# Patient Record
Sex: MALE | Race: WHITE | NOT HISPANIC OR LATINO | Employment: OTHER
[De-identification: names, ages, dates, MRNs, and addresses within clinical notes are randomized per-mention and may not be internally consistent; named-entity substitution may affect disease eponyms.]

---

## 2021-04-06 ENCOUNTER — RECORDS - HEALTHEAST (OUTPATIENT)
Dept: ADMINISTRATIVE | Facility: OTHER | Age: 76
End: 2021-04-06

## 2021-05-18 ENCOUNTER — RECORDS - HEALTHEAST (OUTPATIENT)
Dept: ADMINISTRATIVE | Facility: OTHER | Age: 76
End: 2021-05-18

## 2021-05-28 ENCOUNTER — RECORDS - HEALTHEAST (OUTPATIENT)
Dept: ADMINISTRATIVE | Facility: CLINIC | Age: 76
End: 2021-05-28

## 2021-05-29 ENCOUNTER — RECORDS - HEALTHEAST (OUTPATIENT)
Dept: ADMINISTRATIVE | Facility: CLINIC | Age: 76
End: 2021-05-29

## 2021-05-30 ENCOUNTER — RECORDS - HEALTHEAST (OUTPATIENT)
Dept: ADMINISTRATIVE | Facility: CLINIC | Age: 76
End: 2021-05-30

## 2021-06-24 ENCOUNTER — OFFICE VISIT - HEALTHEAST (OUTPATIENT)
Dept: FAMILY MEDICINE | Facility: CLINIC | Age: 76
End: 2021-06-24

## 2021-06-24 ENCOUNTER — COMMUNICATION - HEALTHEAST (OUTPATIENT)
Dept: FAMILY MEDICINE | Facility: CLINIC | Age: 76
End: 2021-06-24

## 2021-06-24 DIAGNOSIS — I73.9 PERIPHERAL ARTERY DISEASE (H): ICD-10-CM

## 2021-06-24 DIAGNOSIS — R31.0 GROSS HEMATURIA: ICD-10-CM

## 2021-06-24 DIAGNOSIS — Z85.46 HISTORY OF PROSTATE CANCER: ICD-10-CM

## 2021-06-24 DIAGNOSIS — Z98.1 HX OF FUSION OF CERVICAL SPINE: ICD-10-CM

## 2021-06-24 DIAGNOSIS — G62.9 PERIPHERAL POLYNEUROPATHY: ICD-10-CM

## 2021-06-24 DIAGNOSIS — I10 BENIGN ESSENTIAL HYPERTENSION: ICD-10-CM

## 2021-06-24 DIAGNOSIS — N39.46 URINARY INCONTINENCE, MIXED: ICD-10-CM

## 2021-06-24 DIAGNOSIS — Z85.118 PERSONAL HISTORY OF OTHER MALIGNANT NEOPLASM OF BRONCHUS AND LUNG: ICD-10-CM

## 2021-06-24 DIAGNOSIS — R39.11 URINARY HESITANCY: ICD-10-CM

## 2021-06-24 DIAGNOSIS — Z85.118 HX OF CANCER OF LUNG: ICD-10-CM

## 2021-06-24 DIAGNOSIS — E78.5 HYPERLIPIDEMIA LDL GOAL <100: ICD-10-CM

## 2021-06-24 LAB
ALBUMIN SERPL-MCNC: 3.9 G/DL (ref 3.5–5)
ALBUMIN UR-MCNC: ABNORMAL G/DL
ALP SERPL-CCNC: 85 U/L (ref 45–120)
ALT SERPL W P-5'-P-CCNC: 14 U/L (ref 0–45)
ANION GAP SERPL CALCULATED.3IONS-SCNC: 10 MMOL/L (ref 5–18)
APPEARANCE UR: CLEAR
AST SERPL W P-5'-P-CCNC: 22 U/L (ref 0–40)
BASOPHILS # BLD AUTO: 0.1 THOU/UL (ref 0–0.2)
BASOPHILS NFR BLD AUTO: 1 % (ref 0–2)
BILIRUB SERPL-MCNC: 0.5 MG/DL (ref 0–1)
BILIRUB UR QL STRIP: NEGATIVE
BUN SERPL-MCNC: 21 MG/DL (ref 8–28)
CALCIUM SERPL-MCNC: 9.4 MG/DL (ref 8.5–10.5)
CHLORIDE BLD-SCNC: 105 MMOL/L (ref 98–107)
CHOLEST SERPL-MCNC: 114 MG/DL
CO2 SERPL-SCNC: 25 MMOL/L (ref 22–31)
COLOR UR AUTO: COLORLESS
CREAT SERPL-MCNC: 0.97 MG/DL (ref 0.7–1.3)
EOSINOPHIL # BLD AUTO: 0.2 THOU/UL (ref 0–0.4)
EOSINOPHIL NFR BLD AUTO: 3 % (ref 0–6)
ERYTHROCYTE [DISTWIDTH] IN BLOOD BY AUTOMATED COUNT: 15.1 % (ref 11–14.5)
FASTING STATUS PATIENT QL REPORTED: NO
GFR SERPL CREATININE-BSD FRML MDRD: >60 ML/MIN/1.73M2
GLUCOSE BLD-MCNC: 114 MG/DL (ref 70–125)
GLUCOSE UR STRIP-MCNC: NEGATIVE MG/DL
HCT VFR BLD AUTO: 35.1 % (ref 40–54)
HDLC SERPL-MCNC: 33 MG/DL
HGB BLD-MCNC: 11.1 G/DL (ref 14–18)
HGB UR QL STRIP: ABNORMAL
IMM GRANULOCYTES # BLD: 0 THOU/UL
IMM GRANULOCYTES NFR BLD: 0 %
KETONES UR STRIP-MCNC: NEGATIVE MG/DL
LDLC SERPL CALC-MCNC: 49 MG/DL
LEUKOCYTE ESTERASE UR QL STRIP: ABNORMAL
LYMPHOCYTES # BLD AUTO: 2.2 THOU/UL (ref 0.8–4.4)
LYMPHOCYTES NFR BLD AUTO: 34 % (ref 20–40)
MCH RBC QN AUTO: 30.7 PG (ref 27–34)
MCHC RBC AUTO-ENTMCNC: 31.6 G/DL (ref 32–36)
MCV RBC AUTO: 97 FL (ref 80–100)
MONOCYTES # BLD AUTO: 0.5 THOU/UL (ref 0–0.9)
MONOCYTES NFR BLD AUTO: 8 % (ref 2–10)
NEUTROPHILS # BLD AUTO: 3.4 THOU/UL (ref 2–7.7)
NEUTROPHILS NFR BLD AUTO: 54 % (ref 50–70)
NITRATE UR QL: NEGATIVE
PH UR STRIP: 5 [PH] (ref 5–8)
PLATELET # BLD AUTO: 208 THOU/UL (ref 140–440)
PMV BLD AUTO: 9.6 FL (ref 7–10)
POTASSIUM BLD-SCNC: 3.9 MMOL/L (ref 3.5–5)
PROT SERPL-MCNC: 7.1 G/DL (ref 6–8)
PSA SERPL-MCNC: 4.2 NG/ML (ref 0–6.5)
RBC # BLD AUTO: 3.62 MILL/UL (ref 4.4–6.2)
SODIUM SERPL-SCNC: 140 MMOL/L (ref 136–145)
SP GR UR STRIP: 1.01 (ref 1–1.03)
TRIGL SERPL-MCNC: 161 MG/DL
UROBILINOGEN UR STRIP-ACNC: NORMAL
WBC: 6.4 THOU/UL (ref 4–11)

## 2021-06-24 RX ORDER — HYDROCHLOROTHIAZIDE 25 MG/1
25 TABLET ORAL DAILY
Status: SHIPPED | COMMUNITY
Start: 2021-06-24 | End: 2021-09-30

## 2021-06-24 RX ORDER — ATORVASTATIN CALCIUM 80 MG/1
80 TABLET, FILM COATED ORAL AT BEDTIME
Status: SHIPPED | COMMUNITY
Start: 2021-06-24 | End: 2022-01-19

## 2021-06-24 RX ORDER — CLOPIDOGREL BISULFATE 75 MG/1
75 TABLET ORAL DAILY
Status: SHIPPED | COMMUNITY
Start: 2021-06-24 | End: 2022-01-19

## 2021-06-24 RX ORDER — GABAPENTIN 600 MG/1
600 TABLET ORAL 4 TIMES DAILY
Status: SHIPPED | COMMUNITY
Start: 2021-06-24 | End: 2022-01-18

## 2021-06-24 RX ORDER — FERROUS SULFATE 325(65) MG
1 TABLET ORAL
Status: SHIPPED | COMMUNITY
Start: 2021-06-24 | End: 2021-11-08

## 2021-06-24 RX ORDER — LOSARTAN POTASSIUM 50 MG/1
25 TABLET ORAL DAILY
Status: SHIPPED | COMMUNITY
Start: 2021-06-24 | End: 2022-01-21 | Stop reason: ALTCHOICE

## 2021-06-24 ASSESSMENT — MIFFLIN-ST. JEOR: SCORE: 1383.56

## 2021-06-25 LAB — BACTERIA SPEC CULT: NO GROWTH

## 2021-06-26 NOTE — TELEPHONE ENCOUNTER
Who is calling:  Ubaldo Saxena  Reason for Call:  Pt states he forgot to ask Dr. New to complete the form for Handicap sticker in his appt today and would like to know if this can be done.  Date of last appointment with primary care:  6/24/2021  Has the patient been recently seen:  Yes  Okay to leave a detailed message: Yes

## 2021-06-27 ENCOUNTER — COMMUNICATION - HEALTHEAST (OUTPATIENT)
Dept: FAMILY MEDICINE | Facility: CLINIC | Age: 76
End: 2021-06-27

## 2021-06-29 ENCOUNTER — COMMUNICATION - HEALTHEAST (OUTPATIENT)
Dept: FAMILY MEDICINE | Facility: CLINIC | Age: 76
End: 2021-06-29

## 2021-06-29 ENCOUNTER — COMMUNICATION - HEALTHEAST (OUTPATIENT)
Dept: TELEHEALTH | Facility: CLINIC | Age: 76
End: 2021-06-29

## 2021-07-04 NOTE — TELEPHONE ENCOUNTER
Telephone Encounter by Edson New MD at 6/30/2021  3:47 PM     Author: Edson New MD Service: -- Author Type: Physician    Filed: 6/30/2021  3:49 PM Encounter Date: 6/29/2021 Status: Signed    : Edson New MD (Physician)       I already sent him one by mail for him to fill out on top and take in. If he doesn't get that per mail I will fill out the one he left.    Dr. New

## 2021-07-04 NOTE — TELEPHONE ENCOUNTER
Telephone Encounter by Edson New MD at 6/29/2021  5:41 PM     Author: Edson New MD Service: -- Author Type: Physician    Filed: 6/29/2021  5:41 PM Encounter Date: 6/29/2021 Status: Signed    : Edson New MD (Physician)       Please inform the patient to make sure that Dr. Gibson knows who his primary care provider is so that we get communication from the urologist and can collaborate our care for the patient.  Thank you,  Dr. New

## 2021-07-04 NOTE — TELEPHONE ENCOUNTER
Telephone Encounter by Tania Maya CMA at 7/1/2021 10:09 AM     Author: Tania Maya CMA Service: -- Author Type: Certified Medical Assistant    Filed: 7/1/2021 10:09 AM Encounter Date: 6/29/2021 Status: Signed    : Tania Maya CMA (Certified Medical Assistant)       Detailed message left on voicemail.

## 2021-07-04 NOTE — TELEPHONE ENCOUNTER
Telephone Encounter by Radha Glez at 6/29/2021  3:44 PM     Author: Radha Glez Service: -- Author Type: --    Filed: 6/29/2021  3:47 PM Encounter Date: 6/29/2021 Status: Signed    : Radha Glez       Forms Request  Name of form/paperwork: DMV  Have you been seen for this request: Yes:  6/24/2021  Do we have the form: Yes- placed in Provider's Inbox (yellow folder)  When is form needed by: when done  How would you like the form returned: Patient   Patient Notified form requests are processed in 3-5 business days: N/A  Okay to leave a detailed message? N/A

## 2021-07-04 NOTE — TELEPHONE ENCOUNTER
Telephone Encounter by Tania Maya CMA at 6/30/2021  9:31 AM     Author: Tania Maya CMA Service: -- Author Type: Certified Medical Assistant    Filed: 6/30/2021  9:31 AM Encounter Date: 6/29/2021 Status: Signed    : Tania Maya CMA (Certified Medical Assistant)       Pt notified and agrees

## 2021-07-04 NOTE — TELEPHONE ENCOUNTER
Telephone Encounter by Radha Glez at 6/29/2021  3:47 PM     Author: Radha Glez Service: -- Author Type: --    Filed: 6/29/2021  4:20 PM Encounter Date: 6/29/2021 Status: Signed    : Radha Glez       Who:  Ubaldo Saxena  Reason:  FYI  Patient wants Dr. New to know that his Urologist is Dr. Gibson with [facility] at [address] [place] [address], [place] [address] - Phone [phone].  Date of last appointment with primary care:  6/24/2021  Has patient been recently seen:  Yes  Okay to leave a detailed message: Yes

## 2021-07-05 PROBLEM — N39.46 URINARY INCONTINENCE, MIXED: Status: ACTIVE | Noted: 2021-06-27

## 2021-07-05 PROBLEM — I73.9 PERIPHERAL ARTERY DISEASE (H): Status: ACTIVE | Noted: 2021-06-27

## 2021-07-05 PROBLEM — Z85.46 HISTORY OF PROSTATE CANCER: Status: ACTIVE | Noted: 2021-06-27

## 2021-07-05 PROBLEM — Z98.1 HX OF FUSION OF CERVICAL SPINE: Status: ACTIVE | Noted: 2021-06-27

## 2021-07-05 PROBLEM — E78.5 HYPERLIPIDEMIA LDL GOAL <100: Status: ACTIVE | Noted: 2021-06-27

## 2021-07-05 PROBLEM — G62.9 PERIPHERAL POLYNEUROPATHY: Status: ACTIVE | Noted: 2021-06-27

## 2021-07-06 VITALS
HEART RATE: 65 BPM | BODY MASS INDEX: 26.33 KG/M2 | SYSTOLIC BLOOD PRESSURE: 120 MMHG | WEIGHT: 158 LBS | HEIGHT: 65 IN | OXYGEN SATURATION: 98 % | DIASTOLIC BLOOD PRESSURE: 82 MMHG

## 2021-07-07 NOTE — LETTER
Letter by Edson New MD at      Author: Edson New MD Service: -- Author Type: --    Filed:  Encounter Date: 6/27/2021 Status: (Other)         Ubaldo Ramos  [address]  [facility] [address]             June 27, 2021         Dear Mr. Ramos,    Below are the results from your recent visit:    Resulted Orders   XR Chest 2 Views    Narrative    EXAM: XR CHEST 2 VIEWS  LOCATION: [facility]  DATE/TIME: 6/24/2021 2:37 PM    INDICATION: Personal history of other malignant neoplasm of bronchus and lung  COMPARISON: None.      Impression    Pectus deformities. Heart size normal. Left midlung opacity which is likely scar. Left pleural scar. Right lung appears clear.       Recent Results (from the past 240 hour(s))   Comprehensive Metabolic Panel   Result Value Ref Range    Sodium 140 136 - 145 mmol/L    Potassium 3.9 3.5 - 5.0 mmol/L    Chloride 105 98 - 107 mmol/L    CO2 25 22 - 31 mmol/L    Anion Gap, Calculation 10 5 - 18 mmol/L    Glucose 114 70 - 125 mg/dL    BUN 21 8 - 28 mg/dL    Creatinine 0.97 0.70 - 1.30 mg/dL    GFR MDRD Af Amer >60 >60 mL/min/1.73m2    GFR MDRD Non Af Amer >60 >60 mL/min/1.73m2    Bilirubin, Total 0.5 0.0 - 1.0 mg/dL    Calcium 9.4 8.5 - 10.5 mg/dL    Protein, Total 7.1 6.0 - 8.0 g/dL    Albumin 3.9 3.5 - 5.0 g/dL    Alkaline Phosphatase 85 45 - 120 U/L    AST 22 0 - 40 U/L    ALT 14 0 - 45 U/L   Lipid Cascade RANDOM   Result Value Ref Range    Cholesterol 114 <=199 mg/dL    Triglycerides 161 (H) <=149 mg/dL    HDL Cholesterol 33 (L) >=40 mg/dL    LDL Calculated 49 <=129 mg/dL    Patient Fasting > 8hrs? No    HM1 (CBC with Diff)   Result Value Ref Range    WBC 6.4 4.0 - 11.0 thou/uL    RBC 3.62 (L) 4.40 - 6.20 mill/uL    Hemoglobin 11.1 (L) 14.0 - 18.0 g/dL    Hematocrit 35.1 (L) 40.0 - 54.0 %    MCV 97 80 - 100 fL    MCH 30.7 27.0 - 34.0 pg    MCHC 31.6 (L) 32.0 - 36.0 g/dL    RDW 15.1 (H) 11.0 - 14.5 %    Platelets 208 140 - 440 thou/uL    MPV  9.6 7.0 - 10.0 fL    Neutrophils % 54 50 - 70 %    Lymphocytes % 34 20 - 40 %    Monocytes % 8 2 - 10 %    Eosinophils % 3 0 - 6 %    Basophils % 1 0 - 2 %    Immature Granulocyte % 0 <=0 %    Neutrophils Absolute 3.4 2.0 - 7.7 thou/uL    Lymphocytes Absolute 2.2 0.8 - 4.4 thou/uL    Monocytes Absolute 0.5 0.0 - 0.9 thou/uL    Eosinophils Absolute 0.2 0.0 - 0.4 thou/uL    Basophils Absolute 0.1 0.0 - 0.2 thou/uL    Immature Granulocyte Absolute 0.0 <=0.0 thou/uL   PSA, Diagnostic (Prostatic-Specific Antigen)   Result Value Ref Range    PSA 4.2 0.0 - 6.5 ng/mL   Urinalysis-UC if Indicated   Result Value Ref Range    Color, UA Colorless Light Yellow, Yellow    Clarity, UA Clear Clear    Glucose, UA Negative Negative    Protein, UA 10 mg/dL Negative    Bilirubin, UA Negative Negative    Urobilinogen, UA Normal <2.0 mg/dL    pH, UA 5.0 5.0 - 8.0    Blood, UA 0.06 mg/dL (!) Negative    Ketones, UA Negative Negative    Nitrite, UA Negative Negative    Leukocytes,  Princess/uL (!) Negative    Specific Gravity, UA 1.015 1.001 - 1.030   Culture, Urine    Specimen: Urine   Result Value Ref Range    Culture No Growth      Ubaldo,    It was nice to see you in the clinic this past week.  Your test results included a chest x-ray which shows some scar tissue in the left lung likely in the location where the lung cancer was and the scar reflects treatment from the radiation therapy.    The urinalysis showed some blood and white blood cells in the urine but the culture was negative thus suggesting no infection.  I am Looking forward to the urologist consultation and recommendations.    The CBC showed that you are mildly anemic with a hemoglobin of 11.1 with normal white blood cells and platelets    Your lipids look quite good with a LDL which is the bad cholesterol at 49.  That is an excellent value.  The good cholesterol which is the HDL is on the low side at 33 and would like to see that in the 40s.  What helps the good cholesterol  is not smoking and trying to be as physically active as possible.    The metabolic panel showed that your blood glucose was 114 so not at the diabetic level.  Your electrolytes, kidney and liver function look normal.    The PSA is a test to look at prostate cancer and a value of 4.2 is in the normal range.  However since you have had a history of prostate cancer and from your report have had a prostatectomy we would expect the PSA to be closer to 0.  Thus it would be good that the urologist know about your current PSA which is 4.2. I do not know how that compares to the last PSA.    I am hopeful that we can quickly get some records from your [place] doctor.  If there are renewals on your prescriptions that are needed please let your pharmacy know.  Once I get medical records from your outside physicians I will be able to get you a referral for ongoing cancer care in regard to your lungs.    I would like to see you back in the clinic in about 3 months for follow-up visit    Please call with questions or contact us using [employer].    Sincerely,        Electronically signed by Edson New MD

## 2021-07-07 NOTE — PROGRESS NOTES
ASSESSMENT/PLAN:       1. Hx of cancer of lung     - HM1(CBC and Differential), hemoglobin 11.1 with normocytic anemia  Normal white blood cell count and platelet count    - Comprehensive Metabolic Panel, random blood sugar 114 with normal electrolytes, kidney and liver function    - XR Chest 2 Views chest x-ray shows no PCT in the left mid to lower lung field which may be evidence for an old scar.  We will need to get outside records and incorporate care through oncology for his lung cancer follow-up    2. History of prostate cancer    - Comprehensive Metabolic Panel  Kidney function looks normal    - PSA, Diagnostic (Prostatic-Specific Antigen), 4.2    - Urinalysis-UC if Indicated, positive blood and leukocytes in the urine however negative growth for bacterial infection    - Culture, Urine, negative    3. Peripheral polyneuropathy  The patient will continue to use gabapentin 600 mg 4 times a day    - Comprehensive Metabolic Panel    4. Urinary hesitancy  Appreciate urology's  involvement in regard to his recent symptoms of urinary hesitancy    - Comprehensive Metabolic Panel  - Urinalysis-UC if Indicated  - Culture, Urine    5. Gross hematuria  Unclear as to the etiology of the gross hematuria  Patient may need to have cystoscopy and CT of the urinary tract  Again outside records would be very helpful    6. Hyperlipidemia LDL goal <100    - Lipid Cascade RANDOM the patient currently is being treated with atorvastatin 80 mg daily    Lipids show that total cholesterol 114/triglycerides 161/HDL 33 and LDL 49      7. Urinary incontinence, mixed  The patient is currently having to change a pad 5-6 times a day because of a mixed stress and urinary urgency type incontinence.  Recommend input from urology    8. Peripheral artery disease (H)  Most importantly the patient needs to stop smoking but is not ready  Continue with atorvastatin as well as low-dose aspirin and Plavix  States that he currently has 5 or 6 stents in  his legs  No symptomatic claudication at this time    9. Hx of fusion of cervical spine  This occurred about 8 years ago    10. Benign essential hypertension  Blood pressure seems to be controlled on hydrochlorothiazide 25 mg daily  Losartan 50 mg daily and metoprolol 50 mg twice daily    11.  Nicotine dependency  Currently smokes half-1 pack/day and has used nicotine patches in the past but not ready to fully quit.    I will send patient letter with test results  Handicap parking permit completed  Follow-up 3 months chronic disease visit  Level of medical decision making moderate  2 or more chronic stable conditions with 1 more acute condition with systemic symptoms that being his urinary symptoms  Amount of data reviewed includes 3 or more unique tests that were ordered, reviewed and managed  Risk of complications moderate requiring prescription drug management        I have counseled the patient for tobacco cessation and the follow up will occur  at the next visit.    Edson New MD      PROGRESS NOTE   6/27/2021    SUBJECTIVE:  Ubaldo Ramos is a 75 y.o. male  who presents for   Chief Complaint   Patient presents with     Establish Care     The patient is seen today to establish care.  Patient recently has moved from [facility] to [place].  Has been away from the area for about 10 years.  I do not have access to outside records from [place].  KETAN completed and will be sent.    1. Hx of cancer of lung  About 3 years ago the patient was diagnosed with lung cancer and it appears that it was in the left lower lung field and he was treated with radiation and chemotherapy.  He has had good follow-ups since then but has not had a recent follow-up with his oncologist.  He does not complain of a significant cough or shortness of breath.  Patient does continue to smoke.    2. History of prostate cancer  The patient's prostate cancer was diagnosed about 12 years ago and he had a prostatectomy and  radiation therapy.  He has an appointment with urology next week.  He has not had signs of recurrence of prostate cancer.    3. Peripheral polyneuropathy  The patient has a history of peripheral neuropathy that is treated with gabapentin 600 mg 4 times a day.    4. Urinary hesitancy  The patient recently has had urinary hesitancy and has had a Flores catheter in place for a while but currently does not have a Flores.  He has some dysuria and does not feel like he empties his bladder completely.  At times the urine is slow to pass.    5. Gross hematuria  The patient has had gross hematuria passing blood clots and part of his obstructive symptoms may have been related to the blood clots.  He had to have a catheter placed and frequent flushing of the bladder to resolve the blood clots.  The patient is on any blood thinners including low-dose aspirin 81 mg daily and Plavix at 75 mg daily    6. Hyperlipidemia LDL goal <100  The patient is treated with atorvastatin 80 mg daily  He denies any history of coronary vascular events.    7. Urinary incontinence, mixed  He is having some urinary incontinence and has to change a pad about 5-6 times each day.  States that even just going from a supine or sitting to a standing position will cause leakage of urine.  He does not have a good sense as to if he is able to empty his bladder.  He does urinate frequently and his stream of urine can be very slow.  The patient does have an appointment with urology this next week.    8. Peripheral artery disease (H)  The patient has a history of peripheral artery disease in his legs having 6 or 7 stents in his legs.  Currently does not have symptoms of pain with walking in his legs but does have chronic pain related to his neuropathy    9. Hx of fusion of cervical spine  History of cervical fusion in about 2013    10. Benign essential hypertension  The patient's hypertension is treated with hydrochlorothiazide 25 mg daily  Metoprolol 50 mg twice  daily   and losartan 50 mg daily  Blood pressure seems to be controlled  11.  Nicotine dependency  Patient smokes 1/2-1 pack/day and not ready to quit    Patient Active Problem List   Diagnosis     Herniated Disc (L3 - L4)     Lower Back Pain     Fatigue     Adenocarcinoma Of The Prostate Gland     Essential Hypercholesterolemia     Benign Essential Hypertension     Hx of cancer of lung     History of prostate cancer     Peripheral polyneuropathy     Hyperlipidemia LDL goal <100     Urinary incontinence, mixed     Peripheral artery disease (H)     Hx of fusion of cervical spine       Current Outpatient Medications   Medication Sig Dispense Refill     aspirin 81 MG EC tablet Take 81 mg by mouth daily.       atorvastatin (LIPITOR) 80 MG tablet Take 80 mg by mouth at bedtime.       cholecalciferol, vitamin D3, (VITAMIN D3) 25 mcg (1,000 unit) capsule Take 1,000 Units by mouth daily.       clopidogreL (PLAVIX) 75 mg tablet Take 75 mg by mouth daily.       cyanocobalamin 1000 MCG tablet Take 1,000 mcg by mouth daily.       ferrous sulfate 325 (65 FE) MG tablet Take 1 tablet by mouth daily with breakfast.       gabapentin (NEURONTIN) 600 MG tablet Take 600 mg by mouth 4 (four) times a day.       hydroCHLOROthiazide (HYDRODIURIL) 25 MG tablet Take 25 mg by mouth daily. 1/2 tablet daily cut back 2-3 months       losartan (COZAAR) 50 MG tablet Take 50 mg by mouth daily. 1/2 tablet daily started 2-3 months ago       METOPROLOL TARTRATE ORAL Take 50 mg by mouth 2 (two) times a day.       omeprazole (PRILOSEC) 20 MG capsule Take 20 mg by mouth daily before breakfast.       No current facility-administered medications for this visit.        Social History     Tobacco Use   Smoking Status Current Every Day Smoker     Packs/day: 1.00     Types: Cigarettes   Smokeless Tobacco Never Used           OBJECTIVE:        Recent Results (from the past 240 hour(s))   Comprehensive Metabolic Panel   Result Value Ref Range    Sodium 140 136 -  145 mmol/L    Potassium 3.9 3.5 - 5.0 mmol/L    Chloride 105 98 - 107 mmol/L    CO2 25 22 - 31 mmol/L    Anion Gap, Calculation 10 5 - 18 mmol/L    Glucose 114 70 - 125 mg/dL    BUN 21 8 - 28 mg/dL    Creatinine 0.97 0.70 - 1.30 mg/dL    GFR MDRD Af Amer >60 >60 mL/min/1.73m2    GFR MDRD Non Af Amer >60 >60 mL/min/1.73m2    Bilirubin, Total 0.5 0.0 - 1.0 mg/dL    Calcium 9.4 8.5 - 10.5 mg/dL    Protein, Total 7.1 6.0 - 8.0 g/dL    Albumin 3.9 3.5 - 5.0 g/dL    Alkaline Phosphatase 85 45 - 120 U/L    AST 22 0 - 40 U/L    ALT 14 0 - 45 U/L   Lipid Cascade RANDOM   Result Value Ref Range    Cholesterol 114 <=199 mg/dL    Triglycerides 161 (H) <=149 mg/dL    HDL Cholesterol 33 (L) >=40 mg/dL    LDL Calculated 49 <=129 mg/dL    Patient Fasting > 8hrs? No    HM1 (CBC with Diff)   Result Value Ref Range    WBC 6.4 4.0 - 11.0 thou/uL    RBC 3.62 (L) 4.40 - 6.20 mill/uL    Hemoglobin 11.1 (L) 14.0 - 18.0 g/dL    Hematocrit 35.1 (L) 40.0 - 54.0 %    MCV 97 80 - 100 fL    MCH 30.7 27.0 - 34.0 pg    MCHC 31.6 (L) 32.0 - 36.0 g/dL    RDW 15.1 (H) 11.0 - 14.5 %    Platelets 208 140 - 440 thou/uL    MPV 9.6 7.0 - 10.0 fL    Neutrophils % 54 50 - 70 %    Lymphocytes % 34 20 - 40 %    Monocytes % 8 2 - 10 %    Eosinophils % 3 0 - 6 %    Basophils % 1 0 - 2 %    Immature Granulocyte % 0 <=0 %    Neutrophils Absolute 3.4 2.0 - 7.7 thou/uL    Lymphocytes Absolute 2.2 0.8 - 4.4 thou/uL    Monocytes Absolute 0.5 0.0 - 0.9 thou/uL    Eosinophils Absolute 0.2 0.0 - 0.4 thou/uL    Basophils Absolute 0.1 0.0 - 0.2 thou/uL    Immature Granulocyte Absolute 0.0 <=0.0 thou/uL   PSA, Diagnostic (Prostatic-Specific Antigen)   Result Value Ref Range    PSA 4.2 0.0 - 6.5 ng/mL   Urinalysis-UC if Indicated   Result Value Ref Range    Color, UA Colorless Light Yellow, Yellow    Clarity, UA Clear Clear    Glucose, UA Negative Negative    Protein, UA 10 mg/dL Negative    Bilirubin, UA Negative Negative    Urobilinogen, UA Normal <2.0 mg/dL    pH, UA 5.0  "5.0 - 8.0    Blood, UA 0.06 mg/dL (!) Negative    Ketones, UA Negative Negative    Nitrite, UA Negative Negative    Leukocytes,  Princess/uL (!) Negative    Specific Gravity, UA 1.015 1.001 - 1.030   Culture, Urine    Specimen: Urine   Result Value Ref Range    Culture No Growth        Vitals:    06/24/21 1341   BP: 120/82   Pulse: 65   SpO2: 98%   Weight: 158 lb (71.7 kg)   Height: 5' 5\" (1.651 m)     Weight: 158 lb (71.7 kg)          Physical Exam:  GENERAL APPEARANCE: 75-year-old male pleasant NAD, well hydrated, well nourished  SKIN:  Normal skin turgor, no lesions/rashes   HEENT: moist mucous membranes, no rhinorrhea  NECK: Normal without adenopathy or masses, no carotid bruits  CV: RRR, no M/G/R   LUNGS: CTAB  ABDOMEN: S&NT, no masses or enlarged organs, no abdominal bruits  EXTREMITY: no edema and full ROM of all joints  NEURO: no focal findings    "

## 2021-08-15 ENCOUNTER — HEALTH MAINTENANCE LETTER (OUTPATIENT)
Age: 76
End: 2021-08-15

## 2021-08-20 ENCOUNTER — TRANSFERRED RECORDS (OUTPATIENT)
Dept: HEALTH INFORMATION MANAGEMENT | Facility: CLINIC | Age: 76
End: 2021-08-20

## 2021-09-04 ENCOUNTER — TRANSFERRED RECORDS (OUTPATIENT)
Dept: HEALTH INFORMATION MANAGEMENT | Facility: CLINIC | Age: 76
End: 2021-09-04

## 2021-09-30 ENCOUNTER — OFFICE VISIT (OUTPATIENT)
Dept: FAMILY MEDICINE | Facility: CLINIC | Age: 76
End: 2021-09-30
Payer: MEDICARE

## 2021-09-30 VITALS
SYSTOLIC BLOOD PRESSURE: 130 MMHG | HEART RATE: 61 BPM | OXYGEN SATURATION: 96 % | BODY MASS INDEX: 26.74 KG/M2 | WEIGHT: 160.7 LBS | DIASTOLIC BLOOD PRESSURE: 58 MMHG

## 2021-09-30 DIAGNOSIS — I73.9 PERIPHERAL ARTERY DISEASE (H): ICD-10-CM

## 2021-09-30 DIAGNOSIS — I10 BENIGN ESSENTIAL HYPERTENSION: ICD-10-CM

## 2021-09-30 DIAGNOSIS — Z85.118 HX OF CANCER OF LUNG: ICD-10-CM

## 2021-09-30 DIAGNOSIS — Z85.46 HISTORY OF PROSTATE CANCER: ICD-10-CM

## 2021-09-30 DIAGNOSIS — J43.9 PULMONARY EMPHYSEMA, UNSPECIFIED EMPHYSEMA TYPE (H): Primary | ICD-10-CM

## 2021-09-30 PROCEDURE — 99214 OFFICE O/P EST MOD 30 MIN: CPT | Performed by: FAMILY MEDICINE

## 2021-09-30 RX ORDER — ALBUTEROL SULFATE 0.83 MG/ML
2.5 SOLUTION RESPIRATORY (INHALATION) 3 TIMES DAILY PRN
Qty: 90 ML | Refills: 4 | Status: SHIPPED | OUTPATIENT
Start: 2021-09-30 | End: 2023-10-25

## 2021-10-02 NOTE — PROGRESS NOTES
ASSESSMENT/PLAN:       1. Pulmonary emphysema, unspecified emphysema type (H)  Appreciate pulmonary consultation and recommendations for his COPD.  From his history and symptoms it sounds like he likely has more of emphysema rather than chronic bronchitis.    - albuterol (PROVENTIL) (2.5 MG/3ML) 0.083% neb solution; Take 1 vial (2.5 mg) by nebulization 3 times daily as needed for shortness of breath / dyspnea or wheezing  Dispense: 90 mL; Refill: 4  - General PFT Lab (Please always keep checked); Future  - Pulmonary Function Test; Future  - Adult Pulmonary Medicine Referral; Future    2. Peripheral artery disease (H)  The patient is aware of the importance of not smoking to try to prevent progression of his peripheral artery disease    - Vascular Surgery Referral; Future  -  Lower Extremity Arterial Duplex Bilateral; Future    3. 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    4. History of prostate cancer  Previous treatment with radical prostatectomy and radiation therapy  Has had some symptoms of radiation cystitis.  This is followed by urology with Dr. Rasmussen    5.  Benign essential hypertension  Blood pressure seems to be controlled and will continue with his current medication.    Level of medical decision making moderate  Conditions reviewed include 2 or more chronic illnesses which are semistable but need further evaluation.  This includes his pulmonary status and also his vascular status in his lower extremity  Amount of data reviewed included outside records from [place] [facility], [place] [facility], his [place] primary care provider  Risk of complications moderate requiring prescription drug management.  Social determinants of health include the barrier of his continued tobacco usage    Health Maintenance Due   Topic     Diptheria Tetanus  Pertussis (DTAP/TDAP/TD) Vaccine (1 - Tdap)     Flu Vaccine (1)       Edson New MD      PROGRESS NOTE   10/2/2021    SUBJECTIVE:  [id number]  who presents for   Chief Complaint   Patient presents with     Referral     needs vascular referral and pulmonary.      3-month follow-up with patient requesting a referral to a pulmonary specialist and vascular medicine specialist.  The patient has a history from reviewing his outside records classified as moderate COPD.  He was not able to afford Trelegy.  Currently just using a rescue nebulization treatment with albuterol as needed.  The patient continues to smoke.  It has been a couple years since she has had a pulmonary function test.    History of adenocarcinoma of the lung left upper lobe treated with a lobectomy 8/14/2017.  Received chemotherapy and had a recurrence in 2018 in the left upper pleural cavity which was treated with chemotherapy.  A PET CT scan was done 11/5/2018 which showed no active recurrence in the lung.  The patient has been seen by [place] [facility] who are doing some additional evaluation of his lung cancer.  Outside records are not complete.    The patient also has had a history of prostate cancer with a radical prostatectomy and radiation treatment.  This is being followed by Dr. Rasmussen at [facility].    History of peripheral artery disease and has had a number of stents placed he says me in both legs and per medical record I can see mention of iliac stents bilaterally and a enterectomy type procedure done once as well.  The patient does have some claudication and would like to have his vascular status checked in his lower extremity.  The patient is on low-dose aspirin 81 mg daily along with Plavix 75 mg daily    Hypertension seems to be controlled on losartan 25 mg daily along with metoprolol 50 mg twice daily      Patient Active Problem List   Diagnosis     Herniated Disc (L3 - L4)     Lower Back Pain     Fatigue     Adenocarcinoma  Of The Prostate Gland     Essential Hypercholesterolemia     Benign Essential Hypertension     Hx of cancer of lung     History of prostate cancer     Peripheral polyneuropathy     Hyperlipidemia LDL goal <100     Urinary incontinence, mixed     Peripheral artery disease (H)     Hx of fusion of cervical spine       Current Outpatient Medications   Medication Sig Dispense Refill     albuterol (PROVENTIL) (2.5 MG/3ML) 0.083% neb solution Take 1 vial (2.5 mg) by nebulization 3 times daily as needed for shortness of breath / dyspnea or wheezing 90 mL 4     aspirin 81 MG EC tablet [ASPIRIN 81 MG EC TABLET] Take 81 mg by mouth daily.       atorvastatin (LIPITOR) 80 MG tablet [ATORVASTATIN (LIPITOR) 80 MG TABLET] Take 80 mg by mouth at bedtime.       cholecalciferol, vitamin D3, (VITAMIN D3) 25 mcg (1,000 unit) capsule [CHOLECALCIFEROL, VITAMIN D3, (VITAMIN D3) 25 MCG (1,000 UNIT) CAPSULE] Take 1,000 Units by mouth daily.       clopidogreL (PLAVIX) 75 mg tablet [CLOPIDOGREL (PLAVIX) 75 MG TABLET] Take 75 mg by mouth daily.       cyanocobalamin 1000 MCG tablet [CYANOCOBALAMIN 1000 MCG TABLET] Take 1,000 mcg by mouth daily.       ferrous sulfate 325 (65 FE) MG tablet [FERROUS SULFATE 325 (65 FE) MG TABLET] Take 1 tablet by mouth daily with breakfast.       gabapentin (NEURONTIN) 600 MG tablet [GABAPENTIN (NEURONTIN) 600 MG TABLET] Take 600 mg by mouth 4 (four) times a day.       losartan (COZAAR) 50 MG tablet [LOSARTAN (COZAAR) 50 MG TABLET] Take 50 mg by mouth daily. 1/2 tablet daily started 2-3 months ago       METOPROLOL TARTRATE ORAL [METOPROLOL TARTRATE ORAL] Take 50 mg by mouth 2 (two) times a day.       omeprazole (PRILOSEC) 20 MG capsule [OMEPRAZOLE (PRILOSEC) 20 MG CAPSULE] Take 20 mg by mouth daily before breakfast.         History   Smoking Status     Current Every Day Smoker     Packs/day: 1.00     Types: Cigarettes   Smokeless Tobacco     Never Used           OBJECTIVE:      No results found for this or any  previous visit (from the past 720 hour(s)).    Vitals:    09/30/21 1302   BP: 130/58   BP Location: Right arm   Patient Position: Sitting   Cuff Size: Adult Regular   Pulse: 61   SpO2: 96%   Weight: 72.9 kg (160 lb 11.2 oz)     Wt Readings from Last 3 Encounters:   09/30/21 72.9 kg (160 lb 11.2 oz)   06/24/21 71.7 kg (158 lb)           Physical Exam:  GENERAL APPEARANCE: 76-year-old male, NAD, well hydrated, well nourished  SKIN:  Normal skin turgor, no lesions/rashes   HEENT: moist mucous membranes, no rhinorrhea  NECK: Normal without adenopathy or masses  CV: RRR, no M/G/R   LUNGS: Symmetrical breath sounds although decreased without rales rhonchi or wheezes  ABDOMEN: S&NT, no masses or enlarged organs   EXTREMITY: no edema and full ROM of all joints  NEURO: no focal findings

## 2021-10-11 ENCOUNTER — HEALTH MAINTENANCE LETTER (OUTPATIENT)
Age: 76
End: 2021-10-11

## 2021-10-19 ENCOUNTER — HOSPITAL ENCOUNTER (OUTPATIENT)
Dept: RESPIRATORY THERAPY | Facility: CLINIC | Age: 76
Discharge: HOME | End: 2021-10-19
Attending: FAMILY MEDICINE | Admitting: FAMILY MEDICINE
Payer: MEDICARE

## 2021-10-19 DIAGNOSIS — J43.9 PULMONARY EMPHYSEMA, UNSPECIFIED EMPHYSEMA TYPE (H): ICD-10-CM

## 2021-10-19 LAB — HGB BLD-MCNC: 12.1 G/DL (ref 13.3–17.7)

## 2021-10-19 PROCEDURE — 36415 COLL VENOUS BLD VENIPUNCTURE: CPT | Performed by: FAMILY MEDICINE

## 2021-10-19 PROCEDURE — 94726 PLETHYSMOGRAPHY LUNG VOLUMES: CPT | Mod: 26 | Performed by: INTERNAL MEDICINE

## 2021-10-19 PROCEDURE — 94729 DIFFUSING CAPACITY: CPT

## 2021-10-19 PROCEDURE — 85018 HEMOGLOBIN: CPT | Performed by: FAMILY MEDICINE

## 2021-10-19 PROCEDURE — 94060 EVALUATION OF WHEEZING: CPT | Mod: 26 | Performed by: INTERNAL MEDICINE

## 2021-10-19 PROCEDURE — 94729 DIFFUSING CAPACITY: CPT | Mod: 26 | Performed by: INTERNAL MEDICINE

## 2021-10-19 PROCEDURE — 999N000157 HC STATISTIC RCP TIME EA 10 MIN

## 2021-10-19 PROCEDURE — 94060 EVALUATION OF WHEEZING: CPT

## 2021-10-19 PROCEDURE — 94640 AIRWAY INHALATION TREATMENT: CPT

## 2021-10-19 PROCEDURE — 94726 PLETHYSMOGRAPHY LUNG VOLUMES: CPT

## 2021-10-19 RX ORDER — ALBUTEROL SULFATE 0.83 MG/ML
2.5 SOLUTION RESPIRATORY (INHALATION) ONCE
Status: COMPLETED | OUTPATIENT
Start: 2021-10-19 | End: 2021-10-19

## 2021-10-19 RX ADMIN — ALBUTEROL SULFATE 2.5 MG: 0.83 SOLUTION RESPIRATORY (INHALATION) at 12:43

## 2021-10-19 NOTE — LETTER
October 24, 2021      Ubaldo MELANIE Richard  [address]   [facility] [address]      Dear ,    I am writing to inform you of your test results.    Ubaldo,  I have reviewed your pulmonary function test and you have certainly evidence for moderately severe COPD with a mixed picture of obstruction and restrictive lung disease.  I noticed that you have an appointment with the pulmonary doctor on November 8 and would recommend that you keep that appointment.  There likely are some additional interventions that can be done to help you with your breathing.    If I can help in any other way or you have other questions please do not hesitate to contact me or make another appointment to be seen.    Resulted Orders   General PFT Lab (Please always keep checked)   Result Value Ref Range    FVC-Pred 3.53 L    FVC-Pre 1.98 L    FVC-%Pred-Pre 56 %    FEV1-Pre 1.22 L    FEV1-%Pred-Pre 45 %    FEV1FVC-Pred 76 %    FEV1FVC-Pre 61 %    FEFMax-Pred 6.95 L/sec    FEFMax-Pre 5.05 L/sec    FEFMax-%Pred-Pre 72 %    FEF2575-Pred 2.02 L/sec    FEF2575-Pre 0.50 L/sec    XST6297-%Pred-Pre 24 %    FEF2575-Post 0.54 L/sec    FZG6482-%Pred-Post 26 %    ExpTime-Pre 7.19 sec    FIFMax-Pre 3.94 L/sec    VC-Pred 3.95 L    VC-Pre 1.83 L    VC-%Pred-Pre 46 %    IC-Pred 3.15 L    IC-Pre 1.17 L    IC-%Pred-Pre 37 %    ERV-Pred 0.80 L    ERV-Pre 0.66 L    ERV-%Pred-Pre 82 %    FEV1FEV6-Pred 77 %    FEV1FEV6-Pre 63 %    FRCPleth-Pred 3.51 L    FRCPleth-Pre 2.95 L    FRCPleth-%Pred-Pre 83 %    RVPleth-Pred 2.62 L    RVPleth-Pre 2.29 L    RVPleth-%Pred-Pre 87 %    TLCPleth-Pred 6.31 L    TLCPleth-Pre 4.12 L    TLCPleth-%Pred-Pre 65 %    DLCOunc-Pred 22.15 ml/min/mmHg    DLCOunc-Pre 10.67 ml/min/mmHg    DLCOunc-%Pred-Pre 48 %    DLCOcor-Pre 11.58 ml/min/mmHg    DLCOcor-%Pred-Pre 52 %    VA-Pre 3.61 L    VA-%Pred-Pre 65 %    FEV1SVC-Pred 68 %    FEV1SVC-Pre 66 %    Narrative    The FVC, FEV1 and FEV1/FVC ratio are reduced, but the OMJ68-49% is within  normal limits.   The inspiratory flow rates are within normal limits.  The TLC and SVC are reduced, but the FRC is normal.  Following administration of bronchodilators, there is no   significant response.  The diffusing capacity is reduced        IMPRESSION:    Severe mixed obstructive and restrictive physiology is present.    There is no significant bronchodilator response.    Corrected diffusing capacity is moderately reduced.      This interpretation has been electronically signed:  Ok Dela Cruz 10/23/2021  05:09:43 PM       If you have any questions or concerns, please call the clinic at the number listed above.     Sincerely,      Edson New MD

## 2021-10-19 NOTE — PROGRESS NOTES
RESPIRATORY CARE NOTE     Patient Name: Ubaldo Ramos  Today's Date: 10/19/2021     Complete PFT done. Pt performed tests with good effort. Test results meet ATS criteria.Albuterol neb given. Results scanned into epic. Pt left in no distress.       Brittny Delaney, RT

## 2021-10-20 DIAGNOSIS — I10 BENIGN ESSENTIAL HYPERTENSION: Primary | ICD-10-CM

## 2021-10-20 NOTE — TELEPHONE ENCOUNTER
Medication: metoprolol 50 MG  Last Date Filled: 6/24/21  Last appointment addressing medication: 9/30/21  Last B/P:  BP Readings from Last 3 Encounters:   09/30/21 130/58   06/24/21 120/82     Last labs pertaining to refill: N/A      Pend medication and associate diagnosis before routing to Provider for review.       If patient has not been seen in over 1 year, pend 30 day supply and notify patient they are due for an appointment before any further refills.

## 2021-10-21 RX ORDER — METOPROLOL TARTRATE 50 MG
50 TABLET ORAL 2 TIMES DAILY
Qty: 180 TABLET | Refills: 3 | Status: SHIPPED | OUTPATIENT
Start: 2021-10-21 | End: 2022-01-19

## 2021-10-21 NOTE — TELEPHONE ENCOUNTER
"  Disp Refills Start End KOKO    METOPROLOL TARTRATE ORAL     --   Sig - Route: Take 50 mg by mouth 2 (two) times a day. - Oral   Class: Historical Med       METOPROLOL TARTRATE ORAL [[id number]]  Patient-reported historical medication  Ordering date: 06/24/21 1339 Authorized by: PROVIDER, HISTORICAL   Frequency: BID  - Until Discontinued     Routing refill request to provider for review/approval because:  Historical med    Last Written Prescription Date:  ???  Last Fill Quantity: ???,  # refills: ???   Last office visit provider:  9/30/21     Requested Prescriptions   Pending Prescriptions Disp Refills     metoprolol tartrate (LOPRESSOR) 50 MG tablet       Sig: Take 1 tablet (50 mg) by mouth 2 times daily       Beta-Blockers Protocol Passed - 10/20/2021  2:04 PM        Passed - Blood pressure under 140/90 in past 12 months     BP Readings from Last 3 Encounters:   09/30/21 130/58   06/24/21 120/82                 Passed - Patient is age 6 or older        Passed - Recent (12 mo) or future (30 days) visit within the authorizing provider's specialty     Patient has had an office visit with the authorizing provider or a provider within the authorizing providers department within the previous 12 mos or has a future within next 30 days. See \"Patient Info\" tab in inbasket, or \"Choose Columns\" in Meds & Orders section of the refill encounter.              Passed - Medication is active on med list             Mirella Reyes RN 10/21/21 11:46 AM  "

## 2021-10-23 LAB
DLCOCOR-%PRED-PRE: 52 %
DLCOCOR-PRE: 11.58 ML/MIN/MMHG
DLCOUNC-%PRED-PRE: 48 %
DLCOUNC-PRE: 10.67 ML/MIN/MMHG
DLCOUNC-PRED: 22.15 ML/MIN/MMHG
ERV-%PRED-PRE: 82 %
ERV-PRE: 0.66 L
ERV-PRED: 0.8 L
EXPTIME-PRE: 7.19 SEC
FEF2575-%PRED-POST: 26 %
FEF2575-%PRED-PRE: 24 %
FEF2575-POST: 0.54 L/SEC
FEF2575-PRE: 0.5 L/SEC
FEF2575-PRED: 2.02 L/SEC
FEFMAX-%PRED-PRE: 72 %
FEFMAX-PRE: 5.05 L/SEC
FEFMAX-PRED: 6.95 L/SEC
FEV1-%PRED-PRE: 45 %
FEV1-PRE: 1.22 L
FEV1FEV6-PRE: 63 %
FEV1FEV6-PRED: 77 %
FEV1FVC-PRE: 61 %
FEV1FVC-PRED: 76 %
FEV1SVC-PRE: 66 %
FEV1SVC-PRED: 68 %
FIFMAX-PRE: 3.94 L/SEC
FRCPLETH-%PRED-PRE: 83 %
FRCPLETH-PRE: 2.95 L
FRCPLETH-PRED: 3.51 L
FVC-%PRED-PRE: 56 %
FVC-PRE: 1.98 L
FVC-PRED: 3.53 L
IC-%PRED-PRE: 37 %
IC-PRE: 1.17 L
IC-PRED: 3.15 L
RVPLETH-%PRED-PRE: 87 %
RVPLETH-PRE: 2.29 L
RVPLETH-PRED: 2.62 L
TLCPLETH-%PRED-PRE: 65 %
TLCPLETH-PRE: 4.12 L
TLCPLETH-PRED: 6.31 L
VA-%PRED-PRE: 65 %
VA-PRE: 3.61 L
VC-%PRED-PRE: 46 %
VC-PRE: 1.83 L
VC-PRED: 3.95 L

## 2021-11-04 RX ORDER — OXYBUTYNIN CHLORIDE 10 MG/1
1 TABLET, EXTENDED RELEASE ORAL DAILY
COMMUNITY
Start: 2021-10-14 | End: 2022-07-21

## 2021-11-08 ENCOUNTER — OFFICE VISIT (OUTPATIENT)
Dept: PULMONOLOGY | Facility: OTHER | Age: 76
End: 2021-11-08
Attending: FAMILY MEDICINE
Payer: MEDICARE

## 2021-11-08 VITALS
WEIGHT: 159.6 LBS | DIASTOLIC BLOOD PRESSURE: 80 MMHG | BODY MASS INDEX: 26.59 KG/M2 | OXYGEN SATURATION: 97 % | SYSTOLIC BLOOD PRESSURE: 168 MMHG | HEIGHT: 65 IN | HEART RATE: 60 BPM

## 2021-11-08 DIAGNOSIS — F17.200 TOBACCO DEPENDENCE SYNDROME: ICD-10-CM

## 2021-11-08 DIAGNOSIS — J43.9 PULMONARY EMPHYSEMA, UNSPECIFIED EMPHYSEMA TYPE (H): ICD-10-CM

## 2021-11-08 DIAGNOSIS — J44.9 COPD, SEVERE (H): Primary | ICD-10-CM

## 2021-11-08 PROCEDURE — 99204 OFFICE O/P NEW MOD 45 MIN: CPT | Performed by: INTERNAL MEDICINE

## 2021-11-08 RX ORDER — NICOTINE 21 MG/24HR
1 PATCH, TRANSDERMAL 24 HOURS TRANSDERMAL EVERY 24 HOURS
Qty: 30 PATCH | Refills: 11 | Status: SHIPPED | OUTPATIENT
Start: 2021-11-08 | End: 2022-01-19

## 2021-11-08 RX ORDER — OMEPRAZOLE 40 MG/1
CAPSULE, DELAYED RELEASE ORAL
COMMUNITY
Start: 2021-10-16 | End: 2021-11-08

## 2021-11-08 RX ORDER — IPRATROPIUM BROMIDE AND ALBUTEROL SULFATE 2.5; .5 MG/3ML; MG/3ML
1 SOLUTION RESPIRATORY (INHALATION) 4 TIMES DAILY
Qty: 1080 ML | Refills: 3 | Status: SHIPPED | OUTPATIENT
Start: 2021-11-08 | End: 2022-10-12

## 2021-11-08 ASSESSMENT — MIFFLIN-ST. JEOR: SCORE: 1385.82

## 2021-11-08 NOTE — PATIENT INSTRUCTIONS
It was good to meet you in clinic today. This is what we discussed:    1. You have a combination of chronic obstructive pulmonary disease (emphysema due to smoking) and some restriction due to prior left lung surgery and possibly radiation.  2. Try the nicotine patch and inhaler and work on cutting back on smoking.  3. Use the nebulized ipratropium-albuterol (Duoneb) four times daily if possible.  4. Try to stay active with exercise.  5. Look into getting a bacterial pneumonia vaccine called Pneumovax-23.  6. I will see you in one year or sooner if needed.  7. Call any time with questions or concerning symptoms.    Ok Dela Cruz MD  Pulmonary and Critical Care Medicine  [facility]  Office [phone]

## 2021-11-08 NOTE — LETTER
11/8/2021         RE: Ubaldo Ramos  [address]  [facility] [address]        Dear Colleague,    Thank you for referring your patient, Ubaldo Ramos, to the [facility]. Please see a copy of my visit note below.    Pulmonary Clinic Outpatient Consultation    Assessment and Plan:   76 year old male with a history of active tobacco dependence, lung cancer s/p left upper lobectomy in 2017 with recurrence in 2018 s/p chemotherapy and radiation, chronic obstructive pulmonary disease, lumbar herniated disc, prostate cancer s/p radical prostatectomy and radiation, peripheral neuropathy, GERD, cervical spine surgery, peripheral arterial disease s/p left femoral endarterectomy and bilateral iliac stents, cholecystecomy, presenting for evaluation.    Tobacco dependence, chronic obstructive pulmonary disease: Patient recently moved from [place], and is transferring care here. CAT score is 9 (1,0,0,3,2,0,1,2). Precontemplative with regard to smoking cessation, but did accept nicotine replacement therapy because now that he lives in a non-smoking apartment in [place], smoking 1 ppd will lead to a lot of cold exposure. He wants to stick with nebulized therapy, as attempts to prescribe inhalers in [place] led to unaffordable out-of-pocket costs.    Plan:  - use nicotine 21-mg patch daily and as-needed nicotine inhaler; advised smoking cessation  - nebulized ipratropium-albuterol QID if able  - encouraged regular exercise; declines pulmonary rehabilitation  - completed COVID-19 vaccination (Pfizer-Ringostat) including booster  - advised him to receive Pneumovax-23; he will look into it and did not want it today in clinic  - he has oncology follow-up  - follow up in one year or sooner if needed  - encouraged him to call any time with questions or concerning symptoms    Ok Dela Cruz MD  [facility]  Office [phone]  Pager [phone]  (he/him/his)    CCx: tobacco  dependence, chronic obstructive pulmonary disease    HPI: 76 year old male with a history of active tobacco dependence, lung cancer s/p left upper lobectomy in 2017 with recurrence in 2018 s/p chemotherapy and radiation, chronic obstructive pulmonary disease, lumbar herniated disc, prostate cancer s/p radical prostatectomy and radiation, peripheral neuropathy, GERD, cervical spine surgery, peripheral arterial disease s/p left femoral endarterectomy and bilateral iliac stents, cholecystecomy, presenting for evaluation. Patient recently moved from [place], and is transferring care here. CAT score is 9 (1,0,0,3,2,0,1,2). Precontemplative with regard to smoking cessation, but did accept nicotine replacement therapy because now that he lives in a non-smoking apartment in [place], smoking 1 ppd will lead to a lot of cold exposure. He wants to stick with nebulized therapy, as attempts to prescribe inhalers in [place] led to unaffordable out-of-pocket costs.    ROS:  A 12-system review was obtained and was negative with the exception of the symptoms endorsed in the history of present illness.    PMH:  active tobacco dependence  lung cancer s/p left upper lobectomy in 2017 with recurrence in 2018 s/p chemotherapy and radiation  chronic obstructive pulmonary disease  lumbar herniated disc  prostate cancer s/p radical prostatectomy and radiation  peripheral neuropathy  GERD  cervical spine surgery  peripheral arterial disease s/p left femoral endarterectomy and bilateral iliac stents  cholecystecomy    PSH:  [place]  Left upper lobectomy  Prostatectomy  Bilateral iliac stents  Left femoral endarterectomy    Allergies:  No Known Allergies    Family HX:  No family history on file.    Social Hx:  Social History     Socioeconomic History     Marital status:      Spouse name: Not on file     Number of children: Not on file     Years of education: Not on file     Highest education level: Not on file   Occupational History      Not on file   Tobacco Use     Smoking status: Current Every Day Smoker     Packs/day: 1.00     Types: Cigarettes     Smokeless tobacco: Never Used   Substance and Sexual Activity     Alcohol use: Not Currently     Drug use: Not Currently     Sexual activity: Not on file   Other Topics Concern     Not on file   Social History Narrative     Not on file     Social Determinants of Health     Financial Resource Strain: Not on file   Food Insecurity: Not on file   Transportation Needs: Not on file   Physical Activity: Not on file   Stress: Not on file   Social Connections: Not on file   Intimate Partner Violence: Not on file   Housing Stability: Not on file       Current Meds:  Current Outpatient Medications   Medication Sig Dispense Refill     albuterol (PROVENTIL) (2.5 MG/3ML) 0.083% neb solution Take 1 vial (2.5 mg) by nebulization 3 times daily as needed for shortness of breath / dyspnea or wheezing 90 mL 4     aspirin 81 MG EC tablet [ASPIRIN 81 MG EC TABLET] Take 81 mg by mouth daily.       atorvastatin (LIPITOR) 80 MG tablet [ATORVASTATIN (LIPITOR) 80 MG TABLET] Take 80 mg by mouth at bedtime.       cholecalciferol, vitamin D3, (VITAMIN D3) 25 mcg (1,000 unit) capsule [CHOLECALCIFEROL, VITAMIN D3, (VITAMIN D3) 25 MCG (1,000 UNIT) CAPSULE] Take 1,000 Units by mouth daily.       clopidogreL (PLAVIX) 75 mg tablet [CLOPIDOGREL (PLAVIX) 75 MG TABLET] Take 75 mg by mouth daily.       cyanocobalamin 1000 MCG tablet [CYANOCOBALAMIN 1000 MCG TABLET] Take 1,000 mcg by mouth daily.       gabapentin (NEURONTIN) 600 MG tablet [GABAPENTIN (NEURONTIN) 600 MG TABLET] Take 600 mg by mouth 4 (four) times a day.       ipratropium - albuterol 0.5 mg/2.5 mg/3 mL (DUONEB) 0.5-2.5 (3) MG/3ML neb solution Take 1 vial (3 mLs) by nebulization 4 times daily 1080 mL 3     losartan (COZAAR) 50 MG tablet Take 25 mg by mouth daily        metoprolol tartrate (LOPRESSOR) 50 MG tablet Take 1 tablet (50 mg) by mouth 2 times daily 180 tablet 3      "nicotine (NICODERM CQ) 21 MG/24HR 24 hr patch Place 1 patch onto the skin every 24 hours (Patient not taking: Reported on 11/9/2021) 30 patch 11     nicotine (NICOTROL) 10 MG inhaler Use 1 cartridge as needed for urge to smoke by puffing over course of 20min.  Use 6-16 cart/day; reduce number of cart/day over 6-12 weeks. (Patient not taking: Reported on 11/9/2021) 168 each 11     omeprazole (PRILOSEC) 20 MG capsule [OMEPRAZOLE (PRILOSEC) 20 MG CAPSULE] Take 20 mg by mouth daily before breakfast.       oxybutynin ER (DITROPAN-XL) 10 MG 24 hr tablet 1 tablet daily         Physical Exam:  BP (!) 168/80   Pulse 60   Ht 1.651 m (5' 5\")   Wt 72.4 kg (159 lb 9.6 oz)   SpO2 97%   BMI 26.56 kg/m    Gen: alert, oriented, no distress  HEENT: nasal mucosa is unremarkable, no oropharyngeal lesions, no cervical or supraclavicular lymphadenopathy  CV: RRR, no M/G/R  Resp: moderately diminished with prolonged expiratory phase  Skin: no apparent rashes  Ext: no cyanosis, clubbing or edema  Neuro: alert, nonfocal    Labs:  reviewed    Imaging studies:  CXR (June 2021):  - left upper lobectomy  - scar left mid-lung    Pulmonary Function Testing  October 2021:  FVC 1.98 (56%)  FEV1 1.22 (45%)  Ratio 0.61 (LLN 0.62)  No BD response  RV 2.29 (87%)  TLC 4.12 (65%)  RV/TLC 0.56 (128%)  DLCOc 52%  Mixed obstructive/restrictive loop morphology      Again, thank you for allowing me to participate in the care of your patient.        Sincerely,        Ok Dela Cruz MD  "

## 2021-11-09 ENCOUNTER — OFFICE VISIT (OUTPATIENT)
Dept: VASCULAR SURGERY | Facility: CLINIC | Age: 76
End: 2021-11-09
Attending: FAMILY MEDICINE
Payer: MEDICARE

## 2021-11-09 ENCOUNTER — ANCILLARY PROCEDURE (OUTPATIENT)
Dept: VASCULAR ULTRASOUND | Facility: CLINIC | Age: 76
End: 2021-11-09
Attending: FAMILY MEDICINE
Payer: MEDICARE

## 2021-11-09 VITALS — RESPIRATION RATE: 16 BRPM | SYSTOLIC BLOOD PRESSURE: 132 MMHG | DIASTOLIC BLOOD PRESSURE: 80 MMHG | HEART RATE: 60 BPM

## 2021-11-09 DIAGNOSIS — I73.9 PERIPHERAL ARTERY DISEASE (H): ICD-10-CM

## 2021-11-09 DIAGNOSIS — R09.89 OTHER SPECIFIED SYMPTOMS AND SIGNS INVOLVING THE CIRCULATORY AND RESPIRATORY SYSTEMS: ICD-10-CM

## 2021-11-09 DIAGNOSIS — Z13.6 ENCOUNTER FOR SCREENING FOR STENOSIS OF CAROTID ARTERY: Primary | ICD-10-CM

## 2021-11-09 PROCEDURE — G0463 HOSPITAL OUTPT CLINIC VISIT: HCPCS | Mod: 25

## 2021-11-09 PROCEDURE — 93922 UPR/L XTREMITY ART 2 LEVELS: CPT

## 2021-11-09 PROCEDURE — 93925 LOWER EXTREMITY STUDY: CPT

## 2021-11-09 NOTE — PATIENT INSTRUCTIONS
Carotid Duplex    Steps for the test are:    You will be asked to lie on a stretcher. It will be okay for you to wear your street clothes. In some situations, you may be asked to change into a gown.      Ultrasound gel, usually warmed for your comfort, will be placed on either side of your neck.      Through the gel, the technician will apply to your neck a small hand-held device that emits sound waves.     When the test is completed, the technician will remove excess gel from your neck. The gel is water-soluble and will not stain your skin or clothes.    How to Prepare:    Eat and take medications as usual.     Wear minimal or no jewelry to ease application and removal of gel.    Risks  There are typically no side effects or complications associated with a carotid duplex ultrasound examination.    What Can I Expect After the Test?  The technician will send the ultrasound images to your vascular surgeon for evaluation. Typically, a report is available in 2-3 days. If anything critical is found, it is standard practice to notify the vascular surgeon immediately.  Reference: [email or web address]://[email or web address]/patient-resources/vascular-tests    Ankle-Brachial Index (TITI) or Physiologic Test    Description  An ankle-brachial index test is relatively pain free. Blood pressure cuffs of various sizes are placed on your thigh, calf, foot and toes.  Similar to having your blood pressure checked with an arm cuff, as the technician inflates the cuffs, they progressively tighten and are then quickly released.  You may feel some discomfort, but generally for less than 60 seconds for each measurement. You will be asked to remove your socks and shoes and possibly your pants or shorts. Gowns will be provided. It usually takes about 30-60 minutes.   Depending on the initial readings and patient symptoms, you may be asked to perform a light walk on a treadmill.  The technician will apply ultrasound gel, usually warmed for your comfort, to your  ankles and wrists. Through the gel, the technician will use a small hand-held device that emits sound waves.  Risks  There are typically no side effects or complications associated with a physiologic study.  How to Prepare  Eat and take medications as usual.  There is no preparation required for an ankle-brachial index (TITI) or physiologic exam.  What Can I Expect After the Test?  The technician will send the ultrasound images to your vascular surgeon for evaluation. Typically, a report is available in 2-3 days. If anything critical is found, it is standard practice to notify the vascular surgeon immediately.  Reference: [email or web address]/patient-resources/vascular-tests    Lower Extremity Arterial Ultrasound    Description  Ultrasound examinations are painless and easy for the patient. The vascular laboratory will contain a bed and just two or three pieces of equipment. You will be asked to remove pants or shorts and gowns will be provided. It usually takes about 30 minutes.  The technician will tuck a towel under your underpants in the groin. The gel is water-soluble and will not stain your skin or clothes.  Ultrasound gel, usually warmed for your comfort, will be placed on the inner side of your legs.    Through the gel, the technician will apply to your legs a small hand-held device that emits sound waves.  When the test is completed, the technician will remove excess gel from your legs.    Risks  There are typically no side effects or complications associated with a lower extremity arterial duplex ultrasound.  How to Prepare  Eat and take medications as usual.  There is no preparation required for a lower extremity arterial duplex ultrasound.  What Can I Expect After the Test?  The technician will send the ultrasound images to your vascular surgeon for evaluation. Typically, a report is available in 2-3 days. If anything critical is found, it is standard practice to notify the vascular surgeon  immediately.  Reference: [email or web address].org/patient-resources/vascular-tests

## 2021-11-09 NOTE — PROGRESS NOTES
Pulmonary Clinic Outpatient Consultation    Assessment and Plan:   76 year old male with a history of active tobacco dependence, lung cancer s/p left upper lobectomy in 2017 with recurrence in 2018 s/p chemotherapy and radiation, chronic obstructive pulmonary disease, lumbar herniated disc, prostate cancer s/p radical prostatectomy and radiation, peripheral neuropathy, GERD, cervical spine surgery, peripheral arterial disease s/p left femoral endarterectomy and bilateral iliac stents, cholecystecomy, presenting for evaluation.    Tobacco dependence, chronic obstructive pulmonary disease: Patient recently moved from [place], and is transferring care here. CAT score is 9 (1,0,0,3,2,0,1,2). Precontemplative with regard to smoking cessation, but did accept nicotine replacement therapy because now that he lives in a non-smoking apartment in [place], smoking 1 ppd will lead to a lot of cold exposure. He wants to stick with nebulized therapy, as attempts to prescribe inhalers in [place] led to unaffordable out-of-pocket costs.    Plan:  - use nicotine 21-mg patch daily and as-needed nicotine inhaler; advised smoking cessation  - nebulized ipratropium-albuterol QID if able  - encouraged regular exercise; declines pulmonary rehabilitation  - completed COVID-19 vaccination (Pfizer-BioNTech) including booster  - advised him to receive Pneumovax-23; he will look into it and did not want it today in clinic  - he has oncology follow-up  - follow up in one year or sooner if needed  - encouraged him to call any time with questions or concerning symptoms    Ok Dela Cruz MD  [facility]  Office [phone]  Pager [phone]  (he/him/his)    CCx: tobacco dependence, chronic obstructive pulmonary disease    HPI: 76 year old male with a history of active tobacco dependence, lung cancer s/p left upper lobectomy in 2017 with recurrence in 2018 s/p chemotherapy and radiation, chronic obstructive pulmonary disease,  lumbar herniated disc, prostate cancer s/p radical prostatectomy and radiation, peripheral neuropathy, GERD, cervical spine surgery, peripheral arterial disease s/p left femoral endarterectomy and bilateral iliac stents, cholecystecomy, presenting for evaluation. Patient recently moved from [place], and is transferring care here. CAT score is 9 (1,0,0,3,2,0,1,2). Precontemplative with regard to smoking cessation, but did accept nicotine replacement therapy because now that he lives in a non-smoking apartment in [place], smoking 1 ppd will lead to a lot of cold exposure. He wants to stick with nebulized therapy, as attempts to prescribe inhalers in [place] led to unaffordable out-of-pocket costs.    ROS:  A 12-system review was obtained and was negative with the exception of the symptoms endorsed in the history of present illness.    PMH:  active tobacco dependence  lung cancer s/p left upper lobectomy in 2017 with recurrence in 2018 s/p chemotherapy and radiation  chronic obstructive pulmonary disease  lumbar herniated disc  prostate cancer s/p radical prostatectomy and radiation  peripheral neuropathy  GERD  cervical spine surgery  peripheral arterial disease s/p left femoral endarterectomy and bilateral iliac stents  cholecystecomy    PSH:  [place]  Left upper lobectomy  Prostatectomy  Bilateral iliac stents  Left femoral endarterectomy    Allergies:  No Known Allergies    Family HX:  No family history on file.    Social Hx:  Social History     Socioeconomic History     Marital status:      Spouse name: Not on file     Number of children: Not on file     Years of education: Not on file     Highest education level: Not on file   Occupational History     Not on file   Tobacco Use     Smoking status: Current Every Day Smoker     Packs/day: 1.00     Types: Cigarettes     Smokeless tobacco: Never Used   Substance and Sexual Activity     Alcohol use: Not Currently     Drug use: Not Currently     Sexual activity:  Not on file   Other Topics Concern     Not on file   Social History Narrative     Not on file     Social Determinants of Health     Financial Resource Strain: Not on file   Food Insecurity: Not on file   Transportation Needs: Not on file   Physical Activity: Not on file   Stress: Not on file   Social Connections: Not on file   Intimate Partner Violence: Not on file   Housing Stability: Not on file       Current Meds:  Current Outpatient Medications   Medication Sig Dispense Refill     albuterol (PROVENTIL) (2.5 MG/3ML) 0.083% neb solution Take 1 vial (2.5 mg) by nebulization 3 times daily as needed for shortness of breath / dyspnea or wheezing 90 mL 4     aspirin 81 MG EC tablet [ASPIRIN 81 MG EC TABLET] Take 81 mg by mouth daily.       atorvastatin (LIPITOR) 80 MG tablet [ATORVASTATIN (LIPITOR) 80 MG TABLET] Take 80 mg by mouth at bedtime.       cholecalciferol, vitamin D3, (VITAMIN D3) 25 mcg (1,000 unit) capsule [CHOLECALCIFEROL, VITAMIN D3, (VITAMIN D3) 25 MCG (1,000 UNIT) CAPSULE] Take 1,000 Units by mouth daily.       clopidogreL (PLAVIX) 75 mg tablet [CLOPIDOGREL (PLAVIX) 75 MG TABLET] Take 75 mg by mouth daily.       cyanocobalamin 1000 MCG tablet [CYANOCOBALAMIN 1000 MCG TABLET] Take 1,000 mcg by mouth daily.       gabapentin (NEURONTIN) 600 MG tablet [GABAPENTIN (NEURONTIN) 600 MG TABLET] Take 600 mg by mouth 4 (four) times a day.       ipratropium - albuterol 0.5 mg/2.5 mg/3 mL (DUONEB) 0.5-2.5 (3) MG/3ML neb solution Take 1 vial (3 mLs) by nebulization 4 times daily 1080 mL 3     losartan (COZAAR) 50 MG tablet Take 25 mg by mouth daily        metoprolol tartrate (LOPRESSOR) 50 MG tablet Take 1 tablet (50 mg) by mouth 2 times daily 180 tablet 3     nicotine (NICODERM CQ) 21 MG/24HR 24 hr patch Place 1 patch onto the skin every 24 hours (Patient not taking: Reported on 11/9/2021) 30 patch 11     nicotine (NICOTROL) 10 MG inhaler Use 1 cartridge as needed for urge to smoke by puffing over course of 20min.   "Use 6-16 cart/day; reduce number of cart/day over 6-12 weeks. (Patient not taking: Reported on 11/9/2021) 168 each 11     omeprazole (PRILOSEC) 20 MG capsule [OMEPRAZOLE (PRILOSEC) 20 MG CAPSULE] Take 20 mg by mouth daily before breakfast.       oxybutynin ER (DITROPAN-XL) 10 MG 24 hr tablet 1 tablet daily         Physical Exam:  BP (!) 168/80   Pulse 60   Ht 1.651 m (5' 5\")   Wt 72.4 kg (159 lb 9.6 oz)   SpO2 97%   BMI 26.56 kg/m    Gen: alert, oriented, no distress  HEENT: nasal mucosa is unremarkable, no oropharyngeal lesions, no cervical or supraclavicular lymphadenopathy  CV: RRR, no M/G/R  Resp: moderately diminished with prolonged expiratory phase  Skin: no apparent rashes  Ext: no cyanosis, clubbing or edema  Neuro: alert, nonfocal    Labs:  reviewed    Imaging studies:  CXR (June 2021):  - left upper lobectomy  - scar left mid-lung    Pulmonary Function Testing  October 2021:  FVC 1.98 (56%)  FEV1 1.22 (45%)  Ratio 0.61 (LLN 0.62)  No BD response  RV 2.29 (87%)  TLC 4.12 (65%)  RV/TLC 0.56 (128%)  DLCOc 52%  Mixed obstructive/restrictive loop morphology  "

## 2021-11-09 NOTE — NURSING NOTE
[facility]        Patient is here for a consult to discuss Peripheral artery disease (PAD). Symptoms include pain with walking. PAD. Dr. New referring. Iliac stents bilaterally and a enterectomy type procedure done once as well, pt lived in [place] for 10 years where the procedures were done.     Pt is currently taking Aspirin, Statin and Plavix.    There were no vitals taken for this visit.    The provider has been notified that the patient has no concerns.     Questions patient would like addressed today are: Pt does qualify for a carotid screen, no family HX of stroke.    Refills are needed: No    Has homecare services and agency name:  No       Nikki Wilson RN

## 2021-11-09 NOTE — RESULT ENCOUNTER NOTE
Ubaldo,    The noninvasive evaluations of the peripheral arteries in your legs were done yesterday and the results look very satisfactory.  No critical narrowing with adequate circulation to your feet.  I do believe that you are seeing the vascular medicine doctor as well and I do not have the report from that Dr.  If you have particular questions or if I can help in any other way please let me know.  Unless the vascular medicine doctor suggest that you stop aspirin or Plavix I would continue on both aspirin and Plavix and most importantly is to stop smoking.    Best regards,Dr. New

## 2021-11-09 NOTE — PROGRESS NOTES
VASCULAR OUTPATIENT CONSULT OR VISIT  PHYSICIAN: Miguel Callejas MD  NEW PATIENT    LOCATION: [facility]    Ubaldo Ramos   Medical Record #:  [id number]  YOB: 1945  Age:  76 year old     Date of Service: 11/9/2021    PRIMARY CARE PROVIDER: Edson New    Reason for visit: Peripheral vascular disease, lower extremity pain    IMPRESSION: 76-year-old male with bilateral lower extremity pain.  The patient has an extensive past medical and surgical history including multiple stenting procedures involving his bilateral iliac arteries as well as a right to left femoral-femoral bypass all of which were performed in [place].  Overall, he reports no significant improvement despite undergoing all these interventions.  He is able to perform his daily activities and can walk with the assistance of a cane when traveling longer distances.  Noninvasive imaging performed today demonstrates an ankle-brachial index of 0.83 on the right and 0.85 on the left.  His arterial ultrasound demonstrates a patent bypass and lower extremity vasculature.  Overall, his peripheral vascular disease is stable and we will optimize medical therapy.    RECOMMENDATION:    1.  Continue guideline recommended medical therapy for peripheral disease  -The patient has been taking dual antiplatelet therapy with aspirin and Plavix for many years.  He denies any bleeding complications.  No changes will be made to this regimen  -Continue high intensity statin therapy with Lipitor 80 mg once daily  -The patient's blood pressure is noted to be 132/80 mmHg today.  Continue guideline recommended antihypertensive therapy with an angiotensin receptor blocker  -A CT urogram performed at [facility] in August 2021 demonstrates no evidence of an aortic aneurysm.  His bilateral iliac stents appear patent  -Obtain screening carotid ultrasound    Encourage smoking cessation.  The patient has a longstanding history of smoking and continues  to smoke despite being diagnosed and treated for lung cancer.  I have offered the patient pharmacological assistance with either Chantix or Wellbutrin, however, the patient does not wish to quit at this time.    Return to clinic in 1 year for surveillance imaging or sooner if the need should arise.    HPI:  Ubaldo Ramos is a 76 year old male who was evaluated today to establish care for peripheral arterial disease.  The patient reports occasional lower extremity pain when walking longer distances, however, is able to perform his daily activities when ambulating with a cane.  He denies any lower extremity rest pain or wounds/ulcerations.  His past medical history is significant for peripheral arterial disease for which he has undergone multiple endovascular interventions in [place].  He has undergone a right to left femoral-femoral bypass.  Despite all these interventions, he does not report any significant improvement in his lower extremity symptoms post intervention.    PHH:  No past medical history on file.   No past surgical history on file.    ALLERGIES:  Patient has no known allergies.    MEDS:    Current Outpatient Medications:      albuterol (PROVENTIL) (2.5 MG/3ML) 0.083% neb solution, Take 1 vial (2.5 mg) by nebulization 3 times daily as needed for shortness of breath / dyspnea or wheezing, Disp: 90 mL, Rfl: 4     aspirin 81 MG EC tablet, [ASPIRIN 81 MG EC TABLET] Take 81 mg by mouth daily., Disp: , Rfl:      atorvastatin (LIPITOR) 80 MG tablet, [ATORVASTATIN (LIPITOR) 80 MG TABLET] Take 80 mg by mouth at bedtime., Disp: , Rfl:      cholecalciferol, vitamin D3, (VITAMIN D3) 25 mcg (1,000 unit) capsule, [CHOLECALCIFEROL, VITAMIN D3, (VITAMIN D3) 25 MCG (1,000 UNIT) CAPSULE] Take 1,000 Units by mouth daily., Disp: , Rfl:      clopidogreL (PLAVIX) 75 mg tablet, [CLOPIDOGREL (PLAVIX) 75 MG TABLET] Take 75 mg by mouth daily., Disp: , Rfl:      cyanocobalamin 1000 MCG tablet, [CYANOCOBALAMIN 1000 MCG TABLET] Take  1,000 mcg by mouth daily., Disp: , Rfl:      gabapentin (NEURONTIN) 600 MG tablet, [GABAPENTIN (NEURONTIN) 600 MG TABLET] Take 600 mg by mouth 4 (four) times a day., Disp: , Rfl:      ipratropium - albuterol 0.5 mg/2.5 mg/3 mL (DUONEB) 0.5-2.5 (3) MG/3ML neb solution, Take 1 vial (3 mLs) by nebulization 4 times daily, Disp: 1080 mL, Rfl: 3     losartan (COZAAR) 50 MG tablet, Take 25 mg by mouth daily , Disp: , Rfl:      metoprolol tartrate (LOPRESSOR) 50 MG tablet, Take 1 tablet (50 mg) by mouth 2 times daily, Disp: 180 tablet, Rfl: 3     omeprazole (PRILOSEC) 20 MG capsule, [OMEPRAZOLE (PRILOSEC) 20 MG CAPSULE] Take 20 mg by mouth daily before breakfast., Disp: , Rfl:      oxybutynin ER (DITROPAN-XL) 10 MG 24 hr tablet, 1 tablet daily, Disp: , Rfl:      nicotine (NICODERM CQ) 21 MG/24HR 24 hr patch, Place 1 patch onto the skin every 24 hours (Patient not taking: Reported on 11/9/2021), Disp: 30 patch, Rfl: 11     nicotine (NICOTROL) 10 MG inhaler, Use 1 cartridge as needed for urge to smoke by puffing over course of 20min.  Use 6-16 cart/day; reduce number of cart/day over 6-12 weeks. (Patient not taking: Reported on 11/9/2021), Disp: 168 each, Rfl: 11    SOCIAL HABITS:    History   Smoking Status     Current Every Day Smoker     Packs/day: 1.00     Types: Cigarettes   Smokeless Tobacco     Never Used     Social History    Substance and Sexual Activity      Alcohol use: Not Currently      History   Drug Use Unknown       FAMILY HISTORY:  No family history on file.    ADVANCE CARE DIRECTIVES:    Advance care directives reviewed in the chart and no changes made.     PE:  /80   Pulse 60   Resp 16   Wt Readings from Last 1 Encounters:   11/08/21 72.4 kg (159 lb 9.6 oz)     There is no height or weight on file to calculate BMI.    EXAM:  GENERAL: This is a well-developed 76 year old male who appears his stated age  EYES: Grossly normal.  MOUTH: Buccal mucosa normal   MUSCULOSKELETAL: Grossly normal and both  lower extremities are intact.  HEME/LYMPH: No lymphedema  NEUROLOGIC: Focally intact, Alert and oriented x 3.   PSYCH: appropriate affect  INTEGUMENT: No open lesions or ulcers    DIAGNOSTIC STUDIES:     Images:  US Lower Extremity Arterial Duplex Bilateral    Result Date: 11/9/2021  Arterial Duplex Ultrasound (Date: 11/09/21) Lower Extremity Artery Evaluation Indication: Surveillance of Right to Left CFA-CFA Bypass Graft/Hx of Bilateral JAIRO stents, leg pain  Previous: Unavailable done it [place] History: Current Smoker, Hypertension, PAD, Angioplasty and Vascular Surgery Technique: Duplex imaging is performed utilizing gray-scale, two-dimensional images, and color-flow imaging. Doppler waveform analysis and spectral Doppler imaging is also performed. LOWER EXTREMITY ARTERIAL DUPLEX EXAM WITH WAVEFORMS Right Leg:(cm/s) Location: Velocities Waveforms EIA:   282  B CFA:   340  T PFA:   105  T SFA Proximal:   145  T SFA Mid:   168  T SFA Distal:   154  T Popliteal Artery:   101  T PTA:   88   M ANTONIA:   96  M DPA:   87  M Waveforms: T=Triphasic, M=Monophasic, B=Biphasic Left Leg:(cm/s) Location: Velocities Waveforms EIA:   316  B CFA:   121  B PFA:   97  B SFA Proximal:   130  T SFA Mid:   178  T SFA Distal:   113  T Popliteal Artery:   86  T PTA:   99   M ANTONIA:   88  M DPA:   77  M Waveforms: T=Triphasic, M=Monophasic, B=Biphasic Right to Left CFA-CFA Bypass Graft (cm/s)                 Location Velocity  Waveforms RT CFA INFLOW 340 T  Inflow Artery RT CFA   Proximal Anastamosis 83 T Proximal Bypass 31 M Mid Bypass 45  M Distal Bypass 37 M Distal Anastamosis 22  B Outflow Artery LT CFA    LT CFA OUTFLOW 121 B Waveforms: T=Triphasic, M=Monophasic, B=Biphasic Impression: 1. RIGHT LOWER EXTREMITY: Patent external iliac artery with biphasic flow.  Patent vasculature to the popliteal artery with multiphasic flow.  Patent infrapopliteal vessels with monophasic flow.  Patent right to left femoral-femoral bypass graft.  No focal  stenosis identified. 2. LEFT LOWER EXTREMITY: Patent vasculature to the popliteal artery with multiphasic flow.  Patent infrapopliteal vessels with monophasic flow.  No focal stenosis identified. Reference: Category Normal Intermediate Severe Occluded  PSV  cm/s 151-300 cm/s <45 or >300cm/s Absent Flow Ratio <1.5 1.5-3.4 >3.4 N/A Reference: Category Normal 1-19% 20-49% 50-99% Occluded PSV <160 cm/sec without spectral broadening <160 cm/sec with spectral broadening Increased Increased Absent Flow Ratio N/A N/A < 2.0 >2.0 N/A Post-Stenotic Turbulence No No No Yes N/A     US TITI Doppler No Exercise    Result Date: 11/9/2021  BILATERAL RESTING ANKLE-BRACHIAL INDICES (TITI'S) (Date: 11/09/21) Indication: Surveillance of Right to Left CFA-CFA Bypass Graft/Hx of Bilateral JAIRO stents, leg pain  Previous: Unavailable done it [place] History: Current Smoker, Hypertension, PAD, Angioplasty and Vascular Surgery  Resting TITI's          Right: mmHg Index     Brachial: 177  Ankle-(PT): 147 0.83 Ankle-(DP): 132 0.75          Digit Room Temp. : 97 0.55               Left: mmHg Index     Brachial: 164  Ankle-(PT): 151 0.85 Ankle-(DP): 134 0.76          Digit Room Temp. : 102 0.58 Resting ankle-brachial index of 0.83 on the right. Toe Pressures of 97 mmHg and TBI of 0.55  Resting ankle-brachial index of 0.85 on the left. Toe Pressures of 102 mmHg and TBI of 0.58  WAVEFORMS: The right dorsalis pedis and posterior tibial arteries show monophasic waveforms. The left dorsalis pedis and posterior tibial arteries show monophasic waveforms. Impression:  1. RIGHT LOWER EXTREMITY: TITI is Abnormal with an TITI of 0.83. and Mildly abnormal, but adequate for healing toe pressures of 97 mmHg. 2. LEFT LOWER EXTREMITY: TITI is Abnormal with an TITI of 0.85. and Mildly abnormal, but adequate for healing toe pressures of 102 mmHg. Reference: Wound classification Grade TITI Ankle Systolic Pressure Toe Pressures 0 > 0.80 > 100 mmHg > 60 mmHg 1 0.6 -  0.79 70 - 100 mmHg 40 - 59 mmHg 2 0.4 - 0.59 50-70 mmHg 30 - 39 mmHg 3 < 0.39 < 50 mmHg < 30 mmHg Digit Pressures DBI Disease Category > 0.70 Normal < 0.70 Abnormal > 30 mmHg Potential wound healing < 30 mmHg Impaired wound healing Ankle Brachial Pressures TITI Disease Category > 1.3  Likely vessel calcification with monophasic waveforms, non-diagnostic 0.95-1.30 Normal with multiphasic waveforms 0.50-0.95 Single level disease 0.30-0.50 Multilevel disease < 0.30 Critical limb ischema Volume Plethysmography Recording (VPR) at all levels Normal Sharp systolic peak, fast upstroke, prominent dicrotic notch in wave Mild Sharp systolic peak, fast upstroke, absent dicrotic notch in wave Moderate Flattened systolic peak, slowed upstroke, absent dicrotic notch in wave Severe Low-amplitude wave with = upslope and down slope Occluded Flat Line Multiple Level Segmental Pressures  Normal Abnormal Upper Thigh > 1.2 pressure indices, <30 mmHg between lateral and horizontal cuffs < 0.8 pressure indices suggest proximal occlusion, 0.8-1.2 pressure indices suggest inflow disease, > 30 mmHg between lateral and horizontal cuffs  Lower Thigh < 30 mmHg between lateral and horizontal cuffs > 30 mmHg between lateral and horizontal cuffs Upper Calf < 30 mmHg between lateral and horizontal cuffs > 30 mmHg between lateral and horizontal cuffs Note: As limb diameter increases, pressure measurements also increase.      I personally reviewed the images and my interpretation is as above.    LABS:      Sodium   Date Value Ref Range Status   06/24/2021 140 136 - 145 mmol/L Final     Urea Nitrogen   Date Value Ref Range Status   06/24/2021 21 8 - 28 mg/dL Final     Hemoglobin   Date Value Ref Range Status   10/19/2021 12.1 (L) 13.3 - 17.7 g/dL Final   06/24/2021 11.1 (L) 14.0 - 18.0 g/dL Final     Platelet Count   Date Value Ref Range Status   06/24/2021 208 140 - 440 thou/uL Final       This was a in person visit in which 45 minutes of  total time  was spent (either in face-to-face or non-face-to-face time).  A total of 4 minutes of smoking cessation counseling was provided to the patient including the risk factors as well as possible pharmacological treatment.    Miguel Callejas MD, MD, [facility]  VASCULAR AND INTERVENTIONAL PHYSICIAN  VASCULAR MEDICINE  INTERNAL MEDICINE  PAGER: [phone]  CALL SERVICE: [phone]

## 2022-01-18 DIAGNOSIS — R10.13 DYSPEPSIA: ICD-10-CM

## 2022-01-18 DIAGNOSIS — G62.9 PERIPHERAL POLYNEUROPATHY: Primary | ICD-10-CM

## 2022-01-18 RX ORDER — GABAPENTIN 600 MG/1
600 TABLET ORAL 4 TIMES DAILY
Qty: 360 TABLET | Refills: 2 | Status: SHIPPED | OUTPATIENT
Start: 2022-01-18 | End: 2022-06-30

## 2022-01-18 NOTE — TELEPHONE ENCOUNTER
Called pt to clarify what medication he needs.     Medications were pended.     Medication: Gabapentin 600mg and omeprazole 20mg  Last Date Filled: both on 6/24/21.  Last appointment addressing medication: 6/24/21  Last B/P:  BP Readings from Last 3 Encounters:   11/09/21 132/80   11/08/21 (!) 168/80   09/30/21 130/58     Last labs pertaining to refill:N/A      Pend medication and associate diagnosis before routing to Provider for review.       If patient has not been seen in over 1 year, pend 30 day supply and notify patient they are due for an appointment before any further refills.       Tatyana Escamilla, CMA

## 2022-01-18 NOTE — TELEPHONE ENCOUNTER
Contact First & Last Name if other than the patient involved: Pt  Main reason for the request: Changing pharmacy to [facility] in [place] and needs refill within the week  Expecting call back:YES  May leave message: YES  Please contact Patient at Home  Best time to call back: when able  [phone] (home)  Alternate phone number: Home Phone  Mami Lowe

## 2022-01-19 ENCOUNTER — OFFICE VISIT (OUTPATIENT)
Dept: FAMILY MEDICINE | Facility: CLINIC | Age: 77
End: 2022-01-19
Payer: MEDICARE

## 2022-01-19 VITALS
DIASTOLIC BLOOD PRESSURE: 70 MMHG | BODY MASS INDEX: 27.39 KG/M2 | WEIGHT: 164.6 LBS | OXYGEN SATURATION: 100 % | SYSTOLIC BLOOD PRESSURE: 120 MMHG | HEART RATE: 63 BPM

## 2022-01-19 DIAGNOSIS — D64.9 ANEMIA, UNSPECIFIED TYPE: ICD-10-CM

## 2022-01-19 DIAGNOSIS — Z00.00 HEALTH CARE MAINTENANCE: ICD-10-CM

## 2022-01-19 DIAGNOSIS — G61.82 MULTIFOCAL MOTOR NEUROPATHY (H): ICD-10-CM

## 2022-01-19 DIAGNOSIS — R10.13 DYSPEPSIA: ICD-10-CM

## 2022-01-19 DIAGNOSIS — E78.5 HYPERLIPIDEMIA LDL GOAL <100: ICD-10-CM

## 2022-01-19 DIAGNOSIS — I73.9 PERIPHERAL ARTERY DISEASE (H): ICD-10-CM

## 2022-01-19 DIAGNOSIS — J43.9 PULMONARY EMPHYSEMA, UNSPECIFIED EMPHYSEMA TYPE (H): ICD-10-CM

## 2022-01-19 DIAGNOSIS — F17.219 CIGARETTE NICOTINE DEPENDENCE WITH NICOTINE-INDUCED DISORDER: ICD-10-CM

## 2022-01-19 DIAGNOSIS — G62.9 PERIPHERAL POLYNEUROPATHY: ICD-10-CM

## 2022-01-19 DIAGNOSIS — Z85.118 HX OF CANCER OF LUNG: ICD-10-CM

## 2022-01-19 DIAGNOSIS — I10 BENIGN ESSENTIAL HYPERTENSION: ICD-10-CM

## 2022-01-19 DIAGNOSIS — Z85.46 HISTORY OF PROSTATE CANCER: Primary | ICD-10-CM

## 2022-01-19 DIAGNOSIS — Z11.59 ENCOUNTER FOR HEPATITIS C SCREENING TEST FOR LOW RISK PATIENT: ICD-10-CM

## 2022-01-19 LAB
ERYTHROCYTE [DISTWIDTH] IN BLOOD BY AUTOMATED COUNT: 14.3 % (ref 10–15)
HCT VFR BLD AUTO: 38.3 % (ref 40–53)
HGB BLD-MCNC: 12.6 G/DL (ref 13.3–17.7)
MCH RBC QN AUTO: 30.5 PG (ref 26.5–33)
MCHC RBC AUTO-ENTMCNC: 32.9 G/DL (ref 31.5–36.5)
MCV RBC AUTO: 93 FL (ref 78–100)
PLATELET # BLD AUTO: 158 10E3/UL (ref 150–450)
RBC # BLD AUTO: 4.13 10E6/UL (ref 4.4–5.9)
WBC # BLD AUTO: 5.8 10E3/UL (ref 4–11)

## 2022-01-19 PROCEDURE — 84153 ASSAY OF PSA TOTAL: CPT | Performed by: FAMILY MEDICINE

## 2022-01-19 PROCEDURE — 84443 ASSAY THYROID STIM HORMONE: CPT | Performed by: FAMILY MEDICINE

## 2022-01-19 PROCEDURE — 36415 COLL VENOUS BLD VENIPUNCTURE: CPT | Performed by: FAMILY MEDICINE

## 2022-01-19 PROCEDURE — 99215 OFFICE O/P EST HI 40 MIN: CPT | Performed by: FAMILY MEDICINE

## 2022-01-19 PROCEDURE — 85027 COMPLETE CBC AUTOMATED: CPT | Performed by: FAMILY MEDICINE

## 2022-01-19 PROCEDURE — 86803 HEPATITIS C AB TEST: CPT | Performed by: FAMILY MEDICINE

## 2022-01-19 PROCEDURE — 82607 VITAMIN B-12: CPT | Performed by: FAMILY MEDICINE

## 2022-01-19 RX ORDER — ATORVASTATIN CALCIUM 80 MG/1
80 TABLET, FILM COATED ORAL AT BEDTIME
Qty: 90 TABLET | Refills: 3 | Status: SHIPPED | OUTPATIENT
Start: 2022-01-19 | End: 2023-01-10

## 2022-01-19 RX ORDER — CLOPIDOGREL BISULFATE 75 MG/1
75 TABLET ORAL DAILY
Qty: 90 TABLET | Refills: 3 | Status: SHIPPED | OUTPATIENT
Start: 2022-01-19 | End: 2023-01-10

## 2022-01-19 RX ORDER — LOSARTAN POTASSIUM AND HYDROCHLOROTHIAZIDE 12.5; 5 MG/1; MG/1
1 TABLET ORAL EVERY MORNING
Qty: 90 TABLET | Refills: 3 | Status: SHIPPED | OUTPATIENT
Start: 2022-01-19 | End: 2022-11-16

## 2022-01-19 RX ORDER — METOPROLOL TARTRATE 25 MG/1
25 TABLET, FILM COATED ORAL 2 TIMES DAILY
Qty: 180 TABLET | Refills: 3 | Status: SHIPPED | OUTPATIENT
Start: 2022-01-19 | End: 2023-01-10

## 2022-01-20 LAB
HCV AB SERPL QL IA: NONREACTIVE
PSA SERPL-MCNC: 0.58 UG/L (ref 0–6.5)
TSH SERPL DL<=0.005 MIU/L-ACNC: 1.26 UIU/ML (ref 0.3–5)
VIT B12 SERPL-MCNC: 1276 PG/ML (ref 213–816)

## 2022-01-21 PROBLEM — G61.82 MULTIFOCAL MOTOR NEUROPATHY (H): Status: ACTIVE | Noted: 2022-01-21

## 2022-01-21 PROBLEM — J43.9 PULMONARY EMPHYSEMA, UNSPECIFIED EMPHYSEMA TYPE (H): Status: ACTIVE | Noted: 2022-01-21

## 2022-01-21 NOTE — RESULT ENCOUNTER NOTE
Ubaldo,    It was nice to see you in the clinic this week.  Your test results are back and I would like to go over those results.    You have been a bit anemic with a hemoglobin of 11.1 in June 2021 and so I repeated that and your hemoglobin has improved now and is 12.6.  I also note that your vitamin B12 level is nice and high and you are certainly getting adequate amount of vitamin B12.    Your PSA has come down and now is 0.58.  In June that was 4.2.  Please let the urologist know about this.  Also please talk to the urologist about your oxybutynin and lets see how you do with not taking that for a while.    Screening for hepatitis C was negative    I checked a TSH which is a thyroid test and your thyroid is working fine with a TSH of 1.26.    Continue to work on cutting back on smoking.    I would like to see you back in the clinic in about 6 months for another follow-up visit with your medications and needed labs.    Best regards,Dr. New

## 2022-01-21 NOTE — PROGRESS NOTES
ASSESSMENT/PLAN:       1. History of prostate cancer     - PSA, tumor marker, PSA is now 0.58 which has improved and the patient will continue to follow-up with urology.  I told him to put the oxybutynin on hold and talk to the urologist about his continual leakage of urine with a very weak stream of urine.  Shared with him that he is not likely emptying his bladder.  Further evaluation needed by urology    2. Anemia, unspecified type     - Vitamin B12, 1,276 so certainly getting adequate vitamin B12    - CBC with platelets hemoglobin has improved and now is 12.6 with normal white blood cell count and platelets    3. Peripheral polyneuropathy  We talked about the patient's gabapentin usage and I did not recommend that he take more than 1200 mg at a time and the maximum would be 3600 mg daily  Interestingly is that he does not have as much trouble in the evening or bedtime then during the daytime with pain    - Vitamin B12  - TSH, normal TSH at 1.26    4. Encounter for hepatitis C screening test for low risk patient     - Hepatitis C Screen Reflex to HCV RNA Quant and Genotype, negative    5. Benign essential hypertension  Blood pressure is controlled  6 months ago the patient had normal electrolytes and kidney function    - losartan-hydrochlorothiazide (HYZAAR) 50-12.5 MG tablet; Take 1 tablet by mouth every morning  Dispense: 90 tablet; Refill: 3  - metoprolol tartrate (LOPRESSOR) 25 MG tablet; Take 1 tablet (25 mg) by mouth 2 times daily  Dispense: 180 tablet; Refill: 3    6. Peripheral artery disease (H)  Followed by vascular surgery    - clopidogrel (PLAVIX) 75 MG tablet; Take 1 tablet (75 mg) by mouth daily  Dispense: 90 tablet; Refill: 3    7. Hyperlipidemia LDL goal <100  Lipids are at goal with LDL of 49    - atorvastatin (LIPITOR) 80 MG tablet; Take 1 tablet (80 mg) by mouth At Bedtime  Dispense: 90 tablet; Refill: 3    8. Multifocal motor neuropathy (H)   Continue to use a cane with ambulation  - TSH    9.  Hx of cancer of lung  Continue to be followed by [place] [facility]    10. Pulmonary emphysema, unspecified emphysema type (H)  Stop smoking  Continue with his current medication for COPD  Chest x-ray done June 2021 which showed some scar tissue left mid lung otherwise clear    11. Health care maintenance     - REVIEW OF HEALTH MAINTENANCE PROTOCOL ORDERS        Health Maintenance Due   Topic     Diptheria Tetanus Pertussis (DTAP/TDAP/TD) Vaccine (1 - Tdap)     Test results by [facility]  Follow-up 6 months for chronic disease visit  Total time same days encounter 48 minutes which included previsit time looking at records in care everywhere and media in regard to his oncology visits, urology visits, vascular surgery visits and this included laboratory tests and imaging, face-to-face visit with extensive time spent with medication reconciliation and ordering of appropriate medications that he is currently on.    Edson New MD      PROGRESS NOTE   1/21/2022    SUBJECTIVE:  [id number]  who presents for   Chief Complaint   Patient presents with     Medication Update     Would like refills but doesn't know which ones he needs refilled.      The patient is seen today for a medication follow-up visit.    The patient takes omeprazole 40 mg every morning before breakfast.  Medicine is taking for dyspepsia.    The patient continues to smoke a half a pack of cigarettes a day despite of severe peripheral artery disease as well as a history of lung cancer and he is not ready to quit    History of hypertension which is controlled.  The patient takes losartan-HCTZ 50-12.5 mg daily  Metoprolol 50 mg twice a day    COPD is severe and treated with albuterol inhaler as well as duo nebs.    Peripheral neuropathy with some pain but does not interfere with sleep and does take gabapentin 1200 mg twice a day with some benefit.  Also has a history of multifocal motor neuropathy and uses a cane for ambulation    Hyperlipidemia treated with  atorvastatin 80 mg daily with his last LDL 49    Peripheral artery disease with stents approximately 6-7 which has recently been evaluated by vascular surgery and there was not felt to be any intervention that would be needed or helpful at this time.  The patient does have claudication some of which may be neurogenic in some vascular.  His symptoms are worse when he stands but also when he walks.    History of lung cancer with a recurrence in 2018 and followed by [place] [facility]    History of prostate cancer with prostatectomy and radiation and has had some return of PSA with a value of 4.2 in June 2021 and this is followed by urology.  The patient was placed on oxybutynin extended release 10 mg daily which the patient states is quite expensive and he has not noticed any benefit.  It was used because of urinary leakage status post prostate treatment with prostatectomy and radiation therapy.  The patient feels that his stream of urine is very weak and essentially has some continuous leakage.    June 2021 hemoglobin 11.1    AAA screen already done CT scan abdomen pelvis without aneurysm    Patient has elected not to do colon cancer screening    Patient has had vitamin B12 deficiency and is on replacement with 1000 mcg daily          Patient Active Problem List   Diagnosis     Herniated Disc (L3 - L4)     Lower Back Pain     Fatigue     Adenocarcinoma Of The Prostate Gland     Essential Hypercholesterolemia     Benign Essential Hypertension     Hx of cancer of lung     History of prostate cancer     Peripheral polyneuropathy     Hyperlipidemia LDL goal <100     Urinary incontinence, mixed     Peripheral artery disease (H)     Hx of fusion of cervical spine     Multifocal motor neuropathy (H)      Pulmonary emphysema, unspecified emphysema type (H)       Current Outpatient Medications   Medication Sig Dispense Refill     albuterol (PROVENTIL) (2.5 MG/3ML) 0.083% neb solution Take 1 vial (2.5 mg) by nebulization 3  times daily as needed for shortness of breath / dyspnea or wheezing 90 mL 4     aspirin 81 MG EC tablet [ASPIRIN 81 MG EC TABLET] Take 81 mg by mouth daily.       atorvastatin (LIPITOR) 80 MG tablet Take 1 tablet (80 mg) by mouth At Bedtime 90 tablet 3     cholecalciferol, vitamin D3, (VITAMIN D3) 25 mcg (1,000 unit) capsule [CHOLECALCIFEROL, VITAMIN D3, (VITAMIN D3) 25 MCG (1,000 UNIT) CAPSULE] Take 1,000 Units by mouth daily.       clopidogrel (PLAVIX) 75 MG tablet Take 1 tablet (75 mg) by mouth daily 90 tablet 3     cyanocobalamin 1000 MCG tablet [CYANOCOBALAMIN 1000 MCG TABLET] Take 1,000 mcg by mouth daily.       gabapentin (NEURONTIN) 600 MG tablet Take 1 tablet (600 mg) by mouth 4 times daily 360 tablet 2     ipratropium - albuterol 0.5 mg/2.5 mg/3 mL (DUONEB) 0.5-2.5 (3) MG/3ML neb solution Take 1 vial (3 mLs) by nebulization 4 times daily 1080 mL 3     leuprolide (ELIGARD) 45 MG kit 45 mg       losartan-hydrochlorothiazide (HYZAAR) 50-12.5 MG tablet Take 1 tablet by mouth every morning 90 tablet 3     metoprolol tartrate (LOPRESSOR) 25 MG tablet Take 1 tablet (25 mg) by mouth 2 times daily 180 tablet 3     omeprazole (PRILOSEC) 20 MG DR capsule Take 1 capsule (20 mg) by mouth daily before breakfast (Patient taking differently: Take 40 mg by mouth daily before breakfast ) 90 capsule 2     oxybutynin ER (DITROPAN-XL) 10 MG 24 hr tablet 1 tablet daily Put on hold         History   Smoking Status     Current Every Day Smoker     Packs/day: 1.00     Types: Cigarettes   Smokeless Tobacco     Never Used           OBJECTIVE:      Recent Results (from the past 120 hour(s))   Vitamin B12    Collection Time: 01/19/22  4:29 PM   Result Value Ref Range    Vitamin B12 1,276 (H) 213 - 816 pg/mL   TSH    Collection Time: 01/19/22  4:29 PM   Result Value Ref Range    TSH 1.26 0.30 - 5.00 uIU/mL   Hepatitis C Screen Reflex to HCV RNA Quant and Genotype    Collection Time: 01/19/22  4:29 PM   Result Value Ref Range     Hepatitis C Antibody Nonreactive Nonreactive   CBC with platelets    Collection Time: 01/19/22  4:29 PM   Result Value Ref Range    WBC Count 5.8 4.0 - 11.0 10e3/uL    RBC Count 4.13 (L) 4.40 - 5.90 10e6/uL    Hemoglobin 12.6 (L) 13.3 - 17.7 g/dL    Hematocrit 38.3 (L) 40.0 - 53.0 %    MCV 93 78 - 100 fL    MCH 30.5 26.5 - 33.0 pg    MCHC 32.9 31.5 - 36.5 g/dL    RDW 14.3 10.0 - 15.0 %    Platelet Count 158 150 - 450 10e3/uL   PSA, tumor marker    Collection Time: 01/19/22  4:29 PM   Result Value Ref Range    PSA Tumor Marker 0.58 0.00 - 6.50 ug/L       Vitals:    01/19/22 1533   BP: 120/70   BP Location: Left arm   Patient Position: Sitting   Cuff Size: Adult Regular   Pulse: 63   SpO2: 100%   Weight: 74.7 kg (164 lb 9.6 oz)     Wt Readings from Last 3 Encounters:   01/19/22 74.7 kg (164 lb 9.6 oz)   11/08/21 72.4 kg (159 lb 9.6 oz)   09/30/21 72.9 kg (160 lb 11.2 oz)           Physical Exam:  GENERAL APPEARANCE: Pleasant 76-year-old male who ambulates with a cane NAD, well hydrated, well nourished  SKIN:  Normal skin turgor, no lesions/rashes   HEENT: moist mucous membranes, no rhinorrhea  NECK: Normal without adenopathy or masses, no carotid bruits  CV: RRR, no M/G/R   LUNGS: CTAB, however symmetrically decreased breath sounds  ABDOMEN: S&NT, no masses or enlarged organs, no abdominal bruits  EXTREMITY: no edema and full ROM of all joints  NEURO: no focal findings including no focal weakness or cranial nerve deficit.

## 2022-02-17 ENCOUNTER — TRANSFERRED RECORDS (OUTPATIENT)
Dept: HEALTH INFORMATION MANAGEMENT | Facility: CLINIC | Age: 77
End: 2022-02-17
Payer: MEDICARE

## 2022-03-03 ENCOUNTER — NURSE TRIAGE (OUTPATIENT)
Dept: NURSING | Facility: CLINIC | Age: 77
End: 2022-03-03
Payer: MEDICARE

## 2022-03-03 ENCOUNTER — HOSPITAL ENCOUNTER (EMERGENCY)
Facility: CLINIC | Age: 77
Discharge: HOME | End: 2022-03-03
Attending: EMERGENCY MEDICINE | Admitting: EMERGENCY MEDICINE
Payer: MEDICARE

## 2022-03-03 VITALS
OXYGEN SATURATION: 97 % | TEMPERATURE: 98.5 F | WEIGHT: 165 LBS | SYSTOLIC BLOOD PRESSURE: 221 MMHG | HEART RATE: 67 BPM | RESPIRATION RATE: 20 BRPM | BODY MASS INDEX: 26.52 KG/M2 | HEIGHT: 66 IN | DIASTOLIC BLOOD PRESSURE: 91 MMHG

## 2022-03-03 DIAGNOSIS — R04.0 EPISTAXIS: ICD-10-CM

## 2022-03-03 LAB
ERYTHROCYTE [DISTWIDTH] IN BLOOD BY AUTOMATED COUNT: 14.3 % (ref 10–15)
HCT VFR BLD AUTO: 33.4 % (ref 40–53)
HGB BLD-MCNC: 10.9 G/DL (ref 13.3–17.7)
INR PPP: 1.09 (ref 0.85–1.15)
MCH RBC QN AUTO: 30.2 PG (ref 26.5–33)
MCHC RBC AUTO-ENTMCNC: 32.6 G/DL (ref 31.5–36.5)
MCV RBC AUTO: 93 FL (ref 78–100)
PLATELET # BLD AUTO: 184 10E3/UL (ref 150–450)
RBC # BLD AUTO: 3.61 10E6/UL (ref 4.4–5.9)
WBC # BLD AUTO: 7.9 10E3/UL (ref 4–11)

## 2022-03-03 PROCEDURE — 96374 THER/PROPH/DIAG INJ IV PUSH: CPT

## 2022-03-03 PROCEDURE — 99284 EMERGENCY DEPT VISIT MOD MDM: CPT | Mod: 25

## 2022-03-03 PROCEDURE — 85014 HEMATOCRIT: CPT | Performed by: EMERGENCY MEDICINE

## 2022-03-03 PROCEDURE — 250N000009 HC RX 250: Performed by: EMERGENCY MEDICINE

## 2022-03-03 PROCEDURE — 85610 PROTHROMBIN TIME: CPT | Performed by: EMERGENCY MEDICINE

## 2022-03-03 PROCEDURE — 30905 CONTROL OF NOSEBLEED: CPT

## 2022-03-03 PROCEDURE — 36415 COLL VENOUS BLD VENIPUNCTURE: CPT | Performed by: EMERGENCY MEDICINE

## 2022-03-03 PROCEDURE — 250N000011 HC RX IP 250 OP 636: Performed by: EMERGENCY MEDICINE

## 2022-03-03 PROCEDURE — 250N000013 HC RX MED GY IP 250 OP 250 PS 637: Performed by: EMERGENCY MEDICINE

## 2022-03-03 RX ORDER — ONDANSETRON 4 MG/1
4 TABLET, ORALLY DISINTEGRATING ORAL ONCE
Status: COMPLETED | OUTPATIENT
Start: 2022-03-03 | End: 2022-03-03

## 2022-03-03 RX ORDER — ONDANSETRON 4 MG/1
4 TABLET, ORALLY DISINTEGRATING ORAL EVERY 8 HOURS PRN
Qty: 10 TABLET | Refills: 0 | Status: SHIPPED | OUTPATIENT
Start: 2022-03-03 | End: 2022-03-06

## 2022-03-03 RX ORDER — TRANEXAMIC ACID 100 MG/ML
500 INJECTION, SOLUTION INTRAVENOUS ONCE
Status: COMPLETED | OUTPATIENT
Start: 2022-03-03 | End: 2022-03-03

## 2022-03-03 RX ORDER — OXYMETAZOLINE HYDROCHLORIDE 0.05 G/100ML
2 SPRAY NASAL ONCE
Status: COMPLETED | OUTPATIENT
Start: 2022-03-03 | End: 2022-03-03

## 2022-03-03 RX ORDER — AMOXICILLIN 500 MG/1
500 CAPSULE ORAL 3 TIMES DAILY
Qty: 21 CAPSULE | Refills: 0 | Status: SHIPPED | OUTPATIENT
Start: 2022-03-03 | End: 2022-03-10

## 2022-03-03 RX ORDER — METOPROLOL TARTRATE 1 MG/ML
5 INJECTION, SOLUTION INTRAVENOUS ONCE
Status: COMPLETED | OUTPATIENT
Start: 2022-03-03 | End: 2022-03-03

## 2022-03-03 RX ORDER — ENALAPRILAT 1.25 MG/ML
1.25 INJECTION INTRAVENOUS ONCE
Status: DISCONTINUED | OUTPATIENT
Start: 2022-03-03 | End: 2022-03-03

## 2022-03-03 RX ORDER — HYDROCODONE BITARTRATE AND ACETAMINOPHEN 5; 325 MG/1; MG/1
1 TABLET ORAL ONCE
Status: COMPLETED | OUTPATIENT
Start: 2022-03-03 | End: 2022-03-03

## 2022-03-03 RX ADMIN — HYDROCODONE BITARTRATE AND ACETAMINOPHEN 1 TABLET: 5; 325 TABLET ORAL at 14:29

## 2022-03-03 RX ADMIN — TRANEXAMIC ACID 500 MG: 100 INJECTION, SOLUTION INTRAVENOUS at 14:29

## 2022-03-03 RX ADMIN — WATER 3 ML: 1 INJECTION INTRAMUSCULAR; INTRAVENOUS; SUBCUTANEOUS at 14:30

## 2022-03-03 RX ADMIN — Medication 2 SPRAY: at 11:00

## 2022-03-03 RX ADMIN — ONDANSETRON 4 MG: 4 TABLET, ORALLY DISINTEGRATING ORAL at 14:29

## 2022-03-03 RX ADMIN — Medication 2 SPRAY: at 12:34

## 2022-03-03 RX ADMIN — METOPROLOL TARTRATE 5 MG: 5 INJECTION INTRAVENOUS at 14:48

## 2022-03-03 NOTE — TELEPHONE ENCOUNTER
Spoke with Jhon Oconnor. Pt will go to ER for nasal packing.   Nose is still bleeding. Does not have ENT provider.

## 2022-03-03 NOTE — DISCHARGE INSTRUCTIONS
Call your doctor tomorrow to schedule repeat hemoglobin.  Return immediately should you have any significant worsening.  Talk to your doctor about possibility of removing the pack on Monday or Tuesday.  If he does not feel comfortable then follow-up with ENT.    As discussed expect a dark tarry stool over the next 1 to 2 days due to the blood you have been swallowing.  Do not be surprised if you vomit tonight and it looks tarry or bloody.

## 2022-03-03 NOTE — ED TRIAGE NOTES
Patient presents with nosebleed since last night. Denies feeling lightheaded, nausea, or short of breath. Is on baby ASA and plavix. Nose clamp applied at 1034

## 2022-03-03 NOTE — ED PROVIDER NOTES
"EMERGENCY DEPARTMENT ENCOUNTER      NAME: Ubaldo Ramos  AGE: 76 year old male  YOB: 1945  MRN: [id number]  EVALUATION DATE & TIME: 3/3/2022 10:40 AM    PCP: Edsno New    ED PROVIDER: Leo Velazquez M.D.      Chief Complaint   Patient presents with     Epistaxis         FINAL IMPRESSION:  Left posterior epistaxis      ED COURSE & MEDICAL DECISION MAKING:    Pertinent Labs & Imaging studies reviewed. (See chart for details)  76 year old male presents to the Emergency Department for evaluation of left-sided bloody nose.  Symptoms started last night around 1030 and have continued intermittently since then.  Patient denies any recent cold symptoms.  Has been slightly congested due to \"dry air\".  Denies any trauma or injuries.  Ports no similar episodes.  Patient is anticoagulated with Plavix.  On exam he is well-nourished well-built male nasal clamp in place.  Patient with bright red blood tracking down the posterior oropharynx on the left.  We will proceed with epistaxis management.  Baseline blood work and INR being obtained.. Patient appears non toxic with stable vitals signs    10:42.  I met with the patient for the initial interview and physical examination.  Discussed plan for treatment and workup in the ED.  Nasal clamp removed and epistaxis management started.  11:13 AM.  Rechecked the patient.  He had continued bleeding on the left noted.  Cotton pledget soaked with medications replaced.  11:33 AM.  Rechecked the patient.  Patient still having active bleeding on the right seemingly quite posteriorly given the clot accumulating behind the cotton pledget.  Did place 7.5 cm rapid Rhino with control of bleeding.  12:23 PM.  Rechecked the patient.  Patient with slight posterior tracking once again.  Likely the source of small amount of bleeding on the right side.  An additional 1.5 cc of saline placed in rapid Rhino.  CBC with mild anemia.  INR unremarkable.  12:54 PM.  Patient with continued slight " posterior tracking on the right and slight bleeding through the left nare.  Will consult ENT.  1:36 PM.  Patient with continued posterior tracking.  Afrin placed in the left.  We will proceed with a Rhino Rocket on the left.  Still awaiting call from ENT   1:45 PM.  Second 7.5 cm rapid Rhino placed on the right.  Both have approximately 1.5 cm protruding.  Minimal posterior tracking noted now.  Will reevaluate.  1:55 PM.  Patient with continued slight bleeding posteriorly.  Patient markedly uncomfortable.  Will give Zofran and hydrocodone for discomfort.  2 PM.  Patient discussed with ENT.  Recommends aggressive blood pressure management.  Likely a posterior bleed unable to get posterior turbinate.  Will atomize LET bilaterally in place Rhino Rocket for better control.  2:29 PM.  Rapid Rhino was removed bilaterally.  Large clot expressed from the left.  Minimal residual bleeding anteriorly.  LET x1.5 ml atomized bilaterally followed by TXA x1.5 ml bilateral.  Patient with minimal bleeding presently.  We will continue to monitor.  2:47 PM I updated the patient on their results and my plan.  Patient's blood pressure moderated 150 systolic.  Will ambulate.  No further bleeding    At the conclusion of the encounter I discussed the results of all of the tests and the disposition. The questions were answered and return precautions provided. The patient or family acknowledged understanding and was agreeable with the care plan.       PPE: Provider wore gloves, N95 mask, eye protection, surgical cap.     MEDICATIONS GIVEN IN THE EMERGENCY:  Medications   oxymetazoline (AFRIN) 0.05 % spray 2 spray (has no administration in time range)   oxymetazoline (AFRIN) 0.05 % spray 2 spray (has no administration in time range)   lidocaine/EPINEPHrine/tetracaine (LET) solution KIT 3 mL (has no administration in time range)       NEW PRESCRIPTIONS STARTED AT TODAY'S ER VISIT  New Prescriptions    No medications on file           =================================================================    HPI    Patient information was obtained from: Patient    Use of Intrepreter: N/A       Ubaldo Ramos is a 76 year old male with a pertient medical history of HTN, hypercholesterolemia, and peripheral artery disease who presents to the ED for evaluation of epistaxis    The patient presents with a nosebleed that started last night around 2230. The bleeding started as a trickle, but has since become steady bleeding from his left nare. He reports his apartment is very dry and he has been congested lately. He denies bloody or black stool. He is anticoagulated on Plavix and baby aspirin.    Denies any other complaints at this time.    REVIEW OF SYSTEMS   Constitutional:  Denies fever, chills  HENT: Endorses epistaxis, congestion.  Respiratory:  Denies productive cough or increased work of breathing  Cardiovascular:  Denies chest pain, palpitations  GI:  Denies abdominal pain, nausea, vomiting, or change in bowel or bladder habits   Musculoskeletal:  Denies any new muscle/joint swelling  Skin:  Denies rash   Neurologic:  Denies focal weakness  All systems negative except as marked.     PAST MEDICAL HISTORY:  History reviewed. No pertinent past medical history.    PAST SURGICAL HISTORY:  History reviewed. No pertinent surgical history.      CURRENT MEDICATIONS:      Current Facility-Administered Medications:      lidocaine/EPINEPHrine/tetracaine (LET) solution KIT 3 mL, 3 mL, Topical, Once, Leo Velazquez MD     oxymetazoline (AFRIN) 0.05 % spray 2 spray, 2 spray, Nasal, Once, Josh Jeff MD     oxymetazoline (AFRIN) 0.05 % spray 2 spray, 2 spray, Nasal, Once, Leo Velazquez MD    Current Outpatient Medications:      albuterol (PROVENTIL) (2.5 MG/3ML) 0.083% neb solution, Take 1 vial (2.5 mg) by nebulization 3 times daily as needed for shortness of breath / dyspnea or wheezing, Disp: 90 mL, Rfl: 4     aspirin 81 MG EC tablet, [ASPIRIN 81 MG EC  TABLET] Take 81 mg by mouth daily., Disp: , Rfl:      atorvastatin (LIPITOR) 80 MG tablet, Take 1 tablet (80 mg) by mouth At Bedtime, Disp: 90 tablet, Rfl: 3     cholecalciferol, vitamin D3, (VITAMIN D3) 25 mcg (1,000 unit) capsule, [CHOLECALCIFEROL, VITAMIN D3, (VITAMIN D3) 25 MCG (1,000 UNIT) CAPSULE] Take 1,000 Units by mouth daily., Disp: , Rfl:      clopidogrel (PLAVIX) 75 MG tablet, Take 1 tablet (75 mg) by mouth daily, Disp: 90 tablet, Rfl: 3     cyanocobalamin 1000 MCG tablet, [CYANOCOBALAMIN 1000 MCG TABLET] Take 1,000 mcg by mouth daily., Disp: , Rfl:      gabapentin (NEURONTIN) 600 MG tablet, Take 1 tablet (600 mg) by mouth 4 times daily, Disp: 360 tablet, Rfl: 2     ipratropium - albuterol 0.5 mg/2.5 mg/3 mL (DUONEB) 0.5-2.5 (3) MG/3ML neb solution, Take 1 vial (3 mLs) by nebulization 4 times daily, Disp: 1080 mL, Rfl: 3     leuprolide (ELIGARD) 45 MG kit, 45 mg, Disp: , Rfl:      losartan-hydrochlorothiazide (HYZAAR) 50-12.5 MG tablet, Take 1 tablet by mouth every morning, Disp: 90 tablet, Rfl: 3     metoprolol tartrate (LOPRESSOR) 25 MG tablet, Take 1 tablet (25 mg) by mouth 2 times daily, Disp: 180 tablet, Rfl: 3     omeprazole (PRILOSEC) 20 MG DR capsule, Take 2 capsules (40 mg) by mouth daily before breakfast, Disp: , Rfl:      oxybutynin ER (DITROPAN-XL) 10 MG 24 hr tablet, 1 tablet daily Put on hold, Disp: , Rfl:     ALLERGIES:  No Known Allergies    FAMILY HISTORY:  History reviewed. No pertinent family history.    SOCIAL HISTORY:   Social History     Socioeconomic History     Marital status:      Spouse name: Not on file     Number of children: Not on file     Years of education: Not on file     Highest education level: Not on file   Occupational History     Not on file   Tobacco Use     Smoking status: Current Every Day Smoker     Packs/day: 1.00     Types: Cigarettes     Smokeless tobacco: Never Used   Substance and Sexual Activity     Alcohol use: Not Currently     Drug use: Not  "Currently     Sexual activity: Not on file   Other Topics Concern     Not on file   Social History Narrative     Not on file     Social Determinants of Health     Financial Resource Strain: Not on file   Food Insecurity: Not on file   Transportation Needs: Not on file   Physical Activity: Not on file   Stress: Not on file   Social Connections: Not on file   Intimate Partner Violence: Not on file   Housing Stability: Not on file       VITALS:  Patient Vitals for the past 24 hrs:   BP Temp Temp src Pulse Resp SpO2 Height Weight   03/03/22 1035 (!) 178/93 98.5  F (36.9  C) Oral 83 20 93 % 1.676 m (5' 6\") 74.8 kg (165 lb)        PHYSICAL EXAM    Constitutional:  Awake, alert, in mild apparent distress  HENT:  Normocephalic, Atraumatic. Bilateral external ears normal. Oropharynx moist with bright red blood tracking posteriorly on the left. Nasal clamp in place. Neck- Normal range of motion with no guarding, No midline cervical tenderness, Supple, No stridor.   Eyes:  PERRL, EOMI with no signs of entrapment, Conjunctiva normal, No discharge.   Respiratory:  Normal breath sounds, No respiratory distress, No wheezing.    Cardiovascular:  Normal heart rate, Normal rhythm, No appreciable rubs or gallops.   Integument:  Warm, Dry, No erythema, No rash.   Neurologic:  Alert & oriented, Normal motor function, Normal sensory function, No focal deficits noted.   Psychiatric:  Affect normal, Judgment normal, Mood normal.     LAB:  All pertinent labs reviewed and interpreted.  Results for orders placed or performed during the hospital encounter of 03/03/22   Result Value Ref Range    INR 1.09 0.85 - 1.15   CBC (+ platelets, no diff)   Result Value Ref Range    WBC Count 7.9 4.0 - 11.0 10e3/uL    RBC Count 3.61 (L) 4.40 - 5.90 10e6/uL    Hemoglobin 10.9 (L) 13.3 - 17.7 g/dL    Hematocrit 33.4 (L) 40.0 - 53.0 %    MCV 93 78 - 100 fL    MCH 30.2 26.5 - 33.0 pg    MCHC 32.6 31.5 - 36.5 g/dL    RDW 14.3 10.0 - 15.0 %    Platelet Count " 184 150  450 10e3/uL       RADIOLOGY:  Reviewed all pertinent imaging. Please see official radiology report.  No orders to display       PROCEDURES:     PROCEDURE: Epistaxis Management   INDICATIONS: Failure of epistaxis control with non-invasive management techniques.   PROCEDURE PROVIDER: Dr Leo Velazquez   SITE:  Left   MEDICATION:  Afrin/LET   NOTE: Anterior Source:  The area was evaluated and cleared with nasal suction to locate source of bleeding. The bleeding location was managed with cotton balls soaked with Afrin/LET.  Following treatment the patient was observed and no significant bleeding was noted to recur.    REMOVE IF NOT USEDPosterior Source: The area was evaluated and cleared with nasal suction.  The bleeding location was managed with 7.5 cm rapid Rhino (Extended size rapid rhino packing OR dual balloon pack).  Following treatment the patient was observed and no significant bleeding was noted to recur.  Patient had continued bleeding and the rapid Rhino's were both removed.  After additional treatment with atomized LET/TXA a Rhino Rocket was placed on the left with cessation of bleeding   COMPLICATIONS:  Rapid Rhino 7.5 cm placed         I, Zoltan Peterson, am serving as a scribe to document services personally performed by Leo Velazquez MD, based on my observation and the provider's statements to me. I, Leo Velazquez MD attest that Zoltan Peterson is acting in a scribe capacity, has observed my performance of the services and has documented them in accordance with my direction.    Leo Velazquez M.D.  Emergency Medicine  [facility] EMERGENCY ROOM     Leo Velazquez MD  03/04/22 0714

## 2022-03-03 NOTE — ED TRIAGE NOTES
Patient presents to the ED with complaints of high blood pressure and chest pain for the last 2 weeks. She also reports left leg swelling and discomfort.

## 2022-03-03 NOTE — ED NOTES
Pt reports that he laid down to bed at approx 2200, around 2230 pt reports feeling that he had a runny nose and upon wiping noted blood. States he sat in recliner all night hoping the bleeding would stop and when it did not by morning he decided to come into the ER. Pt reports being on blood thinners and that his apartment is very dry. Denies coughing, sneezing, or trauma prior to beginning of episode.

## 2022-03-03 NOTE — TELEPHONE ENCOUNTER
RN triage   Call from pt wife - signed consent in chart  Pt has had blood nose all night   Pt on blood thinners   Pt has not followed advised home care    No dizziness - no paleness   Wife thinks he has bled about 1 cup blood     Reviewed home care advice     Per protocol = should go to ED / [facility] / check w/ PCP ---  Please advise = when and where do you want pt seen ?    Johanna Welch RN  [facility]  Triage Nurse Advisor  COVID 19 Nurse Triage Plan/Patient Instructions    Please be aware that novel coronavirus (COVID-19) may be circulating in the community. If you develop symptoms such as fever, cough, or SOB or if you have concerns about the presence of another infection including coronavirus (COVID-19), please contact your health care provider or visit [email or web address].     Disposition/Instructions    ED Visit recommended. Follow protocol based instructions.     Bring Your Own Device:  Please also bring your smart device(s) (smart phones, tablets, laptops) and their charging cables for your personal use and to communicate with your care team during your visit.    Thank you for taking steps to prevent the spread of this virus.  o Limit your contact with others.  o Wear a simple mask to cover your cough.  o Wash your hands well and often.    Resources    [facility]: About COVID-19: [email or web address]    CDC: What to Do If You're Sick: www.cdc.gov/coronavirus/2019-ncov/about/steps-when-sick.html    CDC: Ending Home Isolation: www.cdc.gov/coronavirus/2019-ncov/hcp/disposition-in-home-patients.html     CDC: Caring for Someone: www.cdc.gov/coronavirus/2019-ncov/if-you-are-sick/care-for-someone.html     [facility]: Interim Guidance for Hospital Discharge to Home: www.health.[facility].mn.us/diseases/coronavirus/hcp/hospdischarge.pdf    [facility] clinical trials (COVID-19 research studies): clinicalaffairs.[facility]/umn-clinical-trials     Below are the COVID-19 hotlines at the [facility]  [facility] ([facility]). Interpreters are available.   o For health questions: Call 632-597-0792 or 1-824.273.2100 (7 a.m. to 7 p.m.)  o For questions about schools and childcare: Call 132-000-3628 or 1-449.293.6079 (7 a.m. to 7 p.m.)       Reason for Disposition    Bleeding present > 30 minutes and using correct method of direct pressure    Additional Information    Negative: Fainted (passed out), or too weak to stand following large blood loss    Negative: Sounds like a life-threatening emergency to the triager    Negative: Lightheadedness or dizziness    Negative: Pale skin (pallor) of new onset or worsening    Protocols used: NOSEBLEED-A-[place]

## 2022-03-04 ENCOUNTER — TELEPHONE (OUTPATIENT)
Dept: FAMILY MEDICINE | Facility: CLINIC | Age: 77
End: 2022-03-04
Payer: MEDICARE

## 2022-03-04 ENCOUNTER — LAB (OUTPATIENT)
Dept: LAB | Facility: CLINIC | Age: 77
End: 2022-03-04
Payer: MEDICARE

## 2022-03-04 ENCOUNTER — NURSE TRIAGE (OUTPATIENT)
Dept: NURSING | Facility: CLINIC | Age: 77
End: 2022-03-04
Payer: MEDICARE

## 2022-03-04 DIAGNOSIS — D64.9 ANEMIA, UNSPECIFIED TYPE: Primary | ICD-10-CM

## 2022-03-04 DIAGNOSIS — D64.9 ANEMIA, UNSPECIFIED TYPE: ICD-10-CM

## 2022-03-04 LAB — HGB BLD-MCNC: 10.4 G/DL (ref 13.3–17.7)

## 2022-03-04 PROCEDURE — 85018 HEMOGLOBIN: CPT

## 2022-03-04 PROCEDURE — 36415 COLL VENOUS BLD VENIPUNCTURE: CPT

## 2022-03-04 NOTE — TELEPHONE ENCOUNTER
Reason for Call:  Same Day Appointment, Requested Provider:      PCP: Edson New    Reason for visit: [facility] follow up 3/03, Nose bleed, nose was packed, was to follow up today.     Duration of symptoms:     Have you been treated for this in the past? No    Additional comments: Was to have hemoglobin checked and check if packing is still right.     Can we leave a detailed message on this number? YES    Phone number patient can be reached at: Cell number on file:    Telephone Information:   Mobile [phone]       Best Time:    Call taken on 3/4/2022 at 8:37 AM by Jovanna Foerman

## 2022-03-04 NOTE — TELEPHONE ENCOUNTER
appt scheduled for Monday by other staff.   See nurse traige sent to [facility] requesting lab only hgb check.

## 2022-03-04 NOTE — TELEPHONE ENCOUNTER
Pt scheduled to see me at 3/7. New pt for me.      I ordered hb per pt request. Please Advise to call for lab only appointment.     Angelica Terry MD on 3/4/2022 at 10:27 AM'

## 2022-03-04 NOTE — TELEPHONE ENCOUNTER
Triage call:     Wife calling- consent on file- home with   States needs to schedule a repeat hemoglobin    Was seen in the ER yesterday  Wife reports that there is a little bit of dripping- blood- very infrequent per patient - much better than when seen in the ER yesterday  Stopped taking the baby aspirin per the ER doc  Still taking the plavix - took a dose this morning     Wife is requesting a repeat hemoglobin as recommended by ER MD today  - also wanting to schedule the appointment for the packing to be removed per ER documentation    Advised a visit within the next 3 days- reviewed additional care advice with patient's wife and she verbalizes understanding. Appointment scheduled for Monday for possible packing removal.     PCP please address the need for a repeat Hemoglobin today- see ER documentation. Clinic please call to schedule if order placed by provider.     *Ok to leave a detailed message upon call back    Opal Parham RN BSN 3/4/2022 9:54 AM    COVID 19 Nurse Triage Plan/Patient Instructions    Please be aware that novel coronavirus (COVID-19) may be circulating in the community. If you develop symptoms such as fever, cough, or SOB or if you have concerns about the presence of another infection including coronavirus (COVID-19), please contact your health care provider or visit [email or web address].     Disposition/Instructions    In-Person Visit with provider recommended. Reference Visit Selection Guide.    Thank you for taking steps to prevent the spread of this virus.  o Limit your contact with others.  o Wear a simple mask to cover your cough.  o Wash your hands well and often.    Resources    [facility]: About COVID-19: [email or web address]    CDC: What to Do If You're Sick: www.cdc.gov/coronavirus/2019-ncov/about/steps-when-sick.html    CDC: Ending Home Isolation: www.cdc.gov/coronavirus/2019-ncov/hcp/disposition-in-home-patients.html     CDC: Caring for  Someone: www.cdc.gov/coronavirus/2019-ncov/if-you-are-sick/care-for-someone.html     [facility]: Interim Guidance for Hospital Discharge to Home: www.health.[facility].mn.us/diseases/coronavirus/hcp/hospdischarge.pdf    [facility] clinical trials (COVID-19 research studies): clinicalaffairs.[facility]/umn-clinical-trials     Below are the COVID-19 hotlines at the [place] [facility] ([facility]). Interpreters are available.   o For health questions: Call 471-490-2860 or 1-325.569.2070 (7 a.m. to 7 p.m.)  o For questions about schools and childcare: Call 988-400-3936 or 1-273.942.2828 (7 a.m. to 7 p.m.)             Reason for Disposition    Has nasal packing (inserted by health care provider to control bleeding)    Additional Information    Negative: Fainted (passed out), or too weak to stand following large blood loss    Negative: Sounds like a life-threatening emergency to the triager    Negative: Nosebleed followed nose injury    Negative: Bleeding present > 30 minutes and using correct method of direct pressure    Negative: Bleeding now and second call after being instructed in correct technique of direct pressure    Negative: Lightheadedness or dizziness    Negative: Pale skin (pallor) of new onset or worsening    Negative: Has nasal packing (inserted by health care provider to control bleeding) and now has new rash    Negative: Has nasal packing and now has bleeding around the packing (Exception: few drops or ooze)    Negative: Patient sounds very sick or weak to the triager    Negative: Large amount of blood has been lost (e.g., one cup)    Negative: Bleeding recurs 3 or more times in 24 hours despite direct pressure    Negative: Taking Coumadin (warfarin) or other strong blood thinner, or known bleeding disorder (e.g., thrombocytopenia)    Negative: Has skin bruises or bleeding gums that are not caused by an injury    Negative: Has nasal packing and now has fever > 100.4 F (38.0 C)    Negative: Patient  wants to be seen    Protocols used: NOSEBLEED-A-[place]

## 2022-03-07 ENCOUNTER — OFFICE VISIT (OUTPATIENT)
Dept: FAMILY MEDICINE | Facility: CLINIC | Age: 77
End: 2022-03-07
Payer: MEDICARE

## 2022-03-07 VITALS
DIASTOLIC BLOOD PRESSURE: 62 MMHG | WEIGHT: 162 LBS | SYSTOLIC BLOOD PRESSURE: 134 MMHG | BODY MASS INDEX: 26.03 KG/M2 | RESPIRATION RATE: 12 BRPM | OXYGEN SATURATION: 99 % | HEIGHT: 66 IN | TEMPERATURE: 98.2 F | HEART RATE: 77 BPM

## 2022-03-07 DIAGNOSIS — C61 MALIGNANT NEOPLASM OF PROSTATE (H): ICD-10-CM

## 2022-03-07 DIAGNOSIS — D62 ANEMIA DUE TO BLOOD LOSS, ACUTE: Primary | ICD-10-CM

## 2022-03-07 LAB — HGB BLD-MCNC: 10 G/DL (ref 13.3–17.7)

## 2022-03-07 PROCEDURE — 99213 OFFICE O/P EST LOW 20 MIN: CPT | Performed by: FAMILY MEDICINE

## 2022-03-07 PROCEDURE — 36415 COLL VENOUS BLD VENIPUNCTURE: CPT | Performed by: FAMILY MEDICINE

## 2022-03-07 PROCEDURE — 85018 HEMOGLOBIN: CPT | Performed by: FAMILY MEDICINE

## 2022-03-07 NOTE — PROGRESS NOTES
"  Assessment & Plan     (D62) Anemia due to blood loss, acute  (primary encounter diagnosis)  Comment: hb low likely due to acute blood loss . HB 10.0 and stable.   Plan: Hemoglobin        Nose bleeding stopped. Continue hold aspirin for one more week. Advise to avoid blow nose.   Advise to take over the counter iron supplement.     (C61) Malignant neoplasm of prostate (H)  Comment: history of prostate ca and he is in remission. . He follow-up with urologist   Plan: continue follow-up with urologist    956}     Tobacco Cessation:   reports that he has been smoking cigarettes. He has been smoking about 1.00 pack per day. He has never used smokeless tobacco.    Strongly advise to quit smoke.   BMI:   Estimated body mass index is 26.15 kg/m  as calculated from the following:    Height as of this encounter: 1.676 m (5' 6\").    Weight as of this encounter: 73.5 kg (162 lb).    Return if symptoms worsen or fail to improve.    Angelica Terry MD  [facility] ALBERTO Conner is a 76 year old who presents for the following health issues     HPI    left nostril bleeding after he blowed the nose . No injury. He was at er and placed nose pack. No more bleeding after he went home. Nose mild tenderness. No headache, fever, cough and sore throat.             Review of Systems   Constitutional, HEENT, cardiovascular, pulmonary, gi and gu systems are negative, except as otherwise noted.      Objective    /62 (BP Location: Right arm, Patient Position: Sitting, Cuff Size: Adult Regular)   Pulse 77   Temp 98.2  F (36.8  C) (Oral)   Resp 12   Ht 1.676 m (5' 6\")   Wt 73.5 kg (162 lb)   SpO2 99%   BMI 26.15 kg/m    Body mass index is 26.15 kg/m .  Physical Exam   GENERAL: healthy, alert and no distress    Nose: no active bleeding. Removed the pack from left nostril. No active bleeding.   NECK: no adenopathy, no asymmetry, masses, or scars and thyroid normal to palpation  RESP: lungs clear to " auscultation - no rales, rhonchi or wheezes  CV: regular rate and rhythm, normal S1 S2, no S3 or S4, no murmur, click or rub, no peripheral edema and peripheral pulses strong  ABDOMEN: soft, nontender, no hepatosplenomegaly, no masses and bowel sounds normal

## 2022-03-07 NOTE — PATIENT INSTRUCTIONS
Patient Education     Nosebleed (Adult)    Bleeding from the nose most commonly occurs because of injury or drying and cracking of the inner lining of the nose. Most nosebleeds are because of dry air or nose-picking. They can occur during a common cold or an allergy attack. They can also occur on a very hot day, or from dry air in the winter.   If the bleeding site is found, it may be cauterized. This means it's treated to cause a blood clot to form. This may be done with a chemical, heat, or electricity. If the bleeding continues after the site is cauterized, or if the site can't be found, packing may be put in your nose. This is to apply pressure and stop the bleeding. The packing may be made of gauze or sponge. A small balloon catheter is sometimes used. These must be removed by your healthcare provider. Some types of packing dissolve on their own. In rare cases, surgery is needed to stop a nosebleed. If you are taking blood thinning (anticoagulant) medicine, you may have a blood test.   Home care    If packing was put in your nose, unless told otherwise, don't pull on it or try to remove it yourself. You will be given an appointment to have it removed. You may also have been given antibiotics to prevent a sinus infection. If so, finish all of the medicine.    Don't blow your nose for 12 hours after the bleeding stops. This will allow a strong blood clot to form. After that, if you have to blow your nose, do it very gently. Don't pick your nose. This may restart bleeding.    Don't drink alcohol or hot liquids for the next 2 days. Alcohol or hot liquids in your mouth can dilate blood vessels in your nose. This can cause bleeding to start again.    Don't take ibuprofen, naproxen, or medicines that contain aspirin. These thin the blood and may cause your nose to bleed. You may take acetaminophen for pain, unless another pain medicine was prescribed.    If the bleeding starts again, sit up and lean forward to prevent  swallowing blood. Pinch your nose tightly on both sides, as shown above, for 10 to 15 minutes. Time yourself. Don t release the pressure on your nose until 10 minutes is up. If bleeding doesn't stop, continue to pinch your nose and, if the bleeding is a small amount, call your healthcare provider. If bleeding is heavy, call 911 or return to this facility.    If you have a cold, allergies, or dry nasal membranes, lubricate the nasal passages. Gently apply a small amount of petroleum jelly inside the nose with a cotton swab twice a day (morning and night).    Don't overheat your home. This can dry the air and make your condition worse.    Put a humidifier in the room where you sleep. This will add moisture to the air. Clean the humidifier as advised by the .    Use a saline nasal spray to keep nasal passages moist.    Don't pick your nose. Keep fingernails trimmed to decrease risk of bleeds.    Don't smoke.    Follow-up care  Follow up with your healthcare provider, or as advised. Nasal packing should be rechecked or removed within 2 to 3 days.   When to get medical advice  Call your healthcare provider right away if any of these occur.     You have intermittent, recurrent, small amounts of bleeding that you can control for a while.    Fever of 100.4 F (38 C) or higher, or as directed by your healthcare provider    Headache    Sinus or facial pain  Call 911  Call 911 or get medical care right away if any of the following occur     Dizziness, weakness, or fainting    Shortness of breath or trouble breathing    You have another nosebleed that you can't control, or with heavy bleeding    You become abnormally tired or confused  [employer] last reviewed this educational content on 2/1/2020 2000-2021 [employer]. All rights reserved. This information is not intended as a substitute for professional medical care. Always follow your healthcare professional's instructions.

## 2022-03-11 ENCOUNTER — TELEPHONE (OUTPATIENT)
Dept: LAB | Facility: CLINIC | Age: 77
End: 2022-03-11
Payer: MEDICARE

## 2022-03-11 DIAGNOSIS — D62 ANEMIA DUE TO BLOOD LOSS, ACUTE: Primary | ICD-10-CM

## 2022-03-11 NOTE — PROGRESS NOTES
patient on lab schedule for labs per Dr Terry, but we have no orders that are not completete.  please put in orders

## 2022-03-11 NOTE — TELEPHONE ENCOUNTER
Lab ordered now.    Angelica Terry MD on 3/11/2022 at 8:20 AM    
Patient/Caregiver provided printed discharge information.

## 2022-03-18 ENCOUNTER — TRANSFERRED RECORDS (OUTPATIENT)
Dept: HEALTH INFORMATION MANAGEMENT | Facility: CLINIC | Age: 77
End: 2022-03-18
Payer: MEDICARE

## 2022-03-21 ENCOUNTER — LAB (OUTPATIENT)
Dept: LAB | Facility: CLINIC | Age: 77
End: 2022-03-21
Payer: MEDICARE

## 2022-03-21 DIAGNOSIS — D62 ANEMIA DUE TO BLOOD LOSS, ACUTE: ICD-10-CM

## 2022-03-21 LAB — HGB BLD-MCNC: 10.8 G/DL (ref 13.3–17.7)

## 2022-03-21 PROCEDURE — 36415 COLL VENOUS BLD VENIPUNCTURE: CPT

## 2022-03-21 PROCEDURE — 85018 HEMOGLOBIN: CPT

## 2022-04-02 PROBLEM — Z85.118 HX OF CANCER OF LUNG: Status: ACTIVE | Noted: 2021-06-27

## 2022-05-03 ENCOUNTER — NURSE TRIAGE (OUTPATIENT)
Dept: NURSING | Facility: CLINIC | Age: 77
End: 2022-05-03
Payer: MEDICARE

## 2022-05-03 ENCOUNTER — HOSPITAL ENCOUNTER (EMERGENCY)
Facility: CLINIC | Age: 77
Discharge: HOME | End: 2022-05-03
Attending: EMERGENCY MEDICINE | Admitting: EMERGENCY MEDICINE
Payer: MEDICARE

## 2022-05-03 VITALS
RESPIRATION RATE: 18 BRPM | HEIGHT: 66 IN | OXYGEN SATURATION: 98 % | TEMPERATURE: 97.4 F | WEIGHT: 163 LBS | SYSTOLIC BLOOD PRESSURE: 174 MMHG | HEART RATE: 53 BPM | DIASTOLIC BLOOD PRESSURE: 79 MMHG | BODY MASS INDEX: 26.2 KG/M2

## 2022-05-03 DIAGNOSIS — R04.0 EPISTAXIS: ICD-10-CM

## 2022-05-03 PROCEDURE — 99283 EMERGENCY DEPT VISIT LOW MDM: CPT | Mod: 25

## 2022-05-03 PROCEDURE — 30901 CONTROL OF NOSEBLEED: CPT | Mod: LT

## 2022-05-03 PROCEDURE — 250N000013 HC RX MED GY IP 250 OP 250 PS 637: Performed by: EMERGENCY MEDICINE

## 2022-05-03 RX ADMIN — PHENYLEPHRINE HYDROCHLORIDE 1 SPRAY: 0.5 SPRAY NASAL at 14:35

## 2022-05-03 ASSESSMENT — ENCOUNTER SYMPTOMS
DYSURIA: 0
VOMITING: 0
HEMATURIA: 0
CHILLS: 0
CONFUSION: 0
ABDOMINAL PAIN: 0
FEVER: 0
SHORTNESS OF BREATH: 0
DIZZINESS: 0
SORE THROAT: 0
LIGHT-HEADEDNESS: 0
JOINT SWELLING: 0
DIARRHEA: 0
NAUSEA: 0

## 2022-05-03 NOTE — ED TRIAGE NOTES
The patient presents to the ED with a nosebleed that began this morning around 0530. The patient reports the bleeding has been continuous but has slowed a bit. The patient does take plavix and baby aspirin. He reports similar nosebleeds in the past.

## 2022-05-03 NOTE — DISCHARGE INSTRUCTIONS
Follow up with ENT - someone should call you, but if not, call number provided.  If bleeding returns, apply clamp. Return for uncontrolled bleeding.

## 2022-05-03 NOTE — TELEPHONE ENCOUNTER
Maribel spouse calling (consent to communicate on file).     Spouse reporting nosebleed starting around 530 a.m. this morning. Patient woke with blood on pillow.    Reporting nosebleed from left nostril.    Denies known injury. Patient is on Plavix.    Reporting he has tried clamp on nose for over 30 minutes this morning, stating nose continues to bleed pretty consistent.     Reporting amount of blood loss estimate >1 cup.    Patient denies feeling dizzy or lightheaded.      Disposition see in ED/UC or PCP.    Maribel prefers to bring patient to ED now to be seen immediately.  Patient will go to [facility] ED now.    Ngozi Urrutia RN  [facility]      COVID 19 Nurse Triage Plan/Patient Instructions    Please be aware that novel coronavirus (COVID-19) may be circulating in the community. If you develop symptoms such as fever, cough, or SOB or if you have concerns about the presence of another infection including coronavirus (COVID-19), please contact your health care provider or visit [email or web address].     Disposition/Instructions    In-Person Visit with provider recommended. Reference Visit Selection Guide.    Thank you for taking steps to prevent the spread of this virus.  o Limit your contact with others.  o Wear a simple mask to cover your cough.  o Wash your hands well and often.    Resources    [facility]: About COVID-19: [email or web address]    CDC: What to Do If You're Sick: www.cdc.gov/coronavirus/2019-ncov/about/steps-when-sick.html    CDC: Ending Home Isolation: www.cdc.gov/coronavirus/2019-ncov/hcp/disposition-in-home-patients.html     CDC: Caring for Someone: www.cdc.gov/coronavirus/2019-ncov/if-you-are-sick/care-for-someone.html     [facility]: Interim Guidance for Hospital Discharge to Home: www.health.[facility].mn.us/diseases/coronavirus/hcp/hospdischarge.pdf    [facility] clinical trials (COVID-19 research studies):  clinicalaffairs.[employer].[facility]/[employer]-clinical-trials     Below are the COVID-19 hotlines at the [place] [facility] ([facility]). Interpreters are available.   o For health questions: Call 150-853-7861 or 1-645.347.1167 (7 a.m. to 7 p.m.)  o For questions about schools and childcare: Call 508-293-8798 or 1-893.141.5305 (7 a.m. to 7 p.m.)                     Reason for Disposition    Bleeding present > 30 minutes and using correct method of direct pressure    Additional Information    Negative: Fainted (passed out), or too weak to stand following large blood loss    Negative: Sounds like a life-threatening emergency to the triager    Negative: Nosebleed followed nose injury    Protocols used: NOSEBLEED-A-[place]

## 2022-05-03 NOTE — ED PROVIDER NOTES
Emergency Department Encounter     Evaluation Date & Time:   5/3/2022 11:49 AM    CHIEF COMPLAINT:  Epistaxis      Triage Note:The patient presents to the ED with a nosebleed that began this morning around 0530. The patient reports the bleeding has been continuous but has slowed a bit. The patient does take plavix and baby aspirin. He reports similar nosebleeds in the past.            ED COURSE & MEDICAL DECISION MAKING:     ED Course as of 05/03/22 1455   Tue May 03, 2022   1328 Pt re-evaluated once again and no bleeding.  Clamp removed, will monitor. Pt would prefer to not use rapid rhino if possible.  Instructed him on expected course, home cares if discharged and ENT follow up.       Pt on plavix for PVD with previous stents, as well as baby aspirin, presenting with ongoing left sided nosebleed since around 0530 today that woke him up.  Pt denies any known trauma/injury.  No other new meds.  Pt with previous, worse nosebleed not too long ago requiring b/l rapid rhinos.  Exam here reveals only some mild bleeding from left nostril only.  Will attempt neosynepherine, continued nasal clamp, but may ultimately require rapid rhino.    12:03 PM I met with the patient, obtained history, performed an initial exam, and discussed options and plan for diagnostics and treatment here in the ED.   12:34 PM I rechecked and updated the patient. No more bleeding, I reapplied the clamp and will recheck in a bit.  1:20 PM I rechecked the patient, no bleeding.  2:18 PM Patient ready for discharge.  Pt remains without recurrent bleeding with clamp off, posterior oropharynx clear.  Instructed on cares, ENT referral, return precautions.    At the conclusion of the encounter I discussed the results of all the tests and the disposition. The questions were answered. The patient or family acknowledged understanding and was agreeable with the care plan.      MEDICATIONS GIVEN IN THE EMERGENCY DEPARTMENT:  Medications   phenylephrine  (TRINI-SYNEPHRINE) 0.5 % spray 1 spray (1 spray Both Nostrils Given by Other 5/3/22 3992)       NEW PRESCRIPTIONS STARTED AT TODAY'S ED VISIT:  Discharge Medication List as of 5/3/2022  2:25 PM          HPI   The history is provided by the patient. No  was used.        Ubaldo Ramos is a 76 year old male with a pertinent history of epistaxis, HTN, prostate and lung cancer, and hyperlipidemia, who presents to this ED via walk-in for evaluation of epistaxis.    Patient reports he was woken up at 0530 today with a nosebleed. He denies any bleeding yesterday. Denies recent trauma or fall. Patient notes he was here 2 months ago with nose bleeds. Patient states the bleeding won't stop, he has been using kleenex and a nose clamp. Endorses taking Plavix and baby aspirin. Denies chest pain, abdominal pain, lightheadedness, or any other current complaints.      REVIEW OF SYSTEMS:  Review of Systems   Constitutional: Negative for chills and fever.   HENT: Positive for nosebleeds. Negative for sore throat.    Eyes: Negative for visual disturbance.   Respiratory: Negative for shortness of breath.    Cardiovascular: Negative for chest pain.   Gastrointestinal: Negative for abdominal pain, diarrhea, nausea and vomiting.   Endocrine: Negative for polyuria.   Genitourinary: Negative for dysuria and hematuria.        - urinary changes     Musculoskeletal: Negative for joint swelling.   Skin: Negative for rash.   Neurological: Negative for dizziness and light-headedness.   Psychiatric/Behavioral: Negative for confusion.   All other systems reviewed and are negative.            Medical History   No past medical history on file.    No past surgical history on file.    No family history on file.    Social History     Tobacco Use     Smoking status: Current Every Day Smoker     Packs/day: 1.00     Types: Cigarettes     Smokeless tobacco: Never Used   Vaping Use     Vaping Use: Never used   Substance Use Topics     Alcohol  "use: Not Currently     Drug use: Not Currently       albuterol (PROVENTIL) (2.5 MG/3ML) 0.083% neb solution  aspirin 81 MG EC tablet  atorvastatin (LIPITOR) 80 MG tablet  cholecalciferol, vitamin D3, (VITAMIN D3) 25 mcg (1,000 unit) capsule  clopidogrel (PLAVIX) 75 MG tablet  cyanocobalamin 1000 MCG tablet  gabapentin (NEURONTIN) 600 MG tablet  ipratropium - albuterol 0.5 mg/2.5 mg/3 mL (DUONEB) 0.5-2.5 (3) MG/3ML neb solution  leuprolide (ELIGARD) 45 MG kit  losartan-hydrochlorothiazide (HYZAAR) 50-12.5 MG tablet  metoprolol tartrate (LOPRESSOR) 25 MG tablet  omeprazole (PRILOSEC) 20 MG DR capsule  oxybutynin ER (DITROPAN-XL) 10 MG 24 hr tablet        Physical Exam     Vitals:  BP (!) 174/79   Pulse 53   Temp 97.4  F (36.3  C) (Temporal)   Resp 18   Ht 1.676 m (5' 6\")   Wt 73.9 kg (163 lb)   SpO2 98%   BMI 26.31 kg/m      PHYSICAL EXAM:   Physical Exam  Vitals and nursing note reviewed.   Constitutional:       General: He is not in acute distress.     Appearance: Normal appearance.   HENT:      Head: Normocephalic and atraumatic.      Nose:      Right Nostril: No epistaxis.      Left Nostril: Epistaxis present.      Comments: Mild active bleeding left nostril. No bleeding from right. No obvious source of bleeding. No facial trauma.     Mouth/Throat:      Mouth: Mucous membranes are moist.   Eyes:      Extraocular Movements: Extraocular movements intact.   Cardiovascular:      Rate and Rhythm: Normal rate and regular rhythm.      Pulses: Normal pulses.           Radial pulses are 2+ on the right side and 2+ on the left side.        Dorsalis pedis pulses are 2+ on the right side and 2+ on the left side.   Pulmonary:      Effort: Pulmonary effort is normal.   Skin:     Findings: No rash.   Neurological:      General: No focal deficit present.      Mental Status: He is alert. Mental status is at baseline.      Comments: Fluent speech   Psychiatric:         Mood and Affect: Mood normal.         Behavior: Behavior " normal.         Results     LAB:  All pertinent labs reviewed and interpreted  Labs Ordered and Resulted from Time of ED Arrival to Time of ED Departure - No data to display    RADIOLOGY:  No orders to display                ECG:  none    PROCEDURES:  [facility]    -Epistaxis Mgmt    Date/Time: 5/3/2022 12:14 PM  Performed by: Darren Gonzalez MD  Authorized by: Darren Gonzalez MD     Risks, benefits and alternatives discussed.      ANESTHESIA (see MAR for exact dosages)     Anesthesia method:  None  PROCEDURE DETAILS     Treatment site: left side.    Repair method: gabo-synephrine and direct pressure/clamp.    Treatment complexity:  Limited    Treatment episode: no recurrence of recent bleed        POST PROCEDURE     Assessment:  Bleeding stopped    PROCEDURE    Patient Tolerance:  Patient tolerated the procedure well with no immediate complications  :        FINAL IMPRESSION:    ICD-10-CM    1. Epistaxis  R04.0 Otolaryngology Referral       0 minutes of critical care time      I, Salina Edwards, am serving as a scribe to document services personally performed by Dr. Darren Gonzalez, based on my observations and the provider's statements to me. IDarren, DO attest that Salina Edwards is acting in a scribe capacity, has observed my performance of the services and has documented them in accordance with my direction.      Darren Gonzalez, DO  Emergency Medicine  [facility] EMERGENCY ROOM  5/3/2022  12:03 PM        Darren Gonzalez MD  05/03/22 9427

## 2022-05-04 NOTE — PROGRESS NOTES
CHIEF COMPLAINT:        HISTORY OF PRESENT ILLNESS    Ubaldo was seen at the behest of Edson Gonzalez MD for epistaxis.     Referral note:    CHIEF COMPLAINT:  Epistaxis        Triage Note:The patient presents to the ED with a nosebleed that began this morning around 0530. The patient reports the bleeding has been continuous but has slowed a bit. The patient does take plavix and baby aspirin. He reports similar nosebleeds in the past.            ED COURSE & MEDICAL DECISION MAKING:          ED Course as of 05/03/22 1455   Tue May 03, 2022   1328 Pt re-evaluated once again and no bleeding.  Clamp removed, will monitor. Pt would prefer to not use rapid rhino if possible.  Instructed him on expected course, home cares if discharged and ENT follow up.         Pt on plavix for PVD with previous stents, as well as baby aspirin, presenting with ongoing left sided nosebleed since around 0530 today that woke him up.  Pt denies any known trauma/injury.  No other new meds.  Pt with previous, worse nosebleed not too long ago requiring b/l rapid rhinos.  Exam here reveals only some mild bleeding from left nostril only.  Will attempt neosynepherine, continued nasal clamp, but may ultimately require rapid rhino.     12:03 PM I met with the patient, obtained history, performed an initial exam, and discussed options and plan for diagnostics and treatment here in the ED.   12:34 PM I rechecked and updated the patient. No more bleeding, I reapplied the clamp and will recheck in a bit.  1:20 PM I rechecked the patient, no bleeding.  2:18 PM Patient ready for discharge.  Pt remains without recurrent bleeding with clamp off, posterior oropharynx clear.  Instructed on cares, ENT referral, return precautions.       REVIEW OF SYSTEMS    Review of Systems as per HPI and PMHx, otherwise 10 system review system are negative.     Patient has no known allergies.     There were no vitals taken for this visit.    HEAD: Normal appearance and  symmetry:  No cutaneous lesions.      NECK:  supple     EARS: normal TM, EACs; Cerumen impaction RIGHT    CERUMEN IMPACTION REMOVAL    After obtaining verbal consent, and using the binocular microscope.  Cerumen impaction(s) were removed from the affected ear canal(s)  using a wire loops and/or suction.  The patient tolerated the procedure well without incident.      EYES:  EOMI    CN VII/XII:  intact     NOSE:     Dorsum:   straight  Septum:  prominent vessel LEFT (cauerized with silver nitrate)  Mucosa:  friable with crusting        ORAL CAVITY/OROPHARYNX:     Lips:  Normal.  Tongue: normal, midline  Mucosa:   no lesions     NECK:  Trachea:  midline.              Thyroid:  normal              Adenopathy:  none        NEURO:   Alert and Oriented     GAIT AND STATION:  normal     RESPIRATORY:   Symmetry and Respiratory effort     PSYCH:  Normal mood and affect     SKIN:   warm and dry         IMPRESSION:    Encounter Diagnoses   Name Primary?     Epistaxis Yes     Nasal crusting      Impacted cerumen of right ear           RECOMMENDATIONS:      Orders Placed This Encounter   Procedures     Remove Cerumen     Nasal Cautery Simple Unilat      Discussed conservative management of epistaxis and steps to keep nose moisturized.

## 2022-05-05 ENCOUNTER — OFFICE VISIT (OUTPATIENT)
Dept: OTOLARYNGOLOGY | Facility: CLINIC | Age: 77
End: 2022-05-05
Attending: EMERGENCY MEDICINE
Payer: MEDICARE

## 2022-05-05 DIAGNOSIS — H61.21 IMPACTED CERUMEN OF RIGHT EAR: ICD-10-CM

## 2022-05-05 DIAGNOSIS — R04.0 EPISTAXIS: Primary | ICD-10-CM

## 2022-05-05 DIAGNOSIS — J34.89 NASAL CRUSTING: ICD-10-CM

## 2022-05-05 PROCEDURE — 69210 REMOVE IMPACTED EAR WAX UNI: CPT | Performed by: OTOLARYNGOLOGY

## 2022-05-05 PROCEDURE — 99203 OFFICE O/P NEW LOW 30 MIN: CPT | Mod: 25 | Performed by: OTOLARYNGOLOGY

## 2022-05-05 PROCEDURE — 30901 CONTROL OF NOSEBLEED: CPT | Mod: LT | Performed by: OTOLARYNGOLOGY

## 2022-05-05 RX ORDER — MUPIROCIN 20 MG/G
OINTMENT TOPICAL
Qty: 22 G | Refills: 0 | Status: SHIPPED | OUTPATIENT
Start: 2022-05-05 | End: 2022-06-29

## 2022-05-05 NOTE — LETTER
5/5/2022         RE: Ubaldo Ramos  [address]  [place] [address]        Dear Colleague,    Thank you for referring your patient, Ubaldo Ramos, to the [facility]. Please see a copy of my visit note below.    CHIEF COMPLAINT:        HISTORY OF PRESENT ILLNESS    Ubaldo was seen at the behest of Edson Gonzalez MD for epistaxis.     Referral note:    CHIEF COMPLAINT:  Epistaxis        Triage Note:The patient presents to the ED with a nosebleed that began this morning around 0530. The patient reports the bleeding has been continuous but has slowed a bit. The patient does take plavix and baby aspirin. He reports similar nosebleeds in the past.            ED COURSE & MEDICAL DECISION MAKING:          ED Course as of 05/03/22 1455   Tue May 03, 2022   1328 Pt re-evaluated once again and no bleeding.  Clamp removed, will monitor. Pt would prefer to not use rapid rhino if possible.  Instructed him on expected course, home cares if discharged and ENT follow up.         Pt on plavix for PVD with previous stents, as well as baby aspirin, presenting with ongoing left sided nosebleed since around 0530 today that woke him up.  Pt denies any known trauma/injury.  No other new meds.  Pt with previous, worse nosebleed not too long ago requiring b/l rapid rhinos.  Exam here reveals only some mild bleeding from left nostril only.  Will attempt neosynepherine, continued nasal clamp, but may ultimately require rapid rhino.     12:03 PM I met with the patient, obtained history, performed an initial exam, and discussed options and plan for diagnostics and treatment here in the ED.   12:34 PM I rechecked and updated the patient. No more bleeding, I reapplied the clamp and will recheck in a bit.  1:20 PM I rechecked the patient, no bleeding.  2:18 PM Patient ready for discharge.  Pt remains without recurrent bleeding with clamp off, posterior oropharynx clear.  Instructed on cares, ENT  referral, return precautions.       REVIEW OF SYSTEMS    Review of Systems as per HPI and PMHx, otherwise 10 system review system are negative.     Patient has no known allergies.     There were no vitals taken for this visit.    HEAD: Normal appearance and symmetry:  No cutaneous lesions.      NECK:  supple     EARS: normal TM, EACs; Cerumen impaction RIGHT    CERUMEN IMPACTION REMOVAL    After obtaining verbal consent, and using the binocular microscope.  Cerumen impaction(s) were removed from the affected ear canal(s)  using a wire loops and/or suction.  The patient tolerated the procedure well without incident.      EYES:  EOMI    CN VII/XII:  intact     NOSE:     Dorsum:   straight  Septum:  prominent vessel LEFT (cauerized with silver nitrate)  Mucosa:  friable with crusting        ORAL CAVITY/OROPHARYNX:     Lips:  Normal.  Tongue: normal, midline  Mucosa:   no lesions     NECK:  Trachea:  midline.              Thyroid:  normal              Adenopathy:  none        NEURO:   Alert and Oriented     GAIT AND STATION:  normal     RESPIRATORY:   Symmetry and Respiratory effort     PSYCH:  Normal mood and affect     SKIN:   warm and dry         IMPRESSION:    Encounter Diagnoses   Name Primary?     Epistaxis Yes     Nasal crusting      Impacted cerumen of right ear           RECOMMENDATIONS:      Orders Placed This Encounter   Procedures     Remove Cerumen     Nasal Cautery Simple Unilat      Discussed conservative management of epistaxis and steps to keep nose moisturized.           Again, thank you for allowing me to participate in the care of your patient.        Sincerely,        Venu Knowles MD

## 2022-05-05 NOTE — PATIENT INSTRUCTIONS
Soak cotton ball with Afrin nasal spray and place in the bleeding nostril with gentle pressure for 20 minutes     AYR nasal saline gel (with aloe) use daily

## 2022-05-19 ENCOUNTER — TRANSFERRED RECORDS (OUTPATIENT)
Dept: HEALTH INFORMATION MANAGEMENT | Facility: CLINIC | Age: 77
End: 2022-05-19
Payer: MEDICARE

## 2022-06-29 ENCOUNTER — OFFICE VISIT (OUTPATIENT)
Dept: FAMILY MEDICINE | Facility: CLINIC | Age: 77
End: 2022-06-29
Payer: MEDICARE

## 2022-06-29 VITALS
BODY MASS INDEX: 26.16 KG/M2 | HEIGHT: 66 IN | SYSTOLIC BLOOD PRESSURE: 150 MMHG | OXYGEN SATURATION: 97 % | WEIGHT: 162.8 LBS | DIASTOLIC BLOOD PRESSURE: 84 MMHG | HEART RATE: 60 BPM

## 2022-06-29 DIAGNOSIS — G61.82 MULTIFOCAL MOTOR NEUROPATHY (H): Primary | ICD-10-CM

## 2022-06-29 DIAGNOSIS — Z23 NEED FOR PNEUMOCOCCAL VACCINE: ICD-10-CM

## 2022-06-29 DIAGNOSIS — D64.9 ANEMIA, UNSPECIFIED TYPE: ICD-10-CM

## 2022-06-29 DIAGNOSIS — E78.00 PURE HYPERCHOLESTEROLEMIA: ICD-10-CM

## 2022-06-29 DIAGNOSIS — I10 BENIGN ESSENTIAL HYPERTENSION: ICD-10-CM

## 2022-06-29 DIAGNOSIS — J43.9 PULMONARY EMPHYSEMA, UNSPECIFIED EMPHYSEMA TYPE (H): ICD-10-CM

## 2022-06-29 LAB
ALBUMIN SERPL BCG-MCNC: 4.2 G/DL (ref 3.5–5.2)
ALP SERPL-CCNC: 92 U/L (ref 40–129)
ALT SERPL W P-5'-P-CCNC: 19 U/L (ref 10–50)
ANION GAP SERPL CALCULATED.3IONS-SCNC: 13 MMOL/L (ref 7–15)
AST SERPL W P-5'-P-CCNC: 23 U/L (ref 10–50)
BILIRUB SERPL-MCNC: 0.4 MG/DL
BUN SERPL-MCNC: 14.8 MG/DL (ref 8–23)
CALCIUM SERPL-MCNC: 9.8 MG/DL (ref 8.8–10.2)
CHLORIDE SERPL-SCNC: 102 MMOL/L (ref 98–107)
CREAT SERPL-MCNC: 0.87 MG/DL (ref 0.67–1.17)
DEPRECATED HCO3 PLAS-SCNC: 27 MMOL/L (ref 22–29)
ERYTHROCYTE [DISTWIDTH] IN BLOOD BY AUTOMATED COUNT: 14.1 % (ref 10–15)
GFR SERPL CREATININE-BSD FRML MDRD: 90 ML/MIN/1.73M2
GLUCOSE SERPL-MCNC: 108 MG/DL (ref 70–99)
HCT VFR BLD AUTO: 39.9 % (ref 40–53)
HGB BLD-MCNC: 12.6 G/DL (ref 13.3–17.7)
MCH RBC QN AUTO: 29.6 PG (ref 26.5–33)
MCHC RBC AUTO-ENTMCNC: 31.6 G/DL (ref 31.5–36.5)
MCV RBC AUTO: 94 FL (ref 78–100)
PLATELET # BLD AUTO: 211 10E3/UL (ref 150–450)
POTASSIUM SERPL-SCNC: 4.2 MMOL/L (ref 3.4–5.3)
PROT SERPL-MCNC: 7.1 G/DL (ref 6.4–8.3)
RBC # BLD AUTO: 4.26 10E6/UL (ref 4.4–5.9)
SODIUM SERPL-SCNC: 142 MMOL/L (ref 136–145)
WBC # BLD AUTO: 4.6 10E3/UL (ref 4–11)

## 2022-06-29 PROCEDURE — 99214 OFFICE O/P EST MOD 30 MIN: CPT | Mod: 25 | Performed by: NURSE PRACTITIONER

## 2022-06-29 PROCEDURE — G0009 ADMIN PNEUMOCOCCAL VACCINE: HCPCS | Performed by: NURSE PRACTITIONER

## 2022-06-29 PROCEDURE — 85027 COMPLETE CBC AUTOMATED: CPT | Performed by: NURSE PRACTITIONER

## 2022-06-29 PROCEDURE — 36415 COLL VENOUS BLD VENIPUNCTURE: CPT | Performed by: NURSE PRACTITIONER

## 2022-06-29 PROCEDURE — 80053 COMPREHEN METABOLIC PANEL: CPT | Performed by: NURSE PRACTITIONER

## 2022-06-29 PROCEDURE — 90677 PCV20 VACCINE IM: CPT | Performed by: NURSE PRACTITIONER

## 2022-06-29 RX ORDER — PREGABALIN 150 MG/1
150 CAPSULE ORAL 2 TIMES DAILY
Qty: 60 CAPSULE | Refills: 0 | Status: SHIPPED | OUTPATIENT
Start: 2022-06-29 | End: 2022-07-14

## 2022-06-29 ASSESSMENT — PAIN SCALES - GENERAL: PAINLEVEL: NO PAIN (0)

## 2022-06-29 NOTE — LETTER
July 1, 2022      Ubaldo Ramos  [address]   [place] [address]        Dear ,    We are writing to inform you of your test results.    Liver, kidneys, electrolytes look good.  Blood sugar if nonfasting is normal.  Hemoglobin has shown some moderate improvement since last check.    Resulted Orders   Comprehensive metabolic panel (BMP + Alb, Alk Phos, ALT, AST, Total. Bili, TP)   Result Value Ref Range    Sodium 142 136 - 145 mmol/L    Potassium 4.2 3.4 - 5.3 mmol/L    Creatinine 0.87 0.67 - 1.17 mg/dL    Urea Nitrogen 14.8 8.0 - 23.0 mg/dL    Chloride 102 98 - 107 mmol/L    Carbon Dioxide (CO2) 27 22 - 29 mmol/L    Anion Gap 13 7 - 15 mmol/L    Glucose 108 (H) 70 - 99 mg/dL    Calcium 9.8 8.8 - 10.2 mg/dL    Protein Total 7.1 6.4 - 8.3 g/dL    Albumin 4.2 3.5 - 5.2 g/dL    Bilirubin Total 0.4 <=1.2 mg/dL    Alkaline Phosphatase 92 40 - 129 U/L    AST 23 10 - 50 U/L    ALT 19 10 - 50 U/L    GFR Estimate 90 >60 mL/min/1.73m2      Comment:      Effective December 21, 2021 eGFRcr in adults is calculated using the 2021 CKD-EPI creatinine equation which includes age and gender (Humble et al., NEJ, DOI: 10.1056/UZZDmp3716745)   CBC with platelets   Result Value Ref Range    WBC Count 4.6 4.0 - 11.0 10e3/uL    RBC Count 4.26 (L) 4.40 - 5.90 10e6/uL    Hemoglobin 12.6 (L) 13.3 - 17.7 g/dL    Hematocrit 39.9 (L) 40.0 - 53.0 %    MCV 94 78 - 100 fL    MCH 29.6 26.5 - 33.0 pg    MCHC 31.6 31.5 - 36.5 g/dL    RDW 14.1 10.0 - 15.0 %    Platelet Count 211 150 - 450 10e3/uL       If you have any questions or concerns, please call the clinic at the number listed above.       Sincerely,      John Oconnor NP

## 2022-06-29 NOTE — PROGRESS NOTES
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 Pulmonary emphysema, unspecified emphysema type (H)  J43.9    3. Need for pneumococcal vaccine  Z23 Pneumococcal 20 Valent Conjugate (PCV20)   4. Essential Hypercholesterolemia  E78.00 Comprehensive metabolic panel (BMP + Alb, Alk Phos, ALT, AST, Total. Bili, TP)     Comprehensive metabolic panel (BMP + Alb, Alk Phos, ALT, AST, Total. Bili, TP)   5. Anemia, unspecified type  D64.9 CBC with platelets     CBC with platelets   6. Benign essential hypertension  I10      Monitor anemia.  Prevnar 20 given.  Encouraged tobacco cessation.  No longer getting relief with gabapentin.  Switch out for Lyrica.  It sounds like he's only taking a 1/2 tab of losartan hydrochlorothiazide per old recommendations from his doctor in [place] a few years ago.  Increase to a full tab.  Check back for nurse BP check in 1 month and adjust from there.  Continue working with vascular medicine.    Reviewed ED note x2, vascular medicine note x1, urology note x1, CBC x2, family medicine note x2    Subjective     HPI       COPD:  He presents for follow up of COPD.  Overall, COPD symptoms are stable since last visit. He has same as usual fatigue or shortness of breath with exertion and no shortness of breath at rest.He sometimes coughs and does not have change in sputum. No recent fever. He can walk less than 1 block without stopping to rest. He can walk 2 flights of stairs without resting.The patient has had no ED, urgent care, or hospital admissions because of COPD since the last visit.  Still smokes.    Hypertension: He presents for follow up of hypertension.  He does check blood pressure  regularly outside of the clinic. Outpatient blood pressures have not been over 140/90. He does not follow a low salt diet. blood pressure is elevated today.    Vascular Disease:  He presents for follow up of vascular disease.  He never takes  "nitroglycerin. He takes daily aspirin. Saw vascular medicine and recommendations were to optimized medically and follow up later this fall.     Neuropathy   Gabapentin doesn't seem to be helping any longer.  Issues mostly in the feet and legs. Symptoms include burning. Hasn't tried lyrica before.        Review of Systems - negative except for what's listed in the HPI      Objective    BP (!) 150/84   Pulse 60   Ht 1.676 m (5' 6\")   Wt 73.8 kg (162 lb 12.8 oz)   SpO2 97%   BMI 26.28 kg/m    Physical Exam   General appearance - alert, well appearing, and in no distress  Mental status - alert, oriented to person, place, and time  Lymphatics - no palpable lymphadenopathy  Chest - clear to auscultation, no wheezes, rales or rhonchi, symmetric air entry  Heart - normal rate and regular rhythm, S1 and S2 normal, no murmurs noted  Abdomen - soft, nontender, nondistended, no masses or organomegaly  Neurological - alert, oriented, normal speech, no focal findings or movement disorder noted, neck supple without rigidity, cranial nerves II through XII intact, motor and sensory grossly normal bilaterally, normal muscle tone, no tremors, strength 5/5  Extremities - peripheral pulses normal, no peripheral edema  Skin - normal coloration and turgor.    John Oconnor, CNP    This note has been dictated using voice recognition software. Any grammatical or context distortions are unintentional and inherent to the software.    "

## 2022-06-29 NOTE — PATIENT INSTRUCTIONS
"Lets go ahead and take a full tablet of your losartan hydrochlorothiazide.  After doing this for a month, schedule a \"nurse only blood pressure check \"at the clinic so that we can see how your body is responding to the increase in medication.  If still too high, we will recommend increasing this to 2 tablets daily.    I sent some medicine called Lyrica/pregabalin to replace her gabapentin.  It is taken twice a day.  Since this medicine is new, there is a chance that it may cause some lethargy or sedation issues and if that happens please let me know and we can bring the dosing back.  No alcohol with this medicine.    Updating blood work to keep an eye on some of those anemia issues.      "

## 2022-07-13 DIAGNOSIS — G61.82 MULTIFOCAL MOTOR NEUROPATHY (H): ICD-10-CM

## 2022-07-14 RX ORDER — PREGABALIN 150 MG/1
CAPSULE ORAL
Qty: 180 CAPSULE | Refills: 1 | Status: SHIPPED | OUTPATIENT
Start: 2022-07-14 | End: 2022-07-21

## 2022-07-21 ENCOUNTER — OFFICE VISIT (OUTPATIENT)
Dept: FAMILY MEDICINE | Facility: CLINIC | Age: 77
End: 2022-07-21
Payer: MEDICARE

## 2022-07-21 VITALS
DIASTOLIC BLOOD PRESSURE: 58 MMHG | HEART RATE: 65 BPM | SYSTOLIC BLOOD PRESSURE: 130 MMHG | BODY MASS INDEX: 26.02 KG/M2 | TEMPERATURE: 98.1 F | OXYGEN SATURATION: 96 % | WEIGHT: 161.2 LBS

## 2022-07-21 DIAGNOSIS — G61.82 MULTIFOCAL MOTOR NEUROPATHY (H): Primary | ICD-10-CM

## 2022-07-21 DIAGNOSIS — N32.81 OVERACTIVE BLADDER: ICD-10-CM

## 2022-07-21 DIAGNOSIS — M43.6 NECK STIFFNESS: ICD-10-CM

## 2022-07-21 DIAGNOSIS — R26.81 GAIT INSTABILITY: ICD-10-CM

## 2022-07-21 DIAGNOSIS — G62.9 PERIPHERAL POLYNEUROPATHY: ICD-10-CM

## 2022-07-21 DIAGNOSIS — Z98.1 S/P CERVICAL SPINAL FUSION: ICD-10-CM

## 2022-07-21 PROCEDURE — 99214 OFFICE O/P EST MOD 30 MIN: CPT | Performed by: NURSE PRACTITIONER

## 2022-07-21 RX ORDER — OXYBUTYNIN CHLORIDE 10 MG/1
10 TABLET, EXTENDED RELEASE ORAL DAILY
Qty: 90 TABLET | Refills: 1 | Status: SHIPPED | OUTPATIENT
Start: 2022-07-21 | End: 2023-05-23

## 2022-07-21 RX ORDER — PREGABALIN 150 MG/1
CAPSULE ORAL
Qty: 180 CAPSULE | Refills: 1 | Status: SHIPPED | OUTPATIENT
Start: 2022-07-21 | End: 2022-08-10

## 2022-07-21 ASSESSMENT — PAIN SCALES - GENERAL: PAINLEVEL: NO PAIN (0)

## 2022-07-21 NOTE — PROGRESS NOTES
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 Overactive bladder  N32.81 oxybutynin ER (DITROPAN XL) 10 MG 24 hr tablet   3. Neck stiffness  M43.6 XR Cervical Spine 2/3 Views   4. S/P cervical spinal fusion  Z98.1 XR Cervical Spine 2/3 Views   5. Gait instability  R26.81 Walker Order for DME - ONLY FOR DME   6. Peripheral polyneuropathy  G62.9 Walker Order for DME - ONLY FOR DME     Declines colonoscopy.  Given worsening neck pain, update cervical x-ray.  Order for walker written and sent to [facility].  Contact information provided to the patient.  Refill of pregabalin and oxybutynin sent to the pharmacy.    Subjective     HPI     Neuropathy  Lyrica is working better than gabapentin. No significant side effects.  When going to shows and he has to walk a lot, it's hard to get around and stand for a while.    Overactive bladder  Needs refill of oxybutynin.  Well-controlled with current regimen.    Neck  Stiffness.  History of a cervical fusion.  Doesn't radiated down the arms. No upper extremity weakness.      Review of Systems - negative except for what's listed in the HPI      Objective    /58 (BP Location: Right arm, Patient Position: Sitting, Cuff Size: Adult Regular)   Pulse 65   Temp 98.1  F (36.7  C)   Wt 73.1 kg (161 lb 3.2 oz)   SpO2 96%   BMI 26.02 kg/m    Physical Exam   General appearance - alert, well appearing, and in no distress  Mental status - alert, oriented to person, place, and time  Lymphatics - no palpable lymphadenopathy  Chest - clear to auscultation, no wheezes, rales or rhonchi, symmetric air entry  Heart - normal rate and regular rhythm, S1 and S2 normal, no murmurs noted  Neurological - alert, oriented, normal speech, no focal findings or movement disorder noted, neck supple without rigidity, cranial nerves II through XII intact, motor grossly normal bilaterally, normal muscle tone, no tremors, strength  5/5, UE DTRs intact  Musculoskeletal -no significant discomfort with palpation along the cervical spine.  Diminished range of motion secondary to stiffness, past surgical history, and discomfort.  Extremities - peripheral pulses normal, no peripheral edema  Skin - normal coloration and turgor.    John Oconnor, CNP    This note has been dictated using voice recognition software. Any grammatical or context distortions are unintentional and inherent to the software.      .  ..

## 2022-07-21 NOTE — PATIENT INSTRUCTIONS
Refill of lyrica sent to [facility].    Paper copy for the oxybutynin prescription and coupon provided to bring to [facility]    We will send the prescription for the 4 wheeled walker to the [facility] medical equipment store in [place].  Their contact information is:    [facility]  Address: [address] [place] [address] [place] [id number]  Phone: [phone]    Xray of the cervical spine ordered.  We'll have to get that scheduled since xray is down today.

## 2022-07-31 ENCOUNTER — HOSPITAL ENCOUNTER (INPATIENT)
Facility: CLINIC | Age: 77
LOS: 3 days | Discharge: HOME | DRG: 193 | End: 2022-08-03
Attending: EMERGENCY MEDICINE | Admitting: INTERNAL MEDICINE
Payer: MEDICARE

## 2022-07-31 ENCOUNTER — APPOINTMENT (OUTPATIENT)
Dept: CT IMAGING | Facility: CLINIC | Age: 77
DRG: 193 | End: 2022-07-31
Attending: EMERGENCY MEDICINE
Payer: MEDICARE

## 2022-07-31 DIAGNOSIS — R09.02 HYPOXEMIA: ICD-10-CM

## 2022-07-31 DIAGNOSIS — J44.1 COPD EXACERBATION (H): ICD-10-CM

## 2022-07-31 DIAGNOSIS — J18.9 PNEUMONIA OF BOTH LUNGS DUE TO INFECTIOUS ORGANISM, UNSPECIFIED PART OF LUNG: ICD-10-CM

## 2022-07-31 DIAGNOSIS — R94.31 ABNORMAL ELECTROCARDIOGRAM: ICD-10-CM

## 2022-07-31 LAB
ANION GAP SERPL CALCULATED.3IONS-SCNC: 13 MMOL/L (ref 5–18)
ATRIAL RATE - MUSE: 65 BPM
BASE EXCESS BLDV CALC-SCNC: 2.8 MMOL/L
BASOPHILS # BLD AUTO: 0 10E3/UL (ref 0–0.2)
BASOPHILS NFR BLD AUTO: 0 %
BNP SERPL-MCNC: 74 PG/ML (ref 0–76)
BUN SERPL-MCNC: 17 MG/DL (ref 8–28)
C PNEUM DNA SPEC QL NAA+PROBE: NOT DETECTED
CALCIUM SERPL-MCNC: 8.4 MG/DL (ref 8.5–10.5)
CHLORIDE BLD-SCNC: 96 MMOL/L (ref 98–107)
CO2 SERPL-SCNC: 25 MMOL/L (ref 22–31)
CREAT SERPL-MCNC: 0.79 MG/DL (ref 0.7–1.3)
CREAT SERPL-MCNC: 0.88 MG/DL (ref 0.7–1.3)
D DIMER PPP FEU-MCNC: 5.35 UG/ML FEU (ref 0–0.5)
DIASTOLIC BLOOD PRESSURE - MUSE: 63 MMHG
EOSINOPHIL # BLD AUTO: 0.1 10E3/UL (ref 0–0.7)
EOSINOPHIL NFR BLD AUTO: 1 %
ERYTHROCYTE [DISTWIDTH] IN BLOOD BY AUTOMATED COUNT: 14.6 % (ref 10–15)
FLUAV H1 2009 PAND RNA SPEC QL NAA+PROBE: NOT DETECTED
FLUAV H1 RNA SPEC QL NAA+PROBE: NOT DETECTED
FLUAV H3 RNA SPEC QL NAA+PROBE: NOT DETECTED
FLUAV RNA SPEC QL NAA+PROBE: NEGATIVE
FLUAV RNA SPEC QL NAA+PROBE: NOT DETECTED
FLUBV RNA RESP QL NAA+PROBE: NEGATIVE
FLUBV RNA SPEC QL NAA+PROBE: NOT DETECTED
GFR SERPL CREATININE-BSD FRML MDRD: 90 ML/MIN/1.73M2
GFR SERPL CREATININE-BSD FRML MDRD: >90 ML/MIN/1.73M2
GLUCOSE BLD-MCNC: 120 MG/DL (ref 70–125)
HADV DNA SPEC QL NAA+PROBE: NOT DETECTED
HCO3 BLDV-SCNC: 26 MMOL/L (ref 24–30)
HCOV PNL SPEC NAA+PROBE: NOT DETECTED
HCT VFR BLD AUTO: 32.5 % (ref 40–53)
HGB BLD-MCNC: 10.6 G/DL (ref 13.3–17.7)
HMPV RNA SPEC QL NAA+PROBE: NOT DETECTED
HPIV1 RNA SPEC QL NAA+PROBE: NOT DETECTED
HPIV2 RNA SPEC QL NAA+PROBE: NOT DETECTED
HPIV3 RNA SPEC QL NAA+PROBE: NOT DETECTED
HPIV4 RNA SPEC QL NAA+PROBE: NOT DETECTED
IMM GRANULOCYTES # BLD: 0 10E3/UL
IMM GRANULOCYTES NFR BLD: 0 %
INR PPP: 1.09 (ref 0.85–1.15)
INTERPRETATION ECG - MUSE: NORMAL
L PNEUMO1 AG UR QL IA: NEGATIVE
LACTATE SERPL-SCNC: 0.7 MMOL/L (ref 0.7–2)
LYMPHOCYTES # BLD AUTO: 1.9 10E3/UL (ref 0.8–5.3)
LYMPHOCYTES NFR BLD AUTO: 26 %
M PNEUMO DNA SPEC QL NAA+PROBE: NOT DETECTED
MAGNESIUM SERPL-MCNC: 1.3 MG/DL (ref 1.8–2.6)
MAGNESIUM SERPL-MCNC: 2.9 MG/DL (ref 1.8–2.6)
MCH RBC QN AUTO: 29.1 PG (ref 26.5–33)
MCHC RBC AUTO-ENTMCNC: 32.6 G/DL (ref 31.5–36.5)
MCV RBC AUTO: 89 FL (ref 78–100)
MONOCYTES # BLD AUTO: 0.8 10E3/UL (ref 0–1.3)
MONOCYTES NFR BLD AUTO: 11 %
NEUTROPHILS # BLD AUTO: 4.6 10E3/UL (ref 1.6–8.3)
NEUTROPHILS NFR BLD AUTO: 62 %
NRBC # BLD AUTO: 0 10E3/UL
NRBC BLD AUTO-RTO: 0 /100
OXYHGB MFR BLDV: 60.8 % (ref 70–75)
P AXIS - MUSE: 41 DEGREES
PCO2 BLDV: 49 MM HG (ref 35–50)
PH BLDV: 7.37 [PH] (ref 7.35–7.45)
PLATELET # BLD AUTO: 155 10E3/UL (ref 150–450)
PO2 BLDV: 33 MM HG (ref 25–47)
POTASSIUM BLD-SCNC: 3.4 MMOL/L (ref 3.5–5)
PR INTERVAL - MUSE: 154 MS
QRS DURATION - MUSE: 90 MS
QT - MUSE: 416 MS
QTC - MUSE: 432 MS
R AXIS - MUSE: 31 DEGREES
RBC # BLD AUTO: 3.64 10E6/UL (ref 4.4–5.9)
RSV RNA SPEC NAA+PROBE: NEGATIVE
RSV RNA SPEC QL NAA+PROBE: NOT DETECTED
RSV RNA SPEC QL NAA+PROBE: NOT DETECTED
RV+EV RNA SPEC QL NAA+PROBE: NOT DETECTED
S PNEUM AG SPEC QL: NEGATIVE
SAO2 % BLDV: 62.9 % (ref 70–75)
SARS-COV-2 RNA RESP QL NAA+PROBE: NEGATIVE
SODIUM SERPL-SCNC: 134 MMOL/L (ref 136–145)
SYSTOLIC BLOOD PRESSURE - MUSE: 132 MMHG
T AXIS - MUSE: 120 DEGREES
TROPONIN I SERPL-MCNC: 0.02 NG/ML (ref 0–0.29)
TROPONIN I SERPL-MCNC: 0.02 NG/ML (ref 0–0.29)
VENTRICULAR RATE- MUSE: 65 BPM
WBC # BLD AUTO: 7.4 10E3/UL (ref 4–11)

## 2022-07-31 PROCEDURE — 250N000009 HC RX 250: Performed by: INTERNAL MEDICINE

## 2022-07-31 PROCEDURE — 250N000011 HC RX IP 250 OP 636: Performed by: EMERGENCY MEDICINE

## 2022-07-31 PROCEDURE — 80048 BASIC METABOLIC PNL TOTAL CA: CPT | Performed by: EMERGENCY MEDICINE

## 2022-07-31 PROCEDURE — 82805 BLOOD GASES W/O2 SATURATION: CPT | Performed by: EMERGENCY MEDICINE

## 2022-07-31 PROCEDURE — 250N000013 HC RX MED GY IP 250 OP 250 PS 637: Performed by: EMERGENCY MEDICINE

## 2022-07-31 PROCEDURE — 83605 ASSAY OF LACTIC ACID: CPT | Performed by: EMERGENCY MEDICINE

## 2022-07-31 PROCEDURE — 99285 EMERGENCY DEPT VISIT HI MDM: CPT | Mod: 25

## 2022-07-31 PROCEDURE — 87637 SARSCOV2&INF A&B&RSV AMP PRB: CPT | Performed by: EMERGENCY MEDICINE

## 2022-07-31 PROCEDURE — 96368 THER/DIAG CONCURRENT INF: CPT

## 2022-07-31 PROCEDURE — 84484 ASSAY OF TROPONIN QUANT: CPT | Performed by: EMERGENCY MEDICINE

## 2022-07-31 PROCEDURE — 36415 COLL VENOUS BLD VENIPUNCTURE: CPT | Performed by: INTERNAL MEDICINE

## 2022-07-31 PROCEDURE — 96365 THER/PROPH/DIAG IV INF INIT: CPT | Mod: 59

## 2022-07-31 PROCEDURE — 258N000003 HC RX IP 258 OP 636: Performed by: INTERNAL MEDICINE

## 2022-07-31 PROCEDURE — 85025 COMPLETE CBC W/AUTO DIFF WBC: CPT | Performed by: EMERGENCY MEDICINE

## 2022-07-31 PROCEDURE — 96366 THER/PROPH/DIAG IV INF ADDON: CPT

## 2022-07-31 PROCEDURE — 99223 1ST HOSP IP/OBS HIGH 75: CPT | Mod: AI | Performed by: INTERNAL MEDICINE

## 2022-07-31 PROCEDURE — 87040 BLOOD CULTURE FOR BACTERIA: CPT | Performed by: EMERGENCY MEDICINE

## 2022-07-31 PROCEDURE — C9803 HOPD COVID-19 SPEC COLLECT: HCPCS

## 2022-07-31 PROCEDURE — 250N000011 HC RX IP 250 OP 636: Performed by: INTERNAL MEDICINE

## 2022-07-31 PROCEDURE — 96376 TX/PRO/DX INJ SAME DRUG ADON: CPT

## 2022-07-31 PROCEDURE — 120N000001 HC R&B MED SURG/OB

## 2022-07-31 PROCEDURE — 93005 ELECTROCARDIOGRAM TRACING: CPT | Performed by: EMERGENCY MEDICINE

## 2022-07-31 PROCEDURE — 87070 CULTURE OTHR SPECIMN AEROBIC: CPT | Performed by: INTERNAL MEDICINE

## 2022-07-31 PROCEDURE — 83880 ASSAY OF NATRIURETIC PEPTIDE: CPT | Performed by: EMERGENCY MEDICINE

## 2022-07-31 PROCEDURE — 85379 FIBRIN DEGRADATION QUANT: CPT | Performed by: EMERGENCY MEDICINE

## 2022-07-31 PROCEDURE — 999N000157 HC STATISTIC RCP TIME EA 10 MIN

## 2022-07-31 PROCEDURE — 82565 ASSAY OF CREATININE: CPT | Performed by: INTERNAL MEDICINE

## 2022-07-31 PROCEDURE — 99207 PR NO CHARGE LOS: CPT | Performed by: INTERNAL MEDICINE

## 2022-07-31 PROCEDURE — 96375 TX/PRO/DX INJ NEW DRUG ADDON: CPT

## 2022-07-31 PROCEDURE — 87899 AGENT NOS ASSAY W/OPTIC: CPT | Performed by: INTERNAL MEDICINE

## 2022-07-31 PROCEDURE — 87486 CHLMYD PNEUM DNA AMP PROBE: CPT | Performed by: INTERNAL MEDICINE

## 2022-07-31 PROCEDURE — 250N000013 HC RX MED GY IP 250 OP 250 PS 637: Performed by: INTERNAL MEDICINE

## 2022-07-31 PROCEDURE — 85610 PROTHROMBIN TIME: CPT | Performed by: EMERGENCY MEDICINE

## 2022-07-31 PROCEDURE — 96361 HYDRATE IV INFUSION ADD-ON: CPT

## 2022-07-31 PROCEDURE — 94640 AIRWAY INHALATION TREATMENT: CPT | Mod: 76

## 2022-07-31 PROCEDURE — 250N000009 HC RX 250: Performed by: EMERGENCY MEDICINE

## 2022-07-31 PROCEDURE — 36415 COLL VENOUS BLD VENIPUNCTURE: CPT | Performed by: EMERGENCY MEDICINE

## 2022-07-31 PROCEDURE — 99207 PR NO BILLABLE SERVICE THIS VISIT: CPT | Performed by: EMERGENCY MEDICINE

## 2022-07-31 PROCEDURE — G1010 CDSM STANSON: HCPCS

## 2022-07-31 PROCEDURE — 83735 ASSAY OF MAGNESIUM: CPT | Performed by: INTERNAL MEDICINE

## 2022-07-31 PROCEDURE — 94640 AIRWAY INHALATION TREATMENT: CPT

## 2022-07-31 RX ORDER — ACETAMINOPHEN 325 MG/1
650 TABLET ORAL EVERY 6 HOURS PRN
Status: DISCONTINUED | OUTPATIENT
Start: 2022-07-31 | End: 2022-08-03 | Stop reason: HOSPADM

## 2022-07-31 RX ORDER — LANOLIN ALCOHOL/MO/W.PET/CERES
1000 CREAM (GRAM) TOPICAL DAILY
Status: DISCONTINUED | OUTPATIENT
Start: 2022-08-01 | End: 2022-08-03 | Stop reason: HOSPADM

## 2022-07-31 RX ORDER — CLOPIDOGREL BISULFATE 75 MG/1
75 TABLET ORAL DAILY
Status: DISCONTINUED | OUTPATIENT
Start: 2022-07-31 | End: 2022-08-03 | Stop reason: HOSPADM

## 2022-07-31 RX ORDER — METOPROLOL TARTRATE 25 MG/1
25 TABLET, FILM COATED ORAL 2 TIMES DAILY
Status: DISCONTINUED | OUTPATIENT
Start: 2022-07-31 | End: 2022-08-03 | Stop reason: HOSPADM

## 2022-07-31 RX ORDER — PANTOPRAZOLE SODIUM 20 MG/1
40 TABLET, DELAYED RELEASE ORAL
Status: DISCONTINUED | OUTPATIENT
Start: 2022-07-31 | End: 2022-08-03 | Stop reason: HOSPADM

## 2022-07-31 RX ORDER — ASPIRIN 81 MG/1
81 TABLET ORAL DAILY
Status: DISCONTINUED | OUTPATIENT
Start: 2022-07-31 | End: 2022-08-03 | Stop reason: HOSPADM

## 2022-07-31 RX ORDER — LOSARTAN POTASSIUM 50 MG/1
50 TABLET ORAL DAILY
Status: DISCONTINUED | OUTPATIENT
Start: 2022-07-31 | End: 2022-08-03 | Stop reason: HOSPADM

## 2022-07-31 RX ORDER — AZITHROMYCIN 500 MG/1
500 TABLET, FILM COATED ORAL ONCE
Status: COMPLETED | OUTPATIENT
Start: 2022-07-31 | End: 2022-07-31

## 2022-07-31 RX ORDER — IPRATROPIUM BROMIDE AND ALBUTEROL SULFATE 2.5; .5 MG/3ML; MG/3ML
3 SOLUTION RESPIRATORY (INHALATION)
Status: DISCONTINUED | OUTPATIENT
Start: 2022-07-31 | End: 2022-08-03 | Stop reason: HOSPADM

## 2022-07-31 RX ORDER — SODIUM CHLORIDE 9 MG/ML
INJECTION, SOLUTION INTRAVENOUS CONTINUOUS
Status: DISCONTINUED | OUTPATIENT
Start: 2022-07-31 | End: 2022-08-01

## 2022-07-31 RX ORDER — AZITHROMYCIN 250 MG/1
500 TABLET, FILM COATED ORAL DAILY
Status: DISCONTINUED | OUTPATIENT
Start: 2022-08-01 | End: 2022-08-03 | Stop reason: HOSPADM

## 2022-07-31 RX ORDER — CEFTRIAXONE 2 G/1
2 INJECTION, POWDER, FOR SOLUTION INTRAMUSCULAR; INTRAVENOUS EVERY 24 HOURS
Status: DISCONTINUED | OUTPATIENT
Start: 2022-07-31 | End: 2022-08-02

## 2022-07-31 RX ORDER — CEFTRIAXONE 2 G/1
2 INJECTION, POWDER, FOR SOLUTION INTRAMUSCULAR; INTRAVENOUS ONCE
Status: DISCONTINUED | OUTPATIENT
Start: 2022-07-31 | End: 2022-07-31

## 2022-07-31 RX ORDER — PREGABALIN 75 MG/1
150 CAPSULE ORAL DAILY
Status: DISCONTINUED | OUTPATIENT
Start: 2022-07-31 | End: 2022-08-01

## 2022-07-31 RX ORDER — IOPAMIDOL 755 MG/ML
100 INJECTION, SOLUTION INTRAVASCULAR ONCE
Status: COMPLETED | OUTPATIENT
Start: 2022-07-31 | End: 2022-07-31

## 2022-07-31 RX ORDER — OXYBUTYNIN CHLORIDE 5 MG/1
10 TABLET, EXTENDED RELEASE ORAL DAILY
Status: DISCONTINUED | OUTPATIENT
Start: 2022-07-31 | End: 2022-08-03 | Stop reason: HOSPADM

## 2022-07-31 RX ORDER — MAGNESIUM SULFATE 4 G/50ML
4 INJECTION INTRAVENOUS ONCE
Status: COMPLETED | OUTPATIENT
Start: 2022-07-31 | End: 2022-07-31

## 2022-07-31 RX ORDER — ACETAMINOPHEN 650 MG/1
650 SUPPOSITORY RECTAL EVERY 6 HOURS PRN
Status: DISCONTINUED | OUTPATIENT
Start: 2022-07-31 | End: 2022-08-03 | Stop reason: HOSPADM

## 2022-07-31 RX ORDER — LIDOCAINE 40 MG/G
CREAM TOPICAL
Status: DISCONTINUED | OUTPATIENT
Start: 2022-07-31 | End: 2022-08-03 | Stop reason: HOSPADM

## 2022-07-31 RX ORDER — ATORVASTATIN CALCIUM 40 MG/1
80 TABLET, FILM COATED ORAL AT BEDTIME
Status: DISCONTINUED | OUTPATIENT
Start: 2022-07-31 | End: 2022-08-03 | Stop reason: HOSPADM

## 2022-07-31 RX ORDER — IPRATROPIUM BROMIDE AND ALBUTEROL SULFATE 2.5; .5 MG/3ML; MG/3ML
3 SOLUTION RESPIRATORY (INHALATION) ONCE
Status: COMPLETED | OUTPATIENT
Start: 2022-07-31 | End: 2022-07-31

## 2022-07-31 RX ORDER — METHYLPREDNISOLONE SODIUM SUCCINATE 125 MG/2ML
125 INJECTION, POWDER, LYOPHILIZED, FOR SOLUTION INTRAMUSCULAR; INTRAVENOUS ONCE
Status: COMPLETED | OUTPATIENT
Start: 2022-07-31 | End: 2022-07-31

## 2022-07-31 RX ORDER — ENOXAPARIN SODIUM 100 MG/ML
40 INJECTION SUBCUTANEOUS EVERY 24 HOURS
Status: DISCONTINUED | OUTPATIENT
Start: 2022-07-31 | End: 2022-08-03 | Stop reason: HOSPADM

## 2022-07-31 RX ORDER — METHYLPREDNISOLONE SODIUM SUCCINATE 125 MG/2ML
60 INJECTION, POWDER, LYOPHILIZED, FOR SOLUTION INTRAMUSCULAR; INTRAVENOUS EVERY 8 HOURS
Status: DISCONTINUED | OUTPATIENT
Start: 2022-07-31 | End: 2022-08-01

## 2022-07-31 RX ORDER — CEFTRIAXONE 1 G/1
1 INJECTION, POWDER, FOR SOLUTION INTRAMUSCULAR; INTRAVENOUS ONCE
Status: DISCONTINUED | OUTPATIENT
Start: 2022-07-31 | End: 2022-07-31

## 2022-07-31 RX ADMIN — PANTOPRAZOLE SODIUM 40 MG: 20 TABLET, DELAYED RELEASE ORAL at 15:57

## 2022-07-31 RX ADMIN — OXYBUTYNIN CHLORIDE 10 MG: 5 TABLET, EXTENDED RELEASE ORAL at 15:57

## 2022-07-31 RX ADMIN — IPRATROPIUM BROMIDE AND ALBUTEROL SULFATE 3 ML: 2.5; .5 SOLUTION RESPIRATORY (INHALATION) at 08:23

## 2022-07-31 RX ADMIN — SODIUM CHLORIDE, PRESERVATIVE FREE: 5 INJECTION INTRAVENOUS at 16:26

## 2022-07-31 RX ADMIN — PREGABALIN 150 MG: 75 CAPSULE ORAL at 15:56

## 2022-07-31 RX ADMIN — METOPROLOL TARTRATE 25 MG: 25 TABLET, FILM COATED ORAL at 20:18

## 2022-07-31 RX ADMIN — METHYLPREDNISOLONE SODIUM SUCCINATE 62.5 MG: 125 INJECTION, POWDER, FOR SOLUTION INTRAMUSCULAR; INTRAVENOUS at 18:41

## 2022-07-31 RX ADMIN — MAGNESIUM SULFATE HEPTAHYDRATE 4 G: 80 INJECTION, SOLUTION INTRAVENOUS at 07:29

## 2022-07-31 RX ADMIN — SODIUM CHLORIDE, PRESERVATIVE FREE: 5 INJECTION INTRAVENOUS at 04:52

## 2022-07-31 RX ADMIN — ATORVASTATIN CALCIUM 80 MG: 40 TABLET, FILM COATED ORAL at 20:19

## 2022-07-31 RX ADMIN — ENOXAPARIN SODIUM 40 MG: 100 INJECTION SUBCUTANEOUS at 04:51

## 2022-07-31 RX ADMIN — CEFTRIAXONE SODIUM 2 G: 2 INJECTION, POWDER, FOR SOLUTION INTRAMUSCULAR; INTRAVENOUS at 04:51

## 2022-07-31 RX ADMIN — IPRATROPIUM BROMIDE AND ALBUTEROL SULFATE 3 ML: 2.5; .5 SOLUTION RESPIRATORY (INHALATION) at 17:29

## 2022-07-31 RX ADMIN — CLOPIDOGREL BISULFATE 75 MG: 75 TABLET ORAL at 15:57

## 2022-07-31 RX ADMIN — METHYLPREDNISOLONE SODIUM SUCCINATE 62.5 MG: 125 INJECTION, POWDER, FOR SOLUTION INTRAMUSCULAR; INTRAVENOUS at 10:34

## 2022-07-31 RX ADMIN — IPRATROPIUM BROMIDE AND ALBUTEROL SULFATE 3 ML: 2.5; .5 SOLUTION RESPIRATORY (INHALATION) at 13:16

## 2022-07-31 RX ADMIN — IPRATROPIUM BROMIDE AND ALBUTEROL SULFATE 3 ML: 2.5; .5 SOLUTION RESPIRATORY (INHALATION) at 20:35

## 2022-07-31 RX ADMIN — SODIUM CHLORIDE, PRESERVATIVE FREE: 5 INJECTION INTRAVENOUS at 12:52

## 2022-07-31 RX ADMIN — METHYLPREDNISOLONE SODIUM SUCCINATE 125 MG: 125 INJECTION, POWDER, FOR SOLUTION INTRAMUSCULAR; INTRAVENOUS at 02:18

## 2022-07-31 RX ADMIN — IPRATROPIUM BROMIDE AND ALBUTEROL SULFATE 3 ML: .5; 3 SOLUTION RESPIRATORY (INHALATION) at 00:51

## 2022-07-31 RX ADMIN — ASPIRIN 81 MG: 81 TABLET, COATED ORAL at 15:56

## 2022-07-31 RX ADMIN — IOPAMIDOL 100 ML: 755 INJECTION, SOLUTION INTRAVENOUS at 03:15

## 2022-07-31 RX ADMIN — LOSARTAN POTASSIUM 50 MG: 50 TABLET, FILM COATED ORAL at 15:57

## 2022-07-31 RX ADMIN — AZITHROMYCIN MONOHYDRATE 500 MG: 500 TABLET ORAL at 04:34

## 2022-07-31 ASSESSMENT — ACTIVITIES OF DAILY LIVING (ADL)
ADLS_ACUITY_SCORE: 35
ADLS_ACUITY_SCORE: 41
ADLS_ACUITY_SCORE: 39
ADLS_ACUITY_SCORE: 39
ADLS_ACUITY_SCORE: 41
ADLS_ACUITY_SCORE: 39
ADLS_ACUITY_SCORE: 41
ADLS_ACUITY_SCORE: 41

## 2022-07-31 NOTE — ED PROVIDER NOTES
EMERGENCY DEPARTMENT ENCOUnter      NAME: Ubaldo Ramos  AGE: 76 year old male  YOB: 1945  MRN: [id number]  EVALUATION DATE & TIME: 7/31/2022 12:29 AM    PCP: John Oconnor    ED PROVIDER: Heather Ariza MD      Chief Complaint   Patient presents with     Shortness of Breath         FINAL IMPRESSION:  1. Hypoxemia    2. Pneumonia of both lungs due to infectious organism, unspecified part of lung    3. COPD exacerbation (H)    4. Abnormal electrocardiogram          ED COURSE & MEDICAL DECISION MAKING:      In summary, the patient is a 76-year-old male that presents to the emergency department for evaluation of shortness of breath thought secondary to COPD exacerbation and bilateral community acquired pneumonia.  12:34 AM I met with the patient to gather history and to perform my initial exam. We discussed plans for the ED course, including diagnostic testing and treatment.  A DuoNeb was administered.  Solu-Medrol 125 mg IV was administered.  Supplemental oxygen was applied using 2 L/min for hypoxemia in the mid 80s.  His oxygen was improved into the mid 90s.   1:39 AM I rechecked on the patient. Patient reports sob has improved after neb.  3:10 AM I rechecked and updated the patient with laboratory results. Patient's breathing is still good.  3:55 AM I rechecked and updated the patient with laboratory and imaging results.  Ceftriaxone 1 g IV and Zithromax 500 mg p.o. was administered for initiation of antibiotic therapy for treatment of community-acquired pneumonia.  4:04 AM I spoke with hospitalist Dr. Kerr concerning the patient.         At the conclusion of the encounter I discussed the results of all of the tests and the disposition. The questions were answered. The patient or family acknowledged understanding and was agreeable with the care plan.         MEDICATIONS GIVEN IN THE EMERGENCY:  Medications   lidocaine 1 % 0.1-1 mL (has no administration in time range)   lidocaine (LMX4)  cream (has no administration in time range)   sodium chloride (PF) 0.9% PF flush 3 mL (3 mLs Intracatheter Given 7/31/22 0453)   sodium chloride (PF) 0.9% PF flush 3 mL (3 mLs Intracatheter Not Given 7/31/22 0452)   melatonin tablet 1 mg (has no administration in time range)   enoxaparin ANTICOAGULANT (LOVENOX) injection 40 mg (40 mg Subcutaneous Given 7/31/22 0451)   sodium chloride 0.9% infusion ( Intravenous New Bag 7/31/22 0452)   acetaminophen (TYLENOL) tablet 650 mg (has no administration in time range)     Or   acetaminophen (TYLENOL) Suppository 650 mg (has no administration in time range)   ipratropium - albuterol 0.5 mg/2.5 mg/3 mL (DUONEB) neb solution 3 mL (has no administration in time range)   ipratropium - albuterol 0.5 mg/2.5 mg/3 mL (DUONEB) neb solution 3 mL (has no administration in time range)   methylPREDNISolone sodium succinate (solu-MEDROL) injection 62.5 mg (has no administration in time range)   azithromycin (ZITHROMAX) tablet 500 mg (has no administration in time range)   cefTRIAXone (ROCEPHIN) 2 g vial to attach to  ml bag for ADULTS or NS 50 ml bag for PEDS (2 g Intravenous New Bag 7/31/22 0451)   guaiFENesin (ROBITUSSIN) 20 mg/mL solution 10 mL (has no administration in time range)   ipratropium - albuterol 0.5 mg/2.5 mg/3 mL (DUONEB) neb solution 3 mL (3 mLs Nebulization Given 7/31/22 0051)   methylPREDNISolone sodium succinate (solu-MEDROL) injection 125 mg (125 mg Intravenous Given 7/31/22 0218)   iopamidol (ISOVUE-370) solution 100 mL (100 mLs Intravenous Given 7/31/22 0315)   azithromycin (ZITHROMAX) tablet 500 mg (500 mg Oral Given 7/31/22 0434)       NEW PRESCRIPTIONS STARTED AT TODAY'S ER VISIT  New Prescriptions    No medications on file          =================================================================    HPI        Ubaldo Ramos is a 76 year old male with a pertinent history of lung cancer surgery, COPD, peripheral artery disease, hyperlipidemia, hypertension,  "hypercholesterolemia, pulmonary emphysema, and epitaxis, who presents to this ED by private car for evaluation of shortness of breath.    The patient states he went to the doctor this morning for a COVID-19 test and evaluation of a sore throat, cough, and body aches. He tested negative for COVID-19 and was told by his doctor that he had a cold. He went home after the appointment and later that afternoon began experiencing shortness of breath alongside his cold-like other symptoms. Patient states he \"couldn't catch his breath.\" He has a history of collapsed lung and states his present symptoms \"kind of feel like that.\" Patient has a history of COPD. He used his nebulizer earlier today for his shortness of breath, but experienced no relief. In addition to these symptoms patient endorses chills.    Patient smokes cigarettes, but doesn't drink. Patient denies any recent sick contacts.    No fever or any other medical concerns are expressed a this time.        REVIEW OF SYSTEMS     Constitutional:  Denies fever. Positive for chills and body aches.  HENT:  Denies sore throat   Respiratory:  Positive for cough and shortness of breath.  Cardiovascular:  Denies chest pain or palpitations  GI:  Denies abdominal pain, nausea, or vomiting  Musculoskeletal:  Denies any new extremity pain   Skin:  Denies rash   Neurologic:  Denies headache, focal weakness or sensory changes    All other systems reviewed and are negative      PAST MEDICAL HISTORY:  COPD, Lung cancer  PAST SURGICAL HISTORY:  Past Surgical History:   Procedure Laterality Date     APPENDECTOMY       CERVICAL SPINE SURGERY       CHOLECYSTECTOMY       FEMORAL ENDARTERECTOMY       LUNG CANCER SURGERY       PROSTATECTOMY             CURRENT MEDICATIONS:    albuterol (PROVENTIL) (2.5 MG/3ML) 0.083% neb solution  aspirin 81 MG EC tablet  atorvastatin (LIPITOR) 80 MG tablet  cholecalciferol, vitamin D3, (VITAMIN D3) 25 mcg (1,000 unit) capsule  clopidogrel (PLAVIX) 75 MG " tablet  cyanocobalamin 1000 MCG tablet  ipratropium - albuterol 0.5 mg/2.5 mg/3 mL (DUONEB) 0.5-2.5 (3) MG/3ML neb solution  losartan-hydrochlorothiazide (HYZAAR) 50-12.5 MG tablet  metoprolol tartrate (LOPRESSOR) 25 MG tablet  omeprazole (PRILOSEC) 20 MG DR capsule  oxybutynin ER (DITROPAN XL) 10 MG 24 hr tablet  pregabalin (LYRICA) 150 MG capsule        ALLERGIES:  Allergies   Allergen Reactions     Adhesive Tape Unknown       FAMILY HISTORY:  History reviewed. No pertinent family history.    SOCIAL HISTORY:   Social History     Socioeconomic History     Marital status:      Spouse name: None     Number of children: None     Years of education: None     Highest education level: None   Tobacco Use     Smoking status: Current Every Day Smoker     Packs/day: 0.50     Types: Cigarettes     Smokeless tobacco: Never Used   Vaping Use     Vaping Use: Never used   Substance and Sexual Activity     Alcohol use: Not Currently     Drug use: Not Currently       VITALS:  Patient Vitals for the past 24 hrs:   BP Temp Temp src Pulse Resp SpO2 Weight   07/31/22 0600 (!) 157/70 -- -- 64 (!) 38 96 % --   07/31/22 0530 (!) 147/64 -- -- 62 (!) 36 94 % --   07/31/22 0500 (!) 142/65 -- -- 63 (!) 42 94 % --   07/31/22 0430 139/65 -- -- 67 (!) 38 94 % --   07/31/22 0400 (!) 148/65 -- -- 63 (!) 36 95 % --   07/31/22 0330 (!) 147/65 -- -- 63 (!) 35 95 % --   07/31/22 0300 125/58 -- -- 65 (!) 39 97 % --   07/31/22 0230 130/60 -- -- 62 (!) 40 97 % --   07/31/22 0115 (!) 153/65 -- -- 67 -- 97 % --   07/31/22 0023 115/85 98.6  F (37  C) Temporal 72 18 91 % 73.5 kg (162 lb)       PHYSICAL EXAM    Constitutional:  Well developed, Well nourished,  HENT:  Normocephalic, Atraumatic, Bilateral external ears normal, Oropharynx moist, Nose normal.   Neck:  Normal range of motion, No meningismus, No stridor.   Eyes:  EOMI, Conjunctiva normal, No discharge.   Respiratory:  decreased breath sounds, mild respiratory distress, tachypnea few  scattered wheezes, No chest tenderness.   Cardiovascular:  Normal heart rate, Normal rhythm, No murmurs  GI:  Soft, No tenderness, No guarding,   Musculoskeletal:  Neurovascularly intact distally, No edema, No tenderness, No cyanosis, Good range of motion in all major joints.   Integument:  Warm, Dry, No erythema, No rash.   Lymphatic:  No lymphadenopathy noted.   Neurologic:  Alert & oriented x 3, Normal motor function,  No focal deficits noted.   Psychiatric:  Affect normal, Judgment normal, Mood normal.      LAB:  All pertinent labs reviewed and interpreted.  Results for orders placed or performed during the hospital encounter of 07/31/22   CT Chest Pulmonary Embolism w Contrast    Impression    IMPRESSION:  1.  No evidence of pulmonary embolus.  2.  Worsening bilateral pulmonary infiltrates suspicious for multifocal pneumonia. Short-term follow-up posttherapy is recommended.  3.  Mediastinal/hilar adenopathy may be reactive in the setting of infection. Attention at the time of follow-up is advised.   Result Value Ref Range    INR 1.09 0.85 - 1.15   Basic metabolic panel   Result Value Ref Range    Sodium 134 (L) 136 - 145 mmol/L    Potassium 3.4 (L) 3.5 - 5.0 mmol/L    Chloride 96 (L) 98 - 107 mmol/L    Carbon Dioxide (CO2) 25 22 - 31 mmol/L    Anion Gap 13 5 - 18 mmol/L    Urea Nitrogen 17 8 - 28 mg/dL    Creatinine 0.88 0.70 - 1.30 mg/dL    Calcium 8.4 (L) 8.5 - 10.5 mg/dL    Glucose 120 70 - 125 mg/dL    GFR Estimate 90 >60 mL/min/1.73m2   Result Value Ref Range    Troponin I 0.02 0.00 - 0.29 ng/mL   B-Type Natriuretic Peptide ([facility] Only)   Result Value Ref Range    BNP 74 0 - 76 pg/mL   Blood gas venous   Result Value Ref Range    pH Venous 7.37 7.35 - 7.45    pCO2 Venous 49 35 - 50 mm Hg    pO2 Venous 33 25 - 47 mm Hg    Bicarbonate Venous 26 24 - 30 mmol/L    Base Excess/Deficit (+/-) 2.8   mmol/L    Oxyhemoglobin Venous 60.8 (L) 70.0 - 75.0 %    O2 Sat, Venous 62.9 (L) 70.0 - 75.0 %   Lactic acid whole  blood   Result Value Ref Range    Lactic Acid 0.7 0.7 - 2.0 mmol/L   Symptomatic; Unknown Influenza A/B & SARS-CoV2 (COVID-19) Virus PCR Multiplex Nasopharyngeal    Specimen: Nasopharyngeal; Swab   Result Value Ref Range    Influenza A PCR Negative Negative    Influenza B PCR Negative Negative    RSV PCR Negative Negative    SARS CoV2 PCR Negative Negative   D dimer quantitative   Result Value Ref Range    D-Dimer Quantitative 5.35 (H) 0.00 - 0.50 ug/mL FEU   CBC with platelets and differential   Result Value Ref Range    WBC Count 7.4 4.0 - 11.0 10e3/uL    RBC Count 3.64 (L) 4.40 - 5.90 10e6/uL    Hemoglobin 10.6 (L) 13.3 - 17.7 g/dL    Hematocrit 32.5 (L) 40.0 - 53.0 %    MCV 89 78 - 100 fL    MCH 29.1 26.5 - 33.0 pg    MCHC 32.6 31.5 - 36.5 g/dL    RDW 14.6 10.0 - 15.0 %    Platelet Count 155 150 - 450 10e3/uL    % Neutrophils 62 %    % Lymphocytes 26 %    % Monocytes 11 %    % Eosinophils 1 %    % Basophils 0 %    % Immature Granulocytes 0 %    NRBCs per 100 WBC 0 <1 /100    Absolute Neutrophils 4.6 1.6 - 8.3 10e3/uL    Absolute Lymphocytes 1.9 0.8 - 5.3 10e3/uL    Absolute Monocytes 0.8 0.0 - 1.3 10e3/uL    Absolute Eosinophils 0.1 0.0 - 0.7 10e3/uL    Absolute Basophils 0.0 0.0 - 0.2 10e3/uL    Absolute Immature Granulocytes 0.0 <=0.4 10e3/uL    Absolute NRBCs 0.0 10e3/uL   Result Value Ref Range    Troponin I 0.02 0.00 - 0.29 ng/mL   Creatinine   Result Value Ref Range    Creatinine 0.79 0.70 - 1.30 mg/dL    GFR Estimate >90 >60 mL/min/1.73m2   Magnesium Lab   Result Value Ref Range    Magnesium 1.3 (L) 1.8 - 2.6 mg/dL       RADIOLOGY:  I have independently reviewed and interpreted the above imaging, pending the final radiology read.  CT Chest Pulmonary Embolism w Contrast   Final Result   IMPRESSION:   1.  No evidence of pulmonary embolus.   2.  Worsening bilateral pulmonary infiltrates suspicious for multifocal pneumonia. Short-term follow-up posttherapy is recommended.   3.  Mediastinal/hilar adenopathy  may be reactive in the setting of infection. Attention at the time of follow-up is advised.          EK-rate is 65, sinus, there is no ST segment elevation.  The patient is noted to have 1 mm of ST segment depression in leads II, aVF V5 and V6.  No previous EKGs for comparison.  I have independently reviewed and interpreted this EKG          I, Fany Leo, am serving as a scribe to document services personally performed by Dr. Ariza based on my observation and the provider's statements to me. I, Heather Ariza MD attest that Fany Leo is acting in a scribe capacity, has observed my performance of the services and has documented them in accordance with my direction.    Heather Ariza MD  Emergency Medicine  [facility] EMERGENCY ROOM  [address] [facility] [address]  [phone]  Dept: [phone]       Heather Ariza MD  22 [id number]

## 2022-07-31 NOTE — H&P
[facility] MEDICINE ADMISSION HISTORY AND PHYSICAL       Assessment & Plan      1.SOB, acute hypoxia requiring O2 supplementation, and CT chest showing --  worsening bilateral pulmonary infiltrates suspicious for multifocal pneumonia.    Complicating factors - he has COPD and likely in exacerbation (still smoking 1/2 pack) and has history of lung CA. Has no evidence of PE    Despite severity of chest CT findings - he looked comfortable laying flat, and no abdominal breathing. And with normal WBC and no fever - wondering if dealing with atypical PNA or fungal or other etiology     - IV rocephin/azitro to continue, nebs and steroid - if not enough or still labored -- BiPAP to start   - Sputum culture, urinary antigens  - AM labs, respiratory panel   - if not better in the next 12 hrs - refer to ID and/or pulmonary   - smoking cessation     2. Abnormal EKG with ischemic changes - trop negative. Continue serial trops and tele. Nitroglycerin if with chest pain    3. Sees Pulmonary service (last visit 11/2021) -- for history of lung cancer s/p left upper lobectomy in 2017 with recurrence in 2018 s/p chemotherapy and radiation and , chronic obstructive pulmonary disease    4. Sees Vascular service for PVD, s/p left femoral endarterectomy and bilateral iliac stents  - PTA meds    5. Sees Urology for prostate CA     6. Has Multifocal motor neuropathy - stable      VTE prophylaxis: SCDs,  lovenox  Diet:  Regular   Code Status: Full,   COVID test result:  negative   COVID vaccination: completed   Barriers to discharge: admitting clinical condition  Discharge Disposition and goals:  Unable to determine at this point, pending clinical progress and response to treatment. Patient may need transfer to SNF or [facility] if unsafe to go home and needed treatment inappropriate at home setting OR may need home health care evaluation if care can be delivered at home settings. Consider referral to care manager/social  worker    PPE - I was wearing PPE when I met the patient - N95 mask, Surgical mask, Isolation gown, Gloves, Safety glasses.       Care plan was created based on available information provided, including patient's condition at the time of encounter.   This plan was discussed with patient and/or family members using layman's terms and have agreed to proceed.   At the end of night shift (9PM - 730A), this case will be presented to the AM Hospitalist.    It is recommended to revise care plan and review history if there is change in condition and/or new clinical information is not available during my encounter.     All or some of home medication/s were not resumed on admission due to safety reasons or contraindications. Dosing and frequency may also have been modified. Please resume/review them during your visit.     70 minutes of total visit duration and greater than 50% was spent in direct evaluation of patient and coordination of care including discussion of diagnostic test results and recommended treatment. .      Brown Kerr MD, MPH, FACP, [facility]  Internal Medicine - Hospitalist        Chief Complaint SOB      HISTORY     - I met him in ED-2. He is awake and interactive. Met his wife as well.  - He has been SOB and was coughing yellowish sputum in the last 3 days. Feels exhausted, no energy and mostly in bed. Denies fever or chest pain. He has COPD and he used his neb and not helping. He decided to come    - He looked comfortable while laying in bed. No major complaints No recent travels. His wife is not sick. He has O2 sat of 87% on RA. At home not on O2. He still smoke - 1/2 pack per day    - In the ED, CT chest no PE but has worsening bilateral pulmonary infiltrates suspicious for multifocal pneumonia. 3.  Mediastinal/hilar adenopathy may be reactive in the setting of infection. Attention at the time of follow-up is advised.    - He was given Rocephin/Azithro. IV solumedrol and Nebs. His BNP and WBC were  normal, and his VBG reassuring.     - ROS --- No headache. No dizziness. No palpitations. No abdominal pain. No nausea or vomiting. No urinary symptoms. No bleeding symptoms. No weight loss. Rest of 12 point ROS was reviewed and negative.       Past Medical History     Hx of cancer of lung 06/27/2021   History of prostate cancer 06/27/2021   Peripheral polyneuropathy 06/27/2021   Hyperlipidemia LDL goal <100 06/27/2021   Urinary incontinence, mixed 06/27/2021   Peripheral artery disease 06/27/2021   Hx of fusion of cervical spine 06/27/2021         Surgical History     Past Surgical History:   Procedure Laterality Date     APPENDECTOMY       CERVICAL SPINE SURGERY       CHOLECYSTECTOMY       FEMORAL ENDARTERECTOMY       LUNG CANCER SURGERY       PROSTATECTOMY          Family History          Social History      .  Social History     Socioeconomic History     Marital status:      Spouse name: Not on file     Number of children: Not on file     Years of education: Not on file     Highest education level: Not on file   Occupational History     Not on file   Tobacco Use     Smoking status: Current Every Day Smoker     Packs/day: 0.50     Types: Cigarettes     Smokeless tobacco: Never Used   Vaping Use     Vaping Use: Never used   Substance and Sexual Activity     Alcohol use: Not Currently     Drug use: Not Currently     Sexual activity: Not on file   Other Topics Concern     Not on file   Social History Narrative     Not on file     Social Determinants of Health     Financial Resource Strain: Not on file   Food Insecurity: Not on file   Transportation Needs: Not on file   Physical Activity: Not on file   Stress: Not on file   Social Connections: Not on file   Intimate Partner Violence: Not on file   Housing Stability: Not on file          Allergies        Allergies   Allergen Reactions     Adhesive Tape Unknown         Prior to Admission Medications      No current facility-administered medications on file prior  to encounter.  albuterol (PROVENTIL) (2.5 MG/3ML) 0.083% neb solution, Take 1 vial (2.5 mg) by nebulization 3 times daily as needed for shortness of breath / dyspnea or wheezing  aspirin 81 MG EC tablet, [ASPIRIN 81 MG EC TABLET] Take 81 mg by mouth daily.  atorvastatin (LIPITOR) 80 MG tablet, Take 1 tablet (80 mg) by mouth At Bedtime  cholecalciferol, vitamin D3, (VITAMIN D3) 25 mcg (1,000 unit) capsule, [CHOLECALCIFEROL, VITAMIN D3, (VITAMIN D3) 25 MCG (1,000 UNIT) CAPSULE] Take 1,000 Units by mouth daily.  clopidogrel (PLAVIX) 75 MG tablet, Take 1 tablet (75 mg) by mouth daily  cyanocobalamin 1000 MCG tablet, [CYANOCOBALAMIN 1000 MCG TABLET] Take 1,000 mcg by mouth daily.  ipratropium - albuterol 0.5 mg/2.5 mg/3 mL (DUONEB) 0.5-2.5 (3) MG/3ML neb solution, Take 1 vial (3 mLs) by nebulization 4 times daily  losartan-hydrochlorothiazide (HYZAAR) 50-12.5 MG tablet, Take 1 tablet by mouth every morning  metoprolol tartrate (LOPRESSOR) 25 MG tablet, Take 1 tablet (25 mg) by mouth 2 times daily  omeprazole (PRILOSEC) 20 MG DR capsule, Take 2 capsules (40 mg) by mouth daily before breakfast  oxybutynin ER (DITROPAN XL) 10 MG 24 hr tablet, Take 1 tablet (10 mg) by mouth daily  pregabalin (LYRICA) 150 MG capsule, TAKE 1 CAPSULE(150 MG) BY MOUTH TWICE DAILY            Review of Systems     A 12 point comprehensive review of systems was negative except as noted above in HPI.    PHYSICAL EXAMINATION       Vitals      Vitals: BP (!) 147/65   Pulse 63   Temp 98.6  F (37  C) (Temporal)   Resp (!) 35   Wt 73.5 kg (162 lb)   SpO2 95%   BMI 26.15 kg/m    BMI= Body mass index is 26.15 kg/m .      Examination     General Appearance:  Alert, cooperative, no distress  Head:    Normocephalic, without obvious abnormality, atraumatic  EENT:  PERRL, conjunctiva/corneas clear, EOM's intact.   Neck:   Supple, symmetrical, trachea midline, no adenopathy; no NVE  Back:  Symmetric, no curvature, no CVA tenderness  Chest/Lungs: decreased  air entry, no rales, (+) neo wheezing. respirations unlabored, No tenderness or deformity. No abdominal breathing or use of accessory muscles.   Heart:    Regular rate and rhythm, S1 and S2 normal, no murmur, rub   or gallop  Abdomen: Soft, non-tender, bowel sounds active all four quadrants, not peritoneal on palpation. Not distended  Extremities:  Extremities normal, atraumatic, no swelling   Skin:  Skin color, texture, turgor normal, no rashes or lesion  Neurologic:  Awake and alert,No lateralizing or localizing signs         Pertinent Lab     Results for orders placed or performed during the hospital encounter of 07/31/22   CT Chest Pulmonary Embolism w Contrast    Impression    IMPRESSION:  1.  No evidence of pulmonary embolus.  2.  Worsening bilateral pulmonary infiltrates suspicious for multifocal pneumonia. Short-term follow-up posttherapy is recommended.  3.  Mediastinal/hilar adenopathy may be reactive in the setting of infection. Attention at the time of follow-up is advised.   Result Value Ref Range    INR 1.09 0.85 - 1.15   Basic metabolic panel   Result Value Ref Range    Sodium 134 (L) 136 - 145 mmol/L    Potassium 3.4 (L) 3.5 - 5.0 mmol/L    Chloride 96 (L) 98 - 107 mmol/L    Carbon Dioxide (CO2) 25 22 - 31 mmol/L    Anion Gap 13 5 - 18 mmol/L    Urea Nitrogen 17 8 - 28 mg/dL    Creatinine 0.88 0.70 - 1.30 mg/dL    Calcium 8.4 (L) 8.5 - 10.5 mg/dL    Glucose 120 70 - 125 mg/dL    GFR Estimate 90 >60 mL/min/1.73m2   Result Value Ref Range    Troponin I 0.02 0.00 - 0.29 ng/mL   B-Type Natriuretic Peptide ([facility] Only)   Result Value Ref Range    BNP 74 0 - 76 pg/mL   Blood gas venous   Result Value Ref Range    pH Venous 7.37 7.35 - 7.45    pCO2 Venous 49 35 - 50 mm Hg    pO2 Venous 33 25 - 47 mm Hg    Bicarbonate Venous 26 24 - 30 mmol/L    Base Excess/Deficit (+/-) 2.8   mmol/L    Oxyhemoglobin Venous 60.8 (L) 70.0 - 75.0 %    O2 Sat, Venous 62.9 (L) 70.0 - 75.0 %   Lactic acid whole blood   Result  Value Ref Range    Lactic Acid 0.7 0.7 - 2.0 mmol/L   Symptomatic; Unknown Influenza A/B & SARS-CoV2 (COVID-19) Virus PCR Multiplex Nasopharyngeal    Specimen: Nasopharyngeal; Swab   Result Value Ref Range    Influenza A PCR Negative Negative    Influenza B PCR Negative Negative    RSV PCR Negative Negative    SARS CoV2 PCR Negative Negative   D dimer quantitative   Result Value Ref Range    D-Dimer Quantitative 5.35 (H) 0.00 - 0.50 ug/mL FEU   CBC with platelets and differential   Result Value Ref Range    WBC Count 7.4 4.0 - 11.0 10e3/uL    RBC Count 3.64 (L) 4.40 - 5.90 10e6/uL    Hemoglobin 10.6 (L) 13.3 - 17.7 g/dL    Hematocrit 32.5 (L) 40.0 - 53.0 %    MCV 89 78 - 100 fL    MCH 29.1 26.5 - 33.0 pg    MCHC 32.6 31.5 - 36.5 g/dL    RDW 14.6 10.0 - 15.0 %    Platelet Count 155 150 - 450 10e3/uL    % Neutrophils 62 %    % Lymphocytes 26 %    % Monocytes 11 %    % Eosinophils 1 %    % Basophils 0 %    % Immature Granulocytes 0 %    NRBCs per 100 WBC 0 <1 /100    Absolute Neutrophils 4.6 1.6 - 8.3 10e3/uL    Absolute Lymphocytes 1.9 0.8 - 5.3 10e3/uL    Absolute Monocytes 0.8 0.0 - 1.3 10e3/uL    Absolute Eosinophils 0.1 0.0 - 0.7 10e3/uL    Absolute Basophils 0.0 0.0 - 0.2 10e3/uL    Absolute Immature Granulocytes 0.0 <=0.4 10e3/uL    Absolute NRBCs 0.0 10e3/uL           Pertinent Radiology

## 2022-07-31 NOTE — PROGRESS NOTES
[facility]  Hospitalist Progress Note    Admit Date:  7/31/2022  Date of Service (when I saw the patient): 07/31/2022   Provider:  Hollie Marmolejo, DO    Assessment & Plan   Ubaldo Ramos is a 76 year old male with a PMH of lung cancer, COPD, prostate CA and PVD who was admitted on 7/31/2022. With increasing SOB despite URI symptoms for 3-4 days.      Problem List:  1.  Acute hypoxic respiratory failure       Multifocal pneumonia         He was noted to be hypoxemic in the 80's on RA on arrival to the ED  CT of the chest negative for PE but with evidence of bilateral pulmonary infiltrates suspicious for multifocal pneumonia.    Legionella urine antigen - neg  Strep urine ag - neg  covid - neg  Awaiting respiratory panels    Will continue on azithromycin and IV rocephin and monitor clinical status closely    Supplemental oxygen vs. BIPAP, if needed    2.  COPD exacerbation        Continued tobacco abuse        H/o lung cancer    Duonebs qid  Solumedrol 60mg IV q8hr  Up out of bed as able  Supplement oxygen to keep sats >88% (he is at high risk for CO2 retention) - ABG if becomes too somnulent  mucinex scheduled    H/o lung cancer noted - s/p surgical resection; then chemo and rad     He does follows with Pulmonary medicine in the outpt setting(last visit 11/2021) -- for history of lung cancer s/p left upper lobectomy in 2017 with recurrence in 2018 s/p chemotherapy and radiation and , chronic obstructive pulmonary disease    Smoking cessation encouraged      3.  HTN  Home meds include:  Metoprolol 25mg po bid - continue  Losartan 50mg/day - continue  hydrochlorothiazide 12.5mg/day - hold for now     4. Sees Vascular service for PVD, s/p left femoral endarterectomy and bilateral iliac stents  - PTA meds include asa 81mg/day and plavix 75mg/day   resume daily statin    5. h/o prostate CA     6. Abnormal EKG with ischemic changes   trop negative. Continue serial trops and tele.  "Nitroglycerin if with chest pain  Will follow ECG once magnesium level has normalized  Will supplement potassium, as well    7.  Hypomagnesia  Supplement per electrolyte protocol    Diet: Combination Diet Regular Diet Adult    DVT Prophylaxis: Enoxaparin (Lovenox) SQ  Flores Catheter: Not present  Code Status: Full Code      Disposition Plan   Respiratory status is guarded.  Anticipate several more days of inpatient hospital treatment.  Entered: Hollie Marmolejo DO 07/31/2022, 7:19 AM     We are operating under sub optimal conditions in the setting of a world wide pandemic, hospitals are running at full capacity with limited bed availability. We are providing the best possible patient care with limited resources.    Interval History   Have had a URI for the last 4 days.  Wife with similar c/o.  Both had neg COVID test PTA.  SOB \"improved\" today from admission.  No CP this am.  No HA, N/V, F/C.  Very fatigued.  Min cough; no hemoptysis.    -Data reviewed today: I reviewed all new labs and imaging results over the last 24 hours. I personally reviewed no images or EKG's today.    Physical Exam   Temp: 98.6  F (37  C) Temp src: Temporal BP: (!) 142/66 Pulse: 62   Resp: (!) 33 SpO2: 95 % O2 Device: Nasal cannula Oxygen Delivery: 3 LPM  Vitals:    07/31/22 0023   Weight: 73.5 kg (162 lb)     Vital Signs with Ranges  Temp:  [98.6  F (37  C)] 98.6  F (37  C)  Pulse:  [62-72] 62  Resp:  [18-42] 33  BP: (115-157)/(58-85) 142/66  SpO2:  [91 %-97 %] 95 %  No intake/output data recorded.    GEN:  Sleepy but easily arousable, appears very fatigued but alert when awake.  Tachypnea noted but no significant accessory muscle use.  HEENT:  Normocephalic/atraumatic, no scleral icterus, no nasal discharge, mouth moist  NECK:  No clear thyromegaly or JVD  CV:  Regular rate and rhythm, no loud murmur to ausc.  S1 + S2 noted, no S3 or S4.  LUNGS:  Scattered rhonchi to ausc ant/lat  bilaterally.  No rales or wheezing auscultated " bilaterally, but post exam limited this am d/t his fatigue and inability to sit up with just my assistance this am.  No costal retractions bilaterally.  Symmetric chest rise on inhalation noted.  ABD:  Active bowel sounds, soft, non-tender/non-distended.  No rebound/guarding/rigidity.  No masses palpated.  No obvious HSM to exam.  EXT:  No pretibial edema or cyanosis bilaterally. No joint synovitis noted.  No calf-tenderness or asymmetry noted.  SKIN:  Dry to touch, no rashes or jaundice noted.  PSYCH:  Mood appropriate, Not tearful or depressed.  Maintains direct eye contact.  NEURO:  No tremors at rest, speech is clear and appropriate.  CN 2-12 intact to gross testing bilaterally.    Data   Labs:  Recent Labs   Lab 07/31/22 0438 07/31/22 0215   NA  --  134*   POTASSIUM  --  3.4*   CHLORIDE  --  96*   CO2  --  25   ANIONGAP  --  13   GLC  --  120   BUN  --  17   CR 0.79 0.88   GFRESTIMATED >90 90   RUTHANN  --  8.4*     Recent Labs   Lab 07/31/22 0216   WBC 7.4   HGB 10.6*   HCT 32.5*   MCV 89        Recent Labs   Lab 07/31/22  1250 07/31/22  0438 07/31/22 0215   NA  --   --  134*   POTASSIUM  --   --  3.4*   CHLORIDE  --   --  96*   CO2  --   --  25   ANIONGAP  --   --  13   GLC  --   --  120   BUN  --   --  17   CR  --  0.79 0.88   GFRESTIMATED  --  >90 90   RUTHANN  --   --  8.4*   MAG 2.9* 1.3*  --       Recent Imaging:   Recent Results (from the past 24 hour(s))   CT Chest Pulmonary Embolism w Contrast    Narrative    EXAM: CT CHEST PULMONARY EMBOLISM W CONTRAST  LOCATION: [facility]  DATE/TIME: 7/31/2022 2:57 AM    INDICATION: sob; elevated ddimer  COMPARISON: Noncontrast chest CT dated 03/11/2022  TECHNIQUE: CT chest pulmonary angiogram during arterial phase injection of IV contrast. Multiplanar reformats and MIP reconstructions were performed. Dose reduction techniques were used.   CONTRAST: 100ml Isovue 370    FINDINGS:  ANGIOGRAM CHEST: Pulmonary arteries are normal caliber  and negative for pulmonary emboli. Thoracic aorta is negative for dissection. No CT evidence of right heart strain.    LUNGS AND PLEURA: Left upper lobectomy. Worsening left lower lobe consolidation with bronchial wall thickening and small pleural effusion. Worsening patchy area of groundglass opacity and tree-in-bud nodularity in the right middle lobe centered on image   146. Focal groundglass opacity within the anterior right upper lobe, centered on image 61 is also new. Stable biapical pleural-parenchymal scarring. New mild patchy right lower lobe peripheral parenchymal opacity and bronchial wall thickening.    MEDIASTINUM/AXILLAE: Enlarged right hilar node reaching 1.3 cm, image 131 enlarging subcarinal node reaching 12 mm, image 142.    CORONARY ARTERY CALCIFICATION: Severe.    UPPER ABDOMEN: No acute abnormalities.    MUSCULOSKELETAL: Old left rib fractures. Pectus carinatum. Multilevel spondylosis.      Impression    IMPRESSION:  1.  No evidence of pulmonary embolus.  2.  Worsening bilateral pulmonary infiltrates suspicious for multifocal pneumonia. Short-term follow-up posttherapy is recommended.  3.  Mediastinal/hilar adenopathy may be reactive in the setting of infection. Attention at the time of follow-up is advised.       Medications     sodium chloride 75 mL/hr at 07/31/22 0653       [START ON 8/1/2022] azithromycin  500 mg Oral Daily     cefTRIAXone  2 g Intravenous Q24H     enoxaparin ANTICOAGULANT  40 mg Subcutaneous Q24H     ipratropium - albuterol 0.5 mg/2.5 mg/3 mL  3 mL Nebulization 4x daily     magnesium sulfate  4 g Intravenous Once     methylPREDNISolone  62.5 mg Intravenous Q8H     sodium chloride (PF)  3 mL Intracatheter Q8H

## 2022-07-31 NOTE — ED NOTES
Pt report taken.  Pt was sleeping for much of morning. Did receive neb which he stated helped slightly.  He has audible wheezing and cough sounds gunky.  He did ambulate to bathroom and change his brief on room air.  When he arrived back his sats were upper 80s.  Back into the mid-90s when placed on 3L NC.  Pt did eat a small breakfast.

## 2022-07-31 NOTE — PHARMACY-ADMISSION MEDICATION HISTORY
Pharmacy Note - Admission Medication History    Pertinent Provider Information: Patient manages own meds and was able to recognize medication names and knew when he takes them - recognized omeprazole, but doesn't think he takes it very often, maybe last time was a few months ago     ______________________________________________________________________      Although I was not present for the interview, nor did I personally see the patient, to my knowledge the intern has compiled the PTA medication list to the best of their ability given the information available at the time.    Edson Weir RPH        Prior To Admission (PTA) med list completed and updated in EMR.       PTA Med List   Medication Sig Last Dose     albuterol (PROVENTIL) (2.5 MG/3ML) 0.083% neb solution Take 1 vial (2.5 mg) by nebulization 3 times daily as needed for shortness of breath / dyspnea or wheezing Unknown at Unknown time     aspirin 81 MG EC tablet [ASPIRIN 81 MG EC TABLET] Take 81 mg by mouth daily. 7/30/2022     atorvastatin (LIPITOR) 80 MG tablet Take 1 tablet (80 mg) by mouth At Bedtime 7/30/2022     cholecalciferol, vitamin D3, (VITAMIN D3) 25 mcg (1,000 unit) capsule [CHOLECALCIFEROL, VITAMIN D3, (VITAMIN D3) 25 MCG (1,000 UNIT) CAPSULE] Take 1,000 Units by mouth daily. 7/30/2022     clopidogrel (PLAVIX) 75 MG tablet Take 1 tablet (75 mg) by mouth daily 7/30/2022     cyanocobalamin 1000 MCG tablet [CYANOCOBALAMIN 1000 MCG TABLET] Take 1,000 mcg by mouth daily. 7/30/2022     ipratropium - albuterol 0.5 mg/2.5 mg/3 mL (DUONEB) 0.5-2.5 (3) MG/3ML neb solution Take 1 vial (3 mLs) by nebulization 4 times daily 7/30/2022     losartan-hydrochlorothiazide (HYZAAR) 50-12.5 MG tablet Take 1 tablet by mouth every morning 7/30/2022     metoprolol tartrate (LOPRESSOR) 25 MG tablet Take 1 tablet (25 mg) by mouth 2 times daily 7/30/2022     omeprazole (PRILOSEC) 20 MG DR capsule Take 2 capsules (40 mg) by mouth daily before breakfast (Patient  taking differently: Take 20 mg by mouth daily as needed) More than a month at Unknown time     oxybutynin ER (DITROPAN XL) 10 MG 24 hr tablet Take 1 tablet (10 mg) by mouth daily 7/30/2022     pregabalin (LYRICA) 150 MG capsule TAKE 1 CAPSULE(150 MG) BY MOUTH TWICE DAILY 7/30/2022       Information source(s): Patient and CareEverywhere/SureScripts  Method of interview communication: in-person    Summary of Changes to PTA Med List  New: none  Discontinued: none  Changed: omeprazole changed to PRN    Patient was asked about OTC/herbal products specifically.  PTA med list reflects this.    In the past week, patient estimated taking medication this percent of the time:  greater than 90%.    Allergies were reviewed, assessed, and updated with the patient.      Patient does not use any multi-dose medications prior to admission.    The information provided in this note is only as accurate as the sources available at the time of the update(s).    Thank you for the opportunity to participate in the care of this patient.    Beronica Hamilton  7/31/2022 8:46 AM

## 2022-07-31 NOTE — ED TRIAGE NOTES
Pt presents to the ED with c/o SOB that started this afternoon. Pt reports that he got a covid test which was negative. Pt was at [employer], was told that he has a cold. Pt thinks that he has a collapsed lung, which he has had in the past roughly 5 years ago. Hx of lung cancer.      Triage Assessment     Row Name 07/31/22 0024       Triage Assessment (Adult)    Airway WDL WDL       Respiratory WDL    Respiratory WDL X  SOB       Skin Circulation/Temperature WDL    Skin Circulation/Temperature WDL WDL       Cardiac WDL    Cardiac WDL WDL       Peripheral/Neurovascular WDL    Peripheral Neurovascular WDL WDL       Cognitive/Neuro/Behavioral WDL    Cognitive/Neuro/Behavioral WDL WDL

## 2022-07-31 NOTE — PROGRESS NOTES
Patient received ordered Duoneb via mouthpiece nebulizer. He tolerated fine, had loose productive cough. Breath sounds were diminished bilaterally. SAT 96% on 3L. Will continue to follow, new scheduled QID nebs noted.

## 2022-07-31 NOTE — PLAN OF CARE
Vss other than BP which was elevated in the 140's. A&O. Denies pain. PIV running NS @ 75 mL/hr. Pt continues to have congested cough and fine crackles at the bases. Will continue to monitor.

## 2022-08-01 ENCOUNTER — APPOINTMENT (OUTPATIENT)
Dept: PHYSICAL THERAPY | Facility: CLINIC | Age: 77
DRG: 193 | End: 2022-08-01
Attending: INTERNAL MEDICINE
Payer: MEDICARE

## 2022-08-01 LAB
ALBUMIN SERPL-MCNC: 2.7 G/DL (ref 3.5–5)
ALP SERPL-CCNC: 62 U/L (ref 45–120)
ALT SERPL W P-5'-P-CCNC: 25 U/L (ref 0–45)
ANION GAP SERPL CALCULATED.3IONS-SCNC: 7 MMOL/L (ref 5–18)
AST SERPL W P-5'-P-CCNC: 30 U/L (ref 0–40)
BASOPHILS # BLD AUTO: 0 10E3/UL (ref 0–0.2)
BASOPHILS NFR BLD AUTO: 0 %
BILIRUB SERPL-MCNC: 0.2 MG/DL (ref 0–1)
BUN SERPL-MCNC: 21 MG/DL (ref 8–28)
CALCIUM SERPL-MCNC: 8.2 MG/DL (ref 8.5–10.5)
CHLORIDE BLD-SCNC: 108 MMOL/L (ref 98–107)
CO2 SERPL-SCNC: 25 MMOL/L (ref 22–31)
CREAT SERPL-MCNC: 0.78 MG/DL (ref 0.7–1.3)
EOSINOPHIL # BLD AUTO: 0 10E3/UL (ref 0–0.7)
EOSINOPHIL NFR BLD AUTO: 0 %
ERYTHROCYTE [DISTWIDTH] IN BLOOD BY AUTOMATED COUNT: 14.4 % (ref 10–15)
GFR SERPL CREATININE-BSD FRML MDRD: >90 ML/MIN/1.73M2
GLUCOSE BLD-MCNC: 175 MG/DL (ref 70–125)
HCT VFR BLD AUTO: 30.1 % (ref 40–53)
HGB BLD-MCNC: 9.9 G/DL (ref 13.3–17.7)
IMM GRANULOCYTES # BLD: 0 10E3/UL
IMM GRANULOCYTES NFR BLD: 0 %
LYMPHOCYTES # BLD AUTO: 0.6 10E3/UL (ref 0.8–5.3)
LYMPHOCYTES NFR BLD AUTO: 10 %
MCH RBC QN AUTO: 29 PG (ref 26.5–33)
MCHC RBC AUTO-ENTMCNC: 32.9 G/DL (ref 31.5–36.5)
MCV RBC AUTO: 88 FL (ref 78–100)
MONOCYTES # BLD AUTO: 0.4 10E3/UL (ref 0–1.3)
MONOCYTES NFR BLD AUTO: 7 %
NEUTROPHILS # BLD AUTO: 4.9 10E3/UL (ref 1.6–8.3)
NEUTROPHILS NFR BLD AUTO: 83 %
NRBC # BLD AUTO: 0 10E3/UL
NRBC BLD AUTO-RTO: 0 /100
PLATELET # BLD AUTO: 162 10E3/UL (ref 150–450)
POTASSIUM BLD-SCNC: 3.6 MMOL/L (ref 3.5–5)
PROT SERPL-MCNC: 6.1 G/DL (ref 6–8)
RBC # BLD AUTO: 3.41 10E6/UL (ref 4.4–5.9)
SODIUM SERPL-SCNC: 140 MMOL/L (ref 136–145)
WBC # BLD AUTO: 5.9 10E3/UL (ref 4–11)

## 2022-08-01 PROCEDURE — 250N000013 HC RX MED GY IP 250 OP 250 PS 637: Performed by: INTERNAL MEDICINE

## 2022-08-01 PROCEDURE — 999N000157 HC STATISTIC RCP TIME EA 10 MIN

## 2022-08-01 PROCEDURE — 97162 PT EVAL MOD COMPLEX 30 MIN: CPT | Mod: GP | Performed by: PHYSICAL THERAPIST

## 2022-08-01 PROCEDURE — 94640 AIRWAY INHALATION TREATMENT: CPT | Mod: 76

## 2022-08-01 PROCEDURE — 258N000003 HC RX IP 258 OP 636: Performed by: INTERNAL MEDICINE

## 2022-08-01 PROCEDURE — 97116 GAIT TRAINING THERAPY: CPT | Mod: GP | Performed by: PHYSICAL THERAPIST

## 2022-08-01 PROCEDURE — 36415 COLL VENOUS BLD VENIPUNCTURE: CPT | Performed by: INTERNAL MEDICINE

## 2022-08-01 PROCEDURE — 97530 THERAPEUTIC ACTIVITIES: CPT | Mod: GP | Performed by: PHYSICAL THERAPIST

## 2022-08-01 PROCEDURE — 250N000011 HC RX IP 250 OP 636: Performed by: INTERNAL MEDICINE

## 2022-08-01 PROCEDURE — 120N000001 HC R&B MED SURG/OB

## 2022-08-01 PROCEDURE — 94640 AIRWAY INHALATION TREATMENT: CPT

## 2022-08-01 PROCEDURE — 80053 COMPREHEN METABOLIC PANEL: CPT | Performed by: INTERNAL MEDICINE

## 2022-08-01 PROCEDURE — 96376 TX/PRO/DX INJ SAME DRUG ADON: CPT

## 2022-08-01 PROCEDURE — 99233 SBSQ HOSP IP/OBS HIGH 50: CPT | Performed by: INTERNAL MEDICINE

## 2022-08-01 PROCEDURE — 85014 HEMATOCRIT: CPT | Performed by: INTERNAL MEDICINE

## 2022-08-01 PROCEDURE — 250N000009 HC RX 250: Performed by: INTERNAL MEDICINE

## 2022-08-01 RX ORDER — METHYLPREDNISOLONE SODIUM SUCCINATE 125 MG/2ML
60 INJECTION, POWDER, LYOPHILIZED, FOR SOLUTION INTRAMUSCULAR; INTRAVENOUS EVERY 12 HOURS
Status: DISCONTINUED | OUTPATIENT
Start: 2022-08-01 | End: 2022-08-02

## 2022-08-01 RX ORDER — PREGABALIN 75 MG/1
150 CAPSULE ORAL 2 TIMES DAILY
Status: DISCONTINUED | OUTPATIENT
Start: 2022-08-01 | End: 2022-08-03 | Stop reason: HOSPADM

## 2022-08-01 RX ADMIN — IPRATROPIUM BROMIDE AND ALBUTEROL SULFATE 3 ML: 2.5; .5 SOLUTION RESPIRATORY (INHALATION) at 16:19

## 2022-08-01 RX ADMIN — IPRATROPIUM BROMIDE AND ALBUTEROL SULFATE 3 ML: 2.5; .5 SOLUTION RESPIRATORY (INHALATION) at 08:10

## 2022-08-01 RX ADMIN — IPRATROPIUM BROMIDE AND ALBUTEROL SULFATE 3 ML: 2.5; .5 SOLUTION RESPIRATORY (INHALATION) at 20:18

## 2022-08-01 RX ADMIN — METHYLPREDNISOLONE SODIUM SUCCINATE 62.5 MG: 125 INJECTION, POWDER, FOR SOLUTION INTRAMUSCULAR; INTRAVENOUS at 10:01

## 2022-08-01 RX ADMIN — IPRATROPIUM BROMIDE AND ALBUTEROL SULFATE 3 ML: 2.5; .5 SOLUTION RESPIRATORY (INHALATION) at 11:59

## 2022-08-01 RX ADMIN — OXYBUTYNIN CHLORIDE 10 MG: 5 TABLET, EXTENDED RELEASE ORAL at 08:04

## 2022-08-01 RX ADMIN — ENOXAPARIN SODIUM 40 MG: 100 INJECTION SUBCUTANEOUS at 04:25

## 2022-08-01 RX ADMIN — ASPIRIN 81 MG: 81 TABLET, COATED ORAL at 08:05

## 2022-08-01 RX ADMIN — GUAIFENESIN 10 ML: 100 SOLUTION ORAL at 05:07

## 2022-08-01 RX ADMIN — CLOPIDOGREL BISULFATE 75 MG: 75 TABLET ORAL at 08:04

## 2022-08-01 RX ADMIN — PREGABALIN 150 MG: 75 CAPSULE ORAL at 19:52

## 2022-08-01 RX ADMIN — CEFTRIAXONE SODIUM 2 G: 2 INJECTION, POWDER, FOR SOLUTION INTRAMUSCULAR; INTRAVENOUS at 04:25

## 2022-08-01 RX ADMIN — PANTOPRAZOLE SODIUM 40 MG: 20 TABLET, DELAYED RELEASE ORAL at 08:05

## 2022-08-01 RX ADMIN — ATORVASTATIN CALCIUM 80 MG: 40 TABLET, FILM COATED ORAL at 21:49

## 2022-08-01 RX ADMIN — METOPROLOL TARTRATE 25 MG: 25 TABLET, FILM COATED ORAL at 08:04

## 2022-08-01 RX ADMIN — AZITHROMYCIN 500 MG: 250 TABLET, FILM COATED ORAL at 05:06

## 2022-08-01 RX ADMIN — METHYLPREDNISOLONE SODIUM SUCCINATE 62.5 MG: 125 INJECTION, POWDER, FOR SOLUTION INTRAMUSCULAR; INTRAVENOUS at 01:37

## 2022-08-01 RX ADMIN — SODIUM CHLORIDE, PRESERVATIVE FREE: 5 INJECTION INTRAVENOUS at 01:37

## 2022-08-01 RX ADMIN — CYANOCOBALAMIN TAB 1000 MCG 1000 MCG: 1000 TAB at 08:04

## 2022-08-01 RX ADMIN — PREGABALIN 150 MG: 75 CAPSULE ORAL at 08:04

## 2022-08-01 RX ADMIN — METOPROLOL TARTRATE 25 MG: 25 TABLET, FILM COATED ORAL at 19:52

## 2022-08-01 RX ADMIN — METHYLPREDNISOLONE SODIUM SUCCINATE 62.5 MG: 125 INJECTION, POWDER, FOR SOLUTION INTRAMUSCULAR; INTRAVENOUS at 21:48

## 2022-08-01 ASSESSMENT — ACTIVITIES OF DAILY LIVING (ADL)
ADLS_ACUITY_SCORE: 36
ADLS_ACUITY_SCORE: 41
ADLS_ACUITY_SCORE: 36
ADLS_ACUITY_SCORE: 41
ADLS_ACUITY_SCORE: 36
ADLS_ACUITY_SCORE: 41

## 2022-08-01 NOTE — PROGRESS NOTES
[facility]  Hospitalist Progress Note    Admit Date:  7/31/2022  Date of Service (when I saw the patient): 08/01/2022   Provider:  Hollie Marmolejo, DO    Assessment & Plan   Ubaldo Ramos is a 76 year old male with a PMH of lung cancer, COPD, prostate CA and PVD who was admitted on 7/31/2022. With increasing SOB despite URI symptoms for 3-4 days.      Problem List:  1.  Acute hypoxic respiratory failure       Multifocal pneumonia         He was noted to be hypoxemic in the 80's on RA on arrival to the ED  CT of the chest negative for PE but with evidence of bilateral pulmonary infiltrates suspicious for multifocal pneumonia.    Legionella urine antigen - neg  Strep urine ag - neg  covid - neg  Awaiting respiratory panels    He is clinically improving on IV azithromycin and IV rocephin  Oxygenation is slowly improving - weaned down to 1 L at rest    Will continue IV abx today and await more clinical improvement    2.  COPD exacerbation        Continued tobacco abuse        H/o lung cancer    Duonebs qid - continue  Solumedrol 60mg IV q8hr - will continue today as still with diminished air exchange throughout -but change to q12 dosing    Up out of bed as able  Supplement oxygen to keep sats >88% (he is at high risk for CO2 retention) - ABG if becomes too somnulent  mucinex scheduled    H/o lung cancer noted - s/p surgical resection; then chemo and rad     He does follows with Pulmonary medicine in the outpt setting(last visit 11/2021) -- for history of lung cancer s/p left upper lobectomy in 2017 with recurrence in 2018 s/p chemotherapy and radiation and , chronic obstructive pulmonary disease    Smoking cessation encouraged again at bedside this am     3.  HTN  Home meds include:  Metoprolol 25mg po bid - continue  Losartan 50mg/day - continue  hydrochlorothiazide 12.5mg/day - will hold for today; may be able to resume in am   Discontinue IVF today as po intake is improving     4. PVD,  "s/p left femoral endarterectomy and bilateral iliac stents  - PTA meds include asa 81mg/day and plavix 75mg/day   resume daily statin    5. h/o prostate CA     6. Abnormal EKG with ischemic changes   trop negative. Continue serial trops and tele. Nitroglycerin if with chest pain  EKG today now that electrolytes have been supplemented    7.  Hypomagnesia - resolved  Supplement per electrolyte protocol    8.  Weakness and deconditioning       Neuropathy    PT to eval and treat  Physical function below baseline    Diet: Combination Diet Regular Diet Adult    DVT Prophylaxis: Enoxaparin (Lovenox) SQ  Flores Catheter: Not present  Code Status: Full Code      Disposition Plan   Respiratory status is still clinically guarded, but improving.  Anticipate 1-2 more days of inpatient hospital treatment.  Entered: Hollie Marmolejo DO 08/01/2022, 7:14 AM     We are operating under sub optimal conditions in the setting of a world wide pandemic, hospitals are running at full capacity with limited bed availability. We are providing the best possible patient care with limited resources.    Interval History   Seen with wife at bedside.  Feeling \"about 60% better\" than yesterday.  Easier to breathe - no ant CP.  Getting short of breath when up out of bed to bathroom.  Dry cough - worse today.  No HA, N/V, F/C.  Still in ED and hard to ambulate.      -Data reviewed today: I reviewed all new labs and imaging results over the last 24 hours. I personally reviewed no images or EKG's today.    Physical Exam   Temp: 97.6  F (36.4  C) Temp src: Oral BP: (!) 156/70 Pulse: 67   Resp: 18 SpO2: 95 % O2 Device: Nasal cannula Oxygen Delivery: 1 LPM  Vitals:    07/31/22 0023   Weight: 73.5 kg (162 lb)     Vital Signs with Ranges  Temp:  [97.6  F (36.4  C)-97.9  F (36.6  C)] 97.6  F (36.4  C)  Pulse:  [] 67  Resp:  [18-36] 18  BP: (137-156)/(65-89) 156/70  SpO2:  [88 %-97 %] 95 %  I/O last 3 completed shifts:  In: 2070 [P.O.:120; " I.V.:1900; IV Piggyback:50]  Out: -     GEN:  Awake, more alert, color better and in less respiratory distress. Easily conversant - min conversational dyspnea.  Some increase in costal retractions noted, however, with just sitting forward and attempting to take deep breaths.  HEENT:  Normocephalic/atraumatic, no scleral icterus, no nasal discharge, mouth and membranes appear moist,  Nasal cannula in place  NECK:  No clear thyromegaly or JVD  CV:  Regular rate and rhythm, no loud murmur to ausc.  S1 + S2 noted, no S3 or S4.  LUNGS:  Diminished air exchange throughout to post ausc - air exchanged best in the upper post lobes bilaterally.  Fairly clear to ausc ant bilaterally.  No significant wheezes or rhonchi to ausc today.  Mild tachypnea and mild costal retractions bilaterally with attempt to sit up and take deep breaths, as above.  Symmetric chest rise on inhalation noted.  ABD:  Active bowel sounds, soft, non-tender/non-distended.  No rebound/guarding/rigidity.  No masses palpated.  No obvious HSM to exam.  EXT:  No significant pretibial edema or cyanosis bilaterally. No joint synovitis noted.  No calf-tenderness or asymmetry noted.  SKIN:  Dry to touch, no rashes or jaundice noted.  PSYCH:  Mood appropriate, Not tearful or depressed.  Maintains direct eye contact.  coopeartive  NEURO:  No tremors at rest, speech is clear and appropriate.  CN 2-12 intact to gross testing bilaterally.    Data   Labs:  Recent Labs   Lab 08/01/22 0619 07/31/22 0438 07/31/22 0215     --  134*   POTASSIUM 3.6  --  3.4*   CHLORIDE 108*  --  96*   CO2 25  --  25   ANIONGAP 7  --  13   *  --  120   BUN 21  --  17   CR 0.78 0.79 0.88   GFRESTIMATED >90 >90 90   RUTHANN 8.2*  --  8.4*     Recent Labs   Lab 08/01/22 0619 07/31/22 0216   WBC 5.9 7.4   HGB 9.9* 10.6*   HCT 30.1* 32.5*   MCV 88 89    155     Recent Labs   Lab 08/01/22 0619 07/31/22  1250 07/31/22 0438 07/31/22 0215     --   --  134*   POTASSIUM  3.6  --   --  3.4*   CHLORIDE 108*  --   --  96*   CO2 25  --   --  25   ANIONGAP 7  --   --  13   *  --   --  120   BUN 21  --   --  17   CR 0.78  --  0.79 0.88   GFRESTIMATED >90  --  >90 90   RUTHANN 8.2*  --   --  8.4*   MAG  --  2.9* 1.3*  --    PROTTOTAL 6.1  --   --   --    ALBUMIN 2.7*  --   --   --    BILITOTAL 0.2  --   --   --    ALKPHOS 62  --   --   --    AST 30  --   --   --    ALT 25  --   --   --       Recent Imaging:   No results found for this or any previous visit (from the past 24 hour(s)).    Medications     sodium chloride 75 mL/hr at 08/01/22 0425       aspirin  81 mg Oral Daily     atorvastatin  80 mg Oral At Bedtime     azithromycin  500 mg Oral Daily     cefTRIAXone  2 g Intravenous Q24H     clopidogrel  75 mg Oral Daily     cyanocobalamin  1,000 mcg Oral Daily     enoxaparin ANTICOAGULANT  40 mg Subcutaneous Q24H     ipratropium - albuterol 0.5 mg/2.5 mg/3 mL  3 mL Nebulization 4x daily     losartan  50 mg Oral Daily     methylPREDNISolone  62.5 mg Intravenous Q8H     metoprolol tartrate  25 mg Oral BID     oxybutynin ER  10 mg Oral Daily     pantoprazole  40 mg Oral QAM AC     pregabalin  150 mg Oral Daily     sodium chloride (PF)  3 mL Intracatheter Q8H

## 2022-08-01 NOTE — ED NOTES
Pt up for bed bath, hair washed with shower cap and brief changed which was wet. Mepelex applied to coccyx as a preventative measure. Skin is clean, and intact with no redness. Pt given call light and a blanket. Wife at the bedside

## 2022-08-01 NOTE — PROGRESS NOTES
08/01/22 1320   Quick Adds   Type of Visit Initial PT Evaluation   Living Environment   People in Home spouse   Current Living Arrangements apartment   Home Accessibility no concerns   Transportation Anticipated family or friend will provide   Living Environment Comments Lives in senior living apartment with his spouse. He says he does not recieve services there and that his wife is in good health.   Self-Care   Equipment Currently Used at Home cane, straight  (recently got a 4WW walker)   Fall history within last six months no   Activity/Exercise/Self-Care Comment Independent with mobility at baseline. Does have a straight cane but rarely uses it unless in the community. He does lean on grocery carts and such when in stores. he notes that he got a 4WW recently because he has neuropathy to extend his walking range in the community.   General Information   Onset of Illness/Injury or Date of Surgery 07/31/22   Referring Physician Jaleel   Patient/Family Therapy Goals Statement (PT) return to home   Pertinent History of Current Problem (include personal factors and/or comorbidities that impact the POC) COPD exacerbation, pneumonia, hypoxia, abnormal echocardiogram; pt says he was feeling under the weather, Covid tests negative, got worse, got the chills and had SOB at rest. Wife drove pt to ED.   Existing Precautions/Restrictions no known precautions/restrictions   General Observations Not on supplemental O2 at baseline.   Cognition   Affect/Mental Status (Cognition) WNL   Pain Assessment   Patient Currently in Pain No   Posture    Posture Forward head position;Protracted shoulders;Kyphosis;Lordosis   Strength (Manual Muscle Testing)   Strength Comments generalized weakness   Bed Mobility   Bed Mobility no deficits identified   Transfers   Transfers sit-stand transfer   Sit-Stand Transfer   Sit-Stand [place] (Transfers) contact guard   Assistive Device (Sit-Stand Transfers) walker, front-wheeled    Gait/Stairs (Locomotion)   [place] (Gait) supervision;verbal cues   Assistive Device (Gait) walker, front-wheeled   Distance in Feet (Required for LE Total Joints) 15,15   Comment, (Gait/Stairs) Required 2L/min O2 via NC to maintain >90% SnO2 during brief walks   Clinical Impression   Criteria for Skilled Therapeutic Intervention Yes, treatment indicated   PT Diagnosis (PT) impaired mobility and activity tolerance   Influenced by the following impairments weakness, dyspnea   Functional limitations due to impairments ambulation, transfers, activity tolerance   Clinical Presentation (PT Evaluation Complexity) Evolving/Changing   Clinical Presentation Rationale presents as diagnosed   Clinical Decision Making (Complexity) moderate complexity   Planned Therapy Interventions (PT) gait training;progressive activity/exercise;transfer training   Risk & Benefits of therapy have been explained evaluation/treatment results reviewed;care plan/treatment goals reviewed;risks/benefits reviewed;current/potential barriers reviewed;participants voiced agreement with care plan;participants included;patient   PT Discharge Planning   PT Discharge Recommendation (DC Rec) (S)  home with assist;home with home care physical therapy   PT Rationale for DC Rec Patient has good home setup and spousal support. Mobilized well at eval but requires increased supplemental O2 during activity at this time.   Physical Therapy Goals   PT Frequency Daily   PT Predicted Duration/Target Date for Goal Attainment 08/12/22   PT Goals Transfers;Gait;PT Goal 1   PT: Transfers Independent;Sit to/from stand   PT: Gait 50 feet;Independent   PT: Goal 1 I with LE HEP

## 2022-08-01 NOTE — PLAN OF CARE
Patient is A&Ox4. Patient is a boarder this evening in the ED.  Patient denies pain.  Patient is incontinent of urine. Patient afebrile over-night.  Patient is on 3L nasal canula over-night while sleeping.  When awake, he is on 1L.  Patient has fluids running @75 per hour. IV Rocephin was administered overnight.  Patient is a one assist w/cane due to unsteady gait. VSS.

## 2022-08-02 ENCOUNTER — APPOINTMENT (OUTPATIENT)
Dept: PHYSICAL THERAPY | Facility: CLINIC | Age: 77
DRG: 193 | End: 2022-08-02
Payer: MEDICARE

## 2022-08-02 LAB
ATRIAL RATE - MUSE: 58 BPM
BACTERIA SPT CULT: NORMAL
DIASTOLIC BLOOD PRESSURE - MUSE: NORMAL MMHG
GRAM STAIN RESULT: NORMAL
INTERPRETATION ECG - MUSE: NORMAL
P AXIS - MUSE: 47 DEGREES
PR INTERVAL - MUSE: 134 MS
QRS DURATION - MUSE: 86 MS
QT - MUSE: 430 MS
QTC - MUSE: 422 MS
R AXIS - MUSE: 79 DEGREES
SYSTOLIC BLOOD PRESSURE - MUSE: NORMAL MMHG
T AXIS - MUSE: 52 DEGREES
VENTRICULAR RATE- MUSE: 58 BPM

## 2022-08-02 PROCEDURE — 250N000009 HC RX 250: Performed by: INTERNAL MEDICINE

## 2022-08-02 PROCEDURE — 94640 AIRWAY INHALATION TREATMENT: CPT

## 2022-08-02 PROCEDURE — 120N000001 HC R&B MED SURG/OB

## 2022-08-02 PROCEDURE — 99233 SBSQ HOSP IP/OBS HIGH 50: CPT | Performed by: INTERNAL MEDICINE

## 2022-08-02 PROCEDURE — 93010 ELECTROCARDIOGRAM REPORT: CPT | Mod: HIP | Performed by: INTERNAL MEDICINE

## 2022-08-02 PROCEDURE — 97116 GAIT TRAINING THERAPY: CPT | Mod: GP

## 2022-08-02 PROCEDURE — 250N000012 HC RX MED GY IP 250 OP 636 PS 637: Performed by: INTERNAL MEDICINE

## 2022-08-02 PROCEDURE — 97110 THERAPEUTIC EXERCISES: CPT | Mod: GP

## 2022-08-02 PROCEDURE — 250N000011 HC RX IP 250 OP 636: Performed by: INTERNAL MEDICINE

## 2022-08-02 PROCEDURE — 250N000013 HC RX MED GY IP 250 OP 250 PS 637: Performed by: INTERNAL MEDICINE

## 2022-08-02 PROCEDURE — 93005 ELECTROCARDIOGRAM TRACING: CPT

## 2022-08-02 PROCEDURE — 999N000157 HC STATISTIC RCP TIME EA 10 MIN

## 2022-08-02 PROCEDURE — 94640 AIRWAY INHALATION TREATMENT: CPT | Mod: 76

## 2022-08-02 RX ORDER — CEFDINIR 300 MG/1
300 CAPSULE ORAL 2 TIMES DAILY
Status: DISCONTINUED | OUTPATIENT
Start: 2022-08-03 | End: 2022-08-03 | Stop reason: HOSPADM

## 2022-08-02 RX ORDER — HYDROCHLOROTHIAZIDE 12.5 MG/1
12.5 CAPSULE ORAL DAILY
Status: DISCONTINUED | OUTPATIENT
Start: 2022-08-02 | End: 2022-08-03 | Stop reason: HOSPADM

## 2022-08-02 RX ORDER — PREDNISONE 20 MG/1
60 TABLET ORAL DAILY
Status: DISCONTINUED | OUTPATIENT
Start: 2022-08-02 | End: 2022-08-03 | Stop reason: HOSPADM

## 2022-08-02 RX ORDER — HYDRALAZINE HYDROCHLORIDE 20 MG/ML
10 INJECTION INTRAMUSCULAR; INTRAVENOUS EVERY 4 HOURS PRN
Status: DISCONTINUED | OUTPATIENT
Start: 2022-08-02 | End: 2022-08-03 | Stop reason: HOSPADM

## 2022-08-02 RX ADMIN — METOPROLOL TARTRATE 25 MG: 25 TABLET, FILM COATED ORAL at 08:18

## 2022-08-02 RX ADMIN — IPRATROPIUM BROMIDE AND ALBUTEROL SULFATE 3 ML: 2.5; .5 SOLUTION RESPIRATORY (INHALATION) at 19:35

## 2022-08-02 RX ADMIN — LOSARTAN POTASSIUM 50 MG: 50 TABLET, FILM COATED ORAL at 08:18

## 2022-08-02 RX ADMIN — IPRATROPIUM BROMIDE AND ALBUTEROL SULFATE 3 ML: 2.5; .5 SOLUTION RESPIRATORY (INHALATION) at 12:31

## 2022-08-02 RX ADMIN — OXYBUTYNIN CHLORIDE 10 MG: 5 TABLET, EXTENDED RELEASE ORAL at 08:19

## 2022-08-02 RX ADMIN — IPRATROPIUM BROMIDE AND ALBUTEROL SULFATE 3 ML: 2.5; .5 SOLUTION RESPIRATORY (INHALATION) at 16:34

## 2022-08-02 RX ADMIN — IPRATROPIUM BROMIDE AND ALBUTEROL SULFATE 3 ML: 2.5; .5 SOLUTION RESPIRATORY (INHALATION) at 08:09

## 2022-08-02 RX ADMIN — HYDROCHLOROTHIAZIDE 12.5 MG: 12.5 CAPSULE ORAL at 10:55

## 2022-08-02 RX ADMIN — PREGABALIN 150 MG: 75 CAPSULE ORAL at 08:18

## 2022-08-02 RX ADMIN — Medication 1 MG: at 23:45

## 2022-08-02 RX ADMIN — CYANOCOBALAMIN TAB 1000 MCG 1000 MCG: 1000 TAB at 08:18

## 2022-08-02 RX ADMIN — ACETAMINOPHEN 650 MG: 325 TABLET ORAL at 23:45

## 2022-08-02 RX ADMIN — AZITHROMYCIN 500 MG: 250 TABLET, FILM COATED ORAL at 05:09

## 2022-08-02 RX ADMIN — PANTOPRAZOLE SODIUM 40 MG: 20 TABLET, DELAYED RELEASE ORAL at 07:00

## 2022-08-02 RX ADMIN — GUAIFENESIN 10 ML: 100 SOLUTION ORAL at 20:04

## 2022-08-02 RX ADMIN — ENOXAPARIN SODIUM 40 MG: 100 INJECTION SUBCUTANEOUS at 05:09

## 2022-08-02 RX ADMIN — METOPROLOL TARTRATE 25 MG: 25 TABLET, FILM COATED ORAL at 19:59

## 2022-08-02 RX ADMIN — ATORVASTATIN CALCIUM 80 MG: 40 TABLET, FILM COATED ORAL at 21:57

## 2022-08-02 RX ADMIN — ASPIRIN 81 MG: 81 TABLET, COATED ORAL at 08:18

## 2022-08-02 RX ADMIN — PREGABALIN 150 MG: 75 CAPSULE ORAL at 19:59

## 2022-08-02 RX ADMIN — CEFTRIAXONE SODIUM 2 G: 2 INJECTION, POWDER, FOR SOLUTION INTRAMUSCULAR; INTRAVENOUS at 05:09

## 2022-08-02 RX ADMIN — PREDNISONE 60 MG: 20 TABLET ORAL at 10:54

## 2022-08-02 RX ADMIN — CLOPIDOGREL BISULFATE 75 MG: 75 TABLET ORAL at 08:18

## 2022-08-02 ASSESSMENT — ACTIVITIES OF DAILY LIVING (ADL)
ADLS_ACUITY_SCORE: 36
DEPENDENT_IADLS:: INDEPENDENT
ADLS_ACUITY_SCORE: 36

## 2022-08-02 NOTE — PROGRESS NOTES
Received pt on 2 LPM NC, pt now on RA  Duoneb given as ordered.  O2 sats remain low to mid 90's, BS diminished with occas crackles in the bases.  Will continue to monitor pt.

## 2022-08-02 NOTE — PLAN OF CARE
Patient vital signs are at baseline: No,  Reason:  pt on 1 L O2   Patient able to ambulate as they were prior to admission or with assist devices provided by therapies during their stay:  YES   Patient MUST void prior to discharge:  Yes  Patient able to tolerate oral intake:  Yes  Pain has adequate pain control using Oral analgesics:  Yes  Does patient have an identified :  Yes  Has goal D/C date and time been discussed with patient:  No,  Reason:  pt still on IV abx.

## 2022-08-02 NOTE — PROGRESS NOTES
[facility]  Hospitalist Progress Note    Admit Date:  7/31/2022  Date of Service (when I saw the patient): 08/02/2022   Provider:  Hollie Marmolejo, DO    Assessment & Plan   Ubaldo Ramos is a 76 year old male with a PMH of lung cancer, COPD, prostate CA and PVD who was admitted on 7/31/2022. With increasing SOB despite URI symptoms for 3-4 days.      Problem List:  1.  Acute hypoxic respiratory failure       Multifocal pneumonia         He was noted to be hypoxemic in the 80's on RA on arrival to the ED  CT of the chest negative for PE but with evidence of bilateral pulmonary infiltrates suspicious for multifocal pneumonia.    Legionella urine antigen - neg  Strep urine ag - neg  covid - neg  Awaiting respiratory panels    He is continuing to clinically improve today on po azithromycin and IV rocephin  Plan to switch to po vantin and continue po azithromycin 8/3/22 if continuing to improve    Will see if we can wean oxygen today  Home oxygen assessment today    2.  COPD exacerbation        Continued tobacco abuse        H/o lung cancer    Duonebs qid - continue  Will change IV solumedrol to po prednisone today  Wean oxygen, as able    mucinex scheduled    H/o lung cancer noted - s/p surgical resection; then chemo and rad     He does follows with Pulmonary medicine in the outpt setting(last visit 11/2021) -- for history of lung cancer s/p left upper lobectomy in 2017 with recurrence in 2018 s/p chemotherapy and radiation and , chronic obstructive pulmonary disease    Smoking cessation encouraged     3.  HTN  Home meds include:  Metoprolol 25mg po bid - continue  Losartan 50mg/day - continue  hydrochlorothiazide 12.5mg/day - has been on hold    SBP elevated this am  Will resume HTCZ today  Prn hydralazine     4. PVD, s/p left femoral endarterectomy and bilateral iliac stents  - PTA meds include asa 81mg/day and plavix 75mg/day   daily statin    5. h/o prostate CA     6. Abnormal EKG  "with ischemic changes   trop negative. Continue serial trops and tele. Nitroglycerin if with chest pain  EKG today now that electrolytes have been supplemented    7.  Hypomagnesia - resolved  Supplement per electrolyte protocol    8.  Weakness and deconditioning       Neuropathy    PT recommending home PT  Physical function below baseline    Diet: pt requesting a change to a regular diet  DVT Prophylaxis: Enoxaparin (Lovenox) SQ  Flores Catheter: Not present  Code Status: Full Code      Disposition Plan   Later today or 8/3/22 pending oxygen assessment    Entered: Hollie Marmolejo,  08/02/2022, 7:00 AM     We are operating under sub optimal conditions in the setting of a world wide pandemic, hospitals are running at full capacity with limited bed availability. We are providing the best possible patient care with limited resources.    Interval History   Feels that he is continuing to improve.  \"Why am I on a low-sodium diet?\"  Still feeling \"weaker\" than normal.  No anterior CP.  Still with mainly non-productive cough---no hemoptysis.  No HA, F/C, N/V.  Feeling a little \"jittery\" from the steroids.      -Data reviewed today: I reviewed all new labs and imaging results over the last 24 hours. I personally reviewed no images or EKG's today.    Physical Exam   Temp: 97.4  F (36.3  C) Temp src: Oral BP: (!) 152/63 Pulse: 59   Resp: 18 SpO2: 96 % O2 Device: Nasal cannula Oxygen Delivery: 1 LPM  Vitals:    07/31/22 0023   Weight: 73.5 kg (162 lb)     Vital Signs with Ranges  Temp:  [97.4  F (36.3  C)-97.6  F (36.4  C)] 97.4  F (36.3  C)  Pulse:  [59-95] 59  Resp:  [16-19] 18  BP: (123-173)/(59-74) 152/63  SpO2:  [91 %-96 %] 96 %  I/O last 3 completed shifts:  In: 742.5 [I.V.:742.5]  Out: -     GEN:  Awake, more alert, up in chair.  Clinically appears to be much improved  No significant conversational dyspnea this am.    Work of breathing is, overall, much easier  HEENT:  Normocephalic/atraumatic, no scleral " icterus, no nasal discharge, mouth and membranes appear moist,  Nasal cannula in place  NECK:  No clear thyromegaly or JVD  CV:  Regular rate and rhythm, no loud murmur to ausc.  S1 + S2 noted, no S3 or S4.  LUNGS:  Still diminished air exchange throughout to bilateral posterior auscultation but improved from yesterday and no wheezes, rhonchi or significant rales noted.    Less tachypnea and no real costal retractions bilaterally this am.   Symmetric chest rise on inhalation noted.  ABD:  Active bowel sounds, soft, non-tender/non-distended.  No rebound/guarding/rigidity.  No masses palpated.  No obvious HSM to exam.  EXT:  trace pretibial edema or cyanosis bilaterally. No joint synovitis noted.  No calf-tenderness or asymmetry noted.  SKIN:  Dry to touch, no rashes or jaundice noted.  PSYCH:  Mood appropriate, Not tearful or depressed.  Maintains direct eye contact.  coopeartive  NEURO:  No tremors at rest, speech is clear and appropriate.  CN 2-12 intact to gross testing bilaterally.    Data   Labs:  Recent Labs   Lab 08/01/22 0619 07/31/22 0438 07/31/22 0215     --  134*   POTASSIUM 3.6  --  3.4*   CHLORIDE 108*  --  96*   CO2 25  --  25   ANIONGAP 7  --  13   *  --  120   BUN 21  --  17   CR 0.78 0.79 0.88   GFRESTIMATED >90 >90 90   RUTHANN 8.2*  --  8.4*     Recent Labs   Lab 08/01/22 0619 07/31/22  0216   WBC 5.9 7.4   HGB 9.9* 10.6*   HCT 30.1* 32.5*   MCV 88 89    155     Recent Labs   Lab 08/01/22 0619 07/31/22  1250 07/31/22 0438 07/31/22  0215     --   --  134*   POTASSIUM 3.6  --   --  3.4*   CHLORIDE 108*  --   --  96*   CO2 25  --   --  25   ANIONGAP 7  --   --  13   *  --   --  120   BUN 21  --   --  17   CR 0.78  --  0.79 0.88   GFRESTIMATED >90  --  >90 90   RUTHANN 8.2*  --   --  8.4*   MAG  --  2.9* 1.3*  --    PROTTOTAL 6.1  --   --   --    ALBUMIN 2.7*  --   --   --    BILITOTAL 0.2  --   --   --    ALKPHOS 62  --   --   --    AST 30  --   --   --    ALT 25  --    --   --       Recent Imaging:   No results found for this or any previous visit (from the past 24 hour(s)).    Medications       aspirin  81 mg Oral Daily     atorvastatin  80 mg Oral At Bedtime     azithromycin  500 mg Oral Daily     cefTRIAXone  2 g Intravenous Q24H     clopidogrel  75 mg Oral Daily     cyanocobalamin  1,000 mcg Oral Daily     enoxaparin ANTICOAGULANT  40 mg Subcutaneous Q24H     ipratropium - albuterol 0.5 mg/2.5 mg/3 mL  3 mL Nebulization 4x daily     losartan  50 mg Oral Daily     methylPREDNISolone  62.5 mg Intravenous Q12H     metoprolol tartrate  25 mg Oral BID     oxybutynin ER  10 mg Oral Daily     pantoprazole  40 mg Oral QAM AC     pregabalin  150 mg Oral BID     sodium chloride (PF)  3 mL Intracatheter Q8H

## 2022-08-02 NOTE — PLAN OF CARE
Problem: Plan of Care - These are the overarching goals to be used throughout the patient stay.    Goal: Plan of Care Review/Shift Note  Description: The Plan of Care Review/Shift note should be completed every shift.  The Outcome Evaluation is a brief statement about your assessment that the patient is improving, declining, or no change.  This information will be displayed automatically on your shift note.  Outcome: Ongoing, Progressing  Flowsheets (Taken 8/2/2022 1313)  Plan of Care Reviewed With: patient  Outcome Evaluation: pt will tolerate antibiotic therapy and maintain oxygen sats on RA.  Overall Patient Progress: improving  Pt denies pain.  Sating well on RA as well as ambulating without SOB or oxygen needs during ambulation.  Lungs with fine crackles and occasionally expiratory wheezes.  Tolerating antibiotic therapy.  Up with SBA in halls.     Goal Outcome Evaluation:    Plan of Care Reviewed With: patient     Overall Patient Progress: improving    Outcome Evaluation: pt will tolerate antibiotic therapy and maintain oxygen sats on RA.

## 2022-08-02 NOTE — CONSULTS
Care Management Initial Consult    General Information  Assessment completed with: Patient,    Type of CM/SW Visit: Initial Assessment    Primary Care Provider verified and updated as needed: Yes   Readmission within the last 30 days:        Reason for Consult: discharge planning  Advance Care Planning:            Communication Assessment  Patient's communication style: spoken language (English or Bilingual)    Hearing Difficulty or Deaf: yes   Wear Glasses or Blind: yes    Cognitive  Cognitive/Neuro/Behavioral: WDL  Level of Consciousness: alert  Arousal Level: opens eyes spontaneously  Orientation: oriented x 4  Mood/Behavior: calm, cooperative  Best Language: 0 - No aphasia  Speech: clear    Living Environment:   People in home:       Current living Arrangements: independent living facility      Able to return to prior arrangements: yes       Family/Social Support:  Care provided by: self  Provides care for: no one  Marital Status:   Wife          Description of Support System: Supportive, Involved         Current Resources:   Patient receiving home care services: No     Community Resources: None  Equipment currently used at home: cane, straight, walker, rolling  Supplies currently used at home: None    Employment/Financial:  Employment Status: retired        Financial Concerns: No concerns identified   Referral to Financial Worker: No       Lifestyle & Psychosocial Needs:  Social Determinants of Health     Tobacco Use: High Risk     Smoking Tobacco Use: Current Every Day Smoker     Smokeless Tobacco Use: Never Used   Alcohol Use: Not on file   Financial Resource Strain: Not on file   Food Insecurity: Not on file   Transportation Needs: Not on file   Physical Activity: Not on file   Stress: Not on file   Social Connections: Not on file   Intimate Partner Violence: Not on file   Depression: Not at risk     PHQ-2 Score: 0   Housing Stability: Not on file       Functional Status:  Prior to admission patient  needed assistance:   Dependent ADLs:: Independent  Dependent IADLs:: Independent       Mental Health Status:  None           Chemical Dependency Status:      None           Values/Beliefs:  Spiritual, Cultural Beliefs, Religion Practices, Values that affect care: no               Additional Information:  YESIKA met and introduced self and CM services to Pt.  Pt lives with wife at [facility].  Pt drives.  Pt independent at baseline.  Recently purchased a 4WW for car and craft shows that he and wife attend.  Therapy recommends Home PT and has no pretences for Home Care.  HC referral sent.  Pt is on 02 currently and does not have at home. Wife to transport.     11:56 AM  [facility] can accept Pt for [facility] [phone].  [facility] talked with Candace and needs discharge orders faxed when ready.     BETI Nichols

## 2022-08-02 NOTE — PLAN OF CARE
Goal Outcome Evaluation:    Plan of Care Reviewed With: patient     Patient up ambulating in the hallway with the use of a gait belt and 1 staff. Patient denies any c/o pain at this time.Patient thought he was going to be going home tonight, but dr. Marmolejo stated he would be here for another night. Patient plans to go back to [facility].Will continue to monitor.

## 2022-08-02 NOTE — DISCHARGE INSTRUCTIONS
Home care services have been arranged for you:  Home Care Agency: [facility]  Home Care Services: PT   Home Care Phone: [phone]    Instructions: Home care agency will call within 48 hours of discharge to schedule appointments.

## 2022-08-02 NOTE — PLAN OF CARE
VSS on 1L overnight, weaned to 0.5 L this AM. Oxygen dips to 83 when ambulating but with deep breaths comes back up to 90s.  CMS intact except baseline neuropathy. Incontinent of urine but manages independently. Up with sba. IV SL with intermittent antibiotics. Denies pain, resting comfortably.

## 2022-08-03 VITALS
OXYGEN SATURATION: 95 % | DIASTOLIC BLOOD PRESSURE: 70 MMHG | SYSTOLIC BLOOD PRESSURE: 148 MMHG | RESPIRATION RATE: 17 BRPM | BODY MASS INDEX: 26.15 KG/M2 | WEIGHT: 162 LBS | HEART RATE: 59 BPM | TEMPERATURE: 97.6 F

## 2022-08-03 LAB
ANION GAP SERPL CALCULATED.3IONS-SCNC: 8 MMOL/L (ref 5–18)
BUN SERPL-MCNC: 24 MG/DL (ref 8–28)
CALCIUM SERPL-MCNC: 8.6 MG/DL (ref 8.5–10.5)
CHLORIDE BLD-SCNC: 108 MMOL/L (ref 98–107)
CO2 SERPL-SCNC: 24 MMOL/L (ref 22–31)
CREAT SERPL-MCNC: 0.77 MG/DL (ref 0.7–1.3)
GFR SERPL CREATININE-BSD FRML MDRD: >90 ML/MIN/1.73M2
GLUCOSE BLD-MCNC: 120 MG/DL (ref 70–125)
PLATELET # BLD AUTO: 157 10E3/UL (ref 150–450)
POTASSIUM BLD-SCNC: 3.7 MMOL/L (ref 3.5–5)
SODIUM SERPL-SCNC: 140 MMOL/L (ref 136–145)

## 2022-08-03 PROCEDURE — 999N000157 HC STATISTIC RCP TIME EA 10 MIN

## 2022-08-03 PROCEDURE — 250N000011 HC RX IP 250 OP 636: Performed by: INTERNAL MEDICINE

## 2022-08-03 PROCEDURE — 94640 AIRWAY INHALATION TREATMENT: CPT | Mod: 76

## 2022-08-03 PROCEDURE — 250N000012 HC RX MED GY IP 250 OP 636 PS 637: Performed by: INTERNAL MEDICINE

## 2022-08-03 PROCEDURE — 94640 AIRWAY INHALATION TREATMENT: CPT

## 2022-08-03 PROCEDURE — 250N000013 HC RX MED GY IP 250 OP 250 PS 637: Performed by: INTERNAL MEDICINE

## 2022-08-03 PROCEDURE — 80048 BASIC METABOLIC PNL TOTAL CA: CPT | Performed by: INTERNAL MEDICINE

## 2022-08-03 PROCEDURE — 36415 COLL VENOUS BLD VENIPUNCTURE: CPT | Performed by: INTERNAL MEDICINE

## 2022-08-03 PROCEDURE — 85049 AUTOMATED PLATELET COUNT: CPT | Performed by: INTERNAL MEDICINE

## 2022-08-03 PROCEDURE — 250N000009 HC RX 250: Performed by: INTERNAL MEDICINE

## 2022-08-03 PROCEDURE — 99207 PR NO BILLABLE SERVICE THIS VISIT: CPT | Performed by: EMERGENCY MEDICINE

## 2022-08-03 RX ORDER — PREDNISONE 20 MG/1
20 TABLET ORAL SEE ADMIN INSTRUCTIONS
Qty: 15 TABLET | Refills: 0 | Status: SHIPPED | OUTPATIENT
Start: 2022-08-03 | End: 2022-11-16

## 2022-08-03 RX ORDER — AZITHROMYCIN 250 MG/1
TABLET, FILM COATED ORAL
Qty: 6 TABLET | Refills: 0 | Status: SHIPPED | OUTPATIENT
Start: 2022-08-03 | End: 2022-08-08

## 2022-08-03 RX ORDER — CEFDINIR 300 MG/1
300 CAPSULE ORAL 2 TIMES DAILY
Qty: 14 CAPSULE | Refills: 0 | Status: SHIPPED | OUTPATIENT
Start: 2022-08-03 | End: 2022-08-11

## 2022-08-03 RX ADMIN — CLOPIDOGREL BISULFATE 75 MG: 75 TABLET ORAL at 08:22

## 2022-08-03 RX ADMIN — PANTOPRAZOLE SODIUM 40 MG: 20 TABLET, DELAYED RELEASE ORAL at 07:07

## 2022-08-03 RX ADMIN — CEFDINIR 300 MG: 300 CAPSULE ORAL at 08:22

## 2022-08-03 RX ADMIN — METOPROLOL TARTRATE 25 MG: 25 TABLET, FILM COATED ORAL at 08:22

## 2022-08-03 RX ADMIN — HYDROCHLOROTHIAZIDE 12.5 MG: 12.5 CAPSULE ORAL at 08:22

## 2022-08-03 RX ADMIN — AZITHROMYCIN 500 MG: 250 TABLET, FILM COATED ORAL at 05:42

## 2022-08-03 RX ADMIN — IPRATROPIUM BROMIDE AND ALBUTEROL SULFATE 3 ML: 2.5; .5 SOLUTION RESPIRATORY (INHALATION) at 12:52

## 2022-08-03 RX ADMIN — HYDRALAZINE HYDROCHLORIDE 10 MG: 20 INJECTION INTRAMUSCULAR; INTRAVENOUS at 04:23

## 2022-08-03 RX ADMIN — ENOXAPARIN SODIUM 40 MG: 100 INJECTION SUBCUTANEOUS at 05:45

## 2022-08-03 RX ADMIN — OXYBUTYNIN CHLORIDE 10 MG: 5 TABLET, EXTENDED RELEASE ORAL at 08:22

## 2022-08-03 RX ADMIN — PREGABALIN 150 MG: 75 CAPSULE ORAL at 08:22

## 2022-08-03 RX ADMIN — LOSARTAN POTASSIUM 50 MG: 50 TABLET, FILM COATED ORAL at 08:22

## 2022-08-03 RX ADMIN — CYANOCOBALAMIN TAB 1000 MCG 1000 MCG: 1000 TAB at 08:22

## 2022-08-03 RX ADMIN — ASPIRIN 81 MG: 81 TABLET, COATED ORAL at 08:23

## 2022-08-03 RX ADMIN — IPRATROPIUM BROMIDE AND ALBUTEROL SULFATE 3 ML: 2.5; .5 SOLUTION RESPIRATORY (INHALATION) at 07:53

## 2022-08-03 RX ADMIN — PREDNISONE 60 MG: 20 TABLET ORAL at 08:22

## 2022-08-03 ASSESSMENT — ACTIVITIES OF DAILY LIVING (ADL)
ADLS_ACUITY_SCORE: 36
ADLS_ACUITY_SCORE: 36
ADLS_ACUITY_SCORE: 37
ADLS_ACUITY_SCORE: 36
ADLS_ACUITY_SCORE: 37
ADLS_ACUITY_SCORE: 36
ADLS_ACUITY_SCORE: 37
ADLS_ACUITY_SCORE: 36

## 2022-08-03 NOTE — PROGRESS NOTES
Patient received scheduled neb.  Pt is on room air with sats in the mid 90's and diminished breath sounds.  RT will follow.        Mahdi Sanchez RT

## 2022-08-03 NOTE — DISCHARGE SUMMARY
[facility] MEDICINE  DISCHARGE SUMMARY     Primary Care Physician: John Oconnor  Admission Date: 7/31/2022   Discharge Provider: Alison Palacios MD Discharge Date: 8/3/2022   Diet:   Active Diet and Nourishment Order   Procedures     Regular Diet Adult       Code Status: Full Code   Activity: as tolerates        Condition at Discharge: stable     REASON FOR PRESENTATION(See Admission Note for Details)    dyspnea, community acquired pneumonia, copd    PRINCIPAL & ACTIVE DISCHARGE DIAGNOSES       1.  Dyspnea due to community acquired pneumonia  2.  COPD with exacerbation  3.  Acute hypoxemic respiratory failure due to CAP  4.  HTN  5.  Low magnesium  6.  PVD, history of with left femoral endarterectomy and bilateral iliac stents  7.  Chronic normocytic anemia  8.  History of lung cancer with IGNACIA                    DISPOSITION     Home    SUMMARY OF HOSPITAL COURSE:      76 year old male admitted on 7/31 to [facility] for dyspnea.  Pt was diagnosed with  Copd exacerbation and community acquired pneumonia.    PMHx includes COPD, prostate CA, PVD, history of lung cancer (s/p IGNACIA in 2017) who on admission  Was dyspneic and coughing.  CT chest done with contrast showed bilateral pulmonary infilitrates consistent with multifocal pneumonia. Pt was admitted and put on empiric iv antibiotics with azithromycin and rocephin.  Urine antigens for legionella and strep pneumonia were negative. A respiratory panel was negative. The magnesium  Was replaced.  The cardiac status was followed.  He did not have an acute ischemic event.      Due to his history of copd he was put on steroids  And given nebulizers.  Pts oxygen needs improved.  He was able to be weaned from his oxygen.  He   Did not fit criteria for sepsis.  Pt was changed to oral antibiotics and oral prednisone on 8/2.  He was able  To walk down the [place] prior to discharge.          Discharge Medications with Med changes:     Current  Discharge Medication List      START taking these medications    Details   azithromycin (ZITHROMAX) 250 MG tablet Take 2 tablets (500 mg) by mouth daily for 1 day, THEN 1 tablet (250 mg) daily for 4 days.  Qty: 6 tablet, Refills: 0    Associated Diagnoses: Pneumonia of both lungs due to infectious organism, unspecified part of lung      cefdinir (OMNICEF) 300 MG capsule Take 1 capsule (300 mg) by mouth 2 times daily  Qty: 14 capsule, Refills: 0    Associated Diagnoses: Pneumonia of both lungs due to infectious organism, unspecified part of lung      predniSONE (DELTASONE) 20 MG tablet Take 1 tablet (20 mg) by mouth See Admin Instructions Take 60 mg once daily for 2 days, 40 mg once daily for 3 days, 20 mg once daily for 3 days, stop15  Qty: 15 tablet, Refills: 0    Associated Diagnoses: Pneumonia of both lungs due to infectious organism, unspecified part of lung; COPD exacerbation (H)         CONTINUE these medications which have NOT CHANGED    Details   albuterol (PROVENTIL) (2.5 MG/3ML) 0.083% neb solution Take 1 vial (2.5 mg) by nebulization 3 times daily as needed for shortness of breath / dyspnea or wheezing  Qty: 90 mL, Refills: 4    Associated Diagnoses: Pulmonary emphysema, unspecified emphysema type (H)      aspirin 81 MG EC tablet [ASPIRIN 81 MG EC TABLET] Take 81 mg by mouth daily.      atorvastatin (LIPITOR) 80 MG tablet Take 1 tablet (80 mg) by mouth At Bedtime  Qty: 90 tablet, Refills: 3    Associated Diagnoses: Hyperlipidemia LDL goal <100      cholecalciferol, vitamin D3, (VITAMIN D3) 25 mcg (1,000 unit) capsule [CHOLECALCIFEROL, VITAMIN D3, (VITAMIN D3) 25 MCG (1,000 UNIT) CAPSULE] Take 1,000 Units by mouth daily.      clopidogrel (PLAVIX) 75 MG tablet Take 1 tablet (75 mg) by mouth daily  Qty: 90 tablet, Refills: 3    Associated Diagnoses: Peripheral artery disease (H)      cyanocobalamin 1000 MCG tablet [CYANOCOBALAMIN 1000 MCG TABLET] Take 1,000 mcg by mouth daily.      ipratropium - albuterol  0.5 mg/2.5 mg/3 mL (DUONEB) 0.5-2.5 (3) MG/3ML neb solution Take 1 vial (3 mLs) by nebulization 4 times daily  Qty: 1080 mL, Refills: 3    Associated Diagnoses: COPD, severe (H)      losartan-hydrochlorothiazide (HYZAAR) 50-12.5 MG tablet Take 1 tablet by mouth every morning  Qty: 90 tablet, Refills: 3    Associated Diagnoses: Benign essential hypertension      metoprolol tartrate (LOPRESSOR) 25 MG tablet Take 1 tablet (25 mg) by mouth 2 times daily  Qty: 180 tablet, Refills: 3    Comments: Change from 50 mg twice daily to 25 mg twice daily  Associated Diagnoses: Benign essential hypertension      omeprazole (PRILOSEC) 20 MG DR capsule Take 2 capsules (40 mg) by mouth daily before breakfast    Associated Diagnoses: Dyspepsia      oxybutynin ER (DITROPAN XL) 10 MG 24 hr tablet Take 1 tablet (10 mg) by mouth daily  Qty: 90 tablet, Refills: 1    Associated Diagnoses: Overactive bladder      pregabalin (LYRICA) 150 MG capsule TAKE 1 CAPSULE(150 MG) BY MOUTH TWICE DAILY  Qty: 180 capsule, Refills: 1    Associated Diagnoses: Multifocal motor neuropathy (H)                             Consults       PHYSICAL THERAPY ADULT IP CONSULT  CARE MANAGEMENT / SOCIAL WORK IP CONSULT    Immunizations given this encounter     Most Recent Immunizations   Administered Date(s) Administered     COVID-19,PF,Pfizer (12+ Yrs) 10/01/2021     COVID-19,PF,Pfizer 12+ Yrs (2022 and After) 05/04/2022     DT (PEDS <7y) 01/01/1997     FLUAD(HD)65+ QUAD 10/22/2021     Flu, Unspecified 10/25/2020     Influenza (IIV3) PF 10/19/2010     Influenza Vaccine, 6+MO IM (QUADRIVALENT W/PRESERVATIVES) 11/29/2011     Pneumococcal 20 valent Conjugate (Prevnar 20) 06/29/2022     Td,adult,historic,unspecified 08/06/2007     Tdap (Adacel,Boostrix) 08/06/2007                   SIGNIFICANT IMAGING FINDINGS     Results for orders placed or performed during the hospital encounter of 07/31/22   CT Chest Pulmonary Embolism w Contrast    Impression    IMPRESSION:  1.  No  evidence of pulmonary embolus.  2.  Worsening bilateral pulmonary infiltrates suspicious for multifocal pneumonia. Short-term follow-up posttherapy is recommended.  3.  Mediastinal/hilar adenopathy may be reactive in the setting of infection. Attention at the time of follow-up is advised.       SIGNIFICANT LABORATORY FINDINGS     Most Recent 3 BMP's:Recent Labs   Lab Test 08/03/22  0542 08/01/22  0619 07/31/22  0438 07/31/22  0215    140  --  134*   POTASSIUM 3.7 3.6  --  3.4*   CHLORIDE 108* 108*  --  96*   CO2 24 25  --  25   BUN 24 21  --  17   CR 0.77 0.78 0.79 0.88   ANIONGAP 8 7  --  13   RUTHANN 8.6 8.2*  --  8.4*    175*  --  120           Discharge Orders     No discharge procedures on file.    Examination   Physical Exam   Temp:  [97.1  F (36.2  C)-98.2  F (36.8  C)] 97.6  F (36.4  C)  Pulse:  [55-83] 59  Resp:  [16-18] 17  BP: (145-191)/(64-79) 148/70  SpO2:  [90 %-95 %] 95 %  Wt Readings from Last 1 Encounters:   07/31/22 73.5 kg (162 lb)       General Appearance: It [birth year] my birthday; I want to go home; VSS  Respiratory: clear   Cardiovascular: regular  GI: soft  Skin: no rashes        Please see EMR for more detailed significant labs, imaging, consultant notes etc.    I, Ailson Palacios MD, personally saw the patient today and spent less than or equal to 30 minutes discharging this patient.    Alison Palacios MD  [facility]    CC:John Oconnor

## 2022-08-03 NOTE — PROGRESS NOTES
Care Management Follow Up    Length of Stay (days): 3    Expected Discharge Date: 08/03/2022     Concerns to be Addressed: discharge planning     Patient plan of care discussed at interdisciplinary rounds: Yes    Anticipated Discharge Disposition: Home, Home Care     Anticipated Discharge Services: None  Anticipated Discharge DME: None    Patient/family educated on Medicare website which has current facility and service quality ratings: no  Education Provided on the Discharge Plan:    Patient/Family in Agreement with the Plan: yes    Referrals Placed by CM/SW: [facility]    Private pay costs discussed: Not applicable    Additional Information:  Pt is discharging home today. Pt lives with wife.  Home Pt is set up through [facility].  Discharge orders and face to face note sent to HC.  Family to transport.       BETI Nichols

## 2022-08-03 NOTE — PROGRESS NOTES
Documentation of Face to Face and Certification for Home Health Services    I certify that patient: Ubaldo Ramos is under my care and that I, or a nurse practitioner or physician's assistant working with me, had a face-to-face encounter that meets the physician face-to-face encounter requirements with this patient on 8/3/2022    This encounter with the patient was in whole, or in part, for the following medical condition, which is the primary reason for home health care: community acquired pneumonia, copd exacerbation, weakness    I certify that, based on my findings, the following services are medically necessary home health services:   Short term physical therapy    My clinical findings support the need for the above services because: pt has generalized weakness from  commuity acquired pneumonia    Further, I certify that my clinical findings support that this patient is homebound (i.e. absences from home require considerable and taxing effort and are for medical reasons or Faith services or infrequently or of short duration when for other reasons) because: Requires assistance of another person or specialized equipment to access medical services because patient: Requires supervision of another for safe transfer...    Based on the above findings. I certify that this patient is confined to the home and needs intermittent skilled nursing care, physical therapy and/or speech therapy.  The patient is under my care, and I have initiated the establishment of the plan of care.  This patient will be followed by a physician who will periodically review the plan of care.  Physician/Provider to provide follow up care: John Oconnor    Attending hospital physician (the Medicare certified [facility] provider): Alison Palacios MD  Physician Signature: See electronic signature associated with these discharge orders.  Date: 8/3/2022

## 2022-08-03 NOTE — PROGRESS NOTES
Nurse discharge teaching given on 8/3/2022 and the patient expresses understanding and acceptance of instructions. Trista Meier RN 8/3/2022 1:51 PM

## 2022-08-03 NOTE — PROGRESS NOTES
Physical Therapy Discharge Summary    Reason for therapy discharge:    Discharged to home with home therapy.    Progress towards therapy goal(s). See goals on Care Plan in [facility] electronic health record for goal details.  Goals partially met.  Barriers to achieving goals:   discharge from facility and Safe ambulation and transfers.    Therapy recommendation(s):    Continued therapy is recommended.  Rationale/Recommendations:  Home PT for continued strengthening and endurance.

## 2022-08-03 NOTE — PLAN OF CARE
Note from 0700 to discharge. Discharge paperwork addressed thoroughly with pt by the discharge RN. AVS sent with pt. IV access discontinued. No issues noted. VSS, no shortness of breath and pt has been on room air the whole day with no respiratory complaints.    Taylor R Schoenecker, RN

## 2022-08-03 NOTE — PLAN OF CARE
Problem: Plan of Care - These are the overarching goals to be used throughout the patient stay.    Goal: Optimal Comfort and Wellbeing  Intervention: Provide Person-Centered Care  Recent Flowsheet Documentation  Taken 8/2/2022 6960 by Joycelyn Camacho, RN  Trust Relationship/Rapport: care explained     VSS on RA, blood pressure remains elevated, last check 185/77, PRN hydralazine given per orders. Incontinent of urine, self manages. Up with sba with gait belt to the bathroom. Pain managed with PRN tylenol at . IV . Plan for discharge today.

## 2022-08-04 ENCOUNTER — PATIENT OUTREACH (OUTPATIENT)
Dept: CARE COORDINATION | Facility: CLINIC | Age: 77
End: 2022-08-04

## 2022-08-04 DIAGNOSIS — Z71.89 OTHER SPECIFIED COUNSELING: ICD-10-CM

## 2022-08-04 NOTE — PROGRESS NOTES
"Clinic Care Coordination Contact  [facility]: Post-Discharge Note  SITUATION                                                      Admission:    Admission Date: 07/31/22   Reason for Admission: dyspnea, community acquired pneumonia, copd  Discharge:   Discharge Date: 08/03/22  Discharge Diagnosis: dyspnea, community acquired pneumonia, copd    BACKGROUND                                                      Per hospital discharge summary and inpatient provider notes:  76 year old male admitted on 7/31 to [facility] for dyspnea.  Pt was diagnosed with  Copd exacerbation and community acquired pneumonia.     PMHx includes COPD, prostate CA, PVD, history of lung cancer (s/p IGNACIA in 2017) who on admission  Was dyspneic and coughing.  CT chest done with contrast showed bilateral pulmonary infilitrates consistent with multifocal pneumonia. Pt was admitted and put on empiric iv antibiotics with azithromycin and rocephin.  Urine antigens for legionella and strep pneumonia were negative. A respiratory panel was negative. The magnesium  Was replaced.  The cardiac status was followed.  He did not have an acute ischemic event.      Due to his history of copd he was put on steroids  And given nebulizers.  Pts oxygen needs improved.  He was able to be weaned from his oxygen.  He   Did not fit criteria for sepsis.  Pt was changed to oral antibiotics and oral prednisone on 8/2.  He was able  To walk down the [place] prior to discharge.    ASSESSMENT      Enrollment  Primary Care Care Coordination Status: Declined    Discharge Assessment  How are you doing now that you are home?: \" I'm doing very well thank you\"  How are your symptoms? (Red Flag symptoms escalate to triage hotline per guidelines): Improved  Do you feel your condition is stable enough to be safe at home until your provider visit?: Yes  Does the patient have questions regarding their discharge instructions? : No  Were you started on any new medications or were there " changes to any of your previous medications? : Yes  Does the patient have all of their medications?: Yes  Do you have questions regarding any of your medications? : No  Do you have all of your needed medical supplies or equipment (DME)?  (i.e. oxygen tank, CPAP, cane, etc.): Yes  Discharge follow-up appointment scheduled within 14 calendar days? : No  Is patient agreeable to assistance with scheduling? : No    Post-op ([facility] Only)  If the patient had a surgery or procedure, do they have any questions for a nurse?: No             PLAN                                                      Outpatient Plan: Follow-up and recommended labs and tests  Routine follow up with primary care    Future Appointments   Date Time Provider Department Center   8/8/2022 10:00 AM [facility] XR 1 CTXRAY [facility]   11/8/2022  8:00 AM [facility] VC US 1 WIVSCU [facility]   11/8/2022  8:30 AM [facility] VC US 1 [facility]IVSCU [facility]   11/8/2022  9:00 AM Miguel Callejas MD WIVS [facility]         For any urgent concerns, please contact our 24 hour nurse triage line: [phone] ([phone])         Julianne Anne MA

## 2022-08-05 ENCOUNTER — TELEPHONE (OUTPATIENT)
Dept: FAMILY MEDICINE | Facility: CLINIC | Age: 77
End: 2022-08-05

## 2022-08-05 LAB
BACTERIA BLD CULT: NO GROWTH
BACTERIA BLD CULT: NO GROWTH

## 2022-08-05 NOTE — TELEPHONE ENCOUNTER
Reason for Call:  Other appointment    Detailed comments: hospital follow steph needed for patient. Please follow up with patient    Phone Number Patient can be reached at: Cell number on file:    Telephone Information:   Mobile [phone]       Best Time: any      Can we leave a detailed message on this number? YES    Call taken on 8/5/2022 at 12:57 PM by Yamini Silverman

## 2022-08-08 ENCOUNTER — ANCILLARY PROCEDURE (OUTPATIENT)
Dept: GENERAL RADIOLOGY | Facility: CLINIC | Age: 77
End: 2022-08-08
Attending: NURSE PRACTITIONER
Payer: MEDICARE

## 2022-08-08 DIAGNOSIS — M43.6 NECK STIFFNESS: ICD-10-CM

## 2022-08-08 DIAGNOSIS — Z98.1 S/P CERVICAL SPINAL FUSION: ICD-10-CM

## 2022-08-08 PROCEDURE — 72040 X-RAY EXAM NECK SPINE 2-3 VW: CPT | Mod: TC | Performed by: RADIOLOGY

## 2022-08-10 ENCOUNTER — OFFICE VISIT (OUTPATIENT)
Dept: FAMILY MEDICINE | Facility: CLINIC | Age: 77
End: 2022-08-10
Payer: MEDICARE

## 2022-08-10 VITALS
BODY MASS INDEX: 26.33 KG/M2 | DIASTOLIC BLOOD PRESSURE: 62 MMHG | WEIGHT: 163.1 LBS | RESPIRATION RATE: 14 BRPM | SYSTOLIC BLOOD PRESSURE: 136 MMHG | OXYGEN SATURATION: 97 % | HEART RATE: 63 BPM

## 2022-08-10 DIAGNOSIS — J18.9 PNEUMONIA OF BOTH LUNGS DUE TO INFECTIOUS ORGANISM, UNSPECIFIED PART OF LUNG: ICD-10-CM

## 2022-08-10 DIAGNOSIS — G61.82 MULTIFOCAL MOTOR NEUROPATHY (H): ICD-10-CM

## 2022-08-10 DIAGNOSIS — J43.9 PULMONARY EMPHYSEMA, UNSPECIFIED EMPHYSEMA TYPE (H): Primary | ICD-10-CM

## 2022-08-10 DIAGNOSIS — J44.1 COPD EXACERBATION (H): ICD-10-CM

## 2022-08-10 PROCEDURE — 99496 TRANSJ CARE MGMT HIGH F2F 7D: CPT | Performed by: NURSE PRACTITIONER

## 2022-08-10 RX ORDER — PREGABALIN 150 MG/1
150 CAPSULE ORAL 3 TIMES DAILY
Start: 2022-08-10 | End: 2022-08-18

## 2022-08-10 ASSESSMENT — PAIN SCALES - GENERAL: PAINLEVEL: NO PAIN (0)

## 2022-08-10 NOTE — PROGRESS NOTES
"Assessment & Plan     ICD-10-CM    1. Pulmonary emphysema, unspecified emphysema type (H)  J43.9    2. COPD exacerbation (H)  J44.1    3. 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  Patient is still precontemplative.  He is done with antibiotics and prednisone.  Follow-up with pulmonary medicine later this fall.  It sounds like he started to slack on his nebulizer treatments prior to this hospitalization but now is back to using scheduled duo nebs.  Recheck chest x-ray in 6 weeks.  Try increasing Lyrica to 3 times a day.  Let me know and I few weeks how its going. Reviewed that he could also redo physical therapy or meet with spine care.  He is not interested in surgery or invasive options like injections right now.    Reviewed pulmonary medicine note x1, hospitalist note x1, ED note x1, CBC x1, metabolic panel x1, CT PE study x1    Subjective     HPI     Hospital F/U (Hospital follow up. Diagnosed with Pneumonia. BP at home has been a little high still. Pt's wife reports pt gets weak occasionally. Is still doing using their nebulizer but has not done so this morning. Will occasionally get jitters but not sure what is causing it. Pt denies jitters after inhaler or nebulizer. )      HPI     Post Discharge Outreach 8/4/2022   Admission Date 7/31/2022   Reason for Admission dyspnea, community acquired pneumonia, copd   Discharge Date 8/3/2022   Discharge Diagnosis dyspnea, community acquired pneumonia, copd   How are you doing now that you are home? \" I'm doing very well thank you\"   How are your symptoms? (Red Flag symptoms escalate to triage hotline per guidelines) Improved   Do you feel your condition is stable enough to be safe at home until your provider visit? Yes   Does the patient have questions regarding their discharge instructions?  " No   Were you started on any new medications or were there changes to any of your previous medications?  Yes   Does the patient have all of their medications? Yes   Do you have questions regarding any of your medications?  No   Do you have all of your needed medical supplies or equipment (DME)?  (i.e. oxygen tank, CPAP, cane, etc.) Yes   Discharge follow-up appointment scheduled within 14 calendar days?  No     Hospital Follow-up Visit:    Hospital/Nursing Home/IP Rehab Facility: [facility]  Date of Admission: 7/31/22  Date of Discharge: 8/3/22  Reason(s) for Admission: SOB, sore throat, and cough.    Was your hospitalization related to COVID-19? No   Problems taking medications regularly:  None  Medication changes since discharge: Took antibiotic but finished it today.  Problems adhering to non-medication therapy:  None    Summary of hospitalization:  [facility] discharge summary reviewed  Diagnostic Tests/Treatments reviewed.  Follow up needed: none  Other Healthcare Providers Involved in Patient s Care: pulmonary medicine  Update since discharge: improved.       Post Medication Reconciliation Status:      Plan of care communicated with patient      History of emphysema. [facility] tried to get him on a maintenance inhaler but they were a couple hundred dollars a month so instead he's been using duonebs. Supposed to be using three times a day but then it slipped to once daily for the last year. He still is smoking a half pack per day.  No cough or SOB issues currently.  Has been having some episodes of jitteriness which always self resolve after a few seconds.  Due for yearly follow-up with pulmonary medicine here in [place] around November.  Still dealing with persistent neck pain.  No radiculopathy.  X-rays were performed about a week ago already.  Did recently switch from gabapentin to pregabalin for his peripheral neuropathy and he has found benefit with that  adjustment.  Taking twice a day.    Review of Systems - negative except for what's listed in the HPI      Objective    /62 (BP Location: Right arm, Patient Position: Sitting, Cuff Size: Adult Regular)   Pulse 63   Resp 14   Wt 74 kg (163 lb 1.6 oz)   SpO2 97%   BMI 26.33 kg/m    Physical Exam   General appearance - alert, well appearing, and in no distress  Mental status - alert, oriented to person, place, and time  Lymphatics - no palpable lymphadenopathy  Chest -diminished breath sounds throughout.  Persistent inspiratory crackles heard through right lower lobe.    Heart - normal rate and regular rhythm, S1 and S2 normal, no murmurs noted  Abdomen - soft, nontender, nondistended, no masses or organomegaly  Neurological - alert, oriented, normal speech, no focal findings or movement disorder noted, neck supple without rigidity, cranial nerves II through XII intact, motor and sensory grossly normal bilaterally, normal muscle tone, no tremors, strength 5/5  Extremities - peripheral pulses normal, no peripheral edema  Skin - normal coloration and turgor.    John Oconnor, CNP    This note has been dictated using voice recognition software. Any grammatical or context distortions are unintentional and inherent to the software.        .  ..

## 2022-08-10 NOTE — PATIENT INSTRUCTIONS
Blood pressure today in the clinic looks good.  I would either recheck at a nurse blood pressure visit with your automatic cuff so we can confirm accuracy or switch it out since it is quite old for an upper arm blood pressure cuff.    It looks like the pulmonary medicine team will be contacting you for your yearly follow-up with them in November.    I would recommend getting a chest x-ray completed in 6 weeks to make sure this pneumonia has resolved.    Lets try increasing your Lyrica/pregabalin to 3 times a day.  Send me a message in 2-3 weeks if this is helpful and then I will send a new medication for this to your pharmacy.  We could always get you connected with the spine clinic, physical therapy, etc. as well for the neck pain.  You just have to let me know if interested.    I suspect some of the shaking episodes you had were side effects from your prescription medications.  This should get better with time but let me know if it persists and we can look into this deeper.

## 2022-08-17 ENCOUNTER — TELEPHONE (OUTPATIENT)
Dept: FAMILY MEDICINE | Facility: CLINIC | Age: 77
End: 2022-08-17

## 2022-08-17 DIAGNOSIS — G61.82 MULTIFOCAL MOTOR NEUROPATHY (H): ICD-10-CM

## 2022-08-17 NOTE — TELEPHONE ENCOUNTER
Pt stated that the Rx for the generic Lyrica is working well. Pt stated that increasing to three tabs is working better than the two tabs.

## 2022-08-18 RX ORDER — PREGABALIN 150 MG/1
150 CAPSULE ORAL 3 TIMES DAILY PRN
Qty: 270 CAPSULE | Refills: 1 | Status: SHIPPED | OUTPATIENT
Start: 2022-08-18 | End: 2023-05-11

## 2022-08-24 ENCOUNTER — PATIENT OUTREACH (OUTPATIENT)
Dept: FAMILY MEDICINE | Facility: CLINIC | Age: 77
End: 2022-08-24

## 2022-08-24 NOTE — TELEPHONE ENCOUNTER
Chief Complaint   Patient presents with     Medication Reconciliation     Quality report ran. Patient needs medication reconcile.Patient was seen by John Oconnor on 8/10 for ED/ post hospital visit.   Post Medication Reconciliation Status:      Ngozi Paz RN on 8/24/2022 at 11:12 AM

## 2022-09-21 ENCOUNTER — ANCILLARY PROCEDURE (OUTPATIENT)
Dept: GENERAL RADIOLOGY | Facility: CLINIC | Age: 77
End: 2022-09-21
Attending: NURSE PRACTITIONER
Payer: MEDICARE

## 2022-09-21 DIAGNOSIS — J18.9 PNEUMONIA OF BOTH LUNGS DUE TO INFECTIOUS ORGANISM, UNSPECIFIED PART OF LUNG: ICD-10-CM

## 2022-09-21 PROCEDURE — 71046 X-RAY EXAM CHEST 2 VIEWS: CPT | Mod: TC | Performed by: RADIOLOGY

## 2022-09-25 ENCOUNTER — HEALTH MAINTENANCE LETTER (OUTPATIENT)
Age: 77
End: 2022-09-25

## 2022-09-26 ENCOUNTER — TRANSFERRED RECORDS (OUTPATIENT)
Dept: HEALTH INFORMATION MANAGEMENT | Facility: CLINIC | Age: 77
End: 2022-09-26

## 2022-10-11 ENCOUNTER — TELEPHONE (OUTPATIENT)
Dept: FAMILY MEDICINE | Facility: CLINIC | Age: 77
End: 2022-10-11

## 2022-10-11 ENCOUNTER — OFFICE VISIT (OUTPATIENT)
Dept: FAMILY MEDICINE | Facility: CLINIC | Age: 77
End: 2022-10-11
Payer: MEDICARE

## 2022-10-11 VITALS
RESPIRATION RATE: 14 BRPM | SYSTOLIC BLOOD PRESSURE: 153 MMHG | TEMPERATURE: 97.8 F | WEIGHT: 164 LBS | DIASTOLIC BLOOD PRESSURE: 78 MMHG | OXYGEN SATURATION: 96 % | HEART RATE: 55 BPM | BODY MASS INDEX: 26.47 KG/M2

## 2022-10-11 DIAGNOSIS — Z11.52 ENCOUNTER FOR SCREENING FOR COVID-19: ICD-10-CM

## 2022-10-11 DIAGNOSIS — J44.1 COPD EXACERBATION (H): Primary | ICD-10-CM

## 2022-10-11 LAB — SARS-COV-2 RNA RESP QL NAA+PROBE: POSITIVE

## 2022-10-11 PROCEDURE — U0005 INFEC AGEN DETEC AMPLI PROBE: HCPCS | Performed by: PHYSICIAN ASSISTANT

## 2022-10-11 PROCEDURE — 99214 OFFICE O/P EST MOD 30 MIN: CPT | Mod: CS | Performed by: PHYSICIAN ASSISTANT

## 2022-10-11 PROCEDURE — U0003 INFECTIOUS AGENT DETECTION BY NUCLEIC ACID (DNA OR RNA); SEVERE ACUTE RESPIRATORY SYNDROME CORONAVIRUS 2 (SARS-COV-2) (CORONAVIRUS DISEASE [COVID-19]), AMPLIFIED PROBE TECHNIQUE, MAKING USE OF HIGH THROUGHPUT TECHNOLOGIES AS DESCRIBED BY CMS-2020-01-R: HCPCS | Performed by: PHYSICIAN ASSISTANT

## 2022-10-11 RX ORDER — PREDNISONE 20 MG/1
40 TABLET ORAL DAILY
Qty: 10 TABLET | Refills: 0 | Status: SHIPPED | OUTPATIENT
Start: 2022-10-11 | End: 2022-10-16

## 2022-10-11 NOTE — PATIENT INSTRUCTIONS
Continue taking your inhalers as previously written.  Take the prednisone short course of treatment.  Return to see your primary care provider in 1 week for reevaluation of your symptoms.

## 2022-10-11 NOTE — TELEPHONE ENCOUNTER
Outreach to patient per clinic RN request. RN states that patient needs to be seen, there's no appointments. Patient should be seen in walk in care.     Spoke with pt's wife. Discharged from hospital for double pneumonia 8/3/22. New cough now x 3days. She is concerned that it's pneumonia again. [facility] walk in care clinic hours discussed. No clinic appointments available.

## 2022-10-11 NOTE — PROGRESS NOTES
Patient presents with:  Cough: Has cough with lots of phlegm  no fever hx of pneumonia      Clinical Decision Making:  Patient is continuing to smoke and is having a COPD exacerbation.  He is admonished to quit smoking.  Use of steroid for short-term anti-inflammatory effect.  Continue with inhalers for treatment to include use of spacers as necessary. Expected course of resolution and indication for return was gone over and questions were answered to patient/parent's satisfaction before discharge.        ICD-10-CM    1. COPD exacerbation (H)  J44.1 predniSONE (DELTASONE) 20 MG tablet      2. Encounter for screening for COVID-19  Z11.52 Symptomatic; Yes; 10/9/2022 COVID-19 Virus (Coronavirus) by PCR Nose          Patient Instructions   Continue taking your inhalers as previously written.  Take the prednisone short course of treatment.  Return to see your primary care provider in 1 week for reevaluation of your symptoms.          HPI:  Ubaldo Ramos is a 77 year old male who presents today for 3-day history of cough.  Patient has a known history of COPD and is having an exacerbation.  Patient does not report having fever chills night sweats nausea vomiting diarrhea shortness of breath dyspnea on exertion or pleuritic chest pain.  No syncope presyncope pleuritic chest pain.  Patient continues eat and drink normally.  He had pneumonia at the end of July and was successfully treated with antibiotic.  He has had good improvement of his symptoms in the interim until the last 3 days.  No COVID screening was performed.  Patient does have his immunizations up-to-date    History obtained from chart review and the patient.    Problem List:  2022-07: Hypoxemia  2022-07: Abnormal electrocardiogram  2022-07: COPD exacerbation (H)  2022-07: Pneumonia of both lungs due to infectious organism,   unspecified part of lung  2022-03: Epistaxis  2022-01: Multifocal motor neuropathy (H)   2022-01: Pulmonary emphysema, unspecified emphysema  type (H)  2021-06: Hx of cancer of lung  2021-06: History of prostate cancer  2021-06: Peripheral polyneuropathy  2021-06: Hyperlipidemia LDL goal <100  2021-06: Urinary incontinence, mixed  2021-06: Peripheral artery disease (H)  2021-06: Hx of fusion of cervical spine  Herniated Disc (L3 - L4)  Lower Back Pain  Fatigue  Adenocarcinoma Of The Prostate Gland  Essential Hypercholesterolemia  Benign Essential Hypertension      No past medical history on file.    Social History     Tobacco Use     Smoking status: Every Day     Packs/day: 0.50     Types: Cigarettes     Smokeless tobacco: Never   Substance Use Topics     Alcohol use: Not Currently       Review of Systems  As above in HPI otherwise negative.    Vitals:    10/11/22 1303   BP: (!) 153/78   Pulse: 55   Resp: 14   Temp: 97.8  F (36.6  C)   TempSrc: Oral   SpO2: 96%   Weight: 74.4 kg (164 lb)       General: Patient is resting comfortably no acute distress is afebrile  HEENT: Head is normocephalic atraumatic   eyes are PERRL EOMI sclera anicteric   TMs are clear bilaterally  Throat is with mild pharyngeal wall erythema and no exudate  No cervical lymphadenopathy present  LUNGS: Left upper lobe appears to be quiet the right lobe has slight prolonged expiratory wheezes.  Normal respiratory effort and excursion.  HEART: Regular rate and rhythm  Skin: Without rash non-diaphoretic    Physical Exam    Radiology:    XR Chest 2 Views    Result Date: 9/21/2022  EXAM: XR CHEST 2 VIEWS LOCATION: [facility] DATE/TIME: 9/21/2022 10:05 AM INDICATION:  Pneumonia of both lungs due to infectious organism, unspecified part of lung COMPARISON: CT of the chest 9/15/2022     IMPRESSION: Unchanged left lung volume loss from previous left upper lobectomy. A focal opacity in the left lung adjacent to the left ventricular heart border is unchanged reflecting focal atelectasis. There is also chronic blunting of the left lateral costophrenic sulcus from prior  surgery. There are no acute alveolar or interstitial lung opacities. Cardiac silhouette is normal in size. Aortic arch and descending aortic contours are normal. Mid/low thoracic spine degenerative osteophytes and disc space narrowing are unchanged. No new bone abnormalities.       At the end of the encounter, I discussed results, diagnosis, medications. Discussed red flags for immediate return to clinic/ER, as well as indications for follow up if no improvement. Patient understood and agreed to plan. Patient was stable for discharge.

## 2022-10-12 ENCOUNTER — TELEPHONE (OUTPATIENT)
Dept: NURSING | Facility: CLINIC | Age: 77
End: 2022-10-12

## 2022-10-12 DIAGNOSIS — J44.9 COPD, SEVERE (H): ICD-10-CM

## 2022-10-12 DIAGNOSIS — R10.13 DYSPEPSIA: ICD-10-CM

## 2022-10-12 RX ORDER — IPRATROPIUM BROMIDE AND ALBUTEROL SULFATE 2.5; .5 MG/3ML; MG/3ML
1 SOLUTION RESPIRATORY (INHALATION) 4 TIMES DAILY
Qty: 1080 ML | Refills: 2 | Status: SHIPPED | OUTPATIENT
Start: 2022-10-12 | End: 2024-04-27

## 2022-10-12 NOTE — TELEPHONE ENCOUNTER
"Routing refill request to provider for review/approval because:  Needs review    Last Written Prescription Date:  1/21/22  Last Fill Quantity: ,  # refills:    Last office visit provider:  8/10/22     Requested Prescriptions   Pending Prescriptions Disp Refills     ipratropium - albuterol 0.5 mg/2.5 mg/3 mL (DUONEB) 0.5-2.5 (3) MG/3ML neb solution 1080 mL 3     Sig: Take 1 vial (3 mLs) by nebulization 4 times daily       Short-Acting Beta Agonist Inhalers Protocol  Passed - 10/12/2022  2:26 PM        Passed - Patient is age 12 or older        Passed - Recent (12 mo) or future (30 days) visit within the authorizing provider's specialty     Patient has had an office visit with the authorizing provider or a provider within the authorizing providers department within the previous 12 mos or has a future within next 30 days. See \"Patient Info\" tab in inbasket, or \"Choose Columns\" in Meds & Orders section of the refill encounter.              Passed - Medication is active on med list       Asthma Nebs Protocol Passed - 10/12/2022  2:26 PM        Passed - Patient is age 4 years or older        Passed - Recent (12 mo) or future (30 days) visit within the authorizing provider's specialty     Patient has had an office visit with the authorizing provider or a provider within the authorizing providers department within the previous 12 mos or has a future within next 30 days. See \"Patient Info\" tab in inbasket, or \"Choose Columns\" in Meds & Orders section of the refill encounter.              Passed - Medication is active on med list           omeprazole (PRILOSEC) 20 MG DR capsule       Sig: Take 2 capsules (40 mg) by mouth daily before breakfast       PPI Protocol Failed - 10/12/2022  2:26 PM        Failed - Not on Clopidogrel (unless Pantoprazole ordered)        Passed - No diagnosis of osteoporosis on record        Passed - Recent (12 mo) or future (30 days) visit within the authorizing provider's specialty     Patient has " "had an office visit with the authorizing provider or a provider within the authorizing providers department within the previous 12 mos or has a future within next 30 days. See \"Patient Info\" tab in inbasket, or \"Choose Columns\" in Meds & Orders section of the refill encounter.              Passed - Medication is active on med list        Passed - Patient is age 18 or older             Trina Hackett RN 10/12/22 6:09 PM  "

## 2022-10-12 NOTE — TELEPHONE ENCOUNTER
Refill Request  Medication name: Pending Prescriptions:                       Disp   Refills    ipratropium - albuterol 0.5 mg/2.5 mg/3 m*1080 mL3            Sig: Take 1 vial (3 mLs) by nebulization 4 times daily    omeprazole (PRILOSEC) 20 MG DR capsule                        Sig: Take 2 capsules (40 mg) by mouth daily before           breakfast    [facility]ed Pharmacy: [facility]

## 2022-10-12 NOTE — TELEPHONE ENCOUNTER
Coronavirus (COVID-19) Notification    Caller Name (Patient, parent, daughter/son, grandparent, etc)  self    Reason for call  Notify of Positive Coronavirus (COVID-19) lab results, assess symptoms,  review [facility] recommendations    Lab Result    Lab test:  2019-nCoV rRt-PCR or SARS-CoV-2 PCR    Oropharyngeal AND/OR nasopharyngeal swabs is POSITIVE for 2019-nCoV RNA/SARS-COV-2 PCR (COVID-19 virus)    { Introduce self then review script  I am calling on behalf of [facility].  We were notified that your Coronavirus test (COVID-19) was POSITIVE for the virus  :  Gather patient reported symptoms   Assessment   Current Symptoms at time of phone call, reported by patient: (if no symptoms, document: No symptoms] yes   Date of symptom(s) onset (if applicable) 10/09     If at time of call, Patients symptoms have worsened, the Patient should contact 911 or have someone drive them to Emergency Dept promptly:      If Patient calling 911, inform 911 personal that you have tested positive for the Coronavirus (COVID-19).  Place mask on and await 911 to arrive.    If Emergency Dept, If possible, please have another adult drive you to the Emergency Dept but you need to wear mask when in contact with other people.      Treatment Options:   Patient classified as COVID treatment eligible by Epic high risk algorithm: Yes  Is the patient symptomatic at the time of result notification? Yes. Was the onset of symptoms within the last 5 days? No. Was the onset of symptoms within the last 7 days? No.     Review information with Patient    Your result was positive. This means you have COVID-19 (coronavirus).    How can I protect others?    These guidelines are for isolating before returning to work, school or .    If you DO have symptoms    Stay home and away from others     For at least 5 days after your symptoms started, AND    You are fever free for 24 hours (with no medicine that reduces fever), AND    Your other  symptoms are better    Wear a mask for 10 full days anytime you are around others    If you DON'T have symptoms    Stay home and away from others for at least 5 days after your positive test    Wear a mask for 10 full days anytime you are around others    There may be different guidelines for healthcare facilities.  Please check with the specific sites before arriving.    If you have been told by a doctor that you were severely ill with COVID-19 or are immunocompromised, you should isolate for at least 10 days.    You should not go back to work until you meet the guidelines above for ending your home isolation. You don't need to be retested for COVID-19 before going back to work--studies show that you won't spread the virus if it's been at least 10 days since your symptoms started (or 20 days, if you have a weak immune system).    Employers, schools, and daycares: This is an official notice for this person's medical guidelines for returning in-person.  They must meet the above guidelines before going back to work, school or  in person.    You will receive a positive COVID-19 letter via [employer] or the mail soon with additional self-care information.    Would you like me to review some of that information with you now?  No    If you were tested for an upcoming procedure, please contact your provider for next steps.    Shu Croosk

## 2022-10-18 ENCOUNTER — OFFICE VISIT (OUTPATIENT)
Dept: FAMILY MEDICINE | Facility: CLINIC | Age: 77
End: 2022-10-18
Payer: MEDICARE

## 2022-10-18 VITALS
TEMPERATURE: 98.6 F | OXYGEN SATURATION: 97 % | BODY MASS INDEX: 26.47 KG/M2 | SYSTOLIC BLOOD PRESSURE: 138 MMHG | WEIGHT: 164 LBS | HEART RATE: 58 BPM | DIASTOLIC BLOOD PRESSURE: 64 MMHG

## 2022-10-18 DIAGNOSIS — R05.3 CHRONIC COUGH: ICD-10-CM

## 2022-10-18 DIAGNOSIS — J43.9 PULMONARY EMPHYSEMA, UNSPECIFIED EMPHYSEMA TYPE (H): ICD-10-CM

## 2022-10-18 DIAGNOSIS — Z85.118 HX OF CANCER OF LUNG: ICD-10-CM

## 2022-10-18 DIAGNOSIS — Z86.16 HISTORY OF COVID-19: Primary | ICD-10-CM

## 2022-10-18 DIAGNOSIS — I10 ESSENTIAL HYPERTENSION, BENIGN: ICD-10-CM

## 2022-10-18 PROCEDURE — 99213 OFFICE O/P EST LOW 20 MIN: CPT | Performed by: NURSE PRACTITIONER

## 2022-10-18 RX ORDER — BENZONATATE 100 MG/1
100 CAPSULE ORAL 3 TIMES DAILY PRN
Qty: 30 CAPSULE | Refills: 1 | Status: SHIPPED | OUTPATIENT
Start: 2022-10-18 | End: 2022-11-16

## 2022-10-18 NOTE — PROGRESS NOTES
"  Assessment & Plan     Chronic cough    - benzonatate (TESSALON) 100 MG capsule  Dispense: 30 capsule; Refill: 1    Hx of cancer of lung      Benign Essential Hypertension      History of COVID-19      Pulmonary emphysema, unspecified emphysema type (H)    - on-going cough post Covid. He appears well, has a strong cough which is able to clear his lungs, and does not appear in distress. No wheezing noted. I will have him continue inhalers, staying hydrated, and can take cough medication. I do not feel he needs any steroids today since no wheezing, oxygen sats are great on room air, and appearing well. Warning signs discussed and when to seek the Urgent Care.   - VSS  - not sure if med is covered under their insurance, other conservative management discussed.           BMI:   Estimated body mass index is 26.47 kg/m  as calculated from the following:    Height as of 6/29/22: 1.676 m (5' 6\").    Weight as of this encounter: 74.4 kg (164 lb).           Return if symptoms worsen or fail to improve.    KAYLEEN Suggs [facility]    Tayo Conner is a 77 year old accompanied by his spouse, presenting for the following health issues:  follow up covid     Patient has a known history of COPD and Covid-19. Was seen 10-11 with a cough for three days. He was prescribed steroids and later his covid test returned positive. he did not get Paxlovid.   Patient does not report having fever, chills, night sweats, nausea/vomiting, diarrhea, worsening shortness of breath, dyspnea on exertion, or pleuritic chest pain.  No syncope, presyncope, pleuritic chest pain.  Patient continues eat and drink normally.  He had pneumonia at the end of July and was successfully treated with antibiotic. He is concerned about his on-going cough. He is able to cough strong and bring up secretions if needed. His wife thinks he is doing better.     History of Present Illness       Reason for visit:  Follow " up    He eats 0-1 servings of fruits and vegetables daily.He consumes 2 sweetened beverage(s) daily.He exercises with enough effort to increase his heart rate 10 to 19 minutes per day.  He exercises with enough effort to increase his heart rate 3 or less days per week.   He is taking medications regularly.       Acute Illness  Acute illness concerns: congestion   Onset/Duration:   Symptoms:  Fever: No  Chills/Sweats: No  Headache (location?): No  Sinus Pressure: No  Conjunctivitis:  No  Ear Pain: no  Rhinorrhea: No  Congestion: YES  Sore Throat: No  Cough: no  Wheeze: YES  Decreased Appetite: No  Nausea: No  Vomiting: No  Diarrhea: No  Dysuria/Freq.: No  Dysuria or Hematuria: No  Fatigue/Achiness: No  Sick/Strep Exposure: No  Therapies tried and outcome: none  {additonal     Review of Systems         Objective    /64   Pulse 58   Temp 98.6  F (37  C)   Wt 74.4 kg (164 lb)   SpO2 97%   BMI 26.47 kg/m    Body mass index is 26.47 kg/m .  Physical Exam   GENERAL: healthy, alert and no distress  NECK: no adenopathy, no asymmetry, masses, or scars and thyroid normal to palpation  RESP: lungs clear to auscultation - no rales, rhonchi or wheezes  CV: regular rate and rhythm, normal S1 S2, no S3 or S4, no murmur, click or rub, no peripheral edema and peripheral pulses strong  MS: no gross musculoskeletal defects noted, no edema  SKIN: no suspicious lesions or rashes  NEURO: Normal strength and tone, mentation intact and speech normal  PSYCH: mentation appears normal, affect normal/bright    Office Visit on 10/11/2022   Component Date Value Ref Range Status     SARS CoV2 PCR 10/11/2022 Positive (A)  Negative Final    POSITIVE: SARS-CoV-2 (COVID-19) RNA detected, presumed positive.

## 2022-10-20 PROBLEM — Z86.16 HISTORY OF COVID-19: Status: ACTIVE | Noted: 2022-10-20

## 2022-11-08 ENCOUNTER — OFFICE VISIT (OUTPATIENT)
Dept: VASCULAR SURGERY | Facility: CLINIC | Age: 77
End: 2022-11-08
Attending: RADIOLOGY
Payer: MEDICARE

## 2022-11-08 ENCOUNTER — ANCILLARY PROCEDURE (OUTPATIENT)
Dept: VASCULAR ULTRASOUND | Facility: CLINIC | Age: 77
End: 2022-11-08
Attending: RADIOLOGY
Payer: MEDICARE

## 2022-11-08 VITALS — DIASTOLIC BLOOD PRESSURE: 60 MMHG | RESPIRATION RATE: 16 BRPM | SYSTOLIC BLOOD PRESSURE: 120 MMHG | HEART RATE: 60 BPM

## 2022-11-08 DIAGNOSIS — I73.9 PERIPHERAL ARTERY DISEASE (H): ICD-10-CM

## 2022-11-08 DIAGNOSIS — R09.89 OTHER SPECIFIED SYMPTOMS AND SIGNS INVOLVING THE CIRCULATORY AND RESPIRATORY SYSTEMS: ICD-10-CM

## 2022-11-08 DIAGNOSIS — I73.9 PERIPHERAL ARTERY DISEASE (H): Primary | ICD-10-CM

## 2022-11-08 DIAGNOSIS — Z13.6 ENCOUNTER FOR SCREENING FOR STENOSIS OF CAROTID ARTERY: ICD-10-CM

## 2022-11-08 PROCEDURE — G0463 HOSPITAL OUTPT CLINIC VISIT: HCPCS | Mod: 25

## 2022-11-08 PROCEDURE — 93923 UPR/LXTR ART STDY 3+ LVLS: CPT

## 2022-11-08 PROCEDURE — 93925 LOWER EXTREMITY STUDY: CPT

## 2022-11-08 NOTE — PATIENT INSTRUCTIONS
Ankle-Brachial Index (TITI) or Physiologic Test    Description  An ankle-brachial index test is relatively pain free. Blood pressure cuffs of various sizes are placed on your thigh, calf, foot and toes.  Similar to having your blood pressure checked with an arm cuff, as the technician inflates the cuffs, they progressively tighten and are then quickly released.  You may feel some discomfort, but generally for less than 60 seconds for each measurement. You will be asked to remove your socks and shoes and possibly your pants or shorts. Gowns will be provided. It usually takes about 30-60 minutes.   Depending on the initial readings and patient symptoms, you may be asked to perform a light walk on a treadmill.  The technician will apply ultrasound gel, usually warmed for your comfort, to your ankles and wrists. Through the gel, the technician will use a small hand-held device that emits sound waves.  Risks  There are typically no side effects or complications associated with a physiologic study.  How to Prepare  Eat and take medications as usual.  There is no preparation required for an ankle-brachial index (TITI) or physiologic exam.  What Can I Expect After the Test?  The technician will send the ultrasound images to your vascular surgeon for evaluation. Typically, a report is available in 2-3 days. If anything critical is found, it is standard practice to notify the vascular surgeon immediately.  Reference: [email or web address]/patient-resources/vascular-tests     Carotid Duplex    Steps for the test are:  You will be asked to lie on a stretcher. It will be okay for you to wear your street clothes. In some situations, you may be asked to change into a gown.    Ultrasound gel, usually warmed for your comfort, will be placed on either side of your neck.    Through the gel, the technician will apply to your neck a small hand-held device that emits sound waves.   When the test is completed, the technician will remove  excess gel from your neck. The gel is water-soluble and will not stain your skin or clothes.    How to Prepare:  Eat and take medications as usual.   Wear minimal or no jewelry to ease application and removal of gel.    Risks  There are typically no side effects or complications associated with a carotid duplex ultrasound examination.    What Can I Expect After the Test?  The technician will send the ultrasound images to your vascular surgeon for evaluation. Typically, a report is available in 2-3 days. If anything critical is found, it is standard practice to notify the vascular surgeon immediately.  Reference: [email or web address]/patient-resources/vascular-tests

## 2022-11-08 NOTE — NURSING NOTE
[facility]        Patient is here for a 1 year follow up  to discuss Peripheral artery disease (PAD). Symptoms include claudicaton: right buttocks and left buttocks at distance of 100 feet in both lower extremities.    Pt is currently taking Aspirin, Statin and Plavix.    There were no vitals taken for this visit.    The provider has been notified that the patient has no concerns.     Questions patient would like addressed today are: N/A.    Refills are needed: No    Has homecare services and agency name:  Janelle Wilson RN

## 2022-11-09 NOTE — PROGRESS NOTES
VASCULAR AND INTERVENTIONAL OUTPATIENT CONSULT OR VISIT  PHYSICIAN: Miguel Callejas MD  ESTABLISHED PATIENT    LOCATION: [facility]    Ubaldo Ramos   Medical Record #:  [id number]  YOB: 1945  Age:  77 year old     Date of Service: 11/8/2022    PRIMARY CARE PROVIDER: John Oconnor    Reason for visit: Peripheral vascular disease, lower extremity pain     IMPRESSION: 77-year-old male with history of chronic peripheral arterial disease. The patient has an extensive past history including multiple stenting procedures involving his bilateral iliac arteries as well as a right to left femoral-femoral bypass (now occluded) all of which were performed in [place].  Overall, he is able to perform his daily activities and can walk with the assistance of a cane when traveling longer distances.  Noninvasive imaging performed today demonstrates an ankle-brachial index of 0.88 on the right and 0.88 on the left.  His arterial ultrasound demonstrates patent lower extremity vasculature.  Overall, his peripheral vascular disease is stable and we will continue conservative medical therapy.     RECOMMENDATION:    1.  Continue guideline recommended medical therapy for peripheral disease  -The patient has been taking dual antiplatelet therapy with aspirin and Plavix for many years.  He denies any bleeding complications.  No changes will be made to this regimen  -Continue high intensity statin therapy with Lipitor 80 mg once daily  -The patient's systolic blood pressure is noted to be 168 on the right and 172 on the left which is not at goal.   I have instructed the patient to closely monitor his blood pressure prior to his next visit with his primary care physician.  -A CT urogram performed at [facility] in August 2021 demonstrates no evidence of an aortic aneurysm.    -Obtain screening carotid ultrasound     Encourage smoking cessation.  The patient has a longstanding history of smoking and continues to  smoke despite being diagnosed and treated for lung cancer.  I have offered the patient pharmacological assistance with either Chantix or Wellbutrin, however, the patient does not wish to quit at this time.     Return to clinic in 1 year for surveillance imaging or sooner if the need should arise.     HPI:  Ubaldo Ramos is a 77 year old male who was seen today in follow-up for peripheral arterial disease.  The patient reports occasional lower extremity pain when walking longer distances, however, is able to perform his daily activities when ambulating with a cane.  He denies any lower extremity rest pain or wounds/ulcerations.  His past medical history is significant for peripheral arterial disease for which he has undergone multiple endovascular interventions in [place].  He has undergone a right to left femoral-femoral bypass.  Despite all these interventions, he does not report any significant improvement in his lower extremity symptoms post intervention.  Overall, no significant changes in his health since his last visit.       PHH:  No past medical history on file.     Past Surgical History:   Procedure Laterality Date     APPENDECTOMY       CERVICAL SPINE SURGERY       CHOLECYSTECTOMY       FEMORAL ENDARTERECTOMY       LUNG CANCER SURGERY       PROSTATECTOMY         ALLERGIES:  Adhesive tape    MEDS:    Current Outpatient Medications:      albuterol (PROVENTIL) (2.5 MG/3ML) 0.083% neb solution, Take 1 vial (2.5 mg) by nebulization 3 times daily as needed for shortness of breath / dyspnea or wheezing, Disp: 90 mL, Rfl: 4     aspirin 81 MG EC tablet, [ASPIRIN 81 MG EC TABLET] Take 81 mg by mouth daily., Disp: , Rfl:      atorvastatin (LIPITOR) 80 MG tablet, Take 1 tablet (80 mg) by mouth At Bedtime, Disp: 90 tablet, Rfl: 3     benzonatate (TESSALON) 100 MG capsule, Take 1 capsule (100 mg) by mouth 3 times daily as needed for cough, Disp: 30 capsule, Rfl: 1     cholecalciferol, vitamin D3, (VITAMIN D3) 25 mcg  (1,000 unit) capsule, [CHOLECALCIFEROL, VITAMIN D3, (VITAMIN D3) 25 MCG (1,000 UNIT) CAPSULE] Take 1,000 Units by mouth daily., Disp: , Rfl:      clopidogrel (PLAVIX) 75 MG tablet, Take 1 tablet (75 mg) by mouth daily, Disp: 90 tablet, Rfl: 3     cyanocobalamin 1000 MCG tablet, [CYANOCOBALAMIN 1000 MCG TABLET] Take 1,000 mcg by mouth daily., Disp: , Rfl:      ipratropium - albuterol 0.5 mg/2.5 mg/3 mL (DUONEB) 0.5-2.5 (3) MG/3ML neb solution, Take 1 vial (3 mLs) by nebulization 4 times daily, Disp: 1080 mL, Rfl: 2     losartan-hydrochlorothiazide (HYZAAR) 50-12.5 MG tablet, Take 1 tablet by mouth every morning, Disp: 90 tablet, Rfl: 3     metoprolol tartrate (LOPRESSOR) 25 MG tablet, Take 1 tablet (25 mg) by mouth 2 times daily, Disp: 180 tablet, Rfl: 3     omeprazole (PRILOSEC) 20 MG DR capsule, Take 2 capsules (40 mg) by mouth daily before breakfast, Disp: 180 capsule, Rfl: 0     oxybutynin ER (DITROPAN XL) 10 MG 24 hr tablet, Take 1 tablet (10 mg) by mouth daily, Disp: 90 tablet, Rfl: 1     pregabalin (LYRICA) 150 MG capsule, Take 1 capsule (150 mg) by mouth 3 times daily as needed, Disp: 270 capsule, Rfl: 1     predniSONE (DELTASONE) 20 MG tablet, Take 1 tablet (20 mg) by mouth See Admin Instructions Take 60 mg once daily for 2 days, 40 mg once daily for 3 days, 20 mg once daily for 3 days, stop15 (Patient not taking: Reported on 11/8/2022), Disp: 15 tablet, Rfl: 0    SOCIAL HABITS:    History   Smoking Status     Every Day     Packs/day: 0.50     Types: Cigarettes   Smokeless Tobacco     Never     Social History    Substance and Sexual Activity      Alcohol use: Not Currently      History   Drug Use Unknown       FAMILY HISTORY:  No family history on file.    ADVANCE CARE DIRECTIVES:    Advance care directives reviewed in the chart and no changes made.     PE:  /60   Pulse 60   Resp 16   Wt Readings from Last 1 Encounters:   10/18/22 74.4 kg (164 lb)     There is no height or weight on file to calculate  BMI.    EXAM:  GENERAL: This is a well-developed 77 year old male who appears his stated age  EYES: Grossly normal.  MOUTH: Buccal mucosa normal   MUSCULOSKELETAL: Grossly normal and both lower extremities are intact.  HEME/LYMPH: No lymphedema  NEUROLOGIC: Focally intact, Alert and oriented x 3.   PSYCH: appropriate affect  INTEGUMENT: No open lesions or ulcers    DIAGNOSTIC STUDIES:     Images:  US Low Ext Arterial Dop Seg Pres w/o Exercise    Result Date: 11/8/2022  Table formatting from the original result was not included. BILATERAL RESTING ANKLE-BRACHIAL INDICES (TITI'S) (Date: 11/08/22) Indication: Surveillance of Right to Left CFA-CFA Bypass Graft/Hx of Bilateral JAIRO stents, leg pain  Previous: 11/09/2021   History: Current Smoker, Hypertension, PAD, Angioplasty and Vascular Surgery  Resting TITI's          Right: mmHg Index     Brachial: 168  Ankle-(PT): 153 0.88 Ankle-(DP): 148 0.86          Digit: 105 0.61               Left: mmHg Index     Brachial: 173  Ankle-(PT): 152 0.88 Ankle-(DP): 130 0.75          Digit: 111 0.64 Resting ankle-brachial index of 0.88 on the right. Toe Pressures of 105 mmHg and TBI of 0.61 Resting ankle-brachial index of 0.88 on the left. Toe Pressures of 111 mmHg and TBI of 0.64  VPR WAVEFORMS: The right volume plethysmography waveforms are mildly abnormal at the lower thigh level, mildly abnormal at the upper calf level and moderately abnormal at the ankle. The left volume plethysmography waveforms are mildly abnormal at the lower thigh level, mildly abnormal at the upper calf level and moderately abnormal at the ankle. Impression:  1. RIGHT LOWER EXTREMITY: TITI is Abnormal with an TITI of 0.88. and Mildly abnormal, but adequate for healing toe pressures of 105 mmHg. 2. LEFT LOWER EXTREMITY: TITI is Abnormal with an TITI of 0.88. and Mildly abnormal, but adequate for healing toe pressures of 111 mmHg. Reference: Wound classification Grade TITI Ankle Systolic Pressure Toe Pressures 0 >  0.80 > 100 mmHg > 60 mmHg 1 0.6 - 0.79 70 - 100 mmHg 40 - 59 mmHg 2 0.4 - 0.59 50-70 mmHg 30 - 39 mmHg 3 < 0.39 < 50 mmHg < 30 mmHg Digit Pressures DBI Disease Category > 0.70 Normal < 0.70 Abnormal > 30 mmHg Potential wound healing < 30 mmHg Impaired wound healing Ankle Brachial Pressures TITI Disease Category > 1.3  Likely vessel calcification with monophasic waveforms, non-diagnostic 0.95-1.30 Normal with multiphasic waveforms 0.50-0.95 Single level disease 0.30-0.50 Multilevel disease < 0.30 Critical limb ischema Volume Plethysmography Recording (VPR) at all levels Normal Sharp systolic peak, fast upstroke, prominent dicrotic notch in wave Mild Sharp systolic peak, fast upstroke, absent dicrotic notch in wave Moderate Flattened systolic peak, slowed upstroke, absent dicrotic notch inwave Severe amplitude wave with = upslope and down slope Occluded Flat Line     US Lower Extremity Arterial Duplex Bilateral    Result Date: 11/8/2022  Table formatting from the original result was not included. Arterial Duplex Ultrasound (Date: 11/08/22) Lower Extremity Artery Evaluation Indication: Surveillance of Right to Left CFA-CFA Bypass Graft/Hx of Bilateral JAIRO stents, leg pain  Previous: 11/09/2021   History: Current Smoker, Hypertension, PAD, Angioplasty and Vascular Surgery Technique: Duplex imaging is performed utilizing gray-scale, two-dimensional images, and color-flow imaging. Doppler waveform analysis and spectral Doppler imaging is also performed. LOWER EXTREMITY ARTERIAL DUPLEX EXAM WITH WAVEFORMS Right Leg:(cm/s) Location: Velocities Waveforms EIA:   342  B CFA:   202  B PFA:   140  B SFA Proximal:   180  B SFA Mid:   163  B SFA Distal:   143  B Popliteal Artery:   76  B PTA:   54   B ANTONIA:   52  B DPA:   48  B Waveforms: T=Triphasic, M=Monophasic, B=Biphasic Left Leg:(cm/s) Location: Velocities Waveforms EIA:   260  B CFA:   254/125  B/B PFA:   157  B SFA Proximal:   145  B SFA Mid:   120  B SFA Distal:   167  B  Popliteal Artery:   109  B PTA:   53   B ANTONIA:   58  B DPA:   46  B Waveforms: T=Triphasic, M=Monophasic, B=Biphasic Right to Left CFA-CFA Bypass Graft (cm/s)                 Location Velocity  Waveforms RT CFA INFLOW 220 T   Inflow Artery RT CFA       Proximal Anastamosis 0 - Proximal Bypass 0 - Mid Bypass 0 - Distal Bypass 0 - Distal Anastamosis 0 - Outflow Artery LT CFA        LT CFA OUTFLOW 254 B Waveforms: T=Triphasic, M=Monophasic, B=Biphasic Impression: Right Lower Extremity: Patent vasculature with biphasic flow.  No significant stenosis identified.  Chronic occlusion of the femoral-femoral bypass. Left Lower Extremity: Calf vasculature with biphasic flow.  No significant stenosis identified. Reference: Category Normal 1-19% 20-49% 50-99% Occluded PSV <160 cm/sec without spectral broadening <160 cm/sec with spectral broadening Increased Increased Absent Flow Ratio N/A N/A < 2.0 >2.0 N/A Post-Stenotic Turbulence No No No Yes N/A       LABS:      Sodium   Date Value Ref Range Status   08/03/2022 140 136 - 145 mmol/L Final   08/01/2022 140 136 - 145 mmol/L Final   07/31/2022 134 (L) 136 - 145 mmol/L Final     Urea Nitrogen   Date Value Ref Range Status   08/03/2022 24 8 - 28 mg/dL Final   08/01/2022 21 8 - 28 mg/dL Final   07/31/2022 17 8 - 28 mg/dL Final     Hemoglobin   Date Value Ref Range Status   08/01/2022 9.9 (L) 13.3 - 17.7 g/dL Final   07/31/2022 10.6 (L) 13.3 - 17.7 g/dL Final   06/29/2022 12.6 (L) 13.3 - 17.7 g/dL Final     Platelet Count   Date Value Ref Range Status   08/03/2022 157 150 - 450 10e3/uL Final   08/01/2022 162 150 - 450 10e3/uL Final   07/31/2022 155 150 - 450 10e3/uL Final     BNP   Date Value Ref Range Status   07/31/2022 74 0 - 76 pg/mL Final     INR   Date Value Ref Range Status   07/31/2022 1.09 0.85 - 1.15 Final   03/03/2022 1.09 0.85 - 1.15 Final       This was a in person visit in which 45 minutes of  total time was spent (either in face-to-face or non-face-to-face  time).    Miguel Callejas MD, [facility]  VASCULAR AND INTERVENTIONAL PHYSICIAN  VASCULAR MEDICINE  INTERNAL MEDICINE  PAGER: [phone]  CALL SERVICE: [phone]

## 2022-11-16 ENCOUNTER — OFFICE VISIT (OUTPATIENT)
Dept: FAMILY MEDICINE | Facility: CLINIC | Age: 77
End: 2022-11-16
Payer: MEDICARE

## 2022-11-16 VITALS
HEIGHT: 65 IN | BODY MASS INDEX: 27.88 KG/M2 | DIASTOLIC BLOOD PRESSURE: 60 MMHG | OXYGEN SATURATION: 99 % | TEMPERATURE: 98.3 F | SYSTOLIC BLOOD PRESSURE: 146 MMHG | RESPIRATION RATE: 16 BRPM | HEART RATE: 65 BPM | WEIGHT: 167.3 LBS

## 2022-11-16 DIAGNOSIS — Z23 NEED FOR COVID-19 VACCINE: ICD-10-CM

## 2022-11-16 DIAGNOSIS — J44.9 CHRONIC OBSTRUCTIVE PULMONARY DISEASE, UNSPECIFIED COPD TYPE (H): ICD-10-CM

## 2022-11-16 DIAGNOSIS — E78.00 PURE HYPERCHOLESTEROLEMIA: ICD-10-CM

## 2022-11-16 DIAGNOSIS — I73.9 PERIPHERAL ARTERY DISEASE (H): Primary | ICD-10-CM

## 2022-11-16 DIAGNOSIS — I10 ESSENTIAL HYPERTENSION, BENIGN: ICD-10-CM

## 2022-11-16 PROBLEM — Z86.16 HISTORY OF COVID-19: Status: RESOLVED | Noted: 2022-10-20 | Resolved: 2022-11-16

## 2022-11-16 PROBLEM — E78.5 HYPERLIPIDEMIA LDL GOAL <100: Status: RESOLVED | Noted: 2021-06-27 | Resolved: 2022-11-16

## 2022-11-16 PROBLEM — R04.0 EPISTAXIS: Status: RESOLVED | Noted: 2022-03-03 | Resolved: 2022-11-16

## 2022-11-16 PROBLEM — J18.9 PNEUMONIA OF BOTH LUNGS DUE TO INFECTIOUS ORGANISM, UNSPECIFIED PART OF LUNG: Status: RESOLVED | Noted: 2022-07-31 | Resolved: 2022-11-16

## 2022-11-16 LAB
ALBUMIN SERPL BCG-MCNC: 4.3 G/DL (ref 3.5–5.2)
ALP SERPL-CCNC: 105 U/L (ref 40–129)
ALT SERPL W P-5'-P-CCNC: 18 U/L (ref 10–50)
ANION GAP SERPL CALCULATED.3IONS-SCNC: 11 MMOL/L (ref 7–15)
AST SERPL W P-5'-P-CCNC: 26 U/L (ref 10–50)
BILIRUB SERPL-MCNC: 0.3 MG/DL
BUN SERPL-MCNC: 15 MG/DL (ref 8–23)
CALCIUM SERPL-MCNC: 9.8 MG/DL (ref 8.8–10.2)
CHLORIDE SERPL-SCNC: 103 MMOL/L (ref 98–107)
CHOLEST SERPL-MCNC: 136 MG/DL
CREAT SERPL-MCNC: 0.87 MG/DL (ref 0.67–1.17)
DEPRECATED HCO3 PLAS-SCNC: 26 MMOL/L (ref 22–29)
ERYTHROCYTE [DISTWIDTH] IN BLOOD BY AUTOMATED COUNT: 14.1 % (ref 10–15)
GFR SERPL CREATININE-BSD FRML MDRD: 89 ML/MIN/1.73M2
GLUCOSE SERPL-MCNC: 102 MG/DL (ref 70–99)
HCT VFR BLD AUTO: 39 % (ref 40–53)
HDLC SERPL-MCNC: 38 MG/DL
HGB BLD-MCNC: 12.4 G/DL (ref 13.3–17.7)
LDLC SERPL CALC-MCNC: 59 MG/DL
MCH RBC QN AUTO: 29.4 PG (ref 26.5–33)
MCHC RBC AUTO-ENTMCNC: 31.8 G/DL (ref 31.5–36.5)
MCV RBC AUTO: 92 FL (ref 78–100)
NONHDLC SERPL-MCNC: 98 MG/DL
PLATELET # BLD AUTO: 154 10E3/UL (ref 150–450)
POTASSIUM SERPL-SCNC: 4.4 MMOL/L (ref 3.4–5.3)
PROT SERPL-MCNC: 6.9 G/DL (ref 6.4–8.3)
RBC # BLD AUTO: 4.22 10E6/UL (ref 4.4–5.9)
SODIUM SERPL-SCNC: 140 MMOL/L (ref 136–145)
TRIGL SERPL-MCNC: 197 MG/DL
WBC # BLD AUTO: 5.4 10E3/UL (ref 4–11)

## 2022-11-16 PROCEDURE — 80053 COMPREHEN METABOLIC PANEL: CPT | Performed by: NURSE PRACTITIONER

## 2022-11-16 PROCEDURE — 80061 LIPID PANEL: CPT | Performed by: NURSE PRACTITIONER

## 2022-11-16 PROCEDURE — 0124A COVID-19,PF,PFIZER BOOSTER BIVALENT: CPT | Performed by: NURSE PRACTITIONER

## 2022-11-16 PROCEDURE — 91312 COVID-19,PF,PFIZER BOOSTER BIVALENT: CPT | Performed by: NURSE PRACTITIONER

## 2022-11-16 PROCEDURE — 36415 COLL VENOUS BLD VENIPUNCTURE: CPT | Performed by: NURSE PRACTITIONER

## 2022-11-16 PROCEDURE — 85027 COMPLETE CBC AUTOMATED: CPT | Performed by: NURSE PRACTITIONER

## 2022-11-16 PROCEDURE — 99214 OFFICE O/P EST MOD 30 MIN: CPT | Performed by: NURSE PRACTITIONER

## 2022-11-16 RX ORDER — LOSARTAN POTASSIUM AND HYDROCHLOROTHIAZIDE 12.5; 5 MG/1; MG/1
2 TABLET ORAL EVERY MORNING
Qty: 180 TABLET | Refills: 3 | Status: SHIPPED | OUTPATIENT
Start: 2022-11-16 | End: 2023-05-23

## 2022-11-16 ASSESSMENT — PAIN SCALES - GENERAL: PAINLEVEL: NO PAIN (0)

## 2022-11-16 NOTE — PROGRESS NOTES
"  Assessment & Plan     ICD-10-CM    1. Peripheral artery disease (H)  I73.9       2. Benign Essential Hypertension  I10 losartan-hydrochlorothiazide (HYZAAR) 50-12.5 MG tablet     Comprehensive metabolic panel (BMP + Alb, Alk Phos, ALT, AST, Total. Bili, TP)     Comprehensive metabolic panel (BMP + Alb, Alk Phos, ALT, AST, Total. Bili, TP)      3. Essential Hypercholesterolemia  E78.00 Comprehensive metabolic panel (BMP + Alb, Alk Phos, ALT, AST, Total. Bili, TP)     Lipid panel     Comprehensive metabolic panel (BMP + Alb, Alk Phos, ALT, AST, Total. Bili, TP)     Lipid panel      4. Chronic obstructive pulmonary disease, unspecified COPD type (H)  J44.9 CBC with platelets     CBC with platelets      5. Need for COVID-19 vaccine  Z23 COVID-19,PF,PFIZER BOOSTER BIVALENT (12+YRS)        Declines colon cancer screening.  Increase losartan hydrochlorothiazide to 2 tablets daily.  Keep planned follow-up with pulmonary medicine.  COVID booster given.  Continue same Lyrica for management of neuropathy prostate cancer being managed by urology    Reviewed vascular medicine note x1, ultrasound arterial duplex bilateral x1    Subjective     HPI     Copd  Still smoking. Will smoke less over the winter because of the cold weather.  Meets pulm in a couple weeks.     Peripheral vascular disease  Met vascular couple weeks ago with [facility] evaluation.      Neuropathy  lyrica bumped up to 3 times a day dosing at last visit.  Currently under good control.  No excessive lethargy or fatigue.     Hypertension  Mildly elevated today.  Was more elevated at vascular's appointment.  No chest pain.    Review of Systems - negative except for what's listed in the HPI      Objective    BP (!) 146/60   Pulse 65   Temp 98.3  F (36.8  C) (Oral)   Resp 16   Ht 1.651 m (5' 5\")   Wt 75.9 kg (167 lb 4.8 oz)   SpO2 99%   BMI 27.84 kg/m    Physical Exam   General appearance - alert, well appearing, and in no distress  Mental status - alert, " oriented to person, place, and time  Lymphatics - no palpable lymphadenopathy  Chest -clear but diminished breath sounds  Heart - normal rate and regular rhythm, S1 and S2 normal, no murmurs noted  Abdomen - soft, nontender, nondistended, no masses or organomegaly, bowel sounds active through all 4 quadrants  Neurological -grossly intact  Extremities - peripheral pulses normal, trace peripheral edema  Skin - normal coloration and turgor.    John Oconnor, CNP    This note has been dictated using voice recognition software. Any grammatical or context distortions are unintentional and inherent to the software.

## 2022-11-16 NOTE — PATIENT INSTRUCTIONS
Updating lab work so we are good to go through the winter.    Lets bump the losartan-hydrochlorothiazide up to 2 tablets daily.  I sent this updated prescription to your pharmacy to reflect the new dosing.  This will help get your blood pressure back under good control which will help to preserve your vascular health.

## 2022-11-16 NOTE — LETTER
November 17, 2022      Ubaldo Ramos  [address] [facility]   [place] [address]        Dear ,    We are writing to inform you of your test results.    Electrolytes, kidneys, and liver look good.  Blood counts are good showing improvement in hemoglobin.  Cholesterol levels also look good    Resulted Orders   Comprehensive metabolic panel (BMP + Alb, Alk Phos, ALT, AST, Total. Bili, TP)   Result Value Ref Range    Sodium 140 136 - 145 mmol/L    Potassium 4.4 3.4 - 5.3 mmol/L    Chloride 103 98 - 107 mmol/L    Carbon Dioxide (CO2) 26 22 - 29 mmol/L    Anion Gap 11 7 - 15 mmol/L    Urea Nitrogen 15.0 8.0 - 23.0 mg/dL    Creatinine 0.87 0.67 - 1.17 mg/dL    Calcium 9.8 8.8 - 10.2 mg/dL    Glucose 102 (H) 70 - 99 mg/dL    Alkaline Phosphatase 105 40 - 129 U/L    AST 26 10 - 50 U/L    ALT 18 10 - 50 U/L    Protein Total 6.9 6.4 - 8.3 g/dL    Albumin 4.3 3.5 - 5.2 g/dL    Bilirubin Total 0.3 <=1.2 mg/dL    GFR Estimate 89 >60 mL/min/1.73m2      Comment:      Effective December 21, 2021 eGFRcr in adults is calculated using the 2021 CKD-EPI creatinine equation which includes age and gender (Humble et al., [facility], DOI: 10.1056/SFTSyo6438631)   CBC with platelets   Result Value Ref Range    WBC Count 5.4 4.0 - 11.0 10e3/uL    RBC Count 4.22 (L) 4.40 - 5.90 10e6/uL    Hemoglobin 12.4 (L) 13.3 - 17.7 g/dL    Hematocrit 39.0 (L) 40.0 - 53.0 %    MCV 92 78 - 100 fL    MCH 29.4 26.5 - 33.0 pg    MCHC 31.8 31.5 - 36.5 g/dL    RDW 14.1 10.0 - 15.0 %    Platelet Count 154 150 - 450 10e3/uL   Lipid panel   Result Value Ref Range    Cholesterol 136 <200 mg/dL    Triglycerides 197 (H) <150 mg/dL    Direct Measure HDL 38 (L) >=40 mg/dL    LDL Cholesterol Calculated 59 <=100 mg/dL    Non HDL Cholesterol 98 <130 mg/dL    Narrative    Cholesterol  Desirable:  <200 mg/dL    Triglycerides  Normal:  Less than 150 mg/dL  Borderline High:  150-199 mg/dL  High:  200-499 mg/dL  Very High:  Greater than or equal to 500 mg/dL    Direct  Measure HDL  Female:  Greater than or equal to 50 mg/dL   Male:  Greater than or equal to 40 mg/dL    LDL Cholesterol  Desirable:  <100mg/dL  Above Desirable:  100-129 mg/dL   Borderline High:  130-159 mg/dL   High:  160-189 mg/dL   Very High:  >= 190 mg/dL    Non HDL Cholesterol  Desirable:  130 mg/dL  Above Desirable:  130-159 mg/dL  Borderline High:  160-189 mg/dL  High:  190-219 mg/dL  Very High:  Greater than or equal to 220 mg/dL       If you have any questions or concerns, please call the clinic at the number listed above.       Sincerely,      John Oconnor NP

## 2022-11-23 ENCOUNTER — OFFICE VISIT (OUTPATIENT)
Dept: PULMONOLOGY | Facility: OTHER | Age: 77
End: 2022-11-23
Payer: MEDICARE

## 2022-11-23 VITALS
BODY MASS INDEX: 27.77 KG/M2 | DIASTOLIC BLOOD PRESSURE: 60 MMHG | SYSTOLIC BLOOD PRESSURE: 122 MMHG | OXYGEN SATURATION: 95 % | WEIGHT: 166.9 LBS | HEART RATE: 85 BPM

## 2022-11-23 DIAGNOSIS — J44.9 COPD, SEVERE (H): Primary | ICD-10-CM

## 2022-11-23 PROCEDURE — 99214 OFFICE O/P EST MOD 30 MIN: CPT | Performed by: INTERNAL MEDICINE

## 2022-11-23 NOTE — LETTER
11/23/2022         RE: Ubaldo Ramos  [address]  [place] [address]        Dear Colleague,    Thank you for referring your patient, Ubaldo Ramos, to the [facility]. Please see a copy of my visit note below.    Patient instructed in use of nebulizer machine from [facility].  Patient states good understanding of how to use the nebulizer machine.  Nebulizer machine given to patient from [facility].  All paperwork signed and copy scanned to chart. Phone numbers given to patient to call if any questions.    Pulmonary Clinic Follow-up Visit    Assessment and Plan:   77 year old male with a history of active tobacco dependence, lung cancer s/p left upper lobectomy in 2017 with recurrence in 2018 s/p chemotherapy and radiation, chronic obstructive pulmonary disease, lumbar herniated disc, prostate cancer s/p radical prostatectomy and radiation, peripheral neuropathy, GERD, cervical spine surgery, peripheral arterial disease s/p left femoral endarterectomy and bilateral iliac stents, cholecystecomy, presenting for evaluation.     Tobacco dependence, chronic obstructive pulmonary disease: Ubaldo continues to smoke, about 0.5 ppd. He and his spouse live in a senior living apartment so he has to smoke outside. He has no interest in quitting smoking. Dyspnea on exertion, which is stable, able to stop to catch his breath then continue with activity. Occasional cough but not bothersome.     Plan:  - I advised tobacco cessation but Ubaldo is not interested in quitting smoking  - nebulized ipratropium-albuterol TID-QID if able; he is currently using it BID  - encouraged regular exercise; previously declined pulmonary rehabilitation  - COVID-19 vaccination: Pfizer-BioNTech  - up to date on pneumococcal vaccination  - annual high-dose influenza vaccination; received for this season  - follow up in one year  - encouraged him to contact us with questions or concerning symptoms    Ok Dela Cruz MD  Pulmonary  and Critical Care Medicine  [facility]  Office [phone]  Pager [phone]  he/him/his    CCx: tobacco dependence, chronic obstructive pulmonary disease    HPI: 77 year old male with a history of active tobacco dependence, lung cancer s/p left upper lobectomy in 2017 with recurrence in 2018 s/p chemotherapy and radiation, chronic obstructive pulmonary disease, lumbar herniated disc, prostate cancer s/p radical prostatectomy and radiation, peripheral neuropathy, GERD, cervical spine surgery, peripheral arterial disease s/p left femoral endarterectomy and bilateral iliac stents, cholecystecomy, presenting for evaluation. Ubaldo continues to smoke, about 0.5 ppd. He and his spouse live in a senior living apartment so he has to smoke outside. He has no interest in quitting smoking. Dyspnea on exertion, which is stable, able to stop to catch his breath then continue with activity. Occasional cough but not bothersome.    ROS:  A 12-system review was obtained and was negative with the exception of the symptoms endorsed in the history of present illness.    PMH:  active tobacco dependence  lung cancer s/p left upper lobectomy in 2017 with recurrence in 2018 s/p chemotherapy and radiation  chronic obstructive pulmonary disease  lumbar herniated disc  prostate cancer s/p radical prostatectomy and radiation  peripheral neuropathy  GERD  cervical spine surgery  peripheral arterial disease s/p left femoral endarterectomy and bilateral iliac stents  cholecystecomy    PSH:  Past Surgical History:   Procedure Laterality Date     APPENDECTOMY       CERVICAL SPINE SURGERY       CHOLECYSTECTOMY       FEMORAL ENDARTERECTOMY       LUNG CANCER SURGERY       PROSTATECTOMY         Allergies:  Allergies   Allergen Reactions     Adhesive Tape Unknown       Family HX:  No family history on file.    Social Hx:  Social History     Socioeconomic History     Marital status:      Spouse name: Not on file     Number of  children: Not on file     Years of education: Not on file     Highest education level: Not on file   Occupational History     Not on file   Tobacco Use     Smoking status: Every Day     Packs/day: 0.50     Types: Cigarettes     Passive exposure: Current     Smokeless tobacco: Never   Vaping Use     Vaping Use: Never used   Substance and Sexual Activity     Alcohol use: Not Currently     Drug use: Not Currently     Sexual activity: Not on file   Other Topics Concern     Not on file   Social History Narrative     Not on file     Social Determinants of Health     Financial Resource Strain: Not on file   Food Insecurity: Not on file   Transportation Needs: Not on file   Physical Activity: Not on file   Stress: Not on file   Social Connections: Not on file   Intimate Partner Violence: Not on file   Housing Stability: Not on file       Current Meds:  Current Outpatient Medications   Medication Sig Dispense Refill     albuterol (PROVENTIL) (2.5 MG/3ML) 0.083% neb solution Take 1 vial (2.5 mg) by nebulization 3 times daily as needed for shortness of breath / dyspnea or wheezing 90 mL 4     aspirin 81 MG EC tablet [ASPIRIN 81 MG EC TABLET] Take 81 mg by mouth daily.       atorvastatin (LIPITOR) 80 MG tablet Take 1 tablet (80 mg) by mouth At Bedtime 90 tablet 3     cholecalciferol, vitamin D3, (VITAMIN D3) 25 mcg (1,000 unit) capsule [CHOLECALCIFEROL, VITAMIN D3, (VITAMIN D3) 25 MCG (1,000 UNIT) CAPSULE] Take 1,000 Units by mouth daily.       clopidogrel (PLAVIX) 75 MG tablet Take 1 tablet (75 mg) by mouth daily 90 tablet 3     cyanocobalamin 1000 MCG tablet [CYANOCOBALAMIN 1000 MCG TABLET] Take 1,000 mcg by mouth daily.       losartan-hydrochlorothiazide (HYZAAR) 50-12.5 MG tablet Take 2 tablets by mouth every morning 180 tablet 3     metoprolol tartrate (LOPRESSOR) 25 MG tablet Take 1 tablet (25 mg) by mouth 2 times daily 180 tablet 3     omeprazole (PRILOSEC) 20 MG DR capsule Take 2 capsules (40 mg) by mouth daily before  breakfast 180 capsule 0     oxybutynin ER (DITROPAN XL) 10 MG 24 hr tablet Take 1 tablet (10 mg) by mouth daily 90 tablet 1     pregabalin (LYRICA) 150 MG capsule Take 1 capsule (150 mg) by mouth 3 times daily as needed 270 capsule 1     ipratropium - albuterol 0.5 mg/2.5 mg/3 mL (DUONEB) 0.5-2.5 (3) MG/3ML neb solution Take 1 vial (3 mLs) by nebulization 4 times daily 1080 mL 2       Physical Exam:  /60 (BP Location: Right arm, Patient Position: Chair, Cuff Size: Adult Regular)   Pulse 85   Wt 75.7 kg (166 lb 14.4 oz)   SpO2 95%   BMI 27.77 kg/m    Gen: alert, oriented, no distress  HEENT: nasal mucosa is unremarkable, no oropharyngeal lesions, no cervical or supraclavicular lymphadenopathy  CV: RRR, no M/G/R  Resp: somewhat diminished, no crackles or wheezes  Skin: no apparent rashes  Ext: no cyanosis, clubbing or edema  Neuro: alert, nonfocal    Labs:  reviewed    Imaging studies:  CXR (June 2021):  - left upper lobectomy  - scar left mid-lung     Pulmonary Function Testing  October 2021:  FVC 1.98 (56%)  FEV1 1.22 (45%)  Ratio 0.61 (LLN 0.62)  No BD response  RV 2.29 (87%)  TLC 4.12 (65%)  RV/TLC 0.56 (128%)  DLCOc 52%  Mixed obstructive/restrictive loop morphology      Again, thank you for allowing me to participate in the care of your patient.        Sincerely,        Ok Dela Cruz MD

## 2022-11-23 NOTE — PROGRESS NOTES
Pulmonary Clinic Follow-up Visit    Assessment and Plan:   77 year old male with a history of active tobacco dependence, lung cancer s/p left upper lobectomy in 2017 with recurrence in 2018 s/p chemotherapy and radiation, chronic obstructive pulmonary disease, lumbar herniated disc, prostate cancer s/p radical prostatectomy and radiation, peripheral neuropathy, GERD, cervical spine surgery, peripheral arterial disease s/p left femoral endarterectomy and bilateral iliac stents, cholecystecomy, presenting for evaluation.     Tobacco dependence, chronic obstructive pulmonary disease: Ubaldo continues to smoke, about 0.5 ppd. He and his spouse live in a senior living apartment so he has to smoke outside. He has no interest in quitting smoking. Dyspnea on exertion, which is stable, able to stop to catch his breath then continue with activity. Occasional cough but not bothersome.     Plan:  - I advised tobacco cessation but Ubaldo is not interested in quitting smoking  - nebulized ipratropium-albuterol TID-QID if able; he is currently using it BID  - encouraged regular exercise; previously declined pulmonary rehabilitation  - COVID-19 vaccination: Pfizer-BioNTech  - up to date on pneumococcal vaccination  - annual high-dose influenza vaccination; received for this season  - follow up in one year  - encouraged him to contact us with questions or concerning symptoms    Ok Dela Cruz MD  Pulmonary and Critical Care Medicine  [facility]  Office [phone]  Pager [phone]  he/him/his    CCx: tobacco dependence, chronic obstructive pulmonary disease    HPI: 77 year old male with a history of active tobacco dependence, lung cancer s/p left upper lobectomy in 2017 with recurrence in 2018 s/p chemotherapy and radiation, chronic obstructive pulmonary disease, lumbar herniated disc, prostate cancer s/p radical prostatectomy and radiation, peripheral neuropathy, GERD, cervical spine surgery, peripheral arterial  disease s/p left femoral endarterectomy and bilateral iliac stents, cholecystecomy, presenting for evaluation. Ubaldo continues to smoke, about 0.5 ppd. He and his spouse live in a senior living apartment so he has to smoke outside. He has no interest in quitting smoking. Dyspnea on exertion, which is stable, able to stop to catch his breath then continue with activity. Occasional cough but not bothersome.    ROS:  A 12-system review was obtained and was negative with the exception of the symptoms endorsed in the history of present illness.    PMH:  active tobacco dependence  lung cancer s/p left upper lobectomy in 2017 with recurrence in 2018 s/p chemotherapy and radiation  chronic obstructive pulmonary disease  lumbar herniated disc  prostate cancer s/p radical prostatectomy and radiation  peripheral neuropathy  GERD  cervical spine surgery  peripheral arterial disease s/p left femoral endarterectomy and bilateral iliac stents  cholecystecomy    PSH:  Past Surgical History:   Procedure Laterality Date     APPENDECTOMY       CERVICAL SPINE SURGERY       CHOLECYSTECTOMY       FEMORAL ENDARTERECTOMY       LUNG CANCER SURGERY       PROSTATECTOMY         Allergies:  Allergies   Allergen Reactions     Adhesive Tape Unknown       Family HX:  No family history on file.    Social Hx:  Social History     Socioeconomic History     Marital status:      Spouse name: Not on file     Number of children: Not on file     Years of education: Not on file     Highest education level: Not on file   Occupational History     Not on file   Tobacco Use     Smoking status: Every Day     Packs/day: 0.50     Types: Cigarettes     Passive exposure: Current     Smokeless tobacco: Never   Vaping Use     Vaping Use: Never used   Substance and Sexual Activity     Alcohol use: Not Currently     Drug use: Not Currently     Sexual activity: Not on file   Other Topics Concern     Not on file   Social History Narrative     Not on file     Social  Determinants of Health     Financial Resource Strain: Not on file   Food Insecurity: Not on file   Transportation Needs: Not on file   Physical Activity: Not on file   Stress: Not on file   Social Connections: Not on file   Intimate Partner Violence: Not on file   Housing Stability: Not on file       Current Meds:  Current Outpatient Medications   Medication Sig Dispense Refill     albuterol (PROVENTIL) (2.5 MG/3ML) 0.083% neb solution Take 1 vial (2.5 mg) by nebulization 3 times daily as needed for shortness of breath / dyspnea or wheezing 90 mL 4     aspirin 81 MG EC tablet [ASPIRIN 81 MG EC TABLET] Take 81 mg by mouth daily.       atorvastatin (LIPITOR) 80 MG tablet Take 1 tablet (80 mg) by mouth At Bedtime 90 tablet 3     cholecalciferol, vitamin D3, (VITAMIN D3) 25 mcg (1,000 unit) capsule [CHOLECALCIFEROL, VITAMIN D3, (VITAMIN D3) 25 MCG (1,000 UNIT) CAPSULE] Take 1,000 Units by mouth daily.       clopidogrel (PLAVIX) 75 MG tablet Take 1 tablet (75 mg) by mouth daily 90 tablet 3     cyanocobalamin 1000 MCG tablet [CYANOCOBALAMIN 1000 MCG TABLET] Take 1,000 mcg by mouth daily.       losartan-hydrochlorothiazide (HYZAAR) 50-12.5 MG tablet Take 2 tablets by mouth every morning 180 tablet 3     metoprolol tartrate (LOPRESSOR) 25 MG tablet Take 1 tablet (25 mg) by mouth 2 times daily 180 tablet 3     omeprazole (PRILOSEC) 20 MG DR capsule Take 2 capsules (40 mg) by mouth daily before breakfast 180 capsule 0     oxybutynin ER (DITROPAN XL) 10 MG 24 hr tablet Take 1 tablet (10 mg) by mouth daily 90 tablet 1     pregabalin (LYRICA) 150 MG capsule Take 1 capsule (150 mg) by mouth 3 times daily as needed 270 capsule 1     ipratropium - albuterol 0.5 mg/2.5 mg/3 mL (DUONEB) 0.5-2.5 (3) MG/3ML neb solution Take 1 vial (3 mLs) by nebulization 4 times daily 1080 mL 2       Physical Exam:  /60 (BP Location: Right arm, Patient Position: Chair, Cuff Size: Adult Regular)   Pulse 85   Wt 75.7 kg (166 lb 14.4 oz)   SpO2  95%   BMI 27.77 kg/m    Gen: alert, oriented, no distress  HEENT: nasal mucosa is unremarkable, no oropharyngeal lesions, no cervical or supraclavicular lymphadenopathy  CV: RRR, no M/G/R  Resp: somewhat diminished, no crackles or wheezes  Skin: no apparent rashes  Ext: no cyanosis, clubbing or edema  Neuro: alert, nonfocal    Labs:  reviewed    Imaging studies:  CXR (June 2021):  - left upper lobectomy  - scar left mid-lung     Pulmonary Function Testing  October 2021:  FVC 1.98 (56%)  FEV1 1.22 (45%)  Ratio 0.61 (LLN 0.62)  No BD response  RV 2.29 (87%)  TLC 4.12 (65%)  RV/TLC 0.56 (128%)  DLCOc 52%  Mixed obstructive/restrictive loop morphology

## 2022-11-23 NOTE — PROGRESS NOTES
Patient instructed in use of nebulizer machine from [facility].  Patient states good understanding of how to use the nebulizer machine.  Nebulizer machine given to patient from [facility].  All paperwork signed and copy scanned to chart. Phone numbers given to patient to call if any questions.

## 2022-11-23 NOTE — PATIENT INSTRUCTIONS
It was good to see you in clinic today. This is what we discussed:    I recommend that you quit smoking.  Continue the nebulized ipratropium-albuterol (Duoneb). Try using it three times daily if possible.  Stay active as much as possible.  I will see you in one year.  Contact me with questions or concerning symptoms.    Ok Dela Cruz MD  Pulmonary and Critical Care Medicine  [facility]  Office [phone]

## 2023-01-10 DIAGNOSIS — E78.5 HYPERLIPIDEMIA LDL GOAL <100: ICD-10-CM

## 2023-01-10 DIAGNOSIS — I10 BENIGN ESSENTIAL HYPERTENSION: ICD-10-CM

## 2023-01-10 DIAGNOSIS — I73.9 PERIPHERAL ARTERY DISEASE (H): ICD-10-CM

## 2023-01-10 DIAGNOSIS — R10.13 DYSPEPSIA: Primary | ICD-10-CM

## 2023-01-10 RX ORDER — METOPROLOL TARTRATE 25 MG/1
25 TABLET, FILM COATED ORAL 2 TIMES DAILY
Qty: 180 TABLET | Refills: 3 | Status: SHIPPED | OUTPATIENT
Start: 2023-01-10 | End: 2023-10-25

## 2023-01-10 RX ORDER — METOPROLOL TARTRATE 25 MG/1
25 TABLET, FILM COATED ORAL 2 TIMES DAILY
Qty: 180 TABLET | Refills: 3 | Status: CANCELLED | OUTPATIENT
Start: 2023-01-10

## 2023-01-10 RX ORDER — CLOPIDOGREL BISULFATE 75 MG/1
75 TABLET ORAL DAILY
Qty: 90 TABLET | Refills: 0 | Status: SHIPPED | OUTPATIENT
Start: 2023-01-10 | End: 2023-04-11

## 2023-01-10 RX ORDER — ATORVASTATIN CALCIUM 80 MG/1
80 TABLET, FILM COATED ORAL AT BEDTIME
Qty: 90 TABLET | Refills: 3 | Status: CANCELLED | OUTPATIENT
Start: 2023-01-10

## 2023-01-10 RX ORDER — ATORVASTATIN CALCIUM 80 MG/1
80 TABLET, FILM COATED ORAL AT BEDTIME
Qty: 90 TABLET | Refills: 3 | Status: SHIPPED | OUTPATIENT
Start: 2023-01-10 | End: 2024-03-05

## 2023-01-10 NOTE — TELEPHONE ENCOUNTER
"Last Written Prescription Date:  1/19/22  Last Fill Quantity: 90,  # refills: 3   Last office visit provider:  11/16/22     Requested Prescriptions   Pending Prescriptions Disp Refills     atorvastatin (LIPITOR) 80 MG tablet 90 tablet 3     Sig: Take 1 tablet (80 mg) by mouth At Bedtime       Statins Protocol Passed - 1/10/2023  9:59 AM        Passed - LDL on file in past 12 months     Recent Labs   Lab Test 11/16/22  1055   LDL 59             Passed - No abnormal creatine kinase in past 12 months     No lab results found.             Passed - Recent (12 mo) or future (30 days) visit within the authorizing provider's specialty     Patient has had an office visit with the authorizing provider or a provider within the authorizing providers department within the previous 12 mos or has a future within next 30 days. See \"Patient Info\" tab in inbasket, or \"Choose Columns\" in Meds & Orders section of the refill encounter.              Passed - Medication is active on med list        Passed - Patient is age 18 or older           metoprolol tartrate (LOPRESSOR) 25 MG tablet 180 tablet 3     Sig: Take 1 tablet (25 mg) by mouth 2 times daily       Beta-Blockers Protocol Passed - 1/10/2023  9:59 AM        Passed - Blood pressure under 140/90 in past 12 months     BP Readings from Last 3 Encounters:   11/23/22 122/60   11/16/22 (!) 146/60   11/08/22 120/60                 Passed - Patient is age 6 or older        Passed - Recent (12 mo) or future (30 days) visit within the authorizing provider's specialty     Patient has had an office visit with the authorizing provider or a provider within the authorizing providers department within the previous 12 mos or has a future within next 30 days. See \"Patient Info\" tab in inbasket, or \"Choose Columns\" in Meds & Orders section of the refill encounter.              Passed - Medication is active on med list             Jalen Quiñones RN 01/10/23 9:59 AM  "

## 2023-01-10 NOTE — TELEPHONE ENCOUNTER
Please call the patient and let him know that I have discontinued his omeprazole and started him on high-dose Protonix as there is an interaction between his omeprazole, and any other med in this class of medications except Protonix, with his Plavix medication.

## 2023-01-10 NOTE — TELEPHONE ENCOUNTER
"Routing Plavix refill request to provider for review/approval because:  Labs out of range:  Hgb    Last Written Plavix Date:  1/19/2022  Last Fill Quantity: 90,  # refills: 3   Last office visit provider:  11/16/2022     Note -> Separate med refill encounter now created for atorvastatin and metoprolol which did not get run through refill protocols with current Plavix request.    Requested Prescriptions   Pending Prescriptions Disp Refills     clopidogrel (PLAVIX) 75 MG tablet 90 tablet 3     Sig: Take 1 tablet (75 mg) by mouth daily       Plavix Failed - 1/10/2023  9:47 AM        Failed - No active PPI on record unless is Protonix        Failed - Normal HGB on file in past 12 months     Recent Labs   Lab Test 11/16/22  1055   HGB 12.4*               Passed - Normal Platelets on file in past 12 months     Recent Labs   Lab Test 11/16/22  1055                  Passed - Recent (12 mo) or future (30 days) visit within the authorizing provider's specialty     Patient has had an office visit with the authorizing provider or a provider within the authorizing providers department within the previous 12 mos or has a future within next 30 days. See \"Patient Info\" tab in inbasket, or \"Choose Columns\" in Meds & Orders section of the refill encounter.              Passed - Medication is active on med list        Passed - Patient is age 18 or older           atorvastatin (LIPITOR) 80 MG tablet 90 tablet 3     Sig: Take 1 tablet (80 mg) by mouth At Bedtime       Statins Protocol Passed - 1/10/2023  9:47 AM        Passed - LDL on file in past 12 months     Recent Labs   Lab Test 11/16/22  1055   LDL 59             Passed - No abnormal creatine kinase in past 12 months     No lab results found.             Passed - Recent (12 mo) or future (30 days) visit within the authorizing provider's specialty     Patient has had an office visit with the authorizing provider or a provider within the authorizing providers department " "within the previous 12 mos or has a future within next 30 days. See \"Patient Info\" tab in inbasket, or \"Choose Columns\" in Meds & Orders section of the refill encounter.              Passed - Medication is active on med list        Passed - Patient is age 18 or older           metoprolol tartrate (LOPRESSOR) 25 MG tablet 180 tablet 3     Sig: Take 1 tablet (25 mg) by mouth 2 times daily       Beta-Blockers Protocol Passed - 1/10/2023  9:47 AM        Passed - Blood pressure under 140/90 in past 12 months     BP Readings from Last 3 Encounters:   11/23/22 122/60   11/16/22 (!) 146/60   11/08/22 120/60                 Passed - Patient is age 6 or older        Passed - Recent (12 mo) or future (30 days) visit within the authorizing provider's specialty     Patient has had an office visit with the authorizing provider or a provider within the authorizing providers department within the previous 12 mos or has a future within next 30 days. See \"Patient Info\" tab in inbasket, or \"Choose Columns\" in Meds & Orders section of the refill encounter.              Passed - Medication is active on med list             Heather Lomas RN 01/10/23 9:47 AM  "

## 2023-01-10 NOTE — TELEPHONE ENCOUNTER
Refill Request  Medication name: Pending Prescriptions:                       Disp   Refills    clopidogrel (PLAVIX) 75 MG tablet         90 tab*3            Sig: Take 1 tablet (75 mg) by mouth daily    atorvastatin (LIPITOR) 80 MG tablet       90 tab*3            Sig: Take 1 tablet (80 mg) by mouth At Bedtime    metoprolol tartrate (LOPRESSOR) 25 MG tab*180 ta*3            Sig: Take 1 tablet (25 mg) by mouth 2 times daily    [facility]ed [facility]

## 2023-01-11 RX ORDER — PANTOPRAZOLE SODIUM 40 MG/1
40 TABLET, DELAYED RELEASE ORAL DAILY
Qty: 90 TABLET | Refills: 0 | Status: SHIPPED | OUTPATIENT
Start: 2023-01-11 | End: 2023-04-15

## 2023-01-13 ENCOUNTER — TRANSFERRED RECORDS (OUTPATIENT)
Dept: HEALTH INFORMATION MANAGEMENT | Facility: CLINIC | Age: 78
End: 2023-01-13

## 2023-03-20 ENCOUNTER — TRANSFERRED RECORDS (OUTPATIENT)
Dept: HEALTH INFORMATION MANAGEMENT | Facility: CLINIC | Age: 78
End: 2023-03-20

## 2023-04-10 DIAGNOSIS — I73.9 PERIPHERAL ARTERY DISEASE (H): ICD-10-CM

## 2023-04-10 NOTE — TELEPHONE ENCOUNTER
"Routing refill request to provider for review/approval because:  Labs out of range:  Hgb    Last Written Prescription Date:  1/10/2023  Last Fill Quantity: 90,  # refills: 0   Last office visit provider:  11/16/2022     Requested Prescriptions   Pending Prescriptions Disp Refills     clopidogrel (PLAVIX) 75 MG tablet [Pharmacy Med Name: CLOPIDOGREL 75MG TABLETS] 90 tablet 0     Sig: TAKE 1 TABLET(75 MG) BY MOUTH DAILY       Plavix Failed - 4/10/2023  3:53 PM        Failed - Normal HGB on file in past 12 months     Recent Labs   Lab Test 11/16/22  1055   HGB 12.4*               Passed - No active PPI on record unless is Protonix        Passed - Normal Platelets on file in past 12 months     Recent Labs   Lab Test 11/16/22  1055                  Passed - Recent (12 mo) or future (30 days) visit within the authorizing provider's specialty     Patient has had an office visit with the authorizing provider or a provider within the authorizing providers department within the previous 12 mos or has a future within next 30 days. See \"Patient Info\" tab in inbasket, or \"Choose Columns\" in Meds & Orders section of the refill encounter.              Passed - Medication is active on med list        Passed - Patient is age 18 or older             JENNIFER RAO RN 04/10/23 3:53 PM  "

## 2023-04-11 RX ORDER — CLOPIDOGREL BISULFATE 75 MG/1
TABLET ORAL
Qty: 90 TABLET | Refills: 0 | Status: SHIPPED | OUTPATIENT
Start: 2023-04-11 | End: 2023-05-23

## 2023-04-14 DIAGNOSIS — R10.13 DYSPEPSIA: ICD-10-CM

## 2023-04-15 RX ORDER — PANTOPRAZOLE SODIUM 40 MG/1
TABLET, DELAYED RELEASE ORAL
Qty: 90 TABLET | Refills: 1 | Status: SHIPPED | OUTPATIENT
Start: 2023-04-15 | End: 2023-10-09

## 2023-04-15 NOTE — TELEPHONE ENCOUNTER
"Last Written Prescription Date:  1/11/23  Last Fill Quantity: 90,  # refills: 0   Last office visit provider:  11/16/22     Requested Prescriptions   Pending Prescriptions Disp Refills     pantoprazole (PROTONIX) 40 MG EC tablet [Pharmacy Med Name: PANTOPRAZOLE 40MG TABLETS] 90 tablet 0     Sig: TAKE 1 TABLET(40 MG) BY MOUTH DAILY       PPI Protocol Passed - 4/14/2023  1:55 PM        Passed - Not on Clopidogrel (unless Pantoprazole ordered)        Passed - No diagnosis of osteoporosis on record        Passed - Recent (12 mo) or future (30 days) visit within the authorizing provider's specialty     Patient has had an office visit with the authorizing provider or a provider within the authorizing providers department within the previous 12 mos or has a future within next 30 days. See \"Patient Info\" tab in inbasket, or \"Choose Columns\" in Meds & Orders section of the refill encounter.              Passed - Medication is active on med list        Passed - Patient is age 18 or older             Trina Hackett RN 04/15/23 6:38 PM  "

## 2023-05-09 DIAGNOSIS — G61.82 MULTIFOCAL MOTOR NEUROPATHY (H): ICD-10-CM

## 2023-05-11 RX ORDER — PREGABALIN 150 MG/1
CAPSULE ORAL
Qty: 270 CAPSULE | Refills: 0 | Status: SHIPPED | OUTPATIENT
Start: 2023-05-11 | End: 2023-05-23

## 2023-05-23 ENCOUNTER — OFFICE VISIT (OUTPATIENT)
Dept: FAMILY MEDICINE | Facility: CLINIC | Age: 78
End: 2023-05-23
Payer: MEDICARE

## 2023-05-23 VITALS
WEIGHT: 175.5 LBS | BODY MASS INDEX: 29.24 KG/M2 | HEIGHT: 65 IN | SYSTOLIC BLOOD PRESSURE: 120 MMHG | DIASTOLIC BLOOD PRESSURE: 60 MMHG | HEART RATE: 57 BPM | OXYGEN SATURATION: 96 % | TEMPERATURE: 98 F | RESPIRATION RATE: 16 BRPM

## 2023-05-23 DIAGNOSIS — G61.82 MULTIFOCAL MOTOR NEUROPATHY (H): ICD-10-CM

## 2023-05-23 DIAGNOSIS — I10 ESSENTIAL HYPERTENSION, BENIGN: ICD-10-CM

## 2023-05-23 DIAGNOSIS — C61 MALIGNANT NEOPLASM OF PROSTATE (H): ICD-10-CM

## 2023-05-23 DIAGNOSIS — M51.26 DISPLACEMENT OF LUMBAR INTERVERTEBRAL DISC WITHOUT MYELOPATHY: ICD-10-CM

## 2023-05-23 DIAGNOSIS — J44.9 COPD, SEVERE (H): ICD-10-CM

## 2023-05-23 DIAGNOSIS — Z85.46 HISTORY OF PROSTATE CANCER: ICD-10-CM

## 2023-05-23 DIAGNOSIS — N32.81 OVERACTIVE BLADDER: ICD-10-CM

## 2023-05-23 DIAGNOSIS — I73.9 PERIPHERAL ARTERY DISEASE (H): Primary | ICD-10-CM

## 2023-05-23 LAB
ALBUMIN SERPL BCG-MCNC: 4.2 G/DL (ref 3.5–5.2)
ALP SERPL-CCNC: 98 U/L (ref 40–129)
ALT SERPL W P-5'-P-CCNC: 17 U/L (ref 10–50)
ANION GAP SERPL CALCULATED.3IONS-SCNC: 11 MMOL/L (ref 7–15)
AST SERPL W P-5'-P-CCNC: 24 U/L (ref 10–50)
BILIRUB SERPL-MCNC: 0.3 MG/DL
BUN SERPL-MCNC: 20.9 MG/DL (ref 8–23)
CALCIUM SERPL-MCNC: 9.7 MG/DL (ref 8.8–10.2)
CHLORIDE SERPL-SCNC: 106 MMOL/L (ref 98–107)
CREAT SERPL-MCNC: 0.93 MG/DL (ref 0.67–1.17)
DEPRECATED HCO3 PLAS-SCNC: 25 MMOL/L (ref 22–29)
ERYTHROCYTE [DISTWIDTH] IN BLOOD BY AUTOMATED COUNT: 14.8 % (ref 10–15)
GFR SERPL CREATININE-BSD FRML MDRD: 85 ML/MIN/1.73M2
GLUCOSE SERPL-MCNC: 103 MG/DL (ref 70–99)
HCT VFR BLD AUTO: 35 % (ref 40–53)
HGB BLD-MCNC: 11.4 G/DL (ref 13.3–17.7)
MCH RBC QN AUTO: 30.6 PG (ref 26.5–33)
MCHC RBC AUTO-ENTMCNC: 32.6 G/DL (ref 31.5–36.5)
MCV RBC AUTO: 94 FL (ref 78–100)
PLATELET # BLD AUTO: 140 10E3/UL (ref 150–450)
POTASSIUM SERPL-SCNC: 4 MMOL/L (ref 3.4–5.3)
PROT SERPL-MCNC: 6.6 G/DL (ref 6.4–8.3)
PSA SERPL DL<=0.01 NG/ML-MCNC: 0.01 NG/ML (ref 0–6.5)
RBC # BLD AUTO: 3.73 10E6/UL (ref 4.4–5.9)
SODIUM SERPL-SCNC: 142 MMOL/L (ref 136–145)
WBC # BLD AUTO: 4.9 10E3/UL (ref 4–11)

## 2023-05-23 PROCEDURE — 80053 COMPREHEN METABOLIC PANEL: CPT | Performed by: NURSE PRACTITIONER

## 2023-05-23 PROCEDURE — 99214 OFFICE O/P EST MOD 30 MIN: CPT | Performed by: NURSE PRACTITIONER

## 2023-05-23 PROCEDURE — 84153 ASSAY OF PSA TOTAL: CPT | Performed by: NURSE PRACTITIONER

## 2023-05-23 PROCEDURE — 36415 COLL VENOUS BLD VENIPUNCTURE: CPT | Performed by: NURSE PRACTITIONER

## 2023-05-23 PROCEDURE — 85027 COMPLETE CBC AUTOMATED: CPT | Performed by: NURSE PRACTITIONER

## 2023-05-23 RX ORDER — OXYBUTYNIN CHLORIDE 10 MG/1
10 TABLET, EXTENDED RELEASE ORAL DAILY
Qty: 90 TABLET | Refills: 3 | Status: SHIPPED | OUTPATIENT
Start: 2023-05-23 | End: 2023-10-25

## 2023-05-23 RX ORDER — GABAPENTIN 600 MG/1
TABLET ORAL
COMMUNITY
End: 2023-10-25

## 2023-05-23 RX ORDER — HYDROCHLOROTHIAZIDE 25 MG/1
TABLET ORAL
COMMUNITY
End: 2023-05-23

## 2023-05-23 RX ORDER — ACETAMINOPHEN AND CODEINE PHOSPHATE 300; 30 MG/1; MG/1
1 TABLET ORAL EVERY 6 HOURS PRN
Qty: 30 TABLET | Refills: 0 | Status: SHIPPED | OUTPATIENT
Start: 2023-05-23 | End: 2023-10-25

## 2023-05-23 RX ORDER — LOSARTAN POTASSIUM AND HYDROCHLOROTHIAZIDE 12.5; 5 MG/1; MG/1
2 TABLET ORAL EVERY MORNING
Qty: 180 TABLET | Refills: 3 | Status: SHIPPED | OUTPATIENT
Start: 2023-05-23 | End: 2023-10-25

## 2023-05-23 RX ORDER — CLOPIDOGREL BISULFATE 75 MG/1
75 TABLET ORAL DAILY
Qty: 90 TABLET | Refills: 1 | Status: SHIPPED | OUTPATIENT
Start: 2023-05-23 | End: 2023-10-25

## 2023-05-23 RX ORDER — PREGABALIN 150 MG/1
CAPSULE ORAL
Qty: 270 CAPSULE | Refills: 1 | Status: SHIPPED | OUTPATIENT
Start: 2023-05-23 | End: 2023-10-25

## 2023-05-23 RX ORDER — LOSARTAN POTASSIUM 50 MG/1
TABLET ORAL
COMMUNITY
End: 2023-05-23

## 2023-05-23 RX ORDER — OMEPRAZOLE 40 MG/1
CAPSULE, DELAYED RELEASE ORAL
COMMUNITY
End: 2023-10-25

## 2023-05-23 ASSESSMENT — PAIN SCALES - GENERAL: PAINLEVEL: NO PAIN (0)

## 2023-05-23 NOTE — PROGRESS NOTES
Assessment & Plan     ICD-10-CM    1. Peripheral artery disease (H)  I73.9 clopidogrel (PLAVIX) 75 MG tablet     Comprehensive metabolic panel (BMP + Alb, Alk Phos, ALT, AST, Total. Bili, TP)     Comprehensive metabolic panel (BMP + Alb, Alk Phos, ALT, AST, Total. Bili, TP)      2. 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 COPD, severe (H)  J44.9 CBC with platelets     CBC with platelets      5. Malignant neoplasm of prostate (H)  C61       6. Benign Essential Hypertension  I10 losartan-hydrochlorothiazide (HYZAAR) 50-12.5 MG tablet     Comprehensive metabolic panel (BMP + Alb, Alk Phos, ALT, AST, Total. Bili, TP)     Comprehensive metabolic panel (BMP + Alb, Alk Phos, ALT, AST, Total. Bili, TP)      7. History of prostate cancer  Z85.46 PSA, tumor marker     PSA, tumor marker      8. 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 Herniated Disc (L3 - L4)  M51.26 acetaminophen-codeine (TYLENOL #3) 300-30 MG per tablet        Blood pressure shows better control.  No changes.  Oxybutynin is helpful when he does actually take it.  Reviewed sipping on fluids throughout the day to manage dry mouth.  COPD managed by pulmonary medicine.  Stable.  Recheck PSA to keep an eye on history of prostate cancer.  Reviewed different pain management options for his chronic low back pain.  For days he is more active, will try Tylenol with codeine.  Reviewed potential risks and side effects.  No alcohol or driving after using this.    Subjective     HPI     COPD  Stable. Follows with pulmonary medicine. Wildfires from [place] have not caused any issues.    HTN  Better today with 2 tablets of the losartan hydrochlorothiazide. No side effects. No CP, LE swelling, SOB, palpitations.      Overactive Bladder.    Better with oxybutynin but uses it as needed because he doesn't like the dry mouth.     History of prostate  "cancer  Last PSA better. Checks in with urology.       Back pain  Chronic issue.  Has had imaging in the past.  Tylenol no longer helpful.  No saddle anesthesia or loss of control of bowel/bladder.      Review of Systems - negative except for what's listed in the HPI      Objective    /60 (BP Location: Left arm, Patient Position: Sitting, Cuff Size: Adult Regular)   Pulse 57   Temp 98  F (36.7  C) (Oral)   Resp 16   Ht 1.651 m (5' 5\")   Wt 79.6 kg (175 lb 8 oz)   SpO2 96%   BMI 29.20 kg/m    Physical Exam   General appearance - alert, well appearing, and in no distress  Mental status - alert, oriented to person, place, and time  Chest - clear to auscultation, no wheezes, rales or rhonchi, symmetric air entry  Heart - normal rate and regular rhythm, S1 and S2 normal, no murmurs noted  Abdomen - soft, nontender, nondistended, no masses or organomegaly  Neurological -describes paresthesias consistent with history of multifocal motor neuropathy.  Musculoskeletal -slow sit to stand with stooped posture while ambulating  Extremities - peripheral pulses normal, no peripheral edema  Skin - normal coloration and turgor.    John Oconnor, CNP    This note has been dictated using voice recognition software. Any grammatical or context distortions are unintentional and inherent to the software.              "

## 2023-05-23 NOTE — PATIENT INSTRUCTIONS
I got refills of your medications and that new Tylenol with codeine prescription as well.  The maximum amount of acetaminophen/Tylenol you should have in a day is no more than 3000 mg    Updating lab work keeping an eye on your blood counts and PSA.    Paper prescription for the oxybutynin provided along with that coupon to [facility].  Otherwise everything else went to [facility].

## 2023-05-23 NOTE — LETTER
May 24, 2023      Ubaldo Ramos  [address]   [facility] [address]        Dear ,    Kidneys, liver, and electrolytes look good.  Blood counts remain stable showing that mild anemia.  PSA is just barely detectable at 0.01.    Resulted Orders   CBC with platelets   Result Value Ref Range    WBC Count 4.9 4.0 - 11.0 10e3/uL    RBC Count 3.73 (L) 4.40 - 5.90 10e6/uL    Hemoglobin 11.4 (L) 13.3 - 17.7 g/dL    Hematocrit 35.0 (L) 40.0 - 53.0 %    MCV 94 78 - 100 fL    MCH 30.6 26.5 - 33.0 pg    MCHC 32.6 31.5 - 36.5 g/dL    RDW 14.8 10.0 - 15.0 %    Platelet Count 140 (L) 150 - 450 10e3/uL   Comprehensive metabolic panel (BMP + Alb, Alk Phos, ALT, AST, Total. Bili, TP)   Result Value Ref Range    Sodium 142 136 - 145 mmol/L    Potassium 4.0 3.4 - 5.3 mmol/L    Chloride 106 98 - 107 mmol/L    Carbon Dioxide (CO2) 25 22 - 29 mmol/L    Anion Gap 11 7 - 15 mmol/L    Urea Nitrogen 20.9 8.0 - 23.0 mg/dL    Creatinine 0.93 0.67 - 1.17 mg/dL    Calcium 9.7 8.8 - 10.2 mg/dL    Glucose 103 (H) 70 - 99 mg/dL    Alkaline Phosphatase 98 40 - 129 U/L    AST 24 10 - 50 U/L    ALT 17 10 - 50 U/L    Protein Total 6.6 6.4 - 8.3 g/dL    Albumin 4.2 3.5 - 5.2 g/dL    Bilirubin Total 0.3 <=1.2 mg/dL    GFR Estimate 85 >60 mL/min/1.73m2      Comment:      eGFR calculated using 2021 CKD-EPI equation.   PSA, tumor marker   Result Value Ref Range    PSA Tumor Marker 0.01 0.00 - 6.50 ng/mL    Narrative    This result is obtained using the Roche Elecsys total PSA method on the robb e801 immunoassay analyzer. Results obtained with different assay methods or kits cannot be used interchangeably.       If you have any questions or concerns, please call the clinic at the number listed above.       Sincerely,      John Oconnor NP

## 2023-06-15 ENCOUNTER — TELEPHONE (OUTPATIENT)
Dept: FAMILY MEDICINE | Facility: CLINIC | Age: 78
End: 2023-06-15
Payer: MEDICARE

## 2023-06-15 NOTE — TELEPHONE ENCOUNTER
Patient Quality Outreach    Patient is due for the following:   Physical Annual Wellness Visit    Next Steps:   No follow up needed at this time.    Type of outreach:    Chart review performed, no outreach needed.      Questions for provider review:    None           Sarah Martinez  Chart routed to RENITA.

## 2023-07-12 ENCOUNTER — TRANSFERRED RECORDS (OUTPATIENT)
Dept: HEALTH INFORMATION MANAGEMENT | Facility: CLINIC | Age: 78
End: 2023-07-12
Payer: MEDICARE

## 2023-07-20 ENCOUNTER — TELEPHONE (OUTPATIENT)
Dept: FAMILY MEDICINE | Facility: CLINIC | Age: 78
End: 2023-07-20
Payer: MEDICARE

## 2023-07-20 NOTE — TELEPHONE ENCOUNTER
Reason for Call:  Other appointment    Detailed comments: lung pain - pt had lung cancer and had it removed - pain is coming from that area    Phone Number Patient can be reached at: Home number on file [phone] (home)    Best Time: any    Can we leave a detailed message on this number? YES    Call taken on 7/20/2023 at 1:18 PM by Lea Diaz

## 2023-07-25 ENCOUNTER — TELEPHONE (OUTPATIENT)
Dept: FAMILY MEDICINE | Facility: CLINIC | Age: 78
End: 2023-07-25

## 2023-07-25 ENCOUNTER — OFFICE VISIT (OUTPATIENT)
Dept: FAMILY MEDICINE | Facility: CLINIC | Age: 78
End: 2023-07-25
Payer: MEDICARE

## 2023-07-25 ENCOUNTER — ANCILLARY PROCEDURE (OUTPATIENT)
Dept: GENERAL RADIOLOGY | Facility: CLINIC | Age: 78
End: 2023-07-25
Attending: NURSE PRACTITIONER
Payer: MEDICARE

## 2023-07-25 ENCOUNTER — TRANSFERRED RECORDS (OUTPATIENT)
Dept: HEALTH INFORMATION MANAGEMENT | Facility: CLINIC | Age: 78
End: 2023-07-25

## 2023-07-25 VITALS
SYSTOLIC BLOOD PRESSURE: 110 MMHG | HEART RATE: 54 BPM | HEIGHT: 65 IN | RESPIRATION RATE: 16 BRPM | BODY MASS INDEX: 28.96 KG/M2 | WEIGHT: 173.8 LBS | DIASTOLIC BLOOD PRESSURE: 60 MMHG | TEMPERATURE: 97.9 F | OXYGEN SATURATION: 96 %

## 2023-07-25 DIAGNOSIS — R07.9 CHEST PAIN, UNSPECIFIED TYPE: ICD-10-CM

## 2023-07-25 DIAGNOSIS — R05.1 ACUTE COUGH: ICD-10-CM

## 2023-07-25 DIAGNOSIS — J43.9 PULMONARY EMPHYSEMA, UNSPECIFIED EMPHYSEMA TYPE (H): ICD-10-CM

## 2023-07-25 DIAGNOSIS — R07.9 CHEST PAIN, UNSPECIFIED TYPE: Primary | ICD-10-CM

## 2023-07-25 PROCEDURE — 99214 OFFICE O/P EST MOD 30 MIN: CPT | Performed by: NURSE PRACTITIONER

## 2023-07-25 PROCEDURE — 71046 X-RAY EXAM CHEST 2 VIEWS: CPT | Mod: TC | Performed by: RADIOLOGY

## 2023-07-25 PROCEDURE — 93005 ELECTROCARDIOGRAM TRACING: CPT | Performed by: NURSE PRACTITIONER

## 2023-07-25 RX ORDER — METHYLPREDNISOLONE 4 MG
TABLET, DOSE PACK ORAL
Qty: 21 TABLET | Refills: 0 | Status: SHIPPED | OUTPATIENT
Start: 2023-07-25 | End: 2023-10-25

## 2023-07-25 RX ORDER — AZITHROMYCIN 250 MG/1
TABLET, FILM COATED ORAL
Qty: 6 TABLET | Refills: 0 | Status: SHIPPED | OUTPATIENT
Start: 2023-07-25 | End: 2023-07-30

## 2023-07-25 ASSESSMENT — PAIN SCALES - GENERAL: PAINLEVEL: NO PAIN (0)

## 2023-07-25 NOTE — PROGRESS NOTES
Assessment & Plan     ICD-10-CM    1. Chest pain, unspecified type  R07.9 EKG 12-lead, tracing only     XR Chest 2 Views      2. Pulmonary emphysema, unspecified emphysema type (H)  J43.9 XR Chest 2 Views      3. Acute cough  R05.1 XR Chest 2 Views        Wells score for PE-0.  Low suspicion for pulmonary emboli.  ECG to help rule out cardiac origin given left-sided discomfort. Low suspicion of aortic origin/dissection.  Given that it is reproducible with movement and palpation, could be musculoskeletal.  Given deep inspiration pains, could also be a pleurisy versus pneumonia.  Could also be musculoskeletal originating from the spine given that discomfort starts just left of the spine and wraps around.  No skin changes to suggest shingles outbreak.  Chest x-ray for evaluation.  Plan based on results.    Reviewed oncology note x1, CT chest x1    Total time spent was 32 minutes including reviewing records prior to arrival, consultation, placing orders, education, and reviewing the plan of care on the date of service.      Subjective     HPI     Left sided chest pain from back wraping around front, increased pain with cough- HX of lung CA)     First noticed a few weeks ago.  Around this time he started to get a lot of phlegm that he would have to cough up.  Triggers include coughing, moving his left arm, twisting, deep breaths. Feels like pain starts around the middle of the back and wraps around.  Phlegm appears white/clear/cloudy. No hemoptysis.  Is having some more SOB episodes in the setting of known COPD. Does have hx of lung cancer dx in 2018 s/p resection with IGNACIA lobectomy. No history of blood clots.  No tearing sensation.  No worsening LE swelling.  No recent immobilization.  No rash or blisters along the area.  Discomfort feels deeper.      Review of Systems - negative except for what's listed in the HPI      Objective    /60 (BP Location: Right arm, Patient Position: Sitting, Cuff Size: Adult Regular)   " Pulse 54   Temp 97.9  F (36.6  C) (Oral)   Resp 16   Ht 1.651 m (5' 5\")   Wt 78.8 kg (173 lb 12.8 oz)   SpO2 96%   BMI 28.92 kg/m    Physical Exam   General appearance - alert, well appearing, and in no distress  Mental status - alert, oriented to person, place, and time  Mouth - mucous membranes moist. No oral lesions.  Lymphatics - no palpable lymphadenopathy  Chest -clear without wheeze or crackles.  Absent breath sounds in left upper lobe status post lobectomy for lung cancer in 2018  Heart - normal rate and regular rhythm, S1 and S2 normal, no murmurs noted  Abdomen - soft, nontender, nondistended, no masses or organomegaly  Neurological -grossly intact   musculoskeletal -mild discomfort with palpation left of the thoracic spine and along the lateral chest wall albeit not severe.  Extremities - peripheral pulses normal, trace lower extremity edema  Skin - normal coloration and turgor.    John Oconnor, CNP    This note has been dictated using voice recognition software. Any grammatical or context distortions are unintentional and inherent to the software.          "

## 2023-07-25 NOTE — PATIENT INSTRUCTIONS
EKG looks ok. We're sending to cardiology to review.    Chest xray to evaluate for pneumonia.    We'll call you with results later today and figure out what we can do to help with the discomfort.

## 2023-07-25 NOTE — TELEPHONE ENCOUNTER
Per provider- He was able to take a look at your Xray. While he is still waiting to hear from radiology nothing looks too concerning to him. He did send in 2 rx for the pt. One is an antibiotic incase there is a pneumonia he is unable to see and the other is a steroid to start in the morning to help with the inflammation.    Patient notified of this information, states understanding, and has no further questions.

## 2023-07-28 ENCOUNTER — TELEPHONE (OUTPATIENT)
Dept: FAMILY MEDICINE | Facility: CLINIC | Age: 78
End: 2023-07-28
Payer: MEDICARE

## 2023-07-28 NOTE — TELEPHONE ENCOUNTER
Left message to call back for: PATIENT  Information to relay to patient: SEE MSG.    hCristine Louis MA on 7/28/2023 at 4:59 PM

## 2023-08-09 NOTE — PROGRESS NOTES
Spoke with spouse to schedule 1 year follow up with Dr. Callejas with imaging.  She requests we call back later this morning.

## 2023-09-21 ENCOUNTER — TRANSFERRED RECORDS (OUTPATIENT)
Dept: HEALTH INFORMATION MANAGEMENT | Facility: CLINIC | Age: 78
End: 2023-09-21
Payer: MEDICARE

## 2023-10-09 DIAGNOSIS — R10.13 DYSPEPSIA: ICD-10-CM

## 2023-10-09 RX ORDER — PANTOPRAZOLE SODIUM 40 MG/1
TABLET, DELAYED RELEASE ORAL
Qty: 90 TABLET | Refills: 3 | Status: SHIPPED | OUTPATIENT
Start: 2023-10-09 | End: 2024-09-27

## 2023-10-14 ENCOUNTER — HEALTH MAINTENANCE LETTER (OUTPATIENT)
Age: 78
End: 2023-10-14

## 2023-10-25 ENCOUNTER — TRANSFERRED RECORDS (OUTPATIENT)
Dept: HEALTH INFORMATION MANAGEMENT | Facility: CLINIC | Age: 78
End: 2023-10-25

## 2023-10-25 ENCOUNTER — OFFICE VISIT (OUTPATIENT)
Dept: FAMILY MEDICINE | Facility: CLINIC | Age: 78
End: 2023-10-25
Payer: MEDICARE

## 2023-10-25 VITALS
HEIGHT: 66 IN | RESPIRATION RATE: 16 BRPM | DIASTOLIC BLOOD PRESSURE: 72 MMHG | OXYGEN SATURATION: 95 % | SYSTOLIC BLOOD PRESSURE: 120 MMHG | TEMPERATURE: 97.8 F | BODY MASS INDEX: 28.08 KG/M2 | HEART RATE: 57 BPM | WEIGHT: 174.7 LBS

## 2023-10-25 DIAGNOSIS — M51.26 DISPLACEMENT OF LUMBAR INTERVERTEBRAL DISC WITHOUT MYELOPATHY: ICD-10-CM

## 2023-10-25 DIAGNOSIS — I10 BENIGN ESSENTIAL HYPERTENSION: ICD-10-CM

## 2023-10-25 DIAGNOSIS — J44.9 COPD, SEVERE (H): ICD-10-CM

## 2023-10-25 DIAGNOSIS — H26.9 CATARACT OF BOTH EYES, UNSPECIFIED CATARACT TYPE: ICD-10-CM

## 2023-10-25 DIAGNOSIS — Z85.118 HX OF CANCER OF LUNG: ICD-10-CM

## 2023-10-25 DIAGNOSIS — J43.9 PULMONARY EMPHYSEMA, UNSPECIFIED EMPHYSEMA TYPE (H): ICD-10-CM

## 2023-10-25 DIAGNOSIS — G61.82 MULTIFOCAL MOTOR NEUROPATHY (H): ICD-10-CM

## 2023-10-25 DIAGNOSIS — I10 ESSENTIAL HYPERTENSION, BENIGN: ICD-10-CM

## 2023-10-25 DIAGNOSIS — N32.81 OVERACTIVE BLADDER: ICD-10-CM

## 2023-10-25 DIAGNOSIS — Z01.818 PREOP GENERAL PHYSICAL EXAM: Primary | ICD-10-CM

## 2023-10-25 DIAGNOSIS — G62.9 PERIPHERAL POLYNEUROPATHY: ICD-10-CM

## 2023-10-25 DIAGNOSIS — I73.9 PERIPHERAL ARTERY DISEASE (H): ICD-10-CM

## 2023-10-25 PROCEDURE — 99214 OFFICE O/P EST MOD 30 MIN: CPT | Performed by: NURSE PRACTITIONER

## 2023-10-25 RX ORDER — LOSARTAN POTASSIUM AND HYDROCHLOROTHIAZIDE 12.5; 5 MG/1; MG/1
2 TABLET ORAL EVERY MORNING
Qty: 180 TABLET | Refills: 3 | Status: SHIPPED | OUTPATIENT
Start: 2023-10-25 | End: 2024-03-19

## 2023-10-25 RX ORDER — PREGABALIN 150 MG/1
CAPSULE ORAL
Qty: 270 CAPSULE | Refills: 3 | Status: SHIPPED | OUTPATIENT
Start: 2023-10-25 | End: 2024-03-19

## 2023-10-25 RX ORDER — RESPIRATORY SYNCYTIAL VIRUS VACCINE 120MCG/0.5
0.5 KIT INTRAMUSCULAR ONCE
Qty: 1 EACH | Refills: 0 | Status: CANCELLED | OUTPATIENT
Start: 2023-10-25 | End: 2023-10-25

## 2023-10-25 RX ORDER — METOPROLOL TARTRATE 25 MG/1
25 TABLET, FILM COATED ORAL 2 TIMES DAILY
Qty: 180 TABLET | Refills: 3 | Status: SHIPPED | OUTPATIENT
Start: 2023-10-25 | End: 2024-03-19

## 2023-10-25 RX ORDER — CLOPIDOGREL BISULFATE 75 MG/1
75 TABLET ORAL DAILY
Qty: 90 TABLET | Refills: 1 | Status: SHIPPED | OUTPATIENT
Start: 2023-10-25 | End: 2024-03-19

## 2023-10-25 RX ORDER — OXYBUTYNIN CHLORIDE 10 MG/1
10 TABLET, EXTENDED RELEASE ORAL DAILY
Qty: 90 TABLET | Refills: 3 | Status: SHIPPED | OUTPATIENT
Start: 2023-10-25 | End: 2024-03-19

## 2023-10-25 ASSESSMENT — PAIN SCALES - GENERAL: PAINLEVEL: NO PAIN (0)

## 2023-10-25 NOTE — LETTER
My COPD Action Plan     Name: Ubaldo Ramos    YOB: 1945   Date: 10/25/2023    My doctor: John Oconnor NP   My clinic: [address] [id number] [phone]  My Controller Medicine: none   Dose:      My Rescue Medicine: albuterol   Dose: 1 puff every 4 hours prn     My Flare Up Medicine: prednisone   Dose: taper     My COPD Severity: Severe = FeV1 < 30%-49%      Use of Oxygen: Oxygen Not Prescribed      Make sure you've had your pneumonia   vaccines.          GREEN ZONE       Doing well today    Usual level of activity and exercise  Usual amount of cough and mucus  No shortness of breath  Usual level of health (thinking clearly, sleeping well, feel like eating) Actions:    Take daily medicines  Use oxygen as prescribed  Follow regular exercise and diet plan  Avoid cigarette smoke and other irritants that harm the lungs           YELLOW ZONE          Having a bad day or flare up    Short of breath more than usual  A lot more sputum (mucus) than usual  Sputum looks yellow, green, tan, brown or bloody  More coughing or wheezing  Fever or chills  Less energy; trouble completing activities  Trouble thinking or focusing  Using quick relief inhaler or nebulizer more often  Poor sleep; symptoms wake me up  Do not feel like eating Actions:    Get plenty of rest  Take daily medicines  Use quick relief inhaler every 4 hours  If you use oxygen, call you doctor to see if you should adjust your oxygen  Do breathing exercises or other things to help you relax  Let a loved one, friend or neighbor know you are feeling worse  Call your care team if you have 2 or more symptoms.  Start taking steroids or antibiotics if directed by your care team           RED ZONE       Need medical care now    Severe shortness of breath (feel you can't breathe)  Fever, chills  Not enough breath to do any activity  Trouble coughing up  mucus, walking or talking  Blood in mucus  Frequent coughing Rescue medicines are not working  Not able to sleep because of breathing  Feel confused or drowsy  Chest pain    Actions:    Call your health care team.  If you cannot reach your care team, call 911 or go to the emergency room.        Annual Reminders:  Meet with Care Team, Flu Shot every Fall  Pharmacy: [id number] [address] [place] [facility] [place] [address]

## 2023-10-25 NOTE — PATIENT INSTRUCTIONS
I will get your paperwork faxed to your surgeon.    Follow your surgeon's directions regarding eating and drinking prior to surgery.     Medications you can take the morning of surgery include any inhalers and: metoprolol and pantoprazole.      Your surgeon will manage any complications after your procedure.    Feel free to pop back in this afternoon after your PET scan for that covid shot.     We will send the orders for the electric scooter to the [employer] [email or web address] people.

## 2023-10-25 NOTE — PROGRESS NOTES
[facility]  [address]  [place]  [facility] [address]  Phone: [phone]  Fax: [phone]  Primary Provider: John Oconnor  Pre-op Performing Provider: JOHN OCONNOR    PREOPERATIVE EVALUATION:  Today's date: 10/25/2023    Ubaldo is a 78 year old male who presents for a preoperative evaluation.      10/25/2023     8:35 AM   Additional Questions   Roomed by Anni Canales CMA       Surgical Information:  Surgery/Procedure: PHACO IO- Left and Right  Surgery Location: [facility]  Surgeon: Dr. Singh  Surgery Date: 10/26/2023 & 11/9/2023  Time of Surgery: TBD  Where patient plans to recover: At home with family  Fax number for surgical facility: [phone]    Assessment & Plan     The proposed surgical procedure is considered LOW risk.    Preop general physical exam  Medically optimized for surgery    Cataract of both eyes, unspecified cataract type  Planned surgical correction    Benign Essential Hypertension  Well-controlled  - losartan-hydrochlorothiazide (HYZAAR) 50-12.5 MG tablet; Take 2 tablets by mouth every morning    - metoprolol tartrate (LOPRESSOR) 25 MG tablet; Take 1 tablet (25 mg) by mouth 2 times daily    Overactive bladder  - oxyBUTYnin ER (DITROPAN XL) 10 MG 24 hr tablet; Take 1 tablet (10 mg) by mouth daily    Multifocal motor neuropathy (H)   - pregabalin (LYRICA) 150 MG capsule; TAKE 1 CAPSULE(150 MG) BY MOUTH THREE TIMES DAILY AS NEEDED    Peripheral artery disease (H24)  Stable.  Checks in with vascular periodically.  - clopidogrel (PLAVIX) 75 MG tablet; Take 1 tablet (75 mg) by mouth daily    Peripheral polyneuropathy  Would benefit from electric scooter.  This will increase his independence and decrease risk for falls  - Miscellaneous Order for DME - ONLY FOR DME    Pulmonary emphysema, unspecified emphysema type (H)  - Miscellaneous Order for DME - ONLY FOR DME    Herniated Disc (L3 - L4)  - Miscellaneous  Order for DME - ONLY FOR DME    COPD, severe (H)  Managed by pulmonary medicine.  Not on maintenance inhalers due to cost.    Hx of cancer of lung  Growing mass noted on last CT imaging done through [facility].  PET scan coming up later today.            - No identified additional risk factors other than previously addressed    Additional Medication Instructions:  Okay to take metoprolol and pantoprazole morning of surgery with small sip of water.    RECOMMENDATION:  APPROVAL GIVEN to proceed with proposed procedure, without further diagnostic evaluation.    Reviewed oncology note x1, CT chest x1, urology note x1    Subjective       HPI related to upcoming procedure: Known cataract issues. Ophthalmology recommended surgical correction.     Has difficulty with walking long distances secondary to emphysema and chronic back pain issues.  Interested in electric scooter.        10/25/2023     8:36 AM   Preop Questions   1. Have you ever had a heart attack or stroke? No   2. Have you ever had surgery on your heart or blood vessels, such as a stent placement, a coronary artery bypass, or surgery on an artery in your head, neck, heart, or legs? No   3. Do you have chest pain with activity? No   4. Do you have a history of  heart failure? No   5. Do you currently have a cold, bronchitis or symptoms of other infection? No   6. Do you have a cough, shortness of breath, or wheezing? No   7. Do you or anyone in your family have previous history of blood clots? No   8. Do you or does anyone in your family have a serious bleeding problem such as prolonged bleeding following surgeries or cuts? No   9. Have you ever had problems with anemia or been told to take iron pills? No   10. Have you had any abnormal blood loss such as black, tarry or bloody stools? No   11. Have you ever had a blood transfusion? No   12. Are you willing to have a blood transfusion if it is medically needed before, during, or after your surgery? Yes    13. Have you or any of your relatives ever had problems with anesthesia? No   14. Do you have sleep apnea, excessive snoring or daytime drowsiness? No   15. Do you have any artifical heart valves or other implanted medical devices like a pacemaker, defibrillator, or continuous glucose monitor? No   16. Do you have artificial joints? No   17. Are you allergic to latex? No       Health Care Directive:  Patient does not have a Health Care Directive or Living Will: previously reviewed    Preoperative Review of :   reviewed - controlled substances reflected in medication list.    Status of Chronic Conditions:  See problem list for active medical problems.  Problems all longstanding and stable, except as noted/documented.  See ROS for pertinent symptoms related to these conditions.    Review of Systems  CONSTITUTIONAL: NEGATIVE for fever, chills, change in weight  INTEGUMENTARY/SKIN: NEGATIVE for worrisome rashes, moles or lesions  EYES: NEGATIVE for vision changes or irritation  ENT/MOUTH: NEGATIVE for ear, mouth and throat problems  RESP: NEGATIVE for significant cough or SOB  CV: NEGATIVE for chest pain, palpitations or peripheral edema  GI: NEGATIVE for nausea, abdominal pain, heartburn, or change in bowel habits  : NEGATIVE for frequency, dysuria, or hematuria  MUSCULOSKELETAL: NEGATIVE for significant arthralgias or myalgia  NEURO: NEGATIVE for weakness, dizziness or paresthesias  ENDOCRINE: NEGATIVE for temperature intolerance, skin/hair changes  HEME: NEGATIVE for bleeding problems  PSYCHIATRIC: NEGATIVE for changes in mood or affect    Patient Active Problem List    Diagnosis Date Noted    Hypoxemia 07/31/2022     Priority: Medium    Abnormal electrocardiogram 07/31/2022     Priority: Medium    COPD exacerbation (H) 07/31/2022     Priority: Medium    Multifocal motor neuropathy (H)  01/21/2022     Priority: Medium    Pulmonary emphysema, unspecified emphysema type (H) 01/21/2022     Priority: Medium     Hx of cancer of lung 06/27/2021     Priority: Medium     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  [facility]    Edson New MD         History of prostate cancer 06/27/2021     Priority: Medium    Peripheral polyneuropathy 06/27/2021     Priority: Medium    Urinary incontinence, mixed 06/27/2021     Priority: Medium    Peripheral artery disease (H24) 06/27/2021     Priority: Medium    Hx of fusion of cervical spine 06/27/2021     Priority: Medium    Herniated Disc (L3 - L4)      Priority: Medium     Created by Conversion        Lower Back Pain      Priority: Medium     Created by Conversion        Fatigue      Priority: Medium     Created by Conversion        Adenocarcinoma Of The Prostate Gland      Priority: Medium     Created by Conversion        Essential Hypercholesterolemia      Priority: Medium     Created by Conversion        Benign Essential Hypertension      Priority: Medium     Created by Conversion          No past medical history on file.  Past Surgical History:   Procedure Laterality Date    APPENDECTOMY      CERVICAL SPINE SURGERY      CHOLECYSTECTOMY      FEMORAL ENDARTERECTOMY      LUNG CANCER SURGERY      PROSTATECTOMY       Current Outpatient Medications   Medication Sig Dispense Refill    aspirin 81 MG EC tablet [ASPIRIN 81 MG EC TABLET] Take 81 mg by mouth daily.      atorvastatin (LIPITOR) 80 MG tablet Take 1 tablet (80 mg) by mouth At Bedtime 90 tablet 3    cholecalciferol, vitamin D3, (VITAMIN D3) 25 mcg (1,000 unit) capsule [CHOLECALCIFEROL, VITAMIN D3, (VITAMIN D3) 25 MCG (1,000 UNIT) CAPSULE] Take 1,000 Units by mouth daily.      clopidogrel (PLAVIX) 75 MG tablet Take 1 tablet (75 mg) by mouth daily 90 tablet 1    cyanocobalamin 1000 MCG tablet [CYANOCOBALAMIN 1000 MCG TABLET] Take 1,000 mcg by mouth daily.      ipratropium - albuterol 0.5 mg/2.5 mg/3 mL (DUONEB) 0.5-2.5 (3) MG/3ML neb solution Take 1 vial (3 mLs) by  "nebulization 4 times daily 1080 mL 2    losartan-hydrochlorothiazide (HYZAAR) 50-12.5 MG tablet Take 2 tablets by mouth every morning 180 tablet 3    metoprolol tartrate (LOPRESSOR) 25 MG tablet Take 1 tablet (25 mg) by mouth 2 times daily 180 tablet 3    oxybutynin ER (DITROPAN XL) 10 MG 24 hr tablet Take 1 tablet (10 mg) by mouth daily 90 tablet 3    pantoprazole (PROTONIX) 40 MG EC tablet TAKE 1 TABLET(40 MG) BY MOUTH DAILY 90 tablet 3    pregabalin (LYRICA) 150 MG capsule TAKE 1 CAPSULE(150 MG) BY MOUTH THREE TIMES DAILY AS NEEDED 270 capsule 1    acetaminophen-codeine (TYLENOL #3) 300-30 MG per tablet Take 1 tablet by mouth every 6 hours as needed for severe pain 30 tablet 0    albuterol (PROVENTIL) (2.5 MG/3ML) 0.083% neb solution Take 1 vial (2.5 mg) by nebulization 3 times daily as needed for shortness of breath / dyspnea or wheezing 90 mL 4    amitriptyline (ELAVIL) 25 MG tablet TAKE 1 TABLET BY MOUTH ONCE DAILY (ONE HOUR BEFORE BEDTIME) Oral for 30      gabapentin (NEURONTIN) 600 MG tablet 1 Orally 4x daily      methylPREDNISolone (MEDROL DOSEPAK) 4 MG tablet therapy pack Follow Package Directions 21 tablet 0    omeprazole (PRILOSEC) 40 MG DR capsule 1 Orally once daily         Allergies   Allergen Reactions    Adhesive Tape Unknown        Social History     Tobacco Use    Smoking status: Every Day     Packs/day: .5     Types: Cigarettes     Passive exposure: Current    Smokeless tobacco: Never   Substance Use Topics    Alcohol use: Not Currently     No family history on file.  History   Drug Use Unknown         Objective     /72 (BP Location: Left arm, Patient Position: Sitting, Cuff Size: Adult Regular)   Pulse 57   Temp 97.8  F (36.6  C) (Oral)   Resp 16   Ht 1.664 m (5' 5.5\")   Wt 79.2 kg (174 lb 11.2 oz)   SpO2 95%   BMI 28.63 kg/m      Physical Exam    GENERAL APPEARANCE: healthy, alert and no distress     EYES: EOMI,  PERRL     HENT: ear canals and TM's normal and nose and mouth without " ulcers or lesions     NECK: no adenopathy, no asymmetry, masses, or scars and thyroid normal to palpation     RESP: lungs clear to auscultation - no rales, rhonchi or wheezes     CV: regular rates and rhythm, normal S1 S2, no S3 or S4 and no murmur, click or rub     ABDOMEN:  soft, nontender, no HSM or masses and bowel sounds normal     MS: extremities normal- no gross deformities noted, no evidence of inflammation in joints, FROM in all extremities.     SKIN: no suspicious lesions or rashes     NEURO: Normal strength and tone, sensory exam grossly normal, mentation intact and speech normal     PSYCH: mentation appears normal. and affect normal/bright     LYMPHATICS: No cervical adenopathy    Recent Labs   Lab Test 05/23/23  1056 11/16/22  1055 07/31/22  0216 07/31/22  0215 03/04/22  1130 03/03/22  1121   HGB 11.4* 12.4*   < >  --    < > 10.9*   * 154   < >  --    < > 184   INR  --   --   --  1.09  --  1.09    140   < > 134*   < >  --    POTASSIUM 4.0 4.4   < > 3.4*   < >  --    CR 0.93 0.87   < > 0.88   < >  --     < > = values in this interval not displayed.        Diagnostics:  No labs were ordered during this visit.   No EKG required for low risk surgery (cataract, skin procedure, breast biopsy, etc).    Revised Cardiac Risk Index (RCRI):  The patient has the following serious cardiovascular risks for perioperative complications:   - No serious cardiac risks = 0 points     RCRI Interpretation: 0 points: Class I (very low risk - 0.4% complication rate)         Signed Electronically by: John Oconnor NP  Copy of this evaluation report is provided to requesting physician.

## 2023-10-27 ENCOUNTER — ALLIED HEALTH/NURSE VISIT (OUTPATIENT)
Dept: FAMILY MEDICINE | Facility: CLINIC | Age: 78
End: 2023-10-27
Payer: MEDICARE

## 2023-10-27 DIAGNOSIS — Z23 ENCOUNTER FOR IMMUNIZATION: Primary | ICD-10-CM

## 2023-10-27 DIAGNOSIS — Z23 NEED FOR TDAP VACCINATION: ICD-10-CM

## 2023-10-27 DIAGNOSIS — Z23 NEED FOR SHINGLES VACCINE: ICD-10-CM

## 2023-10-27 DIAGNOSIS — Z29.11 NEED FOR VACCINATION AGAINST RESPIRATORY SYNCYTIAL VIRUS: ICD-10-CM

## 2023-10-27 PROCEDURE — 91320 SARSCV2 VAC 30MCG TRS-SUC IM: CPT

## 2023-10-27 PROCEDURE — 90480 ADMN SARSCOV2 VAC 1/ONLY CMP: CPT

## 2023-10-27 PROCEDURE — 99207 PR NO CHARGE NURSE ONLY: CPT

## 2023-10-27 NOTE — PROGRESS NOTES
Prior to immunization administration, verified patients identity using patient s name and date of birth. Please see Immunization Activity for additional information.     Screening Questionnaire for Adult Immunization    Are you sick today?   No   Do you have allergies to medications, food, a vaccine component or latex?   No   Have you ever had a serious reaction after receiving a vaccination?   No   Do you have a long-term health problem with heart, lung, kidney, or metabolic disease (e.g., diabetes), asthma, a blood disorder, no spleen, complement component deficiency, a cochlear implant, or a spinal fluid leak?  Are you on long-term aspirin therapy?   No   Do you have cancer, leukemia, HIV/AIDS, or any other immune system problem?   No   Do you have a parent, brother, or sister with an immune system problem?   No   In the past 3 months, have you taken medications that affect  your immune system, such as prednisone, other steroids, or anticancer drugs; drugs for the treatment of rheumatoid arthritis, Crohn s disease, or psoriasis; or have you had radiation treatments?   No   Have you had a seizure, or a brain or other nervous system problem?   No   During the past year, have you received a transfusion of blood or blood    products, or been given immune (gamma) globulin or antiviral drug?   No   For women: Are you pregnant or is there a chance you could become       pregnant during the next month?   No   Have you received any vaccinations in the past 4 weeks?   No     Immunization questionnaire answers were all negative.    I have reviewed the following standing orders:   This patient is due and qualifies for the Covid-19 vaccine.     Click here for COVID-19 Standing Order    I have reviewed the vaccines inclusion and exclusion criteria; No concerns regarding eligibility.     Patient instructed to remain in clinic for 15 minutes afterwards, and to report any adverse reactions.     Screening performed by Sarah CHANG  Juan on 10/27/2023 at 8:46 AM.

## 2023-11-02 ENCOUNTER — TELEPHONE (OUTPATIENT)
Dept: VASCULAR SURGERY | Facility: CLINIC | Age: 78
End: 2023-11-02
Payer: MEDICARE

## 2023-11-02 NOTE — TELEPHONE ENCOUNTER
Writer called Ubaldo because is [employer] policy was coming back as inactive.  Pt states he makes monthly payments and within the last couple days spoke with someone at [facility] and discussed the policy.  Writer called [employer], was told they are not allowed to verify if an insurance is active or not. Writer asked three different ways to make sure it was understood that they are not able to verify insurance activation.  Writer is not able to create an account to verify if the insurance is active, asked coworkers and they are not able to access either.

## 2023-11-21 ENCOUNTER — OFFICE VISIT (OUTPATIENT)
Dept: VASCULAR SURGERY | Facility: CLINIC | Age: 78
End: 2023-11-21
Attending: RADIOLOGY
Payer: MEDICARE

## 2023-11-21 ENCOUNTER — ANCILLARY PROCEDURE (OUTPATIENT)
Dept: VASCULAR ULTRASOUND | Facility: CLINIC | Age: 78
End: 2023-11-21
Attending: RADIOLOGY
Payer: MEDICARE

## 2023-11-21 VITALS
OXYGEN SATURATION: 98 % | SYSTOLIC BLOOD PRESSURE: 132 MMHG | RESPIRATION RATE: 16 BRPM | HEART RATE: 51 BPM | DIASTOLIC BLOOD PRESSURE: 61 MMHG

## 2023-11-21 DIAGNOSIS — R09.89 OTHER SPECIFIED SYMPTOMS AND SIGNS INVOLVING THE CIRCULATORY AND RESPIRATORY SYSTEMS: ICD-10-CM

## 2023-11-21 DIAGNOSIS — I73.9 PERIPHERAL ARTERY DISEASE (H): ICD-10-CM

## 2023-11-21 DIAGNOSIS — I73.9 PERIPHERAL ARTERY DISEASE (H): Primary | ICD-10-CM

## 2023-11-21 DIAGNOSIS — I65.29 STENOSIS OF CAROTID ARTERY, UNSPECIFIED LATERALITY: ICD-10-CM

## 2023-11-21 DIAGNOSIS — Z87.891 HISTORY OF SMOKING: ICD-10-CM

## 2023-11-21 DIAGNOSIS — Z13.6 ENCOUNTER FOR SCREENING FOR STENOSIS OF CAROTID ARTERY: ICD-10-CM

## 2023-11-21 PROCEDURE — 93923 UPR/LXTR ART STDY 3+ LVLS: CPT

## 2023-11-21 PROCEDURE — 93880 EXTRACRANIAL BILAT STUDY: CPT

## 2023-11-21 PROCEDURE — G0463 HOSPITAL OUTPT CLINIC VISIT: HCPCS | Mod: 25

## 2023-11-21 PROCEDURE — 93925 LOWER EXTREMITY STUDY: CPT

## 2023-11-21 RX ORDER — CILOSTAZOL 100 MG/1
100 TABLET ORAL 2 TIMES DAILY
Qty: 60 TABLET | Refills: 0 | Status: SHIPPED | OUTPATIENT
Start: 2023-11-21 | End: 2024-02-20

## 2023-11-21 RX ORDER — VARENICLINE TARTRATE 0.5 (11)-1
KIT ORAL
Qty: 53 TABLET | Refills: 0 | Status: SHIPPED | OUTPATIENT
Start: 2023-11-21 | End: 2024-03-19

## 2023-11-21 RX ORDER — CILOSTAZOL 50 MG/1
TABLET ORAL
Qty: 14 TABLET | Refills: 0 | Status: SHIPPED | OUTPATIENT
Start: 2023-11-21 | End: 2024-04-27

## 2023-11-21 NOTE — PATIENT INSTRUCTIONS
Jarret Conner,    Thank you for entrusting your care with us today. After your visit today with MD Miguel Callejas this is the plan that was discussed at your appointment.    Please call to schedule CTA of the neck and CTA abdomen pelvis runoff. Try to schedule on the same day [phone]. Dr. Callejas will call you with the results    Begin taking Pletal- taper dosing highlighted below    Begin taking Chantix     I am including additional information on these things and our contact information if you have any questions or concerns.   Please do not hesitate to reach out to us if you felt we did not answer your questions or you are unsure of the treatment plan after your visit today. Our number is [phone].Thank you for trusting us with your care.     We would like to start you on a medication called Pletal or Cilostazol.    We start you on this slowly and go up over the course of 4 weeks.    The first 2 weeks we would like you to take 50 mg once a day for 2 weeks.  Then increase to 50mg twice a day for 2 weeks.  Finally increase to 100mg twice a day thereafter.    Please notify us if you have any problems with nausea, diarrhea or increased blood sugar levels. During this dose titration we would like you to check your blood sugars twice a day if you are diabetic.   Cilostazol Oral tablet  What is this medicine?  CILOSTAZOL (kacie [place] sta zol) is used to treat the symptoms of intermittent claudication. This condition causes pain in the legs during walking, and goes away with rest. By improving blood flow, this medicine helps people with this condition walk longer distances without pain.  This medicine may be used for other purposes; ask your health care provider or pharmacist if you have questions.  What should I tell my health care provider before I take this medicine?  They need to know if you have any of the following conditions:  bleeding disorder or hemophilia  history of heart failure, heart attack, or other heart  disease  an unusual or allergic reaction to cilostazol, other medicines, foods, dyes, or preservatives  pregnant or trying to get pregnant  breast-feeding  How should I use this medicine?  Take this medicine by mouth with a full glass of water. Follow the directions on the prescription label. Take this medicine on an empty stomach, at least 30 minutes before or 2 hours after food. Do not take with food. Take your doses at regular intervals. Do not take your medicine more often than directed.  Talk to your pediatrician regarding the use of this medicine in children. Special care may be needed.  Overdosage: If you think you have taken too much of this medicine contact a poison control center or emergency room at once.  NOTE: This medicine is only for you. Do not share this medicine with others.  What if I miss a dose?  If you miss a dose, take it as soon as you can. If it is almost time for your next dose, take only that dose. Do not take double or extra doses.  What may interact with this medicine?  Do not take this medicine with any of the following medications:  grapefruit juice  This medicine may also interact with the following medications:  agents that prevent or treat blood clots like enoxaparin or warfarin  aspirin  diltiazem  erythromycin or clarithromycin  omeprazole  some medications for treating depression like fluoxetine, fluvoxamine, nefazodone  some medications for treating fungal infections like ketoconazole, fluconazole, itraconazole  This list may not describe all possible interactions. Give your health care provider a list of all the medicines, herbs, non-prescription drugs, or dietary supplements you use. Also tell them if you smoke, drink alcohol, or use illegal drugs. Some items may interact with your medicine.  What should I watch for while using this medicine?  Visit your doctor or health care professional for regular checks on your progress. It may take 2 to 4 weeks for your condition to start  to get better once you begin taking this medicine. In some people, it can take as long as 3 months for the condition to get better.  You may get drowsy or dizzy. Do not drive, use machinery, or do anything that needs mental alertness until you know how this drug affects you. Do not stand or sit up quickly, especially if you are an older patient. This reduces the risk of dizzy or fainting spells. Alcohol can make you more drowsy and dizzy. Avoid alcoholic drinks.  Smoking may have effects on the circulation that may limit the benefits you receive from this medicine. You may wish to discuss how to stop smoking with your doctor or health care professional.  If you are going to have surgery, tell your doctor or health care professional that you are taking this medicine.  What side effects may I notice from receiving this medicine?  Side effects that you should report to your doctor or health care professional as soon as possible:  allergic reactions like skin rash, itching or hives, swelling of the face, lips, or tongue  chest pain  fast, slow, or irregular heartbeat  signs and symptoms of bleeding such as bloody or black, tarry stools; red or dark-brown urine; spitting up blood or brown material that looks like coffee grounds; red spots on the skin; unusual bruising or bleeding from the eye, gums, or nose  swelling in the legs or ankles  Side effects that usually do not require medical attention (report to your doctor or health care professional if they continue or are bothersome):  diarrhea  headache  nausea, or upset stomach  This list may not describe all possible side effects. Call your doctor for medical advice about side effects. You may report side effects to FDA at 3-211-FDA-9938.  Where should I keep my medicine?  Keep out of the reach of children.  Store at room temperature between 15 and 30 degrees C (59 and 86 degrees F). Throw away any unused medicine after the expiration date.  NOTE:This sheet is a  summary. It may not cover all possible information. If you have questions about this medicine, talk to your doctor, pharmacist, or health care provider. Copyright  2015 Gold Standard             Again thank you for your time.     Computed Tomography (CT) Scan: About This Test    What is it?  A computed tomography (CT) scan uses X-rays to make detailed pictures of parts of your body and the structures inside your body. During the test, you will lie on a table that is attached to the CT scanner. The CT scanner is a large doughnut-shaped machine.    Why is this test done?  Doctors use CT scans to study areas of the body, such as the brain, chest, belly, spine, bones, or joints. CT scans are also used to assist with or check on the success of a procedure or surgery.    How do you prepare for the test?  In general, there's nothing you have to do before this test, unless your doctor tells you to.    Tell your doctor if you get nervous in tight spaces. You may get a medicine to help you relax. If you think you'll get this medicine, be sure you have someone to take you home.    How is the test done?  Before the test  You may have to take off jewelry.  You will take off all or most of your clothes and change into a gown. If you do leave some clothes on, make sure you take everything out of your pockets.  You may have contrast material (dye) put into your arm through a tube called an IV.    During the test  You will lie on a table that is attached to the CT scanner.  The table slides into the round opening of the scanner. The table will move during the scan. The scanner moves within the doughnut-shaped casing around your body.  You will be asked to hold still during the scan. You may be asked to hold your breath for short periods.  You may be alone in the scanning room. But a technologist will watch you through a window and talk with you during the test.    How does the test feel?  The test will not cause pain, but some  people feel nervous inside the CT scanner.    If a medicine to help you relax (sedative) or dye is used, you may feel a quick sting or pinch when the IV is started. The dye may make you feel warm and flushed and give you a metallic taste in your mouth. Some people feel sick to their stomach or get a headache. Tell the technologist or your doctor how you are feeling.    How long does the test take?  The test will take about 30 to 60 minutes. Most of this time is spent getting ready for the scan. The actual test takes a few minutes.    What happens after the test?  You will probably be able to go home right away.  You can go back to your usual activities right away.  If dye was used, drink plenty of fluids for 24 hours after the test, unless your doctor tells you not to.  Follow-up care is a key part of your treatment and safety. Be sure to make and go to all appointments, and call your doctor if you are having problems. It's also a good idea to keep a list of the medicines you take. Ask your doctor when you can expect to have your test results.    Current as of: December 19, 2022  Author: [employer] Staff  Medical Review:Jus Avilez MD - Family Medicine & JULIO C Mcadams MD - Internal Medicine & Jas Lujan MD - Family Medicine & Ralph Smith MD - Family Medicine & Derrick Brandt MD - Diagnostic Radiology

## 2023-11-21 NOTE — NURSING NOTE
"[facility]        Patient is here for a 1 year follow up  to discuss Peripheral artery disease (PAD). Symptoms include bilateral \"hip\" pain when walking about a half of block. He has to stop due to the pain, he will sit if able. He states that he does his daily activities, no additional exercise.     Pt is currently taking Aspirin, Statin, and Plavix.    There were no vitals taken for this visit.    The provider has been notified that the patient has no concerns.     Questions patient would like addressed today are: N/A.    Refills are needed: N/A    Has homecare services and agency name:  No          "

## 2023-11-22 NOTE — PROGRESS NOTES
VASCULAR AND INTERVENTIONAL OUTPATIENT CONSULT OR VISIT  PHYSICIAN: Miguel Callejas MD  ESTABLISHED PATIENT    LOCATION: [facility]    Ubaldo Ramos   Medical Record #:  [id number]  YOB: 1945  Age:  78 year old     Date of Service: 11/21/2023    PRIMARY CARE PROVIDER: John Oconnor    Reason for visit: Peripheral vascular disease, lower extremity pain     IMPRESSION: 78-year-old male with history of chronic peripheral arterial disease. The patient has an extensive past history including multiple stenting procedures involving his bilateral iliac arteries as well as a right to left femoral-femoral bypass (now occluded) all of which were performed in [place].  He walks with the assistance of a cane when traveling longer distances and reports claudication after walking approximately 1 block.  Noninvasive imaging performed today demonstrates an ankle-brachial index of 0.76 on the right and 0.83 on the left which have worsened since last year.  His arterial ultrasound demonstrates patent lower extremity vasculature with predominantly multiphasic waveforms.  Unfortunately, the patient continues to smoke.     RECOMMENDATION:    1.  Continue guideline recommended medical therapy for peripheral disease  -The patient has been taking dual antiplatelet therapy with aspirin and Plavix for many years.  He denies any bleeding complications.  No changes will be made to this regimen  -Continue high intensity statin therapy with Lipitor 80 mg once daily  -Encourage smoking cessation.  This was reviewed in great detail with the patient declined.  The patient is willing to attempt smoking cessation.  He was given a prescription for Chantix.  -PAD rehab.  Patient does not wish to participate in PAD rehab.  -Begin cilostazol with dose escalation.  The patient denies any history of heart failure.  -A CT urogram performed at [facility] in August 2021 demonstrates no evidence of an aortic aneurysm.     -Screening carotid ultrasound demonstrates severe internal carotid artery stenosis.  We will obtain a CT angiogram of the neck for further evaluation.  -We will obtain a CT angiogram of the abdomen and pelvis with lower extremity runoff for further delineation of his diminishing ankle-brachial indices bilaterally.      Return to clinic in 2-3 months for follow-up after the above imaging has been obtained.     HPI:  Ubaldo Ramos is a 78 year old male who was seen today in follow-up for peripheral arterial disease.  The patient reports lower extremity pain when walking longer distances greater than one block, however, is able to perform most of his daily activities when ambulating with a cane.  He denies any lower extremity rest pain or wounds/ulcerations.  His past medical history is significant for peripheral arterial disease for which he has undergone multiple endovascular interventions in [place].  He has undergone a right to left femoral-femoral bypass.  Despite all these interventions, he does not report any significant improvement in his lower extremity symptoms post intervention.  Overall, no significant changes in his health since his last visit.       PHH:  No past medical history on file.     Past Surgical History:   Procedure Laterality Date    APPENDECTOMY      CERVICAL SPINE SURGERY      CHOLECYSTECTOMY      FEMORAL ENDARTERECTOMY      LUNG CANCER SURGERY      PROSTATECTOMY         ALLERGIES:  Adhesive tape    MEDS:    Current Outpatient Medications:     aspirin 81 MG EC tablet, [ASPIRIN 81 MG EC TABLET] Take 81 mg by mouth daily., Disp: , Rfl:     atorvastatin (LIPITOR) 80 MG tablet, Take 1 tablet (80 mg) by mouth At Bedtime, Disp: 90 tablet, Rfl: 3    cholecalciferol, vitamin D3, (VITAMIN D3) 25 mcg (1,000 unit) capsule, [CHOLECALCIFEROL, VITAMIN D3, (VITAMIN D3) 25 MCG (1,000 UNIT) CAPSULE] Take 1,000 Units by mouth daily., Disp: , Rfl:     cilostazol (PLETAL) 100 MG tablet, Take 1 tablet (100 mg)  by mouth 2 times daily, Disp: 60 tablet, Rfl: 0    cilostazol (PLETAL) 50 MG tablet, Take 1 tablet (50 mg) by mouth 2 times daily for 14 days, THEN 1 tablet (50 mg) 2 times daily for 14 days., Disp: 14 tablet, Rfl: 0    clopidogrel (PLAVIX) 75 MG tablet, Take 1 tablet (75 mg) by mouth daily, Disp: 90 tablet, Rfl: 1    cyanocobalamin 1000 MCG tablet, [CYANOCOBALAMIN 1000 MCG TABLET] Take 1,000 mcg by mouth daily., Disp: , Rfl:     ipratropium - albuterol 0.5 mg/2.5 mg/3 mL (DUONEB) 0.5-2.5 (3) MG/3ML neb solution, Take 1 vial (3 mLs) by nebulization 4 times daily, Disp: 1080 mL, Rfl: 2    losartan-hydrochlorothiazide (HYZAAR) 50-12.5 MG tablet, Take 2 tablets by mouth every morning, Disp: 180 tablet, Rfl: 3    metoprolol tartrate (LOPRESSOR) 25 MG tablet, Take 1 tablet (25 mg) by mouth 2 times daily, Disp: 180 tablet, Rfl: 3    oxyBUTYnin ER (DITROPAN XL) 10 MG 24 hr tablet, Take 1 tablet (10 mg) by mouth daily, Disp: 90 tablet, Rfl: 3    pantoprazole (PROTONIX) 40 MG EC tablet, TAKE 1 TABLET(40 MG) BY MOUTH DAILY, Disp: 90 tablet, Rfl: 3    pregabalin (LYRICA) 150 MG capsule, TAKE 1 CAPSULE(150 MG) BY MOUTH THREE TIMES DAILY AS NEEDED, Disp: 270 capsule, Rfl: 3    varenicline (CHANTIX ZACK) 0.5 MG X 11 & 1 MG X 42 tablet, Take 0.5 mg tab daily for 3 days, THEN 0.5 mg tab twice daily for 4 days, THEN 1 mg twice daily., Disp: 53 tablet, Rfl: 0    SOCIAL HABITS:    History   Smoking Status    Every Day    Packs/day: 0.50    Types: Cigarettes   Smokeless Tobacco    Never     Social History    Substance and Sexual Activity      Alcohol use: Not Currently      History   Drug Use Unknown       FAMILY HISTORY:  No family history on file.    ADVANCE CARE DIRECTIVES:    Advance care directives reviewed in the chart and no changes made.     PE:  /61   Pulse 51   Resp 16   SpO2 98%   Wt Readings from Last 1 Encounters:   10/25/23 174 lb 11.2 oz (79.2 kg)     There is no height or weight on file to calculate  BMI.    EXAM:  GENERAL: This is a well-developed 78 year old male who appears his stated age  EYES: Grossly normal.  MOUTH: Buccal mucosa normal   MUSCULOSKELETAL: Grossly normal and both lower extremities are intact.  HEME/LYMPH: No lymphedema  NEUROLOGIC: Focally intact, Alert and oriented x 3.   PSYCH: appropriate affect  INTEGUMENT: No open lesions or ulcers    DIAGNOSTIC STUDIES:     Images:  US Lower Extremity Arterial Duplex Bilateral    Result Date: 11/21/2023  Table formatting from the original result was not included. Arterial Duplex Ultrasound (Date: 11/21/23) Lower Extremity Artery Evaluation Indication: Surveillance of PAD/ Known Right to Left CFA-CFA Bypass Graft occlusion/Hx of Bilateral JAIRO stents, leg pain, decreased lower extremity pulses, lower extremity pain  Previous: 11/09/2021; 11/08/22  History: Current Smoker, Hypertension, PAD, Angioplasty and Vascular Surgery Technique: Duplex imaging is performed utilizing gray-scale, two-dimensional images, and color-flow imaging. Doppler waveform analysis and spectral Doppler imaging is also performed. LOWER EXTREMITY ARTERIAL DUPLEX EXAM WITH WAVEFORMS Right Leg:(cm/s) Location: Velocities Waveforms EIA:   304  T CFA:   304  M PFA:   138  B SFA Proximal:   165  M SFA Mid:   144  M SFA Distal:   108  M Popliteal Artery:   89  M PTA:   49   M ANTONIA:   62  M DPA:   37  M Waveforms: T=Triphasic, M=Monophasic, B=Biphasic Left Leg:(cm/s) Location: Velocities Waveforms EIA:   340  M CFA:   269  M PFA:   158  B SFA Proximal:   126  M SFA Mid:   139  M SFA Distal:   104  B Popliteal Artery:   93  T PTA:   49   M ANTONIA:   57  B DPA:   51  B Waveforms: T=Triphasic, M=Monophasic, B=Biphasic Right to Left CFA-CFA Bypass Graft (cm/s)                 Location Velocity  Waveforms RT CFA INFLOW 304 M Inflow Artery RT CFA      Proximal Anastamosis 0 - Distal Anastamosis 0 - Outflow Artery LT CFA       LT CFA OUTFLOW 269 M Waveforms: T=Triphasic, M=Monophasic, B=Biphasic  Impression: Chronically occluded femoral-femoral bypass. 2. Right Lower Extremity: Monophasic waveforms in the external iliac artery, suggesting aortoiliac inflow disease.  The remaining vasculature is patent without focal stenosis. 3. Left Lower Extremity: Monophasic waveforms in the external iliac artery, suggesting aortoiliac inflow disease.  The remaining vasculature is patent without focal stenosis. Reference: Category Normal 1-19% 20-49% 50-99% Occluded PSV <160 cm/sec without spectral broadening <160 cm/sec with spectral broadening Increased Increased Absent Flow Ratio N/A N/A < 2.0 >2.0 N/A Post-Stenotic Turbulence No No No Yes N/A      US Low Ext Arterial Doppler  wo Ex    Result Date: 11/21/2023  Table formatting from the original result was not included. BILATERAL RESTING ANKLE-BRACHIAL INDICES (TITI'S) (Date: 11/21/23) Indication: Surveillance of PAD/ Known Right to Left CFA-CFA Bypass Graft occlusion/Hx of Bilateral JAIRO stents, leg pain, decreased lower extremity pulses  Previous: 11/09/2021; 11/08/22  History: Current Smoker, Hypertension, PAD, Angioplasty and Vascular Surgery Resting TITI's          Right: mmHg Index     Brachial: 141  Ankle-(PT): 110 0.76 Ankle-(DP): 90 0.62          Digit: 87 0.60               Left: mmHg Index     Brachial: 145  Ankle-(PT): 120 0.83 Ankle-(DP): 101 0.70          Digit: 80 0.55 Resting ankle-brachial index of 0.76 on the right. Toe Pressures of 87 mmHg and TBI of 0.60 Resting ankle-brachial index of 0.83 on the left. Toe Pressures of 80 mmHg and TBI of 0.55  VPR WAVEFORMS: The right volume plethysmography waveforms are mildly abnormal at the lower thigh level, mildly abnormal at the upper calf level and mildly abnormal at the ankle. The left volume plethysmography waveforms are mildly abnormal at the lower thigh level, mildly abnormal at the upper calf level and mildly abnormal at the ankle. Impression:  1. RIGHT LOWER EXTREMITY: TITI is Abnormal with an TITI of 0.76  indicating single level disease. Toe Pressures are Mildly abnormal but adequate for wound healing with toe pressures of 87 mmHg. 2. LEFT LOWER EXTREMITY: TITI is Abnormal with an TITI of 0.83 indicating single level disease. Toe Pressures are Mildly abnormal but adequate for wound healing with toe pressures of 80 mmHg. Reference: Wound classification Grade TITI Ankle Systolic Pressure Toe Pressures 0 > 0.80 > 100 mmHg > 60 mmHg 1 0.6 - 0.79 70 - 100 mmHg 40 - 59 mmHg 2 0.4 - 0.59 50-70 mmHg 30 - 39 mmHg 3 < 0.39 < 50 mmHg < 30 mmHg Digit Pressures DBI Disease Category > 0.70 Normal < 0.70 Abnormal > 30 mmHg Potential wound healing < 30 mmHg Impaired wound healing Ankle Brachial Pressures TITI Disease Category > 1.3  Likely vessel calcification with monophasic waveforms, non-diagnostic 0.95-1.30 Normal with multiphasic waveforms 0.50-0.95 Single level disease 0.30-0.50 Multilevel disease < 0.30 Critical limb ischema Volume Plethysmography Recording (VPR) at all levels Normal Sharp systolic peak, fast upstroke, prominent dicrotic notch in wave Mild Sharp systolic peak, fast upstroke, absent dicrotic notch in wave Moderate Flattened systolic peak, slowed upstroke, absent dicrotic notch inwave Severe amplitude wave with = upslope and down slope Occluded Flat Line      US Carotid Bilateral    Result Date: 11/21/2023  Table formatting from the original result was not included. BILATERAL CAROTID ULTRASOUND (Date: 11/21/23) Indication: Screening for carotid artery disease, carotid bruit Previous: None Symptoms: Hypertension and Current Smoker TECHNIQUE: Duplex exam performed utilizing 2D gray-scale imaging, Doppler interrogation with color-flow and spectral waveform analysis. Right Velocity  Chart (cm/sec) Location Right DISTAL CCA-PS 67 DISTAL CCA- ED 22 PROX ICA- PROX ICA-ED 62 ECA- ECA-ED 41 Vertebral- Antegrade 72 Subclavian A- Biphasic 113 Ratio ICA/CCA-PS 4.42 Ratio ICA/CCA-ED 2.81 Left Velocity  Chart  (cm/sec) Location Left DISTAL CCA-PS 96 DISTAL CCA- ED 21 PROX ICA- PROX ICA-ED 25 ECA- ECA-ED 19 Vertebral- Antegrade 52 Subclavian A- Biphasic 176 Ratio ICA/CCA-PS 1.06 Ratio ICA/CCA-ED 1.19 FINDINGS: RIGHT: There is predominantly echogenic  atheromatous plaque.  LEFT: There is predominantly echogenic atheromatous plaque.  RIGHT: Vertebral artery and subclavian artery waveforms are antegrade. LEFT: Vertebral artery and subclavian artery waveforms are antegrade. Impression:  1. 70-99% diameter stenosis of the right ICA relative to the proximal ICA diameter.  Elevation in left external carotid artery peak systolic velocities, reflecting hemodynamically significant stenosis. 2. Less than 50% diameter stenosis of the left ICA relative to the proximal ICA diameter. Evaluation based on velocities and NASCET criteria Category PSV (cm/s)  Plaque Imaging EDV (cm/s)  ICA/CCA Ratio Normal,  <125  None <40 <2 Mild <50% <125 < than 50% DR stenosis <40 <2 Moderate Disease 50-69% 125-230 > than 50% DR stenosis  2.0-4.0 Severe->70% but less than near occlusion >230 > than 50% DR stenosis >100 >4.0 Near Occlusion May be low or undetectable Visible, extensive Variable Variable Occlusion No Flow Visible with no detectable lumen N/A N/A Plaquetype Description Type 1 Uniformly echolucent Type 2 Predominantly echolucent >50% Type 3 Predominantly echogenic >50% Type 4 Uniformly echogenic Type 5 Unclassified due to poor visualization Ulcer Visible Ulcerative Plaque       LABS:      Sodium   Date Value Ref Range Status   05/23/2023 142 136 - 145 mmol/L Final   11/16/2022 140 136 - 145 mmol/L Final   08/03/2022 140 136 - 145 mmol/L Final     Urea Nitrogen   Date Value Ref Range Status   05/23/2023 20.9 8.0 - 23.0 mg/dL Final   11/16/2022 15.0 8.0 - 23.0 mg/dL Final   08/03/2022 24 8 - 28 mg/dL Final   08/01/2022 21 8 - 28 mg/dL Final   07/31/2022 17 8 - 28 mg/dL Final     Hemoglobin   Date Value Ref Range Status    05/23/2023 11.4 (L) 13.3 - 17.7 g/dL Final   11/16/2022 12.4 (L) 13.3 - 17.7 g/dL Final   08/01/2022 9.9 (L) 13.3 - 17.7 g/dL Final     Platelet Count   Date Value Ref Range Status   05/23/2023 140 (L) 150 - 450 10e3/uL Final   11/16/2022 154 150 - 450 10e3/uL Final   08/03/2022 157 150 - 450 10e3/uL Final     BNP   Date Value Ref Range Status   07/31/2022 74 0 - 76 pg/mL Final     INR   Date Value Ref Range Status   07/31/2022 1.09 0.85 - 1.15 Final   03/03/2022 1.09 0.85 - 1.15 Final       This was a in person visit in which 45 minutes of  total time was spent (either in face-to-face or non-face-to-face time).    Miguel Callejas MD, [facility]  VASCULAR AND INTERVENTIONAL PHYSICIAN  VASCULAR MEDICINE  INTERNAL MEDICINE  PAGER: [phone]  CALL SERVICE: [phone]

## 2023-11-24 ENCOUNTER — TELEPHONE (OUTPATIENT)
Dept: VASCULAR SURGERY | Facility: CLINIC | Age: 78
End: 2023-11-24
Payer: MEDICARE

## 2023-11-24 DIAGNOSIS — Z87.891 HISTORY OF SMOKING: Primary | ICD-10-CM

## 2023-11-24 RX ORDER — BUPROPION HYDROCHLORIDE 150 MG/1
TABLET, EXTENDED RELEASE ORAL
Qty: 3 TABLET | Refills: 0 | Status: SHIPPED | OUTPATIENT
Start: 2023-11-24 | End: 2024-04-27

## 2023-11-24 NOTE — TELEPHONE ENCOUNTER
Jeanne, pharmacist with [facility], called to clarify the dosing/directions on Wellbutrin. They received Rx for 3 tablets and did fill, but the directions do not match the amount prescribed. Can call and speak with any pharmacist, or send new Rx electronically. PH:  [phone]

## 2023-11-24 NOTE — TELEPHONE ENCOUNTER
Caller: Ubaldo    Provider: MD Miguel Callejas    Detailed reason for call: Ubaldo calling to clarify his prescriptions.  There are 2 of the same medications but different doses (cilostazol).  Is this correct?  Or should he only be filling one of those prescriptions.  He also states that the Chantix ZACK costs him about $200 to fill, so he is not going to get that one.  Is there a cheaper option he can get instead?    Best phone number to contact: [phone]    Best time to contact: any    Ok to leave a detailed message: Yes    Ok to speak to authorized person if needed: Yes: spouse, Maribel      (Noted to patient if reason is related to wound or incision, to please send a photo via email or Quanlightt.)

## 2023-11-24 NOTE — TELEPHONE ENCOUNTER
Writer spoke with wife.     Wrong dosage of Pletal was sent initially. Writer spoke with pharmacist and adjusted the first dose to 50mg once daily.     Pharmacist also helped with dosing of Wellbutrin for smoking cessation, RX sent. Wife updated that if it is too expensive to update us.     Dosing below went over- told to call back if pt develops diarrhea.   We start you on this slowly and go up over the course of 4 weeks.    The first 2 weeks we would like you to take 50 mg once a day for 2 weeks.  Then increase to 50mg twice a day for 2 weeks.  Finally increase to 100mg twice a day thereafter.

## 2023-12-13 ENCOUNTER — HOSPITAL ENCOUNTER (OUTPATIENT)
Dept: CT IMAGING | Facility: CLINIC | Age: 78
Discharge: HOME | End: 2023-12-13
Attending: RADIOLOGY
Payer: MEDICARE

## 2023-12-13 DIAGNOSIS — I65.29 STENOSIS OF CAROTID ARTERY, UNSPECIFIED LATERALITY: ICD-10-CM

## 2023-12-13 DIAGNOSIS — I73.9 PERIPHERAL ARTERY DISEASE (H): ICD-10-CM

## 2023-12-13 LAB
CREAT BLD-MCNC: 1.1 MG/DL (ref 0.7–1.3)
EGFRCR SERPLBLD CKD-EPI 2021: >60 ML/MIN/1.73M2

## 2023-12-13 PROCEDURE — G1010 CDSM STANSON: HCPCS

## 2023-12-13 PROCEDURE — 70498 CT ANGIOGRAPHY NECK: CPT | Mod: MG

## 2023-12-13 PROCEDURE — 82565 ASSAY OF CREATININE: CPT

## 2023-12-13 PROCEDURE — 250N000011 HC RX IP 250 OP 636: Performed by: RADIOLOGY

## 2023-12-13 RX ORDER — IOPAMIDOL 755 MG/ML
75 INJECTION, SOLUTION INTRAVASCULAR ONCE
Status: COMPLETED | OUTPATIENT
Start: 2023-12-13 | End: 2023-12-13

## 2023-12-13 RX ORDER — IOPAMIDOL 755 MG/ML
90 INJECTION, SOLUTION INTRAVASCULAR ONCE
Status: COMPLETED | OUTPATIENT
Start: 2023-12-13 | End: 2023-12-13

## 2023-12-13 RX ADMIN — IOPAMIDOL 90 ML: 755 INJECTION, SOLUTION INTRAVENOUS at 15:22

## 2023-12-13 RX ADMIN — IOPAMIDOL 75 ML: 755 INJECTION, SOLUTION INTRAVENOUS at 15:05

## 2023-12-14 ENCOUNTER — TELEPHONE (OUTPATIENT)
Dept: VASCULAR SURGERY | Facility: CLINIC | Age: 78
End: 2023-12-14
Payer: MEDICARE

## 2023-12-14 NOTE — TELEPHONE ENCOUNTER
"Patient is scheduled to see Dr. Schafer.  He is concerned and would like a call back from nursing to better explain why he needs to be seen.  \"Am I going to die, is it bad?\"      Phone: [phone]    Okay to speak to spouse, Maribel, or to Ubaldo.         "

## 2023-12-14 NOTE — TELEPHONE ENCOUNTER
Writer spoke with Ubaldo, explained the carotid stenosis. Told him to go to the ED if he experiences any stroke like symptoms, vision loss or left arm numbness.     Informed him he will meet with Dr. Schafer and discuss possible surgical options. He agreed and had no further questions.

## 2023-12-14 NOTE — TELEPHONE ENCOUNTER
TCB schedule apt with Dr. Schafer per Dr. Callejas    Message  Received: Today  Nikki Wilson, RN   [facility]  Please call to schedule with Dr. WONG for carotid stenosis.          Previous Messages       ----- Message -----  From: Miguel Callejas MD  Sent: 12/14/2023   1:48 PM CST  To: Nikki Wilson RN    Can you have him see Dr. WONG for a right carotid endart. I am working on getting an addendum to the CTA neck report as it looks to me to be more than 70% stenosis. Thx!

## 2023-12-19 DIAGNOSIS — G61.82 MULTIFOCAL MOTOR NEUROPATHY (H): ICD-10-CM

## 2023-12-19 DIAGNOSIS — I73.9 PERIPHERAL ARTERY DISEASE (H): ICD-10-CM

## 2023-12-19 DIAGNOSIS — I10 ESSENTIAL HYPERTENSION, BENIGN: ICD-10-CM

## 2023-12-19 RX ORDER — CLOPIDOGREL BISULFATE 75 MG/1
75 TABLET ORAL DAILY
Qty: 90 TABLET | Refills: 1 | OUTPATIENT
Start: 2023-12-19

## 2023-12-19 RX ORDER — LOSARTAN POTASSIUM AND HYDROCHLOROTHIAZIDE 12.5; 5 MG/1; MG/1
2 TABLET ORAL EVERY MORNING
Qty: 180 TABLET | Refills: 3 | OUTPATIENT
Start: 2023-12-19

## 2023-12-20 RX ORDER — PREGABALIN 150 MG/1
CAPSULE ORAL
Qty: 270 CAPSULE | OUTPATIENT
Start: 2023-12-20

## 2024-01-04 ENCOUNTER — OFFICE VISIT (OUTPATIENT)
Dept: VASCULAR SURGERY | Facility: CLINIC | Age: 79
End: 2024-01-04
Attending: SURGERY
Payer: MEDICARE

## 2024-01-04 ENCOUNTER — OFFICE VISIT (OUTPATIENT)
Dept: PULMONOLOGY | Facility: CLINIC | Age: 79
End: 2024-01-04
Payer: MEDICARE

## 2024-01-04 VITALS
DIASTOLIC BLOOD PRESSURE: 76 MMHG | SYSTOLIC BLOOD PRESSURE: 144 MMHG | WEIGHT: 177.2 LBS | BODY MASS INDEX: 29.04 KG/M2 | HEART RATE: 66 BPM | OXYGEN SATURATION: 98 %

## 2024-01-04 VITALS
RESPIRATION RATE: 16 BRPM | DIASTOLIC BLOOD PRESSURE: 82 MMHG | HEART RATE: 84 BPM | OXYGEN SATURATION: 97 % | SYSTOLIC BLOOD PRESSURE: 144 MMHG

## 2024-01-04 DIAGNOSIS — R05.3 CHRONIC COUGH: Primary | ICD-10-CM

## 2024-01-04 DIAGNOSIS — J44.9 COPD, SEVERE (H): ICD-10-CM

## 2024-01-04 DIAGNOSIS — I65.29 STENOSIS OF CAROTID ARTERY, UNSPECIFIED LATERALITY: Primary | ICD-10-CM

## 2024-01-04 PROCEDURE — 99214 OFFICE O/P EST MOD 30 MIN: CPT | Performed by: INTERNAL MEDICINE

## 2024-01-04 PROCEDURE — G0463 HOSPITAL OUTPT CLINIC VISIT: HCPCS | Performed by: SURGERY

## 2024-01-04 PROCEDURE — 99213 OFFICE O/P EST LOW 20 MIN: CPT | Mod: GC | Performed by: SURGERY

## 2024-01-04 RX ORDER — GUAIFENESIN 600 MG/1
600-1200 TABLET, EXTENDED RELEASE ORAL 2 TIMES DAILY PRN
Qty: 120 TABLET | Refills: 11 | Status: SHIPPED | OUTPATIENT
Start: 2024-01-04 | End: 2024-05-07

## 2024-01-04 NOTE — PATIENT INSTRUCTIONS
It was good to see you in clinic today. This is what we discussed:    I recommend that you quit smoking.  If you decide you want to quit smoking, Chantix (varenicline) can be very helpful in reducing cigarette cravings.  Use nebulized ipratropium-albuterol (Duoneb) up to four times daily.  Take guaifenesin 1-2 pills twice daily as needed for excess phlegm. If you want an over-the-counter version, there may be generic guaifenesin or brands such as Mucinex or Robitussin.  Try to stay active with exercise.  I will see you in one year.  Contact me with questions or concerns.    Ok Dela Cruz MD  Pulmonary and Critical Care Medicine  [facility]  Office [phone]

## 2024-01-04 NOTE — PROGRESS NOTES
"Pulmonary Clinic Follow-up Visit    Assessment and Plan:   78 year old male with a history of active tobacco dependence, lung cancer s/p left upper lobectomy in 2017 with recurrence in 2018 s/p chemotherapy and radiation, chronic obstructive pulmonary disease, lumbar herniated disc, prostate cancer s/p radical prostatectomy and radiation, peripheral neuropathy, GERD, cervical spine surgery, peripheral arterial disease s/p left femoral endarterectomy and bilateral iliac stents, cholecystecomy, presenting for follow-up.     Tobacco dependence, chronic obstructive pulmonary disease, abnormal chest CT: Ubaldo continues to smoke 0.5 ppd. He is not ready to quit. He is having worsening calf claudication, was prescribed varenicline in vascular clinic which he filled but has not started taking. He may start taking it, but is asking whether it would be a problem to take it if he continues to smoke. He is using nebulized ipratropium-albuterol BID. Has exhaustion and bilateral calf pain with walking. Discussed the RUL opacity but he does not want follow-up at this time.He asks about a medication \"for phlegm.\" Recall from prior visits that Ubaldo has severe COPD with PFT in October 2021 showing FEV1 1.22 L (45%) and DLCOc 52%.    Plan:  - again advised tobacco cessation; he is precontemplative, even with worsening dyspnea, cough, and claudication  - encouraged him to start the varenicline even if he only wants to cut down on cigarettes slowly  - guaifenesin 600-1200 mg BID as needed; did emphasize that active tobacco dependence will continue to cause productive cough in addition to worsening pulmonary function, PAD, risk of heart attack, stroke, lung cancer, etc.  - nebulized ipratropium-albuterol TID-QID if able; he is currently using it BID  - discussed abnormal opacity RUL; will plan to follow up in one year but he does not want intervention at this time  - encouraged regular exercise; previously declined pulmonary " "rehabilitation  - recommend remaining up to date with respiratory vaccinations  - follow up in one year  - encouraged him to contact us with questions or concerning symptoms    Ok Dela Cruz MD  Pulmonary and Critical Care Medicine  [facility]  Office [phone]  Pager [phone]  he/him    CCx: Tobacco dependence, chronic obstructive pulmonary disease, abnormal chest CT    HPI: 78 year old male with a history of active tobacco dependence, lung cancer s/p left upper lobectomy in 2017 with recurrence in 2018 s/p chemotherapy and radiation, chronic obstructive pulmonary disease, lumbar herniated disc, prostate cancer s/p radical prostatectomy and radiation, peripheral neuropathy, GERD, cervical spine surgery, peripheral arterial disease s/p left femoral endarterectomy and bilateral iliac stents, cholecystecomy, presenting for follow-up. Ubaldo continues to smoke 0.5 ppd. He is not ready to quit. He is having worsening calf claudication, was prescribed varenicline in vascular clinic which he filled but has not started taking. He may start taking it, but is asking whether it would be a problem to take it if he continues to smoke. He is using nebulized ipratropium-albuterol BID. Has exhaustion and bilateral calf pain with walking. Discussed the RUL opacity but he does not want follow-up at this time.He asks about a medication \"for phlegm.\" Recall from prior visits that Ubaldo has severe COPD with PFT in October 2021 showing FEV1 1.22 L (45%) and DLCOc 52%.    ROS:  A 12-system review was obtained and was negative with the exception of the symptoms endorsed in the history of present illness.    PMH:  active tobacco dependence, lung cancer s/p left upper lobectomy in 2017 with recurrence in 2018 s/p chemotherapy and radiation, chronic obstructive pulmonary disease, lumbar herniated disc, prostate cancer s/p radical prostatectomy and radiation, peripheral neuropathy, GERD, cervical spine surgery, " peripheral arterial disease s/p left femoral endarterectomy and bilateral iliac stents, cholecystecomy    PSH:  Past Surgical History:   Procedure Laterality Date    APPENDECTOMY      CERVICAL SPINE SURGERY      CHOLECYSTECTOMY      FEMORAL ENDARTERECTOMY      LUNG CANCER SURGERY      PROSTATECTOMY         Allergies:  Allergies   Allergen Reactions    Adhesive Tape Unknown     Adhesive- pulls skin off        Family HX:  No family history on file.    Social Hx:  Social History     Socioeconomic History    Marital status:      Spouse name: Not on file    Number of children: Not on file    Years of education: Not on file    Highest education level: Not on file   Occupational History    Not on file   Tobacco Use    Smoking status: Every Day     Packs/day: .5     Types: Cigarettes     Passive exposure: Current    Smokeless tobacco: Never   Vaping Use    Vaping Use: Never used   Substance and Sexual Activity    Alcohol use: Not Currently    Drug use: Not Currently    Sexual activity: Not on file   Other Topics Concern    Not on file   Social History Narrative    Not on file     Social Determinants of Health     Financial Resource Strain: Low Risk  (10/25/2023)    Financial Resource Strain     Within the past 12 months, have you or your family members you live with been unable to get utilities (heat, electricity) when it was really needed?: No   Food Insecurity: Low Risk  (10/25/2023)    Food Insecurity     Within the past 12 months, did you worry that your food would run out before you got money to buy more?: No     Within the past 12 months, did the food you bought just not last and you didn t have money to get more?: No   Transportation Needs: Low Risk  (10/25/2023)    Transportation Needs     Within the past 12 months, has lack of transportation kept you from medical appointments, getting your medicines, non-medical meetings or appointments, work, or from getting things that you need?: No   Physical Activity:  Not on file   Stress: Not on file   Social Connections: Not on file   Interpersonal Safety: Low Risk  (10/25/2023)    Interpersonal Safety     Do you feel physically and emotionally safe where you currently live?: Yes     Within the past 12 months, have you been hit, slapped, kicked or otherwise physically hurt by someone?: No     Within the past 12 months, have you been humiliated or emotionally abused in other ways by your partner or ex-partner?: No   Housing Stability: Low Risk  (10/25/2023)    Housing Stability     Do you have housing? : Yes     Are you worried about losing your housing?: No       Current Meds:  Current Outpatient Medications   Medication Sig Dispense Refill    aspirin 81 MG EC tablet [ASPIRIN 81 MG EC TABLET] Take 81 mg by mouth daily.      atorvastatin (LIPITOR) 80 MG tablet Take 1 tablet (80 mg) by mouth At Bedtime 90 tablet 3    cholecalciferol, vitamin D3, (VITAMIN D3) 25 mcg (1,000 unit) capsule [CHOLECALCIFEROL, VITAMIN D3, (VITAMIN D3) 25 MCG (1,000 UNIT) CAPSULE] Take 1,000 Units by mouth daily.      clopidogrel (PLAVIX) 75 MG tablet Take 1 tablet (75 mg) by mouth daily 90 tablet 1    cyanocobalamin 1000 MCG tablet [CYANOCOBALAMIN 1000 MCG TABLET] Take 1,000 mcg by mouth daily.      guaiFENesin (MUCINEX) 600 MG 12 hr tablet Take 1-2 tablets (600-1,200 mg) by mouth 2 times daily as needed for congestion or cough 120 tablet 11    ipratropium - albuterol 0.5 mg/2.5 mg/3 mL (DUONEB) 0.5-2.5 (3) MG/3ML neb solution Take 1 vial (3 mLs) by nebulization 4 times daily 1080 mL 2    losartan-hydrochlorothiazide (HYZAAR) 50-12.5 MG tablet Take 2 tablets by mouth every morning 180 tablet 3    metoprolol tartrate (LOPRESSOR) 25 MG tablet Take 1 tablet (25 mg) by mouth 2 times daily 180 tablet 3    oxyBUTYnin ER (DITROPAN XL) 10 MG 24 hr tablet Take 1 tablet (10 mg) by mouth daily 90 tablet 3    pantoprazole (PROTONIX) 40 MG EC tablet TAKE 1 TABLET(40 MG) BY MOUTH DAILY 90 tablet 3    pregabalin  (LYRICA) 150 MG capsule TAKE 1 CAPSULE(150 MG) BY MOUTH THREE TIMES DAILY AS NEEDED 270 capsule 3    buPROPion (WELLBUTRIN SR) 150 MG 12 hr tablet Take 1 tablet (150 mg) by mouth daily for 3 days, THEN 1 tablet (150 mg) 2 times daily for 30 days. 3 tablet 0    cilostazol (PLETAL) 100 MG tablet Take 1 tablet (100 mg) by mouth 2 times daily 60 tablet 0    cilostazol (PLETAL) 50 MG tablet Take 1 tablet (50 mg) by mouth 2 times daily for 14 days, THEN 1 tablet (50 mg) 2 times daily for 14 days. 14 tablet 0    varenicline (CHANTIX ZACK) 0.5 MG X 11 & 1 MG X 42 tablet Take 0.5 mg tab daily for 3 days, THEN 0.5 mg tab twice daily for 4 days, THEN 1 mg twice daily. 53 tablet 0       Physical Exam:  BP (!) 144/76 (BP Location: Left arm, Patient Position: Sitting, Cuff Size: Adult Regular)   Pulse 66   Wt 80.4 kg (177 lb 3.2 oz)   SpO2 98%   BMI 29.04 kg/m    Gen: alert, oriented, no distress  HEENT: no cervical or supraclavicular lymphadenopathy  CV: RRR, no M/G/R  Resp: diminished bilaterally with prolonged expiratory phase, no focal crackles or wheezes  Skin: no apparent rashes  Ext: no cyanosis, clubbing or edema  Neuro: alert, nonfocal    Labs:  reviewed    Imaging studies:  Chest CT (October 2023, [facility]):  - images directly reviewed, formal interpretation follows:  FINDINGS:   LUNGS AND PLEURA: Postoperative changes left thoracotomy and left upper lobectomy. Fibrosis in the left lung base with small amount of adjacent pleural thickening stable.     There has been gradual progression of subsolid opacity in the right upper lobe abutting the fissure on examinations dating back to 2021. The overall size has increased measuring 2.0 x 1.7 cm by my measurements today. Small internal solid components measuring up to 5 mm also appear more prominent.     MEDIASTINUM/AXILLAE: No mediastinal or hilar adenopathy. No axillary adenopathy.     CORONARY ARTERY CALCIFICATION: Severe.     UPPER ABDOMEN: No significant finding.      MUSCULOSKELETAL: Unremarkable.    1.  The semisolid opacity in the right upper lobe abutting the fissure has slowly increased in size and density as compared to examinations dating back to 2021. This remains worrisome for low-grade adenocarcinoma.     2.  No change in appearance of the left lung. Postoperative changes left upper lobectomy. Fibrosis in the left lung base stable.     Pulmonary Function Testing  October 2021:  FVC 1.98 (56%)  FEV1 1.22 (45%)  Ratio 0.61 (LLN 0.62)  No BD response  RV 2.29 (87%)  TLC 4.12 (65%)  RV/TLC 0.56 (128%)  DLCOc 52%  Mixed obstructive/restrictive loop morphology    Time spent on chart and image review, meeting with the patient to obtain history, perform physical exam, discuss test results, diagnostic possibilities, further testing options, treatment plan options, and care coordination: 33 minutes

## 2024-01-04 NOTE — PATIENT INSTRUCTIONS
Jarret Conner,    Thank you for entrusting your care with us today. After your visit today with MD Flynn Schafer this is the plan that was discussed at your appointment.    We will contact you to schedule your 6 month follow-up with imaging      I am including additional information on these things and our contact information if you have any questions or concerns.   Please do not hesitate to reach out to us if you felt we did not answer your questions or you are unsure of the treatment plan after your visit today. Our number is [phone].Thank you for trusting us with your care.         Again thank you for your time.

## 2024-01-04 NOTE — LETTER
"    1/4/2024         RE: Ubaldo Ramos  [address]  [facility] [facility] [address]        Dear Colleague,    Thank you for referring your patient, Ubaldo Ramos, to the [facility]. Please see a copy of my visit note below.    Pulmonary Clinic Follow-up Visit    Assessment and Plan:   78 year old male with a history of active tobacco dependence, lung cancer s/p left upper lobectomy in 2017 with recurrence in 2018 s/p chemotherapy and radiation, chronic obstructive pulmonary disease, lumbar herniated disc, prostate cancer s/p radical prostatectomy and radiation, peripheral neuropathy, GERD, cervical spine surgery, peripheral arterial disease s/p left femoral endarterectomy and bilateral iliac stents, cholecystecomy, presenting for follow-up.     Tobacco dependence, chronic obstructive pulmonary disease, abnormal chest CT: Ubaldo continues to smoke 0.5 ppd. He is not ready to quit. He is having worsening calf claudication, was prescribed varenicline in vascular clinic which he filled but has not started taking. He may start taking it, but is asking whether it would be a problem to take it if he continues to smoke. He is using nebulized ipratropium-albuterol BID. Has exhaustion and bilateral calf pain with walking. Discussed the RUL opacity but he does not want follow-up at this time.He asks about a medication \"for phlegm.\" Recall from prior visits that Ubaldo has severe COPD with PFT in October 2021 showing FEV1 1.22 L (45%) and DLCOc 52%.    Plan:  - again advised tobacco cessation; he is precontemplative, even with worsening dyspnea, cough, and claudication  - encouraged him to start the varenicline even if he only wants to cut down on cigarettes slowly  - guaifenesin 600-1200 mg BID as needed; did emphasize that active tobacco dependence will continue to cause productive cough in addition to worsening pulmonary function, PAD, risk of heart attack, stroke, lung cancer, etc.  - nebulized " "ipratropium-albuterol TID-QID if able; he is currently using it BID  - discussed abnormal opacity RUL; will plan to follow up in one year but he does not want intervention at this time  - encouraged regular exercise; previously declined pulmonary rehabilitation  - recommend remaining up to date with respiratory vaccinations  - follow up in one year  - encouraged him to contact us with questions or concerning symptoms    Ok Dela Cruz MD  Pulmonary and Critical Care Medicine  [facility]  Office [phone]  Pager [phone]  he/him    CCx: Tobacco dependence, chronic obstructive pulmonary disease, abnormal chest CT    HPI: 78 year old male with a history of active tobacco dependence, lung cancer s/p left upper lobectomy in 2017 with recurrence in 2018 s/p chemotherapy and radiation, chronic obstructive pulmonary disease, lumbar herniated disc, prostate cancer s/p radical prostatectomy and radiation, peripheral neuropathy, GERD, cervical spine surgery, peripheral arterial disease s/p left femoral endarterectomy and bilateral iliac stents, cholecystecomy, presenting for follow-up. Ubaldo continues to smoke 0.5 ppd. He is not ready to quit. He is having worsening calf claudication, was prescribed varenicline in vascular clinic which he filled but has not started taking. He may start taking it, but is asking whether it would be a problem to take it if he continues to smoke. He is using nebulized ipratropium-albuterol BID. Has exhaustion and bilateral calf pain with walking. Discussed the RUL opacity but he does not want follow-up at this time.He asks about a medication \"for phlegm.\" Recall from prior visits that Ubaldo has severe COPD with PFT in October 2021 showing FEV1 1.22 L (45%) and DLCOc 52%.    ROS:  A 12-system review was obtained and was negative with the exception of the symptoms endorsed in the history of present illness.    PMH:  active tobacco dependence, lung cancer s/p left upper " lobectomy in 2017 with recurrence in 2018 s/p chemotherapy and radiation, chronic obstructive pulmonary disease, lumbar herniated disc, prostate cancer s/p radical prostatectomy and radiation, peripheral neuropathy, GERD, cervical spine surgery, peripheral arterial disease s/p left femoral endarterectomy and bilateral iliac stents, cholecystecomy    PSH:  Past Surgical History:   Procedure Laterality Date     APPENDECTOMY       CERVICAL SPINE SURGERY       CHOLECYSTECTOMY       FEMORAL ENDARTERECTOMY       LUNG CANCER SURGERY       PROSTATECTOMY         Allergies:  Allergies   Allergen Reactions     Adhesive Tape Unknown     Adhesive- pulls skin off        Family HX:  No family history on file.    Social Hx:  Social History     Socioeconomic History     Marital status:      Spouse name: Not on file     Number of children: Not on file     Years of education: Not on file     Highest education level: Not on file   Occupational History     Not on file   Tobacco Use     Smoking status: Every Day     Packs/day: .5     Types: Cigarettes     Passive exposure: Current     Smokeless tobacco: Never   Vaping Use     Vaping Use: Never used   Substance and Sexual Activity     Alcohol use: Not Currently     Drug use: Not Currently     Sexual activity: Not on file   Other Topics Concern     Not on file   Social History Narrative     Not on file     Social Determinants of Health     Financial Resource Strain: Low Risk  (10/25/2023)    Financial Resource Strain      Within the past 12 months, have you or your family members you live with been unable to get utilities (heat, electricity) when it was really needed?: No   Food Insecurity: Low Risk  (10/25/2023)    Food Insecurity      Within the past 12 months, did you worry that your food would run out before you got money to buy more?: No      Within the past 12 months, did the food you bought just not last and you didn t have money to get more?: No   Transportation Needs: Low  Risk  (10/25/2023)    Transportation Needs      Within the past 12 months, has lack of transportation kept you from medical appointments, getting your medicines, non-medical meetings or appointments, work, or from getting things that you need?: No   Physical Activity: Not on file   Stress: Not on file   Social Connections: Not on file   Interpersonal Safety: Low Risk  (10/25/2023)    Interpersonal Safety      Do you feel physically and emotionally safe where you currently live?: Yes      Within the past 12 months, have you been hit, slapped, kicked or otherwise physically hurt by someone?: No      Within the past 12 months, have you been humiliated or emotionally abused in other ways by your partner or ex-partner?: No   Housing Stability: Low Risk  (10/25/2023)    Housing Stability      Do you have housing? : Yes      Are you worried about losing your housing?: No       Current Meds:  Current Outpatient Medications   Medication Sig Dispense Refill     aspirin 81 MG EC tablet [ASPIRIN 81 MG EC TABLET] Take 81 mg by mouth daily.       atorvastatin (LIPITOR) 80 MG tablet Take 1 tablet (80 mg) by mouth At Bedtime 90 tablet 3     cholecalciferol, vitamin D3, (VITAMIN D3) 25 mcg (1,000 unit) capsule [CHOLECALCIFEROL, VITAMIN D3, (VITAMIN D3) 25 MCG (1,000 UNIT) CAPSULE] Take 1,000 Units by mouth daily.       clopidogrel (PLAVIX) 75 MG tablet Take 1 tablet (75 mg) by mouth daily 90 tablet 1     cyanocobalamin 1000 MCG tablet [CYANOCOBALAMIN 1000 MCG TABLET] Take 1,000 mcg by mouth daily.       guaiFENesin (MUCINEX) 600 MG 12 hr tablet Take 1-2 tablets (600-1,200 mg) by mouth 2 times daily as needed for congestion or cough 120 tablet 11     ipratropium - albuterol 0.5 mg/2.5 mg/3 mL (DUONEB) 0.5-2.5 (3) MG/3ML neb solution Take 1 vial (3 mLs) by nebulization 4 times daily 1080 mL 2     losartan-hydrochlorothiazide (HYZAAR) 50-12.5 MG tablet Take 2 tablets by mouth every morning 180 tablet 3     metoprolol tartrate  (LOPRESSOR) 25 MG tablet Take 1 tablet (25 mg) by mouth 2 times daily 180 tablet 3     oxyBUTYnin ER (DITROPAN XL) 10 MG 24 hr tablet Take 1 tablet (10 mg) by mouth daily 90 tablet 3     pantoprazole (PROTONIX) 40 MG EC tablet TAKE 1 TABLET(40 MG) BY MOUTH DAILY 90 tablet 3     pregabalin (LYRICA) 150 MG capsule TAKE 1 CAPSULE(150 MG) BY MOUTH THREE TIMES DAILY AS NEEDED 270 capsule 3     buPROPion (WELLBUTRIN SR) 150 MG 12 hr tablet Take 1 tablet (150 mg) by mouth daily for 3 days, THEN 1 tablet (150 mg) 2 times daily for 30 days. 3 tablet 0     cilostazol (PLETAL) 100 MG tablet Take 1 tablet (100 mg) by mouth 2 times daily 60 tablet 0     cilostazol (PLETAL) 50 MG tablet Take 1 tablet (50 mg) by mouth 2 times daily for 14 days, THEN 1 tablet (50 mg) 2 times daily for 14 days. 14 tablet 0     varenicline (CHANTIX ZACK) 0.5 MG X 11 & 1 MG X 42 tablet Take 0.5 mg tab daily for 3 days, THEN 0.5 mg tab twice daily for 4 days, THEN 1 mg twice daily. 53 tablet 0       Physical Exam:  BP (!) 144/76 (BP Location: Left arm, Patient Position: Sitting, Cuff Size: Adult Regular)   Pulse 66   Wt 80.4 kg (177 lb 3.2 oz)   SpO2 98%   BMI 29.04 kg/m    Gen: alert, oriented, no distress  HEENT: no cervical or supraclavicular lymphadenopathy  CV: RRR, no M/G/R  Resp: diminished bilaterally with prolonged expiratory phase, no focal crackles or wheezes  Skin: no apparent rashes  Ext: no cyanosis, clubbing or edema  Neuro: alert, nonfocal    Labs:  reviewed    Imaging studies:  Chest CT (October 2023, [facility]):  - images directly reviewed, formal interpretation follows:  FINDINGS:   LUNGS AND PLEURA: Postoperative changes left thoracotomy and left upper lobectomy. Fibrosis in the left lung base with small amount of adjacent pleural thickening stable.     There has been gradual progression of subsolid opacity in the right upper lobe abutting the fissure on examinations dating back to 2021. The overall size has increased measuring 2.0  x 1.7 cm by my measurements today. Small internal solid components measuring up to 5 mm also appear more prominent.     MEDIASTINUM/AXILLAE: No mediastinal or hilar adenopathy. No axillary adenopathy.     CORONARY ARTERY CALCIFICATION: Severe.     UPPER ABDOMEN: No significant finding.     MUSCULOSKELETAL: Unremarkable.    1.  The semisolid opacity in the right upper lobe abutting the fissure has slowly increased in size and density as compared to examinations dating back to 2021. This remains worrisome for low-grade adenocarcinoma.     2.  No change in appearance of the left lung. Postoperative changes left upper lobectomy. Fibrosis in the left lung base stable.     Pulmonary Function Testing  October 2021:  FVC 1.98 (56%)  FEV1 1.22 (45%)  Ratio 0.61 (LLN 0.62)  No BD response  RV 2.29 (87%)  TLC 4.12 (65%)  RV/TLC 0.56 (128%)  DLCOc 52%  Mixed obstructive/restrictive loop morphology    Time spent on chart and image review, meeting with the patient to obtain history, perform physical exam, discuss test results, diagnostic possibilities, further testing options, treatment plan options, and care coordination: 33 minutes      Again, thank you for allowing me to participate in the care of your patient.        Sincerely,        Ok Dela Cruz MD

## 2024-01-07 NOTE — PROGRESS NOTES
Vascular Surgery Clinic Followup Note    LOCATION:  [facility] [place]    Ubaldo Ramos  Medical Record #:  [id number]  YOB: 1945  Age:  78 year old     Date of Service: 1/4/2024         Assessment and Plan:    78 year old male smoker with asymptomatic right internal carotid artery stenosis.    Although duplex ultrasound suggests > 70% stenosis of the right ICA, CTA head and neck demonstrates the degree of stenosis is closer to 60%. Left ICA is < 50% stenosis.    We will plan for continued best medical management of asymptomatic moderate carotid stenosis. He will return to clinic in 6 months for repeat carotid duplex.    Patient was seen and discussed with staff, Dr. Schafer.    Calixto Goodson MD           Interval History:   Mr. Ramos is seen today for high-grade right carotid artery stenosis seen on duple ultrasound.  He has no specific complaints and feels well today. He continues to smoke.          Medications:     Current Outpatient Medications:     aspirin 81 MG EC tablet, [ASPIRIN 81 MG EC TABLET] Take 81 mg by mouth daily., Disp: , Rfl:     atorvastatin (LIPITOR) 80 MG tablet, Take 1 tablet (80 mg) by mouth At Bedtime, Disp: 90 tablet, Rfl: 3    cholecalciferol, vitamin D3, (VITAMIN D3) 25 mcg (1,000 unit) capsule, [CHOLECALCIFEROL, VITAMIN D3, (VITAMIN D3) 25 MCG (1,000 UNIT) CAPSULE] Take 1,000 Units by mouth daily., Disp: , Rfl:     cilostazol (PLETAL) 100 MG tablet, Take 1 tablet (100 mg) by mouth 2 times daily, Disp: 60 tablet, Rfl: 0    clopidogrel (PLAVIX) 75 MG tablet, Take 1 tablet (75 mg) by mouth daily, Disp: 90 tablet, Rfl: 1    cyanocobalamin 1000 MCG tablet, [CYANOCOBALAMIN 1000 MCG TABLET] Take 1,000 mcg by mouth daily., Disp: , Rfl:     guaiFENesin (MUCINEX) 600 MG 12 hr tablet, Take 1-2 tablets (600-1,200 mg) by mouth 2 times daily as needed for congestion or cough, Disp: 120 tablet, Rfl: 11    ipratropium - albuterol 0.5 mg/2.5 mg/3 mL (DUONEB) 0.5-2.5 (3)  MG/3ML neb solution, Take 1 vial (3 mLs) by nebulization 4 times daily, Disp: 1080 mL, Rfl: 2    losartan-hydrochlorothiazide (HYZAAR) 50-12.5 MG tablet, Take 2 tablets by mouth every morning, Disp: 180 tablet, Rfl: 3    metoprolol tartrate (LOPRESSOR) 25 MG tablet, Take 1 tablet (25 mg) by mouth 2 times daily, Disp: 180 tablet, Rfl: 3    oxyBUTYnin ER (DITROPAN XL) 10 MG 24 hr tablet, Take 1 tablet (10 mg) by mouth daily, Disp: 90 tablet, Rfl: 3    pantoprazole (PROTONIX) 40 MG EC tablet, TAKE 1 TABLET(40 MG) BY MOUTH DAILY, Disp: 90 tablet, Rfl: 3    pregabalin (LYRICA) 150 MG capsule, TAKE 1 CAPSULE(150 MG) BY MOUTH THREE TIMES DAILY AS NEEDED, Disp: 270 capsule, Rfl: 3    varenicline (CHANTIX ZACK) 0.5 MG X 11 & 1 MG X 42 tablet, Take 0.5 mg tab daily for 3 days, THEN 0.5 mg tab twice daily for 4 days, THEN 1 mg twice daily., Disp: 53 tablet, Rfl: 0    buPROPion (WELLBUTRIN SR) 150 MG 12 hr tablet, Take 1 tablet (150 mg) by mouth daily for 3 days, THEN 1 tablet (150 mg) 2 times daily for 30 days., Disp: 3 tablet, Rfl: 0    cilostazol (PLETAL) 50 MG tablet, Take 1 tablet (50 mg) by mouth 2 times daily for 14 days, THEN 1 tablet (50 mg) 2 times daily for 14 days., Disp: 14 tablet, Rfl: 0         Physical Exam:   BP (!) 144/82   Pulse 84   Resp 16   SpO2 97%   Wt Readings from Last 1 Encounters:   01/04/24 80.4 kg (177 lb 3.2 oz)     There is no height or weight on file to calculate BMI.    EXAM:    Gen: no acute distress, sitting comfortably in exam chair  HEENT: Atraumatic, normocephalic  Neuro: Alert and oriented. No gross neurologic deficits, face symmetric, speech fluent, tongue midline, normal strength and sensation in all 4 extremities   Pulm: nonlabored breathing on room air, no cough  CV: RRR by radial pulse, noncyanotic   ABD:soft, nontender, nondistended  MSK:  Normal active range of motion, no edema  Skin: Warm, dry, no rashes or abrasions on exposed skin  Psych: Normal affect, cooperative             Data:   Imaging:  CTA Neck with Contrast    Addendum Date: 12/14/2023    The degree of stenosis on the right has regions of more significant stenosis at the origin of the right ICA where there are findings of 70% stenosis, not 60% stenosis. In particular, stenosis is identified measuring 1.5 mm best appreciated on image 255 of series 7 which corresponds to approximately 70% stenosis.    Result Date: 12/14/2023  EXAM: CTA NECK WITH CONTRAST LOCATION: [facility] DATE: 12/13/2023 INDICATION:  Stenosis of carotid artery, unspecified laterality COMPARISON: Ultrasound examination 11/21/2023. CONTRAST: Isovue 370 75mL TECHNIQUE: Neck CT angiogram with IV contrast. Axial helical CT images of the neck vessels were obtained during the arterial phase of intravenous contrast administration. Axial helical 2D reconstructed images and multiplanar 3D MIP reconstructed images of the neck vessels were performed by the technologist. Dose reduction techniques were used. All stenosis measurements made according to NASCET criteria unless otherwise specified. FINDINGS: Atheromatous disease involves the aortic arch without high-grade stenosis. The common carotid arteries are without high-grade stenosis. The origins of the vertebral arteries are unremarkable. There is thickening of a dissection flap involving the proximal right internal carotid artery best appreciated on image 75 of series 6. Areas of plaque ulceration are noted. 60% stenosis of the right internal carotid artery. Atheromatous disease involves the origin of the left internal carotid artery with some mild induration of the surrounding soft tissues and poor visualization of the fat planes around this region. Areas of plaque ulceration are noted in the proximal left internal carotid artery. Less than 50% stenosis. The vertebral artery origins are unremarkable. No acute vascular injury in the vertebral arteries     IMPRESSION: 1.  60 percent stenosis of  the right internal carotid artery proximally in the neck. There is associated plaque ulceration and thickened dissection flap involving the origin of the right internal carotid artery in the neck. This short segment dissection has a more chronic appearance. 2.  Atheromatous disease involves the origin of the left internal carotid artery with some induration of the fat planes surrounding the soft tissues around the left common carotid artery distally and proximal left internal carotid artery. Correlate for focal point tenderness in this area and correlate with clinical findings as could be seen with carotidynia.    CTA Abdomen Pelvis Bilat Leg Runoff w Contr    Result Date: 12/13/2023  EXAM: CTA ABDOMEN PELVIS BILAT LEG RUNOFF W CONTR LOCATION: [facility] DATE: 12/13/2023 INDICATION:  Peripheral artery disease (H24) COMPARISON: PET/CT dated 10/25/2023 TECHNIQUE: Helical acquisition through the abdomen, pelvis, and bilateral lower extremities was performed during the arterial phase of contrast enhancement using IV Contrast. 2D and 3D reconstructions were performed by the CT technologist. Dose reduction  techniques were used. CONTRAST: Isovue 370 90mL FINDINGS: AORTA: There is a moderate amount of irregular ulcerating plaque primarily in the infrarenal abdominal aorta. This contributes to a mild stenosis. Celiac trunk, superior mesenteric artery, 2 right and left renal arteries, and inferior mesenteric artery are patent without significant stenoses. Iliac arteries: There is a stent in the left common iliac artery. There are stents extending from the right common iliac artery into the right external iliac artery. There are stents in the internal iliac arteries bilaterally. The common and external iliac arteries are patent without significant stenoses. The left internal iliac artery appears to be patent without definite significant stenosis. The right internal iliac artery including stent  appears to be occluded. A femoral femoral bypass graft is occluded. RIGHT LEG: Common femoral artery: No significant stenosis. Profunda femoris artery: No significant stenosis. Superficial femoral artery: Moderate to significant stenosis at the distal thigh. Popliteal artery: No significant stenosis. Tibial arteries: Three-vessel runoff. LEFT LEG: Common femoral artery: No significant stenosis. Profunda femoris artery: No significant stenosis. Superficial femoral artery: No significant stenosis. Popliteal artery: No significant stenosis. Tibial arteries: Three-vessel runoff. LUNG BASES: There is scarring/consolidation in the left lower lobe. This is unchanged. Trace left pleural effusion. ABDOMEN: Tiny hypodensity in the left lobe of the liver. This is too small to characterize. Focal atrophy at the lower pole of the left kidney. Remaining solid organs in the abdomen are unremarkable. The bowel is grossly unremarkable.     IMPRESSION: 1.  No significant stenoses in the iliac arteries. Common and external iliac artery stents bilaterally are patent. The right internal iliac artery appears to be occluded. 2.  Moderate irregular ulcerating soft plaque in the infrarenal abdominal aorta. 3.  Moderate to significant stenosis in the right superficial femoral artery in the distal thigh. 4.  Occluded femoral-femoral bypass graft.             Calixto Goodson MD

## 2024-01-17 ENCOUNTER — TRANSFERRED RECORDS (OUTPATIENT)
Dept: HEALTH INFORMATION MANAGEMENT | Facility: CLINIC | Age: 79
End: 2024-01-17
Payer: MEDICARE

## 2024-01-26 ENCOUNTER — TRANSFERRED RECORDS (OUTPATIENT)
Dept: HEALTH INFORMATION MANAGEMENT | Facility: CLINIC | Age: 79
End: 2024-01-26
Payer: MEDICARE

## 2024-02-15 NOTE — PATIENT INSTRUCTIONS
Jarret Conner,    Thank you for entrusting your care with us today. After your visit today with MD Miguel Callejas this is the plan that was discussed at your appointment.    You will be contacted to schedule 3 month follow-up with ultrasounds    Continue to try to quit smoking      I am including additional information on these things and our contact information if you have any questions or concerns.   Please do not hesitate to reach out to us if you felt we did not answer your questions or you are unsure of the treatment plan after your visit today. Our number is [phone].Thank you for trusting us with your care.         Again thank you for your time.     Ankle-Brachial Index (TITI) or Physiologic Test    Description  An ankle-brachial index test is relatively pain free. Blood pressure cuffs of various sizes are placed on your thigh, calf, foot and toes.  Similar to having your blood pressure checked with an arm cuff, as the technician inflates the cuffs, they progressively tighten and are then quickly released.  You may feel some discomfort, but generally for less than 60 seconds for each measurement. You will be asked to remove your socks and shoes and possibly your pants or shorts. Gowns will be provided. It usually takes about 30-60 minutes.   Depending on the initial readings and patient symptoms, you may be asked to perform a light walk on a treadmill.  The technician will apply ultrasound gel, usually warmed for your comfort, to your ankles and wrists. Through the gel, the technician will use a small hand-held device that emits sound waves.  Risks  There are typically no side effects or complications associated with a physiologic study.  How to Prepare  Eat and take medications as usual.  There is no preparation required for an ankle-brachial index (TITI) or physiologic exam.  What Can I Expect After the Test?  The technician will send the ultrasound images to your vascular surgeon for evaluation. Typically, a  report is available in 2-3 days. If anything critical is found, it is standard practice to notify the vascular surgeon immediately.  Reference: [email or web address]/patient-resources/vascular-tests     Lower Extremity Arterial Ultrasound    Description  Ultrasound examinations are painless and easy for the patient. The vascular laboratory will contain a bed and just two or three pieces of equipment. You will be asked to remove pants or shorts and gowns will be provided. It usually takes about 30 minutes.  The technician will tuck a towel under your underpants in the groin. The gel is water-soluble and will not stain your skin or clothes.  Ultrasound gel, usually warmed for your comfort, will be placed on the inner side of your legs.    Through the gel, the technician will apply to your legs a small hand-held device that emits sound waves.  When the test is completed, the technician will remove excess gel from your legs.    Risks  There are typically no side effects or complications associated with a lower extremity arterial duplex ultrasound.  How to Prepare  Eat and take medications as usual.  There is no preparation required for a lower extremity arterial duplex ultrasound.  What Can I Expect After the Test?  The technician will send the ultrasound images to your vascular surgeon for evaluation. Typically, a report is available in 2-3 days. If anything critical is found, it is standard practice to notify the vascular surgeon immediately.  Reference: https://vascular.org/patient-resources/vascular-tests

## 2024-02-15 NOTE — PROGRESS NOTES
[facility]        Patient is here for a 2 month follow up  to discuss Peripheral artery disease (PAD). Symptoms include claudicaton: right buttocks and left buttocks and calf at distance of 100 feet in both lower extremities. CTA runoff done 12/13. He does not believe his symptoms are life style limiting but he states that he has to walk slow.     Pt is currently taking Aspirin, Statin, Plavix, and Pletal .      The provider has been notified that the patient has no concerns.     Questions patient would like addressed today are: N/A.    Refills are needed: No    Has homecare services and agency name:  No

## 2024-02-20 ENCOUNTER — OFFICE VISIT (OUTPATIENT)
Dept: VASCULAR SURGERY | Facility: CLINIC | Age: 79
End: 2024-02-20
Attending: RADIOLOGY
Payer: MEDICARE

## 2024-02-20 VITALS — OXYGEN SATURATION: 96 % | DIASTOLIC BLOOD PRESSURE: 65 MMHG | HEART RATE: 62 BPM | SYSTOLIC BLOOD PRESSURE: 157 MMHG

## 2024-02-20 DIAGNOSIS — I73.9 PERIPHERAL ARTERY DISEASE (H): ICD-10-CM

## 2024-02-20 DIAGNOSIS — I73.9 PERIPHERAL ARTERY DISEASE (H): Primary | ICD-10-CM

## 2024-02-20 PROCEDURE — G0463 HOSPITAL OUTPT CLINIC VISIT: HCPCS

## 2024-02-20 RX ORDER — CILOSTAZOL 100 MG/1
100 TABLET ORAL 2 TIMES DAILY
Qty: 180 TABLET | Refills: 3 | Status: SHIPPED | OUTPATIENT
Start: 2024-02-20

## 2024-02-20 NOTE — PROGRESS NOTES
VASCULAR AND INTERVENTIONAL OUTPATIENT CONSULT OR VISIT  PHYSICIAN: Miguel Callejas MD  ESTABLISHED PATIENT    LOCATION: [facility]    Ubaldo Ramos   Medical Record #:  [id number]  YOB: 1945  Age:  78 year old     Date of Service: 2/20/2024    PRIMARY CARE PROVIDER: John Oconnor    Reason for visit: Peripheral vascular disease, lower extremity pain     IMPRESSION: 78-year-old male with history of chronic peripheral arterial disease. The patient has an extensive past history including multiple stenting procedures involving his bilateral iliac arteries as well as a right to left femoral-femoral bypass (now occluded) all of which were performed in [place].  He walks with the assistance of a cane when traveling longer distances and reports claudication after walking approximately 1 block.  Previous noninvasive imaging demonstrates an ankle-brachial index of 0.76 on the right and 0.83 on the left which have worsened since last year.  CT angiogram lower extremity runoff demonstrates significant stenosis involving the right superficial femoral artery.  Unfortunately, the patient continues to smoke.     RECOMMENDATION:    1.  Continue guideline recommended medical therapy for peripheral disease  -The patient has been taking dual antiplatelet therapy with aspirin and Plavix for many years.  He denies any bleeding complications.  No changes will be made to this regimen  -Continue high intensity statin therapy with Lipitor 80 mg once daily  -Encourage smoking cessation.  This was reviewed in great detail with the patient in the clinic.  The patient is again willing to attempt smoking cessation.  He was previously given a prescription for Chantix.  -PAD rehab.  Patient does not wish to participate in PAD rehab.  -Continue cilostazol 1 mg twice daily.  The patient denies any side effects related to this medication.  -A CT urogram performed at [facility] in August 2021 demonstrates no evidence  of an aortic aneurysm.    -Screening carotid ultrasound demonstrates severe internal carotid artery stenosis.  His CT angiogram neck demonstrates approximately 70% stenosis of the right internal carotid artery.  The patient has follow-up scheduled with Dr. Schafer.        The patient has a history of lung cancer and is being followed by [facility].  The patient has a history of prostate cancer and is being followed by [facility].  Return to clinic in 2-3 months for follow-up.  It is hopeful the patient will have discontinued smoking by this time.     HPI:  Ubaldo Ramos is a 78 year old male who was seen today in follow-up for peripheral arterial disease.  The patient reports lower extremity pain when walking longer distances greater than one block, however, is able to perform most of his daily activities when ambulating with a cane.  He denies any lower extremity rest pain or wounds/ulcerations.  His past medical history is significant for peripheral arterial disease for which he has undergone multiple endovascular interventions in [place].  He has undergone a right to left femoral-femoral bypass.  Despite all these interventions, he does not report any significant improvement in his lower extremity symptoms post intervention.  Overall, no significant changes in his health since his last visit.    PHH:  No past medical history on file.     Past Surgical History:   Procedure Laterality Date    APPENDECTOMY      CERVICAL SPINE SURGERY      CHOLECYSTECTOMY      FEMORAL ENDARTERECTOMY      LUNG CANCER SURGERY      PROSTATECTOMY         ALLERGIES:  Adhesive tape    MEDS:    Current Outpatient Medications:     aspirin 81 MG EC tablet, [ASPIRIN 81 MG EC TABLET] Take 81 mg by mouth daily., Disp: , Rfl:     atorvastatin (LIPITOR) 80 MG tablet, Take 1 tablet (80 mg) by mouth At Bedtime, Disp: 90 tablet, Rfl: 3    cholecalciferol, vitamin D3, (VITAMIN D3) 25 mcg (1,000 unit) capsule, [CHOLECALCIFEROL, VITAMIN  D3, (VITAMIN D3) 25 MCG (1,000 UNIT) CAPSULE] Take 1,000 Units by mouth daily., Disp: , Rfl:     clopidogrel (PLAVIX) 75 MG tablet, Take 1 tablet (75 mg) by mouth daily, Disp: 90 tablet, Rfl: 1    cyanocobalamin 1000 MCG tablet, [CYANOCOBALAMIN 1000 MCG TABLET] Take 1,000 mcg by mouth daily., Disp: , Rfl:     guaiFENesin (MUCINEX) 600 MG 12 hr tablet, Take 1-2 tablets (600-1,200 mg) by mouth 2 times daily as needed for congestion or cough, Disp: 120 tablet, Rfl: 11    ipratropium - albuterol 0.5 mg/2.5 mg/3 mL (DUONEB) 0.5-2.5 (3) MG/3ML neb solution, Take 1 vial (3 mLs) by nebulization 4 times daily, Disp: 1080 mL, Rfl: 2    losartan-hydrochlorothiazide (HYZAAR) 50-12.5 MG tablet, Take 2 tablets by mouth every morning, Disp: 180 tablet, Rfl: 3    metoprolol tartrate (LOPRESSOR) 25 MG tablet, Take 1 tablet (25 mg) by mouth 2 times daily, Disp: 180 tablet, Rfl: 3    oxyBUTYnin ER (DITROPAN XL) 10 MG 24 hr tablet, Take 1 tablet (10 mg) by mouth daily, Disp: 90 tablet, Rfl: 3    pantoprazole (PROTONIX) 40 MG EC tablet, TAKE 1 TABLET(40 MG) BY MOUTH DAILY, Disp: 90 tablet, Rfl: 3    pregabalin (LYRICA) 150 MG capsule, TAKE 1 CAPSULE(150 MG) BY MOUTH THREE TIMES DAILY AS NEEDED, Disp: 270 capsule, Rfl: 3    varenicline (CHANTIX ZACK) 0.5 MG X 11 & 1 MG X 42 tablet, Take 0.5 mg tab daily for 3 days, THEN 0.5 mg tab twice daily for 4 days, THEN 1 mg twice daily., Disp: 53 tablet, Rfl: 0    buPROPion (WELLBUTRIN SR) 150 MG 12 hr tablet, Take 1 tablet (150 mg) by mouth daily for 3 days, THEN 1 tablet (150 mg) 2 times daily for 30 days., Disp: 3 tablet, Rfl: 0    cilostazol (PLETAL) 100 MG tablet, Take 1 tablet (100 mg) by mouth 2 times daily, Disp: 180 tablet, Rfl: 3    cilostazol (PLETAL) 50 MG tablet, Take 1 tablet (50 mg) by mouth 2 times daily for 14 days, THEN 1 tablet (50 mg) 2 times daily for 14 days., Disp: 14 tablet, Rfl: 0    SOCIAL HABITS:    History   Smoking Status    Every Day    Packs/day: 0.50    Types:  Cigarettes   Smokeless Tobacco    Never     Social History    Substance and Sexual Activity      Alcohol use: Not Currently      History   Drug Use Unknown       FAMILY HISTORY:  No family history on file.    ADVANCE CARE DIRECTIVES:    Advance care directives reviewed in the chart and no changes made.     PE:  BP (!) 157/65   Pulse 62   SpO2 96%   Wt Readings from Last 1 Encounters:   01/04/24 80.4 kg (177 lb 3.2 oz)     There is no height or weight on file to calculate BMI.    EXAM:  GENERAL: This is a well-developed 78 year old male who appears his stated age  EYES: Grossly normal.  MOUTH: Buccal mucosa normal   MUSCULOSKELETAL: Grossly normal and both lower extremities are intact.  HEME/LYMPH: No lymphedema  NEUROLOGIC: Focally intact, Alert and oriented x 3.   PSYCH: appropriate affect  INTEGUMENT: No open lesions or ulcers    DIAGNOSTIC STUDIES:     Images:  No results found.    LABS:      Sodium   Date Value Ref Range Status   05/23/2023 142 136 - 145 mmol/L Final   11/16/2022 140 136 - 145 mmol/L Final   08/03/2022 140 136 - 145 mmol/L Final     Urea Nitrogen   Date Value Ref Range Status   05/23/2023 20.9 8.0 - 23.0 mg/dL Final   11/16/2022 15.0 8.0 - 23.0 mg/dL Final   08/03/2022 24 8 - 28 mg/dL Final   08/01/2022 21 8 - 28 mg/dL Final   07/31/2022 17 8 - 28 mg/dL Final     Hemoglobin   Date Value Ref Range Status   05/23/2023 11.4 (L) 13.3 - 17.7 g/dL Final   11/16/2022 12.4 (L) 13.3 - 17.7 g/dL Final   08/01/2022 9.9 (L) 13.3 - 17.7 g/dL Final     Platelet Count   Date Value Ref Range Status   05/23/2023 140 (L) 150 - 450 10e3/uL Final   11/16/2022 154 150 - 450 10e3/uL Final   08/03/2022 157 150 - 450 10e3/uL Final     BNP   Date Value Ref Range Status   07/31/2022 74 0 - 76 pg/mL Final     INR   Date Value Ref Range Status   07/31/2022 1.09 0.85 - 1.15 Final   03/03/2022 1.09 0.85 - 1.15 Final       This was a in person visit in which 30 minutes of  total time was spent (either in face-to-face or  non-face-to-face time).    Miguel Callejas MD, [facility]  VASCULAR AND INTERVENTIONAL PHYSICIAN  VASCULAR MEDICINE  INTERNAL MEDICINE  PAGER: [phone]  CALL SERVICE: [phone]

## 2024-02-26 DIAGNOSIS — I73.9 PERIPHERAL ARTERY DISEASE (H): Primary | ICD-10-CM

## 2024-02-26 DIAGNOSIS — I10 ESSENTIAL HYPERTENSION, BENIGN: ICD-10-CM

## 2024-02-26 NOTE — TELEPHONE ENCOUNTER
Pt has about a week worth of losartan left. Pharm sent request for a covered medication substitute. Please send new rx or call patient with instruction of what to do .  Ok to leave detailed message.

## 2024-02-27 NOTE — TELEPHONE ENCOUNTER
Spoke with pharmacist. Current Hyzaar dose 50-12.5mg tablet two QAM.  Insurance is not willing to cover two tablets daily. Please send larger mg tablet at once daily.     Order pending. Waiting for provider approval before informing patient.

## 2024-02-28 RX ORDER — LOSARTAN POTASSIUM AND HYDROCHLOROTHIAZIDE 25; 100 MG/1; MG/1
1 TABLET ORAL DAILY
Qty: 90 TABLET | Refills: 2 | Status: SHIPPED | OUTPATIENT
Start: 2024-02-28 | End: 2024-03-19

## 2024-03-05 DIAGNOSIS — E78.5 HYPERLIPIDEMIA LDL GOAL <100: ICD-10-CM

## 2024-03-05 NOTE — TELEPHONE ENCOUNTER
No refill left on rx.     Medication pending approval. Pre op 10/25/23. Lipid 11/16/22.  .  Future visit 3/19/24.

## 2024-03-05 NOTE — TELEPHONE ENCOUNTER
Patient came into clinic today asking for a refill on his Atorvastatin Calcium. States he is all out. I asked him if he checked with her pharmacy first. He said he transferred from [facility] to [facility]. Please call him in regards to this refill. I did make him an appt with you for this month.    Olivia Mcgowan on 3/5/2024 at 1:10 PM

## 2024-03-06 RX ORDER — ATORVASTATIN CALCIUM 80 MG/1
80 TABLET, FILM COATED ORAL AT BEDTIME
Qty: 90 TABLET | Refills: 0 | Status: SHIPPED | OUTPATIENT
Start: 2024-03-06 | End: 2024-03-19

## 2024-03-19 ENCOUNTER — OFFICE VISIT (OUTPATIENT)
Dept: FAMILY MEDICINE | Facility: CLINIC | Age: 79
End: 2024-03-19
Payer: MEDICARE

## 2024-03-19 VITALS
HEART RATE: 77 BPM | RESPIRATION RATE: 16 BRPM | SYSTOLIC BLOOD PRESSURE: 130 MMHG | OXYGEN SATURATION: 96 % | HEIGHT: 66 IN | TEMPERATURE: 98 F | WEIGHT: 177.1 LBS | DIASTOLIC BLOOD PRESSURE: 68 MMHG | BODY MASS INDEX: 28.46 KG/M2

## 2024-03-19 DIAGNOSIS — G47.00 INSOMNIA, UNSPECIFIED TYPE: ICD-10-CM

## 2024-03-19 DIAGNOSIS — Z23 NEED FOR TDAP VACCINATION: ICD-10-CM

## 2024-03-19 DIAGNOSIS — N32.81 OVERACTIVE BLADDER: ICD-10-CM

## 2024-03-19 DIAGNOSIS — G61.82 MULTIFOCAL MOTOR NEUROPATHY (H): ICD-10-CM

## 2024-03-19 DIAGNOSIS — E78.00 PURE HYPERCHOLESTEROLEMIA: Primary | ICD-10-CM

## 2024-03-19 DIAGNOSIS — E78.5 HYPERLIPIDEMIA LDL GOAL <100: ICD-10-CM

## 2024-03-19 DIAGNOSIS — R25.1 TREMOR: ICD-10-CM

## 2024-03-19 DIAGNOSIS — R10.13 DYSPEPSIA: ICD-10-CM

## 2024-03-19 DIAGNOSIS — I73.9 PERIPHERAL ARTERY DISEASE (H): ICD-10-CM

## 2024-03-19 DIAGNOSIS — Z23 NEED FOR SHINGLES VACCINE: ICD-10-CM

## 2024-03-19 DIAGNOSIS — I65.21 STENOSIS OF RIGHT CAROTID ARTERY: ICD-10-CM

## 2024-03-19 DIAGNOSIS — I10 ESSENTIAL HYPERTENSION, BENIGN: ICD-10-CM

## 2024-03-19 LAB
ERYTHROCYTE [DISTWIDTH] IN BLOOD BY AUTOMATED COUNT: 14.2 % (ref 10–15)
HCT VFR BLD AUTO: 35.1 % (ref 40–53)
HGB BLD-MCNC: 11.6 G/DL (ref 13.3–17.7)
MCH RBC QN AUTO: 30.6 PG (ref 26.5–33)
MCHC RBC AUTO-ENTMCNC: 33 G/DL (ref 31.5–36.5)
MCV RBC AUTO: 93 FL (ref 78–100)
PLATELET # BLD AUTO: 143 10E3/UL (ref 150–450)
RBC # BLD AUTO: 3.79 10E6/UL (ref 4.4–5.9)
WBC # BLD AUTO: 4.3 10E3/UL (ref 4–11)

## 2024-03-19 PROCEDURE — G2211 COMPLEX E/M VISIT ADD ON: HCPCS | Performed by: NURSE PRACTITIONER

## 2024-03-19 PROCEDURE — 36415 COLL VENOUS BLD VENIPUNCTURE: CPT | Performed by: NURSE PRACTITIONER

## 2024-03-19 PROCEDURE — 99215 OFFICE O/P EST HI 40 MIN: CPT | Performed by: NURSE PRACTITIONER

## 2024-03-19 PROCEDURE — 80061 LIPID PANEL: CPT | Performed by: NURSE PRACTITIONER

## 2024-03-19 PROCEDURE — 84443 ASSAY THYROID STIM HORMONE: CPT | Performed by: NURSE PRACTITIONER

## 2024-03-19 PROCEDURE — 85027 COMPLETE CBC AUTOMATED: CPT | Performed by: NURSE PRACTITIONER

## 2024-03-19 PROCEDURE — 80053 COMPREHEN METABOLIC PANEL: CPT | Performed by: NURSE PRACTITIONER

## 2024-03-19 RX ORDER — TRAZODONE HYDROCHLORIDE 50 MG/1
50-100 TABLET, FILM COATED ORAL AT BEDTIME
Qty: 120 TABLET | Refills: 0 | Status: SHIPPED | OUTPATIENT
Start: 2024-03-19 | End: 2024-04-27

## 2024-03-19 RX ORDER — ATORVASTATIN CALCIUM 80 MG/1
80 TABLET, FILM COATED ORAL AT BEDTIME
Qty: 90 TABLET | Refills: 3 | Status: SHIPPED | OUTPATIENT
Start: 2024-03-19

## 2024-03-19 RX ORDER — PANTOPRAZOLE SODIUM 40 MG/1
40 TABLET, DELAYED RELEASE ORAL DAILY
Qty: 90 TABLET | Refills: 3 | Status: CANCELLED | OUTPATIENT
Start: 2024-03-19

## 2024-03-19 RX ORDER — LOSARTAN POTASSIUM AND HYDROCHLOROTHIAZIDE 25; 100 MG/1; MG/1
1 TABLET ORAL DAILY
Qty: 90 TABLET | Refills: 3 | Status: SHIPPED | OUTPATIENT
Start: 2024-03-19

## 2024-03-19 RX ORDER — PREGABALIN 150 MG/1
CAPSULE ORAL
Qty: 270 CAPSULE | Refills: 3 | Status: SHIPPED | OUTPATIENT
Start: 2024-03-19

## 2024-03-19 RX ORDER — CLOPIDOGREL BISULFATE 75 MG/1
75 TABLET ORAL DAILY
Qty: 90 TABLET | Refills: 1 | Status: SHIPPED | OUTPATIENT
Start: 2024-03-19 | End: 2024-09-29

## 2024-03-19 RX ORDER — PROPRANOLOL HYDROCHLORIDE 20 MG/1
20 TABLET ORAL 2 TIMES DAILY
Qty: 180 TABLET | Refills: 0 | Status: SHIPPED | OUTPATIENT
Start: 2024-03-19 | End: 2024-06-17

## 2024-03-19 RX ORDER — OXYBUTYNIN CHLORIDE 10 MG/1
10 TABLET, EXTENDED RELEASE ORAL DAILY
Qty: 90 TABLET | Refills: 3 | Status: SHIPPED | OUTPATIENT
Start: 2024-03-19

## 2024-03-19 ASSESSMENT — ANXIETY QUESTIONNAIRES
GAD7 TOTAL SCORE: 0
7. FEELING AFRAID AS IF SOMETHING AWFUL MIGHT HAPPEN: NOT AT ALL
4. TROUBLE RELAXING: NOT AT ALL
3. WORRYING TOO MUCH ABOUT DIFFERENT THINGS: NOT AT ALL
8. IF YOU CHECKED OFF ANY PROBLEMS, HOW DIFFICULT HAVE THESE MADE IT FOR YOU TO DO YOUR WORK, TAKE CARE OF THINGS AT HOME, OR GET ALONG WITH OTHER PEOPLE?: NOT DIFFICULT AT ALL
6. BECOMING EASILY ANNOYED OR IRRITABLE: NOT AT ALL
IF YOU CHECKED OFF ANY PROBLEMS ON THIS QUESTIONNAIRE, HOW DIFFICULT HAVE THESE PROBLEMS MADE IT FOR YOU TO DO YOUR WORK, TAKE CARE OF THINGS AT HOME, OR GET ALONG WITH OTHER PEOPLE: NOT DIFFICULT AT ALL
2. NOT BEING ABLE TO STOP OR CONTROL WORRYING: NOT AT ALL
7. FEELING AFRAID AS IF SOMETHING AWFUL MIGHT HAPPEN: NOT AT ALL
5. BEING SO RESTLESS THAT IT IS HARD TO SIT STILL: NOT AT ALL
1. FEELING NERVOUS, ANXIOUS, OR ON EDGE: NOT AT ALL
GAD7 TOTAL SCORE: 0
GAD7 TOTAL SCORE: 0

## 2024-03-19 ASSESSMENT — PAIN SCALES - GENERAL: PAINLEVEL: NO PAIN (0)

## 2024-03-19 ASSESSMENT — PATIENT HEALTH QUESTIONNAIRE - PHQ9
SUM OF ALL RESPONSES TO PHQ QUESTIONS 1-9: 2
SUM OF ALL RESPONSES TO PHQ QUESTIONS 1-9: 2
10. IF YOU CHECKED OFF ANY PROBLEMS, HOW DIFFICULT HAVE THESE PROBLEMS MADE IT FOR YOU TO DO YOUR WORK, TAKE CARE OF THINGS AT HOME, OR GET ALONG WITH OTHER PEOPLE: NOT DIFFICULT AT ALL

## 2024-03-19 NOTE — PATIENT INSTRUCTIONS
As a reminder, it looks like you are due to follow-up with oncology with a repeat CT scan in about a month.    For the tremors we can try the medication propranolol.  This is going to replace your metoprolol.  Start with twice daily dosing.  We can increase the strength/frequency depending on your response to medication. Just keep me updated.    The maximum amount of acetaminophen/Tylenol you should have in a day is 3000 mg.    Try stopping caffeine at 11 AM-12 PM to see if that can help with the sleep.    I also sent a sleep medicine called trazodone to your pharmacy. Take nightly as needed. Watch out for morning grogginess.      See if this provides you more relief than the over-the-counter diphenhydramine/Benadryl you have been taking.

## 2024-03-19 NOTE — LETTER
March 21, 2024      Ubaldo Ramos  [address]   [facility] [address]        Dear ,    Overall labs look okay.  Thyroid levels are good.  Blood counts show modest improvement compared to last year.  Nonfasting blood sugar looks okay.  Potassium is mildly low.  We do not need prescription supplementation right now, but try to eat a potassium rich diet.  If this continues to slip we can start supplementation.  Cholesterol levels show good control.     Resulted Orders   Lipid panel reflex to direct LDL Non-fasting   Result Value Ref Range    Cholesterol 124 <200 mg/dL    Triglycerides 301 (H) <150 mg/dL    Direct Measure HDL 34 (L) >=40 mg/dL    LDL Cholesterol Calculated 30 <=100 mg/dL    Non HDL Cholesterol 90 <130 mg/dL    Patient Fasting > 8hrs? Unknown     Narrative    Cholesterol  Desirable:  <200 mg/dL    Triglycerides  Normal:  Less than 150 mg/dL  Borderline High:  150-199 mg/dL  High:  200-499 mg/dL  Very High:  Greater than or equal to 500 mg/dL    Direct Measure HDL  Female:  Greater than or equal to 50 mg/dL   Male:  Greater than or equal to 40 mg/dL    LDL Cholesterol  Desirable:  <100mg/dL  Above Desirable:  100-129 mg/dL   Borderline High:  130-159 mg/dL   High:  160-189 mg/dL   Very High:  >= 190 mg/dL    Non HDL Cholesterol  Desirable:  130 mg/dL  Above Desirable:  130-159 mg/dL  Borderline High:  160-189 mg/dL  High:  190-219 mg/dL  Very High:  Greater than or equal to 220 mg/dL   Comprehensive metabolic panel (BMP + Alb, Alk Phos, ALT, AST, Total. Bili, TP)   Result Value Ref Range    Sodium 143 135 - 145 mmol/L      Comment:      Reference intervals for this test were updated on 09/26/2023 to more accurately reflect our healthy population. There may be differences in the flagging of prior results with similar values performed with this method. Interpretation of those prior results can be made in the context of the updated reference intervals.     Potassium 3.3 (L) 3.4 - 5.3  mmol/L    Carbon Dioxide (CO2) 25 22 - 29 mmol/L    Anion Gap 12 7 - 15 mmol/L    Urea Nitrogen 22.1 8.0 - 23.0 mg/dL    Creatinine 0.99 0.67 - 1.17 mg/dL    GFR Estimate 78 >60 mL/min/1.73m2    Calcium 9.5 8.8 - 10.2 mg/dL    Chloride 106 98 - 107 mmol/L    Glucose 148 (H) 70 - 99 mg/dL    Alkaline Phosphatase 99 40 - 150 U/L      Comment:      Reference intervals for this test were updated on 11/14/2023 to more accurately reflect our healthy population. There may be differences in the flagging of prior results with similar values performed with this method. Interpretation of those prior results can be made in the context of the updated reference intervals.    AST 24 0 - 45 U/L      Comment:      Reference intervals for this test were updated on 6/12/2023 to more accurately reflect our healthy population. There may be differences in the flagging of prior results with similar values performed with this method. Interpretation of those prior results can be made in the context of the updated reference intervals.    ALT 16 0 - 70 U/L      Comment:      Reference intervals for this test were updated on 6/12/2023 to more accurately reflect our healthy population. There may be differences in the flagging of prior results with similar values performed with this method. Interpretation of those prior results can be made in the context of the updated reference intervals.      Protein Total 6.9 6.4 - 8.3 g/dL    Albumin 4.3 3.5 - 5.2 g/dL    Bilirubin Total 0.3 <=1.2 mg/dL   CBC with platelets   Result Value Ref Range    WBC Count 4.3 4.0 - 11.0 10e3/uL    RBC Count 3.79 (L) 4.40 - 5.90 10e6/uL    Hemoglobin 11.6 (L) 13.3 - 17.7 g/dL    Hematocrit 35.1 (L) 40.0 - 53.0 %    MCV 93 78 - 100 fL    MCH 30.6 26.5 - 33.0 pg    MCHC 33.0 31.5 - 36.5 g/dL    RDW 14.2 10.0 - 15.0 %    Platelet Count 143 (L) 150 - 450 10e3/uL   TSH   Result Value Ref Range    TSH 1.24 0.30 - 4.20 uIU/mL       If you have any questions or concerns, please  call the clinic at the number listed above.       Sincerely,      John Oconnor NP

## 2024-03-19 NOTE — PROGRESS NOTES
Assessment & Plan     ICD-10-CM    1. Pure hypercholesterolemia  E78.00 Lipid panel reflex to direct LDL Non-fasting     Lipid panel reflex to direct LDL Non-fasting      2. Hyperlipidemia LDL goal <100  E78.5 atorvastatin (LIPITOR) 80 MG tablet     Comprehensive metabolic panel (BMP + Alb, Alk Phos, ALT, AST, Total. Bili, TP)     Lipid panel     Comprehensive metabolic panel (BMP + Alb, Alk Phos, ALT, AST, Total. Bili, TP)     CANCELED: Lipid panel      3. Peripheral artery disease (H24)  I73.9 clopidogrel (PLAVIX) 75 MG tablet     CBC with platelets     CBC with platelets      4. Benign Essential Hypertension  I10 Comprehensive metabolic panel (BMP + Alb, Alk Phos, ALT, AST, Total. Bili, TP)     losartan-hydrochlorothiazide (HYZAAR) 100-25 MG tablet     Comprehensive metabolic panel (BMP + Alb, Alk Phos, ALT, AST, Total. Bili, TP)      5. Need for shingles vaccine  Z23       6. Need for Tdap vaccination  Z23       7. 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 Overactive bladder  N32.81 oxyBUTYnin ER (DITROPAN XL) 10 MG 24 hr tablet      10. Tremor  R25.1 TSH     propranolol (INDERAL) 20 MG tablet     TSH      11. Insomnia, unspecified type  G47.00 traZODone (DESYREL) 50 MG tablet      12. Stenosis of right carotid artery  I65.21         For the tremors try switching out metoprolol for propranolol.  Continue same Lyrica for neuropathy.  Continue other antihypertensives unchanged.  Continue working with vascular for peripheral artery disease and carotid stenosis.  Reviewed severe calcifications found on CT chest.  Asymptomatic.  After discussion we elected to continue to monitor and if developing any symptoms, to let me know and we will do further evaluation.  Follow-up with oncology next month with repeat chest CT to keep an eye on the new pulmonary nodule.  Discussed good sleep hygiene.  Stop caffeine earlier in the day.  Can try as needed  "trazodone.  Preferably would use this over diphenhydramine.    The longitudinal plan of care for the diagnosis(es)/condition(s) as documented were addressed during this visit. Due to the added complexity in care, I will continue to support Ubaldo in the subsequent management and with ongoing continuity of care.      Reviewed oncology note x 1, CT chest x 1, vascular surgery note x 2, CTA abdomen pelvis with leg runoff x 1, CTA neck x 1    Total time spent was 45 minutes including reviewing records prior to arrival, consultation, placing orders, education, and reviewing the plan of care on the date of service.      Subjective     HPI     Follow-up history of lung cancer    Status post left upper lobectomy in 2017 with recurrence in 2018 status post chemo and radiation.  Did have follow-up CT scan performed earlier this year showing new pulmonary nodule with recommendations for short interval follow-up.    Hx PAD  Gets some claudication symptoms with the back of the calves tightening up. No sores/poor healing wounds. Has neck CTA done recently.  Getting checked out through vascular every 6 months. CT chest showed severe coronary calcifications.  Reported a stress test done in [place] 5 years ago that was normal.      Hx of prostate cancer.  Still follows with urology.     Tremors    Starting to occur with holding a glass of juice or coffee.  No other tremors in the body.  Left hand dominant.  Unsure if it happens to both hands.   Noticed it about a year ago    Sleep issues  Has issues with when he wakes up his brain will kick start and it'll take up to 2 hours to fall back asleep.  Goes to bed around 10.  Caffeine usually is done by 630-730pm.  No panic attacks.          Review of Systems - negative except for what's listed in the HPI      Objective    /68 (BP Location: Right arm, Patient Position: Sitting, Cuff Size: Adult Regular)   Pulse 77   Temp 98  F (36.7  C) (Oral)   Resp 16   Ht 1.664 m (5' 5.5\")   Wt " 80.3 kg (177 lb 1.6 oz)   SpO2 96%   BMI 29.02 kg/m    Physical Exam   General appearance - alert, well appearing, and in no distress  Mental status - alert, oriented to person, place, and time  Neck - no significant adenopathy  Lymphatics - no palpable lymphadenopathy  Chest - clear to auscultation, no wheezes, rales or rhonchi, symmetric air entry  Heart - normal rate and regular rhythm, S1 and S2 normal, no murmurs noted  Abdomen - soft, nontender, nondistended, no masses or organomegaly  Neurological - alert, oriented, normal speech, no focal findings or movement disorder noted, neck supple without rigidity, cranial nerves II through XII intact, motor and sensory grossly normal bilaterally, normal muscle tone, mild tremors with outstretched hands, strength 5/5  Musculoskeletal - stooped posture. Slow sit to stand.  Antalgic gait  Extremities - peripheral pulses normal, no peripheral edema  Skin - normal coloration and turgor.    John Oconnor, CNP    This note has been dictated using voice recognition software. Any grammatical or context distortions are unintentional and inherent to the software.

## 2024-03-20 LAB
ALBUMIN SERPL BCG-MCNC: 4.3 G/DL (ref 3.5–5.2)
ALP SERPL-CCNC: 99 U/L (ref 40–150)
ALT SERPL W P-5'-P-CCNC: 16 U/L (ref 0–70)
ANION GAP SERPL CALCULATED.3IONS-SCNC: 12 MMOL/L (ref 7–15)
AST SERPL W P-5'-P-CCNC: 24 U/L (ref 0–45)
BILIRUB SERPL-MCNC: 0.3 MG/DL
BUN SERPL-MCNC: 22.1 MG/DL (ref 8–23)
CALCIUM SERPL-MCNC: 9.5 MG/DL (ref 8.8–10.2)
CHLORIDE SERPL-SCNC: 106 MMOL/L (ref 98–107)
CHOLEST SERPL-MCNC: 124 MG/DL
CREAT SERPL-MCNC: 0.99 MG/DL (ref 0.67–1.17)
DEPRECATED HCO3 PLAS-SCNC: 25 MMOL/L (ref 22–29)
EGFRCR SERPLBLD CKD-EPI 2021: 78 ML/MIN/1.73M2
FASTING STATUS PATIENT QL REPORTED: ABNORMAL
GLUCOSE SERPL-MCNC: 148 MG/DL (ref 70–99)
HDLC SERPL-MCNC: 34 MG/DL
LDLC SERPL CALC-MCNC: 30 MG/DL
NONHDLC SERPL-MCNC: 90 MG/DL
POTASSIUM SERPL-SCNC: 3.3 MMOL/L (ref 3.4–5.3)
PROT SERPL-MCNC: 6.9 G/DL (ref 6.4–8.3)
SODIUM SERPL-SCNC: 143 MMOL/L (ref 135–145)
TRIGL SERPL-MCNC: 301 MG/DL
TSH SERPL DL<=0.005 MIU/L-ACNC: 1.24 UIU/ML (ref 0.3–4.2)

## 2024-04-25 ENCOUNTER — TRANSFERRED RECORDS (OUTPATIENT)
Dept: HEALTH INFORMATION MANAGEMENT | Facility: CLINIC | Age: 79
End: 2024-04-25
Payer: MEDICARE

## 2024-04-27 ENCOUNTER — APPOINTMENT (OUTPATIENT)
Dept: CT IMAGING | Facility: CLINIC | Age: 79
DRG: 193 | End: 2024-04-27
Attending: EMERGENCY MEDICINE
Payer: MEDICARE

## 2024-04-27 ENCOUNTER — HOSPITAL ENCOUNTER (INPATIENT)
Facility: CLINIC | Age: 79
LOS: 2 days | Discharge: HOME | DRG: 193 | End: 2024-04-29
Attending: EMERGENCY MEDICINE | Admitting: STUDENT IN AN ORGANIZED HEALTH CARE EDUCATION/TRAINING PROGRAM
Payer: MEDICARE

## 2024-04-27 DIAGNOSIS — J96.21 ACUTE AND CHRONIC RESPIRATORY FAILURE WITH HYPOXIA (H): ICD-10-CM

## 2024-04-27 DIAGNOSIS — Z91.148 NONCOMPLIANCE WITH MEDICATION REGIMEN: ICD-10-CM

## 2024-04-27 DIAGNOSIS — J10.1 INFLUENZA A: ICD-10-CM

## 2024-04-27 DIAGNOSIS — J44.1 COPD EXACERBATION (H): ICD-10-CM

## 2024-04-27 DIAGNOSIS — Z72.0 TOBACCO ABUSE: ICD-10-CM

## 2024-04-27 LAB
ANION GAP SERPL CALCULATED.3IONS-SCNC: 12 MMOL/L (ref 7–15)
APTT PPP: 31 SECONDS (ref 22–38)
ATRIAL RATE - MUSE: 91 BPM
BASE EXCESS BLDV CALC-SCNC: 4.8 MMOL/L (ref -3–3)
BASOPHILS # BLD AUTO: 0 10E3/UL (ref 0–0.2)
BASOPHILS NFR BLD AUTO: 0 %
BUN SERPL-MCNC: 17.2 MG/DL (ref 8–23)
CALCIUM SERPL-MCNC: 9.8 MG/DL (ref 8.8–10.2)
CHLORIDE SERPL-SCNC: 100 MMOL/L (ref 98–107)
CREAT SERPL-MCNC: 0.99 MG/DL (ref 0.67–1.17)
CREAT SERPL-MCNC: 1.13 MG/DL (ref 0.67–1.17)
DEPRECATED HCO3 PLAS-SCNC: 28 MMOL/L (ref 22–29)
DIASTOLIC BLOOD PRESSURE - MUSE: NORMAL MMHG
EGFRCR SERPLBLD CKD-EPI 2021: 67 ML/MIN/1.73M2
EGFRCR SERPLBLD CKD-EPI 2021: 78 ML/MIN/1.73M2
EOSINOPHIL # BLD AUTO: 0 10E3/UL (ref 0–0.7)
EOSINOPHIL NFR BLD AUTO: 0 %
ERYTHROCYTE [DISTWIDTH] IN BLOOD BY AUTOMATED COUNT: 14 % (ref 10–15)
FLUAV RNA SPEC QL NAA+PROBE: POSITIVE
FLUBV RNA RESP QL NAA+PROBE: NEGATIVE
GLUCOSE SERPL-MCNC: 108 MG/DL (ref 70–99)
HCO3 BLDV-SCNC: 30 MMOL/L (ref 21–28)
HCT VFR BLD AUTO: 37.8 % (ref 40–53)
HGB BLD-MCNC: 12.4 G/DL (ref 13.3–17.7)
IMM GRANULOCYTES # BLD: 0 10E3/UL
IMM GRANULOCYTES NFR BLD: 0 %
INR PPP: 1.02 (ref 0.85–1.15)
INTERPRETATION ECG - MUSE: NORMAL
LACTATE SERPL-SCNC: 0.9 MMOL/L (ref 0.7–2)
LYMPHOCYTES # BLD AUTO: 1.1 10E3/UL (ref 0.8–5.3)
LYMPHOCYTES NFR BLD AUTO: 19 %
MAGNESIUM SERPL-MCNC: 1.6 MG/DL (ref 1.7–2.3)
MAGNESIUM SERPL-MCNC: 2.1 MG/DL (ref 1.7–2.3)
MCH RBC QN AUTO: 29.8 PG (ref 26.5–33)
MCHC RBC AUTO-ENTMCNC: 32.8 G/DL (ref 31.5–36.5)
MCV RBC AUTO: 91 FL (ref 78–100)
MONOCYTES # BLD AUTO: 0.7 10E3/UL (ref 0–1.3)
MONOCYTES NFR BLD AUTO: 12 %
NEUTROPHILS # BLD AUTO: 4.1 10E3/UL (ref 1.6–8.3)
NEUTROPHILS NFR BLD AUTO: 69 %
NRBC # BLD AUTO: 0 10E3/UL
NRBC BLD AUTO-RTO: 0 /100
NT-PROBNP SERPL-MCNC: 562 PG/ML (ref 0–1800)
O2/TOTAL GAS SETTING VFR VENT: 29 %
OXYHGB MFR BLDV: 38 % (ref 70–75)
P AXIS - MUSE: 43 DEGREES
PCO2 BLDV: 55 MM HG (ref 40–50)
PH BLDV: 7.35 [PH] (ref 7.32–7.43)
PLATELET # BLD AUTO: 136 10E3/UL (ref 150–450)
PO2 BLDV: 23 MM HG (ref 25–47)
POTASSIUM SERPL-SCNC: 3.8 MMOL/L (ref 3.4–5.3)
POTASSIUM SERPL-SCNC: 3.9 MMOL/L (ref 3.4–5.3)
PR INTERVAL - MUSE: 156 MS
PROCALCITONIN SERPL IA-MCNC: 0.16 NG/ML
QRS DURATION - MUSE: 82 MS
QT - MUSE: 326 MS
QTC - MUSE: 400 MS
R AXIS - MUSE: 61 DEGREES
RBC # BLD AUTO: 4.16 10E6/UL (ref 4.4–5.9)
RSV RNA SPEC NAA+PROBE: NEGATIVE
SAO2 % BLDV: 38.7 % (ref 70–75)
SARS-COV-2 RNA RESP QL NAA+PROBE: NEGATIVE
SODIUM SERPL-SCNC: 140 MMOL/L (ref 135–145)
SYSTOLIC BLOOD PRESSURE - MUSE: NORMAL MMHG
T AXIS - MUSE: 49 DEGREES
TROPONIN T SERPL HS-MCNC: 21 NG/L
TROPONIN T SERPL HS-MCNC: 24 NG/L
TSH SERPL DL<=0.005 MIU/L-ACNC: 0.46 UIU/ML (ref 0.3–4.2)
VENTRICULAR RATE- MUSE: 91 BPM
WBC # BLD AUTO: 6 10E3/UL (ref 4–11)

## 2024-04-27 PROCEDURE — 93005 ELECTROCARDIOGRAM TRACING: CPT | Performed by: EMERGENCY MEDICINE

## 2024-04-27 PROCEDURE — 250N000009 HC RX 250: Performed by: EMERGENCY MEDICINE

## 2024-04-27 PROCEDURE — 94640 AIRWAY INHALATION TREATMENT: CPT

## 2024-04-27 PROCEDURE — 85730 THROMBOPLASTIN TIME PARTIAL: CPT | Performed by: EMERGENCY MEDICINE

## 2024-04-27 PROCEDURE — 85610 PROTHROMBIN TIME: CPT | Performed by: EMERGENCY MEDICINE

## 2024-04-27 PROCEDURE — 99223 1ST HOSP IP/OBS HIGH 75: CPT | Performed by: STUDENT IN AN ORGANIZED HEALTH CARE EDUCATION/TRAINING PROGRAM

## 2024-04-27 PROCEDURE — 82565 ASSAY OF CREATININE: CPT | Performed by: STUDENT IN AN ORGANIZED HEALTH CARE EDUCATION/TRAINING PROGRAM

## 2024-04-27 PROCEDURE — 84132 ASSAY OF SERUM POTASSIUM: CPT | Performed by: STUDENT IN AN ORGANIZED HEALTH CARE EDUCATION/TRAINING PROGRAM

## 2024-04-27 PROCEDURE — 999N000157 HC STATISTIC RCP TIME EA 10 MIN

## 2024-04-27 PROCEDURE — 70450 CT HEAD/BRAIN W/O DYE: CPT | Mod: MA

## 2024-04-27 PROCEDURE — 94640 AIRWAY INHALATION TREATMENT: CPT | Mod: 76

## 2024-04-27 PROCEDURE — 84484 ASSAY OF TROPONIN QUANT: CPT | Performed by: STUDENT IN AN ORGANIZED HEALTH CARE EDUCATION/TRAINING PROGRAM

## 2024-04-27 PROCEDURE — 87637 SARSCOV2&INF A&B&RSV AMP PRB: CPT | Performed by: EMERGENCY MEDICINE

## 2024-04-27 PROCEDURE — 83735 ASSAY OF MAGNESIUM: CPT | Performed by: EMERGENCY MEDICINE

## 2024-04-27 PROCEDURE — 87040 BLOOD CULTURE FOR BACTERIA: CPT | Performed by: EMERGENCY MEDICINE

## 2024-04-27 PROCEDURE — 36415 COLL VENOUS BLD VENIPUNCTURE: CPT | Performed by: STUDENT IN AN ORGANIZED HEALTH CARE EDUCATION/TRAINING PROGRAM

## 2024-04-27 PROCEDURE — 83880 ASSAY OF NATRIURETIC PEPTIDE: CPT | Performed by: EMERGENCY MEDICINE

## 2024-04-27 PROCEDURE — 999N000104 CT THORACIC SPINE RECONSTRUCTED

## 2024-04-27 PROCEDURE — 71250 CT THORAX DX C-: CPT | Mod: MA

## 2024-04-27 PROCEDURE — 250N000011 HC RX IP 250 OP 636: Performed by: STUDENT IN AN ORGANIZED HEALTH CARE EDUCATION/TRAINING PROGRAM

## 2024-04-27 PROCEDURE — 84484 ASSAY OF TROPONIN QUANT: CPT | Performed by: EMERGENCY MEDICINE

## 2024-04-27 PROCEDURE — 120N000001 HC R&B MED SURG/OB

## 2024-04-27 PROCEDURE — 72125 CT NECK SPINE W/O DYE: CPT | Mod: MA

## 2024-04-27 PROCEDURE — 999N000104 CT LUMBAR SPINE RECONSTRUCTED

## 2024-04-27 PROCEDURE — 258N000003 HC RX IP 258 OP 636: Performed by: EMERGENCY MEDICINE

## 2024-04-27 PROCEDURE — 250N000009 HC RX 250: Performed by: STUDENT IN AN ORGANIZED HEALTH CARE EDUCATION/TRAINING PROGRAM

## 2024-04-27 PROCEDURE — 85025 COMPLETE CBC W/AUTO DIFF WBC: CPT | Performed by: EMERGENCY MEDICINE

## 2024-04-27 PROCEDURE — 83735 ASSAY OF MAGNESIUM: CPT | Performed by: STUDENT IN AN ORGANIZED HEALTH CARE EDUCATION/TRAINING PROGRAM

## 2024-04-27 PROCEDURE — 82805 BLOOD GASES W/O2 SATURATION: CPT | Performed by: EMERGENCY MEDICINE

## 2024-04-27 PROCEDURE — 83605 ASSAY OF LACTIC ACID: CPT | Performed by: EMERGENCY MEDICINE

## 2024-04-27 PROCEDURE — 250N000013 HC RX MED GY IP 250 OP 250 PS 637: Performed by: EMERGENCY MEDICINE

## 2024-04-27 PROCEDURE — 84145 PROCALCITONIN (PCT): CPT | Performed by: EMERGENCY MEDICINE

## 2024-04-27 PROCEDURE — 80048 BASIC METABOLIC PNL TOTAL CA: CPT | Performed by: EMERGENCY MEDICINE

## 2024-04-27 PROCEDURE — 84443 ASSAY THYROID STIM HORMONE: CPT | Performed by: EMERGENCY MEDICINE

## 2024-04-27 PROCEDURE — 250N000013 HC RX MED GY IP 250 OP 250 PS 637: Performed by: STUDENT IN AN ORGANIZED HEALTH CARE EDUCATION/TRAINING PROGRAM

## 2024-04-27 PROCEDURE — 36415 COLL VENOUS BLD VENIPUNCTURE: CPT | Performed by: EMERGENCY MEDICINE

## 2024-04-27 PROCEDURE — 250N000011 HC RX IP 250 OP 636: Performed by: EMERGENCY MEDICINE

## 2024-04-27 RX ORDER — METHYLPREDNISOLONE SODIUM SUCCINATE 125 MG/2ML
125 INJECTION, POWDER, LYOPHILIZED, FOR SOLUTION INTRAMUSCULAR; INTRAVENOUS ONCE
Status: COMPLETED | OUTPATIENT
Start: 2024-04-27 | End: 2024-04-27

## 2024-04-27 RX ORDER — OXYBUTYNIN CHLORIDE 5 MG/1
10 TABLET, EXTENDED RELEASE ORAL EVERY EVENING
Status: DISCONTINUED | OUTPATIENT
Start: 2024-04-27 | End: 2024-04-29 | Stop reason: HOSPADM

## 2024-04-27 RX ORDER — CETIRIZINE HYDROCHLORIDE 10 MG/1
10 TABLET ORAL EVERY EVENING
COMMUNITY

## 2024-04-27 RX ORDER — IPRATROPIUM BROMIDE AND ALBUTEROL SULFATE 2.5; .5 MG/3ML; MG/3ML
1 SOLUTION RESPIRATORY (INHALATION) DAILY
COMMUNITY

## 2024-04-27 RX ORDER — LOSARTAN POTASSIUM AND HYDROCHLOROTHIAZIDE 25; 100 MG/1; MG/1
1 TABLET ORAL DAILY
Status: DISCONTINUED | OUTPATIENT
Start: 2024-04-28 | End: 2024-04-29 | Stop reason: HOSPADM

## 2024-04-27 RX ORDER — AMOXICILLIN 250 MG
1 CAPSULE ORAL 2 TIMES DAILY PRN
Status: DISCONTINUED | OUTPATIENT
Start: 2024-04-27 | End: 2024-04-29 | Stop reason: HOSPADM

## 2024-04-27 RX ORDER — OSELTAMIVIR PHOSPHATE 30 MG/1
30 CAPSULE ORAL 2 TIMES DAILY
Status: DISCONTINUED | OUTPATIENT
Start: 2024-04-27 | End: 2024-04-28

## 2024-04-27 RX ORDER — AZITHROMYCIN 250 MG/1
250 TABLET, FILM COATED ORAL DAILY
Status: DISCONTINUED | OUTPATIENT
Start: 2024-04-28 | End: 2024-04-29 | Stop reason: HOSPADM

## 2024-04-27 RX ORDER — HYDRALAZINE HYDROCHLORIDE 10 MG/1
10 TABLET, FILM COATED ORAL 4 TIMES DAILY PRN
Status: DISCONTINUED | OUTPATIENT
Start: 2024-04-27 | End: 2024-04-29 | Stop reason: HOSPADM

## 2024-04-27 RX ORDER — ENOXAPARIN SODIUM 100 MG/ML
40 INJECTION SUBCUTANEOUS EVERY 24 HOURS
Status: DISCONTINUED | OUTPATIENT
Start: 2024-04-27 | End: 2024-04-29 | Stop reason: HOSPADM

## 2024-04-27 RX ORDER — PANTOPRAZOLE SODIUM 40 MG/1
40 TABLET, DELAYED RELEASE ORAL DAILY
Status: DISCONTINUED | OUTPATIENT
Start: 2024-04-27 | End: 2024-04-29 | Stop reason: HOSPADM

## 2024-04-27 RX ORDER — CEFTRIAXONE 2 G/1
2 INJECTION, POWDER, FOR SOLUTION INTRAMUSCULAR; INTRAVENOUS ONCE
Status: COMPLETED | OUTPATIENT
Start: 2024-04-27 | End: 2024-04-27

## 2024-04-27 RX ORDER — CILOSTAZOL 100 MG/1
100 TABLET ORAL 2 TIMES DAILY
Status: DISCONTINUED | OUTPATIENT
Start: 2024-04-27 | End: 2024-04-29 | Stop reason: HOSPADM

## 2024-04-27 RX ORDER — AZITHROMYCIN 500 MG/5ML
500 INJECTION, POWDER, LYOPHILIZED, FOR SOLUTION INTRAVENOUS ONCE
Status: COMPLETED | OUTPATIENT
Start: 2024-04-27 | End: 2024-04-27

## 2024-04-27 RX ORDER — IPRATROPIUM BROMIDE AND ALBUTEROL SULFATE 2.5; .5 MG/3ML; MG/3ML
3 SOLUTION RESPIRATORY (INHALATION) ONCE
Status: COMPLETED | OUTPATIENT
Start: 2024-04-27 | End: 2024-04-27

## 2024-04-27 RX ORDER — LIDOCAINE 40 MG/G
CREAM TOPICAL
Status: DISCONTINUED | OUTPATIENT
Start: 2024-04-27 | End: 2024-04-29 | Stop reason: HOSPADM

## 2024-04-27 RX ORDER — METHYLPREDNISOLONE SODIUM SUCCINATE 40 MG/ML
40 INJECTION, POWDER, LYOPHILIZED, FOR SOLUTION INTRAMUSCULAR; INTRAVENOUS DAILY
Status: DISCONTINUED | OUTPATIENT
Start: 2024-04-28 | End: 2024-04-29 | Stop reason: HOSPADM

## 2024-04-27 RX ORDER — GUAIFENESIN 600 MG/1
600-1200 TABLET, EXTENDED RELEASE ORAL 2 TIMES DAILY PRN
Status: DISCONTINUED | OUTPATIENT
Start: 2024-04-27 | End: 2024-04-29 | Stop reason: HOSPADM

## 2024-04-27 RX ORDER — CALCIUM CARBONATE 500 MG/1
1000 TABLET, CHEWABLE ORAL 4 TIMES DAILY PRN
Status: DISCONTINUED | OUTPATIENT
Start: 2024-04-27 | End: 2024-04-29 | Stop reason: HOSPADM

## 2024-04-27 RX ORDER — OSELTAMIVIR PHOSPHATE 30 MG/1
30 CAPSULE ORAL ONCE
Status: COMPLETED | OUTPATIENT
Start: 2024-04-27 | End: 2024-04-27

## 2024-04-27 RX ORDER — AMOXICILLIN 250 MG
2 CAPSULE ORAL 2 TIMES DAILY PRN
Status: DISCONTINUED | OUTPATIENT
Start: 2024-04-27 | End: 2024-04-29 | Stop reason: HOSPADM

## 2024-04-27 RX ORDER — IPRATROPIUM BROMIDE AND ALBUTEROL SULFATE 2.5; .5 MG/3ML; MG/3ML
3 SOLUTION RESPIRATORY (INHALATION) EVERY 4 HOURS
Status: DISCONTINUED | OUTPATIENT
Start: 2024-04-27 | End: 2024-04-29

## 2024-04-27 RX ORDER — MAGNESIUM SULFATE HEPTAHYDRATE 40 MG/ML
2 INJECTION, SOLUTION INTRAVENOUS ONCE
Status: COMPLETED | OUTPATIENT
Start: 2024-04-27 | End: 2024-04-27

## 2024-04-27 RX ORDER — ALBUTEROL SULFATE 0.83 MG/ML
2.5 SOLUTION RESPIRATORY (INHALATION) EVERY 4 HOURS PRN
Status: DISCONTINUED | OUTPATIENT
Start: 2024-04-27 | End: 2024-04-29 | Stop reason: HOSPADM

## 2024-04-27 RX ORDER — ATORVASTATIN CALCIUM 40 MG/1
80 TABLET, FILM COATED ORAL AT BEDTIME
Status: DISCONTINUED | OUTPATIENT
Start: 2024-04-27 | End: 2024-04-29 | Stop reason: HOSPADM

## 2024-04-27 RX ORDER — ASPIRIN 81 MG/1
81 TABLET ORAL DAILY
Status: DISCONTINUED | OUTPATIENT
Start: 2024-04-28 | End: 2024-04-29 | Stop reason: HOSPADM

## 2024-04-27 RX ORDER — PROPRANOLOL HYDROCHLORIDE 20 MG/1
20 TABLET ORAL 2 TIMES DAILY
Status: DISCONTINUED | OUTPATIENT
Start: 2024-04-27 | End: 2024-04-29 | Stop reason: HOSPADM

## 2024-04-27 RX ORDER — TRAZODONE HYDROCHLORIDE 50 MG/1
50 TABLET, FILM COATED ORAL AT BEDTIME
COMMUNITY
End: 2024-07-17

## 2024-04-27 RX ORDER — ONDANSETRON 2 MG/ML
4 INJECTION INTRAMUSCULAR; INTRAVENOUS EVERY 6 HOURS PRN
Status: DISCONTINUED | OUTPATIENT
Start: 2024-04-27 | End: 2024-04-29 | Stop reason: HOSPADM

## 2024-04-27 RX ORDER — ONDANSETRON 4 MG/1
4 TABLET, ORALLY DISINTEGRATING ORAL EVERY 6 HOURS PRN
Status: DISCONTINUED | OUTPATIENT
Start: 2024-04-27 | End: 2024-04-29 | Stop reason: HOSPADM

## 2024-04-27 RX ORDER — CLOPIDOGREL BISULFATE 75 MG/1
75 TABLET ORAL DAILY
Status: DISCONTINUED | OUTPATIENT
Start: 2024-04-28 | End: 2024-04-29 | Stop reason: HOSPADM

## 2024-04-27 RX ADMIN — CILOSTAZOL 100 MG: 100 TABLET ORAL at 21:21

## 2024-04-27 RX ADMIN — IPRATROPIUM BROMIDE AND ALBUTEROL SULFATE 3 ML: .5; 3 SOLUTION RESPIRATORY (INHALATION) at 15:38

## 2024-04-27 RX ADMIN — PANTOPRAZOLE SODIUM 40 MG: 40 TABLET, DELAYED RELEASE ORAL at 18:11

## 2024-04-27 RX ADMIN — PROPRANOLOL HYDROCHLORIDE 20 MG: 20 TABLET ORAL at 21:21

## 2024-04-27 RX ADMIN — AZITHROMYCIN MONOHYDRATE 500 MG: 500 INJECTION, POWDER, LYOPHILIZED, FOR SOLUTION INTRAVENOUS at 15:12

## 2024-04-27 RX ADMIN — OSELTAMIVIR 30 MG: 30 CAPSULE ORAL at 21:31

## 2024-04-27 RX ADMIN — OSELTAMIVIR PHOSPHATE 30 MG: 30 CAPSULE ORAL at 14:17

## 2024-04-27 RX ADMIN — CEFTRIAXONE SODIUM 2 G: 2 INJECTION, POWDER, FOR SOLUTION INTRAMUSCULAR; INTRAVENOUS at 14:19

## 2024-04-27 RX ADMIN — ATORVASTATIN CALCIUM 80 MG: 40 TABLET, FILM COATED ORAL at 21:21

## 2024-04-27 RX ADMIN — METHYLPREDNISOLONE SODIUM SUCCINATE 125 MG: 125 INJECTION, POWDER, FOR SOLUTION INTRAMUSCULAR; INTRAVENOUS at 13:18

## 2024-04-27 RX ADMIN — MAGNESIUM SULFATE HEPTAHYDRATE 2 G: 40 INJECTION, SOLUTION INTRAVENOUS at 15:07

## 2024-04-27 RX ADMIN — IPRATROPIUM BROMIDE AND ALBUTEROL SULFATE 3 ML: .5; 3 SOLUTION RESPIRATORY (INHALATION) at 20:21

## 2024-04-27 RX ADMIN — ENOXAPARIN SODIUM 40 MG: 100 INJECTION SUBCUTANEOUS at 18:11

## 2024-04-27 RX ADMIN — OXYBUTYNIN CHLORIDE 10 MG: 5 TABLET, EXTENDED RELEASE ORAL at 21:21

## 2024-04-27 RX ADMIN — IPRATROPIUM BROMIDE AND ALBUTEROL SULFATE 3 ML: .5; 3 SOLUTION RESPIRATORY (INHALATION) at 12:37

## 2024-04-27 ASSESSMENT — ACTIVITIES OF DAILY LIVING (ADL)
ADLS_ACUITY_SCORE: 38
ADLS_ACUITY_SCORE: 31
ADLS_ACUITY_SCORE: 25
ADLS_ACUITY_SCORE: 38
ADLS_ACUITY_SCORE: 31
ADLS_ACUITY_SCORE: 31
ADLS_ACUITY_SCORE: 38
ADLS_ACUITY_SCORE: 31
ADLS_ACUITY_SCORE: 38
ADLS_ACUITY_SCORE: 31
ADLS_ACUITY_SCORE: 31

## 2024-04-27 ASSESSMENT — COLUMBIA-SUICIDE SEVERITY RATING SCALE - C-SSRS
2. HAVE YOU ACTUALLY HAD ANY THOUGHTS OF KILLING YOURSELF IN THE PAST MONTH?: NO
6. HAVE YOU EVER DONE ANYTHING, STARTED TO DO ANYTHING, OR PREPARED TO DO ANYTHING TO END YOUR LIFE?: NO
1. IN THE PAST MONTH, HAVE YOU WISHED YOU WERE DEAD OR WISHED YOU COULD GO TO SLEEP AND NOT WAKE UP?: NO

## 2024-04-27 NOTE — ED NOTES
2 day history of a cough, congestion and fevers.  Per his wife he had a fall last night due to weakness and was unable to get up so 911 was called.  Pt has COPD and is a smoker.  Per EMS initial Sa02 was 86%.  Placed on 2 L per N/C with some relief.  Congested cough.  Neb done per respiratory.

## 2024-04-27 NOTE — ED PROVIDER NOTES
EMERGENCY DEPARTMENT ENCOUNTER      NAME: Ubaldo Ramos  AGE: 78 year old male  YOB: 1945  MRN: [id number]  EVALUATION DATE & TIME: No admission date for patient encounter.    PCP: John Oconnor    ED PROVIDER: Damaris Joseph M.D.      Chief Complaint   Patient presents with    Generalized Weakness         FINAL IMPRESSION:  1. COPD exacerbation (H)    2. Tobacco abuse    3. Noncompliance with medication regimen    4. Acute and chronic respiratory failure with hypoxia (H)    5. Influenza A          ED COURSE & MEDICAL DECISION MAKING:    ED Course as of 04/27/24 1402   Sat Apr 27, 2024   1238 Patient with 2 days fatigue with viral URTI like symptoms while still smoking tobacco with known non small cell lung CA and COPD, not using nebs, now with SOB and wheezing with cough and fatigue, fell out of bed last night and too overall globally fatigued without focal neuro anomalies needing EMS to help him to get up, not able to ambulate today due to generalized fatigue , with viral infection vs. PNA with worsening of COPD exacerbation apparent, begun on duoneb therapy with steroids, cutlures/lactic acid pending, CT head and c-spine pending after fall out of bed last night at age 78 with some memory struggles, CT t-spine and L spine added on with low throacic and upper lumbar pain with chronic pain with some acute exacerbation component superimposed and CAP added on noncontrast to allow for T spine and L-spine reconstruction and to assess closely for developing consolidation to suggest PNA vs. Viral infection, viral PCR underway with electrolytes, CBC and TSH, venous blood gas to check pH only, proclacitonin to assist with determination of bacterial vs. Viral pathology. Unlikely new CHF with no leg edema, HJR or JVD but BNP pending   1320 Labs in process, patient to CT imaging shortly   1334 WBC normal and lactic acid normal at 0.9, venous pH WNL which all makes viral URTI with superimposed adult failure to  thrive with fatigue, weakness, falls and COPD exacerbation much more likely and makes CAP less likely, still pending imaging and viral PCR   1337 Patient reassessed with continued limited air flow, sat 94% on 2L NC (does not use home O2) and wheezing with expiratory phase with prolonged expiratory phase. Albuterol PRN ordered, I asked patient's RN Park to please administer further albuterol, awaiting imaging and I paged hospitalist to discuss case with COPD exacerbation with viral URTI +/- CAP pending imaging and s/p antibiotic therapy for patient with COPD exacerbation and infectious symptoms being admitted to the hospital   1355 Pt found to be influenza A+, begun on tamiflu   1358 Magnesium 1.6 repleted       Pertinent Labs & Imaging studies reviewed. (See chart for details)      At the conclusion of the encounter I discussed the results of all of the tests and the disposition. The questions were answered. The patient or family acknowledged understanding and was agreeable with the care plan.     Critical Care time was 45 minutes for this patient excluding procedures.      MEDICATIONS GIVEN IN THE EMERGENCY:  Medications   cefTRIAXone (ROCEPHIN) 2 g vial to attach to  ml bag for ADULTS or NS 50 ml bag for PEDS (has no administration in time range)   azithromycin 500 mg (ZITHROMAX) in 0.9% NaCl 250 mL intermittent infusion 500 mg (has no administration in time range)   oseltamivir (TAMIFLU) capsule 75 mg (has no administration in time range)   magnesium sulfate 2 g in 50 mL sterile water intermittent infusion (has no administration in time range)   ipratropium - albuterol 0.5 mg/2.5 mg/3 mL (DUONEB) neb solution 3 mL (3 mLs Nebulization $Given 4/27/24 1237)   methylPREDNISolone sodium succinate (solu-MEDROL) injection 125 mg (125 mg Intravenous $Given 4/27/24 1318)       NEW PRESCRIPTIONS STARTED AT TODAY'S ER VISIT  New Prescriptions    No medications on file           =================================================================    \[facility]\""      Ubaldo Ramos is a 78 year old male with PMHx of PE, DVT, not currently on blood thinners, hyperlipidemia, hypertension, prostate cancer, COPD, non small cell lung cancer, who presents to the ED today via EMS with shortness of breath.    Per the patient's wife, the patient was sick yesterday with a cold and cough. That night, the patient fell out of bed and couldn't get up on his own or with his wife; 911 was called and they helped the patient into bed. The patient felt better after getting back into bed. Today, the patient was difficult to get out of bed due to fatigue and weakness, with an increase in shortness of breath. The patient used no breathing treatment today. He was a bit warm and took Tylenol for suspected fever.    Per the patient, starting yesterday he has been tremulous with shortness of breath. He last smoked cigarettes yesterday morning. Last night, the patient endured no injury or increased pain due to the fall.    The patient has breathing treatments available at home. He is not on O2 at home. He has had no sick contacts and is vaccinated against covid. The patient denies abdominal pain, nausea, vomiting, and diarrhea.    From CT read on 22-Apr-2024, CT chest without contrast showed the following impressions:   1.  Previously described nodule in the right lower lobe is slightly decreased when compared to prior examination. Recommend continued attention on follow-up.  2. Stable 2 cm subpleural nodule in the right upper lobe.  3. Stable postsurgical changes of left thoracotomy and left upper lobectomy with no evidence of local recurrence.      REVIEW OF SYSTEMS   All other systems reviewed and are negative except as noted above in HPI.    PAST MEDICAL HISTORY:  No past medical history on file.    PAST SURGICAL HISTORY:  Past Surgical History:   Procedure Laterality Date    APPENDECTOMY      CERVICAL SPINE SURGERY       CHOLECYSTECTOMY      FEMORAL ENDARTERECTOMY      LUNG CANCER SURGERY      PROSTATECTOMY         CURRENT MEDICATIONS:    aspirin 81 MG EC tablet  atorvastatin (LIPITOR) 80 MG tablet  buPROPion (WELLBUTRIN SR) 150 MG 12 hr tablet  cholecalciferol, vitamin D3, (VITAMIN D3) 25 mcg (1,000 unit) capsule  cilostazol (PLETAL) 100 MG tablet  cilostazol (PLETAL) 50 MG tablet  clopidogrel (PLAVIX) 75 MG tablet  cyanocobalamin 1000 MCG tablet  guaiFENesin (MUCINEX) 600 MG 12 hr tablet  ipratropium - albuterol 0.5 mg/2.5 mg/3 mL (DUONEB) 0.5-2.5 (3) MG/3ML neb solution  losartan-hydrochlorothiazide (HYZAAR) 100-25 MG tablet  oxyBUTYnin ER (DITROPAN XL) 10 MG 24 hr tablet  pantoprazole (PROTONIX) 40 MG EC tablet  pregabalin (LYRICA) 150 MG capsule  propranolol (INDERAL) 20 MG tablet  traZODone (DESYREL) 50 MG tablet        ALLERGIES:  Allergies   Allergen Reactions    Adhesive Tape Unknown     Adhesive- pulls skin off        FAMILY HISTORY:  No family history on file.    SOCIAL HISTORY:   Social History     Socioeconomic History    Marital status:    Tobacco Use    Smoking status: Every Day     Current packs/day: 0.50     Types: Cigarettes     Passive exposure: Current    Smokeless tobacco: Never   Vaping Use    Vaping status: Never Used   Substance and Sexual Activity    Alcohol use: Not Currently    Drug use: Not Currently     Social Determinants of Health     Financial Resource Strain: Low Risk  (10/25/2023)    Financial Resource Strain     Within the past 12 months, have you or your family members you live with been unable to get utilities (heat, electricity) when it was really needed?: No   Food Insecurity: Low Risk  (10/25/2023)    Food Insecurity     Within the past 12 months, did you worry that your food would run out before you got money to buy more?: No     Within the past 12 months, did the food you bought just not last and you didn t have money to get more?: No   Transportation Needs: Low Risk  (10/25/2023)     "Transportation Needs     Within the past 12 months, has lack of transportation kept you from medical appointments, getting your medicines, non-medical meetings or appointments, work, or from getting things that you need?: No   Interpersonal Safety: Low Risk  (10/25/2023)    Interpersonal Safety     Do you feel physically and emotionally safe where you currently live?: Yes     Within the past 12 months, have you been hit, slapped, kicked or otherwise physically hurt by someone?: No     Within the past 12 months, have you been humiliated or emotionally abused in other ways by your partner or ex-partner?: No   Housing Stability: Low Risk  (10/25/2023)    Housing Stability     Do you have housing? : Yes     Are you worried about losing your housing?: No       VITALS:  Patient Vitals for the past 24 hrs:   BP Temp Temp src Pulse Resp SpO2 Height Weight   04/27/24 1344 -- -- -- 94 (!) 32 94 % -- --   04/27/24 1330 -- -- -- 96 (!) 36 94 % -- --   04/27/24 1324 (!) 170/72 -- -- 97 (!) 36 94 % -- --   04/27/24 1300 -- -- -- 95 (!) 32 95 % -- --   04/27/24 1237 (!) 181/79 -- -- 90 (!) 40 97 % -- --   04/27/24 1220 (!) 154/67 98.6  F (37  C) Oral 96 20 96 % 1.676 m (5' 6\") 79.4 kg (175 lb)       PHYSICAL EXAM    GENERAL: Awake, alert.  In no acute distress.   HEENT: Normocephalic, atraumatic.  Pupils equal, round and reactive.  Conjunctiva normal.  EOMI.  NECK: No stridor or apparent deformity.  PULMONARY: Moderate air entry with diffuse wheezing.  CARDIO: Regular rate and rhythm.  No significant murmur, rub or gallop.  Radial pulses strong and symmetrical.  ABDOMINAL: Abdomen soft, non-distended and non-tender to palpation.  No CVAT, no palpable hepatosplenomegaly.  EXTREMITIES: No lower extremity swelling or edema.    NEURO: Alert and oriented to person, place and time.  Cranial nerves grossly intact.  No focal motor deficit.  PSYCH: Normal mood and affect  SKIN: No rashes      LAB:  All pertinent labs reviewed and " interpreted.  Results for orders placed or performed during the hospital encounter of 04/27/24   Result Value Ref Range    INR 1.02 0.85 - 1.15   Partial thromboplastin time   Result Value Ref Range    aPTT 31 22 - 38 Seconds   Basic metabolic panel   Result Value Ref Range    Sodium 140 135 - 145 mmol/L    Potassium 3.8 3.4 - 5.3 mmol/L    Chloride 100 98 - 107 mmol/L    Carbon Dioxide (CO2) 28 22 - 29 mmol/L    Anion Gap 12 7 - 15 mmol/L    Urea Nitrogen 17.2 8.0 - 23.0 mg/dL    Creatinine 1.13 0.67 - 1.17 mg/dL    GFR Estimate 67 >60 mL/min/1.73m2    Calcium 9.8 8.8 - 10.2 mg/dL    Glucose 108 (H) 70 - 99 mg/dL   Result Value Ref Range    Troponin T, High Sensitivity 24 (H) <=22 ng/L   TSH with free T4 reflex   Result Value Ref Range    TSH 0.46 0.30 - 4.20 uIU/mL   Result Value Ref Range    Magnesium 1.6 (L) 1.7 - 2.3 mg/dL   Lactic acid whole blood   Result Value Ref Range    Lactic Acid 0.9 0.7 - 2.0 mmol/L   Result Value Ref Range    Procalcitonin 0.16 <0.50 ng/mL   Blood gas venous   Result Value Ref Range    pH Venous 7.35 7.32 - 7.43    pCO2 Venous 55 (H) 40 - 50 mm Hg    pO2 Venous 23 (L) 25 - 47 mm Hg    Bicarbonate Venous 30 (H) 21 - 28 mmol/L    Base Excess/Deficit Venous 4.8 (H) -3.0 - 3.0 mmol/L    FIO2 29     Oxyhemoglobin Venous 38 (L) 70 - 75 %    O2 Sat, Venous 38.7 (L) 70.0 - 75.0 %   Nt probnp inpatient (BNP)   Result Value Ref Range    N terminal Pro BNP Inpatient 562 0 - 1,800 pg/mL   Symptomatic Influenza A/B, RSV, & SARS-CoV2 PCR (COVID-19) Nasopharyngeal    Specimen: Nasopharyngeal; Swab   Result Value Ref Range    Influenza A PCR Positive (A) Negative    Influenza B PCR Negative Negative    RSV PCR Negative Negative    SARS CoV2 PCR Negative Negative   CBC with platelets and differential   Result Value Ref Range    WBC Count 6.0 4.0 - 11.0 10e3/uL    RBC Count 4.16 (L) 4.40 - 5.90 10e6/uL    Hemoglobin 12.4 (L) 13.3 - 17.7 g/dL    Hematocrit 37.8 (L) 40.0 - 53.0 %    MCV 91 78 - 100 fL     MCH 29.8 26.5 - 33.0 pg    MCHC 32.8 31.5 - 36.5 g/dL    RDW 14.0 10.0 - 15.0 %    Platelet Count 136 (L) 150 - 450 10e3/uL    % Neutrophils 69 %    % Lymphocytes 19 %    % Monocytes 12 %    % Eosinophils 0 %    % Basophils 0 %    % Immature Granulocytes 0 %    NRBCs per 100 WBC 0 <1 /100    Absolute Neutrophils 4.1 1.6 - 8.3 10e3/uL    Absolute Lymphocytes 1.1 0.8 - 5.3 10e3/uL    Absolute Monocytes 0.7 0.0 - 1.3 10e3/uL    Absolute Eosinophils 0.0 0.0 - 0.7 10e3/uL    Absolute Basophils 0.0 0.0 - 0.2 10e3/uL    Absolute Immature Granulocytes 0.0 <=0.4 10e3/uL    Absolute NRBCs 0.0 10e3/uL       RADIOLOGY:  Reviewed all pertinent imaging. Please see official radiology report.  CT Head w/o Contrast    (Results Pending)   CT Cervical Spine w/o Contrast    (Results Pending)   CT Chest Abdomen Pelvis w/o Contrast    (Results Pending)   CT Lumbar Spine Reconstructed    (Results Pending)   CT Thoracic Spine w/o Contrast    (Results Pending)         EKG:    Reviewed and interpreted as: 27-Apr-2024 12:33 ; sinus rhythm. 91 BPM. When compared with ECG of 02-Aug-2022 10:25, similar.      I have independently reviewed and interpreted the EKG(s) documented above.        I, Britton Mayers, am serving as a scribe to document services personally performed by Dr. Damaris Joseph based on my observation and the provider's statements to me. I, Damaris Joseph MD attest that Britton Mayers is acting in a scribe capacity, has observed my performance of the services and has documented them in accordance with my direction.       Damaris Joseph MD  04/27/24 1399

## 2024-04-27 NOTE — H&P
"[facility]    History and Physical - Hospitalist Service       Date of Admission:  4/27/2024    Assessment & Plan      Ubaldo Ramos is a 78 year old male admitted on 4/27/2024.  He has a past medical history significant for tobacco use disorder, lung cancer status post resection and chemotherapy, COPD, peripheral vascular disease who presented to the hospital with generalized weakness and shortness of breath.    Acute COPD exacerbation  Influenza A infection  -No clear signs suggestive of bacterial pneumonia based on labs and CT chest.  -Procalcitonin negative.    Plan  -Continue scheduled DuoNeb and as needed albuterol  -Continue azithromycin to complete 5 days course   -Continue Tamiflu  -Solu-Medrol 40 mg daily    Generalized weakness  -No clear focal deficits.  -Generalized weakness most likely due to influenza A infection also patient has mild hypomagnesemia.  -CT head showed patchy bilateral cerebral white matter hypoattenuation most likely chronic microvascular disease.    Plan  -Replete magnesium.  -Continue to monitor, if signs of focal deficits will repeat CT head and obtain an MRI.  -Will attempt to reevaluate the patient on 4/28/2024, if persistent generalized weakness will consult physical and occupational therapy for safe discharge planning.    Hypertension  -At home on losartan/hydrochlorothiazide 100-25 mg daily  -Blood pressures currently at 170/70.  -Per patient already received his antihypertensives for today.    Plan  -Continue home antihypertensives.  -Hydralazine p.o. as needed for blood pressure above 180/70.    Peripheral vascular disease    Plan  Continue home aspirin, Plavix-, atorvastatin.    GERD    Plan  -Continue home pantoprazole    History of prostate cancer  History of lung cancer  T6 vertebral body sclerosis  -CT lumbar spine \"patchy sclerosis involving the T6 vertebral body without osseous destruction, indeterminate in this patient with prostate cancer " "although unchanged since 10/09/2023 chest CTA. Notably, no evidence of radiotracer positive disease on PET/CT from 10/25/2023\"            Diet:  Regular  DVT Prophylaxis: Enoxaparin (Lovenox) SQ  Flores Catheter: Not present  Lines: None     Cardiac Monitoring: None  Code Status:  Full code    Clinically Significant Risk Factors Present on Admission            # Hypomagnesemia: Lowest Mg = 1.6 mg/dL in last 2 days, will replace as needed     # Drug Induced Platelet Defect: home medication list includes an antiplatelet medication   # Hypertension: Noted on problem list      # Overweight: Estimated body mass index is 28.25 kg/m  as calculated from the following:    Height as of this encounter: 1.676 m (5' 6\").    Weight as of this encounter: 79.4 kg (175 lb).              Disposition Plan     Medically Ready for Discharge: Anticipated in 2-4 Days           MIRNA EWIGN MD  Hospitalist Service  [facility]  Securely message with [facility] (more info)  Text page via [email or web address] Paging/Directory     ______________________________________________________________________    Chief Complaint   Generalized weakness    History is obtained from the patient    History of Present Illness   Ubaldo Ramos is a 78 year old male admitted on 4/27/2024.  He has a past medical history significant for tobacco use disorder, lung cancer status post resection and chemotherapy, COPD, peripheral vascular disease who presented to the hospital with generalized weakness and shortness of breath.    Patient was in his usual state of health until around 1 or 2 days ago when he noticed slightly worsening cough, mild shortness of breath, significant generalized weakness.  On 4/26/2024, he was attempting to get out of the bed while he noticed significant weakness and therefore he slid down to the floor without any major trauma.  He was unable to stand up.  He denies any focal weaknesses, difficulty finding words, vision changes.  He " denies any chest pain.  He denies any loss of consciousness.  He reported chronic cough which has slightly worsened over the last day or 2 minimally productive, possibly yellow sputum.  He denies any abdominal pain, UTI symptoms.    He denies any recent travel or sick contact.  He stated that he received his COVID vaccination and annual influenza vaccines.      Vitals on presentation: Blood pressure around 180/80, heart rate around 90, afebrile.  Labs significant for magnesium of 1.6, procalcitonin negative, high-sensitivity troponin 24, VBG suggestive of chronic hypercapnia.  No leukocytosis.  Hemoglobin around 12.5 with platelets around 140.  Influenza A positive.      Past Medical History    No past medical history on file.    Past Surgical History   Past Surgical History:   Procedure Laterality Date    APPENDECTOMY      CERVICAL SPINE SURGERY      CHOLECYSTECTOMY      FEMORAL ENDARTERECTOMY      LUNG CANCER SURGERY      PROSTATECTOMY         Prior to Admission Medications   Prior to Admission Medications   Prescriptions Last Dose Informant Patient Reported? Taking?   aspirin 81 MG EC tablet 4/27/2024 at am  Yes Yes   Sig: [ASPIRIN 81 MG EC TABLET] Take 81 mg by mouth daily.   atorvastatin (LIPITOR) 80 MG tablet 4/26/2024 at pm  No Yes   Sig: Take 1 tablet (80 mg) by mouth at bedtime   cetirizine (ZYRTEC) 10 MG tablet 4/26/2024 at pm  Yes Yes   Sig: Take 10 mg by mouth every evening   cholecalciferol, vitamin D3, (VITAMIN D3) 25 mcg (1,000 unit) capsule 4/27/2024 at am  Yes Yes   Sig: [CHOLECALCIFEROL, VITAMIN D3, (VITAMIN D3) 25 MCG (1,000 UNIT) CAPSULE] Take 1,000 Units by mouth daily.   cilostazol (PLETAL) 100 MG tablet 4/27/2024 at am  No Yes   Sig: Take 1 tablet (100 mg) by mouth 2 times daily   clopidogrel (PLAVIX) 75 MG tablet 4/27/2024 at am  No Yes   Sig: Take 1 tablet (75 mg) by mouth daily   cyanocobalamin 1000 MCG tablet 4/27/2024 at am  Yes Yes   Sig: [CYANOCOBALAMIN 1000 MCG TABLET] Take 1,000 mcg  by mouth daily.   guaiFENesin (MUCINEX) 600 MG 12 hr tablet PRN  No Yes   Sig: Take 1-2 tablets (600-1,200 mg) by mouth 2 times daily as needed for congestion or cough   ipratropium - albuterol 0.5 mg/2.5 mg/3 mL (DUONEB) 0.5-2.5 (3) MG/3ML neb solution 4/27/2024 at am  Yes Yes   Sig: Take 1 vial by nebulization daily   losartan-hydrochlorothiazide (HYZAAR) 100-25 MG tablet 4/27/2024 at am  No Yes   Sig: Take 1 tablet by mouth daily   oxyBUTYnin ER (DITROPAN XL) 10 MG 24 hr tablet 4/26/2024 at pm  No Yes   Sig: Take 1 tablet (10 mg) by mouth daily   pantoprazole (PROTONIX) 40 MG EC tablet Past Week  No Yes   Sig: TAKE 1 TABLET(40 MG) BY MOUTH DAILY   pregabalin (LYRICA) 150 MG capsule PRN  No Yes   Sig: TAKE 1 CAPSULE(150 MG) BY MOUTH THREE TIMES DAILY AS NEEDED   propranolol (INDERAL) 20 MG tablet 4/27/2024 at am  No Yes   Sig: Take 1 tablet (20 mg) by mouth 2 times daily   traZODone (DESYREL) 50 MG tablet 4/26/2024 at pm  Yes Yes   Sig: Take 50 mg by mouth at bedtime      Facility-Administered Medications: None          Physical Exam   Vital Signs: Temp: 98.6  F (37  C) Temp src: Oral BP: (!) 181/77 Pulse: 91   Resp: (!) 32 SpO2: 94 % O2 Device: Nasal cannula Oxygen Delivery: 1 LPM  Weight: 175 lbs 0 oz    Physical Exam  Constitutional:       General: He is not in acute distress.     Appearance: He is not toxic-appearing.   Cardiovascular:      Rate and Rhythm: Normal rate.   Pulmonary:      Effort: No respiratory distress.      Comments: Diminished breath sounds in bilateral lung fields.  Musculoskeletal:      Right lower leg: No edema.      Left lower leg: No edema.   Skin:     General: Skin is warm and dry.   Neurological:      Mental Status: He is alert and oriented to person, place, and time.      Comments: Generalized weakness in bilateral upper and lower extremities.  Mild ptosis of the left eyelid.   Psychiatric:         Mood and Affect: Mood normal.          Medical Decision Making       80 MINUTES SPENT  BY ME on the date of service doing chart review, history, exam, documentation & further activities per the note.      Data     I have personally reviewed the following data over the past 24 hrs:    6.0  \   12.4 (L)   / 136 (L)     140 100 17.2 /  108 (H)   3.8 28 1.13 \     Trop: 24 (H) BNP: 562     TSH: 0.46 T4: N/A A1C: N/A     Procal: 0.16 CRP: N/A Lactic Acid: 0.9       INR:  1.02 PTT:  31   D-dimer:  N/A Fibrinogen:  N/A       Imaging results reviewed over the past 24 hrs:   Recent Results (from the past 24 hour(s))   CT Chest Abdomen Pelvis w/o Contrast    Narrative    EXAM: CT CHEST ABDOMEN PELVIS W/O CONTRAST  LOCATION: [facility]  DATE: 4/27/2024    INDICATION: fall out of bed, low back pain, cough and SOB with COPD exacerbation, CTA a few days ago negative but now new low back pain after fall out of bed and new cough and SOB  COMPARISON: CT chest 04/20/2024, CT abdomen and pelvis 12/14/2023  TECHNIQUE: CT scan of the chest, abdomen, and pelvis was performed without IV contrast. Multiplanar reformats were obtained. Dose reduction techniques were used.   CONTRAST: None.    FINDINGS:   LUNGS AND PLEURA: Prior left upper lobectomy. Unchanged left basilar fibrosis and pleural thickening. Unchanged irregular part solid nodule in the right upper lobe on series 4 image 94. Decreased nodular opacity in the right lower lobe on series 4 image   129. No pleural effusions.    MEDIASTINUM/AXILLAE: No lymphadenopathy. No thoracic aortic aneurysm.    CORONARY ARTERY CALCIFICATION: Severe.    HEPATOBILIARY: Cholecystectomy.    PANCREAS: Normal.    SPLEEN: Normal.    ADRENAL GLANDS: Normal.    KIDNEYS/BLADDER: Unremarkable    BOWEL: Unremarkable    LYMPH NODES: Normal.    VASCULATURE: Bilateral iliac artery stents. Femorofemoral bypass graft.    PELVIC ORGANS: Prostatectomy.    MUSCULOSKELETAL: Chronic left-sided rib fractures. No acute osseous abnormality.      Impression    IMPRESSION:  1.  No  acute findings in the chest, abdomen, or pelvis.   CT Thoracic Spine Reconstructed    Narrative    EXAM: CT THORACIC SPINE RECONSTRUCTED, CT LUMBAR SPINE RECONSTRUCTED, CT CERVICAL SPINE W/O CONTRAST, CT HEAD W/O CONTRAST  LOCATION: [facility]  DATE: 4/27/2024    INDICATION: Low back pain after a fall. Weakness. History of prostate cancer.  COMPARISON: Chest CT dated 1/19/2024 and 10/9/2023. Abdomen/pelvis CT dated 07/07/2021. PET/CT dated 10/25/2023.  TECHNIQUE:   1) Routine CT Head without IV contrast. Multiplanar reformats. Dose reduction techniques were used.  2) Routine CT Cervical Spine without IV contrast. Multiplanar reformats. Dose reduction techniques were used.   3) Routine CT Thoracic Spine without IV contrast. Multiplanar reformats. Dose reduction techniques were used.   4) Routine CT Lumbar Spine without IV contrast. Multiplanar reformats. Dose reduction techniques were used.      FINDINGS:  Findings of the visualized thoracic and abdominopelvic cavities are dictated separately.    HEAD CT:   INTRACRANIAL CONTENTS: Portions of the posterior fossa are obscured by streak artifact. No acute intracranial hemorrhage, extraaxial fluid collection, or mass effect. No evidence of an acute transcortical confluent infarct. Patchy bilateral cerebral   white matter hypoattenuation, nonspecific although likely the sequela of chronic microvascular ischemia. This is most notably involving the left frontoparietal corona radiata. Mild generalized cerebral parenchymal volume loss. No hydrocephalus.    VISUALIZED ORBITS/SINUSES/MASTOIDS: No acute intraorbital finding. No significant paranasal sinus or mastoid mucosal disease.    BONES/SOFT TISSUES: No acute calvarial injury or significant scalp hematoma.    CERVICAL SPINE CT:   VERTEBRA: No acute fracture, traumatic listhesis, or compression deformity. Straightening of the normal cervical lordosis with 1 mm likely degenerative retrolisthesis at  C3-C4. Status post posterior decompression and instrumented fusion at C3-C4 with   solid facet joint effusion and partial interdiscal ankylosis without evidence of hardware failure. Multilevel interbody degenerative change, worst and moderate to severe at C6-C7. 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    CANAL/FORAMINA: Limited assessment of the spinal canal secondary to streak artifact, particularly at the fused levels. There is likely mild-moderate spinal canal stenosis at C6-C7, mild at C4-C5. Multilevel foraminal narrowing, worst and severe at C4-C5   bilaterally, C5-C6 on the right, and C6-C7 on the left.    EXTRASPINAL: No prevertebral edema. Scattered calcified atherosclerosis within the neck and visualized upper thorax, worst and severe involving the right carotid bifurcation/proximal ICA. Clear visualized lungs.    THORACIC SPINE CT:  VERTEBRA: Twelve thoracic-type rib-bearing vertebral bodies.  No acute fracture, traumatic listhesis, or compression deformity. Patchy sclerosis involving the T6 vertebral body on the right, indeterminate although unchanged since 10/09/2023 (series 5   image 40). No cortical destruction or erosion. Mild-moderate multilevel interbody degenerative change including multiple disc protrusions, worst at the mid to lower thoracic levels. Mild to moderate diffuse multilevel facet arthrosis. Mild multilevel   hypertrophy/calcification of the ligamenta flava. Status post right hemilaminectomy at L2-L3.    CANAL/FORAMINA: Mild-moderate spinal canal stenosis at T9-T11. No high-grade spinal canal or foraminal stenosis.    PARASPINAL: No acute paraspinal soft tissue abnormality.    LUMBAR SPINE CT:  VERTEBRA: Five lumbar-type vertebral bodies. No acute fracture, traumatic listhesis, or compression deformity. No destructive osseous lesion. Trace dextroconvex curvature centered at the thoracolumbar junction and straightening of the normal lumbar   lordosis  with 1 mm stepwise degenerative grade 1 retrolisthesis at L2-L4. Baastrup's configuration at L2-L5. Multilevel interbody degenerative change, worst at L4-S1 where there is severe intervertebral disc height loss. Multiple disc bulges with   superimposed protrusions and extrusions, some of which are calcified. Mild to moderate multilevel facet arthrosis, worst at L4-S1.    CANAL/FORAMINA: Mild spinal canal stenosis at L3-L5, to a lesser extent at L5-S1. Multilevel foraminal narrowing, worst and severe at L4-L5 on the right, L5-S1 bilaterally.    PARASPINAL: No acute paraspinal soft tissue abnormality. Mild to moderate degenerative change of the sacroiliac joints, worse on the right.      Impression    IMPRESSION:  HEAD CT:  1.  No acute intracranial hemorrhage, extra-axial fluid collection, or mass effect  2.  Patchy bilateral cerebral white matter hypoattenuation, nonspecific although likely the sequela of chronic microvascular ischemia. A superimposed acute infarct is not excluded without a prior exam for comparison.    CERVICAL SPINE CT:  1.  No CT evidence for acute fracture or post traumatic subluxation.  2.  Postoperative changes and multilevel spondylosis, as described.    THORACIC SPINE CT:  1.  No acute fracture or posttraumatic subluxation.  2.  Multilevel spondylosis with mild-moderate spinal canal stenosis at T9-T11.  3.  Patchy sclerosis involving the T6 vertebral body without osseous destruction, indeterminate in this patient with prostate cancer although unchanged since 10/09/2023 chest CTA. Notably, no evidence of radiotracer positive disease on PET/CT from   10/25/2023.    LUMBAR SPINE CT:  1.  No acute fracture or posttraumatic subluxation.  2.  Multilevel spondylosis, as described.    CT Lumbar Spine Reconstructed    Narrative    EXAM: CT THORACIC SPINE RECONSTRUCTED, CT LUMBAR SPINE RECONSTRUCTED, CT CERVICAL SPINE W/O CONTRAST, CT HEAD W/O CONTRAST  LOCATION: [facility]  DATE: 4/27/2024    INDICATION: Low back pain after a fall. Weakness. History of prostate cancer.  COMPARISON: Chest CT dated 1/19/2024 and 10/9/2023. Abdomen/pelvis CT dated 07/07/2021. PET/CT dated 10/25/2023.  TECHNIQUE:   1) Routine CT Head without IV contrast. Multiplanar reformats. Dose reduction techniques were used.  2) Routine CT Cervical Spine without IV contrast. Multiplanar reformats. Dose reduction techniques were used.   3) Routine CT Thoracic Spine without IV contrast. Multiplanar reformats. Dose reduction techniques were used.   4) Routine CT Lumbar Spine without IV contrast. Multiplanar reformats. Dose reduction techniques were used.      FINDINGS:  Findings of the visualized thoracic and abdominopelvic cavities are dictated separately.    HEAD CT:   INTRACRANIAL CONTENTS: Portions of the posterior fossa are obscured by streak artifact. No acute intracranial hemorrhage, extraaxial fluid collection, or mass effect. No evidence of an acute transcortical confluent infarct. Patchy bilateral cerebral   white matter hypoattenuation, nonspecific although likely the sequela of chronic microvascular ischemia. This is most notably involving the left frontoparietal corona radiata. Mild generalized cerebral parenchymal volume loss. No hydrocephalus.    VISUALIZED ORBITS/SINUSES/MASTOIDS: No acute intraorbital finding. No significant paranasal sinus or mastoid mucosal disease.    BONES/SOFT TISSUES: No acute calvarial injury or significant scalp hematoma.    CERVICAL SPINE CT:   VERTEBRA: No acute fracture, traumatic listhesis, or compression deformity. Straightening of the normal cervical lordosis with 1 mm likely degenerative retrolisthesis at C3-C4. Status post posterior decompression and instrumented fusion at C3-C4 with   solid facet joint effusion and partial interdiscal ankylosis without evidence of hardware failure. Multilevel interbody degenerative change, worst and moderate to severe at C6-C7.  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    CANAL/FORAMINA: Limited assessment of the spinal canal secondary to streak artifact, particularly at the fused levels. There is likely mild-moderate spinal canal stenosis at C6-C7, mild at C4-C5. Multilevel foraminal narrowing, worst and severe at C4-C5   bilaterally, C5-C6 on the right, and C6-C7 on the left.    EXTRASPINAL: No prevertebral edema. Scattered calcified atherosclerosis within the neck and visualized upper thorax, worst and severe involving the right carotid bifurcation/proximal ICA. Clear visualized lungs.    THORACIC SPINE CT:  VERTEBRA: Twelve thoracic-type rib-bearing vertebral bodies.  No acute fracture, traumatic listhesis, or compression deformity. Patchy sclerosis involving the T6 vertebral body on the right, indeterminate although unchanged since 10/09/2023 (series 5   image 40). No cortical destruction or erosion. Mild-moderate multilevel interbody degenerative change including multiple disc protrusions, worst at the mid to lower thoracic levels. Mild to moderate diffuse multilevel facet arthrosis. Mild multilevel   hypertrophy/calcification of the ligamenta flava. Status post right hemilaminectomy at L2-L3.    CANAL/FORAMINA: Mild-moderate spinal canal stenosis at T9-T11. No high-grade spinal canal or foraminal stenosis.    PARASPINAL: No acute paraspinal soft tissue abnormality.    LUMBAR SPINE CT:  VERTEBRA: Five lumbar-type vertebral bodies. No acute fracture, traumatic listhesis, or compression deformity. No destructive osseous lesion. Trace dextroconvex curvature centered at the thoracolumbar junction and straightening of the normal lumbar   lordosis with 1 mm stepwise degenerative grade 1 retrolisthesis at L2-L4. Baastrup's configuration at L2-L5. Multilevel interbody degenerative change, worst at L4-S1 where there is severe intervertebral disc height loss. Multiple disc bulges with   superimposed protrusions  and extrusions, some of which are calcified. Mild to moderate multilevel facet arthrosis, worst at L4-S1.    CANAL/FORAMINA: Mild spinal canal stenosis at L3-L5, to a lesser extent at L5-S1. Multilevel foraminal narrowing, worst and severe at L4-L5 on the right, L5-S1 bilaterally.    PARASPINAL: No acute paraspinal soft tissue abnormality. Mild to moderate degenerative change of the sacroiliac joints, worse on the right.      Impression    IMPRESSION:  HEAD CT:  1.  No acute intracranial hemorrhage, extra-axial fluid collection, or mass effect  2.  Patchy bilateral cerebral white matter hypoattenuation, nonspecific although likely the sequela of chronic microvascular ischemia. A superimposed acute infarct is not excluded without a prior exam for comparison.    CERVICAL SPINE CT:  1.  No CT evidence for acute fracture or post traumatic subluxation.  2.  Postoperative changes and multilevel spondylosis, as described.    THORACIC SPINE CT:  1.  No acute fracture or posttraumatic subluxation.  2.  Multilevel spondylosis with mild-moderate spinal canal stenosis at T9-T11.  3.  Patchy sclerosis involving the T6 vertebral body without osseous destruction, indeterminate in this patient with prostate cancer although unchanged since 10/09/2023 chest CTA. Notably, no evidence of radiotracer positive disease on PET/CT from   10/25/2023.    LUMBAR SPINE CT:  1.  No acute fracture or posttraumatic subluxation.  2.  Multilevel spondylosis, as described.    CT Head w/o Contrast    Narrative    EXAM: CT THORACIC SPINE RECONSTRUCTED, CT LUMBAR SPINE RECONSTRUCTED, CT CERVICAL SPINE W/O CONTRAST, CT HEAD W/O CONTRAST  LOCATION: [facility]  DATE: 4/27/2024    INDICATION: Low back pain after a fall. Weakness. History of prostate cancer.  COMPARISON: Chest CT dated 1/19/2024 and 10/9/2023. Abdomen/pelvis CT dated 07/07/2021. PET/CT dated 10/25/2023.  TECHNIQUE:   1) Routine CT Head without IV contrast.  Multiplanar reformats. Dose reduction techniques were used.  2) Routine CT Cervical Spine without IV contrast. Multiplanar reformats. Dose reduction techniques were used.   3) Routine CT Thoracic Spine without IV contrast. Multiplanar reformats. Dose reduction techniques were used.   4) Routine CT Lumbar Spine without IV contrast. Multiplanar reformats. Dose reduction techniques were used.      FINDINGS:  Findings of the visualized thoracic and abdominopelvic cavities are dictated separately.    HEAD CT:   INTRACRANIAL CONTENTS: Portions of the posterior fossa are obscured by streak artifact. No acute intracranial hemorrhage, extraaxial fluid collection, or mass effect. No evidence of an acute transcortical confluent infarct. Patchy bilateral cerebral   white matter hypoattenuation, nonspecific although likely the sequela of chronic microvascular ischemia. This is most notably involving the left frontoparietal corona radiata. Mild generalized cerebral parenchymal volume loss. No hydrocephalus.    VISUALIZED ORBITS/SINUSES/MASTOIDS: No acute intraorbital finding. No significant paranasal sinus or mastoid mucosal disease.    BONES/SOFT TISSUES: No acute calvarial injury or significant scalp hematoma.    CERVICAL SPINE CT:   VERTEBRA: No acute fracture, traumatic listhesis, or compression deformity. Straightening of the normal cervical lordosis with 1 mm likely degenerative retrolisthesis at C3-C4. Status post posterior decompression and instrumented fusion at C3-C4 with   solid facet joint effusion and partial interdiscal ankylosis without evidence of hardware failure. Multilevel interbody degenerative change, worst and moderate to severe at C6-C7. 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    CANAL/FORAMINA: Limited assessment of the spinal canal secondary to streak artifact, particularly at the fused levels. There is likely mild-moderate spinal canal stenosis at C6-C7, mild at C4-C5.  Multilevel foraminal narrowing, worst and severe at C4-C5   bilaterally, C5-C6 on the right, and C6-C7 on the left.    EXTRASPINAL: No prevertebral edema. Scattered calcified atherosclerosis within the neck and visualized upper thorax, worst and severe involving the right carotid bifurcation/proximal ICA. Clear visualized lungs.    THORACIC SPINE CT:  VERTEBRA: Twelve thoracic-type rib-bearing vertebral bodies.  No acute fracture, traumatic listhesis, or compression deformity. Patchy sclerosis involving the T6 vertebral body on the right, indeterminate although unchanged since 10/09/2023 (series 5   image 40). No cortical destruction or erosion. Mild-moderate multilevel interbody degenerative change including multiple disc protrusions, worst at the mid to lower thoracic levels. Mild to moderate diffuse multilevel facet arthrosis. Mild multilevel   hypertrophy/calcification of the ligamenta flava. Status post right hemilaminectomy at L2-L3.    CANAL/FORAMINA: Mild-moderate spinal canal stenosis at T9-T11. No high-grade spinal canal or foraminal stenosis.    PARASPINAL: No acute paraspinal soft tissue abnormality.    LUMBAR SPINE CT:  VERTEBRA: Five lumbar-type vertebral bodies. No acute fracture, traumatic listhesis, or compression deformity. No destructive osseous lesion. Trace dextroconvex curvature centered at the thoracolumbar junction and straightening of the normal lumbar   lordosis with 1 mm stepwise degenerative grade 1 retrolisthesis at L2-L4. Baastrup's configuration at L2-L5. Multilevel interbody degenerative change, worst at L4-S1 where there is severe intervertebral disc height loss. Multiple disc bulges with   superimposed protrusions and extrusions, some of which are calcified. Mild to moderate multilevel facet arthrosis, worst at L4-S1.    CANAL/FORAMINA: Mild spinal canal stenosis at L3-L5, to a lesser extent at L5-S1. Multilevel foraminal narrowing, worst and severe at L4-L5 on the right, L5-S1  bilaterally.    PARASPINAL: No acute paraspinal soft tissue abnormality. Mild to moderate degenerative change of the sacroiliac joints, worse on the right.      Impression    IMPRESSION:  HEAD CT:  1.  No acute intracranial hemorrhage, extra-axial fluid collection, or mass effect  2.  Patchy bilateral cerebral white matter hypoattenuation, nonspecific although likely the sequela of chronic microvascular ischemia. A superimposed acute infarct is not excluded without a prior exam for comparison.    CERVICAL SPINE CT:  1.  No CT evidence for acute fracture or post traumatic subluxation.  2.  Postoperative changes and multilevel spondylosis, as described.    THORACIC SPINE CT:  1.  No acute fracture or posttraumatic subluxation.  2.  Multilevel spondylosis with mild-moderate spinal canal stenosis at T9-T11.  3.  Patchy sclerosis involving the T6 vertebral body without osseous destruction, indeterminate in this patient with prostate cancer although unchanged since 10/09/2023 chest CTA. Notably, no evidence of radiotracer positive disease on PET/CT from   10/25/2023.    LUMBAR SPINE CT:  1.  No acute fracture or posttraumatic subluxation.  2.  Multilevel spondylosis, as described.    CT Cervical Spine w/o Contrast    Narrative    EXAM: CT THORACIC SPINE RECONSTRUCTED, CT LUMBAR SPINE RECONSTRUCTED, CT CERVICAL SPINE W/O CONTRAST, CT HEAD W/O CONTRAST  LOCATION: [facility]  DATE: 4/27/2024    INDICATION: Low back pain after a fall. Weakness. History of prostate cancer.  COMPARISON: Chest CT dated 1/19/2024 and 10/9/2023. Abdomen/pelvis CT dated 07/07/2021. PET/CT dated 10/25/2023.  TECHNIQUE:   1) Routine CT Head without IV contrast. Multiplanar reformats. Dose reduction techniques were used.  2) Routine CT Cervical Spine without IV contrast. Multiplanar reformats. Dose reduction techniques were used.   3) Routine CT Thoracic Spine without IV contrast. Multiplanar reformats. Dose reduction  techniques were used.   4) Routine CT Lumbar Spine without IV contrast. Multiplanar reformats. Dose reduction techniques were used.      FINDINGS:  Findings of the visualized thoracic and abdominopelvic cavities are dictated separately.    HEAD CT:   INTRACRANIAL CONTENTS: Portions of the posterior fossa are obscured by streak artifact. No acute intracranial hemorrhage, extraaxial fluid collection, or mass effect. No evidence of an acute transcortical confluent infarct. Patchy bilateral cerebral   white matter hypoattenuation, nonspecific although likely the sequela of chronic microvascular ischemia. This is most notably involving the left frontoparietal corona radiata. Mild generalized cerebral parenchymal volume loss. No hydrocephalus.    VISUALIZED ORBITS/SINUSES/MASTOIDS: No acute intraorbital finding. No significant paranasal sinus or mastoid mucosal disease.    BONES/SOFT TISSUES: No acute calvarial injury or significant scalp hematoma.    CERVICAL SPINE CT:   VERTEBRA: No acute fracture, traumatic listhesis, or compression deformity. Straightening of the normal cervical lordosis with 1 mm likely degenerative retrolisthesis at C3-C4. Status post posterior decompression and instrumented fusion at C3-C4 with   solid facet joint effusion and partial interdiscal ankylosis without evidence of hardware failure. Multilevel interbody degenerative change, worst and moderate to severe at C6-C7. 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    CANAL/FORAMINA: Limited assessment of the spinal canal secondary to streak artifact, particularly at the fused levels. There is likely mild-moderate spinal canal stenosis at C6-C7, mild at C4-C5. Multilevel foraminal narrowing, worst and severe at C4-C5   bilaterally, C5-C6 on the right, and C6-C7 on the left.    EXTRASPINAL: No prevertebral edema. Scattered calcified atherosclerosis within the neck and visualized upper thorax, worst and severe involving  the right carotid bifurcation/proximal ICA. Clear visualized lungs.    THORACIC SPINE CT:  VERTEBRA: Twelve thoracic-type rib-bearing vertebral bodies.  No acute fracture, traumatic listhesis, or compression deformity. Patchy sclerosis involving the T6 vertebral body on the right, indeterminate although unchanged since 10/09/2023 (series 5   image 40). No cortical destruction or erosion. Mild-moderate multilevel interbody degenerative change including multiple disc protrusions, worst at the mid to lower thoracic levels. Mild to moderate diffuse multilevel facet arthrosis. Mild multilevel   hypertrophy/calcification of the ligamenta flava. Status post right hemilaminectomy at L2-L3.    CANAL/FORAMINA: Mild-moderate spinal canal stenosis at T9-T11. No high-grade spinal canal or foraminal stenosis.    PARASPINAL: No acute paraspinal soft tissue abnormality.    LUMBAR SPINE CT:  VERTEBRA: Five lumbar-type vertebral bodies. No acute fracture, traumatic listhesis, or compression deformity. No destructive osseous lesion. Trace dextroconvex curvature centered at the thoracolumbar junction and straightening of the normal lumbar   lordosis with 1 mm stepwise degenerative grade 1 retrolisthesis at L2-L4. Baastrup's configuration at L2-L5. Multilevel interbody degenerative change, worst at L4-S1 where there is severe intervertebral disc height loss. Multiple disc bulges with   superimposed protrusions and extrusions, some of which are calcified. Mild to moderate multilevel facet arthrosis, worst at L4-S1.    CANAL/FORAMINA: Mild spinal canal stenosis at L3-L5, to a lesser extent at L5-S1. Multilevel foraminal narrowing, worst and severe at L4-L5 on the right, L5-S1 bilaterally.    PARASPINAL: No acute paraspinal soft tissue abnormality. Mild to moderate degenerative change of the sacroiliac joints, worse on the right.      Impression    IMPRESSION:  HEAD CT:  1.  No acute intracranial hemorrhage, extra-axial fluid collection,  or mass effect  2.  Patchy bilateral cerebral white matter hypoattenuation, nonspecific although likely the sequela of chronic microvascular ischemia. A superimposed acute infarct is not excluded without a prior exam for comparison.    CERVICAL SPINE CT:  1.  No CT evidence for acute fracture or post traumatic subluxation.  2.  Postoperative changes and multilevel spondylosis, as described.    THORACIC SPINE CT:  1.  No acute fracture or posttraumatic subluxation.  2.  Multilevel spondylosis with mild-moderate spinal canal stenosis at T9-T11.  3.  Patchy sclerosis involving the T6 vertebral body without osseous destruction, indeterminate in this patient with prostate cancer although unchanged since 10/09/2023 chest CTA. Notably, no evidence of radiotracer positive disease on PET/CT from   10/25/2023.    LUMBAR SPINE CT:  1.  No acute fracture or posttraumatic subluxation.  2.  Multilevel spondylosis, as described.

## 2024-04-27 NOTE — ED NOTES
EMERGENCY DEPARTMENT SIGN OUT NOTE        ED COURSE AND MEDICAL DECISION MAKING  Patient was signed out to me by Dr Damaris Joseph at 2:00 PM  2:05 PM I spoke to the hospitalist.     In brief, Ubaldo Ramos is a 78 year old male who initially presented shortness of breath. Today, the patient was difficult to get out of bed due to fatigue and weakness, with an increase in shortness of breath.     At time of sign out, disposition was pending CT.    Update: Fortunately, CTs are negative for any traumatic injuries.  Case discussed with hospitalist for admission for COPD exacerbation as planned, likely secondary to influenza A illness which has been treated with Tamiflu.    FINAL IMPRESSION    1. COPD exacerbation (H)    2. Tobacco abuse    3. Noncompliance with medication regimen    4. Acute and chronic respiratory failure with hypoxia (H)    5. Influenza A        ED MEDS  Medications   azithromycin 500 mg (ZITHROMAX) in 0.9% NaCl 250 mL intermittent infusion 500 mg (has no administration in time range)   magnesium sulfate 2 g in 50 mL sterile water intermittent infusion (has no administration in time range)   ipratropium - albuterol 0.5 mg/2.5 mg/3 mL (DUONEB) neb solution 3 mL (3 mLs Nebulization $Given 4/27/24 1237)   methylPREDNISolone sodium succinate (solu-MEDROL) injection 125 mg (125 mg Intravenous $Given 4/27/24 1318)   cefTRIAXone (ROCEPHIN) 2 g vial to attach to  ml bag for ADULTS or NS 50 ml bag for PEDS (2 g Intravenous $New Bag 4/27/24 1419)   oseltamivir (TAMIFLU) capsule 30 mg (30 mg Oral $Given 4/27/24 1417)       LAB  Labs Ordered and Resulted from Time of ED Arrival to Time of ED Departure   BASIC METABOLIC PANEL - Abnormal       Result Value    Sodium 140      Potassium 3.8      Chloride 100      Carbon Dioxide (CO2) 28      Anion Gap 12      Urea Nitrogen 17.2      Creatinine 1.13      GFR Estimate 67      Calcium 9.8      Glucose 108 (*)    TROPONIN T, HIGH SENSITIVITY - Abnormal    Troponin T,  High Sensitivity 24 (*)    MAGNESIUM - Abnormal    Magnesium 1.6 (*)    BLOOD GAS VENOUS - Abnormal    pH Venous 7.35      pCO2 Venous 55 (*)     pO2 Venous 23 (*)     Bicarbonate Venous 30 (*)     Base Excess/Deficit Venous 4.8 (*)     FIO2 29      Oxyhemoglobin Venous 38 (*)     O2 Sat, Venous 38.7 (*)    INFLUENZA A/B, RSV, & SARS-COV2 PCR - Abnormal    Influenza A PCR Positive (*)     Influenza B PCR Negative      RSV PCR Negative      SARS CoV2 PCR Negative     CBC WITH PLATELETS AND DIFFERENTIAL - Abnormal    WBC Count 6.0      RBC Count 4.16 (*)     Hemoglobin 12.4 (*)     Hematocrit 37.8 (*)     MCV 91      MCH 29.8      MCHC 32.8      RDW 14.0      Platelet Count 136 (*)     % Neutrophils 69      % Lymphocytes 19      % Monocytes 12      % Eosinophils 0      % Basophils 0      % Immature Granulocytes 0      NRBCs per 100 WBC 0      Absolute Neutrophils 4.1      Absolute Lymphocytes 1.1      Absolute Monocytes 0.7      Absolute Eosinophils 0.0      Absolute Basophils 0.0      Absolute Immature Granulocytes 0.0      Absolute NRBCs 0.0     INR - Normal    INR 1.02     PARTIAL THROMBOPLASTIN TIME - Normal    aPTT 31     TSH WITH FREE T4 REFLEX - Normal    TSH 0.46     LACTIC ACID WHOLE BLOOD - Normal    Lactic Acid 0.9     PROCALCITONIN - Normal    Procalcitonin 0.16     NT PROBNP INPATIENT - Normal    N terminal Pro BNP Inpatient 562     BLOOD CULTURE   BLOOD CULTURE       EKG      RADIOLOGY    CT Cervical Spine w/o Contrast   Final Result   IMPRESSION:   HEAD CT:   1.  No acute intracranial hemorrhage, extra-axial fluid collection, or mass effect   2.  Patchy bilateral cerebral white matter hypoattenuation, nonspecific although likely the sequela of chronic microvascular ischemia. A superimposed acute infarct is not excluded without a prior exam for comparison.      CERVICAL SPINE CT:   1.  No CT evidence for acute fracture or post traumatic subluxation.   2.  Postoperative changes and multilevel spondylosis,  as described.      THORACIC SPINE CT:   1.  No acute fracture or posttraumatic subluxation.   2.  Multilevel spondylosis with mild-moderate spinal canal stenosis at T9-T11.   3.  Patchy sclerosis involving the T6 vertebral body without osseous destruction, indeterminate in this patient with prostate cancer although unchanged since 10/09/2023 chest CTA. Notably, no evidence of radiotracer positive disease on PET/CT from    10/25/2023.      LUMBAR SPINE CT:   1.  No acute fracture or posttraumatic subluxation.   2.  Multilevel spondylosis, as described.       CT Head w/o Contrast   Final Result   IMPRESSION:   HEAD CT:   1.  No acute intracranial hemorrhage, extra-axial fluid collection, or mass effect   2.  Patchy bilateral cerebral white matter hypoattenuation, nonspecific although likely the sequela of chronic microvascular ischemia. A superimposed acute infarct is not excluded without a prior exam for comparison.      CERVICAL SPINE CT:   1.  No CT evidence for acute fracture or post traumatic subluxation.   2.  Postoperative changes and multilevel spondylosis, as described.      THORACIC SPINE CT:   1.  No acute fracture or posttraumatic subluxation.   2.  Multilevel spondylosis with mild-moderate spinal canal stenosis at T9-T11.   3.  Patchy sclerosis involving the T6 vertebral body without osseous destruction, indeterminate in this patient with prostate cancer although unchanged since 10/09/2023 chest CTA. Notably, no evidence of radiotracer positive disease on PET/CT from    10/25/2023.      LUMBAR SPINE CT:   1.  No acute fracture or posttraumatic subluxation.   2.  Multilevel spondylosis, as described.       CT Lumbar Spine Reconstructed   Final Result   IMPRESSION:   HEAD CT:   1.  No acute intracranial hemorrhage, extra-axial fluid collection, or mass effect   2.  Patchy bilateral cerebral white matter hypoattenuation, nonspecific although likely the sequela of chronic microvascular ischemia. A  superimposed acute infarct is not excluded without a prior exam for comparison.      CERVICAL SPINE CT:   1.  No CT evidence for acute fracture or post traumatic subluxation.   2.  Postoperative changes and multilevel spondylosis, as described.      THORACIC SPINE CT:   1.  No acute fracture or posttraumatic subluxation.   2.  Multilevel spondylosis with mild-moderate spinal canal stenosis at T9-T11.   3.  Patchy sclerosis involving the T6 vertebral body without osseous destruction, indeterminate in this patient with prostate cancer although unchanged since 10/09/2023 chest CTA. Notably, no evidence of radiotracer positive disease on PET/CT from    10/25/2023.      LUMBAR SPINE CT:   1.  No acute fracture or posttraumatic subluxation.   2.  Multilevel spondylosis, as described.       CT Thoracic Spine Reconstructed   Final Result   IMPRESSION:   HEAD CT:   1.  No acute intracranial hemorrhage, extra-axial fluid collection, or mass effect   2.  Patchy bilateral cerebral white matter hypoattenuation, nonspecific although likely the sequela of chronic microvascular ischemia. A superimposed acute infarct is not excluded without a prior exam for comparison.      CERVICAL SPINE CT:   1.  No CT evidence for acute fracture or post traumatic subluxation.   2.  Postoperative changes and multilevel spondylosis, as described.      THORACIC SPINE CT:   1.  No acute fracture or posttraumatic subluxation.   2.  Multilevel spondylosis with mild-moderate spinal canal stenosis at T9-T11.   3.  Patchy sclerosis involving the T6 vertebral body without osseous destruction, indeterminate in this patient with prostate cancer although unchanged since 10/09/2023 chest CTA. Notably, no evidence of radiotracer positive disease on PET/CT from    10/25/2023.      LUMBAR SPINE CT:   1.  No acute fracture or posttraumatic subluxation.   2.  Multilevel spondylosis, as described.       CT Chest Abdomen Pelvis w/o Contrast   Final Result    IMPRESSION:   1.  No acute findings in the chest, abdomen, or pelvis.          DISCHARGE MEDS  New Prescriptions    No medications on file       I, [place], am serving as a scribe to document services personally performed by Alayna Angulo MD, based on my observation and the provider's statements to me. I, Alayna Angulo MD, attest that [place] is acting in a scribe capacity, has observed my performance of the services and has documented them in accordance with my direction.    Alayna Angulo MD  [facility] EMERGENCY ROOM  [id number] [id number] [phone]       Alayna Angulo MD  04/27/24 0807

## 2024-04-27 NOTE — PLAN OF CARE
Goal Outcome Evaluation: O2 needs, weakness    Problem: Adult Inpatient Plan of Care  Goal: Optimal Comfort and Wellbeing  Outcome: Progressing  Intervention: Monitor Pain and Promote Comfort  Recent Flowsheet Documentation  Taken 4/27/2024 1655 by Minda Zarate, RN  Pain Management Interventions: repositioned   Pt denied pain this shift.    Problem: Adult Inpatient Plan of Care  Goal: Readiness for Transition of Care  Outcome: Progressing  Intervention: Mutually Develop Transition Plan  Recent Flowsheet Documentation  Taken 4/27/2024 1600 by Minda Zarate, RN  Equipment Currently Used at Home: (power scooter for outside) other (see comments)   Pt cont to have pretty freq cough which is tight and congested. Pt was wanting something for the phlegm. Cont on 2L NC especially while dozing to keep sats >90. Lungs sound tight. Pt cont to be very weak from baseline.

## 2024-04-27 NOTE — ED NOTES
Pt seen in the ED. Pt received duoneb and tolerated it well. Pt is on 1 lpm NC mid 90's. BS diminished with increased aeration post neb with faint upper airway wheeze.     Kizzy Thomas, RT

## 2024-04-27 NOTE — ED TRIAGE NOTES
Arrives to ED via  EMS with c/o generalized weakness. Began last night. Required lift assist from EMS last night. Declined to be transported to ED at that time. Sats 90% on RA per EMS at home. EMS placed pt on oxygen 2L/NC. Sats currently 96% on oxygen 2L/NC. Denies SOB. Denies CP. Reports productive cough. Endorses chills last night. 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 Takes plavix.      Triage Assessment (Adult)       Row Name 04/27/24 1222          Triage Assessment    Airway WDL WDL        Respiratory WDL    Respiratory WDL X;cough        Skin Circulation/Temperature WDL    Skin Circulation/Temperature WDL WDL        Cardiac WDL    Cardiac WDL WDL        Peripheral/Neurovascular WDL    Peripheral Neurovascular WDL WDL        Cognitive/Neuro/Behavioral WDL    Cognitive/Neuro/Behavioral WDL WDL

## 2024-04-27 NOTE — PHARMACY-ADMISSION MEDICATION HISTORY
Pharmacist Admission Medication History    Admission medication history is complete. The information provided in this note is only as accurate as the sources available at the time of the update.    Information Source(s): Patient, Family member, Clinic records, and CareEverywhere/SureScripts via in-person    Pertinent Information: Patient was able to provide up to date medication history and last known administration times.    Changes made to PTA medication list:  Added: Zyrtec 10mg tablet  Deleted: None  Changed: None    Allergies reviewed with patient and updates made in EHR: yes    Medication History Completed By: Jas Carnes [facility] 4/27/2024 2:38 PM    PTA Med List   Medication Sig Last Dose    aspirin 81 MG EC tablet [ASPIRIN 81 MG EC TABLET] Take 81 mg by mouth daily. 4/27/2024 at am    atorvastatin (LIPITOR) 80 MG tablet Take 1 tablet (80 mg) by mouth at bedtime 4/26/2024 at pm    cetirizine (ZYRTEC) 10 MG tablet Take 10 mg by mouth every evening 4/26/2024 at pm    cholecalciferol, vitamin D3, (VITAMIN D3) 25 mcg (1,000 unit) capsule [CHOLECALCIFEROL, VITAMIN D3, (VITAMIN D3) 25 MCG (1,000 UNIT) CAPSULE] Take 1,000 Units by mouth daily. 4/27/2024 at am    cilostazol (PLETAL) 100 MG tablet Take 1 tablet (100 mg) by mouth 2 times daily 4/27/2024 at am    clopidogrel (PLAVIX) 75 MG tablet Take 1 tablet (75 mg) by mouth daily 4/27/2024 at am    cyanocobalamin 1000 MCG tablet [CYANOCOBALAMIN 1000 MCG TABLET] Take 1,000 mcg by mouth daily. 4/27/2024 at am    guaiFENesin (MUCINEX) 600 MG 12 hr tablet Take 1-2 tablets (600-1,200 mg) by mouth 2 times daily as needed for congestion or cough PRN    ipratropium - albuterol 0.5 mg/2.5 mg/3 mL (DUONEB) 0.5-2.5 (3) MG/3ML neb solution Take 1 vial by nebulization daily 4/27/2024 at am    losartan-hydrochlorothiazide (HYZAAR) 100-25 MG tablet Take 1 tablet by mouth daily 4/27/2024 at am    oxyBUTYnin ER (DITROPAN XL) 10 MG 24 hr tablet Take 1 tablet (10 mg) by mouth daily  4/26/2024 at pm    pantoprazole (PROTONIX) 40 MG EC tablet TAKE 1 TABLET(40 MG) BY MOUTH DAILY Past Week    pregabalin (LYRICA) 150 MG capsule TAKE 1 CAPSULE(150 MG) BY MOUTH THREE TIMES DAILY AS NEEDED PRN    propranolol (INDERAL) 20 MG tablet Take 1 tablet (20 mg) by mouth 2 times daily 4/27/2024 at am    traZODone (DESYREL) 50 MG tablet Take 50 mg by mouth at bedtime 4/26/2024 at pm

## 2024-04-28 LAB
HOLD SPECIMEN: NORMAL
MAGNESIUM SERPL-MCNC: 2 MG/DL (ref 1.7–2.3)
POTASSIUM SERPL-SCNC: 3.8 MMOL/L (ref 3.4–5.3)

## 2024-04-28 PROCEDURE — 84132 ASSAY OF SERUM POTASSIUM: CPT | Performed by: STUDENT IN AN ORGANIZED HEALTH CARE EDUCATION/TRAINING PROGRAM

## 2024-04-28 PROCEDURE — 94640 AIRWAY INHALATION TREATMENT: CPT

## 2024-04-28 PROCEDURE — 250N000009 HC RX 250: Performed by: STUDENT IN AN ORGANIZED HEALTH CARE EDUCATION/TRAINING PROGRAM

## 2024-04-28 PROCEDURE — 250N000011 HC RX IP 250 OP 636: Performed by: STUDENT IN AN ORGANIZED HEALTH CARE EDUCATION/TRAINING PROGRAM

## 2024-04-28 PROCEDURE — 120N000001 HC R&B MED SURG/OB

## 2024-04-28 PROCEDURE — 83735 ASSAY OF MAGNESIUM: CPT | Performed by: STUDENT IN AN ORGANIZED HEALTH CARE EDUCATION/TRAINING PROGRAM

## 2024-04-28 PROCEDURE — 94640 AIRWAY INHALATION TREATMENT: CPT | Mod: 76

## 2024-04-28 PROCEDURE — 99232 SBSQ HOSP IP/OBS MODERATE 35: CPT | Performed by: STUDENT IN AN ORGANIZED HEALTH CARE EDUCATION/TRAINING PROGRAM

## 2024-04-28 PROCEDURE — 999N000157 HC STATISTIC RCP TIME EA 10 MIN

## 2024-04-28 PROCEDURE — 250N000013 HC RX MED GY IP 250 OP 250 PS 637: Performed by: STUDENT IN AN ORGANIZED HEALTH CARE EDUCATION/TRAINING PROGRAM

## 2024-04-28 PROCEDURE — 250N000013 HC RX MED GY IP 250 OP 250 PS 637: Performed by: INTERNAL MEDICINE

## 2024-04-28 PROCEDURE — 36415 COLL VENOUS BLD VENIPUNCTURE: CPT | Performed by: STUDENT IN AN ORGANIZED HEALTH CARE EDUCATION/TRAINING PROGRAM

## 2024-04-28 RX ORDER — POTASSIUM CHLORIDE 1.5 G/1.58G
20 POWDER, FOR SOLUTION ORAL ONCE
Qty: 1 PACKET | Refills: 0 | Status: COMPLETED | OUTPATIENT
Start: 2024-04-28 | End: 2024-04-28

## 2024-04-28 RX ORDER — MAGNESIUM SULFATE HEPTAHYDRATE 40 MG/ML
2 INJECTION, SOLUTION INTRAVENOUS ONCE
Qty: 50 ML | Refills: 0 | Status: COMPLETED | OUTPATIENT
Start: 2024-04-28 | End: 2024-04-28

## 2024-04-28 RX ORDER — OSELTAMIVIR PHOSPHATE 75 MG/1
75 CAPSULE ORAL 2 TIMES DAILY
Status: DISCONTINUED | OUTPATIENT
Start: 2024-04-28 | End: 2024-04-29 | Stop reason: HOSPADM

## 2024-04-28 RX ADMIN — OXYBUTYNIN CHLORIDE 10 MG: 5 TABLET, EXTENDED RELEASE ORAL at 19:52

## 2024-04-28 RX ADMIN — ASPIRIN 81 MG: 81 TABLET, COATED ORAL at 09:29

## 2024-04-28 RX ADMIN — MAGNESIUM SULFATE HEPTAHYDRATE 2 G: 40 INJECTION, SOLUTION INTRAVENOUS at 09:29

## 2024-04-28 RX ADMIN — OSELTAMIVIR PHOSPHATE 75 MG: 75 CAPSULE ORAL at 19:54

## 2024-04-28 RX ADMIN — OSELTAMIVIR 30 MG: 30 CAPSULE ORAL at 09:28

## 2024-04-28 RX ADMIN — CLOPIDOGREL BISULFATE 75 MG: 75 TABLET ORAL at 09:29

## 2024-04-28 RX ADMIN — POTASSIUM CHLORIDE 20 MEQ: 1.5 POWDER, FOR SOLUTION ORAL at 09:28

## 2024-04-28 RX ADMIN — IPRATROPIUM BROMIDE AND ALBUTEROL SULFATE 3 ML: .5; 3 SOLUTION RESPIRATORY (INHALATION) at 00:15

## 2024-04-28 RX ADMIN — METHYLPREDNISOLONE SODIUM SUCCINATE 40 MG: 40 INJECTION, POWDER, FOR SOLUTION INTRAMUSCULAR; INTRAVENOUS at 09:27

## 2024-04-28 RX ADMIN — PROPRANOLOL HYDROCHLORIDE 20 MG: 20 TABLET ORAL at 09:35

## 2024-04-28 RX ADMIN — LOSARTAN POTASSIUM AND HYDROCHLOROTHIAZIDE 1 TABLET: 100; 25 TABLET, FILM COATED ORAL at 09:35

## 2024-04-28 RX ADMIN — CILOSTAZOL 100 MG: 100 TABLET ORAL at 19:54

## 2024-04-28 RX ADMIN — IPRATROPIUM BROMIDE AND ALBUTEROL SULFATE 3 ML: .5; 3 SOLUTION RESPIRATORY (INHALATION) at 11:33

## 2024-04-28 RX ADMIN — IPRATROPIUM BROMIDE AND ALBUTEROL SULFATE 3 ML: .5; 3 SOLUTION RESPIRATORY (INHALATION) at 16:01

## 2024-04-28 RX ADMIN — PANTOPRAZOLE SODIUM 40 MG: 40 TABLET, DELAYED RELEASE ORAL at 09:28

## 2024-04-28 RX ADMIN — IPRATROPIUM BROMIDE AND ALBUTEROL SULFATE 3 ML: .5; 3 SOLUTION RESPIRATORY (INHALATION) at 04:27

## 2024-04-28 RX ADMIN — AZITHROMYCIN 250 MG: 250 TABLET, FILM COATED ORAL at 09:28

## 2024-04-28 RX ADMIN — CILOSTAZOL 100 MG: 100 TABLET ORAL at 09:35

## 2024-04-28 RX ADMIN — IPRATROPIUM BROMIDE AND ALBUTEROL SULFATE 3 ML: .5; 3 SOLUTION RESPIRATORY (INHALATION) at 07:44

## 2024-04-28 RX ADMIN — PROPRANOLOL HYDROCHLORIDE 20 MG: 20 TABLET ORAL at 19:54

## 2024-04-28 RX ADMIN — IPRATROPIUM BROMIDE AND ALBUTEROL SULFATE 3 ML: .5; 3 SOLUTION RESPIRATORY (INHALATION) at 20:29

## 2024-04-28 RX ADMIN — ENOXAPARIN SODIUM 40 MG: 100 INJECTION SUBCUTANEOUS at 15:34

## 2024-04-28 RX ADMIN — ATORVASTATIN CALCIUM 80 MG: 40 TABLET, FILM COATED ORAL at 19:53

## 2024-04-28 ASSESSMENT — ACTIVITIES OF DAILY LIVING (ADL)
ADLS_ACUITY_SCORE: 32
ADLS_ACUITY_SCORE: 31
ADLS_ACUITY_SCORE: 32
ADLS_ACUITY_SCORE: 31
ADLS_ACUITY_SCORE: 32
ADLS_ACUITY_SCORE: 32
ADLS_ACUITY_SCORE: 31
ADLS_ACUITY_SCORE: 31
ADLS_ACUITY_SCORE: 32
ADLS_ACUITY_SCORE: 32
ADLS_ACUITY_SCORE: 31
ADLS_ACUITY_SCORE: 32
ADLS_ACUITY_SCORE: 31
ADLS_ACUITY_SCORE: 31
ADLS_ACUITY_SCORE: 32

## 2024-04-28 NOTE — PLAN OF CARE
Goal Outcome Evaluation:    Pt alert and oriented. VSS. 2L O2, nasal cannula. Pt had a bloody nose during the night; states that he has them frequently and that he thinks the covid swab he received yesterday may be the cause. Pt denies having pain. Pt calls appropriately. Pt incont of urine, wears depends at night. Call light within reach, bed alarms on.     Problem: Adult Inpatient Plan of Care  Goal: Optimal Comfort and Wellbeing  Outcome: Progressing     Problem: Risk for Delirium  Goal: Improved Sleep  Outcome: Progressing

## 2024-04-28 NOTE — PROGRESS NOTES
Patient on 1L via NC.  Duoneb QID via mask and tolerating well. Faint wheeze otherwise diminished breath sounds.   RT will continue to monitor    Latasha Hui RT

## 2024-04-28 NOTE — PROGRESS NOTES
"[facility]    Medicine Progress Note - Hospitalist Service    Date of Admission:  4/27/2024    Assessment & Plan   Ubaldo Ramos is a 78 year old male admitted on 4/27/2024.  He has a past medical history significant for tobacco use disorder, lung cancer status post resection and chemotherapy, COPD, peripheral vascular disease who presented to the hospital with generalized weakness and shortness of breath.  He was diagnosed with COPD exacerbation due to influenza A infection.  Possible discharge in 1 to 2 days.     Acute COPD exacerbation  Influenza A infection  -No clear signs suggestive of bacterial pneumonia based on labs and CT chest.  -Procalcitonin negative.    Plan  -Continue scheduled DuoNeb and as needed albuterol  -Continue azithromycin to complete 5 days course   -Continue Tamiflu  -Continue Solu-Medrol 40 mg daily  -Wean off nasal cannula as tolerated, target SpO2 88 to 92%.    Generalized weakness  -No clear focal deficits.  -Generalized weakness most likely due to influenza A infection also patient has mild hypomagnesemia.  -CT head showed patchy bilateral cerebral white matter hypoattenuation most likely chronic microvascular disease.    Plan  -PT/OT evaluation  -Continue to monitor, if signs of focal deficits will repeat CT head and obtain an MRI.    Hypertension  -At home on losartan/hydrochlorothiazide 100-25 mg daily  -Blood pressures currently at 110/60.  -Per patient already received his antihypertensives for today.    Plan  -Continue home antihypertensives.  -Hydralazine p.o. as needed for blood pressure above 180/70.    Peripheral vascular disease    Plan  Continue home aspirin, Plavix, atorvastatin.    GERD    Plan  -Continue home pantoprazole    History of prostate cancer  History of lung cancer  T6 vertebral body sclerosis  -CT lumbar spine \"patchy sclerosis involving the T6 vertebral body without osseous destruction, indeterminate in this patient with prostate " "cancer although unchanged since 10/09/2023 chest CTA. Notably, no evidence of radiotracer positive disease on PET/CT from 10/25/2023\"            Diet: Combination Diet Regular Diet Adult    DVT Prophylaxis: Enoxaparin (Lovenox) SQ  Flores Catheter: Not present  Lines: None     Cardiac Monitoring: None  Code Status: Full Code      Clinically Significant Risk Factors Present on Admission            # Hypomagnesemia: Lowest Mg = 1.6 mg/dL in last 2 days, will replace as needed     # Drug Induced Platelet Defect: home medication list includes an antiplatelet medication   # Hypertension: Noted on problem list      # Overweight: Estimated body mass index is 27.89 kg/m  as calculated from the following:    Height as of this encounter: 1.676 m (5' 6\").    Weight as of this encounter: 78.4 kg (172 lb 12.8 oz).              Disposition Plan     Medically Ready for Discharge: Anticipated Tomorrow             MIRNA EWING MD  Hospitalist Service  [facility]  Securely message with Breaker (more info)  Text page via [employer] Paging/Directory   ______________________________________________________________________    Interval History   Reported that his shortness of breath has improved, cough remains, however slightly improved.  Generalized weakness has significant improved.      Physical Exam   Vital Signs: Temp: 97.6  F (36.4  C) Temp src: Oral BP: 106/54 Pulse: 70   Resp: 18 SpO2: 90 % O2 Device: Nasal cannula Oxygen Delivery: 1 LPM  Weight: 172 lbs 12.8 oz    Physical Exam  Constitutional:       General: He is not in acute distress.  Cardiovascular:      Rate and Rhythm: Normal rate.   Pulmonary:      Comments: Decreased breath sounds in bilateral lung fields.  No clear crackles.  No clear wheezing.  No respiratory distress.  Skin:     General: Skin is warm and dry.   Neurological:      Mental Status: He is alert.   Psychiatric:         Mood and Affect: Mood normal.          Medical Decision Making "       45 MINUTES SPENT BY ME on the date of service doing chart review, history, exam, documentation & further activities per the note.      Data     I have personally reviewed the following data over the past 24 hrs:    N/A  \   N/A   / N/A     N/A N/A N/A /  N/A   3.8 N/A 0.99 \     Trop: 21 BNP: N/A     TSH: N/A T4: N/A A1C: N/A     Procal: N/A CRP: N/A Lactic Acid: N/A         Imaging results reviewed over the past 24 hrs:   Recent Results (from the past 24 hour(s))   CT Chest Abdomen Pelvis w/o Contrast    Narrative    EXAM: CT CHEST ABDOMEN PELVIS W/O CONTRAST  LOCATION: [facility]  DATE: 4/27/2024    INDICATION: fall out of bed, low back pain, cough and SOB with COPD exacerbation, CTA a few days ago negative but now new low back pain after fall out of bed and new cough and SOB  COMPARISON: CT chest 04/20/2024, CT abdomen and pelvis 12/14/2023  TECHNIQUE: CT scan of the chest, abdomen, and pelvis was performed without IV contrast. Multiplanar reformats were obtained. Dose reduction techniques were used.   CONTRAST: None.    FINDINGS:   LUNGS AND PLEURA: Prior left upper lobectomy. Unchanged left basilar fibrosis and pleural thickening. Unchanged irregular part solid nodule in the right upper lobe on series 4 image 94. Decreased nodular opacity in the right lower lobe on series 4 image   129. No pleural effusions.    MEDIASTINUM/AXILLAE: No lymphadenopathy. No thoracic aortic aneurysm.    CORONARY ARTERY CALCIFICATION: Severe.    HEPATOBILIARY: Cholecystectomy.    PANCREAS: Normal.    SPLEEN: Normal.    ADRENAL GLANDS: Normal.    KIDNEYS/BLADDER: Unremarkable    BOWEL: Unremarkable    LYMPH NODES: Normal.    VASCULATURE: Bilateral iliac artery stents. Femorofemoral bypass graft.    PELVIC ORGANS: Prostatectomy.    MUSCULOSKELETAL: Chronic left-sided rib fractures. No acute osseous abnormality.      Impression    IMPRESSION:  1.  No acute findings in the chest, abdomen, or pelvis.   CT  Thoracic Spine Reconstructed    Narrative    EXAM: CT THORACIC SPINE RECONSTRUCTED, CT LUMBAR SPINE RECONSTRUCTED, CT CERVICAL SPINE W/O CONTRAST, CT HEAD W/O CONTRAST  LOCATION: [facility]  DATE: 4/27/2024    INDICATION: Low back pain after a fall. Weakness. History of prostate cancer.  COMPARISON: Chest CT dated 1/19/2024 and 10/9/2023. Abdomen/pelvis CT dated 07/07/2021. PET/CT dated 10/25/2023.  TECHNIQUE:   1) Routine CT Head without IV contrast. Multiplanar reformats. Dose reduction techniques were used.  2) Routine CT Cervical Spine without IV contrast. Multiplanar reformats. Dose reduction techniques were used.   3) Routine CT Thoracic Spine without IV contrast. Multiplanar reformats. Dose reduction techniques were used.   4) Routine CT Lumbar Spine without IV contrast. Multiplanar reformats. Dose reduction techniques were used.      FINDINGS:  Findings of the visualized thoracic and abdominopelvic cavities are dictated separately.    HEAD CT:   INTRACRANIAL CONTENTS: Portions of the posterior fossa are obscured by streak artifact. No acute intracranial hemorrhage, extraaxial fluid collection, or mass effect. No evidence of an acute transcortical confluent infarct. Patchy bilateral cerebral   white matter hypoattenuation, nonspecific although likely the sequela of chronic microvascular ischemia. This is most notably involving the left frontoparietal corona radiata. Mild generalized cerebral parenchymal volume loss. No hydrocephalus.    VISUALIZED ORBITS/SINUSES/MASTOIDS: No acute intraorbital finding. No significant paranasal sinus or mastoid mucosal disease.    BONES/SOFT TISSUES: No acute calvarial injury or significant scalp hematoma.    CERVICAL SPINE CT:   VERTEBRA: No acute fracture, traumatic listhesis, or compression deformity. Straightening of the normal cervical lordosis with 1 mm likely degenerative retrolisthesis at C3-C4. Status post posterior decompression and  instrumented fusion at C3-C4 with   solid facet joint effusion and partial interdiscal ankylosis without evidence of hardware failure. Multilevel interbody degenerative change, worst and moderate to severe at C6-C7. 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    CANAL/FORAMINA: Limited assessment of the spinal canal secondary to streak artifact, particularly at the fused levels. There is likely mild-moderate spinal canal stenosis at C6-C7, mild at C4-C5. Multilevel foraminal narrowing, worst and severe at C4-C5   bilaterally, C5-C6 on the right, and C6-C7 on the left.    EXTRASPINAL: No prevertebral edema. Scattered calcified atherosclerosis within the neck and visualized upper thorax, worst and severe involving the right carotid bifurcation/proximal ICA. Clear visualized lungs.    THORACIC SPINE CT:  VERTEBRA: Twelve thoracic-type rib-bearing vertebral bodies.  No acute fracture, traumatic listhesis, or compression deformity. Patchy sclerosis involving the T6 vertebral body on the right, indeterminate although unchanged since 10/09/2023 (series 5   image 40). No cortical destruction or erosion. Mild-moderate multilevel interbody degenerative change including multiple disc protrusions, worst at the mid to lower thoracic levels. Mild to moderate diffuse multilevel facet arthrosis. Mild multilevel   hypertrophy/calcification of the ligamenta flava. Status post right hemilaminectomy at L2-L3.    CANAL/FORAMINA: Mild-moderate spinal canal stenosis at T9-T11. No high-grade spinal canal or foraminal stenosis.    PARASPINAL: No acute paraspinal soft tissue abnormality.    LUMBAR SPINE CT:  VERTEBRA: Five lumbar-type vertebral bodies. No acute fracture, traumatic listhesis, or compression deformity. No destructive osseous lesion. Trace dextroconvex curvature centered at the thoracolumbar junction and straightening of the normal lumbar   lordosis with 1 mm stepwise degenerative grade 1  retrolisthesis at L2-L4. Baastrup's configuration at L2-L5. Multilevel interbody degenerative change, worst at L4-S1 where there is severe intervertebral disc height loss. Multiple disc bulges with   superimposed protrusions and extrusions, some of which are calcified. Mild to moderate multilevel facet arthrosis, worst at L4-S1.    CANAL/FORAMINA: Mild spinal canal stenosis at L3-L5, to a lesser extent at L5-S1. Multilevel foraminal narrowing, worst and severe at L4-L5 on the right, L5-S1 bilaterally.    PARASPINAL: No acute paraspinal soft tissue abnormality. Mild to moderate degenerative change of the sacroiliac joints, worse on the right.      Impression    IMPRESSION:  HEAD CT:  1.  No acute intracranial hemorrhage, extra-axial fluid collection, or mass effect  2.  Patchy bilateral cerebral white matter hypoattenuation, nonspecific although likely the sequela of chronic microvascular ischemia. A superimposed acute infarct is not excluded without a prior exam for comparison.    CERVICAL SPINE CT:  1.  No CT evidence for acute fracture or post traumatic subluxation.  2.  Postoperative changes and multilevel spondylosis, as described.    THORACIC SPINE CT:  1.  No acute fracture or posttraumatic subluxation.  2.  Multilevel spondylosis with mild-moderate spinal canal stenosis at T9-T11.  3.  Patchy sclerosis involving the T6 vertebral body without osseous destruction, indeterminate in this patient with prostate cancer although unchanged since 10/09/2023 chest CTA. Notably, no evidence of radiotracer positive disease on PET/CT from   10/25/2023.    LUMBAR SPINE CT:  1.  No acute fracture or posttraumatic subluxation.  2.  Multilevel spondylosis, as described.    CT Lumbar Spine Reconstructed    Narrative    EXAM: CT THORACIC SPINE RECONSTRUCTED, CT LUMBAR SPINE RECONSTRUCTED, CT CERVICAL SPINE W/O CONTRAST, CT HEAD W/O CONTRAST  LOCATION: [facility]  DATE: 4/27/2024    INDICATION: Low back  pain after a fall. Weakness. History of prostate cancer.  COMPARISON: Chest CT dated 1/19/2024 and 10/9/2023. Abdomen/pelvis CT dated 07/07/2021. PET/CT dated 10/25/2023.  TECHNIQUE:   1) Routine CT Head without IV contrast. Multiplanar reformats. Dose reduction techniques were used.  2) Routine CT Cervical Spine without IV contrast. Multiplanar reformats. Dose reduction techniques were used.   3) Routine CT Thoracic Spine without IV contrast. Multiplanar reformats. Dose reduction techniques were used.   4) Routine CT Lumbar Spine without IV contrast. Multiplanar reformats. Dose reduction techniques were used.      FINDINGS:  Findings of the visualized thoracic and abdominopelvic cavities are dictated separately.    HEAD CT:   INTRACRANIAL CONTENTS: Portions of the posterior fossa are obscured by streak artifact. No acute intracranial hemorrhage, extraaxial fluid collection, or mass effect. No evidence of an acute transcortical confluent infarct. Patchy bilateral cerebral   white matter hypoattenuation, nonspecific although likely the sequela of chronic microvascular ischemia. This is most notably involving the left frontoparietal corona radiata. Mild generalized cerebral parenchymal volume loss. No hydrocephalus.    VISUALIZED ORBITS/SINUSES/MASTOIDS: No acute intraorbital finding. No significant paranasal sinus or mastoid mucosal disease.    BONES/SOFT TISSUES: No acute calvarial injury or significant scalp hematoma.    CERVICAL SPINE CT:   VERTEBRA: No acute fracture, traumatic listhesis, or compression deformity. Straightening of the normal cervical lordosis with 1 mm likely degenerative retrolisthesis at C3-C4. Status post posterior decompression and instrumented fusion at C3-C4 with   solid facet joint effusion and partial interdiscal ankylosis without evidence of hardware failure. Multilevel interbody degenerative change, worst and moderate to severe at C6-C7. Curvilinear periodontal ligamentous  calcifications, likely CPPD related.    CANAL/FORAMINA: Limited assessment of the spinal canal secondary to streak artifact, particularly at the fused levels. There is likely mild-moderate spinal canal stenosis at C6-C7, mild at C4-C5. Multilevel foraminal narrowing, worst and severe at C4-C5   bilaterally, C5-C6 on the right, and C6-C7 on the left.    EXTRASPINAL: No prevertebral edema. Scattered calcified atherosclerosis within the neck and visualized upper thorax, worst and severe involving the right carotid bifurcation/proximal ICA. Clear visualized lungs.    THORACIC SPINE CT:  VERTEBRA: Twelve thoracic-type rib-bearing vertebral bodies.  No acute fracture, traumatic listhesis, or compression deformity. Patchy sclerosis involving the T6 vertebral body on the right, indeterminate although unchanged since 10/09/2023 (series 5   image 40). No cortical destruction or erosion. Mild-moderate multilevel interbody degenerative change including multiple disc protrusions, worst at the mid to lower thoracic levels. Mild to moderate diffuse multilevel facet arthrosis. Mild multilevel   hypertrophy/calcification of the ligamenta flava. Status post right hemilaminectomy at L2-L3.    CANAL/FORAMINA: Mild-moderate spinal canal stenosis at T9-T11. No high-grade spinal canal or foraminal stenosis.    PARASPINAL: No acute paraspinal soft tissue abnormality.    LUMBAR SPINE CT:  VERTEBRA: Five lumbar-type vertebral bodies. No acute fracture, traumatic listhesis, or compression deformity. No destructive osseous lesion. Trace dextroconvex curvature centered at the thoracolumbar junction and straightening of the normal lumbar   lordosis with 1 mm stepwise degenerative grade 1 retrolisthesis at L2-L4. Baastrup's configuration at L2-L5. Multilevel interbody degenerative change, worst at L4-S1 where there is severe intervertebral disc height loss. Multiple disc bulges with   superimposed protrusions and extrusions, some of which are  calcified. Mild to moderate multilevel facet arthrosis, worst at L4-S1.    CANAL/FORAMINA: Mild spinal canal stenosis at L3-L5, to a lesser extent at L5-S1. Multilevel foraminal narrowing, worst and severe at L4-L5 on the right, L5-S1 bilaterally.    PARASPINAL: No acute paraspinal soft tissue abnormality. Mild to moderate degenerative change of the sacroiliac joints, worse on the right.      Impression    IMPRESSION:  HEAD CT:  1.  No acute intracranial hemorrhage, extra-axial fluid collection, or mass effect  2.  Patchy bilateral cerebral white matter hypoattenuation, nonspecific although likely the sequela of chronic microvascular ischemia. A superimposed acute infarct is not excluded without a prior exam for comparison.    CERVICAL SPINE CT:  1.  No CT evidence for acute fracture or post traumatic subluxation.  2.  Postoperative changes and multilevel spondylosis, as described.    THORACIC SPINE CT:  1.  No acute fracture or posttraumatic subluxation.  2.  Multilevel spondylosis with mild-moderate spinal canal stenosis at T9-T11.  3.  Patchy sclerosis involving the T6 vertebral body without osseous destruction, indeterminate in this patient with prostate cancer although unchanged since 10/09/2023 chest CTA. Notably, no evidence of radiotracer positive disease on PET/CT from   10/25/2023.    LUMBAR SPINE CT:  1.  No acute fracture or posttraumatic subluxation.  2.  Multilevel spondylosis, as described.    CT Head w/o Contrast    Narrative    EXAM: CT THORACIC SPINE RECONSTRUCTED, CT LUMBAR SPINE RECONSTRUCTED, CT CERVICAL SPINE W/O CONTRAST, CT HEAD W/O CONTRAST  LOCATION: [facility]  DATE: 4/27/2024    INDICATION: Low back pain after a fall. Weakness. History of prostate cancer.  COMPARISON: Chest CT dated 1/19/2024 and 10/9/2023. Abdomen/pelvis CT dated 07/07/2021. PET/CT dated 10/25/2023.  TECHNIQUE:   1) Routine CT Head without IV contrast. Multiplanar reformats. Dose reduction  techniques were used.  2) Routine CT Cervical Spine without IV contrast. Multiplanar reformats. Dose reduction techniques were used.   3) Routine CT Thoracic Spine without IV contrast. Multiplanar reformats. Dose reduction techniques were used.   4) Routine CT Lumbar Spine without IV contrast. Multiplanar reformats. Dose reduction techniques were used.      FINDINGS:  Findings of the visualized thoracic and abdominopelvic cavities are dictated separately.    HEAD CT:   INTRACRANIAL CONTENTS: Portions of the posterior fossa are obscured by streak artifact. No acute intracranial hemorrhage, extraaxial fluid collection, or mass effect. No evidence of an acute transcortical confluent infarct. Patchy bilateral cerebral   white matter hypoattenuation, nonspecific although likely the sequela of chronic microvascular ischemia. This is most notably involving the left frontoparietal corona radiata. Mild generalized cerebral parenchymal volume loss. No hydrocephalus.    VISUALIZED ORBITS/SINUSES/MASTOIDS: No acute intraorbital finding. No significant paranasal sinus or mastoid mucosal disease.    BONES/SOFT TISSUES: No acute calvarial injury or significant scalp hematoma.    CERVICAL SPINE CT:   VERTEBRA: No acute fracture, traumatic listhesis, or compression deformity. Straightening of the normal cervical lordosis with 1 mm likely degenerative retrolisthesis at C3-C4. Status post posterior decompression and instrumented fusion at C3-C4 with   solid facet joint effusion and partial interdiscal ankylosis without evidence of hardware failure. Multilevel interbody degenerative change, worst and moderate to severe at C6-C7. 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    CANAL/FORAMINA: Limited assessment of the spinal canal secondary to streak artifact, particularly at the fused levels. There is likely mild-moderate spinal canal stenosis at C6-C7, mild at C4-C5. Multilevel foraminal narrowing, worst  and severe at C4-C5   bilaterally, C5-C6 on the right, and C6-C7 on the left.    EXTRASPINAL: No prevertebral edema. Scattered calcified atherosclerosis within the neck and visualized upper thorax, worst and severe involving the right carotid bifurcation/proximal ICA. Clear visualized lungs.    THORACIC SPINE CT:  VERTEBRA: Twelve thoracic-type rib-bearing vertebral bodies.  No acute fracture, traumatic listhesis, or compression deformity. Patchy sclerosis involving the T6 vertebral body on the right, indeterminate although unchanged since 10/09/2023 (series 5   image 40). No cortical destruction or erosion. Mild-moderate multilevel interbody degenerative change including multiple disc protrusions, worst at the mid to lower thoracic levels. Mild to moderate diffuse multilevel facet arthrosis. Mild multilevel   hypertrophy/calcification of the ligamenta flava. Status post right hemilaminectomy at L2-L3.    CANAL/FORAMINA: Mild-moderate spinal canal stenosis at T9-T11. No high-grade spinal canal or foraminal stenosis.    PARASPINAL: No acute paraspinal soft tissue abnormality.    LUMBAR SPINE CT:  VERTEBRA: Five lumbar-type vertebral bodies. No acute fracture, traumatic listhesis, or compression deformity. No destructive osseous lesion. Trace dextroconvex curvature centered at the thoracolumbar junction and straightening of the normal lumbar   lordosis with 1 mm stepwise degenerative grade 1 retrolisthesis at L2-L4. Baastrup's configuration at L2-L5. Multilevel interbody degenerative change, worst at L4-S1 where there is severe intervertebral disc height loss. Multiple disc bulges with   superimposed protrusions and extrusions, some of which are calcified. Mild to moderate multilevel facet arthrosis, worst at L4-S1.    CANAL/FORAMINA: Mild spinal canal stenosis at L3-L5, to a lesser extent at L5-S1. Multilevel foraminal narrowing, worst and severe at L4-L5 on the right, L5-S1 bilaterally.    PARASPINAL: No acute  paraspinal soft tissue abnormality. Mild to moderate degenerative change of the sacroiliac joints, worse on the right.      Impression    IMPRESSION:  HEAD CT:  1.  No acute intracranial hemorrhage, extra-axial fluid collection, or mass effect  2.  Patchy bilateral cerebral white matter hypoattenuation, nonspecific although likely the sequela of chronic microvascular ischemia. A superimposed acute infarct is not excluded without a prior exam for comparison.    CERVICAL SPINE CT:  1.  No CT evidence for acute fracture or post traumatic subluxation.  2.  Postoperative changes and multilevel spondylosis, as described.    THORACIC SPINE CT:  1.  No acute fracture or posttraumatic subluxation.  2.  Multilevel spondylosis with mild-moderate spinal canal stenosis at T9-T11.  3.  Patchy sclerosis involving the T6 vertebral body without osseous destruction, indeterminate in this patient with prostate cancer although unchanged since 10/09/2023 chest CTA. Notably, no evidence of radiotracer positive disease on PET/CT from   10/25/2023.    LUMBAR SPINE CT:  1.  No acute fracture or posttraumatic subluxation.  2.  Multilevel spondylosis, as described.    CT Cervical Spine w/o Contrast    Narrative    EXAM: CT THORACIC SPINE RECONSTRUCTED, CT LUMBAR SPINE RECONSTRUCTED, CT CERVICAL SPINE W/O CONTRAST, CT HEAD W/O CONTRAST  LOCATION: [facility]  DATE: 4/27/2024    INDICATION: Low back pain after a fall. Weakness. History of prostate cancer.  COMPARISON: Chest CT dated 1/19/2024 and 10/9/2023. Abdomen/pelvis CT dated 07/07/2021. PET/CT dated 10/25/2023.  TECHNIQUE:   1) Routine CT Head without IV contrast. Multiplanar reformats. Dose reduction techniques were used.  2) Routine CT Cervical Spine without IV contrast. Multiplanar reformats. Dose reduction techniques were used.   3) Routine CT Thoracic Spine without IV contrast. Multiplanar reformats. Dose reduction techniques were used.   4) Routine CT Lumbar  Spine without IV contrast. Multiplanar reformats. Dose reduction techniques were used.      FINDINGS:  Findings of the visualized thoracic and abdominopelvic cavities are dictated separately.    HEAD CT:   INTRACRANIAL CONTENTS: Portions of the posterior fossa are obscured by streak artifact. No acute intracranial hemorrhage, extraaxial fluid collection, or mass effect. No evidence of an acute transcortical confluent infarct. Patchy bilateral cerebral   white matter hypoattenuation, nonspecific although likely the sequela of chronic microvascular ischemia. This is most notably involving the left frontoparietal corona radiata. Mild generalized cerebral parenchymal volume loss. No hydrocephalus.    VISUALIZED ORBITS/SINUSES/MASTOIDS: No acute intraorbital finding. No significant paranasal sinus or mastoid mucosal disease.    BONES/SOFT TISSUES: No acute calvarial injury or significant scalp hematoma.    CERVICAL SPINE CT:   VERTEBRA: No acute fracture, traumatic listhesis, or compression deformity. Straightening of the normal cervical lordosis with 1 mm likely degenerative retrolisthesis at C3-C4. Status post posterior decompression and instrumented fusion at C3-C4 with   solid facet joint effusion and partial interdiscal ankylosis without evidence of hardware failure. Multilevel interbody degenerative change, worst and moderate to severe at C6-C7. 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    CANAL/FORAMINA: Limited assessment of the spinal canal secondary to streak artifact, particularly at the fused levels. There is likely mild-moderate spinal canal stenosis at C6-C7, mild at C4-C5. Multilevel foraminal narrowing, worst and severe at C4-C5   bilaterally, C5-C6 on the right, and C6-C7 on the left.    EXTRASPINAL: No prevertebral edema. Scattered calcified atherosclerosis within the neck and visualized upper thorax, worst and severe involving the right carotid bifurcation/proximal ICA.  Clear visualized lungs.    THORACIC SPINE CT:  VERTEBRA: Twelve thoracic-type rib-bearing vertebral bodies.  No acute fracture, traumatic listhesis, or compression deformity. Patchy sclerosis involving the T6 vertebral body on the right, indeterminate although unchanged since 10/09/2023 (series 5   image 40). No cortical destruction or erosion. Mild-moderate multilevel interbody degenerative change including multiple disc protrusions, worst at the mid to lower thoracic levels. Mild to moderate diffuse multilevel facet arthrosis. Mild multilevel   hypertrophy/calcification of the ligamenta flava. Status post right hemilaminectomy at L2-L3.    CANAL/FORAMINA: Mild-moderate spinal canal stenosis at T9-T11. No high-grade spinal canal or foraminal stenosis.    PARASPINAL: No acute paraspinal soft tissue abnormality.    LUMBAR SPINE CT:  VERTEBRA: Five lumbar-type vertebral bodies. No acute fracture, traumatic listhesis, or compression deformity. No destructive osseous lesion. Trace dextroconvex curvature centered at the thoracolumbar junction and straightening of the normal lumbar   lordosis with 1 mm stepwise degenerative grade 1 retrolisthesis at L2-L4. [place]'s configuration at L2-L5. Multilevel interbody degenerative change, worst at L4-S1 where there is severe intervertebral disc height loss. Multiple disc bulges with   superimposed protrusions and extrusions, some of which are calcified. Mild to moderate multilevel facet arthrosis, worst at L4-S1.    CANAL/FORAMINA: Mild spinal canal stenosis at L3-L5, to a lesser extent at L5-S1. Multilevel foraminal narrowing, worst and severe at L4-L5 on the right, L5-S1 bilaterally.    PARASPINAL: No acute paraspinal soft tissue abnormality. Mild to moderate degenerative change of the sacroiliac joints, worse on the right.      Impression    IMPRESSION:  HEAD CT:  1.  No acute intracranial hemorrhage, extra-axial fluid collection, or mass effect  2.  Patchy bilateral cerebral  white matter hypoattenuation, nonspecific although likely the sequela of chronic microvascular ischemia. A superimposed acute infarct is not excluded without a prior exam for comparison.    CERVICAL SPINE CT:  1.  No CT evidence for acute fracture or post traumatic subluxation.  2.  Postoperative changes and multilevel spondylosis, as described.    THORACIC SPINE CT:  1.  No acute fracture or posttraumatic subluxation.  2.  Multilevel spondylosis with mild-moderate spinal canal stenosis at T9-T11.  3.  Patchy sclerosis involving the T6 vertebral body without osseous destruction, indeterminate in this patient with prostate cancer although unchanged since 10/09/2023 chest CTA. Notably, no evidence of radiotracer positive disease on PET/CT from   10/25/2023.    LUMBAR SPINE CT:  1.  No acute fracture or posttraumatic subluxation.  2.  Multilevel spondylosis, as described.

## 2024-04-29 ENCOUNTER — APPOINTMENT (OUTPATIENT)
Dept: PHYSICAL THERAPY | Facility: CLINIC | Age: 79
DRG: 193 | End: 2024-04-29
Attending: STUDENT IN AN ORGANIZED HEALTH CARE EDUCATION/TRAINING PROGRAM
Payer: MEDICARE

## 2024-04-29 VITALS
HEIGHT: 66 IN | TEMPERATURE: 97.4 F | BODY MASS INDEX: 28.06 KG/M2 | DIASTOLIC BLOOD PRESSURE: 60 MMHG | WEIGHT: 174.6 LBS | OXYGEN SATURATION: 94 % | RESPIRATION RATE: 16 BRPM | SYSTOLIC BLOOD PRESSURE: 131 MMHG | HEART RATE: 93 BPM

## 2024-04-29 LAB
HOLD SPECIMEN: NORMAL
MAGNESIUM SERPL-MCNC: 2.5 MG/DL (ref 1.7–2.3)
POTASSIUM SERPL-SCNC: 4.2 MMOL/L (ref 3.4–5.3)

## 2024-04-29 PROCEDURE — 250N000013 HC RX MED GY IP 250 OP 250 PS 637: Performed by: STUDENT IN AN ORGANIZED HEALTH CARE EDUCATION/TRAINING PROGRAM

## 2024-04-29 PROCEDURE — 97116 GAIT TRAINING THERAPY: CPT | Mod: GP

## 2024-04-29 PROCEDURE — 250N000009 HC RX 250: Performed by: STUDENT IN AN ORGANIZED HEALTH CARE EDUCATION/TRAINING PROGRAM

## 2024-04-29 PROCEDURE — 999N000157 HC STATISTIC RCP TIME EA 10 MIN

## 2024-04-29 PROCEDURE — 99239 HOSP IP/OBS DSCHRG MGMT >30: CPT | Performed by: STUDENT IN AN ORGANIZED HEALTH CARE EDUCATION/TRAINING PROGRAM

## 2024-04-29 PROCEDURE — 84132 ASSAY OF SERUM POTASSIUM: CPT | Performed by: STUDENT IN AN ORGANIZED HEALTH CARE EDUCATION/TRAINING PROGRAM

## 2024-04-29 PROCEDURE — 250N000011 HC RX IP 250 OP 636: Performed by: STUDENT IN AN ORGANIZED HEALTH CARE EDUCATION/TRAINING PROGRAM

## 2024-04-29 PROCEDURE — 97161 PT EVAL LOW COMPLEX 20 MIN: CPT | Mod: GP

## 2024-04-29 PROCEDURE — 250N000013 HC RX MED GY IP 250 OP 250 PS 637: Performed by: HOSPITALIST

## 2024-04-29 PROCEDURE — 83735 ASSAY OF MAGNESIUM: CPT | Performed by: STUDENT IN AN ORGANIZED HEALTH CARE EDUCATION/TRAINING PROGRAM

## 2024-04-29 PROCEDURE — 250N000013 HC RX MED GY IP 250 OP 250 PS 637: Performed by: INTERNAL MEDICINE

## 2024-04-29 PROCEDURE — 36415 COLL VENOUS BLD VENIPUNCTURE: CPT | Performed by: STUDENT IN AN ORGANIZED HEALTH CARE EDUCATION/TRAINING PROGRAM

## 2024-04-29 PROCEDURE — 94640 AIRWAY INHALATION TREATMENT: CPT

## 2024-04-29 RX ORDER — IPRATROPIUM BROMIDE AND ALBUTEROL SULFATE 2.5; .5 MG/3ML; MG/3ML
3 SOLUTION RESPIRATORY (INHALATION)
Status: DISCONTINUED | OUTPATIENT
Start: 2024-04-29 | End: 2024-04-29 | Stop reason: HOSPADM

## 2024-04-29 RX ORDER — OSELTAMIVIR PHOSPHATE 75 MG/1
75 CAPSULE ORAL 2 TIMES DAILY
Qty: 6 CAPSULE | Refills: 0 | Status: SHIPPED | OUTPATIENT
Start: 2024-04-29 | End: 2024-05-02

## 2024-04-29 RX ORDER — PREDNISONE 20 MG/1
40 TABLET ORAL DAILY
Qty: 6 TABLET | Refills: 0 | Status: SHIPPED | OUTPATIENT
Start: 2024-04-29 | End: 2024-05-02

## 2024-04-29 RX ORDER — LANOLIN ALCOHOL/MO/W.PET/CERES
3 CREAM (GRAM) TOPICAL
Status: DISCONTINUED | OUTPATIENT
Start: 2024-04-29 | End: 2024-04-29 | Stop reason: HOSPADM

## 2024-04-29 RX ORDER — AZITHROMYCIN 250 MG/1
250 TABLET, FILM COATED ORAL DAILY
Qty: 3 TABLET | Refills: 0 | Status: SHIPPED | OUTPATIENT
Start: 2024-04-30 | End: 2024-05-03

## 2024-04-29 RX ADMIN — AZITHROMYCIN 250 MG: 250 TABLET, FILM COATED ORAL at 08:26

## 2024-04-29 RX ADMIN — Medication 3 MG: at 00:41

## 2024-04-29 RX ADMIN — METHYLPREDNISOLONE SODIUM SUCCINATE 40 MG: 40 INJECTION, POWDER, FOR SOLUTION INTRAMUSCULAR; INTRAVENOUS at 08:26

## 2024-04-29 RX ADMIN — PANTOPRAZOLE SODIUM 40 MG: 40 TABLET, DELAYED RELEASE ORAL at 08:26

## 2024-04-29 RX ADMIN — OSELTAMIVIR PHOSPHATE 75 MG: 75 CAPSULE ORAL at 08:26

## 2024-04-29 RX ADMIN — CILOSTAZOL 100 MG: 100 TABLET ORAL at 08:26

## 2024-04-29 RX ADMIN — IPRATROPIUM BROMIDE AND ALBUTEROL SULFATE 3 ML: .5; 3 SOLUTION RESPIRATORY (INHALATION) at 07:09

## 2024-04-29 RX ADMIN — CLOPIDOGREL BISULFATE 75 MG: 75 TABLET ORAL at 08:26

## 2024-04-29 RX ADMIN — LOSARTAN POTASSIUM AND HYDROCHLOROTHIAZIDE 1 TABLET: 100; 25 TABLET, FILM COATED ORAL at 08:26

## 2024-04-29 RX ADMIN — ASPIRIN 81 MG: 81 TABLET, COATED ORAL at 08:26

## 2024-04-29 RX ADMIN — PROPRANOLOL HYDROCHLORIDE 20 MG: 20 TABLET ORAL at 08:26

## 2024-04-29 ASSESSMENT — ACTIVITIES OF DAILY LIVING (ADL)
ADLS_ACUITY_SCORE: 32

## 2024-04-29 NOTE — PLAN OF CARE
Goal Outcome Evaluation: O2 neds, lung sounds    Problem: Adult Inpatient Plan of Care  Goal: Optimal Comfort and Wellbeing  Outcome: Progressing   Pt denied pain this shift.    Problem: Adult Inpatient Plan of Care  Goal: Readiness for Transition of Care  Outcome: Progressing   Pt cont to req 1L of O2 while at rest and while walking. Pt was able to walk 2 laps around unit with staff and O2. Cont on IV steroids and PO antibx.

## 2024-04-29 NOTE — PLAN OF CARE
Problem: Adult Inpatient Plan of Care  Goal: Readiness for Transition of Care  4/28/2024 2151 by Latanya Olmstead RN  Outcome: Progressing     Problem: Fall Injury Risk  Goal: Absence of Fall and Fall-Related Injury  Outcome: Progressing     Problem: Gas Exchange Impaired  Goal: Optimal Gas Exchange  Outcome: Progressing    A&Ox4. Denies pain. On 1L via NC. K and Mg protocol, AM recheck. Stby assist. Alarms on for safety.

## 2024-04-29 NOTE — PROVIDER NOTIFICATION
Patient is refusing breathing treatment at night and wants to sleep. Currently using 1L NC, breath sounds fine crackles with coarse cough that is productive. CXR from 4/29 indicated no changes. Will continue to follow patient on a QID schedule, he also has PRN nebulizer available as needed.       04/29/24 0611   RCAT Assessment   Reason for Assessment COPD   Pulmonary Status 3   Surgical Status 0   Chest X-ray 1   Respiratory Pattern 0   Mental Status 0   Breath Sounds 3   Cough Effectiveness 1   Level of Activity 1   O2 Required for SpO2>=92% 1   Acuity Level (points) 10   Acuity Level  4   Re-eval Interval Guideline Every 3 days   Re-evaluation Date 05/02/24

## 2024-04-29 NOTE — PROGRESS NOTES
Care Management Follow Up    Length of Stay (days): 2    Expected Discharge Date: 04/29/2024     Concerns to be Addressed: no discharge needs identified     Patient plan of care discussed at interdisciplinary rounds: Yes    Anticipated Discharge Disposition: Home     Anticipated Discharge Services: None  Anticipated Discharge DME: None    Patient/family educated on Medicare website which has current facility and service quality ratings: no  Education Provided on the Discharge Plan: No  Patient/Family in Agreement with the Plan: yes    Referrals Placed by CM/SW:  None   Private pay costs discussed: Not applicable    Additional Information:  Chart reviewed. No CM needs identified.  Family to transport.     BETI Nichols

## 2024-04-29 NOTE — PROGRESS NOTES
Physical Therapy Discharge Summary    Reason for therapy discharge:    All goals and outcomes met, no further needs identified.  No further expectations of functional progress.    Progress towards therapy goal(s). See goals on Care Plan in [facility] electronic health record for goal details.  Goals met    Therapy recommendation(s):    No further therapy is recommended.

## 2024-04-29 NOTE — PLAN OF CARE
Patient discharged at 1123 via wife to home without services. PIV pulled, AVS reviewed, and belongings sent with patient.

## 2024-04-29 NOTE — PROGRESS NOTES
04/29/24 0750   Appointment Info   Signing Clinician's Name / Credentials (PT) Robert Baumann, PT   Quick Adds   Quick Adds Certification   Living Environment   People in Home spouse   Current Living Arrangements apartment;other (see comments)  (senior apt)   Home Accessibility no concerns   Transportation Anticipated family or friend will provide   Self-Care   Usual Activity Tolerance moderate   Current Activity Tolerance moderate   Equipment Currently Used at Home cane, straight;other (see comments)  (power scooter for outside)   Fall history within last six months yes   Number of times patient has fallen within last six months 1   Activity/Exercise/Self-Care Comment Indep with all I ADL's and ADL's,  the one fall was out of bed   General Information   Onset of Illness/Injury or Date of Surgery 04/27/24   Pertinent History of Current Problem (include personal factors and/or comorbidities that impact the POC) COPD exacerbation (H) 7/31/2022    Tobacco abuse 4/27/2024    Influenza A 4/27/2024    Noncompliance with medication regimen 4/27/2024    Acute and chronic respiratory failure with hypoxia (H) 4/27/2024   Existing Precautions/Restrictions no known precautions/restrictions   Cognition   Affect/Mental Status (Cognition) WNL   Range of Motion (ROM)   Range of Motion ROM is WFL   Strength (Manual Muscle Testing)   Strength (Manual Muscle Testing) No deficits observed during functional mobility   Bed Mobility   Bed Mobility no deficits identified   Transfers   Transfers sit-stand transfer   Sit-Stand Transfer   Sit-Stand [place] (Transfers) contact guard;verbal cues   Assistive Device (Sit-Stand Transfers) walker, front-wheeled   Gait/Stairs (Locomotion)   [place] Level (Gait) contact guard;verbal cues   Assistive Device (Gait) cane, straight   Distance in Feet (Gait) 20   Pattern (Gait) swing-through   Deviations/Abnormal Patterns (Gait) gait speed decreased;tracey decreased   Balance   Balance no  deficits were identified   Clinical Impression   Criteria for Skilled Therapeutic Intervention Yes, treatment indicated   PT Diagnosis (PT) Impaired functional mobility   Influenced by the following impairments weakness, pain   Functional limitations due to impairments transfers, gait   Clinical Presentation (PT Evaluation Complexity) stable   Clinical Presentation Rationale Patient presents as medically diagnosed   Clinical Decision Making (Complexity) low complexity   Planned Therapy Interventions (PT) gait training;transfer training;patient/family education   Risk & Benefits of therapy have been explained patient   PT Total Evaluation Time   PT Eval, Low Complexity Minutes (59788) 10   Physical Therapy Goals   PT Frequency One time eval and treatment only   Interventions   Interventions Quick Adds Gait Training   Gait Training   Gait Training Minutes (77199) 10   Symptoms Noted During/After Treatment (Gait Training) fatigue;shortness of breath   Treatment Detail/Skilled Intervention Patient  ambulating with slow stable gait, slightly wider ALEXANDRA, vc for safety and to use walker at least iniitally.  Patient on RA, dropping to 88% with actiivty but returning to 90% within 15 seconds.  Patient encouraged to continue with walking as an exercise activity at home. Patient admits to not exercising on regular basis.   Distance in Feet 150, 180   [place] Level (Gait Training) independent   Physical Assistance Level (Gait Training) verbal cues   Weight Bearing (Gait Training) full weight-bearing   Assistive Device (Gait Training) straight cane   Pattern Analysis (Gait Training) swing-through gait   Gait Analysis Deviations decreased tracey;increased stride width   Impairments (Gait Analysis/Training) other (see comments)  (decreased activity tolerance)   PT Discharge Planning   PT Plan dc PT   PT Discharge Recommendation (DC Rec) home with assist   PT Rationale for DC Rec Patient mobilzing well and close to normal  baseline   PT Brief overview of current status Patient at baseline for mobilty and tolerating ambulation with RA and use of SPC which patient was using prior.  Also does have walker available   PT Equipment Needed at Discharge cane, straight   Total Session Time   Timed Code Treatment Minutes 10   Total Session Time (sum of timed and untimed services) 20

## 2024-04-29 NOTE — DISCHARGE SUMMARY
"[facility]  Hospitalist Discharge Summary      Date of Admission:  4/27/2024  Date of Discharge:  4/29/2024  Discharging Provider: MIRNA EWING MD  Discharge Service: Hospitalist Service    Discharge Diagnoses   COPD exacerbation  Influenza A infection    Clinically Significant Risk Factors     # Overweight: Estimated body mass index is 28.18 kg/m  as calculated from the following:    Height as of this encounter: 1.676 m (5' 6\").    Weight as of this encounter: 79.2 kg (174 lb 9.6 oz).       Follow-ups Needed After Discharge   Follow-up Appointments     Follow-up and recommended labs and tests       Follow up with primary care provider, John Oconnor, within 7 days for   hospital follow- up.  The following labs/tests are recommended: CBC, BMP.              Unresulted Labs Ordered in the Past 30 Days of this Admission       Date and Time Order Name Status Description    4/27/2024 12:28 PM Blood Culture Peripheral Blood Preliminary     4/27/2024 12:28 PM Blood Culture Peripheral Blood Preliminary         These results will be followed up by PCP    Discharge Disposition   Discharged to home  Condition at discharge: Good    Hospital Course   Ubaldo Ramos is a 78 year old male admitted on 4/27/2024.  He has a past medical history significant for tobacco use disorder, lung cancer status post resection and chemotherapy, COPD, peripheral vascular disease who presented to the hospital with generalized weakness and shortness of breath. He was diagnosed with COPD exacerbation due to influenza A infection. Symptoms of shortness of breath and generalized weakness improved.  Patient initially required 1 to 2 L nasal cannula, however, was weaned off and currently saturating around 92% on room air.  Evaluated by physical therapy who recommended discharging the patient home.  Patient is feeling well.  He is eager to go home.  Vitals are stable.       Acute COPD exacerbation  Influenza A infection  -No " "clear signs suggestive of bacterial pneumonia based on labs and CT chest.  -Procalcitonin negative.    Plan  -Continue Tamiflu to complete a course of 5 days.  -Discharged on prednisone 40 mg for 3 more days.  -Continue azithromycin to complete the course of 5 days.    Generalized weakness  -No clear focal neurological deficits.  -Generalized weakness most likely due to influenza A infection also patient has mild hypomagnesemia.  -CT head showed patchy bilateral cerebral white matter hypoattenuation most likely chronic microvascular disease.    Plan  -Educated about the importance of monitoring his symptoms at home and return to the hospital if develop any focal weaknesses, difficulty finding words, vision changes, or any other concerning symptoms.    Hypertension  -At home on losartan/hydrochlorothiazide 100-25 mg daily  -Blood pressures currently at 130/60.    Plan  -Continue home antihypertensives.    Peripheral vascular disease    Plan  Continue home aspirin, Plavix, atorvastatin.    GERD    Plan  -Continue home pantoprazole    History of prostate cancer  History of lung cancer  T6 vertebral body sclerosis  -CT lumbar spine \"patchy sclerosis involving the T6 vertebral body without osseous destruction, indeterminate in this patient with prostate cancer although unchanged since 10/09/2023 chest CTA. Notably, no evidence of radiotracer positive disease on PET/CT from 10/25/2023\"      Consultations This Hospital Stay   PHYSICAL THERAPY ADULT IP CONSULT  OCCUPATIONAL THERAPY ADULT IP CONSULT    Code Status   Full Code    Time Spent on this Encounter   I, MIRNA EWING MD, personally saw the patient today and spent greater than 30 minutes discharging this patient.       MIRNA EWING MD  [address] [address]  Phone: [phone]  Fax: [phone]  ______________________________________________________________________    Physical Exam   Vital " Signs: Temp: 97.4  F (36.3  C) Temp src: Oral BP: 131/60 Pulse: 93   Resp: 16 SpO2: 94 % O2 Device: None (Room air) Oxygen Delivery: 1 LPM  Weight: 174 lbs 9.6 oz  Physical Exam  Constitutional:       General: He is not in acute distress.     Appearance: He is not ill-appearing or toxic-appearing.   Pulmonary:      Effort: Pulmonary effort is normal. No respiratory distress.      Breath sounds: No wheezing.   Skin:     General: Skin is warm and dry.   Neurological:      General: No focal deficit present.      Mental Status: He is alert and oriented to person, place, and time. Mental status is at baseline.      Motor: No weakness.   Psychiatric:         Mood and Affect: Mood normal.         Thought Content: Thought content normal.         Judgment: Judgment normal.             Primary Care Physician   John Oconnor    Discharge Orders      Reason for your hospital stay    Generalized weakness, cough, shortness of breath due to influenza A infection resulting in COPD exacerbation.     Follow-up and recommended labs and tests     Follow up with primary care provider, John Oconnor, within 7 days for hospital follow- up.  The following labs/tests are recommended: CBC, BMP.     Activity    Your activity upon discharge: activity as tolerated     Diet    Follow this diet upon discharge: Orders Placed This Encounter      Combination Diet Regular Diet Adult       Significant Results and Procedures   Most Recent 3 CBC's:  Recent Labs   Lab Test 04/27/24  1307 03/19/24  1513 05/23/23  1056   WBC 6.0 4.3 4.9   HGB 12.4* 11.6* 11.4*   MCV 91 93 94   * 143* 140*     Most Recent 3 BMP's:  Recent Labs   Lab Test 04/29/24  0641 04/28/24  0646 04/27/24  1748 04/27/24  1307 03/19/24  1513 12/13/23  1500 05/23/23  1056   NA  --   --   --  140 143  --  142   POTASSIUM 4.2 3.8 3.9 3.8 3.3*  --  4.0   CHLORIDE  --   --   --  100 106  --  106   CO2  --   --   --  28 25  --  25   BUN  --   --   --  17.2 22.1  --  20.9   CR  --    --  0.99 1.13 0.99   < > 0.93   ANIONGAP  --   --   --  12 12  --  11   RUTHANN  --   --   --  9.8 9.5  --  9.7   GLC  --   --   --  108* 148*  --  103*    < > = values in this interval not displayed.   ,   Results for orders placed or performed during the hospital encounter of 04/27/24   CT Head w/o Contrast    Narrative    EXAM: CT THORACIC SPINE RECONSTRUCTED, CT LUMBAR SPINE RECONSTRUCTED, CT CERVICAL SPINE W/O CONTRAST, CT HEAD W/O CONTRAST  LOCATION: [facility]  DATE: 4/27/2024    INDICATION: Low back pain after a fall. Weakness. History of prostate cancer.  COMPARISON: Chest CT dated 1/19/2024 and 10/9/2023. Abdomen/pelvis CT dated 07/07/2021. PET/CT dated 10/25/2023.  TECHNIQUE:   1) Routine CT Head without IV contrast. Multiplanar reformats. Dose reduction techniques were used.  2) Routine CT Cervical Spine without IV contrast. Multiplanar reformats. Dose reduction techniques were used.   3) Routine CT Thoracic Spine without IV contrast. Multiplanar reformats. Dose reduction techniques were used.   4) Routine CT Lumbar Spine without IV contrast. Multiplanar reformats. Dose reduction techniques were used.      FINDINGS:  Findings of the visualized thoracic and abdominopelvic cavities are dictated separately.    HEAD CT:   INTRACRANIAL CONTENTS: Portions of the posterior fossa are obscured by streak artifact. No acute intracranial hemorrhage, extraaxial fluid collection, or mass effect. No evidence of an acute transcortical confluent infarct. Patchy bilateral cerebral   white matter hypoattenuation, nonspecific although likely the sequela of chronic microvascular ischemia. This is most notably involving the left frontoparietal corona radiata. Mild generalized cerebral parenchymal volume loss. No hydrocephalus.    VISUALIZED ORBITS/SINUSES/MASTOIDS: No acute intraorbital finding. No significant paranasal sinus or mastoid mucosal disease.    BONES/SOFT TISSUES: No acute calvarial injury or  significant scalp hematoma.    CERVICAL SPINE CT:   VERTEBRA: No acute fracture, traumatic listhesis, or compression deformity. Straightening of the normal cervical lordosis with 1 mm likely degenerative retrolisthesis at C3-C4. Status post posterior decompression and instrumented fusion at C3-C4 with   solid facet joint effusion and partial interdiscal ankylosis without evidence of hardware failure. Multilevel interbody degenerative change, worst and moderate to severe at C6-C7. 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    CANAL/FORAMINA: Limited assessment of the spinal canal secondary to streak artifact, particularly at the fused levels. There is likely mild-moderate spinal canal stenosis at C6-C7, mild at C4-C5. Multilevel foraminal narrowing, worst and severe at C4-C5   bilaterally, C5-C6 on the right, and C6-C7 on the left.    EXTRASPINAL: No prevertebral edema. Scattered calcified atherosclerosis within the neck and visualized upper thorax, worst and severe involving the right carotid bifurcation/proximal ICA. Clear visualized lungs.    THORACIC SPINE CT:  VERTEBRA: Twelve thoracic-type rib-bearing vertebral bodies.  No acute fracture, traumatic listhesis, or compression deformity. Patchy sclerosis involving the T6 vertebral body on the right, indeterminate although unchanged since 10/09/2023 (series 5   image 40). No cortical destruction or erosion. Mild-moderate multilevel interbody degenerative change including multiple disc protrusions, worst at the mid to lower thoracic levels. Mild to moderate diffuse multilevel facet arthrosis. Mild multilevel   hypertrophy/calcification of the ligamenta flava. Status post right hemilaminectomy at L2-L3.    CANAL/FORAMINA: Mild-moderate spinal canal stenosis at T9-T11. No high-grade spinal canal or foraminal stenosis.    PARASPINAL: No acute paraspinal soft tissue abnormality.    LUMBAR SPINE CT:  VERTEBRA: Five lumbar-type vertebral  bodies. No acute fracture, traumatic listhesis, or compression deformity. No destructive osseous lesion. Trace dextroconvex curvature centered at the thoracolumbar junction and straightening of the normal lumbar   lordosis with 1 mm stepwise degenerative grade 1 retrolisthesis at L2-L4. Baastrup's configuration at L2-L5. Multilevel interbody degenerative change, worst at L4-S1 where there is severe intervertebral disc height loss. Multiple disc bulges with   superimposed protrusions and extrusions, some of which are calcified. Mild to moderate multilevel facet arthrosis, worst at L4-S1.    CANAL/FORAMINA: Mild spinal canal stenosis at L3-L5, to a lesser extent at L5-S1. Multilevel foraminal narrowing, worst and severe at L4-L5 on the right, L5-S1 bilaterally.    PARASPINAL: No acute paraspinal soft tissue abnormality. Mild to moderate degenerative change of the sacroiliac joints, worse on the right.      Impression    IMPRESSION:  HEAD CT:  1.  No acute intracranial hemorrhage, extra-axial fluid collection, or mass effect  2.  Patchy bilateral cerebral white matter hypoattenuation, nonspecific although likely the sequela of chronic microvascular ischemia. A superimposed acute infarct is not excluded without a prior exam for comparison.    CERVICAL SPINE CT:  1.  No CT evidence for acute fracture or post traumatic subluxation.  2.  Postoperative changes and multilevel spondylosis, as described.    THORACIC SPINE CT:  1.  No acute fracture or posttraumatic subluxation.  2.  Multilevel spondylosis with mild-moderate spinal canal stenosis at T9-T11.  3.  Patchy sclerosis involving the T6 vertebral body without osseous destruction, indeterminate in this patient with prostate cancer although unchanged since 10/09/2023 chest CTA. Notably, no evidence of radiotracer positive disease on PET/CT from   10/25/2023.    LUMBAR SPINE CT:  1.  No acute fracture or posttraumatic subluxation.  2.  Multilevel spondylosis, as  described.    CT Cervical Spine w/o Contrast    Narrative    EXAM: CT THORACIC SPINE RECONSTRUCTED, CT LUMBAR SPINE RECONSTRUCTED, CT CERVICAL SPINE W/O CONTRAST, CT HEAD W/O CONTRAST  LOCATION: [facility]  DATE: 4/27/2024    INDICATION: Low back pain after a fall. Weakness. History of prostate cancer.  COMPARISON: Chest CT dated 1/19/2024 and 10/9/2023. Abdomen/pelvis CT dated 07/07/2021. PET/CT dated 10/25/2023.  TECHNIQUE:   1) Routine CT Head without IV contrast. Multiplanar reformats. Dose reduction techniques were used.  2) Routine CT Cervical Spine without IV contrast. Multiplanar reformats. Dose reduction techniques were used.   3) Routine CT Thoracic Spine without IV contrast. Multiplanar reformats. Dose reduction techniques were used.   4) Routine CT Lumbar Spine without IV contrast. Multiplanar reformats. Dose reduction techniques were used.      FINDINGS:  Findings of the visualized thoracic and abdominopelvic cavities are dictated separately.    HEAD CT:   INTRACRANIAL CONTENTS: Portions of the posterior fossa are obscured by streak artifact. No acute intracranial hemorrhage, extraaxial fluid collection, or mass effect. No evidence of an acute transcortical confluent infarct. Patchy bilateral cerebral   white matter hypoattenuation, nonspecific although likely the sequela of chronic microvascular ischemia. This is most notably involving the left frontoparietal corona radiata. Mild generalized cerebral parenchymal volume loss. No hydrocephalus.    VISUALIZED ORBITS/SINUSES/MASTOIDS: No acute intraorbital finding. No significant paranasal sinus or mastoid mucosal disease.    BONES/SOFT TISSUES: No acute calvarial injury or significant scalp hematoma.    CERVICAL SPINE CT:   VERTEBRA: No acute fracture, traumatic listhesis, or compression deformity. Straightening of the normal cervical lordosis with 1 mm likely degenerative retrolisthesis at C3-C4. Status post posterior  decompression and instrumented fusion at C3-C4 with   solid facet joint effusion and partial interdiscal ankylosis without evidence of hardware failure. Multilevel interbody degenerative change, worst and moderate to severe at C6-C7. 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    CANAL/FORAMINA: Limited assessment of the spinal canal secondary to streak artifact, particularly at the fused levels. There is likely mild-moderate spinal canal stenosis at C6-C7, mild at C4-C5. Multilevel foraminal narrowing, worst and severe at C4-C5   bilaterally, C5-C6 on the right, and C6-C7 on the left.    EXTRASPINAL: No prevertebral edema. Scattered calcified atherosclerosis within the neck and visualized upper thorax, worst and severe involving the right carotid bifurcation/proximal ICA. Clear visualized lungs.    THORACIC SPINE CT:  VERTEBRA: Twelve thoracic-type rib-bearing vertebral bodies.  No acute fracture, traumatic listhesis, or compression deformity. Patchy sclerosis involving the T6 vertebral body on the right, indeterminate although unchanged since 10/09/2023 (series 5   image 40). No cortical destruction or erosion. Mild-moderate multilevel interbody degenerative change including multiple disc protrusions, worst at the mid to lower thoracic levels. Mild to moderate diffuse multilevel facet arthrosis. Mild multilevel   hypertrophy/calcification of the ligamenta flava. Status post right hemilaminectomy at L2-L3.    CANAL/FORAMINA: Mild-moderate spinal canal stenosis at T9-T11. No high-grade spinal canal or foraminal stenosis.    PARASPINAL: No acute paraspinal soft tissue abnormality.    LUMBAR SPINE CT:  VERTEBRA: Five lumbar-type vertebral bodies. No acute fracture, traumatic listhesis, or compression deformity. No destructive osseous lesion. Trace dextroconvex curvature centered at the thoracolumbar junction and straightening of the normal lumbar   lordosis with 1 mm stepwise degenerative  grade 1 retrolisthesis at L2-L4. Baastrup's configuration at L2-L5. Multilevel interbody degenerative change, worst at L4-S1 where there is severe intervertebral disc height loss. Multiple disc bulges with   superimposed protrusions and extrusions, some of which are calcified. Mild to moderate multilevel facet arthrosis, worst at L4-S1.    CANAL/FORAMINA: Mild spinal canal stenosis at L3-L5, to a lesser extent at L5-S1. Multilevel foraminal narrowing, worst and severe at L4-L5 on the right, L5-S1 bilaterally.    PARASPINAL: No acute paraspinal soft tissue abnormality. Mild to moderate degenerative change of the sacroiliac joints, worse on the right.      Impression    IMPRESSION:  HEAD CT:  1.  No acute intracranial hemorrhage, extra-axial fluid collection, or mass effect  2.  Patchy bilateral cerebral white matter hypoattenuation, nonspecific although likely the sequela of chronic microvascular ischemia. A superimposed acute infarct is not excluded without a prior exam for comparison.    CERVICAL SPINE CT:  1.  No CT evidence for acute fracture or post traumatic subluxation.  2.  Postoperative changes and multilevel spondylosis, as described.    THORACIC SPINE CT:  1.  No acute fracture or posttraumatic subluxation.  2.  Multilevel spondylosis with mild-moderate spinal canal stenosis at T9-T11.  3.  Patchy sclerosis involving the T6 vertebral body without osseous destruction, indeterminate in this patient with prostate cancer although unchanged since 10/09/2023 chest CTA. Notably, no evidence of radiotracer positive disease on PET/CT from   10/25/2023.    LUMBAR SPINE CT:  1.  No acute fracture or posttraumatic subluxation.  2.  Multilevel spondylosis, as described.    CT Chest Abdomen Pelvis w/o Contrast    Narrative    EXAM: CT CHEST ABDOMEN PELVIS W/O CONTRAST  LOCATION: [facility]  DATE: 4/27/2024    INDICATION: fall out of bed, low back pain, cough and SOB with COPD exacerbation, CTA  a few days ago negative but now new low back pain after fall out of bed and new cough and SOB  COMPARISON: CT chest 04/20/2024, CT abdomen and pelvis 12/14/2023  TECHNIQUE: CT scan of the chest, abdomen, and pelvis was performed without IV contrast. Multiplanar reformats were obtained. Dose reduction techniques were used.   CONTRAST: None.    FINDINGS:   LUNGS AND PLEURA: Prior left upper lobectomy. Unchanged left basilar fibrosis and pleural thickening. Unchanged irregular part solid nodule in the right upper lobe on series 4 image 94. Decreased nodular opacity in the right lower lobe on series 4 image   129. No pleural effusions.    MEDIASTINUM/AXILLAE: No lymphadenopathy. No thoracic aortic aneurysm.    CORONARY ARTERY CALCIFICATION: Severe.    HEPATOBILIARY: Cholecystectomy.    PANCREAS: Normal.    SPLEEN: Normal.    ADRENAL GLANDS: Normal.    KIDNEYS/BLADDER: Unremarkable    BOWEL: Unremarkable    LYMPH NODES: Normal.    VASCULATURE: Bilateral iliac artery stents. Femorofemoral bypass graft.    PELVIC ORGANS: Prostatectomy.    MUSCULOSKELETAL: Chronic left-sided rib fractures. No acute osseous abnormality.      Impression    IMPRESSION:  1.  No acute findings in the chest, abdomen, or pelvis.   CT Lumbar Spine Reconstructed    Narrative    EXAM: CT THORACIC SPINE RECONSTRUCTED, CT LUMBAR SPINE RECONSTRUCTED, CT CERVICAL SPINE W/O CONTRAST, CT HEAD W/O CONTRAST  LOCATION: [facility]  DATE: 4/27/2024    INDICATION: Low back pain after a fall. Weakness. History of prostate cancer.  COMPARISON: Chest CT dated 1/19/2024 and 10/9/2023. Abdomen/pelvis CT dated 07/07/2021. PET/CT dated 10/25/2023.  TECHNIQUE:   1) Routine CT Head without IV contrast. Multiplanar reformats. Dose reduction techniques were used.  2) Routine CT Cervical Spine without IV contrast. Multiplanar reformats. Dose reduction techniques were used.   3) Routine CT Thoracic Spine without IV contrast. Multiplanar reformats.  Dose reduction techniques were used.   4) Routine CT Lumbar Spine without IV contrast. Multiplanar reformats. Dose reduction techniques were used.      FINDINGS:  Findings of the visualized thoracic and abdominopelvic cavities are dictated separately.    HEAD CT:   INTRACRANIAL CONTENTS: Portions of the posterior fossa are obscured by streak artifact. No acute intracranial hemorrhage, extraaxial fluid collection, or mass effect. No evidence of an acute transcortical confluent infarct. Patchy bilateral cerebral   white matter hypoattenuation, nonspecific although likely the sequela of chronic microvascular ischemia. This is most notably involving the left frontoparietal corona radiata. Mild generalized cerebral parenchymal volume loss. No hydrocephalus.    VISUALIZED ORBITS/SINUSES/MASTOIDS: No acute intraorbital finding. No significant paranasal sinus or mastoid mucosal disease.    BONES/SOFT TISSUES: No acute calvarial injury or significant scalp hematoma.    CERVICAL SPINE CT:   VERTEBRA: No acute fracture, traumatic listhesis, or compression deformity. Straightening of the normal cervical lordosis with 1 mm likely degenerative retrolisthesis at C3-C4. Status post posterior decompression and instrumented fusion at C3-C4 with   solid facet joint effusion and partial interdiscal ankylosis without evidence of hardware failure. Multilevel interbody degenerative change, worst and moderate to severe at C6-C7. 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    CANAL/FORAMINA: Limited assessment of the spinal canal secondary to streak artifact, particularly at the fused levels. There is likely mild-moderate spinal canal stenosis at C6-C7, mild at C4-C5. Multilevel foraminal narrowing, worst and severe at C4-C5   bilaterally, C5-C6 on the right, and C6-C7 on the left.    EXTRASPINAL: No prevertebral edema. Scattered calcified atherosclerosis within the neck and visualized upper thorax, worst and  severe involving the right carotid bifurcation/proximal ICA. Clear visualized lungs.    THORACIC SPINE CT:  VERTEBRA: Twelve thoracic-type rib-bearing vertebral bodies.  No acute fracture, traumatic listhesis, or compression deformity. Patchy sclerosis involving the T6 vertebral body on the right, indeterminate although unchanged since 10/09/2023 (series 5   image 40). No cortical destruction or erosion. Mild-moderate multilevel interbody degenerative change including multiple disc protrusions, worst at the mid to lower thoracic levels. Mild to moderate diffuse multilevel facet arthrosis. Mild multilevel   hypertrophy/calcification of the ligamenta flava. Status post right hemilaminectomy at L2-L3.    CANAL/FORAMINA: Mild-moderate spinal canal stenosis at T9-T11. No high-grade spinal canal or foraminal stenosis.    PARASPINAL: No acute paraspinal soft tissue abnormality.    LUMBAR SPINE CT:  VERTEBRA: Five lumbar-type vertebral bodies. No acute fracture, traumatic listhesis, or compression deformity. No destructive osseous lesion. Trace dextroconvex curvature centered at the thoracolumbar junction and straightening of the normal lumbar   lordosis with 1 mm stepwise degenerative grade 1 retrolisthesis at L2-L4. Baastrup's configuration at L2-L5. Multilevel interbody degenerative change, worst at L4-S1 where there is severe intervertebral disc height loss. Multiple disc bulges with   superimposed protrusions and extrusions, some of which are calcified. Mild to moderate multilevel facet arthrosis, worst at L4-S1.    CANAL/FORAMINA: Mild spinal canal stenosis at L3-L5, to a lesser extent at L5-S1. Multilevel foraminal narrowing, worst and severe at L4-L5 on the right, L5-S1 bilaterally.    PARASPINAL: No acute paraspinal soft tissue abnormality. Mild to moderate degenerative change of the sacroiliac joints, worse on the right.      Impression    IMPRESSION:  HEAD CT:  1.  No acute intracranial hemorrhage, extra-axial  fluid collection, or mass effect  2.  Patchy bilateral cerebral white matter hypoattenuation, nonspecific although likely the sequela of chronic microvascular ischemia. A superimposed acute infarct is not excluded without a prior exam for comparison.    CERVICAL SPINE CT:  1.  No CT evidence for acute fracture or post traumatic subluxation.  2.  Postoperative changes and multilevel spondylosis, as described.    THORACIC SPINE CT:  1.  No acute fracture or posttraumatic subluxation.  2.  Multilevel spondylosis with mild-moderate spinal canal stenosis at T9-T11.  3.  Patchy sclerosis involving the T6 vertebral body without osseous destruction, indeterminate in this patient with prostate cancer although unchanged since 10/09/2023 chest CTA. Notably, no evidence of radiotracer positive disease on PET/CT from   10/25/2023.    LUMBAR SPINE CT:  1.  No acute fracture or posttraumatic subluxation.  2.  Multilevel spondylosis, as described.    CT Thoracic Spine Reconstructed    Narrative    EXAM: CT THORACIC SPINE RECONSTRUCTED, CT LUMBAR SPINE RECONSTRUCTED, CT CERVICAL SPINE W/O CONTRAST, CT HEAD W/O CONTRAST  LOCATION: [facility]  DATE: 4/27/2024    INDICATION: Low back pain after a fall. Weakness. History of prostate cancer.  COMPARISON: Chest CT dated 1/19/2024 and 10/9/2023. Abdomen/pelvis CT dated 07/07/2021. PET/CT dated 10/25/2023.  TECHNIQUE:   1) Routine CT Head without IV contrast. Multiplanar reformats. Dose reduction techniques were used.  2) Routine CT Cervical Spine without IV contrast. Multiplanar reformats. Dose reduction techniques were used.   3) Routine CT Thoracic Spine without IV contrast. Multiplanar reformats. Dose reduction techniques were used.   4) Routine CT Lumbar Spine without IV contrast. Multiplanar reformats. Dose reduction techniques were used.      FINDINGS:  Findings of the visualized thoracic and abdominopelvic cavities are dictated separately.    HEAD CT:    INTRACRANIAL CONTENTS: Portions of the posterior fossa are obscured by streak artifact. No acute intracranial hemorrhage, extraaxial fluid collection, or mass effect. No evidence of an acute transcortical confluent infarct. Patchy bilateral cerebral   white matter hypoattenuation, nonspecific although likely the sequela of chronic microvascular ischemia. This is most notably involving the left frontoparietal corona radiata. Mild generalized cerebral parenchymal volume loss. No hydrocephalus.    VISUALIZED ORBITS/SINUSES/MASTOIDS: No acute intraorbital finding. No significant paranasal sinus or mastoid mucosal disease.    BONES/SOFT TISSUES: No acute calvarial injury or significant scalp hematoma.    CERVICAL SPINE CT:   VERTEBRA: No acute fracture, traumatic listhesis, or compression deformity. Straightening of the normal cervical lordosis with 1 mm likely degenerative retrolisthesis at C3-C4. Status post posterior decompression and instrumented fusion at C3-C4 with   solid facet joint effusion and partial interdiscal ankylosis without evidence of hardware failure. Multilevel interbody degenerative change, worst and moderate to severe at C6-C7. 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    CANAL/FORAMINA: Limited assessment of the spinal canal secondary to streak artifact, particularly at the fused levels. There is likely mild-moderate spinal canal stenosis at C6-C7, mild at C4-C5. Multilevel foraminal narrowing, worst and severe at C4-C5   bilaterally, C5-C6 on the right, and C6-C7 on the left.    EXTRASPINAL: No prevertebral edema. Scattered calcified atherosclerosis within the neck and visualized upper thorax, worst and severe involving the right carotid bifurcation/proximal ICA. Clear visualized lungs.    THORACIC SPINE CT:  VERTEBRA: Twelve thoracic-type rib-bearing vertebral bodies.  No acute fracture, traumatic listhesis, or compression deformity. Patchy sclerosis involving the  T6 vertebral body on the right, indeterminate although unchanged since 10/09/2023 (series 5   image 40). No cortical destruction or erosion. Mild-moderate multilevel interbody degenerative change including multiple disc protrusions, worst at the mid to lower thoracic levels. Mild to moderate diffuse multilevel facet arthrosis. Mild multilevel   hypertrophy/calcification of the ligamenta flava. Status post right hemilaminectomy at L2-L3.    CANAL/FORAMINA: Mild-moderate spinal canal stenosis at T9-T11. No high-grade spinal canal or foraminal stenosis.    PARASPINAL: No acute paraspinal soft tissue abnormality.    LUMBAR SPINE CT:  VERTEBRA: Five lumbar-type vertebral bodies. No acute fracture, traumatic listhesis, or compression deformity. No destructive osseous lesion. Trace dextroconvex curvature centered at the thoracolumbar junction and straightening of the normal lumbar   lordosis with 1 mm stepwise degenerative grade 1 retrolisthesis at L2-L4. Baastrup's configuration at L2-L5. Multilevel interbody degenerative change, worst at L4-S1 where there is severe intervertebral disc height loss. Multiple disc bulges with   superimposed protrusions and extrusions, some of which are calcified. Mild to moderate multilevel facet arthrosis, worst at L4-S1.    CANAL/FORAMINA: Mild spinal canal stenosis at L3-L5, to a lesser extent at L5-S1. Multilevel foraminal narrowing, worst and severe at L4-L5 on the right, L5-S1 bilaterally.    PARASPINAL: No acute paraspinal soft tissue abnormality. Mild to moderate degenerative change of the sacroiliac joints, worse on the right.      Impression    IMPRESSION:  HEAD CT:  1.  No acute intracranial hemorrhage, extra-axial fluid collection, or mass effect  2.  Patchy bilateral cerebral white matter hypoattenuation, nonspecific although likely the sequela of chronic microvascular ischemia. A superimposed acute infarct is not excluded without a prior exam for comparison.    CERVICAL SPINE  CT:  1.  No CT evidence for acute fracture or post traumatic subluxation.  2.  Postoperative changes and multilevel spondylosis, as described.    THORACIC SPINE CT:  1.  No acute fracture or posttraumatic subluxation.  2.  Multilevel spondylosis with mild-moderate spinal canal stenosis at T9-T11.  3.  Patchy sclerosis involving the T6 vertebral body without osseous destruction, indeterminate in this patient with prostate cancer although unchanged since 10/09/2023 chest CTA. Notably, no evidence of radiotracer positive disease on PET/CT from   10/25/2023.    LUMBAR SPINE CT:  1.  No acute fracture or posttraumatic subluxation.  2.  Multilevel spondylosis, as described.        Discharge Medications   Current Discharge Medication List        START taking these medications    Details   azithromycin (ZITHROMAX) 250 MG tablet Take 1 tablet (250 mg) by mouth daily for 3 days  Qty: 3 tablet, Refills: 0    Associated Diagnoses: COPD exacerbation (H)      oseltamivir (TAMIFLU) 75 MG capsule Take 1 capsule (75 mg) by mouth 2 times daily for 3 days  Qty: 6 capsule, Refills: 0    Associated Diagnoses: Influenza A      predniSONE (DELTASONE) 20 MG tablet Take 2 tablets (40 mg) by mouth daily for 3 days  Qty: 6 tablet, Refills: 0    Associated Diagnoses: COPD exacerbation (H)           CONTINUE these medications which have NOT CHANGED    Details   aspirin 81 MG EC tablet [ASPIRIN 81 MG EC TABLET] Take 81 mg by mouth daily.      atorvastatin (LIPITOR) 80 MG tablet Take 1 tablet (80 mg) by mouth at bedtime  Qty: 90 tablet, Refills: 3    Associated Diagnoses: Hyperlipidemia LDL goal <100      cetirizine (ZYRTEC) 10 MG tablet Take 10 mg by mouth every evening      cholecalciferol, vitamin D3, (VITAMIN D3) 25 mcg (1,000 unit) capsule [CHOLECALCIFEROL, VITAMIN D3, (VITAMIN D3) 25 MCG (1,000 UNIT) CAPSULE] Take 1,000 Units by mouth daily.      cilostazol (PLETAL) 100 MG tablet Take 1 tablet (100 mg) by mouth 2 times daily  Qty: 180  tablet, Refills: 3    Associated Diagnoses: Peripheral artery disease (H24)      clopidogrel (PLAVIX) 75 MG tablet Take 1 tablet (75 mg) by mouth daily  Qty: 90 tablet, Refills: 1    Associated Diagnoses: Peripheral artery disease (H24)      cyanocobalamin 1000 MCG tablet [CYANOCOBALAMIN 1000 MCG TABLET] Take 1,000 mcg by mouth daily.      guaiFENesin (MUCINEX) 600 MG 12 hr tablet Take 1-2 tablets (600-1,200 mg) by mouth 2 times daily as needed for congestion or cough  Qty: 120 tablet, Refills: 11    Associated Diagnoses: Chronic cough; COPD, severe (H)      ipratropium - albuterol 0.5 mg/2.5 mg/3 mL (DUONEB) 0.5-2.5 (3) MG/3ML neb solution Take 1 vial by nebulization daily      losartan-hydrochlorothiazide (HYZAAR) 100-25 MG tablet Take 1 tablet by mouth daily  Qty: 90 tablet, Refills: 3    Associated Diagnoses: Essential hypertension, benign      oxyBUTYnin ER (DITROPAN XL) 10 MG 24 hr tablet Take 1 tablet (10 mg) by mouth daily  Qty: 90 tablet, Refills: 3    Associated Diagnoses: Overactive bladder      pantoprazole (PROTONIX) 40 MG EC tablet TAKE 1 TABLET(40 MG) BY MOUTH DAILY  Qty: 90 tablet, Refills: 3    Associated Diagnoses: Dyspepsia      pregabalin (LYRICA) 150 MG capsule TAKE 1 CAPSULE(150 MG) BY MOUTH THREE TIMES DAILY AS NEEDED  Qty: 270 capsule, Refills: 3    Associated Diagnoses: Multifocal motor neuropathy (H)      propranolol (INDERAL) 20 MG tablet Take 1 tablet (20 mg) by mouth 2 times daily  Qty: 180 tablet, Refills: 0    Associated Diagnoses: Tremor      traZODone (DESYREL) 50 MG tablet Take 50 mg by mouth at bedtime           Allergies   Allergies   Allergen Reactions    Adhesive Tape Unknown     Adhesive- pulls skin off

## 2024-04-29 NOTE — PLAN OF CARE
Problem: Adult Inpatient Plan of Care  Goal: Optimal Comfort and Wellbeing  Outcome: Progressing   Goal Outcome Evaluation:  Pt. Will rest tonight with minimal rest and sleep interruptions.

## 2024-04-30 ENCOUNTER — PATIENT OUTREACH (OUTPATIENT)
Dept: CARE COORDINATION | Facility: CLINIC | Age: 79
End: 2024-04-30
Payer: MEDICARE

## 2024-04-30 NOTE — PROGRESS NOTES
Connected Care Resource Center: [facility] [place]    Background: Transitional Care Management program identified per system criteria and reviewed by [facility] team for possible outreach.    Assessment: Upon chart review, [facility] Team member will not proceed with patient outreach related to this episode of Transitional Care Management program due to reason below:    Patient has discharged to a Memory Care, Long-term Care, Assisted Living or Group Home where patient is receiving on-site support with their daily cares, including support with hospital follow up plan.    Plan: Transitional Care Management episode addressed appropriately per reason noted above.      Rosalina De Los Santos MA  Connected Care Resource Center, [facility]    *Connected Care Resource Team does NOT follow patient ongoing. Referrals are identified based on internal discharge reports and the outreach is to ensure patient has an understanding of their discharge instructions.

## 2024-05-02 LAB
BACTERIA BLD CULT: NO GROWTH
BACTERIA BLD CULT: NO GROWTH

## 2024-05-07 ENCOUNTER — OFFICE VISIT (OUTPATIENT)
Dept: FAMILY MEDICINE | Facility: CLINIC | Age: 79
End: 2024-05-07
Payer: MEDICARE

## 2024-05-07 VITALS
SYSTOLIC BLOOD PRESSURE: 106 MMHG | WEIGHT: 173 LBS | TEMPERATURE: 98.1 F | HEIGHT: 65 IN | HEART RATE: 89 BPM | DIASTOLIC BLOOD PRESSURE: 60 MMHG | RESPIRATION RATE: 16 BRPM | BODY MASS INDEX: 28.82 KG/M2 | OXYGEN SATURATION: 93 %

## 2024-05-07 DIAGNOSIS — J44.1 COPD EXACERBATION (H): Primary | ICD-10-CM

## 2024-05-07 DIAGNOSIS — J10.1 INFLUENZA A: ICD-10-CM

## 2024-05-07 DIAGNOSIS — J96.21 ACUTE AND CHRONIC RESPIRATORY FAILURE WITH HYPOXIA (H): ICD-10-CM

## 2024-05-07 LAB
ERYTHROCYTE [DISTWIDTH] IN BLOOD BY AUTOMATED COUNT: 14 % (ref 10–15)
HCT VFR BLD AUTO: 35.4 % (ref 40–53)
HGB BLD-MCNC: 11.5 G/DL (ref 13.3–17.7)
MCH RBC QN AUTO: 30.2 PG (ref 26.5–33)
MCHC RBC AUTO-ENTMCNC: 32.5 G/DL (ref 31.5–36.5)
MCV RBC AUTO: 93 FL (ref 78–100)
PLATELET # BLD AUTO: 191 10E3/UL (ref 150–450)
RBC # BLD AUTO: 3.81 10E6/UL (ref 4.4–5.9)
WBC # BLD AUTO: 6.5 10E3/UL (ref 4–11)

## 2024-05-07 PROCEDURE — 85027 COMPLETE CBC AUTOMATED: CPT

## 2024-05-07 PROCEDURE — 36415 COLL VENOUS BLD VENIPUNCTURE: CPT

## 2024-05-07 PROCEDURE — 80048 BASIC METABOLIC PNL TOTAL CA: CPT

## 2024-05-07 PROCEDURE — G2211 COMPLEX E/M VISIT ADD ON: HCPCS

## 2024-05-07 PROCEDURE — 99214 OFFICE O/P EST MOD 30 MIN: CPT

## 2024-05-07 RX ORDER — BENZONATATE 200 MG/1
200 CAPSULE ORAL 3 TIMES DAILY PRN
Qty: 30 CAPSULE | Refills: 0 | Status: SHIPPED | OUTPATIENT
Start: 2024-05-07 | End: 2024-05-17

## 2024-05-07 ASSESSMENT — PAIN SCALES - GENERAL: PAINLEVEL: NO PAIN (0)

## 2024-05-07 NOTE — PROGRESS NOTES
Assessment & Plan     Acute and chronic respiratory failure with hypoxia (H)  COPD exacerbation (H)  Influenza A  Patient presents today for hospital follow-up.  He was hospitalized from 4/27/2024 to 4/29/2024 for COPD exacerbation secondary to influenza A.  Symptoms overall improved since he has been home but not quite back to baseline.  Still having a lingering productive cough.  He did not care for the guaifenesin and therefore will trial Tessalon Perles with him instead.   - CBC with platelets  - Basic metabolic panel  - benzonatate (TESSALON) 200 MG capsule  Dispense: 30 capsule; Refill: 0    Subjective   Ubaldo is a 78 year old, presenting for the following health issues:  Hospital F/U ([facility]-flu 4/27-4/29/24)      5/7/2024     8:59 AM   Additional Questions   Roomed by Sarah VILLARREAL LPN     History of Present Illness       Reason for visit:  Follow up for flu    He eats 2-3 servings of fruits and vegetables daily.He consumes 2 sweetened beverage(s) daily.He exercises with enough effort to increase his heart rate 9 or less minutes per day.  He exercises with enough effort to increase his heart rate 3 or less days per week.   He is taking medications regularly.    Hospital Follow-up Visit:    Hospital/Nursing Home/IP Rehab Facility: [facility]  Date of Admission: 04/27/24  Date of Discharge: 04/29/24  Reason(s) for Admission: Flu/COPD  Was the patient in the ICU or did the patient experience delirium during hospitalization?  No  Do you have any other stressors you would like to discuss with your provider? No    Problems taking medications regularly:  None  Medication changes since discharge: None  Problems adhering to non-medication therapy:  None    Summary of hospitalization:  [facility] discharge summary reviewed  Diagnostic Tests/Treatments reviewed.  Follow up needed: CBC and CMP  Other Healthcare Providers Involved in Patient s Care:         None  Update since  "discharge: improved.       Finished the azithromycin, tamiflu and prednisone since he has been home.     Overall, better but still not to his 'tip top'. He is still having a cough with some mucus - has improved but is still there. Reports the guaifenesin is not effective. Not needing any supplemental oxygen at home. Using his ipratropium-albuterol nebulizer daily.     Plan of care communicated with patient     Review of Systems  Constitutional, HEENT, cardiovascular, pulmonary, gi and gu systems are negative, except as otherwise noted.      Objective    /60   Pulse 89   Temp 98.1  F (36.7  C) (Oral)   Resp 16   Ht 1.651 m (5' 5\")   Wt 78.5 kg (173 lb)   SpO2 93%   BMI 28.79 kg/m    Body mass index is 28.79 kg/m .  Physical Exam   GENERAL: alert and no distress  EYES: Eyes grossly normal to inspection, conjunctivae and sclerae normal  NECK: no visualized asymmetry, masses, or scars  RESP: lungs clear to auscultation - no rales, rhonchi or wheezes  CV: regular rate and rhythm, normal S1 S2, no S3 or S4, no murmur, click or rub  MS: no gross musculoskeletal defects noted, no edema  PSYCH: mentation appears normal, affect normal/bright    Results for orders placed or performed in visit on 05/07/24 (from the past 24 hour(s))   CBC with platelets   Result Value Ref Range    WBC Count 6.5 4.0 - 11.0 10e3/uL    RBC Count 3.81 (L) 4.40 - 5.90 10e6/uL    Hemoglobin 11.5 (L) 13.3 - 17.7 g/dL    Hematocrit 35.4 (L) 40.0 - 53.0 %    MCV 93 78 - 100 fL    MCH 30.2 26.5 - 33.0 pg    MCHC 32.5 31.5 - 36.5 g/dL    RDW 14.0 10.0 - 15.0 %    Platelet Count 191 150 - 450 10e3/uL         Signed Electronically by: Arelis Gan PA-C    "

## 2024-05-07 NOTE — LETTER
May 13, 2024      Ubaldo Ramos  [address]   [place] [address]        Dear ,    We are writing to inform you of your test results.      Farrukh Conner,  It was nice to meet you the other day. Your lab work has returned.  Kidneys are looking a little dry. Please try to stay well hydrated.     Otherwise I have no concerns regarding your other lab work.  Please follow up with John as he indicates.     Let me know if you have any questions.  Regards,  Arelis Gan PA-C, RD    Resulted Orders   CBC with platelets   Result Value Ref Range    WBC Count 6.5 4.0 - 11.0 10e3/uL    RBC Count 3.81 (L) 4.40 - 5.90 10e6/uL    Hemoglobin 11.5 (L) 13.3 - 17.7 g/dL    Hematocrit 35.4 (L) 40.0 - 53.0 %    MCV 93 78 - 100 fL    MCH 30.2 26.5 - 33.0 pg    MCHC 32.5 31.5 - 36.5 g/dL    RDW 14.0 10.0 - 15.0 %    Platelet Count 191 150 - 450 10e3/uL   Basic metabolic panel   Result Value Ref Range    Sodium 140 135 - 145 mmol/L      Comment:      Reference intervals for this test were updated on 09/26/2023 to more accurately reflect our healthy population. There may be differences in the flagging of prior results with similar values performed with this method. Interpretation of those prior results can be made in the context of the updated reference intervals.     Potassium 4.1 3.4 - 5.3 mmol/L    Chloride 104 98 - 107 mmol/L    Carbon Dioxide (CO2) 25 22 - 29 mmol/L    Anion Gap 11 7 - 15 mmol/L    Urea Nitrogen 24.5 (H) 8.0 - 23.0 mg/dL    Creatinine 1.07 0.67 - 1.17 mg/dL    GFR Estimate 71 >60 mL/min/1.73m2    Calcium 9.5 8.8 - 10.2 mg/dL    Glucose 124 (H) 70 - 99 mg/dL       If you have any questions or concerns, please call the clinic at the number listed above.       Sincerely,      Arelis Gan PA-C

## 2024-05-08 LAB
ANION GAP SERPL CALCULATED.3IONS-SCNC: 11 MMOL/L (ref 7–15)
BUN SERPL-MCNC: 24.5 MG/DL (ref 8–23)
CALCIUM SERPL-MCNC: 9.5 MG/DL (ref 8.8–10.2)
CHLORIDE SERPL-SCNC: 104 MMOL/L (ref 98–107)
CREAT SERPL-MCNC: 1.07 MG/DL (ref 0.67–1.17)
DEPRECATED HCO3 PLAS-SCNC: 25 MMOL/L (ref 22–29)
EGFRCR SERPLBLD CKD-EPI 2021: 71 ML/MIN/1.73M2
GLUCOSE SERPL-MCNC: 124 MG/DL (ref 70–99)
POTASSIUM SERPL-SCNC: 4.1 MMOL/L (ref 3.4–5.3)
SODIUM SERPL-SCNC: 140 MMOL/L (ref 135–145)

## 2024-06-16 DIAGNOSIS — R25.1 TREMOR: ICD-10-CM

## 2024-06-17 RX ORDER — PROPRANOLOL HYDROCHLORIDE 20 MG/1
20 TABLET ORAL 2 TIMES DAILY
Qty: 180 TABLET | Refills: 2 | Status: SHIPPED | OUTPATIENT
Start: 2024-06-17

## 2024-07-11 NOTE — PATIENT INSTRUCTIONS
Jarret Conner,    Thank you for entrusting your care with us today. After your visit today with MD Miguel Callejas this is the plan that was discussed at your appointment.    Follow-up as needed      I am including additional information on these things and our contact information if you have any questions or concerns.   Please do not hesitate to reach out to us if you felt we did not answer your questions or you are unsure of the treatment plan after your visit today. Our number is [phone].Thank you for trusting us with your care.         Again thank you for your time.

## 2024-07-11 NOTE — PROGRESS NOTES
[facility]        Patient is here for a 3 MONTH follow up  to discuss Peripheral artery disease (PAD). Symptoms include claudicaton: right calf and left calf at distance of 0.5 blocks in both lower extremities. He states that is is not affecting his daily lifestyle. He does have an electric scooter that he uses.     Pt is smoking 1/2 pack a day    The patient has an extensive past history including multiple stenting procedures involving his bilateral iliac arteries as well as a right to left femoral-femoral bypass (now occluded) all of which were performed in [place].      Pt is currently taking Aspirin, Statin, Plavix, and Pletal.    The provider has been notified that the patient has no concerns.     Questions patient would like addressed today are: N/A.    Refills are needed: No    Has homecare services and agency name:  No

## 2024-07-16 ENCOUNTER — OFFICE VISIT (OUTPATIENT)
Dept: VASCULAR SURGERY | Facility: CLINIC | Age: 79
End: 2024-07-16
Attending: RADIOLOGY
Payer: MEDICARE

## 2024-07-16 ENCOUNTER — ANCILLARY PROCEDURE (OUTPATIENT)
Dept: VASCULAR ULTRASOUND | Facility: CLINIC | Age: 79
End: 2024-07-16
Attending: RADIOLOGY
Payer: MEDICARE

## 2024-07-16 ENCOUNTER — ANCILLARY PROCEDURE (OUTPATIENT)
Dept: VASCULAR ULTRASOUND | Facility: CLINIC | Age: 79
End: 2024-07-16
Attending: SURGERY
Payer: MEDICARE

## 2024-07-16 VITALS — DIASTOLIC BLOOD PRESSURE: 76 MMHG | SYSTOLIC BLOOD PRESSURE: 148 MMHG | OXYGEN SATURATION: 97 % | HEART RATE: 58 BPM

## 2024-07-16 DIAGNOSIS — I73.9 PERIPHERAL ARTERY DISEASE (H): Primary | ICD-10-CM

## 2024-07-16 DIAGNOSIS — I73.9 PERIPHERAL ARTERY DISEASE (H): ICD-10-CM

## 2024-07-16 DIAGNOSIS — I65.29 STENOSIS OF CAROTID ARTERY, UNSPECIFIED LATERALITY: ICD-10-CM

## 2024-07-16 PROCEDURE — 93923 UPR/LXTR ART STDY 3+ LVLS: CPT

## 2024-07-16 PROCEDURE — 93880 EXTRACRANIAL BILAT STUDY: CPT

## 2024-07-16 PROCEDURE — 93925 LOWER EXTREMITY STUDY: CPT

## 2024-07-16 PROCEDURE — G0463 HOSPITAL OUTPT CLINIC VISIT: HCPCS | Mod: 25

## 2024-07-17 ENCOUNTER — OFFICE VISIT (OUTPATIENT)
Dept: FAMILY MEDICINE | Facility: CLINIC | Age: 79
End: 2024-07-17
Payer: MEDICARE

## 2024-07-17 VITALS
WEIGHT: 169.9 LBS | BODY MASS INDEX: 28.31 KG/M2 | HEIGHT: 65 IN | TEMPERATURE: 97.7 F | HEART RATE: 61 BPM | RESPIRATION RATE: 18 BRPM | SYSTOLIC BLOOD PRESSURE: 124 MMHG | OXYGEN SATURATION: 96 % | DIASTOLIC BLOOD PRESSURE: 72 MMHG

## 2024-07-17 DIAGNOSIS — F51.01 PRIMARY INSOMNIA: Primary | ICD-10-CM

## 2024-07-17 DIAGNOSIS — J44.1 COPD EXACERBATION (H): ICD-10-CM

## 2024-07-17 DIAGNOSIS — M54.42 ACUTE BILATERAL LOW BACK PAIN WITH LEFT-SIDED SCIATICA: ICD-10-CM

## 2024-07-17 DIAGNOSIS — C61 MALIGNANT NEOPLASM OF PROSTATE (H): ICD-10-CM

## 2024-07-17 PROCEDURE — 99214 OFFICE O/P EST MOD 30 MIN: CPT | Performed by: NURSE PRACTITIONER

## 2024-07-17 PROCEDURE — G2211 COMPLEX E/M VISIT ADD ON: HCPCS | Performed by: NURSE PRACTITIONER

## 2024-07-17 RX ORDER — TRAZODONE HYDROCHLORIDE 50 MG/1
50 TABLET, FILM COATED ORAL AT BEDTIME
Qty: 180 TABLET | Refills: 3 | Status: SHIPPED | OUTPATIENT
Start: 2024-07-17

## 2024-07-17 ASSESSMENT — PAIN SCALES - GENERAL: PAINLEVEL: NO PAIN (1)

## 2024-07-17 NOTE — PATIENT INSTRUCTIONS
I went ahead and sent off the trazodone with refills off to the pharmacy.    You may be a good candidate for physical therapy to work on some of the spine pains.  If interested let me know.  Also, if interested in connecting with the spine clinic, let me know

## 2024-07-17 NOTE — PROGRESS NOTES
VASCULAR AND INTERVENTIONAL OUTPATIENT CONSULT OR VISIT  PHYSICIAN: Miguel Callejas MD  ESTABLISHED PATIENT    LOCATION: [facility]    Ubaldo Ramos   Medical Record #:  [id number]  YOB: 1945  Age:  78 year old     Date of Service: 7/16/2024    PRIMARY CARE PROVIDER: John Oconnor    Reason for visit: Peripheral vascular disease, lower extremity pain     IMPRESSION: 78-year-old male with history of chronic peripheral arterial disease. The patient has an extensive past history including multiple stenting procedures involving his bilateral iliac arteries as well as a right to left femoral-femoral bypass (now occluded) all of which were performed in [place].  He walks with the assistance of a electric scooter when traveling longer distances and reports claudication after walking approximately 1 block with a cane.  Previous noninvasive imaging demonstrates an ankle-brachial index of 0.83 on the right and 0.85 on the left which have worsened since last year.  CT angiogram lower extremity runoff demonstrates significant stenosis involving the right superficial femoral artery.  Unfortunately, the patient continues to smoke.     RECOMMENDATION:    1.  Continue guideline recommended medical therapy for peripheral disease  -The patient has been taking dual antiplatelet therapy with aspirin and Plavix for many years.  He denies any bleeding complications.  No changes will be made to this regimen  -Continue high intensity statin therapy with Lipitor 80 mg once daily  -Encourage smoking cessation.  This was reviewed in great detail with the patient in the clinic.    -PAD rehab.  Patient does not wish to participate in PAD rehab.  -Continue cilostazol twice daily.  The patient denies any side effects related to this medication.  -A CT urogram performed at [facility] in August 2021 demonstrates no evidence of an aortic aneurysm.    -Screening carotid ultrasound demonstrates severe internal carotid  artery stenosis.  His CT angiogram neck demonstrates approximately 70% stenosis of the right internal carotid artery.  The patient has previously seen Dr. Schafer.         The patient has a history of lung cancer and is being followed by [facility].  The patient has a history of prostate cancer and is being followed by [facility].  The patient lives at a assisted living facility and ambulates longer distances with the assistance of a scooter.  Given his limited mobility claudication symptoms do not affect his daily lifestyle.  Return to clinic in the future as needed or if any wounds/ulcerations develop.     PHH:  No past medical history on file.     Past Surgical History:   Procedure Laterality Date    APPENDECTOMY      CERVICAL SPINE SURGERY      CHOLECYSTECTOMY      FEMORAL ENDARTERECTOMY      LUNG CANCER SURGERY      PROSTATECTOMY         ALLERGIES:  Adhesive tape    MEDS:    Current Outpatient Medications:     aspirin 81 MG EC tablet, [ASPIRIN 81 MG EC TABLET] Take 81 mg by mouth daily., Disp: , Rfl:     atorvastatin (LIPITOR) 80 MG tablet, Take 1 tablet (80 mg) by mouth at bedtime, Disp: 90 tablet, Rfl: 3    cetirizine (ZYRTEC) 10 MG tablet, Take 10 mg by mouth every evening, Disp: , Rfl:     cholecalciferol, vitamin D3, (VITAMIN D3) 25 mcg (1,000 unit) capsule, [CHOLECALCIFEROL, VITAMIN D3, (VITAMIN D3) 25 MCG (1,000 UNIT) CAPSULE] Take 1,000 Units by mouth daily., Disp: , Rfl:     cilostazol (PLETAL) 100 MG tablet, Take 1 tablet (100 mg) by mouth 2 times daily, Disp: 180 tablet, Rfl: 3    clopidogrel (PLAVIX) 75 MG tablet, Take 1 tablet (75 mg) by mouth daily, Disp: 90 tablet, Rfl: 1    cyanocobalamin 1000 MCG tablet, [CYANOCOBALAMIN 1000 MCG TABLET] Take 1,000 mcg by mouth daily., Disp: , Rfl:     ipratropium - albuterol 0.5 mg/2.5 mg/3 mL (DUONEB) 0.5-2.5 (3) MG/3ML neb solution, Take 1 vial by nebulization daily, Disp: , Rfl:     losartan-hydrochlorothiazide (HYZAAR) 100-25 MG tablet, Take  1 tablet by mouth daily, Disp: 90 tablet, Rfl: 3    oxyBUTYnin ER (DITROPAN XL) 10 MG 24 hr tablet, Take 1 tablet (10 mg) by mouth daily, Disp: 90 tablet, Rfl: 3    pantoprazole (PROTONIX) 40 MG EC tablet, TAKE 1 TABLET(40 MG) BY MOUTH DAILY, Disp: 90 tablet, Rfl: 3    pregabalin (LYRICA) 150 MG capsule, TAKE 1 CAPSULE(150 MG) BY MOUTH THREE TIMES DAILY AS NEEDED, Disp: 270 capsule, Rfl: 3    propranolol (INDERAL) 20 MG tablet, TAKE 1 TABLET BY MOUTH TWICE A DAY, Disp: 180 tablet, Rfl: 2    traZODone (DESYREL) 50 MG tablet, Take 50 mg by mouth at bedtime, Disp: , Rfl:     SOCIAL HABITS:    History   Smoking Status    Every Day    Packs/day: 0.50    Types: Cigarettes   Smokeless Tobacco    Never     Social History    Substance and Sexual Activity      Alcohol use: Not Currently      History   Drug Use Unknown       FAMILY HISTORY:  No family history on file.    ADVANCE CARE DIRECTIVES:    Advance care directives reviewed in the chart and no changes made.     PE:  BP (!) 148/76   Pulse 58   SpO2 97%   Wt Readings from Last 1 Encounters:   05/07/24 173 lb (78.5 kg)     There is no height or weight on file to calculate BMI.    EXAM:  GENERAL: This is a well-developed 78 year old male who appears his stated age  EYES: Grossly normal.  MOUTH: Buccal mucosa normal   MUSCULOSKELETAL: Grossly normal and both lower extremities are intact.  HEME/LYMPH: No lymphedema  NEUROLOGIC: Focally intact, Alert and oriented x 3.   PSYCH: appropriate affect  INTEGUMENT: No open lesions or ulcers    DIAGNOSTIC STUDIES:     Images:  US Carotid Bilateral    Result Date: 7/16/2024  Table formatting from the original result was not included. BILATERAL CAROTID ULTRASOUND (Date: 07/16/24) Indication: Surveillance of right ICA stenosis, carotid bruit Previous: 11/21/23 Symptoms: Hypertension and Current Smoker TECHNIQUE: Duplex exam performed utilizing 2D gray-scale imaging, Doppler interrogation with color-flow and spectral waveform analysis.  Right Velocity  Chart (cm/sec) Location Right DISTAL CCA-PS 63 DISTAL CCA- ED 18 PROX ICA- PROX ICA-ED 71 ECA- ECA-ED 26 Vertebral- Antegrade 56 Subclavian A- Biphasic 126 Ratio ICA/CCA-PS 4.00 Ratio ICA/CCA-ED 3.94 Left Velocity  Chart (cm/sec) Location Left DISTAL CCA-PS 78 DISTAL CCA- ED 24 PROX ICA-PS 90 PROX ICA-ED 30 ECA- ECA-ED 13 Vertebral- Antegrade 62 Subclavian A- Biphasic 168 Ratio ICA/CCA-PS 1.16 Ratio ICA/CCA-ED 1.25 FINDINGS: RIGHT: There is uniformly echogenic  atheromatous plaque.  LEFT: There is predominantly echogenic atheromatous plaque.  RIGHT: Vertebral artery and subclavian artery waveforms are antegrade. LEFT: Vertebral artery and subclavian artery waveforms are antegrade. Impression:  1. 70-99% diameter stenosis of the right ICA relative to the proximal ICA diameter.  Elevated velocities in the right external carotid artery, reflecting hemodynamically significant stenosis. 2. Less than 50% diameter stenosis of the left ICA relative to the proximal ICA diameter. Evaluation based on SRU Consensus Conference Criteria for Internal Carotid Artery Stenosis for Implementation in [facility]-Accredited [facility] Primary Parameters      Additional Parameters Degree of Stenosis, % ICA PSV, (cm/s)  Plaque Estimate, %* ICA/CCA PSV Ratio ICA EDV, (cm/s) Normal,  <180  None <2.0 <40 Mild <50% <180 < than 50% <2.0 <40 Moderate Disease 50-69%** 180-230 > than 50% 2.0-4.0  Severe->70% but less than near occlusion >230 > than 50% >4.0 >100 Near Occlusion High, Low or Undetectable Visible Variable Variable Total Occlusion Undetectable Visible, no detectable lumen Not Applicable Not Applicable  *Plaque estimate (diameter reduction) with grayscale and color Doppler US. **-180 cm/sec and ICA/CCA PSV Ratio = 2.0 is also consistent with 50-69% stenosis Modified from diagnostic criteria proposed by Society of Radiologists in Ultrasound (SRU) Consensus Conference2 Plaquetype  Description Type 1 Uniformly echolucent Type 2 Predominantly echolucent >50% Type 3 Predominantly echogenic >50% Type 4 Uniformly echogenic Type 5 Unclassified due to poor visualization Ulcer Visible Ulcerative Plaque     US Lower Extremity Arterial Duplex Bilateral    Result Date: 7/16/2024  Table formatting from the original result was not included. Arterial Duplex Ultrasound (Date: 07/16/24) Lower Extremity Artery Evaluation Indication: Surveillance of PAD/ Known Right to Left CFA-CFA Bypass Graft occlusion/Hx of Bilateral JAIRO stents, leg pain, decreased lower extremity pulses, lower extremity pain  Previous: 11/09/2021; 11/08/22; 11/21/23  History: Current Smoker, Hypertension, PAD, Angioplasty and Vascular Surgery Technique: Duplex imaging is performed utilizing gray-scale, two-dimensional images, and color-flow imaging. Doppler waveform analysis and spectral Doppler imaging is also performed. LOWER EXTREMITY ARTERIAL DUPLEX EXAM WITH WAVEFORMS Right Leg:(cm/s) Location: Velocities Waveforms EIA:   328  M CFA:   336  M PFA:   194  M SFA Proximal:   172  M SFA Mid:   138  B SFA Distal:   111  B Popliteal Artery:   90  B PTA:   14   M ANTONIA:   53  B DPA:   60  B Waveforms: T=Triphasic, M=Monophasic, B=Biphasic Left Leg:(cm/s) Location: Velocities Waveforms EIA:   243  B CFA:   358/229  B/B PFA:   94  B SFA Proximal:   141  B SFA Mid:   141/221/143  B/B/B SFA Distal:   112  B Popliteal Artery:   125  B PTA:   58   B ANTONAI:   50  B DPA:   81  B Waveforms: T=Triphasic, M=Monophasic, B=Biphasic Stenotic Profile Ratio: 243/358 = 1.47, 221 / 141 = 1.56 Impression: Right Lower Extremity: Monophasic flow in the external iliac artery, suggesting iliac inflow disease.  Remaining vasculature is patent without focal stenosis. Left Lower Extremity: Patent vasculature with multiphasic waveforms.  No significant stenosis identified. Reference: Category Normal 1-19% 20-49% 50-99% Occluded PSV <160 cm/sec without spectral broadening  <160 cm/sec with spectral broadening Increased Increased Absent Flow Ratio N/A N/A < 2.0 >2.0 N/A Post-Stenotic Turbulence No No No Yes N/A      US Low Ext Arterial Dop Seg Pres w/o Exercise    Result Date: 7/16/2024  Table formatting from the original result was not included. BILATERAL RESTING ANKLE-BRACHIAL INDICES (TITI'S) (Date: 07/16/24) Indication: Surveillance of PAD/ Known Right to Left CFA-CFA Bypass Graft occlusion/Hx of Bilateral JAIRO stents, leg pain, decreased lower extremity pulses  Previous: 11/09/2021; 11/08/22; 11/21/23  History: Current Smoker, Hypertension, PAD, Angioplasty and Vascular Surgery Resting TITI's          Right: mmHg Index     Brachial: 127  Ankle-(PT): 105 0.83 Ankle-(DP): 100 0.79          Digit: 73 0.57               Left: mmHg Index     Brachial: 126  Ankle-(PT): 108 0.85 Ankle-(DP): 106 0.83          Digit: 80 0.63 Resting ankle-brachial index of 0.83 on the right. Toe Pressures of 73 mmHg and TBI of 0.57 Resting ankle-brachial index of 0.85 on the left. Toe Pressures of 80 mmHg and TBI of 0.63  VPR WAVEFORMS: The right volume plethysmography waveforms are mildly abnormal at the lower thigh level, mildly abnormal at the upper calf level and mildly abnormal at the ankle. The left volume plethysmography waveforms are mildly abnormal at the lower thigh level, mildly abnormal at the upper calf level and mildly abnormal at the ankle. Impression:  1. RIGHT LOWER EXTREMITY: TITI is Abnormal with an TITI of 0.83 indicating single level disease. Toe Pressures are Mildly abnormal but adequate for wound healing with toe pressures of 73 mmHg. 2. LEFT LOWER EXTREMITY: TITI is Abnormal with an TITI of 0.85 indicating single level disease. Toe Pressures are Mildly abnormal but adequate for wound healing with toe pressures of 80 mmHg. Reference: Wound classification Grade TITI Ankle Systolic Pressure Toe Pressures 0 > 0.80 > 100 mmHg > 60 mmHg 1 0.6 - 0.79 70 - 100 mmHg 40 - 59 mmHg 2 0.4 - 0.59 50-70  mmHg 30 - 39 mmHg 3 < 0.39 < 50 mmHg < 30 mmHg Digit Pressures DBI Disease Category > 0.70 Normal < 0.70 Abnormal > 30 mmHg Potential wound healing < 30 mmHg Impaired wound healing Ankle Brachial Pressures TITI Disease Category > 1.3  Likely vessel calcification with monophasic waveforms, non-diagnostic 0.95-1.30 Normal with multiphasic waveforms 0.50-0.95 Single level disease 0.30-0.50 Multilevel disease < 0.30 Critical limb ischema Volume Plethysmography Recording (VPR) at all levels Normal Sharp systolic peak, fast upstroke, prominent dicrotic notch in wave Mild Sharp systolic peak, fast upstroke, absent dicrotic notch in wave Moderate Flattened systolic peak, slowed upstroke, absent dicrotic notch inwave Severe amplitude wave with = upslope and down slope Occluded Flat Line        LABS:      Sodium   Date Value Ref Range Status   05/07/2024 140 135 - 145 mmol/L Final     Comment:     Reference intervals for this test were updated on 09/26/2023 to more accurately reflect our healthy population. There may be differences in the flagging of prior results with similar values performed with this method. Interpretation of those prior results can be made in the context of the updated reference intervals.    04/27/2024 140 135 - 145 mmol/L Final     Comment:     Reference intervals for this test were updated on 09/26/2023 to more accurately reflect our healthy population. There may be differences in the flagging of prior results with similar values performed with this method. Interpretation of those prior results can be made in the context of the updated reference intervals.    03/19/2024 143 135 - 145 mmol/L Final     Comment:     Reference intervals for this test were updated on 09/26/2023 to more accurately reflect our healthy population. There may be differences in the flagging of prior results with similar values performed with this method. Interpretation of those prior results can be made in the context of the  updated reference intervals.      Urea Nitrogen   Date Value Ref Range Status   05/07/2024 24.5 (H) 8.0 - 23.0 mg/dL Final   04/27/2024 17.2 8.0 - 23.0 mg/dL Final   03/19/2024 22.1 8.0 - 23.0 mg/dL Final   08/03/2022 24 8 - 28 mg/dL Final   08/01/2022 21 8 - 28 mg/dL Final   07/31/2022 17 8 - 28 mg/dL Final     Hemoglobin   Date Value Ref Range Status   05/07/2024 11.5 (L) 13.3 - 17.7 g/dL Final   04/27/2024 12.4 (L) 13.3 - 17.7 g/dL Final   03/19/2024 11.6 (L) 13.3 - 17.7 g/dL Final     Platelet Count   Date Value Ref Range Status   05/07/2024 191 150 - 450 10e3/uL Final   04/27/2024 136 (L) 150 - 450 10e3/uL Final   03/19/2024 143 (L) 150 - 450 10e3/uL Final     BNP   Date Value Ref Range Status   07/31/2022 74 0 - 76 pg/mL Final     INR   Date Value Ref Range Status   04/27/2024 1.02 0.85 - 1.15 Final   07/31/2022 1.09 0.85 - 1.15 Final   03/03/2022 1.09 0.85 - 1.15 Final       This was a in person visit in which 45 minutes of  total time was spent (either in face-to-face or non-face-to-face time).    Miguel Callejas MD, [facility]  VASCULAR AND INTERVENTIONAL PHYSICIAN  VASCULAR MEDICINE  INTERNAL MEDICINE  PAGER: [phone]  CALL SERVICE: [phone]

## 2024-07-17 NOTE — PROGRESS NOTES
Assessment & Plan     ICD-10-CM    1. Primary insomnia  F51.01 traZODone (DESYREL) 50 MG tablet      2. COPD exacerbation (H)  J44.1       3. Malignant neoplasm of prostate (H)  C61       4. Acute bilateral low back pain with left-sided sciatica  M54.42         Continue same trazodone.  Refill sent off to the pharmacy.  For the low back pains discussed that he would be a good candidate for trying physical therapy.  Also discussed the opportunity of meeting with a spine clinic.  He will think about this.  History of prostate cancer.  Status post surgical removal.  Follows with urology for surveillance.  Respiratory status back to baseline following COPD exacerbation.    Reviewed carotid ultrasound x 1, CT cervical spine x 1, CT lumbar spine x 1, CT thoracic spine x 1, CT head x 1, hospitalist note x 1    The longitudinal plan of care for the diagnosis(es)/condition(s) as documented were addressed during this visit. Due to the added complexity in care, I will continue to support Ubaldo in the subsequent management and with ongoing continuity of care.        Subjective     HPI     Patient presents today for med check.  Sleep is going better with trazodone.  Generally is using 100 mg and would like an updated prescription.    Was seen in the ED and hospitalized for COPD exacerbation/influenza.  Did have an exacerbation of low back pain around this time because of a fall.  Imaging showed no acute fractures.  Still dealing with some back pain and sciatica.  No saddle anesthesia or loss of control of bowel/bladder.  Wonders about next steps.    History of prostate cancer status post surgical intervention.  Lately has been having more issues with urine dribbling/difficulty controlling his bladder.  No urge incontinence.  No dysuria, hematuria, pelvic pain, flank pain     Review of Systems - negative except for what's listed in the HPI      Objective    /72 (BP Location: Right arm, Patient Position: Sitting, Cuff Size:  "Adult Regular)   Pulse 61   Temp 97.7  F (36.5  C) (Oral)   Resp 18   Ht 1.651 m (5' 5\")   Wt 77.1 kg (169 lb 14.4 oz)   SpO2 96%   BMI 28.27 kg/m    Physical Exam   General appearance - alert, well appearing, and in no distress  Mental status - alert, oriented to person, place, and time  Eyes -PERRLA  Ears - bilateral TM's and external ear canals normal  Nose - normal and patent, no erythema, discharge or polyps  Mouth - mucous membranes moist. No oral lesions.  Neck - no significant adenopathy  Lymphatics - no palpable lymphadenopathy  Chest - clear to auscultation, no wheezes, rales or rhonchi, symmetric air entry  Heart - normal rate and regular rhythm, S1 and S2 normal, no murmurs noted  Abdomen - soft, nontender, nondistended, no masses or organomegaly  Neurological - alert, oriented, normal speech, no focal findings or movement disorder noted, neck supple without rigidity, cranial nerves II through XII intact, motor and sensory grossly normal bilaterally, normal muscle tone, no tremors, strength 5/5  Musculoskeletal - no joint tenderness, deformity or swelling  Extremities - peripheral pulses normal, no peripheral edema  Skin - normal coloration and turgor.    John Oconnor, CNP    This note has been dictated using voice recognition software. Any grammatical or context distortions are unintentional and inherent to the software.    "

## 2024-07-18 PROBLEM — J96.21 ACUTE AND CHRONIC RESPIRATORY FAILURE WITH HYPOXIA (H): Status: RESOLVED | Noted: 2024-04-27 | Resolved: 2024-07-18

## 2024-07-18 PROBLEM — J10.1 INFLUENZA A: Status: RESOLVED | Noted: 2024-04-27 | Resolved: 2024-07-18

## 2024-08-09 ENCOUNTER — TRANSFERRED RECORDS (OUTPATIENT)
Dept: HEALTH INFORMATION MANAGEMENT | Facility: CLINIC | Age: 79
End: 2024-08-09
Payer: MEDICARE

## 2024-09-13 ENCOUNTER — TRANSFERRED RECORDS (OUTPATIENT)
Dept: HEALTH INFORMATION MANAGEMENT | Facility: CLINIC | Age: 79
End: 2024-09-13
Payer: MEDICARE

## 2024-09-27 DIAGNOSIS — I73.9 PERIPHERAL ARTERY DISEASE (H): ICD-10-CM

## 2024-09-27 DIAGNOSIS — R10.13 DYSPEPSIA: ICD-10-CM

## 2024-09-27 RX ORDER — PANTOPRAZOLE SODIUM 40 MG/1
40 TABLET, DELAYED RELEASE ORAL DAILY
Qty: 90 TABLET | Refills: 2 | Status: SHIPPED | OUTPATIENT
Start: 2024-09-27

## 2024-09-29 RX ORDER — CLOPIDOGREL BISULFATE 75 MG/1
75 TABLET ORAL DAILY
Qty: 90 TABLET | Refills: 3 | Status: SHIPPED | OUTPATIENT
Start: 2024-09-29

## 2024-10-24 ENCOUNTER — TRANSFERRED RECORDS (OUTPATIENT)
Dept: HEALTH INFORMATION MANAGEMENT | Facility: CLINIC | Age: 79
End: 2024-10-24
Payer: MEDICARE

## 2024-11-06 ENCOUNTER — PATIENT OUTREACH (OUTPATIENT)
Dept: FAMILY MEDICINE | Facility: CLINIC | Age: 79
End: 2024-11-06
Payer: MEDICARE

## 2024-11-06 NOTE — TELEPHONE ENCOUNTER
Patient Quality Outreach    Patient is due for the following:   Physical Annual Wellness Visit    Next Steps:   Schedule a Adult Preventative    Type of outreach:    Sent [employer] message.      Questions for provider review:    None           Anni Canales MA

## 2024-11-07 ENCOUNTER — MYC MEDICAL ADVICE (OUTPATIENT)
Dept: INTERVENTIONAL RADIOLOGY/VASCULAR | Facility: CLINIC | Age: 79
End: 2024-11-07
Payer: MEDICARE

## 2024-11-12 ENCOUNTER — HOSPITAL ENCOUNTER (OUTPATIENT)
Dept: RADIOLOGY | Facility: CLINIC | Age: 79
Discharge: HOME | End: 2024-11-12
Attending: RADIOLOGY
Payer: MEDICARE

## 2024-11-12 ENCOUNTER — HOSPITAL ENCOUNTER (OUTPATIENT)
Dept: CT IMAGING | Facility: CLINIC | Age: 79
Discharge: HOME | End: 2024-11-12
Attending: INTERNAL MEDICINE
Payer: MEDICARE

## 2024-11-12 VITALS
RESPIRATION RATE: 25 BRPM | SYSTOLIC BLOOD PRESSURE: 186 MMHG | TEMPERATURE: 98.2 F | HEART RATE: 64 BPM | DIASTOLIC BLOOD PRESSURE: 80 MMHG | OXYGEN SATURATION: 95 %

## 2024-11-12 DIAGNOSIS — R91.8 MULTIPLE LUNG NODULES: ICD-10-CM

## 2024-11-12 PROCEDURE — 250N000011 HC RX IP 250 OP 636: Performed by: RADIOLOGY

## 2024-11-12 PROCEDURE — 999N000065 XR CHEST PORT 1 VIEW

## 2024-11-12 PROCEDURE — 32408 CORE NDL BX LNG/MED PERQ: CPT

## 2024-11-12 RX ORDER — LIDOCAINE 40 MG/G
CREAM TOPICAL
Status: DISCONTINUED | OUTPATIENT
Start: 2024-11-12 | End: 2024-11-13 | Stop reason: HOSPADM

## 2024-11-12 RX ORDER — ACETAMINOPHEN 325 MG/1
650 TABLET ORAL EVERY 4 HOURS PRN
Status: DISCONTINUED | OUTPATIENT
Start: 2024-11-12 | End: 2024-11-13 | Stop reason: HOSPADM

## 2024-11-12 RX ORDER — NALOXONE HYDROCHLORIDE 0.4 MG/ML
0.2 INJECTION, SOLUTION INTRAMUSCULAR; INTRAVENOUS; SUBCUTANEOUS
Status: DISCONTINUED | OUTPATIENT
Start: 2024-11-12 | End: 2024-11-13 | Stop reason: HOSPADM

## 2024-11-12 RX ORDER — NALOXONE HYDROCHLORIDE 0.4 MG/ML
0.4 INJECTION, SOLUTION INTRAMUSCULAR; INTRAVENOUS; SUBCUTANEOUS
Status: DISCONTINUED | OUTPATIENT
Start: 2024-11-12 | End: 2024-11-13 | Stop reason: HOSPADM

## 2024-11-12 RX ORDER — FENTANYL CITRATE 50 UG/ML
25-50 INJECTION, SOLUTION INTRAMUSCULAR; INTRAVENOUS EVERY 5 MIN PRN
Status: DISCONTINUED | OUTPATIENT
Start: 2024-11-12 | End: 2024-11-13 | Stop reason: HOSPADM

## 2024-11-12 RX ORDER — FLUMAZENIL 0.1 MG/ML
0.2 INJECTION, SOLUTION INTRAVENOUS
Status: DISCONTINUED | OUTPATIENT
Start: 2024-11-12 | End: 2024-11-13 | Stop reason: HOSPADM

## 2024-11-12 RX ADMIN — MIDAZOLAM HYDROCHLORIDE 1 MG: 1 INJECTION, SOLUTION INTRAMUSCULAR; INTRAVENOUS at 08:48

## 2024-11-12 RX ADMIN — FENTANYL CITRATE 50 MCG: 50 INJECTION INTRAMUSCULAR; INTRAVENOUS at 08:48

## 2024-11-12 NOTE — PROCEDURES
[facility]    Procedure: Imaging Procedure Note    Date/Time: 11/12/2024 5:06 PM    Performed by: Kendall Reyes MD  Authorized by: Kendall Reyes MD  IR Fellow Physician:    Pre Procedure Diagnosis: RUL Lung Massd  Post Procedure Diagnosis: Same    UNIVERSAL PROTOCOL   Site Marked: Yes  Prior Images Obtained and Reviewed:  Yes  Required items: Required blood products, implants, devices and special equipment available    Patient identity confirmed:  Verbally with patient  Patient was reevaluated immediately before administering moderate or deep sedation or anesthesia  Confirmation Checklist:  Patient's identity using two indicators  Time out: Immediately prior to the procedure a time out was called    Universal Protocol: the Joint Commission Universal Protocol was followed    Preparation: Patient was prepped and draped in usual sterile fashion    ESBL (mL):  0     ANESTHESIA    Local Anesthetic:  Lidocaine 1% without epinephrine  Anesthetic Total (mL):  10      SEDATION  Patient Sedated: Yes    Sedation:  Fentanyl, midazolam and see MAR for details  Vital signs: Vital signs monitored during sedation    Fluoroscopy Time: 0 minute(s)  Findings: CT Guided Biopsy of RUL lung mass.     4 passes with 19/20 ga Temno.    Specimens: core needle biopsy specimens sent for pathological analysis    Procedural Complications: None    Condition: Stable    Plan: Post procedure monitoring and CXR at 2 Hrs      PROCEDURE  Describe Procedure: See above.  Patient Tolerance:  Patient tolerated the procedure well with no immediate complications  Length of time physician/provider present for 1:1 monitoring during sedation:  38-52 min

## 2024-11-12 NOTE — PRE-PROCEDURE
GENERAL PRE-PROCEDURE:   Date/Time:  11/12/2024 8:32 AM    Risks and benefits: Risks, benefits and alternatives were discussed    Consent given by:  Patient  Patient states understanding of procedure being performed: Yes    Patient's understanding of procedure matches consent: Yes    Procedure consent matches procedure scheduled: Yes    Expected level of sedation:  Moderate  Appropriately NPO:  Yes  ASA Class:  2  Mallampati  :  Grade 2- soft palate, base of uvula, tonsillar pillars, and portion of posterior pharyngeal wall visible  Lungs:  Lungs clear with good breath sounds bilaterally  Heart:  Normal heart sounds and rate  History & Physical reviewed:  History and physical reviewed and no updates needed  Statement of review:  I have reviewed the lab findings, diagnostic data, medications, and the plan for sedation

## 2024-11-12 NOTE — H&P
11/12/2024I have reviewed the 10/29/2024 H&P from [facility] surgical (or preoperative) H&P that is linked to this encounter, and examined the patient. There are no significant changes

## 2024-11-12 NOTE — IR NOTE
Patient Name: Ubaldo Ramos  Medical Record Number: [id number]  Today's Date: 11/12/2024    Procedure: CT lung biopsy with possible chest tube placement  Proceduralist: Dr. Reyes    Procedure Start: 0847  Procedure end: 0920  Sedation medications administered: 1 mg midazolam and 50 mcg fentanyl   Sedation time: 40 minutes    Other Notes: Pt arrived to CT room 1 from Pre/post bay 1. Consent reviewed. Pt denies any questions or concerns regarding procedure. Pt positioned supine and monitored per protocol. Pt tolerated procedure without any noted complications. VSS on RA. Pt transferred back to Pre/post bay 1.

## 2024-11-12 NOTE — DISCHARGE INSTRUCTIONS
1. You are required to have someone accompany you home. Do not drive or operate machinery today as the medication may cause sleepiness.    2. Rest today and avoid strenuous activity or heavy lifting for 48 hours. Over-activity may produce dizziness and or nausea.    3. You should follow your normal diet. Drink plenty of fluids. No alcoholic beverages for 24 hours. *(Alcohol may interact with the medications you received today)    4. Leave bandage on today, you may remove tomorrow.    5. You may shower tomorrow. Do not soak in a bath tub, hot tub, or swim until the site is completely healed and the skin glue is off. Keep the site clean and dry.    ADDITIONAL INSTRUCTIONS    When to call your Doctor:     1. Watch your biopsy site for signs of infection, increase pain, redness, swelling, or any drainage and or fever or chills.    2. On-going nausea, vomiting, or un-usual increase in pain.    3. If you experience any of the above or sudden weakness, dizziness, abdominal pain, flank pain or a temperature above 100.0 degree F for more than 24 hours, call your Doctor.    4. Sudden on-set of shortness of breath - call 911 or go to the emergency room.          * Recovery After Conscious Sedation (Adult)  We gave you medicine by vein to make you sleepy or relaxed during your procedure. This may have included both a pain medicine and sleeping medicine. Most of the effects have worn off. But you may still feel sleepy for the next 6 to 8 hours.  Home care  Follow these guidelines when you get home:  You may feel sleepy and clumsy and have poor balance for the next few hours.  A responsible adult should stay with you for the next 8 hours. This person should make sure your condition doesn t get worse.  Don't drink any alcohol for the next 24 hours.  Don't drive, operate dangerous machinery, make important business or personal decisions or sign legal documents during the next 24 hours.  You may vomit (throw up) if you eat too soon  Writer notified mom of message below.  Mom verbalized understanding and agreed to this plan.  Prescription was sent to the pharmacy.   after the procedure. If this happens, drink small amounts of water, juice or clear broth. Wait to try solid food until you no longer have nausea (upset stomach).  Note: Your care team may tell you not to take any medicine by mouth for pain or sleep in the next 4 hours. These medicines may react with the medicines you had in the hospital. This could cause a much stronger response than usual.  Follow-up care  Follow up with your care team if you are not alert and back to your usual level of activity within 12 hours.  When to seek medical advice  Call your care team right away if any of these occur:  You still feel sleepy or clumsy after 12 hours, or your sleepiness gets worse  Weakness or dizziness gets worse  Repeated vomiting  If you can't be woken up and someone is staying with you, they should call 911.  For informational purposes only. Not to replace the advice of your health care provider.  Copyright   2018 [place] [email or web address]. All rights reserved.   none

## 2024-11-13 RX ORDER — LOSARTAN POTASSIUM AND HYDROCHLOROTHIAZIDE 12.5; 5 MG/1; MG/1
TABLET ORAL
Qty: 180 TABLET | Refills: 2 | OUTPATIENT
Start: 2024-11-13

## 2024-11-15 PROCEDURE — 81455 SO/HL 51/>GSAP DNA/DNA&RNA: CPT | Performed by: INTERNAL MEDICINE

## 2024-11-15 PROCEDURE — G0452 MOLECULAR PATHOLOGY INTERPR: HCPCS | Mod: 26 | Performed by: PATHOLOGY

## 2024-11-18 LAB
PATH REPORT.COMMENTS IMP SPEC: ABNORMAL
PATH REPORT.COMMENTS IMP SPEC: YES
PATH REPORT.FINAL DX SPEC: ABNORMAL
PATH REPORT.GROSS SPEC: ABNORMAL
PATH REPORT.MICROSCOPIC SPEC OTHER STN: ABNORMAL
PATH REPORT.RELEVANT HX SPEC: ABNORMAL
PHOTO IMAGE: ABNORMAL

## 2024-12-07 ENCOUNTER — HEALTH MAINTENANCE LETTER (OUTPATIENT)
Age: 79
End: 2024-12-07

## 2024-12-20 ENCOUNTER — TRANSFERRED RECORDS (OUTPATIENT)
Dept: HEALTH INFORMATION MANAGEMENT | Facility: CLINIC | Age: 79
End: 2024-12-20
Payer: MEDICARE

## 2024-12-27 ENCOUNTER — ANCILLARY PROCEDURE (OUTPATIENT)
Dept: GENERAL RADIOLOGY | Facility: CLINIC | Age: 79
End: 2024-12-27
Attending: NURSE PRACTITIONER
Payer: MEDICARE

## 2024-12-27 DIAGNOSIS — R05.1 ACUTE COUGH: ICD-10-CM

## 2024-12-27 PROBLEM — C34.91 PRIMARY LUNG ADENOCARCINOMA, RIGHT (H): Status: ACTIVE | Noted: 2024-12-27

## 2024-12-27 PROCEDURE — 71046 X-RAY EXAM CHEST 2 VIEWS: CPT | Mod: TC | Performed by: RADIOLOGY

## 2024-12-29 DIAGNOSIS — R05.2 SUBACUTE COUGH: Primary | ICD-10-CM

## 2024-12-29 RX ORDER — PREDNISONE 10 MG/1
TABLET ORAL
Qty: 30 TABLET | Refills: 0 | Status: SHIPPED | OUTPATIENT
Start: 2024-12-29

## 2024-12-30 ENCOUNTER — TELEPHONE (OUTPATIENT)
Dept: FAMILY MEDICINE | Facility: CLINIC | Age: 79
End: 2024-12-30
Payer: MEDICARE

## 2024-12-30 NOTE — TELEPHONE ENCOUNTER
Patient Returning Call    Reason for call:  Patient returned call    Information relayed to patient:  Writer relayed the message and patient states he will try it and let the provider know if it helps.    Patient has additional questions:  No

## 2024-12-30 NOTE — TELEPHONE ENCOUNTER
----- Message from John Oconnor sent at 12/29/2024  3:03 PM CST -----  Please call patient.    Checks x-ray looks okay.  No major sign of inflammation.  Lets try a round of steroids to see if that helps the cough.  Keep me updated if failing to improve or worsening

## 2024-12-30 NOTE — TELEPHONE ENCOUNTER
Left message to call back for: Patient  Information to relay to patient: Ok to relay msg per John Louis MA on 12/30/2024 at 2:04 PM

## 2025-01-02 ENCOUNTER — APPOINTMENT (OUTPATIENT)
Dept: CT IMAGING | Facility: CLINIC | Age: 80
DRG: 199 | End: 2025-01-02
Payer: MEDICARE

## 2025-01-02 ENCOUNTER — TELEPHONE (OUTPATIENT)
Dept: FAMILY MEDICINE | Facility: CLINIC | Age: 80
End: 2025-01-02

## 2025-01-02 ENCOUNTER — HOSPITAL ENCOUNTER (EMERGENCY)
Facility: CLINIC | Age: 80
Discharge: HOME | DRG: 199 | End: 2025-01-02
Payer: MEDICARE

## 2025-01-02 VITALS
WEIGHT: 166.8 LBS | TEMPERATURE: 97 F | DIASTOLIC BLOOD PRESSURE: 68 MMHG | RESPIRATION RATE: 18 BRPM | HEIGHT: 65 IN | HEART RATE: 62 BPM | OXYGEN SATURATION: 98 % | SYSTOLIC BLOOD PRESSURE: 150 MMHG | BODY MASS INDEX: 27.79 KG/M2

## 2025-01-02 DIAGNOSIS — Z79.01 ANTICOAGULATED: ICD-10-CM

## 2025-01-02 DIAGNOSIS — S22.31XA CLOSED FRACTURE OF ONE RIB OF RIGHT SIDE, INITIAL ENCOUNTER: ICD-10-CM
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
## 2025-01-02 PROCEDURE — 72125 CT NECK SPINE W/O DYE: CPT

## 2025-01-02 PROCEDURE — 70450 CT HEAD/BRAIN W/O DYE: CPT

## 2025-01-02 PROCEDURE — 250N000013 HC RX MED GY IP 250 OP 250 PS 637

## 2025-01-02 PROCEDURE — 71250 CT THORAX DX C-: CPT

## 2025-01-02 PROCEDURE — 99285 EMERGENCY DEPT VISIT HI MDM: CPT | Mod: 25

## 2025-01-02 RX ORDER — LIDOCAINE 40 MG/G
CREAM TOPICAL
Status: DISCONTINUED | OUTPATIENT
Start: 2025-01-02 | End: 2025-01-02 | Stop reason: HOSPADM

## 2025-01-02 RX ORDER — HYDROCODONE BITARTRATE AND ACETAMINOPHEN 5; 325 MG/1; MG/1
1 TABLET ORAL ONCE
Status: COMPLETED | OUTPATIENT
Start: 2025-01-02 | End: 2025-01-02

## 2025-01-02 RX ORDER — HYDROCODONE BITARTRATE AND ACETAMINOPHEN 5; 325 MG/1; MG/1
1 TABLET ORAL EVERY 6 HOURS PRN
Qty: 12 TABLET | Refills: 0 | Status: SHIPPED | OUTPATIENT
Start: 2025-01-02 | End: 2025-01-05

## 2025-01-02 RX ADMIN — HYDROCODONE BITARTRATE AND ACETAMINOPHEN 1 TABLET: 5; 325 TABLET ORAL at 12:34

## 2025-01-02 ASSESSMENT — COLUMBIA-SUICIDE SEVERITY RATING SCALE - C-SSRS
6. HAVE YOU EVER DONE ANYTHING, STARTED TO DO ANYTHING, OR PREPARED TO DO ANYTHING TO END YOUR LIFE?: NO
1. IN THE PAST MONTH, HAVE YOU WISHED YOU WERE DEAD OR WISHED YOU COULD GO TO SLEEP AND NOT WAKE UP?: NO
2. HAVE YOU ACTUALLY HAD ANY THOUGHTS OF KILLING YOURSELF IN THE PAST MONTH?: NO

## 2025-01-02 ASSESSMENT — ACTIVITIES OF DAILY LIVING (ADL): ADLS_ACUITY_SCORE: 59

## 2025-01-02 NOTE — ED TRIAGE NOTES
Pt arrives to ED with c/o fall that occurred on Friday which pt states they slipped outside and fell and hit his right side of his ribs on a fire hydrant. Pt is on plavix. Denies hitting head. No LOC. Wet cough present. States rib pain area is getting larger and bruising getting worse. Rates pain 8/10 had not taken anything for the pain today.      Triage Assessment (Adult)       Row Name 01/02/25 1056          Triage Assessment    Airway WDL WDL        Respiratory WDL    Respiratory WDL WDL        Skin Circulation/Temperature WDL    Skin Circulation/Temperature WDL WDL        Cardiac WDL    Cardiac WDL WDL        Peripheral/Neurovascular WDL    Peripheral Neurovascular WDL WDL        Cognitive/Neuro/Behavioral WDL    Cognitive/Neuro/Behavioral WDL WDL

## 2025-01-02 NOTE — TELEPHONE ENCOUNTER
Left message to call back for: Patient  Information to relay to patient: Message below per John.    Phyllis Christopher CMA.

## 2025-01-02 NOTE — ED PROVIDER NOTES
EMERGENCY DEPARTMENT ENCOUNTER      NAME: Ubaldo Ramos  AGE: 79 year old male  YOB: 1945  MRN: [id number]  EVALUATION DATE & TIME: No admission date for patient encounter.    PCP: John Oconnor    ED PROVIDER: Home Gilbert MD      Chief Complaint   Patient presents with    Fall         FINAL IMPRESSION:  1. Closed fracture of one rib of right side, initial encounter    2. Fall, initial encounter    3. Anticoagulated          ED COURSE & MEDICAL DECISION MAKING:    Pertinent Labs & Imaging studies reviewed. (See chart for details)  79 year old male presents to the Emergency Department for evaluation following a non-syncopal fall on thinners.  Differential diagnosis considered Skull fracture, subdural hematoma, epidural hematoma, ICH, basilar skull fracture, septal hematoma, facial fracture, cervical spine fracture, spinal cord injury, pneumothorax, hemothorax, rib fracture, AAA, intra-abdominal bleeding, perforated viscus, intra-abdominal hematoma, fracture, dislocation, nerve injury, arterial injury, compartment.     Triage Note: Pt arrives to ED with c/o fall that occurred on Friday which pt states they slipped outside and fell and hit his right side of his ribs on a fire hydrant. Pt is on plavix. Denies hitting head. No LOC. Wet cough present. States rib pain area is getting larger and bruising getting worse. Rates pain 8/10 had not taken anything for the pain today.         ED Course as of 01/02/25 1334   Thu Jan 02, 2025   1058 Trauma alert was called in triage. I met with the patient, obtained history, performed an initial exam, and discussed options and plan for diagnostics and treatment here in the ED. patient had a nonsyncopal fall landing on his right ribs approximately 5 days prior to presentation.  Denies head or lose consciousness.  He has not had high risk features such as headaches, vision changes or neurologic deficits however given his age trauma alert and CT scanning of the head and  neck was    Ordered.  He is also having right-sided rib pain which is his primary reason for presenting.  Does have trauma and ecchymosis to the right lateral ribs.  Does not previously evaluated.  Will obtain a chest CT without contrast.  He does not currently have chest pain.  He does state he is short of breath but only when taking deep breaths due to the pain in his right labs.  High concern for rib fracture.  Will also assess for pneumothorax.  98% on room air no respiratory distress.   1217 Head CT w/o contrast  IMPRESSION:  1.  No CT evidence for acute intracranial process.  2.  Brain atrophy and presumed chronic microvascular ischemic changes as above.     3.  No acute process     4.  No changes from 4/27/2024 head CT     5.  No intracranial mass, mass effect or hemorrhage. Nothing for parenchymal contusion or fracture.         1218 CT Cervical Spine w/o Contrast  IMPRESSION:  1.  No acute cervical fracture or posttraumatic subluxation.     2.  Uncomplicated postoperative appearance as before in the cervical spine with posterior instrumentation and hardware fusion C3-C4 and C2-C4 decompressive midline laminectomies.     3.  No high-grade spinal canal stenosis. Multilevel moderate-severe chronic neural foraminal stenosis. This is on a degenerative basis and is stable from prior CT 4/27/2024.     4.  No other changes and no acute process.  This result has not been signed. Information might be incomplete.     1227 Chest CT w/o contrast  1.  Acute mildly displaced oblique fracture of right anterolateral sixth rib. Small reactive right pleural effusion.  2.  Status post left upper lobectomy with unchanged cicatrization atelectasis and pleural thickening in the posterior left base.  3.  Evolution of the peripheral right upper lobe lesion with decreased solid and groundglass components and more angular parenchymal bands consistent with positive treatment response (known primary lung adenocarcinoma).     1231  Rechecked and updated patient on imaging results.  He has a single right-sided rib fracture, no underlying pneumothorax.  Do not believe he requires admission at this time as he is not on supplemental oxygen.  Will give pain control here as well as the outpatient setting.  Given his Plavix not able to take ibuprofen.  Encouraged Tylenol and Norco.  Also encouraged incentive spirometry.  Discussed plan for discharge. Able to obtain 1000 mL of incentive spirometer.  Encouraged to frequently use incentive spirometer.  Do not Believe patient requires admission.       Adult Minor Head Trauma:Age 65 years or older and Currently taking antiplatelet medications: clopidogrel or other antiplatelet medications    I discussed the plan for discharge with the patient, and patient is agreeable. We discussed supportive cares at home and reasons for return to the ER including new or worsening symptoms - all questions and concerns addressed to the best of my ability. Strict return precautions discussed. Patient to be discharged by RN.    At the conclusion of the encounter I discussed the results of all of the tests and the disposition. The questions were answered. The patient or family acknowledged understanding and was agreeable with the care plan.     MEDICATIONS GIVEN IN THE EMERGENCY:  Medications   lidocaine 1 % 0.1-1 mL (has no administration in time range)   lidocaine (LMX4) cream (has no administration in time range)   sodium chloride (PF) 0.9% PF flush 3 mL ( Intracatheter Canceled Entry 1/2/25 1205)   sodium chloride (PF) 0.9% PF flush 3 mL (has no administration in time range)   HYDROcodone-acetaminophen (NORCO) 5-325 MG per tablet 1 tablet (1 tablet Oral $Given 1/2/25 1234)       NEW PRESCRIPTIONS STARTED AT TODAY'S ER VISIT  Discharge Medication List as of 1/2/2025 12:48 PM        START taking these medications    Details   HYDROcodone-acetaminophen (NORCO) 5-325 MG tablet Take 1 tablet by mouth every 6 hours as needed.,  "Disp-12 tablet, R-0, Local Print           Discharge Medication List as of 1/2/2025 12:48 PM          =================================================================    HPI    Patient information was obtained from: Patient, family member    Use of : N/A        Ubaldo Ramos is a 79 year old male with a pertinent history of DDD, non-small cell lung cancer, right lung lung adenocarcinoma, prostate cancer, PAD, COPD, HLD, and acute PE, who presents to this ED for evaluation following a non-syncopal fall.    Patient reports he slipped and fell while walking on the cement floor while he was talking to someone else, and had a mechanical fall. States he fell and hit his right-sided ribs on the fire hydrant on his way down. He endorses right-sided rib pain and rates this pain a 8/10 currently. This fall occurred this past Friday (12/27/24). Patient developed a bruise over his right-sided ribs and notes the circumference of this bruise increased the last few days. He denies any preceding lightheadedness, dizziness, chest pain, feeling near-syncopal or any other symptoms prior to this fall. Patient developed a wet cough after this fall. He is concerned about his right lung. He denies hitting his head with this fall. He denies having any associated headache, vision changes or confusion. He didn't take any medications for the pain today. No other reported complaints or concerns at this time.      PHYSICAL EXAM    BP (!) 150/68   Pulse 62   Temp 97  F (36.1  C) (Temporal)   Resp 18   Ht 1.651 m (5' 5\")   Wt 75.7 kg (166 lb 12.8 oz)   SpO2 98%   BMI 27.76 kg/m    BP (!) 150/68   Pulse 62   Temp 97  F (36.1  C) (Temporal)   Resp 18   Ht 1.651 m (5' 5\")   Wt 75.7 kg (166 lb 12.8 oz)   SpO2 98%   BMI 27.76 kg/m      General Appearance: Alert, cooperative, normal speech and facial symmetry,  appears stated age,     Primary survey:     Airway patent  Breath sounds: bilateral breath sounds  Cardiovascular: 2+ " radial pulses and DP pulses  Disability GCS 15    Secondary survey    Head:  Normocephalic, without obvious abnormality, atraumatic  Eyes:  PERRL,pupils midsized, conjunctiva/corneas clear, EOM's intact, no orbital injury  ENT:  No obvious facial deformity.  No tenderness to palpation.  No epistaxis.  Extraocular movements are intact.  No evidence of orbital injury.    Neck:  No midline cervical spine tenderness.  No paraspinal tenderness.  Chest: Tenderness to the right lateral chest, ecchymosis to the right-sided ribs.  Cardio:  Regular rate and rhythm, S1 and S2 normal, no murmur, rub    or gallop, 2+ pulses symmetric in all extremities  Pulm:  Clear to auscultation bilaterally, nonlabored breathing, no respiratory distress  Back:   no CVA tenderness, no spinal tenderness  Abdomen:  Soft, non-tender, no rebound or guarding, no pelvic pain to compression  Extremities:  No obvious deformity, all joints palpated in place with full range of motion.  No tenderness or instability.  Patient is able to bear full weight, no tenderness over joints or extremities, no cyanosis or edema, full function and range of motion, pulses equal in all extremities, normal cap refill  Skin:  Skin color, texture, turgor normal, no rashes or lesions  Neuro:  Awake, alert, responsive to voice, follows commands, normal speech, No gross motor weakness or sensory loss, moves all extremities, baseline ambulation, GCS 15.      LAB:  All pertinent labs reviewed and interpreted.  Results for orders placed or performed during the hospital encounter of 01/02/25   Head CT w/o contrast    Impression    IMPRESSION:  1.  No CT evidence for acute intracranial process.  2.  Brain atrophy and presumed chronic microvascular ischemic changes as above.    3.  No acute process    4.  No changes from 4/27/2024 head CT    5.  No intracranial mass, mass effect or hemorrhage. Nothing for parenchymal contusion or fracture.                 CT Cervical Spine w/o  Contrast    Impression    IMPRESSION:  1.  No acute cervical fracture or posttraumatic subluxation.    2.  Uncomplicated postoperative appearance as before in the cervical spine with posterior instrumentation and hardware fusion C3-C4 and C2-C4 decompressive midline laminectomies.    3.  No high-grade spinal canal stenosis. Multilevel moderate-severe chronic neural foraminal stenosis. This is on a degenerative basis and is stable from prior CT 4/27/2024.    4.  No other changes and no acute process.   Chest CT w/o contrast    Impression    IMPRESSION:     1.  Acute mildly displaced oblique fracture of right anterolateral sixth rib. Small reactive right pleural effusion.  2.  Status post left upper lobectomy with unchanged cicatrization atelectasis and pleural thickening in the posterior left base.  3.  Evolution of the peripheral right upper lobe lesion with decreased solid and groundglass components and more angular parenchymal bands consistent with positive treatment response (known primary lung adenocarcinoma).         RADIOLOGY:  Reviewed all pertinent imaging. Please see official radiology report.  CT Cervical Spine w/o Contrast   Final Result   IMPRESSION:   1.  No acute cervical fracture or posttraumatic subluxation.      2.  Uncomplicated postoperative appearance as before in the cervical spine with posterior instrumentation and hardware fusion C3-C4 and C2-C4 decompressive midline laminectomies.      3.  No high-grade spinal canal stenosis. Multilevel moderate-severe chronic neural foraminal stenosis. This is on a degenerative basis and is stable from prior CT 4/27/2024.      4.  No other changes and no acute process.      Head CT w/o contrast   Final Result   IMPRESSION:   1.  No CT evidence for acute intracranial process.   2.  Brain atrophy and presumed chronic microvascular ischemic changes as above.      3.  No acute process      4.  No changes from 4/27/2024 head CT      5.  No intracranial mass, mass  effect or hemorrhage. Nothing for parenchymal contusion or fracture.                         Chest CT w/o contrast   Final Result   IMPRESSION:       1.  Acute mildly displaced oblique fracture of right anterolateral sixth rib. Small reactive right pleural effusion.   2.  Status post left upper lobectomy with unchanged cicatrization atelectasis and pleural thickening in the posterior left base.   3.  Evolution of the peripheral right upper lobe lesion with decreased solid and groundglass components and more angular parenchymal bands consistent with positive treatment response (known primary lung adenocarcinoma).             EKG:    N/A    PROCEDURES:   N/A      Home Gilbert MD  [facility] EMERGENCY ROOM  [address] [address]  [phone]   =================================================================    BILLING:  Data  Category 1  Non-ED record review, if applicable. External record reviewed: N/A     Clinical information was obtained from an independent historian. History was obtained from: Patient and Significant other provided additional information in the HPI     The following testing was considered but ultimately not selected after discussion with patient/family: N/A     Category 2  My independent interpretation of EKG, rhythm strip, radiology study: Head CT did not reveal large intracranial hemorrhage     Category 3  Discussion of management with other physician/healthcare provider/other source: N/A       Risk  Prescription medication was considered, but ultimately not given after discussion with patient/family: N/A     Chronic conditions affecting care:  DDD, non-small cell lung cancer, right lung lung adenocarcinoma, prostate cancer, PAD, COPD, HLD, and acute PE     Care significantly affected by Social Determinants of Health: N/A     Consideration of Admission/Observation: Escalation of care including admission/observation was considered given the  complexity and risk of the patient's presenting complaint, exam findings, and/or their underlying comorbidities. However, ultimately I feel the patient is safe for outpatient management with close follow up. Reasoning: Work-up reassuring, does not reveal any acute life/organ threatening processes, patient's symptoms well controlled upon reevaluation, reexamination is reassuring, vitals are stable, patient agreeable with discharge, reliable for follow-up.      Considered admission for pain control however pain was controlled with Norco.      I, Hope Tuttle, am serving as a scribe to document services personally performed by Home Gilbert MD based on my observation and the provider's statements to me. I, Home Gilbert MD, attest that Hope Tuttle is acting in a scribe capacity, has observed my performance of the services and has documented them in accordance with my direction.      Home Gilbert MD  01/02/25 2879       Home Gilbert MD  01/05/25 [id number]

## 2025-01-02 NOTE — TELEPHONE ENCOUNTER
Pt presents to the clinic after leaving the ER for a fractured rib. They want him to check with PCP to make sure he can take prednisone with the NORCO 5-325 MG. Routing to provider to advise.    Pt would like any future prescriptions to go to [facility] in [place]. Chart updated.     Phyllis Christopher CMA.

## 2025-01-02 NOTE — DISCHARGE INSTRUCTIONS
Seen here for rib fracture.  You are given pain medication with improvement.  You are given prescription for pain medication.  She did not have evidence of a pneumothorax or blood in your lung.  No other traumatic findings.  If you have worsening shortness of breath or pain present to the emergency department.

## 2025-01-05 ENCOUNTER — APPOINTMENT (OUTPATIENT)
Dept: RADIOLOGY | Facility: CLINIC | Age: 80
DRG: 199 | End: 2025-01-05
Attending: EMERGENCY MEDICINE
Payer: MEDICARE

## 2025-01-05 ENCOUNTER — HOSPITAL ENCOUNTER (INPATIENT)
Facility: CLINIC | Age: 80
LOS: 3 days | Discharge: OTHER INSTITUTION | DRG: 199 | End: 2025-01-08
Attending: EMERGENCY MEDICINE
Payer: MEDICARE

## 2025-01-05 ENCOUNTER — APPOINTMENT (OUTPATIENT)
Dept: CT IMAGING | Facility: CLINIC | Age: 80
DRG: 199 | End: 2025-01-05
Attending: EMERGENCY MEDICINE
Payer: MEDICARE

## 2025-01-05 DIAGNOSIS — J93.9 PNEUMOTHORAX ON RIGHT: ICD-10-CM

## 2025-01-05 DIAGNOSIS — J96.01 ACUTE RESPIRATORY FAILURE WITH HYPOXIA (H): ICD-10-CM

## 2025-01-05 DIAGNOSIS — S22.31XA CLOSED FRACTURE OF ONE RIB OF RIGHT SIDE, INITIAL ENCOUNTER: ICD-10-CM

## 2025-01-05 DIAGNOSIS — R79.89 ELEVATED TROPONIN: ICD-10-CM

## 2025-01-05 LAB
ALBUMIN SERPL BCG-MCNC: 4.4 G/DL (ref 3.5–5.2)
ALP SERPL-CCNC: 104 U/L (ref 40–150)
ALT SERPL W P-5'-P-CCNC: 21 U/L (ref 0–70)
ANION GAP SERPL CALCULATED.3IONS-SCNC: 15 MMOL/L (ref 7–15)
AST SERPL W P-5'-P-CCNC: 31 U/L (ref 0–45)
ATRIAL RATE - MUSE: 100 BPM
ATRIAL RATE - MUSE: 90 BPM
BASE EXCESS BLDV CALC-SCNC: 1.1 MMOL/L (ref -3–3)
BASOPHILS # BLD AUTO: 0 10E3/UL (ref 0–0.2)
BASOPHILS NFR BLD AUTO: 0 %
BILIRUB SERPL-MCNC: 0.4 MG/DL
BUN SERPL-MCNC: 27.9 MG/DL (ref 8–23)
CALCIUM SERPL-MCNC: 9.8 MG/DL (ref 8.8–10.4)
CHLORIDE SERPL-SCNC: 98 MMOL/L (ref 98–107)
CREAT SERPL-MCNC: 0.95 MG/DL (ref 0.67–1.17)
DIASTOLIC BLOOD PRESSURE - MUSE: 72 MMHG
DIASTOLIC BLOOD PRESSURE - MUSE: 93 MMHG
EGFRCR SERPLBLD CKD-EPI 2021: 82 ML/MIN/1.73M2
EOSINOPHIL # BLD AUTO: 0.1 10E3/UL (ref 0–0.7)
EOSINOPHIL NFR BLD AUTO: 0 %
ERYTHROCYTE [DISTWIDTH] IN BLOOD BY AUTOMATED COUNT: 14.6 % (ref 10–15)
FLUAV RNA SPEC QL NAA+PROBE: NEGATIVE
FLUBV RNA RESP QL NAA+PROBE: NEGATIVE
GLUCOSE SERPL-MCNC: 154 MG/DL (ref 70–99)
HCO3 BLDV-SCNC: 30 MMOL/L (ref 21–28)
HCO3 SERPL-SCNC: 23 MMOL/L (ref 22–29)
HCT VFR BLD AUTO: 34.2 % (ref 40–53)
HGB BLD-MCNC: 11.4 G/DL (ref 13.3–17.7)
HOLD SPECIMEN: NORMAL
HOLD SPECIMEN: NORMAL
IMM GRANULOCYTES # BLD: 0.1 10E3/UL
IMM GRANULOCYTES NFR BLD: 1 %
INTERPRETATION ECG - MUSE: NORMAL
INTERPRETATION ECG - MUSE: NORMAL
LACTATE SERPL-SCNC: 1.5 MMOL/L (ref 0.7–2)
LYMPHOCYTES # BLD AUTO: 1.3 10E3/UL (ref 0.8–5.3)
LYMPHOCYTES NFR BLD AUTO: 10 %
MCH RBC QN AUTO: 30.7 PG (ref 26.5–33)
MCHC RBC AUTO-ENTMCNC: 33.3 G/DL (ref 31.5–36.5)
MCV RBC AUTO: 92 FL (ref 78–100)
MONOCYTES # BLD AUTO: 1.1 10E3/UL (ref 0–1.3)
MONOCYTES NFR BLD AUTO: 9 %
NEUTROPHILS # BLD AUTO: 10.2 10E3/UL (ref 1.6–8.3)
NEUTROPHILS NFR BLD AUTO: 80 %
NRBC # BLD AUTO: 0 10E3/UL
NRBC BLD AUTO-RTO: 0 /100
NT-PROBNP SERPL-MCNC: 783 PG/ML (ref 0–1800)
O2/TOTAL GAS SETTING VFR VENT: 41 %
OXYHGB MFR BLDV: 53 % (ref 70–75)
P AXIS - MUSE: 62 DEGREES
P AXIS - MUSE: 71 DEGREES
PCO2 BLDV: 64 MM HG (ref 40–50)
PH BLDV: 7.27 [PH] (ref 7.32–7.43)
PLATELET # BLD AUTO: 244 10E3/UL (ref 150–450)
PO2 BLDV: 32 MM HG (ref 25–47)
POTASSIUM SERPL-SCNC: 4 MMOL/L (ref 3.4–5.3)
PR INTERVAL - MUSE: 140 MS
PR INTERVAL - MUSE: 174 MS
PROT SERPL-MCNC: 7.1 G/DL (ref 6.4–8.3)
QRS DURATION - MUSE: 108 MS
QRS DURATION - MUSE: 138 MS
QT - MUSE: 358 MS
QT - MUSE: 366 MS
QTC - MUSE: 437 MS
QTC - MUSE: 472 MS
R AXIS - MUSE: 158 DEGREES
R AXIS - MUSE: 209 DEGREES
RBC # BLD AUTO: 3.71 10E6/UL (ref 4.4–5.9)
RSV RNA SPEC NAA+PROBE: NEGATIVE
SAO2 % BLDV: 54.6 % (ref 70–75)
SARS-COV-2 RNA RESP QL NAA+PROBE: NEGATIVE
SODIUM SERPL-SCNC: 136 MMOL/L (ref 135–145)
SYSTOLIC BLOOD PRESSURE - MUSE: 115 MMHG
SYSTOLIC BLOOD PRESSURE - MUSE: 156 MMHG
T AXIS - MUSE: 33 DEGREES
T AXIS - MUSE: 69 DEGREES
TROPONIN T SERPL HS-MCNC: 1258 NG/L
TROPONIN T SERPL HS-MCNC: 540 NG/L
TROPONIN T SERPL HS-MCNC: 940 NG/L
UFH PPP CHRO-ACNC: 0.37 IU/ML
VENTRICULAR RATE- MUSE: 100 BPM
VENTRICULAR RATE- MUSE: 90 BPM
WBC # BLD AUTO: 12.8 10E3/UL (ref 4–11)

## 2025-01-05 PROCEDURE — 87637 SARSCOV2&INF A&B&RSV AMP PRB: CPT | Performed by: EMERGENCY MEDICINE

## 2025-01-05 PROCEDURE — 85025 COMPLETE CBC W/AUTO DIFF WBC: CPT | Performed by: EMERGENCY MEDICINE

## 2025-01-05 PROCEDURE — 83880 ASSAY OF NATRIURETIC PEPTIDE: CPT | Performed by: EMERGENCY MEDICINE

## 2025-01-05 PROCEDURE — 85520 HEPARIN ASSAY: CPT | Performed by: EMERGENCY MEDICINE

## 2025-01-05 PROCEDURE — 71275 CT ANGIOGRAPHY CHEST: CPT

## 2025-01-05 PROCEDURE — 71045 X-RAY EXAM CHEST 1 VIEW: CPT

## 2025-01-05 PROCEDURE — 84484 ASSAY OF TROPONIN QUANT: CPT | Performed by: STUDENT IN AN ORGANIZED HEALTH CARE EDUCATION/TRAINING PROGRAM

## 2025-01-05 PROCEDURE — 250N000009 HC RX 250: Performed by: EMERGENCY MEDICINE

## 2025-01-05 PROCEDURE — 94640 AIRWAY INHALATION TREATMENT: CPT

## 2025-01-05 PROCEDURE — 32551 INSERTION OF CHEST TUBE: CPT | Mod: RT

## 2025-01-05 PROCEDURE — 96365 THER/PROPH/DIAG IV INF INIT: CPT | Mod: 59

## 2025-01-05 PROCEDURE — 999N000157 HC STATISTIC RCP TIME EA 10 MIN

## 2025-01-05 PROCEDURE — 83605 ASSAY OF LACTIC ACID: CPT | Performed by: EMERGENCY MEDICINE

## 2025-01-05 PROCEDURE — 999N000065 XR CHEST PORT 1 VIEW

## 2025-01-05 PROCEDURE — 96376 TX/PRO/DX INJ SAME DRUG ADON: CPT

## 2025-01-05 PROCEDURE — 250N000013 HC RX MED GY IP 250 OP 250 PS 637: Performed by: STUDENT IN AN ORGANIZED HEALTH CARE EDUCATION/TRAINING PROGRAM

## 2025-01-05 PROCEDURE — 93005 ELECTROCARDIOGRAM TRACING: CPT | Performed by: EMERGENCY MEDICINE

## 2025-01-05 PROCEDURE — 99223 1ST HOSP IP/OBS HIGH 75: CPT | Performed by: STUDENT IN AN ORGANIZED HEALTH CARE EDUCATION/TRAINING PROGRAM

## 2025-01-05 PROCEDURE — 82805 BLOOD GASES W/O2 SATURATION: CPT | Performed by: EMERGENCY MEDICINE

## 2025-01-05 PROCEDURE — 120N000004 HC R&B MS OVERFLOW

## 2025-01-05 PROCEDURE — 36415 COLL VENOUS BLD VENIPUNCTURE: CPT | Performed by: EMERGENCY MEDICINE

## 2025-01-05 PROCEDURE — 82310 ASSAY OF CALCIUM: CPT | Performed by: EMERGENCY MEDICINE

## 2025-01-05 PROCEDURE — 80053 COMPREHEN METABOLIC PANEL: CPT | Performed by: EMERGENCY MEDICINE

## 2025-01-05 PROCEDURE — 250N000011 HC RX IP 250 OP 636: Performed by: EMERGENCY MEDICINE

## 2025-01-05 PROCEDURE — 99291 CRITICAL CARE FIRST HOUR: CPT | Mod: 25

## 2025-01-05 PROCEDURE — 84484 ASSAY OF TROPONIN QUANT: CPT | Performed by: EMERGENCY MEDICINE

## 2025-01-05 PROCEDURE — 0W9930Z DRAINAGE OF RIGHT PLEURAL CAVITY WITH DRAINAGE DEVICE, PERCUTANEOUS APPROACH: ICD-10-PCS | Performed by: EMERGENCY MEDICINE

## 2025-01-05 PROCEDURE — 96375 TX/PRO/DX INJ NEW DRUG ADDON: CPT

## 2025-01-05 RX ORDER — ASPIRIN 81 MG/1
81 TABLET ORAL DAILY
Status: DISCONTINUED | OUTPATIENT
Start: 2025-01-05 | End: 2025-01-08 | Stop reason: HOSPADM

## 2025-01-05 RX ORDER — LIDOCAINE 40 MG/G
CREAM TOPICAL
Status: DISCONTINUED | OUTPATIENT
Start: 2025-01-05 | End: 2025-01-08 | Stop reason: HOSPADM

## 2025-01-05 RX ORDER — PREGABALIN 75 MG/1
150 CAPSULE ORAL 3 TIMES DAILY
Status: DISCONTINUED | OUTPATIENT
Start: 2025-01-05 | End: 2025-01-08 | Stop reason: HOSPADM

## 2025-01-05 RX ORDER — LOSARTAN POTASSIUM AND HYDROCHLOROTHIAZIDE 25; 100 MG/1; MG/1
1 TABLET ORAL DAILY
Status: DISCONTINUED | OUTPATIENT
Start: 2025-01-05 | End: 2025-01-07

## 2025-01-05 RX ORDER — CLOPIDOGREL BISULFATE 75 MG/1
75 TABLET ORAL DAILY
Status: DISCONTINUED | OUTPATIENT
Start: 2025-01-05 | End: 2025-01-08 | Stop reason: HOSPADM

## 2025-01-05 RX ORDER — ONDANSETRON 4 MG/1
4 TABLET, ORALLY DISINTEGRATING ORAL EVERY 6 HOURS PRN
Status: DISCONTINUED | OUTPATIENT
Start: 2025-01-05 | End: 2025-01-08 | Stop reason: HOSPADM

## 2025-01-05 RX ORDER — SALIVA STIMULANT COMB. NO.3
1 SPRAY, NON-AEROSOL (ML) MUCOUS MEMBRANE 4 TIMES DAILY PRN
Status: DISCONTINUED | OUTPATIENT
Start: 2025-01-05 | End: 2025-01-08 | Stop reason: HOSPADM

## 2025-01-05 RX ORDER — OXYBUTYNIN CHLORIDE 5 MG/1
10 TABLET, EXTENDED RELEASE ORAL DAILY
Status: DISCONTINUED | OUTPATIENT
Start: 2025-01-05 | End: 2025-01-08 | Stop reason: HOSPADM

## 2025-01-05 RX ORDER — PREGABALIN 150 MG/1
150 CAPSULE ORAL 3 TIMES DAILY
COMMUNITY

## 2025-01-05 RX ORDER — IPRATROPIUM BROMIDE AND ALBUTEROL SULFATE 2.5; .5 MG/3ML; MG/3ML
3 SOLUTION RESPIRATORY (INHALATION) ONCE
Status: COMPLETED | OUTPATIENT
Start: 2025-01-05 | End: 2025-01-05

## 2025-01-05 RX ORDER — CILOSTAZOL 100 MG/1
100 TABLET ORAL 2 TIMES DAILY
Status: DISCONTINUED | OUTPATIENT
Start: 2025-01-05 | End: 2025-01-07

## 2025-01-05 RX ORDER — AMOXICILLIN 250 MG
1 CAPSULE ORAL 2 TIMES DAILY PRN
Status: DISCONTINUED | OUTPATIENT
Start: 2025-01-05 | End: 2025-01-08 | Stop reason: HOSPADM

## 2025-01-05 RX ORDER — CETIRIZINE HYDROCHLORIDE 10 MG/1
10 TABLET ORAL EVERY EVENING
Status: DISCONTINUED | OUTPATIENT
Start: 2025-01-05 | End: 2025-01-08 | Stop reason: HOSPADM

## 2025-01-05 RX ORDER — AMOXICILLIN 250 MG
2 CAPSULE ORAL 2 TIMES DAILY PRN
Status: DISCONTINUED | OUTPATIENT
Start: 2025-01-05 | End: 2025-01-08 | Stop reason: HOSPADM

## 2025-01-05 RX ORDER — CALCIUM CARBONATE 500 MG/1
1000 TABLET, CHEWABLE ORAL 4 TIMES DAILY PRN
Status: DISCONTINUED | OUTPATIENT
Start: 2025-01-05 | End: 2025-01-08 | Stop reason: HOSPADM

## 2025-01-05 RX ORDER — HYDROMORPHONE HYDROCHLORIDE 1 MG/ML
0.5 INJECTION, SOLUTION INTRAMUSCULAR; INTRAVENOUS; SUBCUTANEOUS ONCE
Status: COMPLETED | OUTPATIENT
Start: 2025-01-05 | End: 2025-01-05

## 2025-01-05 RX ORDER — METHYLPREDNISOLONE SODIUM SUCCINATE 125 MG/2ML
125 INJECTION INTRAMUSCULAR; INTRAVENOUS ONCE
Status: COMPLETED | OUTPATIENT
Start: 2025-01-05 | End: 2025-01-05

## 2025-01-05 RX ORDER — ACETAMINOPHEN 650 MG/1
650 SUPPOSITORY RECTAL EVERY 4 HOURS PRN
Status: DISCONTINUED | OUTPATIENT
Start: 2025-01-05 | End: 2025-01-07

## 2025-01-05 RX ORDER — IOPAMIDOL 755 MG/ML
75 INJECTION, SOLUTION INTRAVASCULAR ONCE
Status: COMPLETED | OUTPATIENT
Start: 2025-01-05 | End: 2025-01-05

## 2025-01-05 RX ORDER — TRAZODONE HYDROCHLORIDE 50 MG/1
50 TABLET, FILM COATED ORAL AT BEDTIME
Status: DISCONTINUED | OUTPATIENT
Start: 2025-01-05 | End: 2025-01-08 | Stop reason: HOSPADM

## 2025-01-05 RX ORDER — PROPRANOLOL HCL 20 MG
20 TABLET ORAL 2 TIMES DAILY
Status: DISCONTINUED | OUTPATIENT
Start: 2025-01-05 | End: 2025-01-07

## 2025-01-05 RX ORDER — HYDROCODONE BITARTRATE AND ACETAMINOPHEN 5; 325 MG/1; MG/1
1 TABLET ORAL EVERY 6 HOURS PRN
Status: DISCONTINUED | OUTPATIENT
Start: 2025-01-05 | End: 2025-01-05

## 2025-01-05 RX ORDER — IPRATROPIUM BROMIDE AND ALBUTEROL SULFATE 2.5; .5 MG/3ML; MG/3ML
1 SOLUTION RESPIRATORY (INHALATION) DAILY
Status: DISCONTINUED | OUTPATIENT
Start: 2025-01-06 | End: 2025-01-08 | Stop reason: HOSPADM

## 2025-01-05 RX ORDER — LORAZEPAM 2 MG/ML
0.5 INJECTION INTRAMUSCULAR ONCE
Status: COMPLETED | OUTPATIENT
Start: 2025-01-05 | End: 2025-01-05

## 2025-01-05 RX ORDER — BENZONATATE 100 MG/1
200 CAPSULE ORAL 3 TIMES DAILY PRN
Status: DISCONTINUED | OUTPATIENT
Start: 2025-01-05 | End: 2025-01-07

## 2025-01-05 RX ORDER — ONDANSETRON 2 MG/ML
4 INJECTION INTRAMUSCULAR; INTRAVENOUS ONCE
Status: COMPLETED | OUTPATIENT
Start: 2025-01-05 | End: 2025-01-05

## 2025-01-05 RX ORDER — FENTANYL CITRATE 50 UG/ML
50 INJECTION, SOLUTION INTRAMUSCULAR; INTRAVENOUS ONCE
Status: COMPLETED | OUTPATIENT
Start: 2025-01-05 | End: 2025-01-05

## 2025-01-05 RX ORDER — ATORVASTATIN CALCIUM 40 MG/1
80 TABLET, FILM COATED ORAL AT BEDTIME
Status: DISCONTINUED | OUTPATIENT
Start: 2025-01-05 | End: 2025-01-08 | Stop reason: HOSPADM

## 2025-01-05 RX ORDER — HYDROCODONE BITARTRATE AND ACETAMINOPHEN 5; 325 MG/1; MG/1
1 TABLET ORAL EVERY 4 HOURS PRN
Status: DISCONTINUED | OUTPATIENT
Start: 2025-01-05 | End: 2025-01-07

## 2025-01-05 RX ORDER — HEPARIN SODIUM 10000 [USP'U]/100ML
0-5000 INJECTION, SOLUTION INTRAVENOUS CONTINUOUS
Status: DISCONTINUED | OUTPATIENT
Start: 2025-01-05 | End: 2025-01-08 | Stop reason: HOSPADM

## 2025-01-05 RX ORDER — ACETAMINOPHEN 325 MG/1
650 TABLET ORAL EVERY 4 HOURS PRN
Status: DISCONTINUED | OUTPATIENT
Start: 2025-01-05 | End: 2025-01-07

## 2025-01-05 RX ORDER — PANTOPRAZOLE SODIUM 40 MG/1
40 TABLET, DELAYED RELEASE ORAL DAILY
Status: DISCONTINUED | OUTPATIENT
Start: 2025-01-05 | End: 2025-01-08 | Stop reason: HOSPADM

## 2025-01-05 RX ORDER — ONDANSETRON 2 MG/ML
4 INJECTION INTRAMUSCULAR; INTRAVENOUS EVERY 6 HOURS PRN
Status: DISCONTINUED | OUTPATIENT
Start: 2025-01-05 | End: 2025-01-08 | Stop reason: HOSPADM

## 2025-01-05 RX ADMIN — FENTANYL CITRATE 50 MCG: 50 INJECTION INTRAMUSCULAR; INTRAVENOUS at 16:00

## 2025-01-05 RX ADMIN — HYDROMORPHONE HYDROCHLORIDE 0.5 MG: 1 INJECTION, SOLUTION INTRAMUSCULAR; INTRAVENOUS; SUBCUTANEOUS at 12:54

## 2025-01-05 RX ADMIN — HYDROMORPHONE HYDROCHLORIDE 0.5 MG: 1 INJECTION, SOLUTION INTRAMUSCULAR; INTRAVENOUS; SUBCUTANEOUS at 17:06

## 2025-01-05 RX ADMIN — PREGABALIN 150 MG: 75 CAPSULE ORAL at 19:41

## 2025-01-05 RX ADMIN — IPRATROPIUM BROMIDE AND ALBUTEROL SULFATE 3 ML: .5; 3 SOLUTION RESPIRATORY (INHALATION) at 12:49

## 2025-01-05 RX ADMIN — ATORVASTATIN CALCIUM 80 MG: 40 TABLET, FILM COATED ORAL at 22:52

## 2025-01-05 RX ADMIN — CETIRIZINE HYDROCHLORIDE 10 MG: 10 TABLET, FILM COATED ORAL at 19:40

## 2025-01-05 RX ADMIN — ONDANSETRON 4 MG: 2 INJECTION, SOLUTION INTRAMUSCULAR; INTRAVENOUS at 12:53

## 2025-01-05 RX ADMIN — METHYLPREDNISOLONE SODIUM SUCCINATE 125 MG: 125 INJECTION, POWDER, FOR SOLUTION INTRAMUSCULAR; INTRAVENOUS at 12:54

## 2025-01-05 RX ADMIN — BENZONATATE 200 MG: 100 CAPSULE ORAL at 19:41

## 2025-01-05 RX ADMIN — IOPAMIDOL 75 ML: 755 INJECTION, SOLUTION INTRAVENOUS at 14:32

## 2025-01-05 RX ADMIN — HEPARIN SODIUM 900 UNITS/HR: 10000 INJECTION, SOLUTION INTRAVENOUS at 17:46

## 2025-01-05 RX ADMIN — TRAZODONE HYDROCHLORIDE 50 MG: 50 TABLET ORAL at 22:52

## 2025-01-05 RX ADMIN — HYDROCODONE BITARTRATE AND ACETAMINOPHEN 1 TABLET: 5; 325 TABLET ORAL at 19:46

## 2025-01-05 RX ADMIN — PANTOPRAZOLE SODIUM 40 MG: 40 TABLET, DELAYED RELEASE ORAL at 19:41

## 2025-01-05 ASSESSMENT — ACTIVITIES OF DAILY LIVING (ADL)
ADLS_ACUITY_SCORE: 59

## 2025-01-05 NOTE — ED PROVIDER NOTES
EMERGENCY DEPARTMENT ENCOUNTER      NAME: Ubaldo Ramos  AGE: 79 year old male  YOB: 1945  MRN: [id number]  EVALUATION DATE & TIME: 1/5/2025 11:51 AM    PCP: John Oconnor    ED PROVIDER: Sandhya Lane MD      Chief Complaint   Patient presents with    Shortness of Breath         FINAL IMPRESSION:  1. Pneumothorax on right    2. Closed fracture of one rib of right side, initial encounter    3. Elevated troponin          ED COURSE & MEDICAL DECISION MAKING:    Pertinent Labs & Imaging studies reviewed. (See chart for details)    12:05 PM I introduced myself to the patient, obtained patient history, performed a physical exam, and discussed plan for ED workup including potential diagnostic laboratory/imaging studies and interventions.  1:07 PM I checked on the patient.   1:15 PM I received a critical lab: Elevated troponin.  1:28 PM I spoke with Dr. Cheek, Cardiology.   3:45 PM I spoke with Dr. Hassan with trauma surgery who felt that the patient did not require transfer from a trauma standpoint. Recommended wherever cardiology comfortable having the patient admitted and that the surgery team could follow the chest tube.   5:46 PM Spoke again with Dr. Cheek who recommends heparin with no additional bolus and we dicussed that there were no cardiac telemetry beds with a cath lab in the system and he was comfortable with the patient being admitted to [facility] and they will evaluate tomorrow whether necessitates transfer for cath.     79 year old male with a pertinent history of COPD, hypertension, non-small cell lung cancer (not currently on chemo, 2018), pulmonary embolism not currently anticoagulated other than plavix, and peripheral artery disease who presents to this ED for evaluation of shortness of breath after recent rib fracture.  Patient presented hypoxic to 86% on room air. He is on oxi mask here initially up to 7 L but weaned down to 4 with improvement in his oxygen saturations.   Initially was a bit tachypneic but improving on the supplemental oxygen.  No accessory muscle use.  Reports worsening shortness of breath that started last night but significantly worsened today.  He was seen here 3 days ago after a fall where he states he slipped on the ice and fell onto a fire hydrant.  He landed on the right side of his chest.  Had CT of the head cervical spine and chest which revealed a single right rib fracture and his pain was well-controlled at the time and was pulling at thousand on I-S and discharged with pain medication.  He is on Plavix but no other anticoagulation.  He has history of COPD but not typically on oxygen.  Also prior lung cancer.  No current treatment for this.  With his presentation did have concern for possible pneumonia developed after rib fracture of poorly controlled pain at home, COPD exacerbation, pulmonary edema, pneumothorax, PE, etc.  Initially had ordered portable chest x-ray however due to severe capacity issues this was delayed and then was noted to have an elevated troponin at 540.  With this decided instead to change this to a CT PE protocol of the chest for better evaluation.  He is appearing overall improved on the supplemental oxygen.  Was given 4 mg of IV Zofran as well as 0.5 mg of IV Dilaudid and a DuoNeb here.  Also given 125 mg of IV Solu-Medrol.  He was feeling improved with this.  EKG reveals right bundle branch block which was not as prominent previously but no obvious signs of ST elevation or acute ischemia.  I did repeat this after the elevated troponin and this is unchanged again without any sign of acute ischemia.  Spoke with cardiology and did question whether he could potentially have a cardiac contusion from the fall.  Also possible this is demand ischemia.  They agreed with CT imaging and at this point would hold on heparin especially in the light of his recent trauma.  They also reviewed his EKG and did not feel there was any sign of STEMI  or acute occlusion MI.  White blood cell count here is 12.8.  Hemoglobin 11.4 which is near his recent baseline.  Lactic acid within normal limits.  COVID-19 influenza and RSV PCR is negative.  CMP unremarkable.  BNP is 783.  No obvious sign of significant volume overload on exam.  Does not appear that he has any known history of cardiac disease.  I reviewed the CT scan myself and did have concern for right pneumothorax that appears to be new.  This was not noted on the prior CT.  There is no sign of any significant pleural effusion/hemothorax.  No obvious large pericardial effusion on my evaluation of the CT either.  The formal read agrees.  No new rib fractures noted.  He denies any recurrent falls.  He does have some bullae possibly from his COPD and is possible that rupture of 1 of these could have caused the pneumothorax.  There was no PE.  He does have severe coronary artery calcification noted.  With this we will plan to place pigtail catheter chest tube.  Patient was consented for this and he and his wife are in agreement with the plan.  He was given 50 mcg of fentanyl and tolerated this procedure very well as documented below.  Postplacement chest x-ray reveals good positioning and resolution of pneumothorax.  He is feeling much improved with this and was able to be transition to nasal cannula.  Repeat troponin did come back more elevated at 1258.  Again spoke with Dr. Cheek with cardiology who recommended restart heparin at this time no of the chest tube was placed without the initial bolus and just doing low intensity drip at this point.  He was given additional IV Dilaudid 0.5 mg shortly after the chest tube placement likely from pain from reexpansion but he is feeling much improved on reevaluation.  Discussed with cardiology whether he requires transfer from their standpoint and they stated that ideally he would go somewhere where there is a Cath Lab available although they would not do this  emergently or tonight.  We did try to see if there were any cardiac telemetry beds with the Cath Lab present in the system and unfortunately they are not available at this time.  Spoke again to cardiology Dr. Cheek who was comfortable with him staying at [facility] and they could transfer him tomorrow if felt cath was necessary.  They will plan to get an echo tomorrow and see the patient.  I also spoke with Dr. Hassan with trauma surgery and he felt that overall cardiac contusion would be somewhat less likely and from his standpoint did not feel that he required transfer to a level 1 trauma center and that he could stay wherever cardiology felt comfortable with the patient.  It does not like this fall was mechanical without syncope previously.  Trauma surgery did state that they could manage the chest tube moving forward.  He was helped to continuous suction via Pleur-evac.  Patient does not have any anginal sounding chest pain at this time and is overall feeling improved after chest tube insertion.  Spoke with the hospitalist who accepted the patient for admission.  Patient and his wife are in agreement with this plan.  He was admitted in stable condition.         At the conclusion of the encounter I discussed the results of all of the tests and the disposition. The questions were answered. The patient or family acknowledged understanding and was agreeable with the care plan.     Critical Care  Performed by: Sandhya Lane MD  Authorized by: Sandhya Lane MD     Total critical care time: 60 minutes  Critical care time was exclusive of separately billable procedures and treating other patients.  Critical care was necessary to treat or prevent imminent or life-threatening deterioration of the following conditions: acute respiratory failure, pneumothorax, possible NSTEMI  Critical care was time spent personally by me on the following activities: development of treatment plan with patient or surrogate,  discussions with consultants, examination of patient, evaluation of patient's response to treatment, obtaining history from patient or surrogate, ordering and performing treatments and interventions, ordering and review of laboratory studies, ordering and review of radiographic studies and re-evaluation of patient's condition, this excludes any separately billable procedures.       Medical Decision Making  Obtained supplemental history:Supplemental history obtained?: Family Member/Significant Other  Reviewed external records: External records reviewed?: Documented in chart  Care impacted by chronic illness:Cancer/Chemotherapy, Chronic Lung Disease, Heart Disease, Hyperlipidemia, Hypertension, and Smoking / Nicotine Use  Care significantly affected by social determinants of health:N/A  Did you consider but not order tests?: Work up considered but not performed and documented in chart, if applicable  Did you interpret images independently?: Independent interpretation of ECG and images noted in documentation, when applicable.  Consultation discussion with other provider:Did you involve another provider (consultant, , pharmacy, etc.)?: I discussed the care with another health care provider, see documentation for details.  Admit.    CT Pulmonary Angiogram:CT PA was ordered for reason(s) other than PE.      MEDICATIONS GIVEN IN THE EMERGENCY:  Medications   heparin 25,000 units in 0.45% NaCl 250 mL ANTICOAGULANT infusion (has no administration in time range)   methylPREDNISolone Na Suc (solu-MEDROL) injection 125 mg (125 mg Intravenous $Given 1/5/25 1254)   HYDROmorphone (PF) (DILAUDID) injection 0.5 mg (0.5 mg Intravenous $Given 1/5/25 1254)   ondansetron (ZOFRAN) injection 4 mg (4 mg Intravenous $Given 1/5/25 1253)   ipratropium - albuterol 0.5 mg/2.5 mg/3 mL (DUONEB) neb solution 3 mL (3 mLs Nebulization $Given 1/5/25 1249)   iopamidol (ISOVUE-370) solution 75 mL (75 mLs Intravenous $Given 1/5/25 1432)   fentaNYL  (PF) (SUBLIMAZE) injection 50 mcg (50 mcg Intravenous $Given 1/5/25 1600)   LORazepam (ATIVAN) injection 0.5 mg (0.5 mg Intravenous Not Given 1/5/25 1638)   HYDROmorphone (PF) (DILAUDID) injection 0.5 mg (0.5 mg Intravenous $Given 1/5/25 1706)       NEW PRESCRIPTIONS STARTED AT TODAY'S ER VISIT  New Prescriptions    No medications on file          =================================================================    HPI    Patient information was obtained from: Patient and wife    Use of : N/A        Ubaldo Ramos is a 79 year old male with a pertinent history of COPD, hypertension, non-small cell lung cancer (not currently on chemo, 2018), pulmonary embolism not currently anticoagulated other than plavix, and peripheral artery disease who presents to this ED for evaluation of shortness of breath after recent rib fracture.    Patient began having worsening shortness of breath last night and a worsening of his chronic cough. He was seen here on 01/02/2025 for evaluation of a broken right rib after a fall, and notes that he has been in a significant amount of pain since, and his pain medicine that he was prescribed has not been very beneficial. He endorses pain with deep breaths, and notes his cough is not very productive. He feels like he is unable to get the phlegm up from coughing. He denies any blood with his cough. He is not chronically on oxygen, but notes that his shortness of breath is improving with oxygen in the ED today. He was given a nebulizing treatment by EMS. Patient is on Plavix and Prednisone, but has not taken any of his medication today.     Patient denies leg swelling, fever, abdominal pain, or any other complaints at this time.       Chart Review:  01/02/2025: Patient was seen at [facility] ED for evaluation after fall. The patient had a non-syncopal fall landing on his right ribs approximately 5 days prior to evaluation in the ED. CT head and neck was negative,  but chest CT without contrast showed an acutely mildly displaced oblique fracture of right anterolateral sixth rib and small reactive right pleural effusion. Patient's pain controlled with good IS and discharged.       REVIEW OF SYSTEMS   Pertinent positives and negatives are documented in the HPI. All other systems reviewed and are negative.     PAST MEDICAL HISTORY:  No past medical history on file.    PAST SURGICAL HISTORY:  Past Surgical History:   Procedure Laterality Date    APPENDECTOMY      CERVICAL SPINE SURGERY      CHOLECYSTECTOMY      FEMORAL ENDARTERECTOMY      LUNG CANCER SURGERY      PROSTATECTOMY             CURRENT MEDICATIONS:    aspirin 81 MG EC tablet  atorvastatin (LIPITOR) 80 MG tablet  benzonatate (TESSALON) 200 MG capsule  cetirizine (ZYRTEC) 10 MG tablet  cholecalciferol, vitamin D3, (VITAMIN D3) 25 mcg (1,000 unit) capsule  cilostazol (PLETAL) 100 MG tablet  clopidogrel (PLAVIX) 75 MG tablet  cyanocobalamin 1000 MCG tablet  HYDROcodone-acetaminophen (NORCO) 5-325 MG tablet  ipratropium - albuterol 0.5 mg/2.5 mg/3 mL (DUONEB) 0.5-2.5 (3) MG/3ML neb solution  losartan-hydrochlorothiazide (HYZAAR) 100-25 MG tablet  oxyBUTYnin ER (DITROPAN XL) 10 MG 24 hr tablet  pantoprazole (PROTONIX) 40 MG EC tablet  predniSONE (DELTASONE) 10 MG tablet  pregabalin (LYRICA) 150 MG capsule  propranolol (INDERAL) 20 MG tablet  traZODone (DESYREL) 50 MG tablet        ALLERGIES:  Allergies   Allergen Reactions    Adhesive Tape Unknown     Adhesive- pulls skin off        FAMILY HISTORY:  No family history on file.    SOCIAL HISTORY:   Social History     Socioeconomic History    Marital status:    Tobacco Use    Smoking status: Every Day     Current packs/day: 0.50     Types: Cigarettes     Passive exposure: Current    Smokeless tobacco: Never   Vaping Use    Vaping status: Never Used   Substance and Sexual Activity    Alcohol use: Not Currently    Drug use: Not Currently     Social Drivers of Health      Financial Resource Strain: Low Risk  (10/25/2023)    Financial Resource Strain     Within the past 12 months, have you or your family members you live with been unable to get utilities (heat, electricity) when it was really needed?: No   Food Insecurity: Low Risk  (10/25/2023)    Food Insecurity     Within the past 12 months, did you worry that your food would run out before you got money to buy more?: No     Within the past 12 months, did the food you bought just not last and you didn t have money to get more?: No   Transportation Needs: Low Risk  (10/25/2023)    Transportation Needs     Within the past 12 months, has lack of transportation kept you from medical appointments, getting your medicines, non-medical meetings or appointments, work, or from getting things that you need?: No   Interpersonal Safety: Low Risk  (11/12/2024)    Interpersonal Safety     Do you feel physically and emotionally safe where you currently live?: Yes     Within the past 12 months, have you been hit, slapped, kicked or otherwise physically hurt by someone?: No     Within the past 12 months, have you been humiliated or emotionally abused in other ways by your partner or ex-partner?: No   Housing Stability: Low Risk  (10/25/2023)    Housing Stability     Do you have housing? : Yes     Are you worried about losing your housing?: No       VITALS:  /80   Pulse 99   Temp 97.2  F (36.2  C) (Tympanic)   Resp 26   SpO2 94%     PHYSICAL EXAM    Physical Exam  Constitutional: Appears painful and is mildly tachypneic  HENT: Normocephalic, Atraumatic, Bilateral external ears normal, Oropharynx normal, mucous membranes moist, Nose normal. Neck- Normal range of motion, No tenderness, Supple, No stridor.    Eyes: PERRL, EOMI, Conjunctiva normal, No discharge.   Respiratory: Slight wheezing bilaterally, mild tachypnea, oxygen improving on oxymask, speaking in full sentences, no accessory muscle use, frequent cough  Cardiovascular: Mildly  tachycardic, Regular rhythm, No murmurs, No rubs, No gallops. 2+ radial pulses bilaterally. Tenderness along the right lateral chest wall with some mild ecchymosis noted.   GI: Bowel sounds normal, Soft, No tenderness, No masses, No rebound or guarding.   Musculoskeletal: 2+ DP pulses. No notable lower extremity edema.Good range of motion in all major joints.   Integument: Warm, Dry  Neurologic: Alert & oriented x 3, 5/5 strength in all 4 extremities bilaterally. Sensation intact to light touch in all 4 extremities and the face bilaterally. No focal deficits noted.   Psychiatric: Affect normal, Judgment normal, Mood normal. Cooperative.      LAB:  All pertinent labs reviewed and interpreted.  Results for orders placed or performed during the hospital encounter of 01/05/25   CT Chest Pulmonary Embolism w Contrast    Impression    IMPRESSION:    1.  No acute pulmonary embolism.  2.  Redemonstration of the oblique fracture of the right anterior sixth rib and development of small right posttraumatic pneumothorax.  3.  Unchanged findings of previous left upper lobectomy with left lung volume loss and scarring/pleural thickening along the posterior basal pleura.   XR Chest Port 1 View    Impression    IMPRESSION: New right apically oriented chest tube. No residual pneumothorax. The remaining cardiopulmonary findings are otherwise not significantly changed.   Comprehensive metabolic panel   Result Value Ref Range    Sodium 136 135 - 145 mmol/L    Potassium 4.0 3.4 - 5.3 mmol/L    Carbon Dioxide (CO2) 23 22 - 29 mmol/L    Anion Gap 15 7 - 15 mmol/L    Urea Nitrogen 27.9 (H) 8.0 - 23.0 mg/dL    Creatinine 0.95 0.67 - 1.17 mg/dL    GFR Estimate 82 >60 mL/min/1.73m2    Calcium 9.8 8.8 - 10.4 mg/dL    Chloride 98 98 - 107 mmol/L    Glucose 154 (H) 70 - 99 mg/dL    Alkaline Phosphatase 104 40 - 150 U/L    AST 31 0 - 45 U/L    ALT 21 0 - 70 U/L    Protein Total 7.1 6.4 - 8.3 g/dL    Albumin 4.4 3.5 - 5.2 g/dL    Bilirubin Total  0.4 <=1.2 mg/dL   Blood gas venous   Result Value Ref Range    pH Venous 7.27 (L) 7.32 - 7.43    pCO2 Venous 64 (H) 40 - 50 mm Hg    pO2 Venous 32 25 - 47 mm Hg    Bicarbonate Venous 30 (H) 21 - 28 mmol/L    Base Excess/Deficit Venous 1.1 -3.0 - 3.0 mmol/L    FIO2 41     Oxyhemoglobin Venous 53 (L) 70 - 75 %    O2 Sat, Venous 54.6 (L) 70.0 - 75.0 %   Lactic acid whole blood with 1x repeat in 2 hr when >2   Result Value Ref Range    Lactic Acid, Initial 1.5 0.7 - 2.0 mmol/L   Nt probnp inpatient (BNP)   Result Value Ref Range    N terminal Pro BNP Inpatient 783 0 - 1,800 pg/mL   Result Value Ref Range    Troponin T, High Sensitivity 540 (HH) <=22 ng/L   Influenza A/B, RSV and SARS-CoV2 PCR (COVID-19) Nasopharyngeal    Specimen: Nasopharyngeal; Swab   Result Value Ref Range    Influenza A PCR Negative Negative    Influenza B PCR Negative Negative    RSV PCR Negative Negative    SARS CoV2 PCR Negative Negative   CBC with platelets and differential   Result Value Ref Range    WBC Count 12.8 (H) 4.0 - 11.0 10e3/uL    RBC Count 3.71 (L) 4.40 - 5.90 10e6/uL    Hemoglobin 11.4 (L) 13.3 - 17.7 g/dL    Hematocrit 34.2 (L) 40.0 - 53.0 %    MCV 92 78 - 100 fL    MCH 30.7 26.5 - 33.0 pg    MCHC 33.3 31.5 - 36.5 g/dL    RDW 14.6 10.0 - 15.0 %    Platelet Count 244 150 - 450 10e3/uL    % Neutrophils 80 %    % Lymphocytes 10 %    % Monocytes 9 %    % Eosinophils 0 %    % Basophils 0 %    % Immature Granulocytes 1 %    NRBCs per 100 WBC 0 <1 /100    Absolute Neutrophils 10.2 (H) 1.6 - 8.3 10e3/uL    Absolute Lymphocytes 1.3 0.8 - 5.3 10e3/uL    Absolute Monocytes 1.1 0.0 - 1.3 10e3/uL    Absolute Eosinophils 0.1 0.0 - 0.7 10e3/uL    Absolute Basophils 0.0 0.0 - 0.2 10e3/uL    Absolute Immature Granulocytes 0.1 <=0.4 10e3/uL    Absolute NRBCs 0.0 10e3/uL   Extra Blue Top Tube   Result Value Ref Range    Hold Specimen JIC    Extra Red Top Tube   Result Value Ref Range    Hold Specimen JIC    Result Value Ref Range    Troponin T, High  Sensitivity 1,258 (HH) <=22 ng/L   ECG 12-LEAD WITH MUSE (L[facility])   Result Value Ref Range    Systolic Blood Pressure 156 mmHg    Diastolic Blood Pressure 93 mmHg    Ventricular Rate 100 BPM    Atrial Rate 100 BPM    IL Interval 140 ms    QRS Duration 138 ms     ms    QTc 472 ms    P Axis 62 degrees    R AXIS 209 degrees    T Axis 33 degrees    Interpretation ECG       Sinus rhythm with occasional Premature ventricular complexes  Right bundle branch block  Inferior infarct , age undetermined  Anterior infarct (cited on or before 02-Aug-2022)  Abnormal ECG  When compared with ECG of 27-Apr-2024 12:33,  Right bundle branch block is now Present  Inferior infarct is now Present  Questionable change in initial forces of Anterior leads  Confirmed by SEE ED PROVIDER NOTE FOR, ECG INTERPRETATION ([id number]),  NANNETTE CARDOZO ([id number]) on 1/5/2025 12:15:26 PM     ECG 12-LEAD WITH MUSE (LHE)   Result Value Ref Range    Systolic Blood Pressure 115 mmHg    Diastolic Blood Pressure 72 mmHg    Ventricular Rate 90 BPM    Atrial Rate 90 BPM    IL Interval 174 ms    QRS Duration 108 ms     ms    QTc 437 ms    P Axis 71 degrees    R AXIS 158 degrees    T Axis 69 degrees    Interpretation ECG       Sinus rhythm with sinus arrhythmia with occasional Premature ventricular complexes  Right ventricular hypertrophy with repolarization abnormality  Septal infarct Lateral infarct Inferior injury pattern  ** ** ACUTE MI / STEMI ** **  Abnormal ECG  When compared with ECG of 05-Jan-2025 12:03,  Significant changes have occurred  Confirmed by SEE ED PROVIDER NOTE FOR, ECG INTERPRETATION ([id number]),  NANNETTE CARDOZO ([id number]) on 1/5/2025 1:21:54 PM         RADIOLOGY:  Reviewed all pertinent imaging. Please see official radiology report.  XR Chest Port 1 View   Final Result   IMPRESSION: New right apically oriented chest tube. No residual pneumothorax. The remaining cardiopulmonary findings are otherwise not significantly changed.       CT Chest Pulmonary Embolism w Contrast   Final Result   IMPRESSION:      1.  No acute pulmonary embolism.   2.  Redemonstration of the oblique fracture of the right anterior sixth rib and development of small right posttraumatic pneumothorax.   3.  Unchanged findings of previous left upper lobectomy with left lung volume loss and scarring/pleural thickening along the posterior basal pleura.          EKG:    Performed at: 12:03:05 on 05-Jan-2025    Impression: Sinus rhythm with occasional Premature ventricular complexes. Right bundle branch block.  No sign of acute ischemia.    Rate: 100 BPM  Rhythm: Sinus rhythm with occasional Premature ventricular complexes.  Axis: 62 209 33  MO Interval: 140 ms  QRS Interval: 138 ms  QTc Interval: 366/472 ms  ST Changes: None  Comparison: When compared with ECG of 27-Apr-2024 12:33, Right bundle branch block is now Present.     Repeat EKG at 1319 unchanged from prior with sinus rhythm with right bundle branch block.  No sign of acute ischemia.    I have independently reviewed and interpreted the EKG(s) documented above.    PROCEDURES:     PROCEDURE: Tube Thoracostomy   TYPE: Pigtail catheter   INDICATIONS: It is medically necessary to place a chest tube for pneumothorax   PROCEDURE PROVIDER: Dr Sandhya Lane   CONSENT: Risks, benefits and alternatives were discussed with and Written consent was obtained from Patient.   TIME OUT: Universal protocol was followed. TIME OUT conducted just prior to starting procedure confirmed patient identity, site/side, procedure, patient position, and availability of correct equipment. Yes   SITE: Right midaxillary line   MEDICATIONS 5 mLs of 1% Lidocaine without epinephrine    Fentanyl, 50 mcg, IV   NOTE: Patient was placed in a semirecumbent position with the head of the bed at 30 degrees.  The right prepped with chlorhexidine. Patient was medicated as above. Incision was made laterally in the midaxilary line.  Blunt dissection up and over  the rib was preformed until access was obtained to the pleural cavity.  A  14' Kiswahili chest tube was placed and connected to Pleurovac on continuous suction. Tube was sutured in place with 2-0 silk, and all connections banded.    There was air rush, but no liquid drainage.     Post procedure X-ray showing complete reexpansion of the lung.     COMPLICATIONS: Patient tolerated procedure well, without complication                     I, Kevin Landrum, am serving as a scribe to document services personally performed by Sandhya Lane MD based on my observation and the provider's statements to me. I, Sandhya Lane MD, attest that Kevin Landrum is acting in a scribe capacity, has observed my performance of the services and has documented them in accordance with my direction.    Sandhya Lane MD  [facility] EMERGENCY ROOM  [address] [place] [address] [id number] [phone]     Sandhya Lane MD  01/05/25 0552

## 2025-01-05 NOTE — ED NOTES
Bed: [facility]  Expected date:   Expected time:   Means of arrival:   Comments:  Crash

## 2025-01-05 NOTE — PHARMACY-ADMISSION MEDICATION HISTORY
Pharmacist Admission Medication History    Admission medication history is complete. The information provided in this note is only as accurate as the sources available at the time of the update.    Information Source(s): Patient, Family member, and CareEverywhere/SureScripts via in-person    Pertinent Information:     Changes made to PTA medication list:  Added: None  Deleted: None  Changed: None    Allergies reviewed with patient and updates made in EHR: yes    Medication History Completed By: Alison Quesada RPH 1/5/2025 3:27 PM    PTA Med List   Medication Sig Note Last Dose/Taking    aspirin 81 MG EC tablet [ASPIRIN 81 MG EC TABLET] Take 81 mg by mouth daily.  1/4/2025 Morning    atorvastatin (LIPITOR) 80 MG tablet Take 1 tablet (80 mg) by mouth at bedtime  1/4/2025 Bedtime    benzonatate (TESSALON) 200 MG capsule Take 1 capsule (200 mg) by mouth 3 times daily as needed for cough.  1/4/2025 Bedtime    cetirizine (ZYRTEC) 10 MG tablet Take 10 mg by mouth every evening  1/4/2025 Bedtime    cholecalciferol, vitamin D3, (VITAMIN D3) 25 mcg (1,000 unit) capsule [CHOLECALCIFEROL, VITAMIN D3, (VITAMIN D3) 25 MCG (1,000 UNIT) CAPSULE] Take 1,000 Units by mouth daily.  1/4/2025 Morning    cilostazol (PLETAL) 100 MG tablet Take 1 tablet (100 mg) by mouth 2 times daily  1/4/2025 Bedtime    clopidogrel (PLAVIX) 75 MG tablet TAKE 1 TABLET BY MOUTH EVERY DAY  1/4/2025 Morning    cyanocobalamin 1000 MCG tablet [CYANOCOBALAMIN 1000 MCG TABLET] Take 1,000 mcg by mouth daily.  1/4/2025 Morning    HYDROcodone-acetaminophen (NORCO) 5-325 MG tablet Take 1 tablet by mouth every 6 hours as needed.  1/4/2025 Bedtime    ipratropium - albuterol 0.5 mg/2.5 mg/3 mL (DUONEB) 0.5-2.5 (3) MG/3ML neb solution Take 1 vial by nebulization daily  1/4/2025 Morning    losartan-hydrochlorothiazide (HYZAAR) 100-25 MG tablet Take 1 tablet by mouth daily  1/4/2025 Morning    oxyBUTYnin ER (DITROPAN XL) 10 MG 24 hr tablet Take 1 tablet (10 mg) by  mouth daily  1/4/2025 Morning    pantoprazole (PROTONIX) 40 MG EC tablet TAKE 1 TABLET BY MOUTH ONCE DAILY  1/4/2025 Morning    predniSONE (DELTASONE) 10 MG tablet 4 tabs for 3 days. 3 tabs for 3 days. 2 tabs for 3 days. 1 tab for 3 days. 1/5/2025: Start 12/29, missed Sat dose, still taking 3mg daily 1/3/2025 Morning    pregabalin (LYRICA) 150 MG capsule Take 150 mg by mouth 3 times daily.  1/4/2025 Bedtime    propranolol (INDERAL) 20 MG tablet TAKE 1 TABLET BY MOUTH TWICE A DAY  1/4/2025 Bedtime    traZODone (DESYREL) 50 MG tablet Take 1 tablet (50 mg) by mouth at bedtime  1/4/2025 Bedtime

## 2025-01-05 NOTE — ED TRIAGE NOTES
Arrived from home with EMS. History of lung cancer and COPD not on chemo currently. Patient developed shortness of breath starting last night. Usually not on supplemental oxygen. On arrival oxygen 86% on room air. Family reports he has been having congested productive cough for the last few day. Currently needing 7 liters of oxygen.  Noah Paredes RN  1/5/2025  12:04 PM       Triage Assessment (Adult)       Row Name 01/05/25 1200          Triage Assessment    Airway WDL X;all     Additional Documentation Breath Sounds (Group)        Respiratory WDL    Respiratory WDL X;rhythm/pattern;cough     Rhythm/Pattern, Respiratory dyspnea upon exertion     Cough Frequency frequent     Cough Type productive;congested        Breath Sounds    Breath Sounds All Fields        Skin Circulation/Temperature WDL    Skin Circulation/Temperature WDL X        Cardiac WDL    Cardiac WDL X;rhythm     Pulse Rate & Regularity tachycardic        Peripheral/Neurovascular WDL    Peripheral Neurovascular WDL WDL        Cognitive/Neuro/Behavioral WDL    Cognitive/Neuro/Behavioral WDL WDL

## 2025-01-06 ENCOUNTER — APPOINTMENT (OUTPATIENT)
Dept: RADIOLOGY | Facility: CLINIC | Age: 80
DRG: 199 | End: 2025-01-06
Payer: MEDICARE

## 2025-01-06 ENCOUNTER — APPOINTMENT (OUTPATIENT)
Dept: CARDIOLOGY | Facility: CLINIC | Age: 80
DRG: 199 | End: 2025-01-06
Payer: MEDICARE

## 2025-01-06 LAB
ANION GAP SERPL CALCULATED.3IONS-SCNC: 11 MMOL/L (ref 7–15)
BUN SERPL-MCNC: 27.9 MG/DL (ref 8–23)
CALCIUM SERPL-MCNC: 9.4 MG/DL (ref 8.8–10.4)
CHLORIDE SERPL-SCNC: 100 MMOL/L (ref 98–107)
CREAT SERPL-MCNC: 0.93 MG/DL (ref 0.67–1.17)
EGFRCR SERPLBLD CKD-EPI 2021: 84 ML/MIN/1.73M2
ERYTHROCYTE [DISTWIDTH] IN BLOOD BY AUTOMATED COUNT: 14.7 % (ref 10–15)
GLUCOSE SERPL-MCNC: 129 MG/DL (ref 70–99)
HCO3 SERPL-SCNC: 27 MMOL/L (ref 22–29)
HCT VFR BLD AUTO: 34.8 % (ref 40–53)
HGB BLD-MCNC: 11.4 G/DL (ref 13.3–17.7)
HGB BLD-MCNC: 11.5 G/DL (ref 13.3–17.7)
LVEF ECHO: NORMAL
MCH RBC QN AUTO: 30.3 PG (ref 26.5–33)
MCHC RBC AUTO-ENTMCNC: 33 G/DL (ref 31.5–36.5)
MCV RBC AUTO: 92 FL (ref 78–100)
PLATELET # BLD AUTO: 194 10E3/UL (ref 150–450)
POTASSIUM SERPL-SCNC: 4.6 MMOL/L (ref 3.4–5.3)
RBC # BLD AUTO: 3.79 10E6/UL (ref 4.4–5.9)
SODIUM SERPL-SCNC: 138 MMOL/L (ref 135–145)
TROPONIN T SERPL HS-MCNC: 736 NG/L
TROPONIN T SERPL HS-MCNC: 838 NG/L
UFH PPP CHRO-ACNC: 0.07 IU/ML
UFH PPP CHRO-ACNC: 0.77 IU/ML
WBC # BLD AUTO: 7.7 10E3/UL (ref 4–11)

## 2025-01-06 PROCEDURE — 99223 1ST HOSP IP/OBS HIGH 75: CPT | Mod: GC | Performed by: INTERNAL MEDICINE

## 2025-01-06 PROCEDURE — 36415 COLL VENOUS BLD VENIPUNCTURE: CPT | Performed by: STUDENT IN AN ORGANIZED HEALTH CARE EDUCATION/TRAINING PROGRAM

## 2025-01-06 PROCEDURE — 999N000157 HC STATISTIC RCP TIME EA 10 MIN

## 2025-01-06 PROCEDURE — 99233 SBSQ HOSP IP/OBS HIGH 50: CPT | Performed by: STUDENT IN AN ORGANIZED HEALTH CARE EDUCATION/TRAINING PROGRAM

## 2025-01-06 PROCEDURE — 99222 1ST HOSP IP/OBS MODERATE 55: CPT

## 2025-01-06 PROCEDURE — 94640 AIRWAY INHALATION TREATMENT: CPT

## 2025-01-06 PROCEDURE — 85018 HEMOGLOBIN: CPT | Performed by: STUDENT IN AN ORGANIZED HEALTH CARE EDUCATION/TRAINING PROGRAM

## 2025-01-06 PROCEDURE — 93306 TTE W/DOPPLER COMPLETE: CPT | Mod: 26 | Performed by: INTERNAL MEDICINE

## 2025-01-06 PROCEDURE — 71045 X-RAY EXAM CHEST 1 VIEW: CPT

## 2025-01-06 PROCEDURE — 85027 COMPLETE CBC AUTOMATED: CPT | Performed by: STUDENT IN AN ORGANIZED HEALTH CARE EDUCATION/TRAINING PROGRAM

## 2025-01-06 PROCEDURE — C8929 TTE W OR WO FOL WCON,DOPPLER: HCPCS

## 2025-01-06 PROCEDURE — 85520 HEPARIN ASSAY: CPT | Performed by: STUDENT IN AN ORGANIZED HEALTH CARE EDUCATION/TRAINING PROGRAM

## 2025-01-06 PROCEDURE — 250N000013 HC RX MED GY IP 250 OP 250 PS 637: Performed by: STUDENT IN AN ORGANIZED HEALTH CARE EDUCATION/TRAINING PROGRAM

## 2025-01-06 PROCEDURE — 80048 BASIC METABOLIC PNL TOTAL CA: CPT | Performed by: STUDENT IN AN ORGANIZED HEALTH CARE EDUCATION/TRAINING PROGRAM

## 2025-01-06 PROCEDURE — 255N000002 HC RX 255 OP 636

## 2025-01-06 PROCEDURE — 84484 ASSAY OF TROPONIN QUANT: CPT | Performed by: STUDENT IN AN ORGANIZED HEALTH CARE EDUCATION/TRAINING PROGRAM

## 2025-01-06 PROCEDURE — 82310 ASSAY OF CALCIUM: CPT | Performed by: STUDENT IN AN ORGANIZED HEALTH CARE EDUCATION/TRAINING PROGRAM

## 2025-01-06 PROCEDURE — 250N000013 HC RX MED GY IP 250 OP 250 PS 637: Performed by: INTERNAL MEDICINE

## 2025-01-06 PROCEDURE — 250N000009 HC RX 250: Performed by: STUDENT IN AN ORGANIZED HEALTH CARE EDUCATION/TRAINING PROGRAM

## 2025-01-06 PROCEDURE — 120N000004 HC R&B MS OVERFLOW

## 2025-01-06 RX ADMIN — HYDROCODONE BITARTRATE AND ACETAMINOPHEN 1 TABLET: 5; 325 TABLET ORAL at 07:49

## 2025-01-06 RX ADMIN — IPRATROPIUM BROMIDE AND ALBUTEROL SULFATE 3 ML: .5; 3 SOLUTION RESPIRATORY (INHALATION) at 08:11

## 2025-01-06 RX ADMIN — PANTOPRAZOLE SODIUM 40 MG: 40 TABLET, DELAYED RELEASE ORAL at 07:46

## 2025-01-06 RX ADMIN — ACETAMINOPHEN 650 MG: 325 TABLET ORAL at 04:17

## 2025-01-06 RX ADMIN — ATORVASTATIN CALCIUM 80 MG: 40 TABLET, FILM COATED ORAL at 21:18

## 2025-01-06 RX ADMIN — HYDROCODONE BITARTRATE AND ACETAMINOPHEN 1 TABLET: 5; 325 TABLET ORAL at 13:17

## 2025-01-06 RX ADMIN — HYDROCODONE BITARTRATE AND ACETAMINOPHEN 1 TABLET: 5; 325 TABLET ORAL at 22:17

## 2025-01-06 RX ADMIN — HYDROCODONE BITARTRATE AND ACETAMINOPHEN 1 TABLET: 5; 325 TABLET ORAL at 18:08

## 2025-01-06 RX ADMIN — PREGABALIN 150 MG: 75 CAPSULE ORAL at 21:18

## 2025-01-06 RX ADMIN — HYDROCODONE BITARTRATE AND ACETAMINOPHEN 1 TABLET: 5; 325 TABLET ORAL at 00:16

## 2025-01-06 RX ADMIN — PREGABALIN 150 MG: 75 CAPSULE ORAL at 07:46

## 2025-01-06 RX ADMIN — PERFLUTREN 2 ML: 6.52 INJECTION, SUSPENSION INTRAVENOUS at 13:04

## 2025-01-06 RX ADMIN — CETIRIZINE HYDROCHLORIDE 10 MG: 10 TABLET, FILM COATED ORAL at 21:18

## 2025-01-06 RX ADMIN — TRAZODONE HYDROCHLORIDE 50 MG: 50 TABLET ORAL at 21:18

## 2025-01-06 RX ADMIN — PREGABALIN 150 MG: 75 CAPSULE ORAL at 13:17

## 2025-01-06 RX ADMIN — BENZONATATE 200 MG: 100 CAPSULE ORAL at 08:52

## 2025-01-06 RX ADMIN — ACETAMINOPHEN 650 MG: 325 TABLET ORAL at 10:42

## 2025-01-06 RX ADMIN — BENZONATATE 200 MG: 100 CAPSULE ORAL at 22:17

## 2025-01-06 RX ADMIN — BENZONATATE 200 MG: 100 CAPSULE ORAL at 03:48

## 2025-01-06 ASSESSMENT — ACTIVITIES OF DAILY LIVING (ADL)
ADLS_ACUITY_SCORE: 63
ADLS_ACUITY_SCORE: 46
ADLS_ACUITY_SCORE: 46
ADLS_ACUITY_SCORE: 63
ADLS_ACUITY_SCORE: 43
ADLS_ACUITY_SCORE: 43
ADLS_ACUITY_SCORE: 59
ADLS_ACUITY_SCORE: 63
ADLS_ACUITY_SCORE: 46
ADLS_ACUITY_SCORE: 46
ADLS_ACUITY_SCORE: 63
ADLS_ACUITY_SCORE: 43
ADLS_ACUITY_SCORE: 63
DEPENDENT_IADLS:: CLEANING;COOKING;LAUNDRY
ADLS_ACUITY_SCORE: 63
ADLS_ACUITY_SCORE: 46
ADLS_ACUITY_SCORE: 59
ADLS_ACUITY_SCORE: 46
ADLS_ACUITY_SCORE: 46
ADLS_ACUITY_SCORE: 59
ADLS_ACUITY_SCORE: 63
ADLS_ACUITY_SCORE: 46

## 2025-01-06 NOTE — ED NOTES
AIDET performed, white board updated for rounding. Patient updated on plan of care. Patient's pain assessed. Call light within reach, bed in low position, side rails up.   Pain 4/10, states this is tolerable. Repositioned in bed for comfort.  Pt currently on 4 LPM NC.  Oxygen 92-94%.  Pt has a congested sounding cough.
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
## 2025-01-06 NOTE — CONSULTS
[facility] [address], [place] [address]   [email or web address]   OFFICE: [phone]     CARDIOLOGY INPATIENT CONSULT NOTE     Impression and Plan     Assessment:  Severe coronary artery calcification - noted on CT PE (report below)  Elevated troponin - down trending - believe combination of pneumothorax and calcification likely underlying cause  Acute traumatic pneumothorax s/p chest tube placement  COPD    Plan:  Discontinue troponin draws  Echocardiogram - assess cardiac function in setting of severe coronary artery calcification, will consider catheterization based on results  Heparin paused with continued bleeding noted from chest tube site    Primary Cardiologist: None    History of Present Illness      Mr. Ubaldo Ramos is a 79 year old male with COPD, hx non-small cell lung CA, hx prostate CA, admitted after developing shortness of breath found to have a right pneumothorax in setting of rib fracture on 1/2. Troponins were drawn while in ED, trended upwards initially (540 to 1,258) and have since been down trending. CT PE done while in ED demonstrated severe coronary artery calcification. Believe the combination of calcification with pneumothorax likely underlying cause of troponin elevation.      Other than noted above, Mr. Ramos denies any pressure/tightness, shortness of breath at rest or with exertion that is different from baseline in setting of COPD, light headedness/dizziness, pre-syncope, or syncope.        Review of Systems:  Further review of systems is otherwise negative/noncontributory (based on review of medical record (admission H&P) and 13 point review of systems reviewed. Pertinent positives noted).    Cardiac Diagnostics       Telemetry (personally reviewed): Sinus rhythm      Medical History  Surgical History Family History Social History   No past medical history on file.  Past Surgical History:   Procedure Laterality Date     APPENDECTOMY      CERVICAL SPINE SURGERY      CHOLECYSTECTOMY      FEMORAL ENDARTERECTOMY      LUNG CANCER SURGERY      PROSTATECTOMY       No family history on file.        Social History     Socioeconomic History    Marital status:      Spouse name: Not on file    Number of children: Not on file    Years of education: Not on file    Highest education level: Not on file   Occupational History    Not on file   Tobacco Use    Smoking status: Every Day     Current packs/day: 0.50     Types: Cigarettes     Passive exposure: Current    Smokeless tobacco: Never   Vaping Use    Vaping status: Never Used   Substance and Sexual Activity    Alcohol use: Not Currently    Drug use: Not Currently    Sexual activity: Not on file   Other Topics Concern    Not on file   Social History Narrative    Not on file     Social Drivers of Health     Financial Resource Strain: Low Risk  (10/25/2023)    Financial Resource Strain     Within the past 12 months, have you or your family members you live with been unable to get utilities (heat, electricity) when it was really needed?: No   Food Insecurity: Low Risk  (10/25/2023)    Food Insecurity     Within the past 12 months, did you worry that your food would run out before you got money to buy more?: No     Within the past 12 months, did the food you bought just not last and you didn t have money to get more?: No   Transportation Needs: Low Risk  (10/25/2023)    Transportation Needs     Within the past 12 months, has lack of transportation kept you from medical appointments, getting your medicines, non-medical meetings or appointments, work, or from getting things that you need?: No   Physical Activity: Not on file   Stress: Not on file   Social Connections: Not on file   Interpersonal Safety: Low Risk  (11/12/2024)    Interpersonal Safety     Do you feel physically and emotionally safe where you currently live?: Yes     Within the past 12 months, have you been hit, slapped, kicked or  otherwise physically hurt by someone?: No     Within the past 12 months, have you been humiliated or emotionally abused in other ways by your partner or ex-partner?: No   Housing Stability: Low Risk  (10/25/2023)    Housing Stability     Do you have housing? : Yes     Are you worried about losing your housing?: No             Physical Examination   VITALS: /64   Pulse 77   Temp 98  F (36.7  C) (Oral)   Resp (!) 34   SpO2 100%   BMI: There is no height or weight on file to calculate BMI.  Wt Readings from Last 3 Encounters:   01/02/25 75.7 kg (166 lb 12.8 oz)   12/27/24 76.6 kg (168 lb 12.8 oz)   07/17/24 77.1 kg (169 lb 14.4 oz)       Intake/Output Summary (Last 24 hours) at 1/6/2025 0917  Last data filed at 1/6/2025 0400  Gross per 24 hour   Intake --   Output 1 ml   Net -1 ml       General: pleasant male. No acute distress.   HENT: external ears normal. Nares patent. Mucous membranes moist.  Eyes: perrla, extraocular muscles intact. No scleral icterus.   Neck: No JVD  COR:  regular rate and rhythm, No murmurs, rubs, or gallops  Chest: Right sided chest tube in place, oozing bleeding around insertion.  Abd: nondistended, BS present  Extrem: No edema         Non-cardiac Imaging Studies Reviewed      EXAM: CT CHEST PULMONARY EMBOLISM W CONTRAST  LOCATION: [facility]  DATE: 1/5/2025     INDICATION: recent rib fracture, now with hypoxia, cough, hx of lung cancer, elevated trop, eval for PE, etc  COMPARISON: CT of the chest 1/2/2025  TECHNIQUE: CT chest pulmonary angiogram during arterial phase injection of IV contrast. Multiplanar reformats and MIP reconstructions were performed. Dose reduction techniques were used.   CONTRAST: 75ml Isovue 370     FINDINGS:  ANGIOGRAM CHEST: Pulmonary arteries are normal caliber and negative for pulmonary emboli. Suboptimal opacification of the aortic lumen, limits assessment for a communicating aortic dissection. Nonaneurysmal thoracic aorta.  Unchanged great vessel, aortic   arch and descending aorta moderate atheromatous calcifications. No periaortic inflammatory stranding.     LUNGS AND PLEURA: New small right pneumothorax. There are is a cluster of subpleural bulla present along the anterolateral costal pleural the right upper lobe (series 7, image 59) an additional subpleural bulla in the right apex. New atelectasis along   the right major fissure and in the right posterior costophrenic sulcus with development of trace pleural fluid. Status post left upper lobectomy. Unchanged left lung volume loss with band of opacity in the left lower lobe towards the posterior   costophrenic sulcus and adjacent pleural thickening.     MEDIASTINUM: The left ventricle is mildly enlarged. Other cardiac chambers are normal in size. No pericardial effusion. No enlarged mediastinal or hilar lymph nodes. The thyroid gland is normal. Esophagus is decompressed.     CORONARY ARTERY CALCIFICATION: Severe.     UPPER ABDOMEN: There are no actionable findings in the imaged upper abdomen.     MUSCULOSKELETAL: There is a new oblique fracture of the right anterolateral sixth rib (series 7, image 162).                                                                      IMPRESSION:     1.  No acute pulmonary embolism.  2.  Redemonstration of the oblique fracture of the right anterior sixth rib and development of small right posttraumatic pneumothorax.  3.  Unchanged findings of previous left upper lobectomy with left lung volume loss and scarring/pleural thickening along the posterior basal pleura.             Lab Results Reviewed    Chemistry/lipid CBC Cardiac Enzymes/BNP/TSH/INR   Recent Labs   Lab Test 03/19/24  1513   CHOL 124   HDL 34*   LDL 30   TRIG 301*     Recent Labs   Lab Test 03/19/24  1513 11/16/22  1055 06/24/21  1420   LDL 30 59 49     Recent Labs   Lab Test 01/06/25  0558      POTASSIUM 4.6   CHLORIDE 100   CO2 27   *   BUN 27.9*   CR 0.93  "  GFRESTIMATED 84   RUTHANN 9.4     Recent Labs   Lab Test 01/06/25  0558 01/05/25  1237 05/07/24  0943   CR 0.93 0.95 1.07     No results for input(s): \"A1C\" in the last 53071 hours.       Recent Labs   Lab Test 01/06/25  0558   WBC 7.7   HGB 11.5*   HCT 34.8*   MCV 92        Recent Labs   Lab Test 01/06/25  0558 01/05/25  1449 05/07/24  0943   HGB 11.5* 11.4* 11.5*    Recent Labs   Lab Test 07/31/22  0438 07/31/22  0215   TROPONINI 0.02 0.02     Recent Labs   Lab Test 01/05/25  1237 04/27/24  1307 07/31/22  0145   BNP  --   --  74   NTBNPI 783 562  --      Recent Labs   Lab Test 04/27/24  1307   TSH 0.46     Recent Labs   Lab Test 04/27/24  1307 07/31/22  0215 03/03/22  1121   INR 1.02 1.09 1.09           Current Inpatient Scheduled Medications   Scheduled Meds:  Current Facility-Administered Medications   Medication Dose Route Frequency Provider Last Rate Last Admin    [Held by provider] aspirin EC tablet 81 mg  81 mg Oral Daily Anthony Paris MD        atorvastatin (LIPITOR) tablet 80 mg  80 mg Oral At Bedtime Anthony Paris MD   80 mg at 01/05/25 2252    cetirizine (zyrTEC) tablet 10 mg  10 mg Oral QPM Anthony Paris MD   10 mg at 01/05/25 1940    [Held by provider] cilostazol (PLETAL) tablet 100 mg  100 mg Oral BID Anthony Paris MD        [Held by provider] clopidogrel (PLAVIX) tablet 75 mg  75 mg Oral Daily Anthony Pairs MD        ipratropium - albuterol 0.5 mg/2.5 mg/3 mL (DUONEB) neb solution 3 mL  1 vial Nebulization Daily Anthony Paris MD   3 mL at 01/06/25 0811    [Held by provider] losartan-hydrochlorothiazide (HYZAAR) 100-25 MG per tablet 1 tablet  1 tablet Oral Daily Anthony Paris MD        [Held by provider] oxyBUTYnin ER (DITROPAN XL) 24 hr tablet 10 mg  10 mg Oral Daily Anthony Paris MD        pantoprazole (PROTONIX) EC tablet 40 mg  40 mg Oral Daily Anthony Paris MD   40 mg at 01/06/25 0746    [Held by provider] predniSONE (DELTASONE) half-tab 3 mg  3 mg Oral Daily Anthony Paris MD     "    pregabalin (LYRICA) capsule 150 mg  150 mg Oral TID Anthony Paris MD   150 mg at 01/06/25 0746    [Held by provider] propranolol (INDERAL) tablet 20 mg  20 mg Oral BID Anthony Paris MD        sodium chloride (PF) 0.9% PF flush 3 mL  3 mL Intracatheter Q8H Anthony Paris MD   3 mL at 01/06/25 0351    traZODone (DESYREL) tablet 50 mg  50 mg Oral At Bedtime Anthony Paris MD   50 mg at 01/05/25 2252     Continuous Infusions:  Current Facility-Administered Medications   Medication Dose Route Frequency Provider Last Rate Last Admin    heparin 25,000 units in 0.45% NaCl 250 mL ANTICOAGULANT infusion  0-5,000 Units/hr Intravenous Continuous Anthony Paris MD   Paused at 01/06/25 0914    Patient is already receiving anticoagulation with heparin, enoxaparin (LOVENOX), warfarin (COUMADIN)  or other anticoagulant medication   Does not apply Continuous PRN Anthony Paris MD           Current Outpatient Medications   Medication Sig Dispense Refill    aspirin 81 MG EC tablet [ASPIRIN 81 MG EC TABLET] Take 81 mg by mouth daily.      atorvastatin (LIPITOR) 80 MG tablet Take 1 tablet (80 mg) by mouth at bedtime 90 tablet 3    benzonatate (TESSALON) 200 MG capsule Take 1 capsule (200 mg) by mouth 3 times daily as needed for cough. 20 capsule 0    cetirizine (ZYRTEC) 10 MG tablet Take 10 mg by mouth every evening      cholecalciferol, vitamin D3, (VITAMIN D3) 25 mcg (1,000 unit) capsule [CHOLECALCIFEROL, VITAMIN D3, (VITAMIN D3) 25 MCG (1,000 UNIT) CAPSULE] Take 1,000 Units by mouth daily.      cilostazol (PLETAL) 100 MG tablet Take 1 tablet (100 mg) by mouth 2 times daily 180 tablet 3    clopidogrel (PLAVIX) 75 MG tablet TAKE 1 TABLET BY MOUTH EVERY DAY 90 tablet 3    cyanocobalamin 1000 MCG tablet [CYANOCOBALAMIN 1000 MCG TABLET] Take 1,000 mcg by mouth daily.      ipratropium - albuterol 0.5 mg/2.5 mg/3 mL (DUONEB) 0.5-2.5 (3) MG/3ML neb solution Take 1 vial by nebulization daily      losartan-hydrochlorothiazide (HYZAAR)  100-25 MG tablet Take 1 tablet by mouth daily 90 tablet 3    oxyBUTYnin ER (DITROPAN XL) 10 MG 24 hr tablet Take 1 tablet (10 mg) by mouth daily 90 tablet 3    pantoprazole (PROTONIX) 40 MG EC tablet TAKE 1 TABLET BY MOUTH ONCE DAILY 90 tablet 2    predniSONE (DELTASONE) 10 MG tablet 4 tabs for 3 days. 3 tabs for 3 days. 2 tabs for 3 days. 1 tab for 3 days. 30 tablet 0    pregabalin (LYRICA) 150 MG capsule Take 150 mg by mouth 3 times daily.      propranolol (INDERAL) 20 MG tablet TAKE 1 TABLET BY MOUTH TWICE A  tablet 2    traZODone (DESYREL) 50 MG tablet Take 1 tablet (50 mg) by mouth at bedtime 180 tablet 3          Medications Prior to Admission   Prior to Admission medications    Medication Sig Start Date End Date Taking? Authorizing Provider   aspirin 81 MG EC tablet [ASPIRIN 81 MG EC TABLET] Take 81 mg by mouth daily. 6/24/21  Yes Provider, Historical   atorvastatin (LIPITOR) 80 MG tablet Take 1 tablet (80 mg) by mouth at bedtime 3/19/24  Yes John Oconnor NP   benzonatate (TESSALON) 200 MG capsule Take 1 capsule (200 mg) by mouth 3 times daily as needed for cough. 12/27/24  Yes John Oconnor NP   cetirizine (ZYRTEC) 10 MG tablet Take 10 mg by mouth every evening   Yes Unknown, Entered By History   cholecalciferol, vitamin D3, (VITAMIN D3) 25 mcg (1,000 unit) capsule [CHOLECALCIFEROL, VITAMIN D3, (VITAMIN D3) 25 MCG (1,000 UNIT) CAPSULE] Take 1,000 Units by mouth daily. 6/24/21  Yes Provider, Historical   cilostazol (PLETAL) 100 MG tablet Take 1 tablet (100 mg) by mouth 2 times daily 2/20/24  Yes Miguel Callejas MD   clopidogrel (PLAVIX) 75 MG tablet TAKE 1 TABLET BY MOUTH EVERY DAY 9/29/24  Yes John Oconnor NP   cyanocobalamin 1000 MCG tablet [CYANOCOBALAMIN 1000 MCG TABLET] Take 1,000 mcg by mouth daily. 6/24/21  Yes Provider, Historical   HYDROcodone-acetaminophen (NORCO) 5-325 MG tablet Take 1 tablet by mouth every 6 hours as needed. 1/2/25 1/5/25 Yes Home Gilbert MD   ipratropium -  "albuterol 0.5 mg/2.5 mg/3 mL (DUONEB) 0.5-2.5 (3) MG/3ML neb solution Take 1 vial by nebulization daily   Yes Unknown, Entered By History   losartan-hydrochlorothiazide (HYZAAR) 100-25 MG tablet Take 1 tablet by mouth daily 3/19/24  Yes John Oconnor NP   oxyBUTYnin ER (DITROPAN XL) 10 MG 24 hr tablet Take 1 tablet (10 mg) by mouth daily 3/19/24  Yes John Oconnor NP   pantoprazole (PROTONIX) 40 MG EC tablet TAKE 1 TABLET BY MOUTH ONCE DAILY 9/27/24  Yes John Oconnor NP   predniSONE (DELTASONE) 10 MG tablet 4 tabs for 3 days. 3 tabs for 3 days. 2 tabs for 3 days. 1 tab for 3 days. 12/29/24  Yes John Oconnor NP   pregabalin (LYRICA) 150 MG capsule Take 150 mg by mouth 3 times daily.   Yes Unknown, Entered By History   propranolol (INDERAL) 20 MG tablet TAKE 1 TABLET BY MOUTH TWICE A DAY 6/17/24  Yes John Oconnor NP   traZODone (DESYREL) 50 MG tablet Take 1 tablet (50 mg) by mouth at bedtime 7/17/24  Yes John Oconnor NP Jacob Roskam, DO PGY-2  [facility]  01/06/25          Clinically Significant Risk Factors Present on Admission                 # Drug Induced Platelet Defect: home medication list includes an antiplatelet medication   # Hypertension: Noted on problem list     # Acute Hypercapnic Respiratory Failure: based on venous blood gas results.  Continue supplemental oxygen and ventilatory support as indicated.        # Overweight: Estimated body mass index is 27.76 kg/m  as calculated from the following:    Height as of 1/2/25: 1.651 m (5' 5\").    Weight as of 1/2/25: 75.7 kg (166 lb 12.8 oz).             History of non-small cell lung cancer and prostate adenocarcinoma, not currently receiving any treatment, COPD,   "

## 2025-01-06 NOTE — CONSULTS
Care Management Initial Consult    General Information  Assessment completed with: Patient,    Type of CM/SW Visit: Initial Assessment    Primary Care Provider verified and updated as needed: Yes   Readmission within the last 30 days: no previous admission in last 30 days      Reason for Consult: other (see comments) (elevated)  Advance Care Planning: Advance Care Planning Reviewed: verified with patient          Communication Assessment  Patient's communication style: spoken language (English or Bilingual)    Hearing Difficulty or Deaf: no   Wear Glasses or Blind: yes    Cognitive  Cognitive/Neuro/Behavioral: WDL                      Living Environment:   People in home: spouse  Maribel  Current living Arrangements: independent living facility ([facility])      Able to return to prior arrangements: yes       Family/Social Support:  Care provided by: self  Provides care for: no one  Marital Status:   Support system: Wife, Children  Maribel       Description of Support System: Supportive, Involved         Current Resources:   Patient receiving home care services: No        Community Resources: None  Equipment currently used at home: cane, straight, walker, rolling, grab bar, tub/shower (electric scooter)  Supplies currently used at home: None    Employment/Financial:  Employment Status: retired        Financial Concerns: none   Referral to Financial Worker: No       Does the patient's insurance plan have a 3 day qualifying hospital stay waiver?  Yes     Which insurance plan 3 day waiver is available? [employer]    Will the waiver be used for post-acute placement? Undetermined at this time    Lifestyle & Psychosocial Needs:  Social Drivers of Health     Food Insecurity: Low Risk  (1/6/2025)    Food Insecurity     Within the past 12 months, did you worry that your food would run out before you got money to buy more?: No     Within the past 12 months, did the food you bought just not last and you didn t have  money to get more?: No   Depression: Not at risk (3/19/2024)    PHQ-2     PHQ-2 Score: 0   Housing Stability: Low Risk  (1/6/2025)    Housing Stability     Do you have housing? : Yes     Are you worried about losing your housing?: No   Tobacco Use: High Risk (12/27/2024)    Patient History     Smoking Tobacco Use: Every Day     Smokeless Tobacco Use: Never     Passive Exposure: Current   Financial Resource Strain: Low Risk  (1/6/2025)    Financial Resource Strain     Within the past 12 months, have you or your family members you live with been unable to get utilities (heat, electricity) when it was really needed?: No   Alcohol Use: Not on file   Transportation Needs: Low Risk  (1/6/2025)    Transportation Needs     Within the past 12 months, has lack of transportation kept you from medical appointments, getting your medicines, non-medical meetings or appointments, work, or from getting things that you need?: No   Physical Activity: Not on file   Interpersonal Safety: Low Risk  (1/6/2025)    Interpersonal Safety     Do you feel physically and emotionally safe where you currently live?: Yes     Within the past 12 months, have you been hit, slapped, kicked or otherwise physically hurt by someone?: No     Within the past 12 months, have you been humiliated or emotionally abused in other ways by your partner or ex-partner?: No   Stress: Not on file   Social Connections: Not on file   Health Literacy: Not on file       Functional Status:  Prior to admission patient needed assistance:   Dependent ADLs:: Independent  Dependent IADLs:: Cleaning, Cooking, Laundry       Mental Health Status:  Mental Health Status: No Current Concerns       Chemical Dependency Status:  Chemical Dependency Status: No Current Concerns             Values/Beliefs:  Spiritual, Cultural Beliefs, Jehovah's witness Practices, Values that affect care: no               Discussed  Partnership in Safe Discharge Planning  document with patient/family:  Yes    Additional Information:  Uabldo Ramos is a(n) 79 year old year old male who presented with Elevated troponin [R79.89]  Pneumothorax on right [J93.9]  Acute respiratory failure with hypoxia (H) [J96.01]  Closed fracture of one rib of right side, initial encounter [S22.31XA].     CM spoke with pt at bedside. Introduced self and role. Patient assisted with completing assessment. Patient lives in a(n) independent living facility ([facility]) with his wife . Patient receives assistance with cooking, cleaning, and laundry. Anticipated that patient will discharge to home with family.    Pt lives at [facility]. Wife to transport upon discharge.     No PT/OT consults.     Pt denies any further needs at this time.    Contacts:  Extended Emergency Contact Information  Primary Emergency Contact: Maribel Ramos  Mobile Phone: [phone]  Relation: Spouse    No further care management intervention anticipated at this time.  Please re-consult if further needs arise.  Care management signing off.        Kenny Hernandez RN
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
## 2025-01-06 NOTE — ED NOTES
Pt reports pain has improved as well as the frequency of his cough.  Resting with eyes closed on stretcher, easily arousable.

## 2025-01-06 NOTE — PROVIDER NOTIFICATION
Patient bleeding from the chest tube site. All MD teams notified. Cardiology paused heparin at 0915. Waiting to hear from surgery. More pressure tape placed over chest tube site.

## 2025-01-06 NOTE — ED NOTES
Reports increased pain from 4/10 to 8/10.  PO hydrocodone given.  Pts chest tube dressing was redressed.  Pt given pillow for splinting with cough.
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
## 2025-01-06 NOTE — PLAN OF CARE
Goal Outcome Evaluation:      Plan of Care Reviewed With: patient          Outcome Evaluation: Pt to d/c home to [facility]

## 2025-01-07 ENCOUNTER — APPOINTMENT (OUTPATIENT)
Dept: RADIOLOGY | Facility: CLINIC | Age: 80
DRG: 199 | End: 2025-01-07
Payer: MEDICARE

## 2025-01-07 LAB
ANION GAP SERPL CALCULATED.3IONS-SCNC: 12 MMOL/L (ref 7–15)
BUN SERPL-MCNC: 30.7 MG/DL (ref 8–23)
CALCIUM SERPL-MCNC: 9.6 MG/DL (ref 8.8–10.4)
CHLORIDE SERPL-SCNC: 101 MMOL/L (ref 98–107)
CREAT SERPL-MCNC: 0.87 MG/DL (ref 0.67–1.17)
EGFRCR SERPLBLD CKD-EPI 2021: 88 ML/MIN/1.73M2
ERYTHROCYTE [DISTWIDTH] IN BLOOD BY AUTOMATED COUNT: 14.7 % (ref 10–15)
GLUCOSE SERPL-MCNC: 108 MG/DL (ref 70–99)
HCO3 SERPL-SCNC: 26 MMOL/L (ref 22–29)
HCT VFR BLD AUTO: 38.7 % (ref 40–53)
HGB BLD-MCNC: 12.8 G/DL (ref 13.3–17.7)
MCH RBC QN AUTO: 30.5 PG (ref 26.5–33)
MCHC RBC AUTO-ENTMCNC: 33.1 G/DL (ref 31.5–36.5)
MCV RBC AUTO: 92 FL (ref 78–100)
PLATELET # BLD AUTO: 187 10E3/UL (ref 150–450)
POTASSIUM SERPL-SCNC: 3.9 MMOL/L (ref 3.4–5.3)
RBC # BLD AUTO: 4.19 10E6/UL (ref 4.4–5.9)
SODIUM SERPL-SCNC: 139 MMOL/L (ref 135–145)
WBC # BLD AUTO: 10.2 10E3/UL (ref 4–11)

## 2025-01-07 PROCEDURE — 94640 AIRWAY INHALATION TREATMENT: CPT

## 2025-01-07 PROCEDURE — 120N000004 HC R&B MS OVERFLOW

## 2025-01-07 PROCEDURE — 250N000013 HC RX MED GY IP 250 OP 250 PS 637: Performed by: INTERNAL MEDICINE

## 2025-01-07 PROCEDURE — 71045 X-RAY EXAM CHEST 1 VIEW: CPT | Mod: 76

## 2025-01-07 PROCEDURE — 82310 ASSAY OF CALCIUM: CPT | Performed by: STUDENT IN AN ORGANIZED HEALTH CARE EDUCATION/TRAINING PROGRAM

## 2025-01-07 PROCEDURE — 999N000157 HC STATISTIC RCP TIME EA 10 MIN

## 2025-01-07 PROCEDURE — 85018 HEMOGLOBIN: CPT | Performed by: STUDENT IN AN ORGANIZED HEALTH CARE EDUCATION/TRAINING PROGRAM

## 2025-01-07 PROCEDURE — 71045 X-RAY EXAM CHEST 1 VIEW: CPT

## 2025-01-07 PROCEDURE — 99233 SBSQ HOSP IP/OBS HIGH 50: CPT | Performed by: STUDENT IN AN ORGANIZED HEALTH CARE EDUCATION/TRAINING PROGRAM

## 2025-01-07 PROCEDURE — 85049 AUTOMATED PLATELET COUNT: CPT | Performed by: STUDENT IN AN ORGANIZED HEALTH CARE EDUCATION/TRAINING PROGRAM

## 2025-01-07 PROCEDURE — 250N000009 HC RX 250: Performed by: STUDENT IN AN ORGANIZED HEALTH CARE EDUCATION/TRAINING PROGRAM

## 2025-01-07 PROCEDURE — 80048 BASIC METABOLIC PNL TOTAL CA: CPT | Performed by: STUDENT IN AN ORGANIZED HEALTH CARE EDUCATION/TRAINING PROGRAM

## 2025-01-07 PROCEDURE — 250N000013 HC RX MED GY IP 250 OP 250 PS 637: Performed by: STUDENT IN AN ORGANIZED HEALTH CARE EDUCATION/TRAINING PROGRAM

## 2025-01-07 PROCEDURE — 99232 SBSQ HOSP IP/OBS MODERATE 35: CPT

## 2025-01-07 PROCEDURE — 36415 COLL VENOUS BLD VENIPUNCTURE: CPT | Performed by: STUDENT IN AN ORGANIZED HEALTH CARE EDUCATION/TRAINING PROGRAM

## 2025-01-07 PROCEDURE — 250N000013 HC RX MED GY IP 250 OP 250 PS 637

## 2025-01-07 PROCEDURE — 99233 SBSQ HOSP IP/OBS HIGH 50: CPT | Performed by: INTERNAL MEDICINE

## 2025-01-07 RX ORDER — NALOXONE HYDROCHLORIDE 0.4 MG/ML
0.4 INJECTION, SOLUTION INTRAMUSCULAR; INTRAVENOUS; SUBCUTANEOUS
Status: DISCONTINUED | OUTPATIENT
Start: 2025-01-07 | End: 2025-01-08 | Stop reason: HOSPADM

## 2025-01-07 RX ORDER — OXYCODONE HYDROCHLORIDE 5 MG/1
5 TABLET ORAL EVERY 4 HOURS PRN
Status: DISCONTINUED | OUTPATIENT
Start: 2025-01-07 | End: 2025-01-08 | Stop reason: HOSPADM

## 2025-01-07 RX ORDER — BENZONATATE 100 MG/1
200 CAPSULE ORAL 3 TIMES DAILY
Status: DISCONTINUED | OUTPATIENT
Start: 2025-01-07 | End: 2025-01-08 | Stop reason: HOSPADM

## 2025-01-07 RX ORDER — BISOPROLOL FUMARATE 5 MG/1
5 TABLET, FILM COATED ORAL DAILY
Status: DISCONTINUED | OUTPATIENT
Start: 2025-01-07 | End: 2025-01-08 | Stop reason: HOSPADM

## 2025-01-07 RX ORDER — BENZONATATE 100 MG/1
100 CAPSULE ORAL 3 TIMES DAILY PRN
Status: DISCONTINUED | OUTPATIENT
Start: 2025-01-07 | End: 2025-01-08 | Stop reason: HOSPADM

## 2025-01-07 RX ORDER — ACETAMINOPHEN 325 MG/1
650 TABLET ORAL EVERY 6 HOURS
Status: DISCONTINUED | OUTPATIENT
Start: 2025-01-07 | End: 2025-01-08 | Stop reason: HOSPADM

## 2025-01-07 RX ORDER — METHOCARBAMOL 500 MG/1
500 TABLET, FILM COATED ORAL 3 TIMES DAILY
Status: DISCONTINUED | OUTPATIENT
Start: 2025-01-07 | End: 2025-01-08 | Stop reason: HOSPADM

## 2025-01-07 RX ORDER — LOSARTAN POTASSIUM 25 MG/1
25 TABLET ORAL DAILY
Status: DISCONTINUED | OUTPATIENT
Start: 2025-01-07 | End: 2025-01-07

## 2025-01-07 RX ORDER — NALOXONE HYDROCHLORIDE 0.4 MG/ML
0.2 INJECTION, SOLUTION INTRAMUSCULAR; INTRAVENOUS; SUBCUTANEOUS
Status: DISCONTINUED | OUTPATIENT
Start: 2025-01-07 | End: 2025-01-08 | Stop reason: HOSPADM

## 2025-01-07 RX ADMIN — ATORVASTATIN CALCIUM 80 MG: 40 TABLET, FILM COATED ORAL at 22:00

## 2025-01-07 RX ADMIN — METHOCARBAMOL 500 MG: 500 TABLET ORAL at 20:04

## 2025-01-07 RX ADMIN — ACETAMINOPHEN 650 MG: 325 TABLET ORAL at 22:00

## 2025-01-07 RX ADMIN — CETIRIZINE HYDROCHLORIDE 10 MG: 10 TABLET, FILM COATED ORAL at 20:04

## 2025-01-07 RX ADMIN — BENZONATATE 200 MG: 100 CAPSULE ORAL at 14:15

## 2025-01-07 RX ADMIN — PREGABALIN 150 MG: 75 CAPSULE ORAL at 14:15

## 2025-01-07 RX ADMIN — HYDROCODONE BITARTRATE AND ACETAMINOPHEN 1 TABLET: 5; 325 TABLET ORAL at 08:57

## 2025-01-07 RX ADMIN — PREGABALIN 150 MG: 75 CAPSULE ORAL at 20:04

## 2025-01-07 RX ADMIN — HYDROCODONE BITARTRATE AND ACETAMINOPHEN 1 TABLET: 5; 325 TABLET ORAL at 04:52

## 2025-01-07 RX ADMIN — OXYCODONE 5 MG: 5 TABLET ORAL at 17:59

## 2025-01-07 RX ADMIN — BENZONATATE 200 MG: 100 CAPSULE ORAL at 04:52

## 2025-01-07 RX ADMIN — BISOPROLOL FUMARATE 5 MG: 5 TABLET ORAL at 11:49

## 2025-01-07 RX ADMIN — PANTOPRAZOLE SODIUM 40 MG: 40 TABLET, DELAYED RELEASE ORAL at 08:57

## 2025-01-07 RX ADMIN — BENZONATATE 100 MG: 100 CAPSULE ORAL at 16:57

## 2025-01-07 RX ADMIN — ACETAMINOPHEN 650 MG: 325 TABLET ORAL at 16:58

## 2025-01-07 RX ADMIN — METHOCARBAMOL 500 MG: 500 TABLET ORAL at 14:15

## 2025-01-07 RX ADMIN — IPRATROPIUM BROMIDE AND ALBUTEROL SULFATE 3 ML: .5; 3 SOLUTION RESPIRATORY (INHALATION) at 08:09

## 2025-01-07 RX ADMIN — TRAZODONE HYDROCHLORIDE 50 MG: 50 TABLET ORAL at 22:00

## 2025-01-07 RX ADMIN — BENZONATATE 200 MG: 100 CAPSULE ORAL at 20:04

## 2025-01-07 RX ADMIN — METHOCARBAMOL 500 MG: 500 TABLET ORAL at 11:49

## 2025-01-07 RX ADMIN — ACETAMINOPHEN 650 MG: 325 TABLET ORAL at 11:49

## 2025-01-07 RX ADMIN — PREGABALIN 150 MG: 75 CAPSULE ORAL at 08:57

## 2025-01-07 ASSESSMENT — ACTIVITIES OF DAILY LIVING (ADL)
ADLS_ACUITY_SCORE: 46

## 2025-01-07 NOTE — PLAN OF CARE
Problem: Gas Exchange Impaired  Goal: Optimal Gas Exchange  Outcome: Progressing  Intervention: Optimize Oxygenation and Ventilation  Recent Flowsheet Documentation  Taken 1/6/2025 2129 by Michelle Basurto RN  Head of Bed (HOB) Positioning: HOB at 30-45 degrees     Problem: Respiratory Compromise (Pneumothorax)  Goal: Optimal Oxygenation and Ventilation  Outcome: Progressing     Problem: Fall Injury Risk  Goal: Absence of Fall and Fall-Related Injury  Outcome: Progressing  Intervention: Promote Injury-Free Environment  Recent Flowsheet Documentation  Taken 1/7/2025 0357 by Michelle Basurto RN  Safety Promotion/Fall Prevention:   safety round/check completed   room near nurse's station   room door open  Taken 1/7/2025 0030 by Michelle Basurto RN  Safety Promotion/Fall Prevention:   safety round/check completed   room near nurse's station   room door open   Goal Outcome Evaluation:         Pt alert and oriented.  Lungs diminished and coarse.  Loose congested cough Pt medicated with Tessalon pearls twice this 12 hour shift.  Some improvement noted.  Pt also c/o chest wall pain due to chest trauma from fall and chest tube. Medicated with Norco prn with relief.  Pt given incentive spirometer.  Also discussed splinting chest with cough overnight.  Pt able to turn side to side.  Incontinent of urine.

## 2025-01-07 NOTE — PROGRESS NOTES
General Surgery Progress Note:    Hospital Day # 2    ASSESSMENT:   1. Pneumothorax on right    2. Closed fracture of one rib of right side, initial encounter    3. Elevated troponin    4. Acute respiratory failure with hypoxia (H)        Ubaldo Ramos is a 79 year old male past medical history of multiple co-morbidities notable for PAD on DAPT is currently admitted status post mechanical fall with a right rib fracture and posttraumatic small pneumothorax, NSTEMI who is status post pigtail chest tube placement.      Chest tube with minimal output since placement on 01/5/2025. Placed on water seal yesterday. No e/o air leak on pleurovac. AM CXR pending. Reporting intermittent increased pain with coughing, will adjust multimodal analgesics. He is HDS, AF without oxygen requirement. Labs without leukocytosis or anemia. Cardiology following.     PLAN:   -AM CXR pending  -Chest tube to remain, anticipate chest tube removal following imaging results  -Multimodal pain control, will supplement with paola tylenol as well as paola robaxin.   -Aggressive pulmonary toilet  -No dietary restrictions from general surgical perspective   -Encourage movement/activity to promote ambulation  -Continue medical mgmt per [facility]  -Surgery to follow     Addendum: CXR without PTX. Chest tube removed successfully at bedside.  Chest x-ray pending at 1700.  Occlusive dressing overlying chest tube removal site that is to remain x 48 hours, keep dry. Following 01/09 at 1 pm, may remove dressing.       SUBJECTIVE:   Ubaldo Ramos is reporting feeling okay however with intermittent exacerbation of right chest wall pain that have been rated 7-8 out of 10 at its worst occurring when coughing.  Reports relief with rest as well as with analgesics.  Denies fever, chills, palpitations, shortness of breath/ACEVEDO.  Tolerating a diet, spontaneously voiding, passing flatus.  No evidence of air leak on Pleur-evac with deep inspiration as well as coughing.      Patient  "Vitals for the past 24 hrs:   BP Temp Temp src Pulse Resp SpO2 Height Weight   01/07/25 0809 -- -- -- -- 24 92 % -- --   01/07/25 0746 128/63 98.4  F (36.9  C) Oral 100 -- 97 % -- --   01/07/25 0500 -- -- -- -- -- -- -- 74.8 kg (164 lb 14.5 oz)   01/07/25 0353 111/61 97.8  F (36.6  C) Oral 77 16 97 % -- --   01/07/25 0001 122/71 98.3  F (36.8  C) Oral 88 16 96 % -- --   01/06/25 2100 -- -- -- 87 -- 96 % -- --   01/06/25 2019 118/64 98.5  F (36.9  C) Oral 84 19 98 % -- --   01/06/25 2000 118/64 -- -- 77 -- -- -- --   01/06/25 1900 -- -- -- 90 -- -- -- --   01/06/25 1609 -- 97.8  F (36.6  C) Oral -- -- -- -- --   01/06/25 1110 117/62 97.3  F (36.3  C) Oral 76 22 96 % 1.651 m (5' 5\") --       Physical Exam:  General: patient seen resting in bed, no acute distress  Resp: no respiratory distress, breathing comfortably on room air.  Right chest wall with anterolateral tenderness to palpation without appreciable crepitus.  Overlying occlusive dressing securing right pigtail chest tube in place with right axillary ecchymosis without appreciable drainage.   Abdomen: Soft, mildly distended without appreciable tenderness to palpation.  Extremities: warm and well perfused    Admission on 01/05/2025   Component Date Value    Sodium 01/05/2025 136     Potassium 01/05/2025 4.0     Carbon Dioxide (CO2) 01/05/2025 23     Anion Gap 01/05/2025 15     Urea Nitrogen 01/05/2025 27.9 (H)     Creatinine 01/05/2025 0.95     GFR Estimate 01/05/2025 82     Calcium 01/05/2025 9.8     Chloride 01/05/2025 98     Glucose 01/05/2025 154 (H)     Alkaline Phosphatase 01/05/2025 104     AST 01/05/2025 31     ALT 01/05/2025 21     Protein Total 01/05/2025 7.1     Albumin 01/05/2025 4.4     Bilirubin Total 01/05/2025 0.4     pH Venous 01/05/2025 7.27 (L)     pCO2 Venous 01/05/2025 64 (H)     pO2 Venous 01/05/2025 32     Bicarbonate Venous 01/05/2025 30 (H)     Base Excess/Deficit Veno* 01/05/2025 1.1     FIO2 01/05/2025 41     Oxyhemoglobin Venous " 01/05/2025 53 (L)     O2 Sat, Venous 01/05/2025 54.6 (L)     Lactic Acid, Initial 01/05/2025 1.5     N terminal Pro BNP Inpat* 01/05/2025 783     Troponin T, High Sensiti* 01/05/2025 540 (HH)     Influenza A PCR 01/05/2025 Negative     Influenza B PCR 01/05/2025 Negative     RSV PCR 01/05/2025 Negative     SARS CoV2 PCR 01/05/2025 Negative     Systolic Blood Pressure 01/05/2025 156     Diastolic Blood Pressure 01/05/2025 93     Ventricular Rate 01/05/2025 100     Atrial Rate 01/05/2025 100     [place] Interval 01/05/2025 140     QRS Duration 01/05/2025 138     QT 01/05/2025 366     QTc 01/05/2025 472     P Axis 01/05/2025 62     R AXIS 01/05/2025 209     T [place] 01/05/2025 33     Interpretation ECG 01/05/2025                      Value:Sinus rhythm with occasional Premature ventricular complexes  Right bundle branch block  Inferior infarct , age undetermined  Anterior infarct (cited on or before 02-Aug-2022)  Abnormal ECG  When compared with ECG of 27-Apr-2024 12:33,  Right bundle branch block is now Present  Inferior infarct is now Present  Questionable change in initial forces of Anterior leads  Confirmed by SEE ED PROVIDER NOTE FOR, ECG INTERPRETATION (4000),  NANNETTE CARDOZO ([id number]) on 1/5/2025 12:15:26 PM      WBC Count 01/05/2025 12.8 (H)     RBC Count 01/05/2025 3.71 (L)     Hemoglobin 01/05/2025 11.4 (L)     Hematocrit 01/05/2025 34.2 (L)     MCV 01/05/2025 92     MCH 01/05/2025 30.7     MCHC 01/05/2025 33.3     RDW 01/05/2025 14.6     Platelet Count 01/05/2025 244     % Neutrophils 01/05/2025 80     % Lymphocytes 01/05/2025 10     % Monocytes 01/05/2025 9     % Eosinophils 01/05/2025 0     % Basophils 01/05/2025 0     % Immature Granulocytes 01/05/2025 1     NRBCs per 100 WBC 01/05/2025 0     Absolute Neutrophils 01/05/2025 10.2 (H)     Absolute Lymphocytes 01/05/2025 1.3     Absolute Monocytes 01/05/2025 1.1     Absolute Eosinophils 01/05/2025 0.1     Absolute Basophils 01/05/2025 0.0     Absolute  Immature Granul* 01/05/2025 0.1     Absolute NRBCs 01/05/2025 0.0     Hold Specimen 01/05/2025 JIC     Hold Specimen 01/05/2025 JIC     Troponin T, High Sensiti* 01/05/2025 1,258 ()     Systolic Blood Pressure 01/05/2025 115     Diastolic Blood Pressure 01/05/2025 72     Ventricular Rate 01/05/2025 90     Atrial Rate 01/05/2025 90     UT Interval 01/05/2025 174     QRS Duration 01/05/2025 108     QT 01/05/2025 358     QTc 01/05/2025 437     P Axis 01/05/2025 71     R AXIS 01/05/2025 158     T Axis 01/05/2025 69     Interpretation ECG 01/05/2025                      Value:Sinus rhythm with sinus arrhythmia with occasional Premature ventricular complexes  Right ventricular hypertrophy with repolarization abnormality  Septal infarct Lateral infarct Inferior injury pattern  ** ** ACUTE MI / STEMI ** **  Abnormal ECG  When compared with ECG of 05-Jan-2025 12:03,  Significant changes have occurred  Confirmed by SEE ED PROVIDER NOTE FOR, ECG INTERPRETATION (4000),  NANNETTE CARDOZO ([id number]) on 1/5/2025 1:21:54 PM      Anti Xa Unfractionated H* 01/05/2025 0.37     Troponin T, High Sensiti* 01/05/2025 940 ()     Anti Xa Unfractionated H* 01/06/2025 0.77     Troponin T, High Sensiti* 01/06/2025 838 ()     Troponin T, High Sensiti* 01/06/2025 736 ()     Sodium 01/06/2025 138     Potassium 01/06/2025 4.6     Chloride 01/06/2025 100     Carbon Dioxide (CO2) 01/06/2025 27     Anion Gap 01/06/2025 11     Urea Nitrogen 01/06/2025 27.9 (H)     Creatinine 01/06/2025 0.93     GFR Estimate 01/06/2025 84     Calcium 01/06/2025 9.4     Glucose 01/06/2025 129 (H)     WBC Count 01/06/2025 7.7     RBC Count 01/06/2025 3.79 (L)     Hemoglobin 01/06/2025 11.5 (L)     Hematocrit 01/06/2025 34.8 (L)     MCV 01/06/2025 92     MCH 01/06/2025 30.3     MCHC 01/06/2025 33.0     RDW 01/06/2025 14.7     Platelet Count 01/06/2025 194     Anti Xa Unfractionated H* 01/06/2025 0.07     LVEF  01/06/2025 35-40% (moderately reduced)      Hemoglobin 01/06/2025 11.4 (L)     WBC Count 01/07/2025 10.2     RBC Count 01/07/2025 4.19 (L)     Hemoglobin 01/07/2025 12.8 (L)     Hematocrit 01/07/2025 38.7 (L)     MCV 01/07/2025 92     MCH 01/07/2025 30.5     MCHC 01/07/2025 33.1     RDW 01/07/2025 14.7     Platelet Count 01/07/2025 187     Sodium 01/07/2025 139     Potassium 01/07/2025 3.9     Chloride 01/07/2025 101     Carbon Dioxide (CO2) 01/07/2025 26     Anion Gap 01/07/2025 12     Urea Nitrogen 01/07/2025 30.7 (H)     Creatinine 01/07/2025 0.87     GFR Estimate 01/07/2025 88     Calcium 01/07/2025 9.6     Glucose 01/07/2025 108 (H)         Alexandra Hernandez PA-C  [facility]  Surgery [address]  [address] [place] [id number]?  Office: [phone]
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
## 2025-01-07 NOTE — PLAN OF CARE
Pt alert and oriented. Chest tube to water seal. A1 with GB for ambulation. PRN PO pain medication given x 2 for right chest/rib pain. Medication effective. NSR on tele. Hourly rounding done. Bed alarm on for safety.   Problem: Pain Acute  Goal: Optimal Pain Control and Function  Outcome: Progressing  Intervention: Develop Pain Management Plan  Recent Flowsheet Documentation  Taken 1/6/2025 1808 by Annmarie Medrano, RN  Pain Management Interventions: medication (see MAR)  Taken 1/6/2025 1415 by Annmarie Medrano, RN  Pain Management Interventions: medication (see MAR)  Taken 1/6/2025 1317 by Annmarie Medrano, RN  Pain Management Interventions: medication (see MAR)  Intervention: Prevent or Manage Pain  Recent Flowsheet Documentation  Taken 1/6/2025 1530 by Annmarie Medrano, RN  Medication Review/Management: medications reviewed  Taken 1/6/2025 1130 by Annmarie Medrano, RN  Medication Review/Management: medications reviewed     Problem: Gas Exchange Impaired  Goal: Optimal Gas Exchange  Outcome: Progressing   Goal Outcome Evaluation:

## 2025-01-07 NOTE — PROGRESS NOTES
Writer received pt on RA this morning, SpO2 was low 90s, BS coarse, has frequent congest cough. Scheduled neb given per order.    Roger Dickinson, RT

## 2025-01-07 NOTE — PLAN OF CARE
[id number]    Pt Aox4. Denies pain. Chest tube removed this afternoon. Pt denies SOB. On 1L NC. Other VSS. PRN tessalon utilized for frequent cough. Briefs utilized for incontinence. Plan is for transfer to [place] for angio 1/8. Pt able to make needs known. Call light within reach and purposeful rounding performed. Rahel Wisdom RN      Problem: Adult Inpatient Plan of Care  Goal: Absence of Hospital-Acquired Illness or Injury  Intervention: Identify and Manage Fall Risk  Recent Flowsheet Documentation  Taken 1/7/2025 1625 by Rahel Wisdom, RN  Safety Promotion/Fall Prevention: safety round/check completed     Problem: Adult Inpatient Plan of Care  Goal: Optimal Comfort and Wellbeing  Outcome: Progressing     Problem: Pain Acute  Goal: Optimal Pain Control and Function  Outcome: Progressing     Problem: Gas Exchange Impaired  Goal: Optimal Gas Exchange  Outcome: Progressing

## 2025-01-07 NOTE — PROGRESS NOTES
"    [facility]   [address]  [place] [address]   [email or web address]   OFFICE: [phone]     CARDIOLOGY FOLLOW-UP NOTE      Impression and Plan     Impression:   Severe coronary artery calcification - noted on CT PE (report below)  Elevated troponin - likely demand ischemia from a combination of pneumothorax on underlying coronary disease. Did not have anginal pain on presentation.  Newly identified cardiomyopathy, suspect secondary to ischemic heart disease.   Acute traumatic pneumothorax s/p chest tube placement  COPD  History of lung cancer s/p lobectomy and prostate cancer.    Plan:  Recommend coronary angiogram with possible PCI after chest tube removed.   Discontinue home propranolol and replace with bisoprolol  Discontinue cilostazol with new cardiomyopathy (contraindicated)  Resume aspirin  Also start losartan 12.5 mg daily.    Primary Cardiologist: not previously established.    Subjective     Still has chest pain with coughing. No new cardiac symptoms.    Cardiac Diagnostics   Telemetry (personally reviewed): sinus tachycardia    Echocardiogram (results reviewed):   TTE 1/6/25  Left ventricular function is decreased. The ejection fraction is 35-40% (moderately reduced).  There is severe hypokinesis of the mid to apical septal, mid to apical inferior, apex, and apical lateral wall segments.  Normal right ventricle size and systolic function.  Normal left atrial size.  The aortic valve is trileaflet with aortic valve sclerosis.  There is no comparison study available.      Physical Examination       /63 (BP Location: Right arm)   Pulse 100   Temp 98.4  F (36.9  C) (Oral)   Resp 24   Ht 1.651 m (5' 5\")   Wt 74.8 kg (164 lb 14.5 oz)   SpO2 92%   BMI 27.44 kg/m          Intake/Output Summary (Last 24 hours) at 1/7/2025 1009  Last data filed at 1/6/2025 1530  Gross per 24 hour   Intake --   Output 15 ml   Net -15 ml       General: pleasant male. " No acute distress.   Neck: No JVD  Lungs: chest tube in R chest.  COR: fast rate, regular rhythm, No murmurs, rubs, or gallops  Extrem: No edema         Imaging      Chest x-ray 1/6/25  IMPRESSION: Coiled, small caliber right-sided chest tube again noted in the apex laterally. No pneumothorax.     Slightly displaced right lateral sixth rib fracture again noted. Postthoracotomy changes on the left with volume loss and pleural thickening, unchanged. Heart is of normal size.     Lab Results   Lab Results   Component Value Date    CHOL 124 03/19/2024    HDL 34 (L) 03/19/2024    TRIG 301 (H) 03/19/2024    BUN 30.7 (H) 01/07/2025     01/07/2025    CO2 26 01/07/2025       Lab Results   Component Value Date    WBC 10.2 01/07/2025    HGB 12.8 (L) 01/07/2025    HCT 38.7 (L) 01/07/2025    MCV 92 01/07/2025     01/07/2025       Lab Results   Component Value Date    TROPONINI 0.02 07/31/2022    BNP 74 07/31/2022    TSH 0.46 04/27/2024    INR 1.02 04/27/2024               Current Inpatient Scheduled Medications   Scheduled Meds:  Current Facility-Administered Medications   Medication Dose Route Frequency Provider Last Rate Last Admin    [Held by provider] aspirin EC tablet 81 mg  81 mg Oral Daily Anthony Paris MD        atorvastatin (LIPITOR) tablet 80 mg  80 mg Oral At Bedtime Anthony Paris MD   80 mg at 01/06/25 2118    cetirizine (zyrTEC) tablet 10 mg  10 mg Oral QPM Anthony Paris MD   10 mg at 01/06/25 2118    [Held by provider] cilostazol (PLETAL) tablet 100 mg  100 mg Oral BID Anthony Paris MD        [Held by provider] clopidogrel (PLAVIX) tablet 75 mg  75 mg Oral Daily Anthony Paris MD        ipratropium - albuterol 0.5 mg/2.5 mg/3 mL (DUONEB) neb solution 3 mL  1 vial Nebulization Daily Anthony Paris MD   3 mL at 01/07/25 0809    [Held by provider] losartan-hydrochlorothiazide (HYZAAR) 100-25 MG per tablet 1 tablet  1 tablet Oral Daily Anthony Paris MD        [Held by provider] oxyBUTYnin ER (DITROPAN  XL) 24 hr tablet 10 mg  10 mg Oral Daily Anthony Paris MD        pantoprazole (PROTONIX) EC tablet 40 mg  40 mg Oral Daily Anthony Paris MD   40 mg at 01/07/25 0857    [Held by provider] predniSONE (DELTASONE) half-tab 3 mg  3 mg Oral Daily Anthony Paris MD        pregabalin (LYRICA) capsule 150 mg  150 mg Oral TID Anthony Paris MD   150 mg at 01/07/25 0857    [Held by provider] propranolol (INDERAL) tablet 20 mg  20 mg Oral BID Anthony Paris MD        sodium chloride (PF) 0.9% PF flush 3 mL  3 mL Intracatheter Q8H Anthony Paris MD   3 mL at 01/07/25 0356    traZODone (DESYREL) tablet 50 mg  50 mg Oral At Bedtime Anthony Paris MD   50 mg at 01/06/25 2118     Continuous Infusions:  Current Facility-Administered Medications   Medication Dose Route Frequency Provider Last Rate Last Admin    heparin 25,000 units in 0.45% NaCl 250 mL ANTICOAGULANT infusion  0-5,000 Units/hr Intravenous Continuous Anthony Paris MD   Paused at 01/06/25 0914    Patient is already receiving anticoagulation with heparin, enoxaparin (LOVENOX), warfarin (COUMADIN)  or other anticoagulant medication   Does not apply Continuous PRN Anthony Paris MD                Medications Prior to Admission   Prior to Admission medications    Medication Sig Start Date End Date Taking? Authorizing Provider   aspirin 81 MG EC tablet [ASPIRIN 81 MG EC TABLET] Take 81 mg by mouth daily. 6/24/21  Yes Provider, Historical   atorvastatin (LIPITOR) 80 MG tablet Take 1 tablet (80 mg) by mouth at bedtime 3/19/24  Yes John Oconnor NP   benzonatate (TESSALON) 200 MG capsule Take 1 capsule (200 mg) by mouth 3 times daily as needed for cough. 12/27/24  Yes John Oconnor NP   cetirizine (ZYRTEC) 10 MG tablet Take 10 mg by mouth every evening   Yes Unknown, Entered By History   cholecalciferol, vitamin D3, (VITAMIN D3) 25 mcg (1,000 unit) capsule [CHOLECALCIFEROL, VITAMIN D3, (VITAMIN D3) 25 MCG (1,000 UNIT) CAPSULE] Take 1,000 Units by mouth daily. 6/24/21   Yes Provider, Historical   cilostazol (PLETAL) 100 MG tablet Take 1 tablet (100 mg) by mouth 2 times daily 2/20/24  Yes Miguel Callejas MD   clopidogrel (PLAVIX) 75 MG tablet TAKE 1 TABLET BY MOUTH EVERY DAY 9/29/24  Yes John Oconnor NP   cyanocobalamin 1000 MCG tablet [CYANOCOBALAMIN 1000 MCG TABLET] Take 1,000 mcg by mouth daily. 6/24/21  Yes Provider, Historical   ipratropium - albuterol 0.5 mg/2.5 mg/3 mL (DUONEB) 0.5-2.5 (3) MG/3ML neb solution Take 1 vial by nebulization daily   Yes Unknown, Entered By History   losartan-hydrochlorothiazide (HYZAAR) 100-25 MG tablet Take 1 tablet by mouth daily 3/19/24  Yes John Oconnor NP   oxyBUTYnin ER (DITROPAN XL) 10 MG 24 hr tablet Take 1 tablet (10 mg) by mouth daily 3/19/24  Yes John Oconnor NP   pantoprazole (PROTONIX) 40 MG EC tablet TAKE 1 TABLET BY MOUTH ONCE DAILY 9/27/24  Yes John Oconnor NP   predniSONE (DELTASONE) 10 MG tablet 4 tabs for 3 days. 3 tabs for 3 days. 2 tabs for 3 days. 1 tab for 3 days. 12/29/24  Yes John Oconnor NP   pregabalin (LYRICA) 150 MG capsule Take 150 mg by mouth 3 times daily.   Yes Unknown, Entered By History   propranolol (INDERAL) 20 MG tablet TAKE 1 TABLET BY MOUTH TWICE A DAY 6/17/24  Yes John Oconnor NP   traZODone (DESYREL) 50 MG tablet Take 1 tablet (50 mg) by mouth at bedtime 7/17/24  Yes John Oconnor NP

## 2025-01-08 ENCOUNTER — HOSPITAL ENCOUNTER (INPATIENT)
Facility: HOSPITAL | Age: 80
DRG: 321 | End: 2025-01-08
Attending: INTERNAL MEDICINE | Admitting: INTERNAL MEDICINE
Payer: MEDICARE

## 2025-01-08 VITALS
SYSTOLIC BLOOD PRESSURE: 113 MMHG | RESPIRATION RATE: 18 BRPM | DIASTOLIC BLOOD PRESSURE: 55 MMHG | WEIGHT: 159 LBS | BODY MASS INDEX: 26.49 KG/M2 | HEART RATE: 59 BPM | OXYGEN SATURATION: 99 % | HEIGHT: 65 IN | TEMPERATURE: 97.5 F

## 2025-01-08 DIAGNOSIS — R79.89 ELEVATED TROPONIN: ICD-10-CM

## 2025-01-08 DIAGNOSIS — I10 ESSENTIAL HYPERTENSION, BENIGN: ICD-10-CM

## 2025-01-08 DIAGNOSIS — Z95.5 STATUS POST INSERTION OF DRUG-ELUTING STENT INTO LEFT ANTERIOR DESCENDING (LAD) ARTERY: Primary | ICD-10-CM

## 2025-01-08 DIAGNOSIS — S22.31XA CLOSED FRACTURE OF ONE RIB OF RIGHT SIDE, INITIAL ENCOUNTER: ICD-10-CM

## 2025-01-08 LAB
ABO + RH BLD: NORMAL
ACT BLD: 280 SECONDS (ref 74–150)
ACT BLD: 304 SECONDS (ref 74–150)
BLD GP AB SCN SERPL QL: NEGATIVE
SPECIMEN EXP DATE BLD: NORMAL

## 2025-01-08 PROCEDURE — 272N000001 HC OR GENERAL SUPPLY STERILE: Performed by: INTERNAL MEDICINE

## 2025-01-08 PROCEDURE — 99152 MOD SED SAME PHYS/QHP 5/>YRS: CPT | Performed by: INTERNAL MEDICINE

## 2025-01-08 PROCEDURE — 99231 SBSQ HOSP IP/OBS SF/LOW 25: CPT

## 2025-01-08 PROCEDURE — 250N000013 HC RX MED GY IP 250 OP 250 PS 637: Performed by: NURSE PRACTITIONER

## 2025-01-08 PROCEDURE — 92978 ENDOLUMINL IVUS OCT C 1ST: CPT | Mod: 26 | Performed by: INTERNAL MEDICINE

## 2025-01-08 PROCEDURE — 99153 MOD SED SAME PHYS/QHP EA: CPT | Performed by: INTERNAL MEDICINE

## 2025-01-08 PROCEDURE — C1887 CATHETER, GUIDING: HCPCS | Performed by: INTERNAL MEDICINE

## 2025-01-08 PROCEDURE — 210N000001 HC R&B IMCU HEART CARE

## 2025-01-08 PROCEDURE — 85347 COAGULATION TIME ACTIVATED: CPT

## 2025-01-08 PROCEDURE — C1753 CATH, INTRAVAS ULTRASOUND: HCPCS | Performed by: INTERNAL MEDICINE

## 2025-01-08 PROCEDURE — 027035Z DILATION OF CORONARY ARTERY, ONE ARTERY WITH TWO DRUG-ELUTING INTRALUMINAL DEVICES, PERCUTANEOUS APPROACH: ICD-10-PCS | Performed by: INTERNAL MEDICINE

## 2025-01-08 PROCEDURE — C1894 INTRO/SHEATH, NON-LASER: HCPCS | Performed by: INTERNAL MEDICINE

## 2025-01-08 PROCEDURE — C9600 PERC DRUG-EL COR STENT SING: HCPCS | Performed by: INTERNAL MEDICINE

## 2025-01-08 PROCEDURE — 250N000011 HC RX IP 250 OP 636: Performed by: INTERNAL MEDICINE

## 2025-01-08 PROCEDURE — 258N000003 HC RX IP 258 OP 636: Performed by: INTERNAL MEDICINE

## 2025-01-08 PROCEDURE — 250N000013 HC RX MED GY IP 250 OP 250 PS 637: Performed by: INTERNAL MEDICINE

## 2025-01-08 PROCEDURE — 93458 L HRT ARTERY/VENTRICLE ANGIO: CPT | Mod: 26 | Performed by: INTERNAL MEDICINE

## 2025-01-08 PROCEDURE — 99233 SBSQ HOSP IP/OBS HIGH 50: CPT | Performed by: INTERNAL MEDICINE

## 2025-01-08 PROCEDURE — 99207 PR APP CREDIT; MD BILLING SHARED VISIT: CPT | Performed by: INTERNAL MEDICINE

## 2025-01-08 PROCEDURE — 94640 AIRWAY INHALATION TREATMENT: CPT

## 2025-01-08 PROCEDURE — 999N000157 HC STATISTIC RCP TIME EA 10 MIN

## 2025-01-08 PROCEDURE — C1725 CATH, TRANSLUMIN NON-LASER: HCPCS | Performed by: INTERNAL MEDICINE

## 2025-01-08 PROCEDURE — C1874 STENT, COATED/COV W/DEL SYS: HCPCS | Performed by: INTERNAL MEDICINE

## 2025-01-08 PROCEDURE — 86850 RBC ANTIBODY SCREEN: CPT | Performed by: NURSE PRACTITIONER

## 2025-01-08 PROCEDURE — 93458 L HRT ARTERY/VENTRICLE ANGIO: CPT | Performed by: INTERNAL MEDICINE

## 2025-01-08 PROCEDURE — C1769 GUIDE WIRE: HCPCS | Performed by: INTERNAL MEDICINE

## 2025-01-08 PROCEDURE — 36415 COLL VENOUS BLD VENIPUNCTURE: CPT | Performed by: NURSE PRACTITIONER

## 2025-01-08 PROCEDURE — 250N000013 HC RX MED GY IP 250 OP 250 PS 637: Performed by: STUDENT IN AN ORGANIZED HEALTH CARE EDUCATION/TRAINING PROGRAM

## 2025-01-08 PROCEDURE — 93005 ELECTROCARDIOGRAM TRACING: CPT

## 2025-01-08 PROCEDURE — 99239 HOSP IP/OBS DSCHRG MGMT >30: CPT | Performed by: STUDENT IN AN ORGANIZED HEALTH CARE EDUCATION/TRAINING PROGRAM

## 2025-01-08 PROCEDURE — 250N000009 HC RX 250: Performed by: STUDENT IN AN ORGANIZED HEALTH CARE EDUCATION/TRAINING PROGRAM

## 2025-01-08 PROCEDURE — 250N000013 HC RX MED GY IP 250 OP 250 PS 637

## 2025-01-08 PROCEDURE — 92978 ENDOLUMINL IVUS OCT C 1ST: CPT | Mod: LD | Performed by: INTERNAL MEDICINE

## 2025-01-08 PROCEDURE — 250N000009 HC RX 250: Performed by: INTERNAL MEDICINE

## 2025-01-08 PROCEDURE — 86900 BLOOD TYPING SEROLOGIC ABO: CPT | Performed by: NURSE PRACTITIONER

## 2025-01-08 PROCEDURE — 92928 PRQ TCAT PLMT NTRAC ST 1 LES: CPT | Mod: LD | Performed by: INTERNAL MEDICINE

## 2025-01-08 PROCEDURE — B240ZZ3 ULTRASONOGRAPHY OF SINGLE CORONARY ARTERY, INTRAVASCULAR: ICD-10-PCS | Performed by: INTERNAL MEDICINE

## 2025-01-08 PROCEDURE — 4A023N7 MEASUREMENT OF CARDIAC SAMPLING AND PRESSURE, LEFT HEART, PERCUTANEOUS APPROACH: ICD-10-PCS | Performed by: INTERNAL MEDICINE

## 2025-01-08 PROCEDURE — 255N000002 HC RX 255 OP 636: Performed by: INTERNAL MEDICINE

## 2025-01-08 PROCEDURE — B211YZZ FLUOROSCOPY OF MULTIPLE CORONARY ARTERIES USING OTHER CONTRAST: ICD-10-PCS | Performed by: INTERNAL MEDICINE

## 2025-01-08 DEVICE — STENT COR ONYX FRONTIER 22X3MM ONYXNG30022UX: Type: IMPLANTABLE DEVICE | Status: FUNCTIONAL

## 2025-01-08 DEVICE — STENT COR ONYX FRONTIER 12X3MM ONYXNG30012UX: Type: IMPLANTABLE DEVICE | Status: FUNCTIONAL

## 2025-01-08 RX ORDER — ONDANSETRON 2 MG/ML
4 INJECTION INTRAMUSCULAR; INTRAVENOUS EVERY 6 HOURS PRN
Status: DISCONTINUED | OUTPATIENT
Start: 2025-01-08 | End: 2025-01-10 | Stop reason: HOSPADM

## 2025-01-08 RX ORDER — METOPROLOL TARTRATE 1 MG/ML
5 INJECTION, SOLUTION INTRAVENOUS
Status: DISCONTINUED | OUTPATIENT
Start: 2025-01-08 | End: 2025-01-10 | Stop reason: HOSPADM

## 2025-01-08 RX ORDER — NALOXONE HYDROCHLORIDE 0.4 MG/ML
0.2 INJECTION, SOLUTION INTRAMUSCULAR; INTRAVENOUS; SUBCUTANEOUS
Status: CANCELLED | OUTPATIENT
Start: 2025-01-08

## 2025-01-08 RX ORDER — FENTANYL CITRATE 50 UG/ML
25 INJECTION, SOLUTION INTRAMUSCULAR; INTRAVENOUS
Status: CANCELLED | OUTPATIENT
Start: 2025-01-08

## 2025-01-08 RX ORDER — ONDANSETRON 4 MG/1
4 TABLET, ORALLY DISINTEGRATING ORAL EVERY 6 HOURS PRN
Status: CANCELLED | OUTPATIENT
Start: 2025-01-08

## 2025-01-08 RX ORDER — ASPIRIN 325 MG
325 TABLET ORAL ONCE
Status: COMPLETED | OUTPATIENT
Start: 2025-01-08 | End: 2025-01-08

## 2025-01-08 RX ORDER — ACETAMINOPHEN 325 MG/1
650 TABLET ORAL EVERY 6 HOURS
Status: CANCELLED | OUTPATIENT
Start: 2025-01-08

## 2025-01-08 RX ORDER — NALOXONE HYDROCHLORIDE 0.4 MG/ML
0.4 INJECTION, SOLUTION INTRAMUSCULAR; INTRAVENOUS; SUBCUTANEOUS
Status: CANCELLED | OUTPATIENT
Start: 2025-01-08

## 2025-01-08 RX ORDER — NALOXONE HYDROCHLORIDE 0.4 MG/ML
0.2 INJECTION, SOLUTION INTRAMUSCULAR; INTRAVENOUS; SUBCUTANEOUS
Status: DISCONTINUED | OUTPATIENT
Start: 2025-01-08 | End: 2025-01-10 | Stop reason: HOSPADM

## 2025-01-08 RX ORDER — CALCIUM CARBONATE 500 MG/1
1000 TABLET, CHEWABLE ORAL 4 TIMES DAILY PRN
Status: DISCONTINUED | OUTPATIENT
Start: 2025-01-08 | End: 2025-01-10 | Stop reason: HOSPADM

## 2025-01-08 RX ORDER — ATROPINE SULFATE 0.1 MG/ML
0.5 INJECTION INTRAVENOUS
Status: ACTIVE | OUTPATIENT
Start: 2025-01-08 | End: 2025-01-08

## 2025-01-08 RX ORDER — PREGABALIN 75 MG/1
150 CAPSULE ORAL 3 TIMES DAILY
Status: DISCONTINUED | OUTPATIENT
Start: 2025-01-08 | End: 2025-01-10 | Stop reason: HOSPADM

## 2025-01-08 RX ORDER — NALOXONE HYDROCHLORIDE 0.4 MG/ML
0.4 INJECTION, SOLUTION INTRAMUSCULAR; INTRAVENOUS; SUBCUTANEOUS
Status: DISCONTINUED | OUTPATIENT
Start: 2025-01-08 | End: 2025-01-10 | Stop reason: HOSPADM

## 2025-01-08 RX ORDER — ACETAMINOPHEN 325 MG/1
650 TABLET ORAL EVERY 6 HOURS
Status: DISCONTINUED | OUTPATIENT
Start: 2025-01-08 | End: 2025-01-10 | Stop reason: HOSPADM

## 2025-01-08 RX ORDER — ONDANSETRON 4 MG/1
4 TABLET, ORALLY DISINTEGRATING ORAL EVERY 6 HOURS PRN
Status: DISCONTINUED | OUTPATIENT
Start: 2025-01-08 | End: 2025-01-08

## 2025-01-08 RX ORDER — IPRATROPIUM BROMIDE AND ALBUTEROL SULFATE 2.5; .5 MG/3ML; MG/3ML
1 SOLUTION RESPIRATORY (INHALATION) DAILY
Status: DISCONTINUED | OUTPATIENT
Start: 2025-01-09 | End: 2025-01-10 | Stop reason: HOSPADM

## 2025-01-08 RX ORDER — DIAZEPAM 5 MG/1
5 TABLET ORAL
Status: CANCELLED | OUTPATIENT
Start: 2025-01-08

## 2025-01-08 RX ORDER — LIDOCAINE 40 MG/G
CREAM TOPICAL
Status: CANCELLED | OUTPATIENT
Start: 2025-01-08

## 2025-01-08 RX ORDER — NALOXONE HYDROCHLORIDE 0.4 MG/ML
0.2 INJECTION, SOLUTION INTRAMUSCULAR; INTRAVENOUS; SUBCUTANEOUS
Status: DISCONTINUED | OUTPATIENT
Start: 2025-01-08 | End: 2025-01-08

## 2025-01-08 RX ORDER — IODIXANOL 320 MG/ML
INJECTION, SOLUTION INTRAVASCULAR
Status: DISCONTINUED | OUTPATIENT
Start: 2025-01-08 | End: 2025-01-08 | Stop reason: HOSPADM

## 2025-01-08 RX ORDER — ASPIRIN 81 MG/1
243 TABLET, CHEWABLE ORAL ONCE
Status: COMPLETED | OUTPATIENT
Start: 2025-01-08 | End: 2025-01-08

## 2025-01-08 RX ORDER — NITROGLYCERIN 0.4 MG/1
0.4 TABLET SUBLINGUAL EVERY 5 MIN PRN
Status: DISCONTINUED | OUTPATIENT
Start: 2025-01-08 | End: 2025-01-10 | Stop reason: HOSPADM

## 2025-01-08 RX ORDER — CLOPIDOGREL BISULFATE 75 MG/1
TABLET ORAL
Status: DISCONTINUED | OUTPATIENT
Start: 2025-01-08 | End: 2025-01-08 | Stop reason: HOSPADM

## 2025-01-08 RX ORDER — SALIVA STIMULANT COMB. NO.3
1 SPRAY, NON-AEROSOL (ML) MUCOUS MEMBRANE 4 TIMES DAILY PRN
Status: DISCONTINUED | OUTPATIENT
Start: 2025-01-08 | End: 2025-01-10 | Stop reason: HOSPADM

## 2025-01-08 RX ORDER — SODIUM CHLORIDE 9 MG/ML
INJECTION, SOLUTION INTRAVENOUS CONTINUOUS
Status: DISCONTINUED | OUTPATIENT
Start: 2025-01-08 | End: 2025-01-08 | Stop reason: HOSPADM

## 2025-01-08 RX ORDER — LIDOCAINE 40 MG/G
CREAM TOPICAL
Status: DISCONTINUED | OUTPATIENT
Start: 2025-01-08 | End: 2025-01-08 | Stop reason: HOSPADM

## 2025-01-08 RX ORDER — CLOPIDOGREL BISULFATE 75 MG/1
75 TABLET ORAL DAILY
Qty: 90 TABLET | Refills: 3 | Status: SHIPPED | OUTPATIENT
Start: 2025-01-09

## 2025-01-08 RX ORDER — CALCIUM CARBONATE 500 MG/1
1000 TABLET, CHEWABLE ORAL 4 TIMES DAILY PRN
Status: CANCELLED | OUTPATIENT
Start: 2025-01-08

## 2025-01-08 RX ORDER — TRAZODONE HYDROCHLORIDE 50 MG/1
50 TABLET, FILM COATED ORAL AT BEDTIME
Status: DISCONTINUED | OUTPATIENT
Start: 2025-01-08 | End: 2025-01-10 | Stop reason: HOSPADM

## 2025-01-08 RX ORDER — PREDNISONE 1 MG/1
3 TABLET ORAL DAILY
Status: DISCONTINUED | OUTPATIENT
Start: 2025-01-08 | End: 2025-01-10 | Stop reason: HOSPADM

## 2025-01-08 RX ORDER — PANTOPRAZOLE SODIUM 40 MG/1
40 TABLET, DELAYED RELEASE ORAL DAILY
Status: CANCELLED | OUTPATIENT
Start: 2025-01-08

## 2025-01-08 RX ORDER — BISOPROLOL FUMARATE 5 MG/1
5 TABLET, FILM COATED ORAL DAILY
Status: CANCELLED | OUTPATIENT
Start: 2025-01-08

## 2025-01-08 RX ORDER — ATORVASTATIN CALCIUM 40 MG/1
80 TABLET, FILM COATED ORAL AT BEDTIME
Status: DISCONTINUED | OUTPATIENT
Start: 2025-01-08 | End: 2025-01-10 | Stop reason: HOSPADM

## 2025-01-08 RX ORDER — AMOXICILLIN 250 MG
1 CAPSULE ORAL 2 TIMES DAILY PRN
Status: DISCONTINUED | OUTPATIENT
Start: 2025-01-08 | End: 2025-01-10 | Stop reason: HOSPADM

## 2025-01-08 RX ORDER — BENZONATATE 100 MG/1
100 CAPSULE ORAL 3 TIMES DAILY PRN
Status: CANCELLED | OUTPATIENT
Start: 2025-01-08

## 2025-01-08 RX ORDER — OXYCODONE HYDROCHLORIDE 5 MG/1
5 TABLET ORAL EVERY 4 HOURS PRN
Status: CANCELLED | OUTPATIENT
Start: 2025-01-08

## 2025-01-08 RX ORDER — ASPIRIN 81 MG/1
81 TABLET ORAL DAILY
Status: DISCONTINUED | OUTPATIENT
Start: 2025-01-09 | End: 2025-01-08

## 2025-01-08 RX ORDER — BENZONATATE 100 MG/1
200 CAPSULE ORAL 3 TIMES DAILY
Status: CANCELLED | OUTPATIENT
Start: 2025-01-08

## 2025-01-08 RX ORDER — CETIRIZINE HYDROCHLORIDE 10 MG/1
10 TABLET ORAL EVERY EVENING
Status: CANCELLED | OUTPATIENT
Start: 2025-01-08

## 2025-01-08 RX ORDER — METHOCARBAMOL 500 MG/1
500 TABLET, FILM COATED ORAL 3 TIMES DAILY
Status: CANCELLED | OUTPATIENT
Start: 2025-01-08

## 2025-01-08 RX ORDER — HEPARIN SODIUM 200 [USP'U]/100ML
INJECTION, SOLUTION INTRAVENOUS
Status: DISCONTINUED | OUTPATIENT
Start: 2025-01-08 | End: 2025-01-08 | Stop reason: HOSPADM

## 2025-01-08 RX ORDER — CETIRIZINE HYDROCHLORIDE 10 MG/1
10 TABLET ORAL EVERY EVENING
Status: DISCONTINUED | OUTPATIENT
Start: 2025-01-08 | End: 2025-01-10 | Stop reason: HOSPADM

## 2025-01-08 RX ORDER — IPRATROPIUM BROMIDE AND ALBUTEROL SULFATE 2.5; .5 MG/3ML; MG/3ML
1 SOLUTION RESPIRATORY (INHALATION) DAILY
Status: CANCELLED | OUTPATIENT
Start: 2025-01-08

## 2025-01-08 RX ORDER — LIDOCAINE 40 MG/G
CREAM TOPICAL
Status: DISCONTINUED | OUTPATIENT
Start: 2025-01-08 | End: 2025-01-10 | Stop reason: HOSPADM

## 2025-01-08 RX ORDER — DIAZEPAM 5 MG/1
5 TABLET ORAL
Status: DISCONTINUED | OUTPATIENT
Start: 2025-01-08 | End: 2025-01-08 | Stop reason: HOSPADM

## 2025-01-08 RX ORDER — ASPIRIN 81 MG/1
81 TABLET ORAL DAILY
Status: DISCONTINUED | OUTPATIENT
Start: 2025-01-09 | End: 2025-01-10 | Stop reason: HOSPADM

## 2025-01-08 RX ORDER — ACETAMINOPHEN 325 MG/1
650 TABLET ORAL EVERY 4 HOURS PRN
Status: DISCONTINUED | OUTPATIENT
Start: 2025-01-08 | End: 2025-01-10 | Stop reason: HOSPADM

## 2025-01-08 RX ORDER — ONDANSETRON 2 MG/ML
4 INJECTION INTRAMUSCULAR; INTRAVENOUS EVERY 6 HOURS PRN
Status: DISCONTINUED | OUTPATIENT
Start: 2025-01-08 | End: 2025-01-08

## 2025-01-08 RX ORDER — ASPIRIN 81 MG/1
81 TABLET ORAL DAILY
Status: CANCELLED | OUTPATIENT
Start: 2025-01-08

## 2025-01-08 RX ORDER — ATORVASTATIN CALCIUM 40 MG/1
80 TABLET, FILM COATED ORAL AT BEDTIME
Status: CANCELLED | OUTPATIENT
Start: 2025-01-08

## 2025-01-08 RX ORDER — NALOXONE HYDROCHLORIDE 0.4 MG/ML
0.4 INJECTION, SOLUTION INTRAMUSCULAR; INTRAVENOUS; SUBCUTANEOUS
Status: DISCONTINUED | OUTPATIENT
Start: 2025-01-08 | End: 2025-01-08

## 2025-01-08 RX ORDER — DIAZEPAM 5 MG/1
5 TABLET ORAL
Status: DISCONTINUED | OUTPATIENT
Start: 2025-01-08 | End: 2025-01-08

## 2025-01-08 RX ORDER — HEPARIN SODIUM 1000 [USP'U]/ML
INJECTION, SOLUTION INTRAVENOUS; SUBCUTANEOUS
Status: DISCONTINUED | OUTPATIENT
Start: 2025-01-08 | End: 2025-01-08 | Stop reason: HOSPADM

## 2025-01-08 RX ORDER — HYDRALAZINE HYDROCHLORIDE 20 MG/ML
10 INJECTION INTRAMUSCULAR; INTRAVENOUS EVERY 4 HOURS PRN
Status: DISCONTINUED | OUTPATIENT
Start: 2025-01-08 | End: 2025-01-10 | Stop reason: HOSPADM

## 2025-01-08 RX ORDER — METHOCARBAMOL 500 MG/1
500 TABLET, FILM COATED ORAL 3 TIMES DAILY
Status: DISCONTINUED | OUTPATIENT
Start: 2025-01-08 | End: 2025-01-10 | Stop reason: HOSPADM

## 2025-01-08 RX ORDER — FENTANYL CITRATE 50 UG/ML
INJECTION, SOLUTION INTRAMUSCULAR; INTRAVENOUS
Status: DISCONTINUED | OUTPATIENT
Start: 2025-01-08 | End: 2025-01-08 | Stop reason: HOSPADM

## 2025-01-08 RX ORDER — PANTOPRAZOLE SODIUM 40 MG/1
40 TABLET, DELAYED RELEASE ORAL DAILY
Status: DISCONTINUED | OUTPATIENT
Start: 2025-01-09 | End: 2025-01-10 | Stop reason: HOSPADM

## 2025-01-08 RX ORDER — TRAZODONE HYDROCHLORIDE 50 MG/1
50 TABLET, FILM COATED ORAL AT BEDTIME
Status: CANCELLED | OUTPATIENT
Start: 2025-01-08

## 2025-01-08 RX ORDER — FENTANYL CITRATE 50 UG/ML
25 INJECTION, SOLUTION INTRAMUSCULAR; INTRAVENOUS
Status: DISCONTINUED | OUTPATIENT
Start: 2025-01-08 | End: 2025-01-08 | Stop reason: HOSPADM

## 2025-01-08 RX ORDER — AMOXICILLIN 250 MG
1 CAPSULE ORAL 2 TIMES DAILY PRN
Status: CANCELLED | OUTPATIENT
Start: 2025-01-08

## 2025-01-08 RX ORDER — OXYCODONE HYDROCHLORIDE 5 MG/1
5 TABLET ORAL EVERY 4 HOURS PRN
Status: DISCONTINUED | OUTPATIENT
Start: 2025-01-08 | End: 2025-01-10 | Stop reason: HOSPADM

## 2025-01-08 RX ORDER — NITROGLYCERIN 5 MG/ML
VIAL (ML) INTRAVENOUS
Status: DISCONTINUED | OUTPATIENT
Start: 2025-01-08 | End: 2025-01-08 | Stop reason: HOSPADM

## 2025-01-08 RX ORDER — CLOPIDOGREL BISULFATE 75 MG/1
75 TABLET ORAL DAILY
Status: DISCONTINUED | OUTPATIENT
Start: 2025-01-09 | End: 2025-01-10 | Stop reason: HOSPADM

## 2025-01-08 RX ORDER — SODIUM CHLORIDE 9 MG/ML
INJECTION, SOLUTION INTRAVENOUS CONTINUOUS
Status: ACTIVE | OUTPATIENT
Start: 2025-01-08 | End: 2025-01-08

## 2025-01-08 RX ORDER — ONDANSETRON 4 MG/1
4 TABLET, ORALLY DISINTEGRATING ORAL EVERY 6 HOURS PRN
Status: DISCONTINUED | OUTPATIENT
Start: 2025-01-08 | End: 2025-01-10 | Stop reason: HOSPADM

## 2025-01-08 RX ORDER — BENZONATATE 100 MG/1
200 CAPSULE ORAL 3 TIMES DAILY
Status: DISCONTINUED | OUTPATIENT
Start: 2025-01-08 | End: 2025-01-10 | Stop reason: HOSPADM

## 2025-01-08 RX ORDER — SODIUM CHLORIDE 9 MG/ML
INJECTION, SOLUTION INTRAVENOUS CONTINUOUS
Status: CANCELLED | OUTPATIENT
Start: 2025-01-08

## 2025-01-08 RX ORDER — FLUMAZENIL 0.1 MG/ML
0.2 INJECTION, SOLUTION INTRAVENOUS
Status: DISCONTINUED | OUTPATIENT
Start: 2025-01-08 | End: 2025-01-08

## 2025-01-08 RX ORDER — BISOPROLOL FUMARATE 5 MG/1
5 TABLET, FILM COATED ORAL DAILY
Status: DISCONTINUED | OUTPATIENT
Start: 2025-01-09 | End: 2025-01-10 | Stop reason: HOSPADM

## 2025-01-08 RX ORDER — FENTANYL CITRATE 50 UG/ML
25 INJECTION, SOLUTION INTRAMUSCULAR; INTRAVENOUS
Status: DISCONTINUED | OUTPATIENT
Start: 2025-01-08 | End: 2025-01-08

## 2025-01-08 RX ORDER — ONDANSETRON 2 MG/ML
4 INJECTION INTRAMUSCULAR; INTRAVENOUS EVERY 6 HOURS PRN
Status: CANCELLED | OUTPATIENT
Start: 2025-01-08

## 2025-01-08 RX ORDER — SALIVA STIMULANT COMB. NO.3
1 SPRAY, NON-AEROSOL (ML) MUCOUS MEMBRANE 4 TIMES DAILY PRN
Status: CANCELLED | OUTPATIENT
Start: 2025-01-08

## 2025-01-08 RX ORDER — BENZONATATE 100 MG/1
100 CAPSULE ORAL 3 TIMES DAILY PRN
Status: DISCONTINUED | OUTPATIENT
Start: 2025-01-08 | End: 2025-01-10 | Stop reason: HOSPADM

## 2025-01-08 RX ORDER — PREGABALIN 75 MG/1
150 CAPSULE ORAL 3 TIMES DAILY
Status: CANCELLED | OUTPATIENT
Start: 2025-01-08

## 2025-01-08 RX ORDER — AMOXICILLIN 250 MG
2 CAPSULE ORAL 2 TIMES DAILY PRN
Status: CANCELLED | OUTPATIENT
Start: 2025-01-08

## 2025-01-08 RX ORDER — ASPIRIN 81 MG/1
81 TABLET ORAL DAILY
Qty: 30 TABLET | Refills: 3 | Status: SHIPPED | OUTPATIENT
Start: 2025-01-09

## 2025-01-08 RX ORDER — AMOXICILLIN 250 MG
2 CAPSULE ORAL 2 TIMES DAILY PRN
Status: DISCONTINUED | OUTPATIENT
Start: 2025-01-08 | End: 2025-01-10 | Stop reason: HOSPADM

## 2025-01-08 RX ORDER — LANOLIN ALCOHOL/MO/W.PET/CERES
1000 CREAM (GRAM) TOPICAL DAILY
Status: DISCONTINUED | OUTPATIENT
Start: 2025-01-08 | End: 2025-01-10 | Stop reason: HOSPADM

## 2025-01-08 RX ADMIN — LOSARTAN POTASSIUM 12.5 MG: 25 TABLET, FILM COATED ORAL at 08:22

## 2025-01-08 RX ADMIN — PANTOPRAZOLE SODIUM 40 MG: 40 TABLET, DELAYED RELEASE ORAL at 08:21

## 2025-01-08 RX ADMIN — OXYCODONE 5 MG: 5 TABLET ORAL at 02:53

## 2025-01-08 RX ADMIN — SODIUM CHLORIDE: 9 INJECTION, SOLUTION INTRAVENOUS at 15:51

## 2025-01-08 RX ADMIN — ACETAMINOPHEN 650 MG: 325 TABLET ORAL at 17:04

## 2025-01-08 RX ADMIN — ACETAMINOPHEN 650 MG: 325 TABLET ORAL at 21:55

## 2025-01-08 RX ADMIN — METHOCARBAMOL 500 MG: 500 TABLET ORAL at 19:51

## 2025-01-08 RX ADMIN — ASPIRIN 325 MG ORAL TABLET 325 MG: 325 PILL ORAL at 08:21

## 2025-01-08 RX ADMIN — BISOPROLOL FUMARATE 5 MG: 5 TABLET ORAL at 08:22

## 2025-01-08 RX ADMIN — BENZONATATE 200 MG: 100 CAPSULE ORAL at 08:21

## 2025-01-08 RX ADMIN — IPRATROPIUM BROMIDE AND ALBUTEROL SULFATE 3 ML: .5; 3 SOLUTION RESPIRATORY (INHALATION) at 07:19

## 2025-01-08 RX ADMIN — PREGABALIN 150 MG: 75 CAPSULE ORAL at 19:51

## 2025-01-08 RX ADMIN — ATORVASTATIN CALCIUM 80 MG: 40 TABLET, FILM COATED ORAL at 21:55

## 2025-01-08 RX ADMIN — BENZONATATE 200 MG: 100 CAPSULE ORAL at 17:05

## 2025-01-08 RX ADMIN — PREGABALIN 150 MG: 75 CAPSULE ORAL at 08:22

## 2025-01-08 RX ADMIN — BENZONATATE 200 MG: 100 CAPSULE ORAL at 21:58

## 2025-01-08 RX ADMIN — CETIRIZINE HYDROCHLORIDE 10 MG: 10 TABLET, FILM COATED ORAL at 19:51

## 2025-01-08 RX ADMIN — METHOCARBAMOL 500 MG: 500 TABLET ORAL at 08:21

## 2025-01-08 RX ADMIN — CYANOCOBALAMIN TAB 1000 MCG 1000 MCG: 1000 TAB at 19:51

## 2025-01-08 RX ADMIN — TRAZODONE HYDROCHLORIDE 50 MG: 50 TABLET ORAL at 21:55

## 2025-01-08 ASSESSMENT — ACTIVITIES OF DAILY LIVING (ADL)
ADLS_ACUITY_SCORE: 48
ADLS_ACUITY_SCORE: 45
ADLS_ACUITY_SCORE: 46
ADLS_ACUITY_SCORE: 58
ADLS_ACUITY_SCORE: 58
ADLS_ACUITY_SCORE: 46
ADLS_ACUITY_SCORE: 65
ADLS_ACUITY_SCORE: 48
ADLS_ACUITY_SCORE: 58
ADLS_ACUITY_SCORE: 46
ADLS_ACUITY_SCORE: 58
ADLS_ACUITY_SCORE: 46
ADLS_ACUITY_SCORE: 48
ADLS_ACUITY_SCORE: 45
ADLS_ACUITY_SCORE: 48
ADLS_ACUITY_SCORE: 45
ADLS_ACUITY_SCORE: 45
ADLS_ACUITY_SCORE: 46
ADLS_ACUITY_SCORE: 45
ADLS_ACUITY_SCORE: 45
ADLS_ACUITY_SCORE: 46

## 2025-01-08 ASSESSMENT — EJECTION FRACTION: EF_VALUE: .28

## 2025-01-08 NOTE — Clinical Note
Stent deployed in the middle left anterior descending. Max pressure = 14 nabeel. Total duration = 23 seconds.

## 2025-01-08 NOTE — INTERVAL H&P NOTE
"I have reviewed the surgical (or preoperative) H&P that is linked to this encounter, and examined the patient. There are no significant changes    Clinical Conditions Present on Arrival:  Clinically Significant Risk Factors Present on Admission                  # Drug Induced Platelet Defect: home medication list includes an antiplatelet medication      # Overweight: Estimated body mass index is 26.46 kg/m  as calculated from the following:    Height as of 1/6/25: 1.651 m (5' 5\").    Weight as of an earlier encounter on 1/8/25: 72.1 kg (159 lb).       "

## 2025-01-08 NOTE — PHARMACY-ADMISSION MEDICATION HISTORY
Admission medication history completed at [facility]. Please see Pharmacist Admission Medication History note from 01/05/2025.

## 2025-01-08 NOTE — PROGRESS NOTES
General Surgery Progress Note:    Hospital Day # 3    ASSESSMENT:   1. Pneumothorax on right    2. Closed fracture of one rib of right side, initial encounter    3. Elevated troponin    4. Acute respiratory failure with hypoxia (H)      Ubaldo Ramos is a 79 year old male past medical history of multiple co-morbidities notable for PAD on DAPT is currently admitted status post mechanical fall with a right rib fracture and posttraumatic small pneumothorax, NSTEMI who is status post pigtail chest tube placement and removal yesterday afternoon.     Post pull film without e/o PTX. Reports persistent right chest wall pain that has improved from yesterday and alleviated with analgesics. He is HDS, AF with a stable oxygen requirement of 2 L NC. Right chest wall occlusive dressing intact, to remain until 01/09 at 1300.     PLAN:   -Occlusive Right chest wall dressing to remain until 01/09 at 1300, keep dry  -Instruction placed in AVS as well as patient care order  -Continue multimodal pain control with gradual taper  -Aggressive pulmonary toilet  -No dietary restrictions from general surgical perspective   -Encourage movement/activity to promote ambulation  -Continue medical mgmt per [facility] [place]  -No further indication for chest tube placement or operative intervention  -Surgery to sign off  -Please page with any questions/concerns    SUBJECTIVE:   Ubaldo Ramos is reporting feeling better however with intermittent right chest wall pain that is rated 4 out of 10 at its worst with coughing relieved at rest as well as with analgesics.  He denies shortness of breath, chest pain, palpitations, fever, chills nausea or vomiting.  Endorsing flatus as well as spontaneously voiding.  Occlusive dressing examined at bedside that is intact.     Patient Vitals for the past 24 hrs:   BP Temp Temp src Pulse Resp SpO2 Weight   01/08/25 0810 128/60 97.4  F (36.3  C) Oral 70 18 98 % --   01/08/25 0719 -- -- -- -- 16 98 % --   01/08/25 0519 -- -- -- --  -- -- 72.1 kg (159 lb)   01/08/25 0345 115/58 98.1  F (36.7  C) Oral 63 16 -- --   01/07/25 2336 100/57 97.7  F (36.5  C) Oral 63 20 93 % --   01/07/25 2011 107/59 98.4  F (36.9  C) Oral 76 16 95 % --   01/07/25 1612 121/66 98.3  F (36.8  C) Oral 69 24 91 % --   01/07/25 1422 -- -- -- -- -- 93 % --   01/07/25 1420 -- -- -- -- -- (!) 89 % --   01/07/25 1400 -- -- -- -- -- (!) 88 % --   01/07/25 1100 121/59 97.8  F (36.6  C) Oral -- 22 -- --       Physical Exam:  General: patient seen resting in bed, no acute distress  Resp: no respiratory distress, breathing comfortably on 2 L nasal cannula.  Right chest wall with overlying occlusive dressing consisting of Xeroform gauze and multiple layers of Tegaderm.  Posterior lateral chest wall with ecchymosis as well as tenderness to palpation without appreciable crepitus.   Extremities: warm and well perfused    Admission on 01/05/2025   Component Date Value    Sodium 01/05/2025 136     Potassium 01/05/2025 4.0     Carbon Dioxide (CO2) 01/05/2025 23     Anion Gap 01/05/2025 15     Urea Nitrogen 01/05/2025 27.9 (H)     Creatinine 01/05/2025 0.95     GFR Estimate 01/05/2025 82     Calcium 01/05/2025 9.8     Chloride 01/05/2025 98     Glucose 01/05/2025 154 (H)     Alkaline Phosphatase 01/05/2025 104     AST 01/05/2025 31     ALT 01/05/2025 21     Protein Total 01/05/2025 7.1     Albumin 01/05/2025 4.4     Bilirubin Total 01/05/2025 0.4     pH Venous 01/05/2025 7.27 (L)     pCO2 Venous 01/05/2025 64 (H)     pO2 Venous 01/05/2025 32     Bicarbonate Venous 01/05/2025 30 (H)     Base Excess/Deficit Veno* 01/05/2025 1.1     FIO2 01/05/2025 41     Oxyhemoglobin Venous 01/05/2025 53 (L)     O2 Sat, Venous 01/05/2025 54.6 (L)     Lactic Acid, Initial 01/05/2025 1.5     N terminal Pro BNP Inpat* 01/05/2025 783     Troponin T, High Sensiti* 01/05/2025 540 (HH)     Influenza A PCR 01/05/2025 Negative     Influenza B PCR 01/05/2025 Negative     RSV PCR 01/05/2025 Negative     SARS CoV2 PCR  01/05/2025 Negative     Systolic Blood Pressure 01/05/2025 156     Diastolic Blood Pressure 01/05/2025 93     Ventricular Rate 01/05/2025 100     Atrial Rate 01/05/2025 100     HI Interval 01/05/2025 140     QRS Duration 01/05/2025 138     QT 01/05/2025 366     QTc 01/05/2025 472     P Axis 01/05/2025 62     R AXIS 01/05/2025 209     T [place] Line 01/05/2025 33     Interpretation ECG 01/05/2025                      Value:Sinus rhythm with occasional Premature ventricular complexes  Right bundle branch block  Inferior infarct , age undetermined  Anterior infarct (cited on or before 02-Aug-2022)  Abnormal ECG  When compared with ECG of 27-Apr-2024 12:33,  Right bundle branch block is now Present  Inferior infarct is now Present  Questionable change in initial forces of Anterior leads  Confirmed by SEE ED PROVIDER NOTE FOR, ECG INTERPRETATION (4000),  NANNETTE CARDOZO ([id number]) on 1/5/2025 12:15:26 PM      WBC Count 01/05/2025 12.8 (H)     RBC Count 01/05/2025 3.71 (L)     Hemoglobin 01/05/2025 11.4 (L)     Hematocrit 01/05/2025 34.2 (L)     MCV 01/05/2025 92     MCH 01/05/2025 30.7     MCHC 01/05/2025 33.3     RDW 01/05/2025 14.6     Platelet Count 01/05/2025 244     % Neutrophils 01/05/2025 80     % Lymphocytes 01/05/2025 10     % Monocytes 01/05/2025 9     % Eosinophils 01/05/2025 0     % Basophils 01/05/2025 0     % Immature Granulocytes 01/05/2025 1     NRBCs per 100 WBC 01/05/2025 0     Absolute Neutrophils 01/05/2025 10.2 (H)     Absolute Lymphocytes 01/05/2025 1.3     Absolute Monocytes 01/05/2025 1.1     Absolute Eosinophils 01/05/2025 0.1     Absolute Basophils 01/05/2025 0.0     Absolute Immature Granul* 01/05/2025 0.1     Absolute NRBCs 01/05/2025 0.0     Hold Specimen 01/05/2025 JIC     Hold Specimen 01/05/2025 JI     Troponin T, High Sensiti* 01/05/2025 1,258 (HH)     Systolic Blood Pressure 01/05/2025 115     Diastolic Blood Pressure 01/05/2025 72     Ventricular Rate 01/05/2025 90     Atrial Rate  01/05/2025 90     WA Interval 01/05/2025 174     QRS Duration 01/05/2025 108     QT 01/05/2025 358     QTc 01/05/2025 437     P Axis 01/05/2025 71     R AXIS 01/05/2025 158     T Axis 01/05/2025 69     Interpretation ECG 01/05/2025                      Value:Sinus rhythm with sinus arrhythmia with occasional Premature ventricular complexes  Right ventricular hypertrophy with repolarization abnormality  Septal infarct Lateral infarct Inferior injury pattern  ** ** ACUTE MI / STEMI ** **  Abnormal ECG  When compared with ECG of 05-Jan-2025 12:03,  Significant changes have occurred  Confirmed by SEE ED PROVIDER NOTE FOR, ECG INTERPRETATION (4000),  NANNETTE CARDOZO ([id number]) on 1/5/2025 1:21:54 PM      Anti Xa Unfractionated H* 01/05/2025 0.37     Troponin T, High Sensiti* 01/05/2025 940 (HH)     Anti Xa Unfractionated H* 01/06/2025 0.77     Troponin T, High Sensiti* 01/06/2025 838 (HH)     Troponin T, High Sensiti* 01/06/2025 736 (HH)     Sodium 01/06/2025 138     Potassium 01/06/2025 4.6     Chloride 01/06/2025 100     Carbon Dioxide (CO2) 01/06/2025 27     Anion Gap 01/06/2025 11     Urea Nitrogen 01/06/2025 27.9 (H)     Creatinine 01/06/2025 0.93     GFR Estimate 01/06/2025 84     Calcium 01/06/2025 9.4     Glucose 01/06/2025 129 (H)     WBC Count 01/06/2025 7.7     RBC Count 01/06/2025 3.79 (L)     Hemoglobin 01/06/2025 11.5 (L)     Hematocrit 01/06/2025 34.8 (L)     MCV 01/06/2025 92     MCH 01/06/2025 30.3     MCHC 01/06/2025 33.0     RDW 01/06/2025 14.7     Platelet Count 01/06/2025 194     Anti Xa Unfractionated H* 01/06/2025 0.07     LVEF  01/06/2025 35-40% (moderately reduced)     Hemoglobin 01/06/2025 11.4 (L)     WBC Count 01/07/2025 10.2     RBC Count 01/07/2025 4.19 (L)     Hemoglobin 01/07/2025 12.8 (L)     Hematocrit 01/07/2025 38.7 (L)     MCV 01/07/2025 92     MCH 01/07/2025 30.5     MCHC 01/07/2025 33.1     RDW 01/07/2025 14.7     Platelet Count 01/07/2025 187     Sodium 01/07/2025 139      Potassium 01/07/2025 3.9     Chloride 01/07/2025 101     Carbon Dioxide (CO2) 01/07/2025 26     Anion Gap 01/07/2025 12     Urea Nitrogen 01/07/2025 30.7 (H)     Creatinine 01/07/2025 0.87     GFR Estimate 01/07/2025 88     Calcium 01/07/2025 9.6     Glucose 01/07/2025 108 (H)     ABO/RH(D) 01/08/2025 O NEG     Antibody Screen 01/08/2025 Negative     SPECIMEN EXPIRATION DATE 01/08/2025 [id number]       Alexandra Hernandez PA-C  [facility]  Surgery [address]  [address]  [place] [address]?  Office: [phone]

## 2025-01-08 NOTE — Clinical Note
The first balloon was inserted into the left anterior descending and middle left anterior descending.Max pressure = 14 nabeel. Total duration = 9 seconds.

## 2025-01-08 NOTE — PRE-PROCEDURE
GENERAL PRE-PROCEDURE:   Procedure:  Coronary angiogram with possible PCI  Date/Time:  1/8/2025 1:39 PM    Written consent obtained?: Yes    Risks and benefits: Risks, benefits and alternatives were discussed    Consent given by:  Patient  Patient states understanding of procedure being performed: Yes    Patient's understanding of procedure matches consent: Yes    Procedure consent matches procedure scheduled: Yes    Expected level of sedation:  Moderate  Appropriately NPO:  Yes  ASA Class:  4 (elevated troponin, cardiomyopathy, severe coronary calcifications on CT, hypercholesterolemia, traumatic pneumothorax, COPD, hx of Lung CA; s/p lobectomy, hx of prostate CA)  Mallampati  :  Grade 2- soft palate, base of uvula, tonsillar pillars, and portion of posterior pharyngeal wall visible  Lungs:  Lungs clear with good breath sounds bilaterally  Heart:  Normal heart sounds and rate  History & Physical reviewed:  History and physical reviewed and updates made (see comment)  H&P Comments:  Clinically Significant Risk Factors Present on Admission    Cardiovascular : elevated troponin, cardiomyopathy, severe coronary calcifications on CT, hypercholesterolemia    Fluid & Electrolyte Disorders : Not present on admission    Gastroenterology : Not present on admission    Hematology/Oncology : hx of Lung CA; s/p lobectomy, hx of prostate CA    Nephrology : Not present on admission    Neurology : Not present on admission    Pulmonology : traumatic pneumothorax, COPD, hx of Lung CA; s/p lobectomy    Systemic : Not present on admission    Statement of review:  I have reviewed the lab findings, diagnostic data, medications, and the plan for sedation

## 2025-01-08 NOTE — Clinical Note
The first balloon was inserted into the left anterior descending and middle left anterior descending.Max pressure = 10 nabeel. Total duration = 14 seconds.

## 2025-01-08 NOTE — Clinical Note
The first balloon was inserted into the left anterior descending and middle left anterior descending.Max pressure = 10 nabeel. Total duration = 6 seconds.     Max pressure = 14 nabeel. Total duration = 7 seconds.    Balloon reinflated a second time: Max pressure = 14 nabeel. Total duration = 7 seconds.  Balloon reinflated a third time: Max pressure = 14 nabeel. Total duration = 6 seconds.

## 2025-01-08 NOTE — H&P (VIEW-ONLY)
[facility]   [address]  [place] [address]   [email or web address]   OFFICE: [phone]     CARDIOLOGY FOLLOW-UP NOTE      Impression and Plan     Impression:   Severe coronary artery calcification - noted on CT PE (report below)  Elevated troponin - likely demand ischemia from a combination of pneumothorax on underlying coronary disease. Did not have anginal pain on presentation.  Newly identified cardiomyopathy, suspect secondary to ischemic heart disease.   Acute traumatic pneumothorax s/p chest tube placement, now removed.  COPD  History of lung cancer s/p lobectomy and prostate cancer.    Plan:  Recommend coronary angiogram with possible PCI today. Discussed with the patient the risks and benefits of left heart catheterization with coronary angiogram including possible PCI. The risks include but are not limited to death, myocardial infarction, stroke, kidney dysfunction, vessel trauma, hemorrhage, need for emergency corrective surgery, allergy, and dysrhythmia.  Discontinued home propranolol and replaced with bisoprolol  Discontinue cilostazol with new cardiomyopathy (contraindicated)  Continue aspirin  continue losartan 12.5 mg daily.  Encouraged incentive spirometer. Consider use of flutter valve or other RT therapy to help with lung expansion to prevent     Primary Cardiologist: not previously established.    Subjective     No SOB with removal of chest tube. Still has some discomfort with coughing.     Cardiac Diagnostics   Telemetry (personally reviewed): sinus rhythm    Echocardiogram (results reviewed):   TTE 1/6/25  Left ventricular function is decreased. The ejection fraction is 35-40% (moderately reduced).  There is severe hypokinesis of the mid to apical septal, mid to apical inferior, apex, and apical lateral wall segments.  Normal right ventricle size and systolic function.  Normal left atrial size.  The aortic valve is trileaflet with aortic  "valve sclerosis.  There is no comparison study available.      Physical Examination       /58 (BP Location: Left arm)   Pulse 63   Temp 98.1  F (36.7  C) (Oral)   Resp 16   Ht 1.651 m (5' 5\")   Wt 72.1 kg (159 lb)   SpO2 98%   BMI 26.46 kg/m          Intake/Output Summary (Last 24 hours) at 1/7/2025 1009  Last data filed at 1/6/2025 1530  Gross per 24 hour   Intake --   Output 15 ml   Net -15 ml       General: pleasant male. No acute distress.   Neck: No JVD  Lungs: clear to auscultation  COR: normal rate, regular rhythm, No murmurs, rubs, or gallops  Extrem: No edema         Imaging      Chest x-ray 1/7/25  IMPRESSION: Right chest tube removed. No visible pneumothorax. Slightly increased left basilar atelectasis. No pleural effusion. Stable enlarged cardiac silhouette. Normal pulmonary vascularity. Stable left rib fracture.     Lab Results   Lab Results   Component Value Date    CHOL 124 03/19/2024    HDL 34 (L) 03/19/2024    TRIG 301 (H) 03/19/2024    BUN 30.7 (H) 01/07/2025     01/07/2025    CO2 26 01/07/2025       Lab Results   Component Value Date    WBC 10.2 01/07/2025    HGB 12.8 (L) 01/07/2025    HCT 38.7 (L) 01/07/2025    MCV 92 01/07/2025     01/07/2025       Lab Results   Component Value Date    TROPONINI 0.02 07/31/2022    BNP 74 07/31/2022    TSH 0.46 04/27/2024    INR 1.02 04/27/2024               Current Inpatient Scheduled Medications   Scheduled Meds:  Current Facility-Administered Medications   Medication Dose Route Frequency Provider Last Rate Last Admin    acetaminophen (TYLENOL) tablet 650 mg  650 mg Oral Q6H Alexandra Hernandez PA   650 mg at 01/07/25 2200    aspirin (ASA) tablet 325 mg  325 mg Oral Once SatyaamitsioAnnmarie dodge CNP        Or    aspirin (ASA) chewable tablet 243 mg  243 mg Oral Once Annmarie Ball CNP        aspirin EC tablet 81 mg  81 mg Oral Daily Alton Hernadez MD        atorvastatin (LIPITOR) tablet 80 mg  80 mg Oral At Bedtime Raina, " Anthony ZAMORA MD   80 mg at 01/07/25 2200    benzonatate (TESSALON) capsule 200 mg  200 mg Oral TID Anthony Paris MD   200 mg at 01/07/25 2004    bisoprolol (ZEBETA) tablet 5 mg  5 mg Oral Daily Alton Hernadez MD   5 mg at 01/07/25 1149    cetirizine (zyrTEC) tablet 10 mg  10 mg Oral QPM Anthony Paris MD   10 mg at 01/07/25 2004    [Held by provider] clopidogrel (PLAVIX) tablet 75 mg  75 mg Oral Daily Anthony Paris MD        ipratropium - albuterol 0.5 mg/2.5 mg/3 mL (DUONEB) neb solution 3 mL  1 vial Nebulization Daily Anthony Paris MD   3 mL at 01/08/25 0719    losartan (COZAAR) half-tab 12.5 mg  12.5 mg Oral Daily Alton Hernadez MD        methocarbamol (ROBAXIN) tablet 500 mg  500 mg Oral TID Alexandra Hernandez PA   500 mg at 01/07/25 2004    [Held by provider] oxyBUTYnin ER (DITROPAN XL) 24 hr tablet 10 mg  10 mg Oral Daily Anthony Paris MD        pantoprazole (PROTONIX) EC tablet 40 mg  40 mg Oral Daily Anthony Paris MD   40 mg at 01/07/25 0857    [Held by provider] predniSONE (DELTASONE) half-tab 3 mg  3 mg Oral Daily Anthony Paris MD        pregabalin (LYRICA) capsule 150 mg  150 mg Oral TID Anthony Paris MD   150 mg at 01/07/25 2004    sodium chloride (PF) 0.9% PF flush 3 mL  3 mL Intracatheter Q8H Anthony Paris MD   3 mL at 01/07/25 0356    traZODone (DESYREL) tablet 50 mg  50 mg Oral At Bedtime Anthony Paris MD   50 mg at 01/07/25 2200     Continuous Infusions:  Current Facility-Administered Medications   Medication Dose Route Frequency Provider Last Rate Last Admin    heparin 25,000 units in 0.45% NaCl 250 mL ANTICOAGULANT infusion  0-5,000 Units/hr Intravenous Continuous Anthony Paris MD   Paused at 01/06/25 0928    Patient is already receiving anticoagulation with heparin, enoxaparin (LOVENOX), warfarin (COUMADIN)  or other anticoagulant medication   Does not apply Continuous PRN Anthony Paris MD                Medications Prior to Admission   Prior to Admission medications    Medication Sig  Start Date End Date Taking? Authorizing Provider   aspirin 81 MG EC tablet [ASPIRIN 81 MG EC TABLET] Take 81 mg by mouth daily. 6/24/21  Yes Provider, Historical   atorvastatin (LIPITOR) 80 MG tablet Take 1 tablet (80 mg) by mouth at bedtime 3/19/24  Yes John Oconnor NP   benzonatate (TESSALON) 200 MG capsule Take 1 capsule (200 mg) by mouth 3 times daily as needed for cough. 12/27/24  Yes John Oconnor NP   cetirizine (ZYRTEC) 10 MG tablet Take 10 mg by mouth every evening   Yes Unknown, Entered By History   cholecalciferol, vitamin D3, (VITAMIN D3) 25 mcg (1,000 unit) capsule [CHOLECALCIFEROL, VITAMIN D3, (VITAMIN D3) 25 MCG (1,000 UNIT) CAPSULE] Take 1,000 Units by mouth daily. 6/24/21  Yes Provider, Historical   cilostazol (PLETAL) 100 MG tablet Take 1 tablet (100 mg) by mouth 2 times daily 2/20/24  Yes Miguel Callejas MD   clopidogrel (PLAVIX) 75 MG tablet TAKE 1 TABLET BY MOUTH EVERY DAY 9/29/24  Yes John Oconnor NP   cyanocobalamin 1000 MCG tablet [CYANOCOBALAMIN 1000 MCG TABLET] Take 1,000 mcg by mouth daily. 6/24/21  Yes Provider, Historical   ipratropium - albuterol 0.5 mg/2.5 mg/3 mL (DUONEB) 0.5-2.5 (3) MG/3ML neb solution Take 1 vial by nebulization daily   Yes Unknown, Entered By History   losartan-hydrochlorothiazide (HYZAAR) 100-25 MG tablet Take 1 tablet by mouth daily 3/19/24  Yes John Oconnor NP   oxyBUTYnin ER (DITROPAN XL) 10 MG 24 hr tablet Take 1 tablet (10 mg) by mouth daily 3/19/24  Yes John Oconnor NP   pantoprazole (PROTONIX) 40 MG EC tablet TAKE 1 TABLET BY MOUTH ONCE DAILY 9/27/24  Yes John Oconnor NP   predniSONE (DELTASONE) 10 MG tablet 4 tabs for 3 days. 3 tabs for 3 days. 2 tabs for 3 days. 1 tab for 3 days. 12/29/24  Yes John Oconnor NP   pregabalin (LYRICA) 150 MG capsule Take 150 mg by mouth 3 times daily.   Yes Unknown, Entered By History   propranolol (INDERAL) 20 MG tablet TAKE 1 TABLET BY MOUTH TWICE A DAY 6/17/24  Yes John Oconnor NP    traZODone (DESYREL) 50 MG tablet Take 1 tablet (50 mg) by mouth at bedtime 7/17/24  Yes John Oconnor, NP

## 2025-01-08 NOTE — PLAN OF CARE
Patient alert and oriented. Family at bed side. Denies chest pain or shortness of breath. VSS. Incontinent of bowel and bladder. Plan Coronary Angiogram today. Pre procedure Aspirin given, type and screen completed, patient remained NPO. NORM MARTINES. Report to [facility] RN. Transfer to [facility] for coronary angiogram via [facility] stretcher transport.    Problem: Adult Inpatient Plan of Care  Goal: Readiness for Transition of Care  Outcome: Adequate for Care Transition
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
## 2025-01-08 NOTE — PLAN OF CARE
Goal Outcome Evaluation:       A&O, VSS with soft pressures. PRN oxy given for pain, pt has been resting comfortably overnight. 1-2L via nasal cannula. Frequent cough, but did not want PRN cough medication. Lung sounds are coarse and diminished.                  Problem: Gas Exchange Impaired  Goal: Optimal Gas Exchange  Outcome: Progressing  Intervention: Optimize Oxygenation and Ventilation  Recent Flowsheet Documentation  Taken 1/8/2025 0045 by Christelle Parmar, RN  Head of Bed (HOB) Positioning: HOB at 20-30 degrees  Taken 1/7/2025 2000 by Christelle Parmar, RN  Head of Bed (HOB) Positioning: HOB at 20-30 degrees     Problem: Respiratory Compromise (Pneumothorax)  Goal: Optimal Oxygenation and Ventilation  1/8/2025 0433 by Christelle Parmar, RN  Outcome: Progressing  1/8/2025 0417 by Christelle Parmar, RN  Outcome: Progressing

## 2025-01-08 NOTE — PROGRESS NOTES
[facility]   [address]  [place] [address]   [email or web address]   OFFICE: [phone]     CARDIOLOGY FOLLOW-UP NOTE      Impression and Plan     Impression:   Severe coronary artery calcification - noted on CT PE (report below)  Elevated troponin - likely demand ischemia from a combination of pneumothorax on underlying coronary disease. Did not have anginal pain on presentation.  Newly identified cardiomyopathy, suspect secondary to ischemic heart disease.   Acute traumatic pneumothorax s/p chest tube placement, now removed.  COPD  History of lung cancer s/p lobectomy and prostate cancer.    Plan:  Recommend coronary angiogram with possible PCI today. Discussed with the patient the risks and benefits of left heart catheterization with coronary angiogram including possible PCI. The risks include but are not limited to death, myocardial infarction, stroke, kidney dysfunction, vessel trauma, hemorrhage, need for emergency corrective surgery, allergy, and dysrhythmia.  Discontinued home propranolol and replaced with bisoprolol  Discontinue cilostazol with new cardiomyopathy (contraindicated)  Continue aspirin  continue losartan 12.5 mg daily.  Encouraged incentive spirometer. Consider use of flutter valve or other RT therapy to help with lung expansion to prevent     Primary Cardiologist: not previously established.    Subjective     No SOB with removal of chest tube. Still has some discomfort with coughing.     Cardiac Diagnostics   Telemetry (personally reviewed): sinus rhythm    Echocardiogram (results reviewed):   TTE 1/6/25  Left ventricular function is decreased. The ejection fraction is 35-40% (moderately reduced).  There is severe hypokinesis of the mid to apical septal, mid to apical inferior, apex, and apical lateral wall segments.  Normal right ventricle size and systolic function.  Normal left atrial size.  The aortic valve is trileaflet with aortic  "valve sclerosis.  There is no comparison study available.      Physical Examination       /58 (BP Location: Left arm)   Pulse 63   Temp 98.1  F (36.7  C) (Oral)   Resp 16   Ht 1.651 m (5' 5\")   Wt 72.1 kg (159 lb)   SpO2 98%   BMI 26.46 kg/m          Intake/Output Summary (Last 24 hours) at 1/7/2025 1009  Last data filed at 1/6/2025 1530  Gross per 24 hour   Intake --   Output 15 ml   Net -15 ml       General: pleasant male. No acute distress.   Neck: No JVD  Lungs: clear to auscultation  COR: normal rate, regular rhythm, No murmurs, rubs, or gallops  Extrem: No edema         Imaging      Chest x-ray 1/7/25  IMPRESSION: Right chest tube removed. No visible pneumothorax. Slightly increased left basilar atelectasis. No pleural effusion. Stable enlarged cardiac silhouette. Normal pulmonary vascularity. Stable left rib fracture.     Lab Results   Lab Results   Component Value Date    CHOL 124 03/19/2024    HDL 34 (L) 03/19/2024    TRIG 301 (H) 03/19/2024    BUN 30.7 (H) 01/07/2025     01/07/2025    CO2 26 01/07/2025       Lab Results   Component Value Date    WBC 10.2 01/07/2025    HGB 12.8 (L) 01/07/2025    HCT 38.7 (L) 01/07/2025    MCV 92 01/07/2025     01/07/2025       Lab Results   Component Value Date    TROPONINI 0.02 07/31/2022    BNP 74 07/31/2022    TSH 0.46 04/27/2024    INR 1.02 04/27/2024               Current Inpatient Scheduled Medications   Scheduled Meds:  Current Facility-Administered Medications   Medication Dose Route Frequency Provider Last Rate Last Admin    acetaminophen (TYLENOL) tablet 650 mg  650 mg Oral Q6H Alexandra Hernandez PA   650 mg at 01/07/25 2200    aspirin (ASA) tablet 325 mg  325 mg Oral Once SatyaamitsioAnnmarie dodge CNP        Or    aspirin (ASA) chewable tablet 243 mg  243 mg Oral Once Annmarie Ball CNP        aspirin EC tablet 81 mg  81 mg Oral Daily Alton Hernadez MD        atorvastatin (LIPITOR) tablet 80 mg  80 mg Oral At Bedtime Raina, " Anthony ZAMORA MD   80 mg at 01/07/25 2200    benzonatate (TESSALON) capsule 200 mg  200 mg Oral TID Anthony Paris MD   200 mg at 01/07/25 2004    bisoprolol (ZEBETA) tablet 5 mg  5 mg Oral Daily Alton Hernadez MD   5 mg at 01/07/25 1149    cetirizine (zyrTEC) tablet 10 mg  10 mg Oral QPM Anthony Paris MD   10 mg at 01/07/25 2004    [Held by provider] clopidogrel (PLAVIX) tablet 75 mg  75 mg Oral Daily Anthony Paris MD        ipratropium - albuterol 0.5 mg/2.5 mg/3 mL (DUONEB) neb solution 3 mL  1 vial Nebulization Daily Anthony Paris MD   3 mL at 01/08/25 0719    losartan (COZAAR) half-tab 12.5 mg  12.5 mg Oral Daily Alton Hernadez MD        methocarbamol (ROBAXIN) tablet 500 mg  500 mg Oral TID Alexandra Hernandez PA   500 mg at 01/07/25 2004    [Held by provider] oxyBUTYnin ER (DITROPAN XL) 24 hr tablet 10 mg  10 mg Oral Daily Anthony Paris MD        pantoprazole (PROTONIX) EC tablet 40 mg  40 mg Oral Daily Anthony Paris MD   40 mg at 01/07/25 0857    [Held by provider] predniSONE (DELTASONE) half-tab 3 mg  3 mg Oral Daily Anthony Paris MD        pregabalin (LYRICA) capsule 150 mg  150 mg Oral TID Anthony Paris MD   150 mg at 01/07/25 2004    sodium chloride (PF) 0.9% PF flush 3 mL  3 mL Intracatheter Q8H Anthony Paris MD   3 mL at 01/07/25 0356    traZODone (DESYREL) tablet 50 mg  50 mg Oral At Bedtime Anthony Paris MD   50 mg at 01/07/25 2200     Continuous Infusions:  Current Facility-Administered Medications   Medication Dose Route Frequency Provider Last Rate Last Admin    heparin 25,000 units in 0.45% NaCl 250 mL ANTICOAGULANT infusion  0-5,000 Units/hr Intravenous Continuous Anthony Paris MD   Paused at 01/06/25 0918    Patient is already receiving anticoagulation with heparin, enoxaparin (LOVENOX), warfarin (COUMADIN)  or other anticoagulant medication   Does not apply Continuous PRN Anthony Paris MD                Medications Prior to Admission   Prior to Admission medications    Medication Sig  Start Date End Date Taking? Authorizing Provider   aspirin 81 MG EC tablet [ASPIRIN 81 MG EC TABLET] Take 81 mg by mouth daily. 6/24/21  Yes Provider, Historical   atorvastatin (LIPITOR) 80 MG tablet Take 1 tablet (80 mg) by mouth at bedtime 3/19/24  Yes John Oconnor NP   benzonatate (TESSALON) 200 MG capsule Take 1 capsule (200 mg) by mouth 3 times daily as needed for cough. 12/27/24  Yes John Oconnor NP   cetirizine (ZYRTEC) 10 MG tablet Take 10 mg by mouth every evening   Yes Unknown, Entered By History   cholecalciferol, vitamin D3, (VITAMIN D3) 25 mcg (1,000 unit) capsule [CHOLECALCIFEROL, VITAMIN D3, (VITAMIN D3) 25 MCG (1,000 UNIT) CAPSULE] Take 1,000 Units by mouth daily. 6/24/21  Yes Provider, Historical   cilostazol (PLETAL) 100 MG tablet Take 1 tablet (100 mg) by mouth 2 times daily 2/20/24  Yes Miguel Callejas MD   clopidogrel (PLAVIX) 75 MG tablet TAKE 1 TABLET BY MOUTH EVERY DAY 9/29/24  Yes John Oconnor NP   cyanocobalamin 1000 MCG tablet [CYANOCOBALAMIN 1000 MCG TABLET] Take 1,000 mcg by mouth daily. 6/24/21  Yes Provider, Historical   ipratropium - albuterol 0.5 mg/2.5 mg/3 mL (DUONEB) 0.5-2.5 (3) MG/3ML neb solution Take 1 vial by nebulization daily   Yes Unknown, Entered By History   losartan-hydrochlorothiazide (HYZAAR) 100-25 MG tablet Take 1 tablet by mouth daily 3/19/24  Yes John Oconnor NP   oxyBUTYnin ER (DITROPAN XL) 10 MG 24 hr tablet Take 1 tablet (10 mg) by mouth daily 3/19/24  Yes John Oconnor NP   pantoprazole (PROTONIX) 40 MG EC tablet TAKE 1 TABLET BY MOUTH ONCE DAILY 9/27/24  Yes John Oconnor NP   predniSONE (DELTASONE) 10 MG tablet 4 tabs for 3 days. 3 tabs for 3 days. 2 tabs for 3 days. 1 tab for 3 days. 12/29/24  Yes John Oconnor NP   pregabalin (LYRICA) 150 MG capsule Take 150 mg by mouth 3 times daily.   Yes Unknown, Entered By History   propranolol (INDERAL) 20 MG tablet TAKE 1 TABLET BY MOUTH TWICE A DAY 6/17/24  Yes John Oconnor NP    traZODone (DESYREL) 50 MG tablet Take 1 tablet (50 mg) by mouth at bedtime 7/17/24  Yes John Oconnor, NP

## 2025-01-08 NOTE — Clinical Note
The first balloon was inserted into the left anterior descending and middle left anterior descending.Max pressure = 6 nabeel. Total duration = 15 seconds.

## 2025-01-08 NOTE — Clinical Note
Stent deployed in the middle left anterior descending. Max pressure = 10 nabeel. Total duration = 15 seconds.

## 2025-01-09 ENCOUNTER — APPOINTMENT (OUTPATIENT)
Dept: RADIOLOGY | Facility: HOSPITAL | Age: 80
DRG: 321 | End: 2025-01-09
Attending: INTERNAL MEDICINE
Payer: MEDICARE

## 2025-01-09 ENCOUNTER — APPOINTMENT (OUTPATIENT)
Dept: OCCUPATIONAL THERAPY | Facility: HOSPITAL | Age: 80
DRG: 321 | End: 2025-01-09
Attending: INTERNAL MEDICINE
Payer: MEDICARE

## 2025-01-09 VITALS
HEART RATE: 65 BPM | BODY MASS INDEX: 26.39 KG/M2 | SYSTOLIC BLOOD PRESSURE: 127 MMHG | RESPIRATION RATE: 28 BRPM | TEMPERATURE: 98.3 F | OXYGEN SATURATION: 96 % | DIASTOLIC BLOOD PRESSURE: 61 MMHG | WEIGHT: 158.6 LBS

## 2025-01-09 DIAGNOSIS — I25.10 CORONARY ARTERY DISEASE INVOLVING NATIVE CORONARY ARTERY OF NATIVE HEART WITHOUT ANGINA PECTORIS: Primary | ICD-10-CM

## 2025-01-09 LAB
ANION GAP SERPL CALCULATED.3IONS-SCNC: 10 MMOL/L (ref 7–15)
ATRIAL RATE - MUSE: 57 BPM
ATRIAL RATE - MUSE: 63 BPM
ATRIAL RATE - MUSE: 71 BPM
BUN SERPL-MCNC: 25.3 MG/DL (ref 8–23)
CALCIUM SERPL-MCNC: 9 MG/DL (ref 8.8–10.4)
CHLORIDE SERPL-SCNC: 102 MMOL/L (ref 98–107)
CREAT SERPL-MCNC: 0.78 MG/DL (ref 0.67–1.17)
DIASTOLIC BLOOD PRESSURE - MUSE: NORMAL MMHG
EGFRCR SERPLBLD CKD-EPI 2021: >90 ML/MIN/1.73M2
GLUCOSE SERPL-MCNC: 96 MG/DL (ref 70–99)
HCO3 SERPL-SCNC: 27 MMOL/L (ref 22–29)
INTERPRETATION ECG - MUSE: NORMAL
P AXIS - MUSE: 51 DEGREES
P AXIS - MUSE: 52 DEGREES
P AXIS - MUSE: 54 DEGREES
POTASSIUM SERPL-SCNC: 4 MMOL/L (ref 3.4–5.3)
PR INTERVAL - MUSE: 152 MS
PR INTERVAL - MUSE: 154 MS
PR INTERVAL - MUSE: 154 MS
QRS DURATION - MUSE: 132 MS
QRS DURATION - MUSE: 132 MS
QRS DURATION - MUSE: 134 MS
QT - MUSE: 458 MS
QT - MUSE: 464 MS
QT - MUSE: 488 MS
QTC - MUSE: 474 MS
QTC - MUSE: 474 MS
QTC - MUSE: 497 MS
R AXIS - MUSE: 115 DEGREES
R AXIS - MUSE: 199 DEGREES
R AXIS - MUSE: 214 DEGREES
SODIUM SERPL-SCNC: 139 MMOL/L (ref 135–145)
SYSTOLIC BLOOD PRESSURE - MUSE: NORMAL MMHG
T AXIS - MUSE: 200 DEGREES
T AXIS - MUSE: 212 DEGREES
T AXIS - MUSE: 216 DEGREES
VENTRICULAR RATE- MUSE: 57 BPM
VENTRICULAR RATE- MUSE: 63 BPM
VENTRICULAR RATE- MUSE: 71 BPM

## 2025-01-09 PROCEDURE — 250N000013 HC RX MED GY IP 250 OP 250 PS 637: Performed by: NURSE PRACTITIONER

## 2025-01-09 PROCEDURE — 210N000001 HC R&B IMCU HEART CARE

## 2025-01-09 PROCEDURE — 71045 X-RAY EXAM CHEST 1 VIEW: CPT

## 2025-01-09 PROCEDURE — 80048 BASIC METABOLIC PNL TOTAL CA: CPT | Performed by: INTERNAL MEDICINE

## 2025-01-09 PROCEDURE — 250N000013 HC RX MED GY IP 250 OP 250 PS 637: Performed by: INTERNAL MEDICINE

## 2025-01-09 PROCEDURE — 999N000157 HC STATISTIC RCP TIME EA 10 MIN

## 2025-01-09 PROCEDURE — 99233 SBSQ HOSP IP/OBS HIGH 50: CPT | Performed by: GENERAL ACUTE CARE HOSPITAL

## 2025-01-09 PROCEDURE — 99233 SBSQ HOSP IP/OBS HIGH 50: CPT | Performed by: INTERNAL MEDICINE

## 2025-01-09 PROCEDURE — 94640 AIRWAY INHALATION TREATMENT: CPT

## 2025-01-09 PROCEDURE — 36415 COLL VENOUS BLD VENIPUNCTURE: CPT | Performed by: INTERNAL MEDICINE

## 2025-01-09 PROCEDURE — 93005 ELECTROCARDIOGRAM TRACING: CPT | Performed by: INTERNAL MEDICINE

## 2025-01-09 PROCEDURE — 93005 ELECTROCARDIOGRAM TRACING: CPT

## 2025-01-09 PROCEDURE — 93010 ELECTROCARDIOGRAM REPORT: CPT | Mod: HIP | Performed by: INTERNAL MEDICINE

## 2025-01-09 PROCEDURE — 97535 SELF CARE MNGMENT TRAINING: CPT | Mod: GO

## 2025-01-09 PROCEDURE — 250N000009 HC RX 250: Performed by: NURSE PRACTITIONER

## 2025-01-09 PROCEDURE — 97165 OT EVAL LOW COMPLEX 30 MIN: CPT | Mod: GO

## 2025-01-09 PROCEDURE — 84132 ASSAY OF SERUM POTASSIUM: CPT | Performed by: INTERNAL MEDICINE

## 2025-01-09 PROCEDURE — 97110 THERAPEUTIC EXERCISES: CPT | Mod: GO

## 2025-01-09 PROCEDURE — 250N000013 HC RX MED GY IP 250 OP 250 PS 637: Performed by: GENERAL ACUTE CARE HOSPITAL

## 2025-01-09 RX ORDER — SPIRONOLACTONE 25 MG
12.5 TABLET ORAL DAILY
Status: DISCONTINUED | OUTPATIENT
Start: 2025-01-09 | End: 2025-01-10 | Stop reason: HOSPADM

## 2025-01-09 RX ADMIN — BENZONATATE 200 MG: 100 CAPSULE ORAL at 14:58

## 2025-01-09 RX ADMIN — METHOCARBAMOL 500 MG: 500 TABLET ORAL at 08:44

## 2025-01-09 RX ADMIN — BENZONATATE 200 MG: 100 CAPSULE ORAL at 19:49

## 2025-01-09 RX ADMIN — PANTOPRAZOLE SODIUM 40 MG: 40 TABLET, DELAYED RELEASE ORAL at 08:44

## 2025-01-09 RX ADMIN — CETIRIZINE HYDROCHLORIDE 10 MG: 10 TABLET, FILM COATED ORAL at 19:50

## 2025-01-09 RX ADMIN — TRAZODONE HYDROCHLORIDE 50 MG: 50 TABLET ORAL at 21:03

## 2025-01-09 RX ADMIN — METHOCARBAMOL 500 MG: 500 TABLET ORAL at 19:49

## 2025-01-09 RX ADMIN — ASPIRIN 81 MG: 81 TABLET, COATED ORAL at 08:44

## 2025-01-09 RX ADMIN — OXYCODONE HYDROCHLORIDE 5 MG: 5 TABLET ORAL at 02:44

## 2025-01-09 RX ADMIN — LOSARTAN POTASSIUM 12.5 MG: 25 TABLET, FILM COATED ORAL at 08:45

## 2025-01-09 RX ADMIN — ACETAMINOPHEN 650 MG: 325 TABLET ORAL at 03:59

## 2025-01-09 RX ADMIN — ACETAMINOPHEN 650 MG: 325 TABLET ORAL at 16:52

## 2025-01-09 RX ADMIN — OXYCODONE HYDROCHLORIDE 5 MG: 5 TABLET ORAL at 12:55

## 2025-01-09 RX ADMIN — METHOCARBAMOL 500 MG: 500 TABLET ORAL at 14:58

## 2025-01-09 RX ADMIN — CYANOCOBALAMIN TAB 1000 MCG 1000 MCG: 1000 TAB at 08:45

## 2025-01-09 RX ADMIN — BENZONATATE 200 MG: 100 CAPSULE ORAL at 08:44

## 2025-01-09 RX ADMIN — PREGABALIN 150 MG: 75 CAPSULE ORAL at 08:44

## 2025-01-09 RX ADMIN — ACETAMINOPHEN 650 MG: 325 TABLET ORAL at 10:06

## 2025-01-09 RX ADMIN — BISOPROLOL FUMARATE 5 MG: 5 TABLET ORAL at 08:45

## 2025-01-09 RX ADMIN — ACETAMINOPHEN 650 MG: 325 TABLET ORAL at 21:03

## 2025-01-09 RX ADMIN — PREGABALIN 150 MG: 75 CAPSULE ORAL at 19:48

## 2025-01-09 RX ADMIN — ATORVASTATIN CALCIUM 80 MG: 40 TABLET, FILM COATED ORAL at 21:04

## 2025-01-09 RX ADMIN — CLOPIDOGREL BISULFATE 75 MG: 75 TABLET ORAL at 08:45

## 2025-01-09 RX ADMIN — IPRATROPIUM BROMIDE AND ALBUTEROL SULFATE 3 ML: .5; 3 SOLUTION RESPIRATORY (INHALATION) at 11:10

## 2025-01-09 RX ADMIN — SPIRONOLACTONE 12.5 MG: 25 TABLET, FILM COATED ORAL at 10:06

## 2025-01-09 RX ADMIN — PREGABALIN 150 MG: 75 CAPSULE ORAL at 14:58

## 2025-01-09 ASSESSMENT — ACTIVITIES OF DAILY LIVING (ADL)
ADLS_ACUITY_SCORE: 53
ADLS_ACUITY_SCORE: 53
DEPENDENT_IADLS:: CLEANING;COOKING;LAUNDRY;SHOPPING;MEAL PREPARATION
ADLS_ACUITY_SCORE: 53
ADLS_ACUITY_SCORE: 45
ADLS_ACUITY_SCORE: 45
ADLS_ACUITY_SCORE: 53
ADLS_ACUITY_SCORE: 45
ADLS_ACUITY_SCORE: 45
ADLS_ACUITY_SCORE: 53
ADLS_ACUITY_SCORE: 53
PREVIOUS_RESPONSIBILITIES: DRIVING
ADLS_ACUITY_SCORE: 53
ADLS_ACUITY_SCORE: 45
ADLS_ACUITY_SCORE: 45
ADLS_ACUITY_SCORE: 53
ADLS_ACUITY_SCORE: 45
ADLS_ACUITY_SCORE: 53
ADLS_ACUITY_SCORE: 45
ADLS_ACUITY_SCORE: 47

## 2025-01-09 NOTE — CONSULTS
Care Management Initial Consult    General Information  Assessment completed with: Patient, Patient  Type of CM/SW Visit: Initial Assessment    Primary Care Provider verified and updated as needed: Yes   Readmission within the last 30 days: current reason for admission unrelated to previous admission      Reason for Consult: other (see comments), discharge planning (Elevated risk score)  Advance Care Planning: Advance Care Planning Reviewed: verified with patient        Communication Assessment  Patient's communication style: spoken language (English or Bilingual)    Hearing Difficulty or Deaf: no   Wear Glasses or Blind: yes    Cognitive  Cognitive/Neuro/Behavioral: WDL                      Living Environment:   People in home: spouse     Current living Arrangements: apartment      Able to return to prior arrangements: yes     Family/Social Support:  Care provided by: self  Provides care for: no one  Marital Status:   Support system: Wife  Maribel       Description of Support System: Supportive    Support Assessment: Adequate family and caregiver support    Current Resources:   Patient receiving home care services: No     Community Resources: None  Equipment currently used at home: cane, straight, walker, rolling, wheelchair, manual  Supplies currently used at home: None    Employment/Financial:  Employment Status: retired        Financial Concerns: none     Does the patient's insurance plan have a 3 day qualifying hospital stay waiver?  Yes     Which insurance plan 3 day waiver is available? [employer] RE[employer]    Will the waiver be used for post-acute placement? Undetermined at this time    Lifestyle & Psychosocial Needs:  Social Drivers of Health     Food Insecurity: Low Risk  (1/8/2025)    Food Insecurity     Within the past 12 months, did you worry that your food would run out before you got money to buy more?: No     Within the past 12 months, did the food you bought just not last and you didn t have money to  get more?: No   Depression: Not at risk (3/19/2024)    PHQ-2     PHQ-2 Score: 0   Housing Stability: Low Risk  (1/8/2025)    Housing Stability     Do you have housing? : Yes     Are you worried about losing your housing?: No   Tobacco Use: High Risk (12/27/2024)    Patient History     Smoking Tobacco Use: Every Day     Smokeless Tobacco Use: Never     Passive Exposure: Current   Financial Resource Strain: Low Risk  (1/8/2025)    Financial Resource Strain     Within the past 12 months, have you or your family members you live with been unable to get utilities (heat, electricity) when it was really needed?: No   Alcohol Use: Not on file   Transportation Needs: Low Risk  (1/8/2025)    Transportation Needs     Within the past 12 months, has lack of transportation kept you from medical appointments, getting your medicines, non-medical meetings or appointments, work, or from getting things that you need?: No   Physical Activity: Not on file   Interpersonal Safety: Low Risk  (1/8/2025)    Interpersonal Safety     Do you feel physically and emotionally safe where you currently live?: Yes     Within the past 12 months, have you been hit, slapped, kicked or otherwise physically hurt by someone?: No     Within the past 12 months, have you been humiliated or emotionally abused in other ways by your partner or ex-partner?: No   Stress: Not on file   Social Connections: Not on file   Health Literacy: Not on file     Functional Status:  Prior to admission patient needed assistance:   Dependent ADLs:: Independent  Dependent IADLs:: Cleaning, Cooking, Laundry, Shopping, Meal Preparation     Discussed  Partnership in Safe Discharge Planning  document with patient/family: No    Additional Information:  Writer met with patient at bedside to review role of care management services, discuss goals of care and assess need for any possible services at discharge. Patient alert, answering questions appropriately and engaged in the  conversation. Address, phone number and PCP confirmed. Patient has no HCD. Lives with spouse in an independent living apartment at [facility]. Report independent with ADLs, but needs assistance with IADLs. Report has DME, but no community resources. Goal is to return home. Anticipate spouse will transport.     Per provider, Dr. Kramer, patient may be ready tomorrow. Weaning off oxygen. Will have PT/OT consult.    Next Steps: RNCM to follow for medical progression, recommendations, and final discharge plan.     Ning Brooks RN
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
## 2025-01-09 NOTE — PLAN OF CARE
Problem: Adult Inpatient Plan of Care  Goal: Optimal Comfort and Wellbeing  1/9/2025 1729 by Cecilia Craig RN  Outcome: Progressing     Problem: Cardiac Catheterization (Diagnostic/Interventional)  Goal: Absence of Bleeding  1/9/2025 1729 by Cecilia Craig RN  Outcome: Progressing     Problem: Cardiac Catheterization (Diagnostic/Interventional)  Goal: Stable Heart Rate and Rhythm  1/9/2025 1729 by Cecilia Craig RN  Outcome: Progressing     Problem: Cardiac Catheterization (Diagnostic/Interventional)  Goal: Optimal Pain Control and Function  1/9/2025 1729 by Cecilia Craig RN  Outcome: Progressing     Problem: Gas Exchange Impaired  Goal: Optimal Gas Exchange  1/9/2025 1729 by Cecilia Craig RN  Outcome: Progressing     Problem: Pain Acute  Goal: Optimal Pain Control and Function  1/9/2025 1729 by Cecilia Craig RN  Outcome: Progressing     Problem: Fall Injury Risk  Goal: Absence of Fall and Fall-Related Injury  1/9/2025 1729 by Cecilia Craig RN  Outcome: Progressing     Pt A/Ox4. Denied SOB. Right radial site remains WNL. Pain controlled with PRN and scheduled medications per pt. Up in chair for meals with A1 walker/belt. Attempted to wean 02 to room air this shift, pt tolerated for about two hours and then 02 saturations sustained the upper 80s. 1L 02 NC applied to pt, pt tolerating well sating >90% per orders. Unable to obtain accurate output per orders as pt is incontinent and primofit does not work with pts anatomy. Potential discharge home 1/10 per MD.

## 2025-01-09 NOTE — PLAN OF CARE
Goal Outcome Evaluation:      Plan of Care Reviewed With: patient    Overall Patient Progress: improving    Problem: Cardiac Catheterization (Diagnostic/Interventional)  Goal: Absence of Bleeding  Outcome: Progressing  Goal: Absence of Contrast-Induced Injury  Outcome: Progressing  Goal: Stable Heart Rate and Rhythm  Outcome: Progressing  Goal: Absence of Embolism Signs and Symptoms  Outcome: Progressing  Goal: Anesthesia/Sedation Recovery  Outcome: Progressing  Intervention: Optimize Anesthesia Recovery  Recent Flowsheet Documentation  Taken 1/9/2025 0056 by Kelby Aguirre RN  Safety Promotion/Fall Prevention:   activity supervised   assistive device/personal items within reach   clutter free environment maintained   mobility aid in reach   nonskid shoes/slippers when out of bed   patient and family education   room organization consistent   safety round/check completed   supervised activity  Reorientation Measures:   clock in view   calendar in view  Taken 1/8/2025 1931 by Kelby Aguirre, RN  Safety Promotion/Fall Prevention:   activity supervised   assistive device/personal items within reach   clutter free environment maintained   mobility aid in reach   nonskid shoes/slippers when out of bed   patient and family education   room organization consistent   safety round/check completed   supervised activity  Reorientation Measures:   clock in view   calendar in view  Goal: Optimal Pain Control and Function  Outcome: Progressing  Intervention: Prevent or Manage Pain  Recent Flowsheet Documentation  Taken 1/8/2025 2155 by Kelby Aguirre, RN  Pain Management Interventions: medication (see MAR)  Goal: Absence of Vascular Access Complication  Outcome: Progressing  Intervention: Prevent and Manage Access Complications  Recent Flowsheet Documentation  Taken 1/9/2025 0056 by Kelby Aguirre, RN  Activity Management: bedrest  Head of Bed (HOB) Positioning: HOB at 20-30 degrees  Taken 1/8/2025 1931 by Carla  ITZEL Lama  Activity Management: bedrest  Head of Bed (HOB) Positioning: HOB at 20-30 degrees     Problem: Cardiac Catheterization (Diagnostic/Interventional)  Goal: Optimal Pain Control and Function  Outcome: Progressing  Intervention: Prevent or Manage Pain  Recent Flowsheet Documentation  Taken 1/8/2025 2155 by Kelby Aguirre RN  Pain Management Interventions: medication (see MAR)     Problem: Cardiac Catheterization (Diagnostic/Interventional)  Goal: Absence of Vascular Access Complication  Outcome: Progressing  Intervention: Prevent and Manage Access Complications  Recent Flowsheet Documentation  Taken 1/9/2025 0056 by Kelby Aguirre, RN  Activity Management: bedrest  Head of Bed (HOB) Positioning: HOB at 20-30 degrees  Taken 1/8/2025 1931 by Kelby Aguirre, RN  Activity Management: bedrest  Head of Bed (HOB) Positioning: HOB at 20-30 degrees     Patient alert and oriented X 4,Vitals stable through the night, denied SOB, reported chest pain from chest insertion site, PRN oxycodone given as well as scheduled tylenol. Angio procedure site WDL, no bleeding or hematoma. Pt is able to make needs known, care is ongoing.

## 2025-01-09 NOTE — PROGRESS NOTES
Impression and Plan     Impression:   Non-ST elevation myocardial infarction status post drug-eluting stenting x2 to the mid left anterior descending artery. He denies angina.  Acute heart failure with reduced left ventricular ejection fraction of 35-40% due to ischemic cardiomyopathy. Cardiac catheterization 1/8/2025 showed elevated left ventricular end-diastolic pressure of 30 mmHg but he appears euvolemic on examination today.  Asymptomatic right internal carotid artery stenosis last assessed on carotid ultrasound 7/16/2024.  Peripheral arterial disease status post bilateral lower extremity stenting and right-to-left femoral-femoral arterial bypass done in [place]. CT angiography of the lower extremities 12/13/2023 showed patent bilateral common and external iliac artery stents but occlusion of the femoral bypass graft.  Essential hypertension.  Hyperlipidemia. Last LDL 30 mg/dL.  Traumatic right rib fracture and right pneumothorax status post chest tube placement 1/5/2025 and removal 1/7/2025.  Severe chronic obstructive pulmonary disease.  Non-small cell lung cancer of the bilateral upper lungs treated with resection, radiation therapy and chemotherapy in 2017 and 2018. I do not have further details on this.  Tobacco use.    Plan:  Continue bisoprolol 5 mg and losartan 12.5 mg daily  Add spironolactone 12.5 mg daily  We may be able to add a sodium-glucose co-transporter 2 inhibitor at outpatient follow-up  He does not appear to require a loop diuretic at this time.  Cilostazol should be discontinued on discharge as this is contraindicated in heart failure  He was previously taking dual antiplatelet therapy with aspirin 81 mg and clopidogrel 75 mg daily which he needs to take for at least 12 months (until 1/8/2026) for his myocardial infarction and percutaneous coronary intervention  Atorvastatin 80 mg daily  Recommended he quit smoking  He will need his left ventricular ejection fraction reassessed when  on maximally tolerated medical therapy and if his left ventricular ejection fraction remains 35% or less, be considered for implantable cardioverter-defibrillator placement for the primary prevention of sudden cardiac death  We will arrange for general cardiology advanced practitioner follow-up in 2-4 weeks and follow-up with Dr. Hernadez in 3-4 months  Okay to discharge from the hospital from a cardiac perspective    Primary Cardiologist: can establish with Dr. Alton Hernadez    Subjective     - pain where his right chest tube was otherwise no chest pain  - shortness of breath is at baseline    Cardiac Diagnostics   Telemetry (personally reviewed): SR, no arrhythmias    ECG 1/9/2025 (personally reviewed and interpreted): sinus bradycardia HR 57 bpm, RBBB LAFB    Echocardiogram 1/6/2025 (results reviewed):   Left ventricular function is decreased. The ejection fraction is 35-40% (moderately reduced).  There is severe hypokinesis of the mid to apical septal, mid to apical inferior, apex, and apical lateral wall segments.  Normal right ventricle size and systolic function.  Normal left atrial size.  The aortic valve is trileaflet with aortic valve sclerosis.  There is no comparison study available.    Cardiac Cath 1/8/2025 (results reviewed):   1.  Severe focal stenosis mid LAD at angulated takeoff adjacent to bifurcating septal branches.  2.  Large codominant left circumflex with 40 to 50% stenosis mid segment after takeoff of large OM1.  3.  Small RCA supplies 2 small LV branches.  No significant stenoses  4.  Elevated LVEDP = 30 mmHg  5.  Successful PCI mid LAD with 3.0 x 22 Tan JIA and 3.0 x 12 Lyon JIA implants.  0% residual, KESHIA-3 flow.    Physical Examination       /58 (BP Location: Right arm, Cuff Size: Adult Regular)   Pulse 59   Temp 97.8  F (36.6  C) (Oral)   Resp 18   Wt 71.9 kg (158 lb 9.6 oz)   SpO2 95%   BMI 26.39 kg/m          Wt Readings from Last 3 Encounters:   01/09/25 71.9 kg (158 lb 9.6  oz)   01/08/25 72.1 kg (159 lb)   01/02/25 75.7 kg (166 lb 12.8 oz)             Intake/Output Summary (Last 24 hours) at 1/9/2025 0743  Last data filed at 1/9/2025 0626  Gross per 24 hour   Intake 560 ml   Output 200 ml   Net 360 ml       General: pleasant male. No acute distress.   HENT: external ears normal. Nares patent. Mucous membranes moist.  Eyes: perrla, extraocular muscles intact. No scleral icterus.   Neck: No JVD  Lungs: diminished breath sounds  COR:  regular rate and rhythm, No murmurs, rubs, or gallops  Abd: nondistended, BS present  Extrem: No edema         Imaging      CXR 1/7/2025 (results reviewed):  Right chest tube removed.   No visible pneumothorax.   Slightly increased left basilar atelectasis.   No pleural effusion.   Stable enlarged cardiac silhouette.   Normal pulmonary vascularity.   Stable left rib fracture.     CTA chest 1/5/2025 (results reviewed):  1.  No acute pulmonary embolism.  2.  Redemonstration of the oblique fracture of the right anterior sixth rib and development of small right posttraumatic pneumothorax.  3.  Unchanged findings of previous left upper lobectomy with left lung volume loss and scarring/pleural thickening along the posterior basal pleura.    Carotid ultrasound 7/16/2024 (results reviewed):  1. 70-99% diameter stenosis of the right ICA relative to the proximal ICA diameter.  Elevated velocities in the right external carotid artery, reflecting hemodynamically significant stenosis.  2. Less than 50% diameter stenosis of the left ICA relative to the proximal ICA diameter.    Lower extremity arterial ultrasound 7/16/2024 (results reviewed):  Right Lower Extremity: Monophasic flow in the external iliac artery, suggesting iliac inflow disease.  Remaining vasculature is patent without focal stenosis.  Left Lower Extremity: Patent vasculature with multiphasic waveforms.  No significant stenosis identified.    CTA abdomen/pelvis/lower extremity runoff 12/13/2023 (results  reviewed):  1.  No significant stenoses in the iliac arteries. Common and external iliac artery stents bilaterally are patent. The right internal iliac artery appears to be occluded.  2.  Moderate irregular ulcerating soft plaque in the infrarenal abdominal aorta.  3.  Moderate to significant stenosis in the right superficial femoral artery in the distal thigh.  4.  Occluded femoral-femoral bypass graft.    Lab Results   Lab Results   Component Value Date     01/09/2025    CO2 27 01/09/2025    CO2 24 08/03/2022    BUN 25.3 01/09/2025    BUN 24 08/03/2022     Lab Results   Component Value Date    WBC 10.2 01/07/2025    HGB 12.8 01/07/2025    HCT 38.7 01/07/2025    MCV 92 01/07/2025     01/07/2025     Lab Results   Component Value Date    CHOL 124 03/19/2024    TRIG 301 03/19/2024    HDL 34 03/19/2024     Lab Results   Component Value Date    LDL 30 03/19/2024     Lab Results   Component Value Date    INR 1.02 04/27/2024     Lab Results   Component Value Date    BNP 74 07/31/2022     Lab Results   Component Value Date    TROPONINI 0.02 07/31/2022    TROPONINI 0.02 07/31/2022     Lab Results   Component Value Date    TSH 0.46 04/27/2024    TSH 1.26 01/19/2022           Current Inpatient Scheduled Medications   Scheduled Meds:  Current Facility-Administered Medications   Medication Dose Route Frequency Provider Last Rate Last Admin    acetaminophen (TYLENOL) tablet 650 mg  650 mg Oral Q6H Kalamitsiotis, Annmarie C, CNP   650 mg at 01/09/25 0359    aspirin EC tablet 81 mg  81 mg Oral Daily Jamie Noriega MD        atorvastatin (LIPITOR) tablet 80 mg  80 mg Oral At Bedtime Kalamitsiotis, Annmarie C, CNP   80 mg at 01/08/25 2155    benzonatate (TESSALON) capsule 200 mg  200 mg Oral TID Kalamitsiotis, Annmarie C, CNP   200 mg at 01/08/25 2158    bisoprolol (ZEBETA) tablet 5 mg  5 mg Oral Daily Kalamitsiotis, Annmarie C, CNP        cetirizine (zyrTEC) tablet 10 mg  10 mg Oral QPM Kalamitsiotis, Annmarie C, CNP   10 mg  at 01/08/25 1951    clopidogrel (PLAVIX) tablet 75 mg  75 mg Oral Daily Jamie Noriega MD        cyanocobalamin (VITAMIN B-12) tablet 1,000 mcg  1,000 mcg Oral Daily Gildardo Guerrier MD   1,000 mcg at 01/08/25 1951    ipratropium - albuterol 0.5 mg/2.5 mg/3 mL (DUONEB) neb solution 3 mL  1 vial Nebulization Daily Annmarie Ball CNP        losartan (COZAAR) half-tab 12.5 mg  12.5 mg Oral Daily Annmarie Ball CNP        methocarbamol (ROBAXIN) tablet 500 mg  500 mg Oral TID Annmarie Ball CNP   500 mg at 01/08/25 1951    pantoprazole (PROTONIX) EC tablet 40 mg  40 mg Oral Daily Annmarie Ball CNP        [Held by provider] predniSONE (DELTASONE) tablet 3 mg  3 mg Oral Daily Annmarie Ball CNP        pregabalin (LYRICA) capsule 150 mg  150 mg Oral TID Annmarie Ball CNP   150 mg at 01/08/25 1951    sodium chloride (PF) 0.9% PF flush 3 mL  3 mL Intracatheter Q8H Annmarie Ball CNP   3 mL at 01/08/25 2353    traZODone (DESYREL) tablet 50 mg  50 mg Oral At Bedtime Annmarie Ball CNP   50 mg at 01/08/25 0263          Medications Prior to Admission   Prior to Admission medications    Medication Sig Start Date End Date Taking? Authorizing Provider   aspirin 81 MG EC tablet Take 1 tablet (81 mg) by mouth daily. Start tomorrow. 1/9/25  Yes Jamie Noriega MD   clopidogrel (PLAVIX) 75 MG tablet Take 1 tablet (75 mg) by mouth daily. Dose to start tomorrow. 1/9/25  Yes Jamie Noriega MD   aspirin 81 MG EC tablet [ASPIRIN 81 MG EC TABLET] Take 81 mg by mouth daily. 6/24/21   Provider, Historical   atorvastatin (LIPITOR) 80 MG tablet Take 1 tablet (80 mg) by mouth at bedtime 3/19/24   John Oconnor NP   benzonatate (TESSALON) 200 MG capsule Take 1 capsule (200 mg) by mouth 3 times daily as needed for cough. 12/27/24   John Oconnor NP   cetirizine (ZYRTEC) 10 MG tablet Take 10 mg by mouth every evening    Unknown, Entered By History    cholecalciferol, vitamin D3, (VITAMIN D3) 25 mcg (1,000 unit) capsule [CHOLECALCIFEROL, VITAMIN D3, (VITAMIN D3) 25 MCG (1,000 UNIT) CAPSULE] Take 1,000 Units by mouth daily. 6/24/21   Provider, Historical   cilostazol (PLETAL) 100 MG tablet Take 1 tablet (100 mg) by mouth 2 times daily 2/20/24   Miguel Callejas MD   clopidogrel (PLAVIX) 75 MG tablet TAKE 1 TABLET BY MOUTH EVERY DAY 9/29/24   John Oconnor NP   cyanocobalamin 1000 MCG tablet [CYANOCOBALAMIN 1000 MCG TABLET] Take 1,000 mcg by mouth daily. 6/24/21   Provider, Historical   ipratropium - albuterol 0.5 mg/2.5 mg/3 mL (DUONEB) 0.5-2.5 (3) MG/3ML neb solution Take 1 vial by nebulization daily    Unknown, Entered By History   losartan-hydrochlorothiazide (HYZAAR) 100-25 MG tablet Take 1 tablet by mouth daily 3/19/24   John Oconnor NP   oxyBUTYnin ER (DITROPAN XL) 10 MG 24 hr tablet Take 1 tablet (10 mg) by mouth daily 3/19/24   John Oconnor NP   pantoprazole (PROTONIX) 40 MG EC tablet TAKE 1 TABLET BY MOUTH ONCE DAILY 9/27/24   John Oconnor NP   predniSONE (DELTASONE) 10 MG tablet 4 tabs for 3 days. 3 tabs for 3 days. 2 tabs for 3 days. 1 tab for 3 days. 12/29/24   John Oconnor NP   pregabalin (LYRICA) 150 MG capsule Take 150 mg by mouth 3 times daily.    Unknown, Entered By History   propranolol (INDERAL) 20 MG tablet TAKE 1 TABLET BY MOUTH TWICE A DAY 6/17/24   John Oconnor NP   traZODone (DESYREL) 50 MG tablet Take 1 tablet (50 mg) by mouth at bedtime 7/17/24   John Oconnor NP Brent E. White, MD [facility]  Non-Invasive Cardiologist  [facility]  Pager [phone]

## 2025-01-09 NOTE — PROVIDER NOTIFICATION
Dr. Kramer notified via page at 1500 reagrding pts tele appearing to show a short run of afib vs. SVT. HR 140s. Pt asymptomatic and sleeping during episode. New orders noted for EKG per MD.

## 2025-01-09 NOTE — PROGRESS NOTES
Care Management Follow Up    Length of Stay (days): 1    Expected Discharge Date: 01/10/2025     Concerns to be Addressed: Care progression - discharge planning     Patient plan of care discussed at interdisciplinary rounds: Yes    Anticipated Discharge Disposition:  OT rec home with home care occupational therapy     Anticipated Discharge Services:  Home care  Anticipated Discharge DME:  NA    Patient/family educated on Medicare website which has current facility and service quality ratings:  NA  Education Provided on the Discharge Plan:  yes per team  Patient/Family in Agreement with the Plan:  NA    Referrals Placed by CM/SW:  NA  Private pay costs discussed: Not applicable    Discussed  Partnership in Safe Discharge Planning  document with patient/family: No     Handoff Completed: No, handoff not indicated or clinically appropriate    Additional Information:  Met with patient at bedside to discuss OT discharge rec for home with home care occupational therapy.     Patient agreed and added RN/PT    Referral sent    Social Hx:  Assessment: Follow. Live w/spouse in an independent living apartment at [facility]. Independent w/ADLs, but needs assistance w/IADLs. Report has DME, but no community resources.   Last note: 1/9/25  Plan: OT rec home care, referral sent for RNlPT/OT  Needs: PT eval  Transport: Family/friend     Next Steps: RNCM to follow for medical progression, recommendations, and final discharge plan.     Ning Brooks RN

## 2025-01-09 NOTE — CONSULTS
NUTRITION EDUCATION      REASON FOR ASSESSMENT:  Consulted to educate on heart healthy diet    NUTRITION HISTORY:  Information obtained from pt and his wife.      Pt states he adds salt to his food at the table, eats butter , likes pepperoni and sausage pizza. He likes to eat the skin on chicken    CURRENT DIET:  Low saturated fat < 2400 mg Na    NUTRITION DIAGNOSIS:  Food- and nutrition-related knowledge deficit R/t Non-STEMI as evidenced by need for therapeutic diet and consulted for heart healthy diet education    INTERVENTIONS:    Nutrition Prescription:  Low saturated fat, low sodium    Implementation:      *  Nutrition Education (Content):   A)  Provided handout Heart healthy nutrition therapy, heart healthy label reading tips, omega 3 FA sources, plant sterols   B)  Discussed fats to avoid/fats to include in diet, taking skin off chicken, using just small amounts of butter, trying vegetable pizza with no extra cheese, increasing fish intake      *  Nutrition Education (Application):   A)  Discussed current eating habits and recommended alternative food choices      *  Anticipate good compliance      *  Diet Education - refer to Education Flowsheet      *  Pt was sleepy during diet education but his wife was alert and attentive    Goals:      *  Patient will verbalize understanding of diet met      *  All of the above goals met during the education session    Follow Up/Monitoring:      *  Provided RD contact information for future questions      *  Recommended Out-Patient Nutrition Referral, if further diet instructions are needed

## 2025-01-09 NOTE — TREATMENT PLAN
RCAT Treatment Plan    Patient Score: 7  Patient Acuity: 4    Clinical Indication for Therapy: S/p stent LAD, Lt basilar atelectasis    Therapy Ordered: Duoneb HHN daily (home regimen).     Assessment Summary: LAD stent 01/08/25, LLL atelectasis, Lung CA, Emphysema, COPD exacerbation. Smoker. Hx, BS clear bilat, diminished LLL. Will continue with home regimen, no further RCAT indicated. RT to follow    EXAM: XR CHEST PORT 1 VIEW  LOCATION: [facility]  DATE: 1/7/2025     INDICATION: Evaluation to PTX s p chest tube pull  COMPARISON: 1/7/2025 at 0825 hours                                                                    IMPRESSION: Right chest tube removed. No visible pneumothorax. Slightly increased left basilar atelectasis. No pleural effusion. Stable enlarged cardiac silhouette. Normal pulmonary vascularity. Stable left rib fracture.    Robert Junior, RT  1/9/2025

## 2025-01-09 NOTE — PROGRESS NOTES
"[facility]    Medicine Progress Note - Hospitalist Service    Date of Admission:  1/8/2025    Assessment & Plan   79-year-old transferred from [facility] for coronary angiogram s/p stent placed on 1/8.  He is doing well postoperatively.    Non-STEMI  --Status post stent on 1/8  --Dual antiplatelet therapy as per cardiology  -- Continue statin, losartan, bisoprolol    Traumatic pneumothorax  --Now resolved  --Chest tube removed on 1/7    Essential hypertension  -- Controlled  Currently on losartan and bisoprolol    Chronic pain  -- Continue PTA pregabalin and Robaxin    Other chronic medical conditions include  Peripheral vascular disease, reflux, prostate cancer, lung cancer, T6 vertebral body sclerosis   -Cilostazol has been discontinued due to decreased EF     Diet: Low Saturated Fat Na <2400 mg    DVT Prophylaxis: Defer to primary service  Flores Catheter: Not present  Lines: None     Cardiac Monitoring: ACTIVE order. Indication: Post- PCI/Angiogram (24 hours)  Code Status: Full Code      Clinically Significant Risk Factors Present on Admission                 # Drug Induced Platelet Defect: home medication list includes an antiplatelet medication   # Hypertension: Noted on problem list  # Chronic heart failure with reduced ejection fraction: last echo with EF <40%          # Overweight: Estimated body mass index is 26.46 kg/m  as calculated from the following:    Height as of 1/6/25: 1.651 m (5' 5\").    Weight as of an earlier encounter on 1/8/25: 72.1 kg (159 lb).       # Financial/Environmental Concerns:           Social Drivers of Health    Tobacco Use: High Risk (12/27/2024)    Patient History     Smoking Tobacco Use: Every Day     Smokeless Tobacco Use: Never     Passive Exposure: Current          Disposition Plan     Medically Ready for Discharge: Anticipated Tomorrow             Gildardo Guerrier MD  Hospitalist Service  [facility]  Securely message " with Windy (more info)  Text page via [facility] Paging/Directory   ______________________________________________________________________    Interval History   Patient new to me.  Chart reviewed.  Hospital medicine consulted after cardiac catheterization.  Patient denies any chest pain.  S/p stent placement x 2.  Review of system is limited due to drowsiness    Physical Exam   Vital Signs: Temp: 98.1  F (36.7  C) Temp src: Oral BP: 119/58 Pulse: 63   Resp: 16 SpO2: 94 % O2 Device: None (Room air) Oxygen Delivery: 1 LPM  Weight: 0 lbs 0 oz    Patient is drowsy but comfortable  No apparent distress  Bilateral vesicular breath sounds heard  No leg edema      Medical Decision Making       35 MINUTES SPENT BY ME on the date of service doing chart review, history, exam, documentation & further activities per the note.  MANAGEMENT DISCUSSED with the following over the past 24 hours: Patient and his wife       Data
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
## 2025-01-09 NOTE — PLAN OF CARE
Problem: Adult Inpatient Plan of Care  Goal: Optimal Comfort and Wellbeing  1/8/2025 1813 by Cecilia Craig RN  Outcome: Progressing     Problem: Cardiac Catheterization (Diagnostic/Interventional)  Goal: Absence of Bleeding  1/8/2025 1813 by Cecilia Craig RN  Outcome: Progressing     Problem: Cardiac Catheterization (Diagnostic/Interventional)  Goal: Stable Heart Rate and Rhythm  1/8/2025 1813 by Cecilia Craig RN  Outcome: Progressing     Problem: Cardiac Catheterization (Diagnostic/Interventional)  Goal: Optimal Pain Control and Function  1/8/2025 1813 by Cecilia Craig RN  Outcome: Progressing     Problem: Cardiac Catheterization (Diagnostic/Interventional)  Goal: Absence of Vascular Access Complication  1/8/2025 1813 by Cecilia Craig RN  Outcome: Progressing     Problem: Gas Exchange Impaired  Goal: Optimal Gas Exchange  1/8/2025 1813 by Cecilia Craig RN  Outcome: Progressing     Problem: Pain Acute  Goal: Optimal Pain Control and Function  1/8/2025 1813 by Cecilia Craig RN  Outcome: Progressing     Problem: Fall Injury Risk  Goal: Absence of Fall and Fall-Related Injury  1/8/2025 1813 by Cecilia Craig RN  Outcome: Progressing     Pt admitted from [facility] at 1530. A/Ox4. VSS. Denied pain and SOB. Attempted to start removing air from TR band at 1730, minimal oozing noted at the site around 1807 and 2mls of air re-added per orders. Bedrest to end once TR band is off. NS at 75ml/hr per orders. Weaned to room air, tolerating well sating >90% per orders. Pt tolerating po food/fluids well w/o difficulty.

## 2025-01-10 ENCOUNTER — APPOINTMENT (OUTPATIENT)
Dept: PHYSICAL THERAPY | Facility: HOSPITAL | Age: 80
DRG: 321 | End: 2025-01-10
Attending: INTERNAL MEDICINE
Payer: MEDICARE

## 2025-01-10 ENCOUNTER — APPOINTMENT (OUTPATIENT)
Dept: OCCUPATIONAL THERAPY | Facility: HOSPITAL | Age: 80
DRG: 321 | End: 2025-01-10
Attending: INTERNAL MEDICINE
Payer: MEDICARE

## 2025-01-10 VITALS
OXYGEN SATURATION: 96 % | BODY MASS INDEX: 26.41 KG/M2 | HEART RATE: 62 BPM | TEMPERATURE: 97.4 F | DIASTOLIC BLOOD PRESSURE: 72 MMHG | RESPIRATION RATE: 16 BRPM | WEIGHT: 158.7 LBS | SYSTOLIC BLOOD PRESSURE: 130 MMHG

## 2025-01-10 LAB
ANION GAP SERPL CALCULATED.3IONS-SCNC: 10 MMOL/L (ref 7–15)
ATRIAL RATE - MUSE: 71 BPM
BUN SERPL-MCNC: 23.1 MG/DL (ref 8–23)
CALCIUM SERPL-MCNC: 9.4 MG/DL (ref 8.8–10.4)
CHLORIDE SERPL-SCNC: 104 MMOL/L (ref 98–107)
CREAT SERPL-MCNC: 0.86 MG/DL (ref 0.67–1.17)
DIASTOLIC BLOOD PRESSURE - MUSE: NORMAL MMHG
EGFRCR SERPLBLD CKD-EPI 2021: 89 ML/MIN/1.73M2
GLUCOSE SERPL-MCNC: 113 MG/DL (ref 70–99)
HCO3 SERPL-SCNC: 26 MMOL/L (ref 22–29)
INTERPRETATION ECG - MUSE: NORMAL
P AXIS - MUSE: 51 DEGREES
POTASSIUM SERPL-SCNC: 4.2 MMOL/L (ref 3.4–5.3)
PR INTERVAL - MUSE: 154 MS
QRS DURATION - MUSE: 132 MS
QT - MUSE: 458 MS
QTC - MUSE: 497 MS
R AXIS - MUSE: 214 DEGREES
SODIUM SERPL-SCNC: 140 MMOL/L (ref 135–145)
SYSTOLIC BLOOD PRESSURE - MUSE: NORMAL MMHG
T AXIS - MUSE: 200 DEGREES
VENTRICULAR RATE- MUSE: 71 BPM

## 2025-01-10 PROCEDURE — 97116 GAIT TRAINING THERAPY: CPT | Mod: GP

## 2025-01-10 PROCEDURE — 99239 HOSP IP/OBS DSCHRG MGMT >30: CPT | Performed by: INTERNAL MEDICINE

## 2025-01-10 PROCEDURE — 94640 AIRWAY INHALATION TREATMENT: CPT

## 2025-01-10 PROCEDURE — 80051 ELECTROLYTE PANEL: CPT | Performed by: INTERNAL MEDICINE

## 2025-01-10 PROCEDURE — 97110 THERAPEUTIC EXERCISES: CPT | Mod: GO

## 2025-01-10 PROCEDURE — 250N000012 HC RX MED GY IP 250 OP 636 PS 637: Performed by: INTERNAL MEDICINE

## 2025-01-10 PROCEDURE — 250N000013 HC RX MED GY IP 250 OP 250 PS 637: Performed by: NURSE PRACTITIONER

## 2025-01-10 PROCEDURE — 97535 SELF CARE MNGMENT TRAINING: CPT | Mod: GO

## 2025-01-10 PROCEDURE — 97530 THERAPEUTIC ACTIVITIES: CPT | Mod: GP

## 2025-01-10 PROCEDURE — 250N000009 HC RX 250: Performed by: NURSE PRACTITIONER

## 2025-01-10 PROCEDURE — 97161 PT EVAL LOW COMPLEX 20 MIN: CPT | Mod: GP

## 2025-01-10 PROCEDURE — 250N000013 HC RX MED GY IP 250 OP 250 PS 637: Performed by: INTERNAL MEDICINE

## 2025-01-10 PROCEDURE — 80048 BASIC METABOLIC PNL TOTAL CA: CPT | Performed by: INTERNAL MEDICINE

## 2025-01-10 PROCEDURE — 36415 COLL VENOUS BLD VENIPUNCTURE: CPT | Performed by: INTERNAL MEDICINE

## 2025-01-10 PROCEDURE — 250N000013 HC RX MED GY IP 250 OP 250 PS 637: Performed by: GENERAL ACUTE CARE HOSPITAL

## 2025-01-10 RX ORDER — SPIRONOLACTONE 25 MG/1
12.5 TABLET ORAL DAILY
Qty: 15 TABLET | Refills: 0 | Status: SHIPPED | OUTPATIENT
Start: 2025-01-11 | End: 2025-02-10

## 2025-01-10 RX ORDER — ACETAMINOPHEN 325 MG/1
650 TABLET ORAL EVERY 6 HOURS PRN
Qty: 90 TABLET | Refills: 0 | Status: SHIPPED | OUTPATIENT
Start: 2025-01-10

## 2025-01-10 RX ORDER — BISOPROLOL FUMARATE 5 MG/1
5 TABLET, FILM COATED ORAL DAILY
Qty: 30 TABLET | Refills: 0 | Status: SHIPPED | OUTPATIENT
Start: 2025-01-11 | End: 2025-02-10

## 2025-01-10 RX ORDER — LOSARTAN POTASSIUM 25 MG/1
12.5 TABLET ORAL DAILY
Qty: 15 TABLET | Refills: 0 | Status: SHIPPED | OUTPATIENT
Start: 2025-01-11 | End: 2025-02-10

## 2025-01-10 RX ADMIN — BENZONATATE 200 MG: 100 CAPSULE ORAL at 08:47

## 2025-01-10 RX ADMIN — PREGABALIN 150 MG: 75 CAPSULE ORAL at 08:47

## 2025-01-10 RX ADMIN — METHOCARBAMOL 500 MG: 500 TABLET ORAL at 13:39

## 2025-01-10 RX ADMIN — CLOPIDOGREL BISULFATE 75 MG: 75 TABLET ORAL at 08:47

## 2025-01-10 RX ADMIN — PREDNISONE 3 MG: 1 TABLET ORAL at 08:47

## 2025-01-10 RX ADMIN — ACETAMINOPHEN 650 MG: 325 TABLET ORAL at 04:17

## 2025-01-10 RX ADMIN — PANTOPRAZOLE SODIUM 40 MG: 40 TABLET, DELAYED RELEASE ORAL at 08:47

## 2025-01-10 RX ADMIN — ACETAMINOPHEN 650 MG: 325 TABLET ORAL at 08:47

## 2025-01-10 RX ADMIN — PREGABALIN 150 MG: 75 CAPSULE ORAL at 13:39

## 2025-01-10 RX ADMIN — BISOPROLOL FUMARATE 5 MG: 5 TABLET ORAL at 08:48

## 2025-01-10 RX ADMIN — IPRATROPIUM BROMIDE AND ALBUTEROL SULFATE 3 ML: .5; 3 SOLUTION RESPIRATORY (INHALATION) at 07:32

## 2025-01-10 RX ADMIN — ASPIRIN 81 MG: 81 TABLET, COATED ORAL at 08:47

## 2025-01-10 RX ADMIN — LOSARTAN POTASSIUM 12.5 MG: 25 TABLET, FILM COATED ORAL at 08:48

## 2025-01-10 RX ADMIN — CYANOCOBALAMIN TAB 1000 MCG 1000 MCG: 1000 TAB at 08:47

## 2025-01-10 RX ADMIN — SPIRONOLACTONE 12.5 MG: 25 TABLET, FILM COATED ORAL at 08:47

## 2025-01-10 RX ADMIN — BENZONATATE 200 MG: 100 CAPSULE ORAL at 13:39

## 2025-01-10 RX ADMIN — METHOCARBAMOL 500 MG: 500 TABLET ORAL at 08:47

## 2025-01-10 ASSESSMENT — ACTIVITIES OF DAILY LIVING (ADL)
ADLS_ACUITY_SCORE: 53

## 2025-01-10 NOTE — PROGRESS NOTES
Care Management Discharge Note    Discharge Date: 01/10/2025       Discharge Disposition: Home, Home Care - [facility] for RN/PT/OT    Discharge Services: Home Care - [facility] for RN/PT/OT    Discharge DME: None    Discharge Transportation: family or friend will provide    Private pay costs discussed: Not applicable    Does the patient's insurance plan have a 3 day qualifying hospital stay waiver?  Yes     Which insurance plan 3 day waiver is available? [employer]    Will the waiver be used for post-acute placement? No    PAS Confirmation Code: NA  Patient/family educated on Medicare website which has current facility and service quality ratings: NA    Education Provided on the Discharge Plan: Yes per team  Persons Notified of Discharge Plans: Patient  Patient/Family in Agreement with the Plan: yes    Handoff Referral Completed: No, handoff not indicated or clinically appropriate    Additional Information:  Patient discharge to home with Home Care - [facility] for RN/PT/OT. Family will transport.     Orders sent to Home Care - [facility] for RN/PT/OT    Ning Brooks RN

## 2025-01-10 NOTE — DISCHARGE INSTRUCTIONS

## 2025-01-10 NOTE — PLAN OF CARE
Occupational Therapy Discharge Summary    Reason for therapy discharge:    Discharged to home with home therapy.    Progress towards therapy goal(s). See goals on Care Plan in [facility] electronic health record for goal details.  Goals partially met.  Barriers to achieving goals:   discharge from facility.    Therapy recommendation(s):    Continued therapy is recommended.  Rationale/Recommendations:  home w/ support from spouse, home w/ therapies.  HENOK Puckett, OTR/L, 1/10/2025, 4:08 PM
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
## 2025-01-10 NOTE — PLAN OF CARE
Goal Outcome Evaluation:    Patient has been assessed for Home Oxygen needs.     Pulse oximetry (SpO2) and Oxygen flow readings:    SpO2 = 95% on room air at rest while awake.    SpO2 improved to  % on   liters/minute at rest.    SpO2 = 92% on room air during activity/with exercise.    *SpO2 improved to  % on   liters/minute during activity/with exercise.

## 2025-01-10 NOTE — PLAN OF CARE
"Goal Outcome Evaluation:    Pt alert and oriented. Denied SOB and chest pain. VSS.  Pt dishcarging to home accompanied by wife via own transport.  Pt belongings remained with the pt. Discharge orders/instructions given to pt, verbalized understanding.      Problem: Adult Inpatient Plan of Care  Goal: Plan of Care Review  Description: The Plan of Care Review/Shift note should be completed every shift.  The Outcome Evaluation is a brief statement about your assessment that the patient is improving, declining, or no change.  This information will be displayed automatically on your shift  note.  Outcome: Adequate for Care Transition  Goal: Patient-Specific Goal (Individualized)  Description: You can add care plan individualizations to a care plan. Examples of Individualization might be:  \"Parent requests to be called daily at 9am for status\", \"I have a hard time hearing out of my right ear\", or \"Do not touch me to wake me up as it startles  me\".  Outcome: Adequate for Care Transition  Goal: Absence of Hospital-Acquired Illness or Injury  Outcome: Adequate for Care Transition  Intervention: Identify and Manage Fall Risk  Recent Flowsheet Documentation  Taken 1/10/2025 1200 by Xavi Ogden, RN  Safety Promotion/Fall Prevention:   activity supervised   increase visualization of patient   lighting adjusted   nonskid shoes/slippers when out of bed  Taken 1/10/2025 0900 by Xavi Ogden, RN  Safety Promotion/Fall Prevention:   activity supervised   increase visualization of patient   lighting adjusted   nonskid shoes/slippers when out of bed  Intervention: Prevent Skin Injury  Recent Flowsheet Documentation  Taken 1/10/2025 1200 by Xavi Ogden, RN  Body Position: position changed independently  Goal: Optimal Comfort and Wellbeing  Outcome: Adequate for Care Transition  Goal: Readiness for Transition of Care  Outcome: Adequate for Care Transition     "

## 2025-01-10 NOTE — TELEPHONE ENCOUNTER
Spoke with patient and relayed message below regarding NORCO and prednisone. He is currently in the hospital. No further questions.     Phyllis Christopher CMA.

## 2025-01-10 NOTE — PLAN OF CARE
Goal Outcome Evaluation:      Plan of Care Reviewed With: patient    Overall Patient Progress: improving    Problem: Cardiac Catheterization (Diagnostic/Interventional)  Goal: Absence of Bleeding  Outcome: Progressing  Goal: Absence of Contrast-Induced Injury  Outcome: Progressing  Goal: Stable Heart Rate and Rhythm  Outcome: Progressing  Goal: Absence of Embolism Signs and Symptoms  Outcome: Progressing  Goal: Anesthesia/Sedation Recovery  Outcome: Progressing  Intervention: Optimize Anesthesia Recovery  Recent Flowsheet Documentation  Taken 1/10/2025 0414 by Kelby Aguirre, RN  Administration (IS): self-administered  Reorientation Measures:   calendar in view   clock in view  Level Incentive Spirometer (mL): 800  Incentive Spirometer Predicted Level (mL): 1500  Number of Repetitions (IS): 6  Patient Tolerance (IS): fair  Taken 1/10/2025 0001 by Kelby Aguirre, RN  Safety Promotion/Fall Prevention:   activity supervised   assistive device/personal items within reach   clutter free environment maintained   mobility aid in reach   nonskid shoes/slippers when out of bed   patient and family education   room organization consistent   safety round/check completed   supervised activity  Administration (IS): instruction provided, initial  Reorientation Measures:   clock in view   calendar in view  Level Incentive Spirometer (mL): 800  Incentive Spirometer Predicted Level (mL): 1500  Number of Repetitions (IS): 6  Patient Tolerance (IS): fair  Taken 1/9/2025 1946 by Kelby Aguirre, RN  Safety Promotion/Fall Prevention:   activity supervised   assistive device/personal items within reach   clutter free environment maintained   mobility aid in reach   nonskid shoes/slippers when out of bed   patient and family education   room organization consistent   safety round/check completed   supervised activity  Reorientation Measures:   clock in view   calendar in view  Goal: Optimal Pain Control and Function  Outcome:  Progressing  Intervention: Prevent or Manage Pain  Recent Flowsheet Documentation  Taken 1/9/2025 2103 by Kelby Aguirre RN  Pain Management Interventions: medication (see MAR)  Goal: Absence of Vascular Access Complication  Outcome: Progressing  Intervention: Prevent and Manage Access Complications  Recent Flowsheet Documentation  Taken 1/10/2025 0001 by Kelby Aguirre RN  Activity Management: activity adjusted per tolerance  Head of Bed (HOB) Positioning: HOB at 20-30 degrees  Taken 1/9/2025 1946 by Kelby Aguirre RN  Activity Management: activity adjusted per tolerance  Head of Bed (HOB) Positioning: HOB at 20-30 degrees     Problem: Cardiac Catheterization (Diagnostic/Interventional)  Goal: Optimal Pain Control and Function  Outcome: Progressing  Intervention: Prevent or Manage Pain  Recent Flowsheet Documentation  Taken 1/9/2025 2103 by Kelby Aguirre RN  Pain Management Interventions: medication (see MAR)     Problem: Cardiac Catheterization (Diagnostic/Interventional)  Goal: Absence of Vascular Access Complication  Outcome: Progressing  Intervention: Prevent and Manage Access Complications  Recent Flowsheet Documentation  Taken 1/10/2025 0001 by Kelby Aguirre RN  Activity Management: activity adjusted per tolerance  Head of Bed (HOB) Positioning: HOB at 20-30 degrees  Taken 1/9/2025 1946 by Kelby Aguirre RN  Activity Management: activity adjusted per tolerance  Head of Bed (HOB) Positioning: HOB at 20-30 degrees     Problem: Gas Exchange Impaired  Goal: Optimal Gas Exchange  Outcome: Progressing  Intervention: Optimize Oxygenation and Ventilation  Recent Flowsheet Documentation  Taken 1/10/2025 0001 by Kelby Aguirre RN  Head of Bed (HOB) Positioning: HOB at 20-30 degrees  Taken 1/9/2025 1946 by Kelby Aguirre RN  Head of Bed (HOB) Positioning: HOB at 20-30 degrees     Problem: Pain Acute  Goal: Optimal Pain Control and Function  Outcome: Progressing  Intervention: Develop Pain  Management Plan  Recent Flowsheet Documentation  Taken 1/9/2025 2103 by Kelby Aguirre RN  Pain Management Interventions: medication (see MAR)  Intervention: Prevent or Manage Pain  Recent Flowsheet Documentation  Taken 1/10/2025 0414 by Kelby Aguirre RN  Sensory Stimulation Regulation:   care clustered   lighting decreased  Taken 1/10/2025 0001 by Kelby Aguirre RN  Sensory Stimulation Regulation:   lighting decreased   care clustered  Medication Review/Management: medications reviewed  Taken 1/9/2025 1946 by Kelby Aguirre RN  Sensory Stimulation Regulation: television on  Medication Review/Management: medications reviewed     Patient alert and oriented X 4, Vitals stable through the night, denied SOB, on 1L oxygen, reported chest pain from chest tube insertion site, PRN and scheduled given  Angio procedure site WDL, no bleeding or hematoma. Incontinent for bowel and bladder, Pt is able to make needs known, care is ongoing.    English

## 2025-01-10 NOTE — PROGRESS NOTES
Care Management Follow Up    Length of Stay (days): 2    Expected Discharge Date: 01/10/2025     Concerns to be Addressed: Care progression - discharge planning     Patient plan of care discussed at interdisciplinary rounds: Yes    Anticipated Discharge Disposition:  OT rec home with home care occupational therapy     Anticipated Discharge Services:  Home care for RN/PT/OT  Anticipated Discharge DME:  NA    Patient/family educated on Medicare website which has current facility and service quality ratings:  NA  Education Provided on the Discharge Plan:  yes per team  Patient/Family in Agreement with the Plan:  NA    Referrals Placed by CM/SW:  NA  Private pay costs discussed: Not applicable    Discussed  Partnership in Safe Discharge Planning  document with patient/family: No     Handoff Completed: No, handoff not indicated or clinically appropriate    Additional Information:  Per provider, Dr. Kramer, patient is ready. Will do home oxygen eval and waiting for PT eval/rec.    Social Hx:  Assessment: Follow. Live w/spouse in an independent living apartment at [facility]. Independent w/ADLs, but needs assistance w/IADLs. Report has DME, but no community resources.   Last note: 1/10/25  Plan: Accepted to [facility] for RN/PT/OT  Needs: Home oxygen eval  Transport: Family/friend     Next Steps: RNCM to follow for medical progression, recommendations, and final discharge plan.     Ning Brooks RN

## 2025-01-13 ENCOUNTER — PATIENT OUTREACH (OUTPATIENT)
Dept: CARE COORDINATION | Facility: CLINIC | Age: 80
End: 2025-01-13
Payer: MEDICARE

## 2025-01-13 ENCOUNTER — TELEPHONE (OUTPATIENT)
Dept: FAMILY MEDICINE | Facility: CLINIC | Age: 80
End: 2025-01-13
Payer: MEDICARE

## 2025-01-13 NOTE — PROGRESS NOTES
Clinic Care Coordination Contact  Transitions of Care Outreach  Chief Complaint   Patient presents with    Clinic Care Coordination - Post Hospital       Most Recent Admission Date: 1/8/2025   Most Recent Admission Diagnosis: Elevated troponin - R79.89     Most Recent Discharge Date: 1/10/2025   Most Recent Discharge Diagnosis: Elevated troponin - R79.89  Status post insertion of drug-eluting stent into left anterior descending (LAD) artery - Z95.5  Essential hypertension, benign - I10  Closed fracture of one rib of right side, initial encounter - S22.31XA     Transitions of Care Assessment                   Care Management       Care Mgmt General Assessment      CCC RN spoke with patient today to follow up on recent hospitalizaiton. Patient reported he was doing well at home, just a little tired. Patient denied any pain related concerns. He stated he is taking his medications as directed. He is aware of Cardiac Rehab appointment on 1/14/25 and follow up with PCP on 1/15/26. Patient reported he has good support at home. No concerns expressed.                    Follow up Plan          Future Appointments   Date Time Provider Department Center   1/14/2025  8:00 AM [facility] CR 2 WWCVRB [facility]   1/15/2025  9:00 AM John Oconnor NP CTFMOB [facility]   1/28/2025  2:50 PM Karmen Odonnell, PAZaidaC HRSJN [facility]       Outpatient Plan as outlined on AVS reviewed with patient.    For any urgent concerns, please contact our 24 hour nurse triage line: [phone] ([phone])       David J. Myhre, RN

## 2025-01-13 NOTE — TELEPHONE ENCOUNTER
Home Care is calling regarding an established patient with [facility].       Requesting orders from: John Oconnor  Provider is following patient: Yes  Is this a 60-day recertification request?  No    Orders Requested    Skilled Nursing  Request for initial certification (first set of orders)   Frequency:  2x/wk for 1 wks, then 1x a week for 2 weeks, then every other week for 6 weeks    Physical Therapy  Request for initial evaluation and treatment (one time)     Speech Therapy  Request for initial evaluation and treatment (one time)     Verbal orders given for PT and ST.  Information was gathered for Skilled Nursing.  Provider review needed.  RN will contact Home Care with information after provider review.  Confirmed ok to leave a detailed message with call back.  Contact information confirmed and updated as needed.    Quinn Shepard RN

## 2025-01-14 ENCOUNTER — TELEPHONE (OUTPATIENT)
Dept: FAMILY MEDICINE | Facility: CLINIC | Age: 80
End: 2025-01-14
Payer: MEDICARE

## 2025-01-14 ENCOUNTER — HOSPITAL ENCOUNTER (OUTPATIENT)
Dept: CARDIAC REHAB | Facility: CLINIC | Age: 80
Discharge: HOME | End: 2025-01-14
Attending: INTERNAL MEDICINE
Payer: MEDICARE

## 2025-01-14 DIAGNOSIS — Z95.5 STATUS POST INSERTION OF DRUG-ELUTING STENT INTO LEFT ANTERIOR DESCENDING (LAD) ARTERY: ICD-10-CM

## 2025-01-14 PROCEDURE — 93797 PHYS/QHP OP CAR RHAB WO ECG: CPT

## 2025-01-14 PROCEDURE — 93798 PHYS/QHP OP CAR RHAB W/ECG: CPT

## 2025-01-14 NOTE — TELEPHONE ENCOUNTER
Provider gave the okay for orders as requested.  This message was left on the secure voice mail line that was provided.    MACIE Aaron RN  [facility]

## 2025-01-14 NOTE — TELEPHONE ENCOUNTER
Forms/Letter Request    Type of form/letter: OTHER: initial plan pf care SN, PT, OT. ST-2 pgs       Do we have the form/letter: Yes: placed in John's inbox    Who is the form from? Home care    Where did/will the form come from? form was faxed in    When is form/letter needed by: when signed    How would you like the form/letter returned: Fax : [phone]

## 2025-01-15 ENCOUNTER — OFFICE VISIT (OUTPATIENT)
Dept: FAMILY MEDICINE | Facility: CLINIC | Age: 80
End: 2025-01-15
Payer: MEDICARE

## 2025-01-15 VITALS
SYSTOLIC BLOOD PRESSURE: 110 MMHG | RESPIRATION RATE: 18 BRPM | HEART RATE: 67 BPM | DIASTOLIC BLOOD PRESSURE: 70 MMHG | BODY MASS INDEX: 27.24 KG/M2 | HEIGHT: 65 IN | WEIGHT: 163.5 LBS | OXYGEN SATURATION: 95 % | TEMPERATURE: 97.6 F

## 2025-01-15 DIAGNOSIS — R79.89 ELEVATED TROPONIN: ICD-10-CM

## 2025-01-15 DIAGNOSIS — I10 ESSENTIAL HYPERTENSION, BENIGN: ICD-10-CM

## 2025-01-15 DIAGNOSIS — F51.01 PRIMARY INSOMNIA: ICD-10-CM

## 2025-01-15 DIAGNOSIS — F33.1 MODERATE EPISODE OF RECURRENT MAJOR DEPRESSIVE DISORDER (H): Primary | ICD-10-CM

## 2025-01-15 PROCEDURE — 99495 TRANSJ CARE MGMT MOD F2F 14D: CPT | Performed by: NURSE PRACTITIONER

## 2025-01-15 RX ORDER — BISOPROLOL FUMARATE 5 MG/1
5 TABLET, FILM COATED ORAL DAILY
Qty: 30 TABLET | Refills: 0 | Status: CANCELLED | OUTPATIENT
Start: 2025-01-15

## 2025-01-15 RX ORDER — SPIRONOLACTONE 25 MG/1
12.5 TABLET ORAL DAILY
Qty: 15 TABLET | Refills: 0 | Status: CANCELLED | OUTPATIENT
Start: 2025-01-15

## 2025-01-15 RX ORDER — LOSARTAN POTASSIUM 25 MG/1
12.5 TABLET ORAL DAILY
Qty: 15 TABLET | Refills: 0 | Status: CANCELLED | OUTPATIENT
Start: 2025-01-15

## 2025-01-15 RX ORDER — SERTRALINE HYDROCHLORIDE 25 MG/1
25 TABLET, FILM COATED ORAL DAILY
Qty: 60 TABLET | Refills: 0 | Status: SHIPPED | OUTPATIENT
Start: 2025-01-15

## 2025-01-15 RX ORDER — TRAZODONE HYDROCHLORIDE 50 MG/1
50-100 TABLET, FILM COATED ORAL AT BEDTIME
Qty: 180 TABLET | Refills: 3 | Status: SHIPPED | OUTPATIENT
Start: 2025-01-15

## 2025-01-15 ASSESSMENT — PAIN SCALES - GENERAL: PAINLEVEL_OUTOF10: NO PAIN (0)

## 2025-01-15 NOTE — PATIENT INSTRUCTIONS
It sounds like the cough is slowly getting better, but if it worsens, let me know.    Make sure to keep using that incentive spirometer for at least 2-3 more weeks.    The heart clinic will make sure you'll get refills of your new heart medicines. You have enough to get to that appointment.    For the depression symptoms, I sent sertraline to your pharmacy. You do not have to start this immediatly if you want to focus on rehab first, but after you have been on it for 6 weeks, schedule a phone visit with me so we can carve out time to talk and see how you are responding to treatment.    I will send a message to the care coordinator regarding the insurance issues regarding home therapy and cardiac rehab.

## 2025-01-19 ENCOUNTER — TELEPHONE (OUTPATIENT)
Dept: FAMILY MEDICINE | Facility: CLINIC | Age: 80
End: 2025-01-19
Payer: MEDICARE

## 2025-01-19 DIAGNOSIS — G89.29 CHRONIC LOW BACK PAIN, UNSPECIFIED BACK PAIN LATERALITY, UNSPECIFIED WHETHER SCIATICA PRESENT: Primary | ICD-10-CM

## 2025-01-19 DIAGNOSIS — M54.50 CHRONIC LOW BACK PAIN, UNSPECIFIED BACK PAIN LATERALITY, UNSPECIFIED WHETHER SCIATICA PRESENT: Primary | ICD-10-CM

## 2025-01-19 DIAGNOSIS — M62.81 GENERALIZED MUSCLE WEAKNESS: ICD-10-CM

## 2025-01-19 NOTE — TELEPHONE ENCOUNTER
Please contact patient.    I did hear back from social work.  He notes that Medicare is pretty much guaranteed to not cover both in-home and outpatient rehabs.  Essentially, he would have to choose which one he would like to do.      I would say the cardiac rehab is the most important of them.  If open to it, I could also place referrals to outpatient physical therapy.  He would still be able to get the exercise guidance to work on increasing strength/endurance, but would have to drive into clinic rather than have them come to him. Let me know if interested.    John Oconnor, NP

## 2025-01-21 ENCOUNTER — HOSPITAL ENCOUNTER (OUTPATIENT)
Dept: CARDIAC REHAB | Facility: CLINIC | Age: 80
Discharge: HOME | End: 2025-01-21
Attending: INTERNAL MEDICINE
Payer: MEDICARE

## 2025-01-21 PROCEDURE — 93798 PHYS/QHP OP CAR RHAB W/ECG: CPT

## 2025-01-21 NOTE — TELEPHONE ENCOUNTER
Patient Returning Call    Reason for call:  return call    Information relayed to patient:  relayed message from John below.     Patient has additional questions:  Yes    What are your questions/concerns:  patient states he had his first outpatient cardiac rehab at  today. Patient states he is OK with doing outpatient rehab at this time, as he is doing outpatient cardiac rehab.    Who does the patient want to speak with:   any    Could we send this information to you in [facility] or would you prefer to receive a phone call?:   Patient would prefer a phone call   Okay to leave a detailed message?: Yes at Home number on file [phone] (home)

## 2025-01-22 ENCOUNTER — TELEPHONE (OUTPATIENT)
Dept: FAMILY MEDICINE | Facility: CLINIC | Age: 80
End: 2025-01-22
Payer: MEDICARE

## 2025-01-22 NOTE — TELEPHONE ENCOUNTER
Forms/Letter Request    Type of form/letter: Home Health Certification      Do we have the form/letter: Yes: in John's inbox    Who is the form from? Home care    Where did/will the form come from? form was faxed in    When is form/letter needed by: when done    How would you like the form/letter returned: Fax : [phone]    Order details/form description: home health cert and POC-7 pages-home health skilled services [facility]    Order number (if applicable): [id number]

## 2025-01-27 NOTE — PROGRESS NOTES
Assessment/Recommendations   Assessment:    1.  Coronary artery disease: s/p NSTEMI and DESx2 to mid LAD  - On dual antiplatelet therapy with ASA 81 mg indefinitely and Clopidogrel (Plavix) 75 mg daily for 12 months.  Patient denies any angina. Some fatigue but recovering well from hospitalization.  - Cardiac rehab has been scheduled  - Reviewed most recent BMP, Hgb, platelet- stable.    2. Acute HFrEF, ischemic cardiomyopathy: Compensated. LVEF 30-45%, bisoprolol, losartan, spironolactone    4.  Dyslipidemia with LDL goal <70: Ubaldo Ramos is on high intensity statin therapy with atorvastatin 80 mg with controlled LDL cholesterol.    5.  Hypertension: His blood pressure is Controlled. Currently on bisoprolol, losartan, spironolactone    6. PAD lower extremity stenting and right-to-left femoral-femoral arterial bypass done in [place]. CT angiography of the lower extremities 12/13/2023 showed patent bilateral common and external iliac artery stents but occlusion of the femoral bypass graft  7. Asymptomatic right internal carotid artery stenosis last assessed on carotid ultrasound 7/16/2024   8. COPD, emphysema  9. NSCLC:  resection, radiation therapy and chemotherapy in 2017 and 2018   10. Tobacco use: still smoking, encouraged cessation/reduction  11. S/p rib fracture and pneumothorax requiring chest tube      Plan:  - We discussed the importance of antiplatelet therapy and talking with his cardiologist prior to stopping these medications for any reason.  Has had some nosebleeds, we talked about nasal care and prevention. Continue DAPT.  - Encouraged to seek medical attention if recurrent chest pain or shortness of breath.    - Repeat echo in 2 months    - Continue current hypertension regimen    - Continue hyperlipidemia regimen.     - Cardiac rehab as scheduled    - We discussed a diet low in saturated fat, weight loss, and exercise along with medication for better control of cholesterol.  Highly  "encouraged to participate in nutrition class in cardiac rehab.  - Risk factor modification and lifestyle management topics were discussed including managing comorbidities, weight loss, heart healthy diet, exercise, and smoking cessation.        Follow up with Dr. Lau or Satya 3-4 months     History of Present Illness/Subjective    Mr. Ubaldo Ramos is a 79 year old male with a past medical history of CAD/NSTEMI, HLD, PAD, HTN, COPD, NSCLC, tobacco use,  who is seen at [facility] for post coronary intervention follow up. S/p NSTEMI and 2 stents to mid LAD 1/8/2025. Most recent ECHO showed an EF of 35-40% with severe hypokinesis of mid to apical septal, mid to apical inferior, apex, and apical lateral wall segments. Traumatic right rib fracture and right pneumothorax status post chest tube placement 1/5/2025 and removal 1/7/2025.     Nose bleeds 3 nights in a row, can last for 2 hours. Not \"gushing\" blood but persistent. Has occurred 3 nights in a row. Has a humidifier. Denies angina, doing well in cardiac rehab without concern. Tolerating mediations. Continues to smoke, does so less in cold weather.      He denies lightheadedness, shortness of breath, dyspnea on exertion, orthopnea, PND, palpitations, chest pain, and lower extremity edema.       Echocardiogram 1/5/2025 Results:  Left ventricular function is decreased. The ejection fraction is 35-40%  (moderately reduced).  There is severe hypokinesis of the mid to apical septal, mid to apical  inferior, apex, and apical lateral wall segments.  Normal right ventricle size and systolic function.  Normal left atrial size.  The aortic valve is trileaflet with aortic valve sclerosis.  There is no comparison study available.    Coronary angiogram 1/8/2025    Prox Cx lesion is 45% stenosed.    Mid LAD lesion is 95% stenosed.    Ultrasound (IVUS) was performed.     1.  Severe focal stenosis mid LAD at angulated takeoff adjacent to " bifurcating septal branches.  2.  Large codominant left circumflex with 40 to 50% stenosis mid segment after takeoff of large OM1.  3.  Small RCA supplies 2 small LV branches.  No significant stenoses  4.  Elevated LVEDP = 30 mmHg  5.  Successful PCI mid LAD with 3.0 x 22 [place] JIA and 3.0 x 12 [place] JIA implants.  0% residual, KESHIA-3 flow.            Physical Examination Review of Systems   /60 (BP Location: Left arm, Patient Position: Sitting, Cuff Size: Adult Regular)   Pulse 56   Resp 16   Wt 75.3 kg (166 lb)   SpO2 96%   BMI 27.62 kg/m    Body mass index is 27.62 kg/m .  Wt Readings from Last 3 Encounters:   01/28/25 75.3 kg (166 lb)   01/15/25 74.2 kg (163 lb 8 oz)   01/10/25 72 kg (158 lb 11.2 oz)     General Appearance:   no distress, normal body habitus   ENT/Mouth: membranes moist, no oral lesions or bleeding gums.      EYES:  no scleral icterus, normal conjunctivae       Chest/Lungs:   lungs are clear to auscultation, no rales or wheezing, equal chest wall expansion    Cardiovascular:   Regular. Normal first and second heart sounds with no murmurs, rubs, or gallops; the radial pulses are intact,  trace edema bilaterally        Extremities  Puncture Site: no cyanosis or clubbing  Right radial site is soft without bruising.  Radial pulses intact and symmetrical.  CMS intact.   Skin: no xanthelasma, warm.    Neurologic: no tremors     Psychiatric: alert and oriented x3, calm                                                        Negative unless noted in HPI     Medical History  Surgical History Family History Social History   No past medical history on file. Past Surgical History:   Procedure Laterality Date    APPENDECTOMY      CERVICAL SPINE SURGERY      CHOLECYSTECTOMY      CV CORONARY ANGIOGRAM N/A 1/8/2025    Procedure: Coronary Angiogram;  Surgeon: Jamie Noriega MD;  Location: [facility] CATH [facility] CV    CV INTRAVASULAR ULTRASOUND N/A 1/8/2025    Procedure: Intravascular Ultrasound;  Surgeon:  Jamie Noriega MD;  Location: [facility] CV    CV LEFT HEART CATH N/A 1/8/2025    Procedure: Left Heart Catheterization;  Surgeon: Jamie Noriega MD;  Location: [facility] LAB CV    CV PCI STENT DRUG ELUTING N/A 1/8/2025    Procedure: Percutaneous Coronary Intervention Stent;  Surgeon: Jamie Noriega MD;  Location: [facility] CV    FEMORAL ENDARTERECTOMY      LUNG CANCER SURGERY      PROSTATECTOMY      No family history on file. Social History     Socioeconomic History    Marital status:      Spouse name: Not on file    Number of children: Not on file    Years of education: Not on file    Highest education level: Not on file   Occupational History    Not on file   Tobacco Use    Smoking status: Every Day     Current packs/day: 0.50     Types: Cigarettes     Passive exposure: Current    Smokeless tobacco: Never   Vaping Use    Vaping status: Never Used   Substance and Sexual Activity    Alcohol use: Not Currently    Drug use: Not Currently    Sexual activity: Not on file   Other Topics Concern    Not on file   Social History Narrative    Not on file     Social Drivers of Health     Financial Resource Strain: Low Risk  (1/8/2025)    Financial Resource Strain     Within the past 12 months, have you or your family members you live with been unable to get utilities (heat, electricity) when it was really needed?: No   Food Insecurity: Low Risk  (1/8/2025)    Food Insecurity     Within the past 12 months, did you worry that your food would run out before you got money to buy more?: No     Within the past 12 months, did the food you bought just not last and you didn t have money to get more?: No   Transportation Needs: Low Risk  (1/8/2025)    Transportation Needs     Within the past 12 months, has lack of transportation kept you from medical appointments, getting your medicines, non-medical meetings or appointments, work, or from getting things that you need?: No   Physical Activity: Not on  file   Stress: Not on file   Social Connections: Not on file   Interpersonal Safety: Low Risk  (1/8/2025)    Interpersonal Safety     Do you feel physically and emotionally safe where you currently live?: Yes     Within the past 12 months, have you been hit, slapped, kicked or otherwise physically hurt by someone?: No     Within the past 12 months, have you been humiliated or emotionally abused in other ways by your partner or ex-partner?: No   Housing Stability: Low Risk  (1/8/2025)    Housing Stability     Do you have housing? : Yes     Are you worried about losing your housing?: No          Medications  Allergies   Current Outpatient Medications   Medication Sig Dispense Refill    acetaminophen (TYLENOL) 325 MG tablet Take 2 tablets (650 mg) by mouth every 6 hours as needed for mild pain. 90 tablet 0    aspirin 81 MG EC tablet Take 1 tablet (81 mg) by mouth daily. Start tomorrow. 30 tablet 3    atorvastatin (LIPITOR) 80 MG tablet Take 1 tablet (80 mg) by mouth at bedtime 90 tablet 3    bisoprolol (ZEBETA) 5 MG tablet Take 1 tablet (5 mg) by mouth daily. 30 tablet 0    cetirizine (ZYRTEC) 10 MG tablet Take 10 mg by mouth every evening      cholecalciferol, vitamin D3, (VITAMIN D3) 25 mcg (1,000 unit) capsule [CHOLECALCIFEROL, VITAMIN D3, (VITAMIN D3) 25 MCG (1,000 UNIT) CAPSULE] Take 1,000 Units by mouth daily.      clopidogrel (PLAVIX) 75 MG tablet Take 1 tablet (75 mg) by mouth daily. Dose to start tomorrow. 90 tablet 3    cyanocobalamin 1000 MCG tablet [CYANOCOBALAMIN 1000 MCG TABLET] Take 1,000 mcg by mouth daily.      ipratropium - albuterol 0.5 mg/2.5 mg/3 mL (DUONEB) 0.5-2.5 (3) MG/3ML neb solution Take 1 vial by nebulization daily      losartan (COZAAR) 25 MG tablet Take 0.5 tablets (12.5 mg) by mouth daily. 45 tablet 3    oxyBUTYnin ER (DITROPAN XL) 10 MG 24 hr tablet Take 1 tablet (10 mg) by mouth daily (Patient taking differently: Take 10 mg by mouth every other day.) 90 tablet 3    pantoprazole  (PROTONIX) 40 MG EC tablet TAKE 1 TABLET BY MOUTH ONCE DAILY 90 tablet 2    pregabalin (LYRICA) 150 MG capsule Take 150 mg by mouth 3 times daily.      sertraline (ZOLOFT) 25 MG tablet Take 1 tablet (25 mg) by mouth daily. 60 tablet 0    spironolactone (ALDACTONE) 25 MG tablet Take 0.5 tablets (12.5 mg) by mouth daily. 45 tablet 3    traZODone (DESYREL) 50 MG tablet Take 1-2 tablets ( mg) by mouth at bedtime. 180 tablet 3    Allergies   Allergen Reactions    Adhesive Tape Unknown     Adhesive- pulls skin off          Lab Results    Chemistry/lipid CBC Cardiac Enzymes/BNP/TSH/INR   Lab Results   Component Value Date    CHOL 124 03/19/2024    HDL 34 (L) 03/19/2024    TRIG 301 (H) 03/19/2024    BUN 23.1 (H) 01/10/2025     01/10/2025    CO2 26 01/10/2025    Lab Results   Component Value Date    WBC 10.2 01/07/2025    HGB 12.8 (L) 01/07/2025    HCT 38.7 (L) 01/07/2025    MCV 92 01/07/2025     01/07/2025    Lab Results   Component Value Date    TROPONINI 0.02 07/31/2022    BNP 74 07/31/2022    TSH 0.46 04/27/2024    INR 1.02 04/27/2024            This note has been dictated using voice recognition software. Any grammatical, typographical, or context distortions are unintentional and inherent to the software    Karmen Eisenberg PA-C

## 2025-01-28 ENCOUNTER — OFFICE VISIT (OUTPATIENT)
Dept: CARDIOLOGY | Facility: CLINIC | Age: 80
End: 2025-01-28
Attending: GENERAL ACUTE CARE HOSPITAL
Payer: MEDICARE

## 2025-01-28 ENCOUNTER — HOSPITAL ENCOUNTER (OUTPATIENT)
Dept: CARDIAC REHAB | Facility: CLINIC | Age: 80
Discharge: HOME | End: 2025-01-28
Attending: INTERNAL MEDICINE
Payer: MEDICARE

## 2025-01-28 VITALS
HEART RATE: 56 BPM | WEIGHT: 166 LBS | RESPIRATION RATE: 16 BRPM | BODY MASS INDEX: 27.62 KG/M2 | SYSTOLIC BLOOD PRESSURE: 126 MMHG | DIASTOLIC BLOOD PRESSURE: 60 MMHG | OXYGEN SATURATION: 96 %

## 2025-01-28 DIAGNOSIS — Z95.5 STATUS POST INSERTION OF DRUG-ELUTING STENT INTO LEFT ANTERIOR DESCENDING (LAD) ARTERY: Primary | ICD-10-CM

## 2025-01-28 DIAGNOSIS — I25.5 ISCHEMIC CARDIOMYOPATHY: ICD-10-CM

## 2025-01-28 DIAGNOSIS — I73.9 PERIPHERAL ARTERY DISEASE: ICD-10-CM

## 2025-01-28 DIAGNOSIS — I10 ESSENTIAL HYPERTENSION, BENIGN: ICD-10-CM

## 2025-01-28 DIAGNOSIS — E78.00 PURE HYPERCHOLESTEROLEMIA: ICD-10-CM

## 2025-01-28 DIAGNOSIS — I25.10 CORONARY ARTERY DISEASE INVOLVING NATIVE CORONARY ARTERY OF NATIVE HEART WITHOUT ANGINA PECTORIS: ICD-10-CM

## 2025-01-28 PROCEDURE — 93798 PHYS/QHP OP CAR RHAB W/ECG: CPT

## 2025-01-28 PROCEDURE — 99214 OFFICE O/P EST MOD 30 MIN: CPT

## 2025-01-28 RX ORDER — SPIRONOLACTONE 25 MG/1
12.5 TABLET ORAL DAILY
Qty: 45 TABLET | Refills: 3 | Status: SHIPPED | OUTPATIENT
Start: 2025-01-28

## 2025-01-28 RX ORDER — LOSARTAN POTASSIUM 25 MG/1
12.5 TABLET ORAL DAILY
Qty: 45 TABLET | Refills: 3 | Status: SHIPPED | OUTPATIENT
Start: 2025-01-28

## 2025-01-28 RX ORDER — SPIRONOLACTONE 25 MG/1
12.5 TABLET ORAL DAILY
Qty: 45 TABLET | Refills: 3 | Status: SHIPPED | OUTPATIENT
Start: 2025-01-28 | End: 2025-01-28

## 2025-01-28 RX ORDER — LOSARTAN POTASSIUM 25 MG/1
12.5 TABLET ORAL DAILY
Qty: 45 TABLET | Refills: 3 | Status: SHIPPED | OUTPATIENT
Start: 2025-01-28 | End: 2025-01-28

## 2025-01-28 NOTE — LETTER
1/28/2025    John Oconnor, NP  [address] [place] [address]    RE: Ubaldo Beckmane       Dear Colleague,     I had the pleasure of seeing Ubaldo Ramos in the [facility].          Assessment/Recommendations   Assessment:    1.  Coronary artery disease: s/p NSTEMI and DESx2 to mid LAD  - On dual antiplatelet therapy with ASA 81 mg indefinitely and Clopidogrel (Plavix) 75 mg daily for 12 months.  Patient denies any angina. Some fatigue but recovering well from hospitalization.  - Cardiac rehab has been scheduled  - Reviewed most recent BMP, Hgb, platelet- stable.    2. Acute HFrEF, ischemic cardiomyopathy: Compensated. LVEF 30-45%, bisoprolol, losartan, spironolactone    4.  Dyslipidemia with LDL goal <70: Ubaldo Ramos is on high intensity statin therapy with atorvastatin 80 mg with controlled LDL cholesterol.    5.  Hypertension: His blood pressure is Controlled. Currently on bisoprolol, losartan, spironolactone    6. PAD lower extremity stenting and right-to-left femoral-femoral arterial bypass done in [place]. CT angiography of the lower extremities 12/13/2023 showed patent bilateral common and external iliac artery stents but occlusion of the femoral bypass graft  7. Asymptomatic right internal carotid artery stenosis last assessed on carotid ultrasound 7/16/2024   8. COPD, emphysema  9. NSCLC:  resection, radiation therapy and chemotherapy in 2017 and 2018   10. Tobacco use: still smoking, encouraged cessation/reduction  11. S/p rib fracture and pneumothorax requiring chest tube      Plan:  - We discussed the importance of antiplatelet therapy and talking with his cardiologist prior to stopping these medications for any reason.  Has had some nosebleeds, we talked about nasal care and prevention. Continue DAPT.  - Encouraged to seek medical attention if recurrent chest pain or shortness of breath.    - Repeat echo in 2 months    - Continue current hypertension regimen    -  "Continue hyperlipidemia regimen.     - Cardiac rehab as scheduled    - We discussed a diet low in saturated fat, weight loss, and exercise along with medication for better control of cholesterol.  Highly encouraged to participate in nutrition class in cardiac rehab.  - Risk factor modification and lifestyle management topics were discussed including managing comorbidities, weight loss, heart healthy diet, exercise, and smoking cessation.        Follow up with Dr. Lau or Satya 3-4 months     History of Present Illness/Subjective    Mr. Ubaldo Ramos is a 79 year old male with a past medical history of CAD/NSTEMI, HLD, PAD, HTN, COPD, NSCLC, tobacco use,  who is seen at [facility] for post coronary intervention follow up. S/p NSTEMI and 2 stents to mid LAD 1/8/2025. Most recent ECHO showed an EF of 35-40% with severe hypokinesis of mid to apical septal, mid to apical inferior, apex, and apical lateral wall segments. Traumatic right rib fracture and right pneumothorax status post chest tube placement 1/5/2025 and removal 1/7/2025.     Nose bleeds 3 nights in a row, can last for 2 hours. Not \"gushing\" blood but persistent. Has occurred 3 nights in a row. Has a humidifier. Denies angina, doing well in cardiac rehab without concern. Tolerating mediations. Continues to smoke, does so less in cold weather.      He denies lightheadedness, shortness of breath, dyspnea on exertion, orthopnea, PND, palpitations, chest pain, and lower extremity edema.       Echocardiogram 1/5/2025 Results:  Left ventricular function is decreased. The ejection fraction is 35-40%  (moderately reduced).  There is severe hypokinesis of the mid to apical septal, mid to apical  inferior, apex, and apical lateral wall segments.  Normal right ventricle size and systolic function.  Normal left atrial size.  The aortic valve is trileaflet with aortic valve sclerosis.  There is no comparison study " available.    Coronary angiogram 1/8/2025     Prox Cx lesion is 45% stenosed.     Mid LAD lesion is 95% stenosed.     Ultrasound (IVUS) was performed.     1.  Severe focal stenosis mid LAD at angulated takeoff adjacent to bifurcating septal branches.  2.  Large codominant left circumflex with 40 to 50% stenosis mid segment after takeoff of large OM1.  3.  Small RCA supplies 2 small LV branches.  No significant stenoses  4.  Elevated LVEDP = 30 mmHg  5.  Successful PCI mid LAD with 3.0 x 22 [place] JIA and 3.0 x 12 [place] JIA implants.  0% residual, KESHIA-3 flow.            Physical Examination Review of Systems   /60 (BP Location: Left arm, Patient Position: Sitting, Cuff Size: Adult Regular)   Pulse 56   Resp 16   Wt 75.3 kg (166 lb)   SpO2 96%   BMI 27.62 kg/m    Body mass index is 27.62 kg/m .  Wt Readings from Last 3 Encounters:   01/28/25 75.3 kg (166 lb)   01/15/25 74.2 kg (163 lb 8 oz)   01/10/25 72 kg (158 lb 11.2 oz)     General Appearance:   no distress, normal body habitus   ENT/Mouth: membranes moist, no oral lesions or bleeding gums.      EYES:  no scleral icterus, normal conjunctivae       Chest/Lungs:   lungs are clear to auscultation, no rales or wheezing, equal chest wall expansion    Cardiovascular:   Regular. Normal first and second heart sounds with no murmurs, rubs, or gallops; the radial pulses are intact,  trace edema bilaterally        Extremities  Puncture Site: no cyanosis or clubbing  Right radial site is soft without bruising.  Radial pulses intact and symmetrical.  CMS intact.   Skin: no xanthelasma, warm.    Neurologic: no tremors     Psychiatric: alert and oriented x3, calm                                                        Negative unless noted in HPI     Medical History  Surgical History Family History Social History   No past medical history on file. Past Surgical History:   Procedure Laterality Date     APPENDECTOMY       CERVICAL SPINE SURGERY       CHOLECYSTECTOMY        CV CORONARY ANGIOGRAM N/A 1/8/2025    Procedure: Coronary Angiogram;  Surgeon: Jamie Noriega MD;  Location: [facility] CV     CV INTRAVASULAR ULTRASOUND N/A 1/8/2025    Procedure: Intravascular Ultrasound;  Surgeon: Jamie Noriega MD;  Location: [facility] CV     CV LEFT HEART CATH N/A 1/8/2025    Procedure: Left Heart Catheterization;  Surgeon: Jamie Noriega MD;  Location: [facility] CV     CV PCI STENT DRUG ELUTING N/A 1/8/2025    Procedure: Percutaneous Coronary Intervention Stent;  Surgeon: Jamie Noriega MD;  Location: [facility] CV     FEMORAL ENDARTERECTOMY       LUNG CANCER SURGERY       PROSTATECTOMY      No family history on file. Social History     Socioeconomic History     Marital status:      Spouse name: Not on file     Number of children: Not on file     Years of education: Not on file     Highest education level: Not on file   Occupational History     Not on file   Tobacco Use     Smoking status: Every Day     Current packs/day: 0.50     Types: Cigarettes     Passive exposure: Current     Smokeless tobacco: Never   Vaping Use     Vaping status: Never Used   Substance and Sexual Activity     Alcohol use: Not Currently     Drug use: Not Currently     Sexual activity: Not on file   Other Topics Concern     Not on file   Social History Narrative     Not on file     Social Drivers of Health     Financial Resource Strain: Low Risk  (1/8/2025)    Financial Resource Strain      Within the past 12 months, have you or your family members you live with been unable to get utilities (heat, electricity) when it was really needed?: No   Food Insecurity: Low Risk  (1/8/2025)    Food Insecurity      Within the past 12 months, did you worry that your food would run out before you got money to buy more?: No      Within the past 12 months, did the food you bought just not last and you didn t have money to get more?: No   Transportation Needs: Low Risk  (1/8/2025)     Transportation Needs      Within the past 12 months, has lack of transportation kept you from medical appointments, getting your medicines, non-medical meetings or appointments, work, or from getting things that you need?: No   Physical Activity: Not on file   Stress: Not on file   Social Connections: Not on file   Interpersonal Safety: Low Risk  (1/8/2025)    Interpersonal Safety      Do you feel physically and emotionally safe where you currently live?: Yes      Within the past 12 months, have you been hit, slapped, kicked or otherwise physically hurt by someone?: No      Within the past 12 months, have you been humiliated or emotionally abused in other ways by your partner or ex-partner?: No   Housing Stability: Low Risk  (1/8/2025)    Housing Stability      Do you have housing? : Yes      Are you worried about losing your housing?: No          Medications  Allergies   Current Outpatient Medications   Medication Sig Dispense Refill     acetaminophen (TYLENOL) 325 MG tablet Take 2 tablets (650 mg) by mouth every 6 hours as needed for mild pain. 90 tablet 0     aspirin 81 MG EC tablet Take 1 tablet (81 mg) by mouth daily. Start tomorrow. 30 tablet 3     atorvastatin (LIPITOR) 80 MG tablet Take 1 tablet (80 mg) by mouth at bedtime 90 tablet 3     bisoprolol (ZEBETA) 5 MG tablet Take 1 tablet (5 mg) by mouth daily. 30 tablet 0     cetirizine (ZYRTEC) 10 MG tablet Take 10 mg by mouth every evening       cholecalciferol, vitamin D3, (VITAMIN D3) 25 mcg (1,000 unit) capsule [CHOLECALCIFEROL, VITAMIN D3, (VITAMIN D3) 25 MCG (1,000 UNIT) CAPSULE] Take 1,000 Units by mouth daily.       clopidogrel (PLAVIX) 75 MG tablet Take 1 tablet (75 mg) by mouth daily. Dose to start tomorrow. 90 tablet 3     cyanocobalamin 1000 MCG tablet [CYANOCOBALAMIN 1000 MCG TABLET] Take 1,000 mcg by mouth daily.       ipratropium - albuterol 0.5 mg/2.5 mg/3 mL (DUONEB) 0.5-2.5 (3) MG/3ML neb solution Take 1 vial by nebulization daily        losartan (COZAAR) 25 MG tablet Take 0.5 tablets (12.5 mg) by mouth daily. 45 tablet 3     oxyBUTYnin ER (DITROPAN XL) 10 MG 24 hr tablet Take 1 tablet (10 mg) by mouth daily (Patient taking differently: Take 10 mg by mouth every other day.) 90 tablet 3     pantoprazole (PROTONIX) 40 MG EC tablet TAKE 1 TABLET BY MOUTH ONCE DAILY 90 tablet 2     pregabalin (LYRICA) 150 MG capsule Take 150 mg by mouth 3 times daily.       sertraline (ZOLOFT) 25 MG tablet Take 1 tablet (25 mg) by mouth daily. 60 tablet 0     spironolactone (ALDACTONE) 25 MG tablet Take 0.5 tablets (12.5 mg) by mouth daily. 45 tablet 3     traZODone (DESYREL) 50 MG tablet Take 1-2 tablets ( mg) by mouth at bedtime. 180 tablet 3    Allergies   Allergen Reactions     Adhesive Tape Unknown     Adhesive- pulls skin off          Lab Results    Chemistry/lipid CBC Cardiac Enzymes/BNP/TSH/INR   Lab Results   Component Value Date    CHOL 124 03/19/2024    HDL 34 (L) 03/19/2024    TRIG 301 (H) 03/19/2024    BUN 23.1 (H) 01/10/2025     01/10/2025    CO2 26 01/10/2025    Lab Results   Component Value Date    WBC 10.2 01/07/2025    HGB 12.8 (L) 01/07/2025    HCT 38.7 (L) 01/07/2025    MCV 92 01/07/2025     01/07/2025    Lab Results   Component Value Date    TROPONINI 0.02 07/31/2022    BNP 74 07/31/2022    TSH 0.46 04/27/2024    INR 1.02 04/27/2024            This note has been dictated using voice recognition software. Any grammatical, typographical, or context distortions are unintentional and inherent to the software    Karmen Eisenberg PA-C      Thank you for allowing me to participate in the care of your patient.      Sincerely,     Karmen Eisenberg PA-C     [facility]  cc:   Tristan Lau MD  [address]  [place] [id number]

## 2025-01-28 NOTE — PATIENT INSTRUCTIONS
Ubaldo Ramos,    It was a pleasure to see you today at the [facility].     My recommendations after this visit include:    - Echocardiogram in 2 months    - No medications changes made today  - Watch for nose bleeds. Try vaseline or aquaphor into nasal cavity.    - Please seek medical attention if you develop recurrent chest pain or shortness of breath or similar symptoms you experienced prior to recent cardiac event    - Cardiac rehab as scheduled    - Follow up with Dr. Lau or Satya in 3-4 months or next available    - Please call [phone], if you have any questions or concerns    Karmen Odonnell PA-C    Medication     Take all your medications as prescribed  Do not stop any medications without talking with a healthcare provider    Exercise      Physical activity is important for overall health  Set a goal of 150 minutes of exercise each week  For example, 30 minutes of exercise 5 days each week.    These 30 minutes can be broken into shorter periods of 15 minutes twice daily or 10 minutes three times daily  Start any exercise program slowly and work towards the goal of 150 minutes each week  For example, you may start with 10 minutes and plan to add a few minutes each week as you get stronger   Examples of exercise include walking, swimming, or biking  Remember to stretch and stay hydrated with exercise    Diet     A heart healthy diet includes:  A variety of fruits and vegetables  Whole grains  Low-fat dairy (fat-free, 1% fat, and low-fat)  Lean meats and poultry without skin   Fish (eat fish 2 times each week)  Nuts  Limit saturated fat to about 13 grams each day (based on a 2000 calorie diet)  Limit red meat  Limit sugars (sweets and sugary beverages)  Limit your portion sizes  Do not add salt to your food when cooking or at the table  Limit alcohol intake (no more than 1 drink each day for women or 2 drinks each day for men)    Weight Loss     Work on losing weight with diet  and exercise  You BMI (body mass index) should be between 18.5-24.9  This is a calculation of your weight and height  Please ask your healthcare provider for your BMI    Manage Other Chronic Health Conditions     Control cholesterol  Eat a diet low in saturated fat  Exercise   Take a statin medication as prescribed  Manage blood pressure  Eat a diet low in sodium  Exercise  Reduce stress  Lose weight   Take blood pressure medications as prescribed  Control blood sugars if diabetic  Monitor sugars and carbohydrates in your diet  Lose weight   Take diabetes medications as prescribed  Follow-up with your primary care provider to make sure your blood sugars are well controlled    Stress Reduction     Find time each day to relax  Reading, listening to music, yoga, meditation, exercise, spending time with friends and family, volunteering   Get 6-8 hours of sleep each night    Smoking Cessation     Smoking causes numerous health problems including coronary artery disease  It is never too late to quit  Set realistic goals for quitting  Decrease the number of cigarettes used each week  Use nicotine gum or patches to help you quit    Information from the [employer].  Please visit their website at www.heart.org

## 2025-02-04 ENCOUNTER — HOSPITAL ENCOUNTER (OUTPATIENT)
Dept: CARDIAC REHAB | Facility: CLINIC | Age: 80
Discharge: HOME | End: 2025-02-04
Attending: INTERNAL MEDICINE
Payer: MEDICARE

## 2025-02-04 PROCEDURE — 93798 PHYS/QHP OP CAR RHAB W/ECG: CPT

## 2025-02-08 ENCOUNTER — APPOINTMENT (OUTPATIENT)
Dept: CARDIOLOGY | Facility: CLINIC | Age: 80
DRG: 291 | End: 2025-02-08
Attending: STUDENT IN AN ORGANIZED HEALTH CARE EDUCATION/TRAINING PROGRAM
Payer: MEDICARE

## 2025-02-08 ENCOUNTER — APPOINTMENT (OUTPATIENT)
Dept: RADIOLOGY | Facility: CLINIC | Age: 80
DRG: 291 | End: 2025-02-08
Attending: EMERGENCY MEDICINE
Payer: MEDICARE

## 2025-02-08 ENCOUNTER — HOSPITAL ENCOUNTER (INPATIENT)
Facility: CLINIC | Age: 80
DRG: 291 | End: 2025-02-08
Attending: EMERGENCY MEDICINE
Payer: MEDICARE

## 2025-02-08 DIAGNOSIS — I25.83 CORONARY ARTERY DISEASE DUE TO LIPID RICH PLAQUE: ICD-10-CM

## 2025-02-08 DIAGNOSIS — I50.9 ACUTE ON CHRONIC CONGESTIVE HEART FAILURE, UNSPECIFIED HEART FAILURE TYPE (H): Primary | ICD-10-CM

## 2025-02-08 DIAGNOSIS — I25.10 CORONARY ARTERY DISEASE DUE TO LIPID RICH PLAQUE: ICD-10-CM

## 2025-02-08 DIAGNOSIS — J96.01 ACUTE RESPIRATORY FAILURE WITH HYPOXIA (H): ICD-10-CM

## 2025-02-08 DIAGNOSIS — J44.1 COPD EXACERBATION (H): ICD-10-CM

## 2025-02-08 DIAGNOSIS — J20.9 ACUTE BRONCHITIS, UNSPECIFIED ORGANISM: ICD-10-CM

## 2025-02-08 LAB
ANION GAP SERPL CALCULATED.3IONS-SCNC: 12 MMOL/L (ref 7–15)
ATRIAL RATE - MUSE: 102 BPM
BUN SERPL-MCNC: 11.4 MG/DL (ref 8–23)
CALCIUM SERPL-MCNC: 9.2 MG/DL (ref 8.8–10.4)
CHLORIDE SERPL-SCNC: 106 MMOL/L (ref 98–107)
CHOLEST SERPL-MCNC: 87 MG/DL
CREAT SERPL-MCNC: 0.84 MG/DL (ref 0.67–1.17)
DIASTOLIC BLOOD PRESSURE - MUSE: 98 MMHG
EGFRCR SERPLBLD CKD-EPI 2021: 89 ML/MIN/1.73M2
ERYTHROCYTE [DISTWIDTH] IN BLOOD BY AUTOMATED COUNT: 16.6 % (ref 10–15)
EST. AVERAGE GLUCOSE BLD GHB EST-MCNC: 105 MG/DL
FLUAV RNA SPEC QL NAA+PROBE: NEGATIVE
FLUBV RNA RESP QL NAA+PROBE: NEGATIVE
GLUCOSE BLDC GLUCOMTR-MCNC: 136 MG/DL (ref 70–99)
GLUCOSE BLDC GLUCOMTR-MCNC: 150 MG/DL (ref 70–99)
GLUCOSE BLDC GLUCOMTR-MCNC: 190 MG/DL (ref 70–99)
GLUCOSE SERPL-MCNC: 159 MG/DL (ref 70–99)
HBA1C MFR BLD: 5.3 %
HCO3 SERPL-SCNC: 24 MMOL/L (ref 22–29)
HCT VFR BLD AUTO: 32.7 % (ref 40–53)
HDLC SERPL-MCNC: 38 MG/DL
HGB BLD-MCNC: 10.5 G/DL (ref 13.3–17.7)
HOLD SPECIMEN: NORMAL
INTERPRETATION ECG - MUSE: NORMAL
LDLC SERPL CALC-MCNC: 28 MG/DL
LVEF ECHO: NORMAL
MAGNESIUM SERPL-MCNC: 1.5 MG/DL (ref 1.7–2.3)
MAGNESIUM SERPL-MCNC: 2.8 MG/DL (ref 1.7–2.3)
MCH RBC QN AUTO: 31 PG (ref 26.5–33)
MCHC RBC AUTO-ENTMCNC: 32.1 G/DL (ref 31.5–36.5)
MCV RBC AUTO: 97 FL (ref 78–100)
NONHDLC SERPL-MCNC: 49 MG/DL
NT-PROBNP SERPL-MCNC: ABNORMAL PG/ML (ref 0–1800)
P AXIS - MUSE: 40 DEGREES
PHOSPHATE SERPL-MCNC: 2.5 MG/DL (ref 2.5–4.5)
PLATELET # BLD AUTO: 113 10E3/UL (ref 150–450)
POTASSIUM SERPL-SCNC: 3.8 MMOL/L (ref 3.4–5.3)
POTASSIUM SERPL-SCNC: 4 MMOL/L (ref 3.4–5.3)
PR INTERVAL - MUSE: 162 MS
QRS DURATION - MUSE: 130 MS
QT - MUSE: 396 MS
QTC - MUSE: 516 MS
R AXIS - MUSE: 107 DEGREES
RBC # BLD AUTO: 3.39 10E6/UL (ref 4.4–5.9)
RSV RNA SPEC NAA+PROBE: NEGATIVE
SARS-COV-2 RNA RESP QL NAA+PROBE: NEGATIVE
SODIUM SERPL-SCNC: 142 MMOL/L (ref 135–145)
SYSTOLIC BLOOD PRESSURE - MUSE: 206 MMHG
T AXIS - MUSE: 76 DEGREES
TRIGL SERPL-MCNC: 107 MG/DL
TROPONIN T SERPL HS-MCNC: 221 NG/L
TROPONIN T SERPL HS-MCNC: 233 NG/L
TSH SERPL DL<=0.005 MIU/L-ACNC: 1.01 UIU/ML (ref 0.3–4.2)
VENTRICULAR RATE- MUSE: 102 BPM
WBC # BLD AUTO: 4.6 10E3/UL (ref 4–11)

## 2025-02-08 PROCEDURE — 94640 AIRWAY INHALATION TREATMENT: CPT

## 2025-02-08 PROCEDURE — 84443 ASSAY THYROID STIM HORMONE: CPT | Performed by: STUDENT IN AN ORGANIZED HEALTH CARE EDUCATION/TRAINING PROGRAM

## 2025-02-08 PROCEDURE — 94640 AIRWAY INHALATION TREATMENT: CPT | Mod: 76

## 2025-02-08 PROCEDURE — 82310 ASSAY OF CALCIUM: CPT | Performed by: EMERGENCY MEDICINE

## 2025-02-08 PROCEDURE — 85027 COMPLETE CBC AUTOMATED: CPT | Performed by: EMERGENCY MEDICINE

## 2025-02-08 PROCEDURE — 96374 THER/PROPH/DIAG INJ IV PUSH: CPT

## 2025-02-08 PROCEDURE — 999N000208 ECHOCARDIOGRAM LIMITED

## 2025-02-08 PROCEDURE — 51701 INSERT BLADDER CATHETER: CPT

## 2025-02-08 PROCEDURE — 250N000013 HC RX MED GY IP 250 OP 250 PS 637: Performed by: EMERGENCY MEDICINE

## 2025-02-08 PROCEDURE — 36415 COLL VENOUS BLD VENIPUNCTURE: CPT | Performed by: EMERGENCY MEDICINE

## 2025-02-08 PROCEDURE — 250N000011 HC RX IP 250 OP 636: Performed by: EMERGENCY MEDICINE

## 2025-02-08 PROCEDURE — 93325 DOPPLER ECHO COLOR FLOW MAPG: CPT | Mod: 26 | Performed by: INTERNAL MEDICINE

## 2025-02-08 PROCEDURE — 250N000009 HC RX 250: Performed by: STUDENT IN AN ORGANIZED HEALTH CARE EDUCATION/TRAINING PROGRAM

## 2025-02-08 PROCEDURE — 5A09357 ASSISTANCE WITH RESPIRATORY VENTILATION, LESS THAN 24 CONSECUTIVE HOURS, CONTINUOUS POSITIVE AIRWAY PRESSURE: ICD-10-PCS | Performed by: EMERGENCY MEDICINE

## 2025-02-08 PROCEDURE — 82565 ASSAY OF CREATININE: CPT | Performed by: STUDENT IN AN ORGANIZED HEALTH CARE EDUCATION/TRAINING PROGRAM

## 2025-02-08 PROCEDURE — 87637 SARSCOV2&INF A&B&RSV AMP PRB: CPT | Performed by: EMERGENCY MEDICINE

## 2025-02-08 PROCEDURE — 93005 ELECTROCARDIOGRAM TRACING: CPT | Performed by: EMERGENCY MEDICINE

## 2025-02-08 PROCEDURE — 84100 ASSAY OF PHOSPHORUS: CPT | Performed by: STUDENT IN AN ORGANIZED HEALTH CARE EDUCATION/TRAINING PROGRAM

## 2025-02-08 PROCEDURE — 99285 EMERGENCY DEPT VISIT HI MDM: CPT | Mod: 25

## 2025-02-08 PROCEDURE — 83735 ASSAY OF MAGNESIUM: CPT | Performed by: STUDENT IN AN ORGANIZED HEALTH CARE EDUCATION/TRAINING PROGRAM

## 2025-02-08 PROCEDURE — 94660 CPAP INITIATION&MGMT: CPT

## 2025-02-08 PROCEDURE — 999N000157 HC STATISTIC RCP TIME EA 10 MIN

## 2025-02-08 PROCEDURE — 255N000002 HC RX 255 OP 636: Performed by: STUDENT IN AN ORGANIZED HEALTH CARE EDUCATION/TRAINING PROGRAM

## 2025-02-08 PROCEDURE — 84484 ASSAY OF TROPONIN QUANT: CPT | Performed by: EMERGENCY MEDICINE

## 2025-02-08 PROCEDURE — 99223 1ST HOSP IP/OBS HIGH 75: CPT | Mod: AI | Performed by: STUDENT IN AN ORGANIZED HEALTH CARE EDUCATION/TRAINING PROGRAM

## 2025-02-08 PROCEDURE — 272N000202 HC AEROBIKA WITH MANOMETER

## 2025-02-08 PROCEDURE — 250N000009 HC RX 250: Performed by: EMERGENCY MEDICINE

## 2025-02-08 PROCEDURE — 36415 COLL VENOUS BLD VENIPUNCTURE: CPT | Performed by: STUDENT IN AN ORGANIZED HEALTH CARE EDUCATION/TRAINING PROGRAM

## 2025-02-08 PROCEDURE — 120N000004 HC R&B MS OVERFLOW

## 2025-02-08 PROCEDURE — 82435 ASSAY OF BLOOD CHLORIDE: CPT | Performed by: EMERGENCY MEDICINE

## 2025-02-08 PROCEDURE — 84132 ASSAY OF SERUM POTASSIUM: CPT | Performed by: STUDENT IN AN ORGANIZED HEALTH CARE EDUCATION/TRAINING PROGRAM

## 2025-02-08 PROCEDURE — 83880 ASSAY OF NATRIURETIC PEPTIDE: CPT | Performed by: EMERGENCY MEDICINE

## 2025-02-08 PROCEDURE — 250N000013 HC RX MED GY IP 250 OP 250 PS 637: Performed by: STUDENT IN AN ORGANIZED HEALTH CARE EDUCATION/TRAINING PROGRAM

## 2025-02-08 PROCEDURE — 94799 UNLISTED PULMONARY SVC/PX: CPT

## 2025-02-08 PROCEDURE — 71045 X-RAY EXAM CHEST 1 VIEW: CPT

## 2025-02-08 PROCEDURE — 83718 ASSAY OF LIPOPROTEIN: CPT | Performed by: STUDENT IN AN ORGANIZED HEALTH CARE EDUCATION/TRAINING PROGRAM

## 2025-02-08 PROCEDURE — 82962 GLUCOSE BLOOD TEST: CPT

## 2025-02-08 PROCEDURE — 83036 HEMOGLOBIN GLYCOSYLATED A1C: CPT | Performed by: STUDENT IN AN ORGANIZED HEALTH CARE EDUCATION/TRAINING PROGRAM

## 2025-02-08 PROCEDURE — 93321 DOPPLER ECHO F-UP/LMTD STD: CPT | Mod: 26 | Performed by: INTERNAL MEDICINE

## 2025-02-08 PROCEDURE — 250N000011 HC RX IP 250 OP 636: Performed by: STUDENT IN AN ORGANIZED HEALTH CARE EDUCATION/TRAINING PROGRAM

## 2025-02-08 PROCEDURE — 250N000012 HC RX MED GY IP 250 OP 636 PS 637: Performed by: STUDENT IN AN ORGANIZED HEALTH CARE EDUCATION/TRAINING PROGRAM

## 2025-02-08 PROCEDURE — 93308 TTE F-UP OR LMTD: CPT | Mod: 26 | Performed by: INTERNAL MEDICINE

## 2025-02-08 RX ORDER — SERTRALINE HYDROCHLORIDE 25 MG/1
25 TABLET, FILM COATED ORAL DAILY
Status: DISCONTINUED | OUTPATIENT
Start: 2025-02-08 | End: 2025-02-15 | Stop reason: HOSPADM

## 2025-02-08 RX ORDER — MAGNESIUM SULFATE 4 G/50ML
4 INJECTION INTRAVENOUS ONCE
Status: COMPLETED | OUTPATIENT
Start: 2025-02-08 | End: 2025-02-08

## 2025-02-08 RX ORDER — IPRATROPIUM BROMIDE AND ALBUTEROL SULFATE 2.5; .5 MG/3ML; MG/3ML
1 SOLUTION RESPIRATORY (INHALATION) EVERY 4 HOURS PRN
Status: DISCONTINUED | OUTPATIENT
Start: 2025-02-08 | End: 2025-02-08

## 2025-02-08 RX ORDER — ACETAMINOPHEN 325 MG/1
650 TABLET ORAL EVERY 6 HOURS PRN
Status: DISCONTINUED | OUTPATIENT
Start: 2025-02-08 | End: 2025-02-15 | Stop reason: HOSPADM

## 2025-02-08 RX ORDER — TRAZODONE HYDROCHLORIDE 50 MG/1
50-100 TABLET ORAL AT BEDTIME
Status: DISCONTINUED | OUTPATIENT
Start: 2025-02-08 | End: 2025-02-15 | Stop reason: HOSPADM

## 2025-02-08 RX ORDER — POTASSIUM CHLORIDE 1.5 G/1.58G
20 POWDER, FOR SOLUTION ORAL ONCE
Status: COMPLETED | OUTPATIENT
Start: 2025-02-08 | End: 2025-02-08

## 2025-02-08 RX ORDER — GUAIFENESIN AND DEXTROMETHORPHAN HYDROBROMIDE 600; 30 MG/1; MG/1
1 TABLET, EXTENDED RELEASE ORAL 2 TIMES DAILY PRN
Status: DISCONTINUED | OUTPATIENT
Start: 2025-02-08 | End: 2025-02-08

## 2025-02-08 RX ORDER — BISOPROLOL FUMARATE 5 MG/1
5 TABLET, FILM COATED ORAL DAILY
Status: DISCONTINUED | OUTPATIENT
Start: 2025-02-09 | End: 2025-02-15 | Stop reason: HOSPADM

## 2025-02-08 RX ORDER — DEXTROSE MONOHYDRATE 25 G/50ML
25-50 INJECTION, SOLUTION INTRAVENOUS
Status: DISCONTINUED | OUTPATIENT
Start: 2025-02-08 | End: 2025-02-15 | Stop reason: HOSPADM

## 2025-02-08 RX ORDER — BISOPROLOL FUMARATE 5 MG/1
5 TABLET, FILM COATED ORAL DAILY
Status: DISCONTINUED | OUTPATIENT
Start: 2025-02-08 | End: 2025-02-08

## 2025-02-08 RX ORDER — IPRATROPIUM BROMIDE AND ALBUTEROL SULFATE 2.5; .5 MG/3ML; MG/3ML
1 SOLUTION RESPIRATORY (INHALATION)
Status: DISCONTINUED | OUTPATIENT
Start: 2025-02-08 | End: 2025-02-11

## 2025-02-08 RX ORDER — OXYBUTYNIN CHLORIDE 10 MG/1
10 TABLET, EXTENDED RELEASE ORAL EVERY OTHER DAY
COMMUNITY

## 2025-02-08 RX ORDER — SPIRONOLACTONE 25 MG
12.5 TABLET ORAL DAILY
Status: DISCONTINUED | OUTPATIENT
Start: 2025-02-08 | End: 2025-02-15 | Stop reason: HOSPADM

## 2025-02-08 RX ORDER — ASPIRIN 81 MG/1
81 TABLET ORAL DAILY
Status: DISCONTINUED | OUTPATIENT
Start: 2025-02-08 | End: 2025-02-15 | Stop reason: HOSPADM

## 2025-02-08 RX ORDER — PREGABALIN 50 MG/1
150 CAPSULE ORAL 3 TIMES DAILY PRN
Status: DISCONTINUED | OUTPATIENT
Start: 2025-02-08 | End: 2025-02-15 | Stop reason: HOSPADM

## 2025-02-08 RX ORDER — BISOPROLOL FUMARATE 5 MG/1
5 TABLET, FILM COATED ORAL 2 TIMES DAILY
Status: DISCONTINUED | OUTPATIENT
Start: 2025-02-08 | End: 2025-02-08

## 2025-02-08 RX ORDER — PANTOPRAZOLE SODIUM 40 MG/1
40 TABLET, DELAYED RELEASE ORAL DAILY
Status: DISCONTINUED | OUTPATIENT
Start: 2025-02-08 | End: 2025-02-15 | Stop reason: HOSPADM

## 2025-02-08 RX ORDER — CETIRIZINE HYDROCHLORIDE 10 MG/1
10 TABLET ORAL EVERY MORNING
Status: DISCONTINUED | OUTPATIENT
Start: 2025-02-08 | End: 2025-02-15 | Stop reason: HOSPADM

## 2025-02-08 RX ORDER — OXYBUTYNIN CHLORIDE 5 MG/1
10 TABLET, EXTENDED RELEASE ORAL EVERY OTHER DAY
Status: DISCONTINUED | OUTPATIENT
Start: 2025-02-09 | End: 2025-02-15 | Stop reason: HOSPADM

## 2025-02-08 RX ORDER — CEFTRIAXONE 1 G/1
1 INJECTION, POWDER, FOR SOLUTION INTRAMUSCULAR; INTRAVENOUS EVERY 24 HOURS
Status: DISCONTINUED | OUTPATIENT
Start: 2025-02-08 | End: 2025-02-11

## 2025-02-08 RX ORDER — HYDRALAZINE HYDROCHLORIDE 20 MG/ML
10 INJECTION INTRAMUSCULAR; INTRAVENOUS EVERY 4 HOURS PRN
Status: DISCONTINUED | OUTPATIENT
Start: 2025-02-08 | End: 2025-02-15 | Stop reason: HOSPADM

## 2025-02-08 RX ORDER — CALCIUM CARBONATE 500 MG/1
1000 TABLET, CHEWABLE ORAL 4 TIMES DAILY PRN
Status: DISCONTINUED | OUTPATIENT
Start: 2025-02-08 | End: 2025-02-08

## 2025-02-08 RX ORDER — LIDOCAINE 40 MG/G
CREAM TOPICAL
Status: DISCONTINUED | OUTPATIENT
Start: 2025-02-08 | End: 2025-02-15 | Stop reason: HOSPADM

## 2025-02-08 RX ORDER — FUROSEMIDE 10 MG/ML
60 INJECTION INTRAMUSCULAR; INTRAVENOUS ONCE
Status: COMPLETED | OUTPATIENT
Start: 2025-02-08 | End: 2025-02-08

## 2025-02-08 RX ORDER — NICOTINE POLACRILEX 4 MG
15-30 LOZENGE BUCCAL
Status: DISCONTINUED | OUTPATIENT
Start: 2025-02-08 | End: 2025-02-15 | Stop reason: HOSPADM

## 2025-02-08 RX ORDER — ACETAMINOPHEN 650 MG/1
650 SUPPOSITORY RECTAL EVERY 6 HOURS PRN
Status: DISCONTINUED | OUTPATIENT
Start: 2025-02-08 | End: 2025-02-15 | Stop reason: HOSPADM

## 2025-02-08 RX ORDER — FUROSEMIDE 10 MG/ML
40 INJECTION INTRAMUSCULAR; INTRAVENOUS EVERY 8 HOURS
Status: DISCONTINUED | OUTPATIENT
Start: 2025-02-08 | End: 2025-02-13

## 2025-02-08 RX ORDER — ONDANSETRON 4 MG/1
4 TABLET, ORALLY DISINTEGRATING ORAL EVERY 6 HOURS PRN
Status: DISCONTINUED | OUTPATIENT
Start: 2025-02-08 | End: 2025-02-15 | Stop reason: HOSPADM

## 2025-02-08 RX ORDER — ONDANSETRON 2 MG/ML
4 INJECTION INTRAMUSCULAR; INTRAVENOUS EVERY 6 HOURS PRN
Status: DISCONTINUED | OUTPATIENT
Start: 2025-02-08 | End: 2025-02-15 | Stop reason: HOSPADM

## 2025-02-08 RX ORDER — DOXYCYCLINE 100 MG/1
100 CAPSULE ORAL EVERY 12 HOURS SCHEDULED
Status: DISCONTINUED | OUTPATIENT
Start: 2025-02-08 | End: 2025-02-11

## 2025-02-08 RX ORDER — IPRATROPIUM BROMIDE AND ALBUTEROL SULFATE 2.5; .5 MG/3ML; MG/3ML
3 SOLUTION RESPIRATORY (INHALATION) ONCE
Status: COMPLETED | OUTPATIENT
Start: 2025-02-08 | End: 2025-02-08

## 2025-02-08 RX ORDER — ATORVASTATIN CALCIUM 40 MG/1
80 TABLET, FILM COATED ORAL AT BEDTIME
Status: DISCONTINUED | OUTPATIENT
Start: 2025-02-08 | End: 2025-02-15 | Stop reason: HOSPADM

## 2025-02-08 RX ORDER — METHYLPREDNISOLONE SODIUM SUCCINATE 125 MG/2ML
60 INJECTION INTRAMUSCULAR; INTRAVENOUS EVERY 24 HOURS
Status: DISCONTINUED | OUTPATIENT
Start: 2025-02-08 | End: 2025-02-10

## 2025-02-08 RX ORDER — DOCUSATE SODIUM 100 MG/1
100 CAPSULE, LIQUID FILLED ORAL
Status: DISCONTINUED | OUTPATIENT
Start: 2025-02-08 | End: 2025-02-15 | Stop reason: HOSPADM

## 2025-02-08 RX ORDER — GUAIFENESIN 200 MG/10ML
200 LIQUID ORAL EVERY 4 HOURS PRN
Status: DISCONTINUED | OUTPATIENT
Start: 2025-02-08 | End: 2025-02-08

## 2025-02-08 RX ORDER — ENOXAPARIN SODIUM 100 MG/ML
40 INJECTION SUBCUTANEOUS EVERY 24 HOURS
Status: DISCONTINUED | OUTPATIENT
Start: 2025-02-08 | End: 2025-02-15 | Stop reason: HOSPADM

## 2025-02-08 RX ORDER — POLYETHYLENE GLYCOL 3350 17 G/17G
17 POWDER, FOR SOLUTION ORAL
Status: DISCONTINUED | OUTPATIENT
Start: 2025-02-08 | End: 2025-02-15 | Stop reason: HOSPADM

## 2025-02-08 RX ORDER — NITROGLYCERIN 0.4 MG/1
0.4 TABLET SUBLINGUAL EVERY 5 MIN PRN
Status: DISCONTINUED | OUTPATIENT
Start: 2025-02-08 | End: 2025-02-15 | Stop reason: HOSPADM

## 2025-02-08 RX ORDER — GUAIFENESIN 600 MG/1
600 TABLET, EXTENDED RELEASE ORAL 2 TIMES DAILY
Status: DISCONTINUED | OUTPATIENT
Start: 2025-02-08 | End: 2025-02-15 | Stop reason: HOSPADM

## 2025-02-08 RX ORDER — CLOPIDOGREL BISULFATE 75 MG/1
75 TABLET ORAL DAILY
Status: DISCONTINUED | OUTPATIENT
Start: 2025-02-08 | End: 2025-02-15 | Stop reason: HOSPADM

## 2025-02-08 RX ADMIN — NITROGLYCERIN 0.4 MG: 0.4 TABLET SUBLINGUAL at 07:56

## 2025-02-08 RX ADMIN — NITROGLYCERIN 0.4 MG: 0.4 TABLET SUBLINGUAL at 08:04

## 2025-02-08 RX ADMIN — IPRATROPIUM BROMIDE AND ALBUTEROL SULFATE 3 ML: .5; 3 SOLUTION RESPIRATORY (INHALATION) at 15:39

## 2025-02-08 RX ADMIN — HYDRALAZINE HYDROCHLORIDE 10 MG: 20 INJECTION INTRAMUSCULAR; INTRAVENOUS at 17:15

## 2025-02-08 RX ADMIN — PANTOPRAZOLE SODIUM 40 MG: 40 TABLET, DELAYED RELEASE ORAL at 10:13

## 2025-02-08 RX ADMIN — POTASSIUM CHLORIDE 20 MEQ: 1.5 POWDER, FOR SOLUTION ORAL at 11:58

## 2025-02-08 RX ADMIN — IPRATROPIUM BROMIDE AND ALBUTEROL SULFATE 3 ML: .5; 3 SOLUTION RESPIRATORY (INHALATION) at 07:43

## 2025-02-08 RX ADMIN — CEFTRIAXONE 1 G: 1 INJECTION, POWDER, FOR SOLUTION INTRAMUSCULAR; INTRAVENOUS at 11:11

## 2025-02-08 RX ADMIN — IPRATROPIUM BROMIDE AND ALBUTEROL SULFATE 3 ML: .5; 3 SOLUTION RESPIRATORY (INHALATION) at 11:28

## 2025-02-08 RX ADMIN — GUAIFENESIN 600 MG: 600 TABLET ORAL at 10:19

## 2025-02-08 RX ADMIN — IPRATROPIUM BROMIDE AND ALBUTEROL SULFATE 3 ML: .5; 3 SOLUTION RESPIRATORY (INHALATION) at 23:45

## 2025-02-08 RX ADMIN — CETIRIZINE HYDROCHLORIDE 10 MG: 10 TABLET, FILM COATED ORAL at 10:14

## 2025-02-08 RX ADMIN — MAGNESIUM SULFATE HEPTAHYDRATE 4 G: 80 INJECTION, SOLUTION INTRAVENOUS at 11:58

## 2025-02-08 RX ADMIN — DOCUSATE SODIUM 100 MG: 100 CAPSULE, LIQUID FILLED ORAL at 16:00

## 2025-02-08 RX ADMIN — DOCUSATE SODIUM 100 MG: 100 CAPSULE, LIQUID FILLED ORAL at 10:14

## 2025-02-08 RX ADMIN — POLYETHYLENE GLYCOL 3350 17 G: 17 POWDER, FOR SOLUTION ORAL at 10:17

## 2025-02-08 RX ADMIN — CLOPIDOGREL BISULFATE 75 MG: 75 TABLET ORAL at 10:14

## 2025-02-08 RX ADMIN — ACETAMINOPHEN 650 MG: 325 TABLET ORAL at 13:20

## 2025-02-08 RX ADMIN — PERFLUTREN 2 ML: 6.52 INJECTION, SUSPENSION INTRAVENOUS at 10:33

## 2025-02-08 RX ADMIN — ASPIRIN 81 MG: 81 TABLET, COATED ORAL at 10:14

## 2025-02-08 RX ADMIN — GUAIFENESIN 600 MG: 600 TABLET ORAL at 20:28

## 2025-02-08 RX ADMIN — HYDRALAZINE HYDROCHLORIDE 10 MG: 20 INJECTION INTRAMUSCULAR; INTRAVENOUS at 12:14

## 2025-02-08 RX ADMIN — ATORVASTATIN CALCIUM 80 MG: 40 TABLET, FILM COATED ORAL at 22:12

## 2025-02-08 RX ADMIN — LOSARTAN POTASSIUM 12.5 MG: 25 TABLET, FILM COATED ORAL at 20:28

## 2025-02-08 RX ADMIN — FUROSEMIDE 40 MG: 10 INJECTION, SOLUTION INTRAVENOUS at 17:16

## 2025-02-08 RX ADMIN — BISOPROLOL FUMARATE 5 MG: 5 TABLET ORAL at 10:15

## 2025-02-08 RX ADMIN — METHYLPREDNISOLONE SODIUM SUCCINATE 62.5 MG: 125 INJECTION, POWDER, FOR SOLUTION INTRAMUSCULAR; INTRAVENOUS at 10:19

## 2025-02-08 RX ADMIN — LOSARTAN POTASSIUM 12.5 MG: 25 TABLET, FILM COATED ORAL at 14:16

## 2025-02-08 RX ADMIN — IPRATROPIUM BROMIDE AND ALBUTEROL SULFATE 3 ML: .5; 3 SOLUTION RESPIRATORY (INHALATION) at 19:33

## 2025-02-08 RX ADMIN — DOXYCYCLINE HYCLATE 100 MG: 100 CAPSULE ORAL at 11:58

## 2025-02-08 RX ADMIN — SPIRONOLACTONE 12.5 MG: 25 TABLET, FILM COATED ORAL at 10:15

## 2025-02-08 RX ADMIN — LOSARTAN POTASSIUM 12.5 MG: 25 TABLET, FILM COATED ORAL at 10:15

## 2025-02-08 RX ADMIN — FUROSEMIDE 60 MG: 10 INJECTION, SOLUTION INTRAVENOUS at 08:31

## 2025-02-08 RX ADMIN — POLYETHYLENE GLYCOL 3350 17 G: 17 POWDER, FOR SOLUTION ORAL at 16:00

## 2025-02-08 RX ADMIN — INSULIN ASPART 2 UNITS: 100 INJECTION, SOLUTION INTRAVENOUS; SUBCUTANEOUS at 17:45

## 2025-02-08 RX ADMIN — Medication 1 MG: at 23:44

## 2025-02-08 RX ADMIN — ENOXAPARIN SODIUM 40 MG: 40 INJECTION SUBCUTANEOUS at 11:58

## 2025-02-08 RX ADMIN — DOXYCYCLINE HYCLATE 100 MG: 100 CAPSULE ORAL at 20:28

## 2025-02-08 RX ADMIN — SERTRALINE HYDROCHLORIDE 25 MG: 25 TABLET ORAL at 10:14

## 2025-02-08 RX ADMIN — INSULIN ASPART 1 UNITS: 100 INJECTION, SOLUTION INTRAVENOUS; SUBCUTANEOUS at 12:42

## 2025-02-08 ASSESSMENT — ACTIVITIES OF DAILY LIVING (ADL)
ADLS_ACUITY_SCORE: 58
ADLS_ACUITY_SCORE: 40
ADLS_ACUITY_SCORE: 58

## 2025-02-08 NOTE — PROGRESS NOTES
Pt placed on BiPAP per MD to help with pulmonary congestion and breathing, settings were S/T, 10/5, RR 12, FiO2 35%. Pt was on 4 LPM NC before placed on BiPAP, SpO2 mid 90s, HR 84, BS expiratory wheezing, tachypnea, no SOB reports, no accessory muscle used. RT following.    Roger Dickinson, RT

## 2025-02-08 NOTE — ED NOTES
Bed: [facility]-  Expected date: 2/8/25  Expected time: 6:55 AM  Means of arrival: Ambulance  Comments:  [facility]  80 y/o M    SOB  Hx of Ca  SPO2 sats 70's on RA  1 Neb >> now SPO2 99%  Bp: 198/104

## 2025-02-08 NOTE — PHARMACY-ADMISSION MEDICATION HISTORY
Pharmacy Intern Admission Medication History    Admission medication history is complete. The information provided in this note is only as accurate as the sources available at the time of the update.    Information Source(s): Patient and CareEverywhere/SureScripts via in-person wife in room     Pertinent Information:     On discharge from [facility] 1/10/25  - started on losartan, spironolactone, and bisoprolol and the following were discontinued: losartan-hydrochlorothiazide, propranolol, and cilostazol. Unclear when speaking with patient if he stopped taking? Patient picked up all 3 new medications, taking as prescribed (1/2 tabs of both losartan and spironolactone) and 1 tab of bisoprolol. Since discharge, losartan-hydrochlorothiazide was filled 2/5/25 - likely autorefill - should be set aside. Some confusion regarding discharge instructions when speaking to patient and wife, did try and clarify what was new and what was replaced. Patient's wife asked about refills on the new meds. Found Cardiology appointment 1/28/25, sent refills for both losartan and spironolactone - however, not bisoprolol. Note says continue current antihypertensives, no med changes. Called patient's pharmacy, [facility], requested refill request of bisoprolol be sent to provider for review. Counseled patient and wife to wait for new med list on discharge and use that to match up to prescriptions when filling pill box.   Thank you, Carisa Talley [facility] 2/8/2025 9:42 AM    Changes made to PTA medication list:  Added: None  Deleted: afrin, mupirocin, benzonatate - supply ran out  Changed: ocybutynin to every other day (likely less often based on refill history), 1/10 confirm discharge ciltostazol, propranolol, losartan-hydrochlorothiazide, zyrtec QAM,     Allergies reviewed with patient and updates made in EHR: yes    Medication History Completed By: Sandhya Reed 2/8/2025 8:50 AM    To my knowledge the intern has compiled the PTA  medication list to the best of their ability given the information available at the time.    Carisa Talley, [facility]    PTA Med List   Medication Sig Last Dose/Taking    acetaminophen (TYLENOL) 325 MG tablet Take 2 tablets (650 mg) by mouth every 6 hours as needed for mild pain. 2/7/2025 Bedtime    aspirin 81 MG EC tablet Take 1 tablet (81 mg) by mouth daily. Start tomorrow. 2/7/2025 Morning    atorvastatin (LIPITOR) 80 MG tablet Take 1 tablet (80 mg) by mouth at bedtime 2/7/2025 Bedtime    bisoprolol (ZEBETA) 5 MG tablet Take 1 tablet (5 mg) by mouth daily. 2/7/2025 Morning    cetirizine (ZYRTEC) 10 MG tablet Take 10 mg by mouth every morning. 2/7/2025 Morning    cholecalciferol, vitamin D3, (VITAMIN D3) 25 mcg (1,000 unit) capsule [CHOLECALCIFEROL, VITAMIN D3, (VITAMIN D3) 25 MCG (1,000 UNIT) CAPSULE] Take 1,000 Units by mouth daily. 2/7/2025 Morning    clopidogrel (PLAVIX) 75 MG tablet Take 1 tablet (75 mg) by mouth daily. Dose to start tomorrow. 2/7/2025 Morning    cyanocobalamin 1000 MCG tablet [CYANOCOBALAMIN 1000 MCG TABLET] Take 1,000 mcg by mouth daily. 2/7/2025 Morning    ipratropium - albuterol 0.5 mg/2.5 mg/3 mL (DUONEB) 0.5-2.5 (3) MG/3ML neb solution Take 1 vial by nebulization daily 2/7/2025    losartan (COZAAR) 25 MG tablet Take 0.5 tablets (12.5 mg) by mouth daily. 2/7/2025 Morning    oxyBUTYnin ER (DITROPAN XL) 10 MG 24 hr tablet Take 10 mg by mouth every other day. 2/7/2025 Morning    pantoprazole (PROTONIX) 40 MG EC tablet TAKE 1 TABLET BY MOUTH ONCE DAILY 2/7/2025 Morning    pregabalin (LYRICA) 150 MG capsule Take 150 mg by mouth 3 times daily as needed. 2/7/2025 Bedtime    sertraline (ZOLOFT) 25 MG tablet Take 1 tablet (25 mg) by mouth daily. 2/7/2025 Morning    spironolactone (ALDACTONE) 25 MG tablet Take 0.5 tablets (12.5 mg) by mouth daily. 2/7/2025 Morning    traZODone (DESYREL) 50 MG tablet Take 1-2 tablets ( mg) by mouth at bedtime. 2/7/2025 Bedtime

## 2025-02-08 NOTE — H&P
[facility] MEDICINE ADMISSION HISTORY AND PHYSICAL   Date of Admission: 2/8/2025  Ubaldo Ramos, 1945, [id number]    Identification/Summary:   Ubaldo Ramos is a 79 year old male with PMH signficant for .  Presented with .  Mr. Ubaldo Ramos is a 79 year old male with a past medical history of NSTEMI (s/p DESx2 to mid LAD on 1/8/2025), HFrEF with EF of 35 to 40%, recent traumatic right rib fracture and right pneumothorax s/p chest tube removal on 1/7/HLD, PAD, HTN, severe COPD with PFT in October 2021 showing FEV1 1.22 L (45%) and DLCOc 52%, NSCLC, active tobacco dependence, lung cancer s/p left upper lobectomy in 2017 with recurrence in 2018 s/p chemotherapy and radiation, peripheral arterial disease s/p left femoral endarterectomy and bilateral iliac stents, history of prostate cancer, GERD who presented with shortness of breath and productive cough for few weeks.  He desaturated to 78% when EMS arrived.  He was found to have elevated pro-BNP and troponin, congested pulmonary vasculature x-ray,  and wheezing on exam.  He was admitted for acute on chronic HFrEF and COPD exacerbation.  BiPAP and IV Lasix 60 mg was started in the ED.  Cardiology is consulted.    Possible admission for 4 to 5 days for supervised IV diuresis, IV steroid and frequent nebulization.    Assessment and Plan:  Acute hypoxic respiratory failure due to exacerbation of HFrEF and COPD  -Continue BiPAP, will reassess later  -Treat HFrEF and COPD exacerbation as below    Acute on chronic HFrEF, ischemic cardiomyopathy  EF of 35 to 40% on 1/6/2025, due for recheck as an outpatient  Check TSH  Echo ordered  IV Lasix 40 mg every 8 hours  Continue bisoprolol 5 mg daily, losartan 12.5 mg daily, spironolactone 12.5 mg daily  Strict I's and O's, daily weights, telemetry  Fluid restriction to 2000 mL/day, heart healthy diet  Check electrolytes daily and replete per protocol ordered  Cardiology consult    COPD  exacerbation  Acute bronchitis  Worsening cough, shortness of breath and production (sputum color is yellow)  Start empiric coverage of ceftriaxone and doxycycline. Unfortunately, he cannot tolerate azithromycin for QTc prolonging.  Schedule DuoNeb every 4 hours  IV methylprednisolone 125 mg today, may repeat tomorrow  He will need LABA and LAMA at discharge    NSTEMI s/p DESx2 to mid LAD on 1/8/2025  Dyslipidemia   To note, he never had chest pain with NSTEMI but shortness of breath  Troponin is 233 on admission and 220 on repeat, much lower than the time when he had NSTEMI  Check hemoglobin A1c and lipid profile  Continue aspirin 81 mg, clopidogrel 75 mg daily, bisoprolol as above, and atorvastatin 80 mg    QTc prolonging  QTc is 516 ms on admission  Hold trazodone and other QT prolonging meds  Replete magnesium    Hypertension urgency: His blood pressure is Controlled. Currently on bisoprolol, losartan, spironolactone.  Ordered hydralazine 10mg as needed for blood pressure systolically greater than 180 mmHg.  PAD: s/p lower extremity stenting and right-to-left femoral-femoral arterial bypass done in [place]. CT angiography of the lower extremities 12/13/2023 showed patent bilateral common and external iliac artery stents but occlusion of the femoral bypass graft  Asymptomatic right internal carotid artery stenosis: last assessed on carotid ultrasound 7/16/2024   NSCLC:  resection, radiation therapy and chemotherapy in 2017 and 2018   Tobacco use: still smoking, encouraged cessation/reduction.  He still has trouble quitting.  Nicotine replacement is not started for possible NSTEMI at this time.  S/p rib fracture and pneumothorax requiring chest tube  Urinary incontinence: Continue oxybutynin  GERD: Continue pantoprazole  Constipation: Last bowel movement was a few days ago, start MiraLAX scheduled dose  Depression and anxiety: Continue sertraline  Glucose control: Will start medium dose sliding scale for the time  "being      Code Status: Full Code    IVF: None    FoleyNot present    Disposition: Inpatient     Clinically Significant Risk Factors Present on Admission             # Hypomagnesemia: Lowest Mg = 1.5 mg/dL in last 2 days, will replace as needed     # Drug Induced Platelet Defect: home medication list includes an antiplatelet medication   # Hypertension: Noted on problem list  # Acute heart failure with reduced ejection fraction: last echo with EF <40% and receiving IV diuretics     # Anemia: based on hgb <11       # Overweight: Estimated body mass index is 27.84 kg/m  as calculated from the following:    Height as of 1/31/25: 1.651 m (5' 5\").    Weight as of 1/31/25: 75.9 kg (167 lb 4.8 oz).         # Financial/Environmental Concerns:           Chief Complaint Dyspnea     HISTORY     Ubaldo Ramos is a 79 year old male with [facility] signficant for .  Presented with .  Mr. Ubaldo Ramos is a 79 year old male with a past medical history of NSTEMI (s/p DESx2 to mid LAD on 1/8/2025), HFrEF with EF of 35 to 40%, recent traumatic right rib fracture and right pneumothorax s/p chest tube removal on 1/7/HLD, PAD, HTN, severe COPD with PFT in October 2021 showing FEV1 1.22 L (45%) and DLCOc 52%, NSCLC, active tobacco dependence, lung cancer s/p left upper lobectomy in 2017 with recurrence in 2018 s/p chemotherapy and radiation, peripheral arterial disease s/p left femoral endarterectomy and bilateral iliac stents, history of prostate cancer, GERD who presented with shortness of breath and productive cough for few weeks.     He started to notice gradual worsening of his breathing since a few weeks ago.  He also has productive cough with yellow sputum. He also heard wheezing himself.   He denies fever, orthopnea, palpitation or chest pain or headache, muscle sore or sore throat.  He is still smoking.  He is not compliant with heart healthy diet.  He is still smoking.    Wife is at bedside and is supportive.  Since he is put on BiPAP, " he has not noticed any difference.        Past Medical History     No past medical history on file.     Patient Active Problem List    Diagnosis Date Noted    Acute on chronic congestive heart failure, unspecified heart failure type (H) 02/08/2025     Priority: Medium    Status post insertion of drug-eluting stent into left anterior descending (LAD) artery 01/08/2025     Priority: Medium    Elevated troponin 01/05/2025     Priority: Medium    Pneumothorax on right 01/05/2025     Priority: Medium    Acute respiratory failure with hypoxia (H) 01/05/2025     Priority: Medium    Closed fracture of one rib of right side, initial encounter 01/05/2025     Priority: Medium    Primary lung adenocarcinoma, right (H) 12/27/2024     Priority: Medium    Tobacco abuse 04/27/2024     Priority: Medium    Noncompliance with medication regimen 04/27/2024     Priority: Medium    Hypoxemia 07/31/2022     Priority: Medium    Abnormal electrocardiogram 07/31/2022     Priority: Medium    COPD exacerbation (H) 07/31/2022     Priority: Medium    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  [facility]    Edson New MD         History of prostate cancer 06/27/2021     Priority: Medium    Peripheral polyneuropathy 06/27/2021     Priority: Medium    Urinary incontinence, mixed 06/27/2021     Priority: Medium    Peripheral artery disease 06/27/2021     Priority: Medium    Hx of fusion of cervical spine 06/27/2021     Priority: Medium    Herniated Disc (L3 - L4)      Priority: Medium     Created by Conversion        Lower Back Pain      Priority: Medium     Created by Conversion        Fatigue      Priority: Medium     Created by Conversion        Adenocarcinoma Of The Prostate  Gland      Priority: Medium     Created by Conversion        Essential Hypercholesterolemia      Priority: Medium     Created by Conversion        Benign Essential Hypertension      Priority: Medium     Created by Conversion         Surgical History     Past Surgical History:   Procedure Laterality Date    APPENDECTOMY      CERVICAL SPINE SURGERY      CHOLECYSTECTOMY      CV CORONARY ANGIOGRAM N/A 1/8/2025    Procedure: Coronary Angiogram;  Surgeon: Jamie Noriega MD;  Location: [facility] CV    CV INTRAVASULAR ULTRASOUND N/A 1/8/2025    Procedure: Intravascular Ultrasound;  Surgeon: Jamie Noriega MD;  Location: [facility] LAB CV    CV LEFT HEART CATH N/A 1/8/2025    Procedure: Left Heart Catheterization;  Surgeon: Jamie Noriega MD;  Location: [facility] LAB CV    CV PCI STENT DRUG ELUTING N/A 1/8/2025    Procedure: Percutaneous Coronary Intervention Stent;  Surgeon: Jamie Noriega MD;  Location: [facility] CV    FEMORAL ENDARTERECTOMY      LUNG CANCER SURGERY      PROSTATECTOMY       Family History    History reviewed. No pertinent family history.   Social History      Social History     Tobacco Use    Smoking status: Every Day     Current packs/day: 0.50     Types: Cigarettes     Passive exposure: Current    Smokeless tobacco: Never   Vaping Use    Vaping status: Never Used   Substance Use Topics    Alcohol use: Not Currently    Drug use: Not Currently      Allergies     Allergies   Allergen Reactions    Adhesive Tape Unknown     Adhesive- pulls skin off      Prior to Admission Medications      Prior to Admission Medications   Prescriptions Last Dose Informant Patient Reported? Taking?   acetaminophen (TYLENOL) 325 MG tablet 2/7/2025 Bedtime  No Yes   Sig: Take 2 tablets (650 mg) by mouth every 6 hours as needed for mild pain.   aspirin 81 MG EC tablet 2/7/2025 Morning  No Yes   Sig: Take 1 tablet (81 mg) by mouth daily. Start tomorrow.   atorvastatin (LIPITOR) 80 MG tablet  2/7/2025 Bedtime  No Yes   Sig: Take 1 tablet (80 mg) by mouth at bedtime   bisoprolol (ZEBETA) 5 MG tablet 2/7/2025 Morning  No Yes   Sig: Take 1 tablet (5 mg) by mouth daily.   cetirizine (ZYRTEC) 10 MG tablet 2/7/2025 Morning  Yes Yes   Sig: Take 10 mg by mouth every morning.   cholecalciferol, vitamin D3, (VITAMIN D3) 25 mcg (1,000 unit) capsule 2/7/2025 Morning  Yes Yes   Sig: [CHOLECALCIFEROL, VITAMIN D3, (VITAMIN D3) 25 MCG (1,000 UNIT) CAPSULE] Take 1,000 Units by mouth daily.   clopidogrel (PLAVIX) 75 MG tablet 2/7/2025 Morning  No Yes   Sig: Take 1 tablet (75 mg) by mouth daily. Dose to start tomorrow.   cyanocobalamin 1000 MCG tablet 2/7/2025 Morning  Yes Yes   Sig: [CYANOCOBALAMIN 1000 MCG TABLET] Take 1,000 mcg by mouth daily.   ipratropium - albuterol 0.5 mg/2.5 mg/3 mL (DUONEB) 0.5-2.5 (3) MG/3ML neb solution 2/7/2025  Yes Yes   Sig: Take 1 vial by nebulization daily   losartan (COZAAR) 25 MG tablet 2/7/2025 Morning  No Yes   Sig: Take 0.5 tablets (12.5 mg) by mouth daily.   oxyBUTYnin ER (DITROPAN XL) 10 MG 24 hr tablet 2/7/2025 Morning  Yes Yes   Sig: Take 10 mg by mouth every other day.   pantoprazole (PROTONIX) 40 MG EC tablet 2/7/2025 Morning  No Yes   Sig: TAKE 1 TABLET BY MOUTH ONCE DAILY   pregabalin (LYRICA) 150 MG capsule 2/7/2025 Bedtime  Yes Yes   Sig: Take 150 mg by mouth 3 times daily as needed.   sertraline (ZOLOFT) 25 MG tablet 2/7/2025 Morning  No Yes   Sig: Take 1 tablet (25 mg) by mouth daily.   spironolactone (ALDACTONE) 25 MG tablet 2/7/2025 Morning  No Yes   Sig: Take 0.5 tablets (12.5 mg) by mouth daily.   traZODone (DESYREL) 50 MG tablet 2/7/2025 Bedtime  No Yes   Sig: Take 1-2 tablets ( mg) by mouth at bedtime.      Facility-Administered Medications: None      Review of Systems     A 12 point comprehensive review of systems was negative except as noted above in HPI.    PHYSICAL EXAMINATION     Vitals      Temp:  [97.7  F (36.5  C)] 97.7  F (36.5  C)  Pulse:  []  76  Resp:  [17-40] 35  BP: (148-223)/(66-98) 158/73  FiO2 (%):  [35 %] 35 %  SpO2:  [92 %-100 %] 96 %    Examination     General Appearance: Alert and wake, not in distress  Respiratory: Diffusely wheezing  Cardiovascular: rhythmic, normal S1 and S2, no murmur  GI: soft, non-tender, normal bowel sound  Neurology: oriented x 3  Psych: cooperative and calm, normal affect      Pertinent Lab     Results for orders placed or performed during the hospital encounter of 02/08/25 (from the past 24 hours)   ECG 12-Lead with MUSE - [facility]   Result Value Ref Range    Systolic Blood Pressure 206 mmHg    Diastolic Blood Pressure 98 mmHg    Ventricular Rate 102 BPM    Atrial Rate 102 BPM    AZ Interval 162 ms    QRS Duration 130 ms     ms    QTc 516 ms    P Axis 40 degrees    R AXIS 107 degrees    T Axis 76 degrees    Interpretation ECG       Sinus tachycardia  Possible Left atrial enlargement  Right bundle branch block  T wave abnormality, consider lateral ischemia  Abnormal ECG  When compared with ECG of 09-Jan-2025 15:12,  Significant changes have occurred  Confirmed by SEE ED PROVIDER NOTE FOR, ECG INTERPRETATION ([id number]),  Gerda Mcadams ([id number]) on 2/8/2025 7:35:50 AM     CBC with platelets   Result Value Ref Range    WBC Count 4.6 4.0 - 11.0 10e3/uL    RBC Count 3.39 (L) 4.40 - 5.90 10e6/uL    Hemoglobin 10.5 (L) 13.3 - 17.7 g/dL    Hematocrit 32.7 (L) 40.0 - 53.0 %    MCV 97 78 - 100 fL    MCH 31.0 26.5 - 33.0 pg    MCHC 32.1 31.5 - 36.5 g/dL    RDW 16.6 (H) 10.0 - 15.0 %    Platelet Count 113 (L) 150 - 450 10e3/uL   Basic metabolic panel   Result Value Ref Range    Sodium 142 135 - 145 mmol/L    Potassium 3.8 3.4 - 5.3 mmol/L    Chloride 106 98 - 107 mmol/L    Carbon Dioxide (CO2) 24 22 - 29 mmol/L    Anion Gap 12 7 - 15 mmol/L    Urea Nitrogen 11.4 8.0 - 23.0 mg/dL    Creatinine 0.84 0.67 - 1.17 mg/dL    GFR Estimate 89 >60 mL/min/1.73m2    Calcium 9.2 8.8 - 10.4 mg/dL    Glucose 159 (H) 70 - 99 mg/dL    Troponin T, High Sensitivity   Result Value Ref Range    Troponin T, High Sensitivity 233 (HH) <=22 ng/L   Nt probnp inpatient   Result Value Ref Range    N terminal Pro BNP Inpatient 28,282 (H) 0 - 1,800 pg/mL   [facility] Draw    Narrative    The following orders were created for panel order [facility] Draw.  Procedure                               Abnormality         Status                     ---------                               -----------         ------                     Extra Blue Top Tube[[id number]]                              Final result               Extra Heparinized Syringe[[id number]]                        Final result                 Please view results for these tests on the individual orders.   [facility] Draw    Narrative    The following orders were created for panel order [facility] Draw.  Procedure                               Abnormality         Status                     ---------                               -----------         ------                     Extra Red Top Tube[[id number]]                               Final result                 Please view results for these tests on the individual orders.   Extra Blue Top Tube   Result Value Ref Range    Hold Specimen JIC    Extra Heparinized Syringe   Result Value Ref Range    Hold Specimen     Extra Red Top Tube   Result Value Ref Range    Hold Specimen JIC    Hemoglobin A1c   Result Value Ref Range    Estimated Average Glucose 105 <117 mg/dL    Hemoglobin A1C 5.3 <5.7 %   Influenza A/B, RSV and SARS-CoV2 PCR (COVID-19) Nasopharyngeal    Specimen: Nasopharyngeal; Swab   Result Value Ref Range    Influenza A PCR Negative Negative    Influenza B PCR Negative Negative    RSV PCR Negative Negative    SARS CoV2 PCR Negative Negative    Narrative    Testing was performed using the Xpert Xpress CoV2/Flu/RSV Assay on the Cepheid GeneXpert Instrument. This test should be ordered for the detection of SARS-CoV2, influenza, and RSV viruses in individuals  with signs and symptoms of respiratory tract infection. This test is for in vitro diagnostic use under the [place] FDA for laboratories certified under CLIA to perform high or moderate complexity testing. This test has been [place] FDA cleared. A negative result does not rule out the presence of PCR inhibitors in the specimen or target RNA in concentration below the limit of detection for the assay. If only one viral target is positive but coinfection with multiple targets is suspected, the sample should be re-tested with another FDA cleared, approved, or authorized test, if coninfection would change clinical management. This test was validated by the [facility]. These laboratories are certified under the Clinical Laboratory Improvement Amendments of 1988 (CLIA-88) as qualified to perfom high complexity laboratory testing.   XR Chest Port 1 View    Narrative    EXAM: XR CHEST PORT 1 VIEW  LOCATION: [facility]  DATE: 2/8/2025    INDICATION: Cough.  COMPARISON: 1/9/2025.      Impression    IMPRESSION: Mild bilateral interstitial prominence, nonspecific, though pulmonary vascular congestion not excluded. Mild basilar atelectasis. Stable left costophrenic angle blunting (from mild pleural fluid or thickening). No pneumothorax. Stable   cardiomediastinal silhouette.     Troponin T, High Sensitivity   Result Value Ref Range    Troponin T, High Sensitivity 221 (HH) <=22 ng/L   Magnesium   Result Value Ref Range    Magnesium 1.5 (L) 1.7 - 2.3 mg/dL       Pertinent Radiology     Radiology Results:   Recent Results (from the past 24 hours)   XR Chest Port 1 View    Narrative    EXAM: XR CHEST PORT 1 VIEW  LOCATION: [facility]  DATE: 2/8/2025    INDICATION: Cough.  COMPARISON: 1/9/2025.      Impression    IMPRESSION: Mild bilateral interstitial prominence, nonspecific, though pulmonary vascular congestion not excluded. Mild basilar atelectasis. Stable left  costophrenic angle blunting (from mild pleural fluid or thickening). No pneumothorax. Stable   cardiomediastinal silhouette.         EKG Results:  Personally interpreted.  T wave inversion in V1 through V6.  RBBB.  Sinus tachycardia.  No ST-T change.  I spoke with ED provider for patient's care.  I spoke with patient's wife in the room.    CECELIA TURNER MD  [facility]   Phone: #[phone]
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
## 2025-02-08 NOTE — ED TRIAGE NOTES
Patient presents to ED via EMS from home for c/o shortness of breath and cough. Patient has been having cough for a week that has progressively worsened. Over the past 24 hours patient feels that he has been working hard to breathe. EMS found patient to be satting at 78% on room air. Placed on 10L non rebreather mask with sats up to 100%. EMS gave neb prior to arrival and pt states he feels much better after that. Hypertensive 206/98 on arrival to ED. Patient satting 100% on non rebreather on arrival to ED, transitioned to 4L nasal cannula. History of CHF and COPD. Per EMS pt fell in January and punctured right lung. Also had a heart attack and had stents placed during same hospitalization. Alert and oriented x4.

## 2025-02-08 NOTE — PROGRESS NOTES
Pt received scheduled Duoneb treatment. BS expiratory wheezes. Pt las seen on 4L Oxymask sating 95%. RT provided instruction on how to use flutter valve correctly. Pt using flutter valve independently. Pt has strong non productive cough. BiPAP on standby. RT will continue to follow.

## 2025-02-08 NOTE — PROGRESS NOTES
Off bipap now. BP still elevated. Bisoprolol and losartan both increased to bid.  Patient and incontinent and doesn't tolerate Purewick. Will utilize daily weight.

## 2025-02-08 NOTE — ED NOTES
Patient feeling anxious on bipap. Discussed with provider and ok to take patient off bipap. He is maintaining O2 sats well on nasal cannula.

## 2025-02-09 ENCOUNTER — APPOINTMENT (OUTPATIENT)
Dept: OCCUPATIONAL THERAPY | Facility: CLINIC | Age: 80
DRG: 291 | End: 2025-02-09
Attending: STUDENT IN AN ORGANIZED HEALTH CARE EDUCATION/TRAINING PROGRAM
Payer: MEDICARE

## 2025-02-09 LAB
ALBUMIN SERPL BCG-MCNC: 3.8 G/DL (ref 3.5–5.2)
ALP SERPL-CCNC: 84 U/L (ref 40–150)
ALT SERPL W P-5'-P-CCNC: 44 U/L (ref 0–70)
ANION GAP SERPL CALCULATED.3IONS-SCNC: 13 MMOL/L (ref 7–15)
AST SERPL W P-5'-P-CCNC: 45 U/L (ref 0–45)
BASE EXCESS BLDV CALC-SCNC: 8.1 MMOL/L (ref -3–3)
BILIRUB DIRECT SERPL-MCNC: <0.2 MG/DL (ref 0–0.3)
BILIRUB SERPL-MCNC: 0.4 MG/DL
BUN SERPL-MCNC: 16.3 MG/DL (ref 8–23)
CALCIUM SERPL-MCNC: 9.2 MG/DL (ref 8.8–10.4)
CHLORIDE SERPL-SCNC: 102 MMOL/L (ref 98–107)
CREAT SERPL-MCNC: 0.93 MG/DL (ref 0.67–1.17)
EGFRCR SERPLBLD CKD-EPI 2021: 84 ML/MIN/1.73M2
ERYTHROCYTE [DISTWIDTH] IN BLOOD BY AUTOMATED COUNT: 16.7 % (ref 10–15)
GLUCOSE BLDC GLUCOMTR-MCNC: 143 MG/DL (ref 70–99)
GLUCOSE BLDC GLUCOMTR-MCNC: 144 MG/DL (ref 70–99)
GLUCOSE BLDC GLUCOMTR-MCNC: 186 MG/DL (ref 70–99)
GLUCOSE BLDC GLUCOMTR-MCNC: 96 MG/DL (ref 70–99)
GLUCOSE SERPL-MCNC: 115 MG/DL (ref 70–99)
HCO3 BLDV-SCNC: 33 MMOL/L (ref 21–28)
HCO3 SERPL-SCNC: 28 MMOL/L (ref 22–29)
HCT VFR BLD AUTO: 30.9 % (ref 40–53)
HGB BLD-MCNC: 10.1 G/DL (ref 13.3–17.7)
MAGNESIUM SERPL-MCNC: 2 MG/DL (ref 1.7–2.3)
MCH RBC QN AUTO: 30.8 PG (ref 26.5–33)
MCHC RBC AUTO-ENTMCNC: 32.7 G/DL (ref 31.5–36.5)
MCV RBC AUTO: 94 FL (ref 78–100)
O2/TOTAL GAS SETTING VFR VENT: 3 %
OXYHGB MFR BLDV: 86 % (ref 70–75)
PCO2 BLDV: 48 MM HG (ref 40–50)
PH BLDV: 7.45 [PH] (ref 7.32–7.43)
PLATELET # BLD AUTO: 121 10E3/UL (ref 150–450)
PO2 BLDV: 52 MM HG (ref 25–47)
POTASSIUM SERPL-SCNC: 3.3 MMOL/L (ref 3.4–5.3)
POTASSIUM SERPL-SCNC: 4 MMOL/L (ref 3.4–5.3)
PROT SERPL-MCNC: 6.5 G/DL (ref 6.4–8.3)
RBC # BLD AUTO: 3.28 10E6/UL (ref 4.4–5.9)
SAO2 % BLDV: 88 % (ref 70–75)
SODIUM SERPL-SCNC: 143 MMOL/L (ref 135–145)
WBC # BLD AUTO: 3.6 10E3/UL (ref 4–11)

## 2025-02-09 PROCEDURE — 85027 COMPLETE CBC AUTOMATED: CPT | Performed by: STUDENT IN AN ORGANIZED HEALTH CARE EDUCATION/TRAINING PROGRAM

## 2025-02-09 PROCEDURE — 250N000009 HC RX 250: Performed by: STUDENT IN AN ORGANIZED HEALTH CARE EDUCATION/TRAINING PROGRAM

## 2025-02-09 PROCEDURE — 99233 SBSQ HOSP IP/OBS HIGH 50: CPT | Performed by: STUDENT IN AN ORGANIZED HEALTH CARE EDUCATION/TRAINING PROGRAM

## 2025-02-09 PROCEDURE — 84075 ASSAY ALKALINE PHOSPHATASE: CPT | Performed by: STUDENT IN AN ORGANIZED HEALTH CARE EDUCATION/TRAINING PROGRAM

## 2025-02-09 PROCEDURE — 120N000004 HC R&B MS OVERFLOW

## 2025-02-09 PROCEDURE — 84450 TRANSFERASE (AST) (SGOT): CPT | Performed by: STUDENT IN AN ORGANIZED HEALTH CARE EDUCATION/TRAINING PROGRAM

## 2025-02-09 PROCEDURE — 97535 SELF CARE MNGMENT TRAINING: CPT | Mod: GO

## 2025-02-09 PROCEDURE — 250N000013 HC RX MED GY IP 250 OP 250 PS 637: Performed by: STUDENT IN AN ORGANIZED HEALTH CARE EDUCATION/TRAINING PROGRAM

## 2025-02-09 PROCEDURE — 94640 AIRWAY INHALATION TREATMENT: CPT | Mod: 76

## 2025-02-09 PROCEDURE — 99223 1ST HOSP IP/OBS HIGH 75: CPT | Performed by: INTERNAL MEDICINE

## 2025-02-09 PROCEDURE — 83735 ASSAY OF MAGNESIUM: CPT | Performed by: STUDENT IN AN ORGANIZED HEALTH CARE EDUCATION/TRAINING PROGRAM

## 2025-02-09 PROCEDURE — 97110 THERAPEUTIC EXERCISES: CPT | Mod: GO

## 2025-02-09 PROCEDURE — 82565 ASSAY OF CREATININE: CPT | Performed by: STUDENT IN AN ORGANIZED HEALTH CARE EDUCATION/TRAINING PROGRAM

## 2025-02-09 PROCEDURE — 94640 AIRWAY INHALATION TREATMENT: CPT

## 2025-02-09 PROCEDURE — 250N000011 HC RX IP 250 OP 636: Performed by: STUDENT IN AN ORGANIZED HEALTH CARE EDUCATION/TRAINING PROGRAM

## 2025-02-09 PROCEDURE — 999N000157 HC STATISTIC RCP TIME EA 10 MIN

## 2025-02-09 PROCEDURE — 84132 ASSAY OF SERUM POTASSIUM: CPT | Performed by: STUDENT IN AN ORGANIZED HEALTH CARE EDUCATION/TRAINING PROGRAM

## 2025-02-09 PROCEDURE — 94660 CPAP INITIATION&MGMT: CPT

## 2025-02-09 PROCEDURE — 97166 OT EVAL MOD COMPLEX 45 MIN: CPT | Mod: GO

## 2025-02-09 PROCEDURE — 36415 COLL VENOUS BLD VENIPUNCTURE: CPT | Performed by: STUDENT IN AN ORGANIZED HEALTH CARE EDUCATION/TRAINING PROGRAM

## 2025-02-09 PROCEDURE — 250N000013 HC RX MED GY IP 250 OP 250 PS 637: Performed by: INTERNAL MEDICINE

## 2025-02-09 PROCEDURE — 82374 ASSAY BLOOD CARBON DIOXIDE: CPT | Performed by: STUDENT IN AN ORGANIZED HEALTH CARE EDUCATION/TRAINING PROGRAM

## 2025-02-09 PROCEDURE — 82805 BLOOD GASES W/O2 SATURATION: CPT | Performed by: STUDENT IN AN ORGANIZED HEALTH CARE EDUCATION/TRAINING PROGRAM

## 2025-02-09 PROCEDURE — 94799 UNLISTED PULMONARY SVC/PX: CPT

## 2025-02-09 RX ORDER — BISACODYL 10 MG
10 SUPPOSITORY, RECTAL RECTAL ONCE
Status: COMPLETED | OUTPATIENT
Start: 2025-02-09 | End: 2025-02-09

## 2025-02-09 RX ORDER — LOSARTAN POTASSIUM 25 MG/1
25 TABLET ORAL 2 TIMES DAILY
Status: DISCONTINUED | OUTPATIENT
Start: 2025-02-09 | End: 2025-02-15 | Stop reason: HOSPADM

## 2025-02-09 RX ORDER — MAGNESIUM OXIDE 400 MG/1
400 TABLET ORAL EVERY 4 HOURS
Status: COMPLETED | OUTPATIENT
Start: 2025-02-09 | End: 2025-02-09

## 2025-02-09 RX ORDER — POTASSIUM CHLORIDE 1500 MG/1
40 TABLET, EXTENDED RELEASE ORAL ONCE
Status: COMPLETED | OUTPATIENT
Start: 2025-02-09 | End: 2025-02-09

## 2025-02-09 RX ADMIN — IPRATROPIUM BROMIDE AND ALBUTEROL SULFATE 3 ML: .5; 3 SOLUTION RESPIRATORY (INHALATION) at 12:59

## 2025-02-09 RX ADMIN — CETIRIZINE HYDROCHLORIDE 10 MG: 10 TABLET, FILM COATED ORAL at 09:21

## 2025-02-09 RX ADMIN — FUROSEMIDE 40 MG: 10 INJECTION, SOLUTION INTRAVENOUS at 18:02

## 2025-02-09 RX ADMIN — FUROSEMIDE 40 MG: 10 INJECTION, SOLUTION INTRAVENOUS at 02:12

## 2025-02-09 RX ADMIN — DOXYCYCLINE HYCLATE 100 MG: 100 CAPSULE ORAL at 19:56

## 2025-02-09 RX ADMIN — OXYBUTYNIN CHLORIDE 10 MG: 5 TABLET, EXTENDED RELEASE ORAL at 09:20

## 2025-02-09 RX ADMIN — POTASSIUM CHLORIDE 40 MEQ: 1500 TABLET, EXTENDED RELEASE ORAL at 06:32

## 2025-02-09 RX ADMIN — FUROSEMIDE 40 MG: 10 INJECTION, SOLUTION INTRAVENOUS at 09:20

## 2025-02-09 RX ADMIN — GUAIFENESIN 600 MG: 600 TABLET ORAL at 09:19

## 2025-02-09 RX ADMIN — LOSARTAN POTASSIUM 25 MG: 25 TABLET, FILM COATED ORAL at 19:56

## 2025-02-09 RX ADMIN — ATORVASTATIN CALCIUM 80 MG: 40 TABLET, FILM COATED ORAL at 19:56

## 2025-02-09 RX ADMIN — METHYLPREDNISOLONE SODIUM SUCCINATE 62.5 MG: 125 INJECTION, POWDER, FOR SOLUTION INTRAMUSCULAR; INTRAVENOUS at 09:30

## 2025-02-09 RX ADMIN — SPIRONOLACTONE 12.5 MG: 25 TABLET, FILM COATED ORAL at 09:17

## 2025-02-09 RX ADMIN — IPRATROPIUM BROMIDE AND ALBUTEROL SULFATE 3 ML: .5; 3 SOLUTION RESPIRATORY (INHALATION) at 20:04

## 2025-02-09 RX ADMIN — IPRATROPIUM BROMIDE AND ALBUTEROL SULFATE 3 ML: .5; 3 SOLUTION RESPIRATORY (INHALATION) at 15:41

## 2025-02-09 RX ADMIN — GUAIFENESIN 600 MG: 600 TABLET ORAL at 19:56

## 2025-02-09 RX ADMIN — INSULIN ASPART 1 UNITS: 100 INJECTION, SOLUTION INTRAVENOUS; SUBCUTANEOUS at 18:03

## 2025-02-09 RX ADMIN — ASPIRIN 81 MG: 81 TABLET, COATED ORAL at 09:19

## 2025-02-09 RX ADMIN — ACETAMINOPHEN 650 MG: 325 TABLET ORAL at 13:54

## 2025-02-09 RX ADMIN — DOXYCYCLINE HYCLATE 100 MG: 100 CAPSULE ORAL at 09:19

## 2025-02-09 RX ADMIN — SERTRALINE HYDROCHLORIDE 25 MG: 25 TABLET ORAL at 09:21

## 2025-02-09 RX ADMIN — LOSARTAN POTASSIUM 12.5 MG: 25 TABLET, FILM COATED ORAL at 09:19

## 2025-02-09 RX ADMIN — IPRATROPIUM BROMIDE AND ALBUTEROL SULFATE 3 ML: .5; 3 SOLUTION RESPIRATORY (INHALATION) at 08:38

## 2025-02-09 RX ADMIN — CEFTRIAXONE 1 G: 1 INJECTION, POWDER, FOR SOLUTION INTRAMUSCULAR; INTRAVENOUS at 13:38

## 2025-02-09 RX ADMIN — PREGABALIN 150 MG: 50 CAPSULE ORAL at 13:54

## 2025-02-09 RX ADMIN — Medication 400 MG: at 06:32

## 2025-02-09 RX ADMIN — DOCUSATE SODIUM 100 MG: 100 CAPSULE, LIQUID FILLED ORAL at 09:19

## 2025-02-09 RX ADMIN — IPRATROPIUM BROMIDE AND ALBUTEROL SULFATE 3 ML: .5; 3 SOLUTION RESPIRATORY (INHALATION) at 03:01

## 2025-02-09 RX ADMIN — ENOXAPARIN SODIUM 40 MG: 40 INJECTION SUBCUTANEOUS at 13:54

## 2025-02-09 RX ADMIN — BISOPROLOL FUMARATE 5 MG: 5 TABLET ORAL at 09:19

## 2025-02-09 RX ADMIN — Medication 400 MG: at 09:30

## 2025-02-09 RX ADMIN — CLOPIDOGREL BISULFATE 75 MG: 75 TABLET ORAL at 09:19

## 2025-02-09 RX ADMIN — PANTOPRAZOLE SODIUM 40 MG: 40 TABLET, DELAYED RELEASE ORAL at 09:20

## 2025-02-09 ASSESSMENT — ACTIVITIES OF DAILY LIVING (ADL)
ADLS_ACUITY_SCORE: 52
ADLS_ACUITY_SCORE: 48
ADLS_ACUITY_SCORE: 53
ADLS_ACUITY_SCORE: 48
ADLS_ACUITY_SCORE: 48
ADLS_ACUITY_SCORE: 53
ADLS_ACUITY_SCORE: 48
ADLS_ACUITY_SCORE: 46
ADLS_ACUITY_SCORE: 48
ADLS_ACUITY_SCORE: 42
ADLS_ACUITY_SCORE: 48
ADLS_ACUITY_SCORE: 53
ADLS_ACUITY_SCORE: 46
ADLS_ACUITY_SCORE: 48
ADLS_ACUITY_SCORE: 46
ADLS_ACUITY_SCORE: 48
ADLS_ACUITY_SCORE: 49
ADLS_ACUITY_SCORE: 49
ADLS_ACUITY_SCORE: 48
ADLS_ACUITY_SCORE: 48
ADLS_ACUITY_SCORE: 46

## 2025-02-09 NOTE — PROGRESS NOTES
Patient is alert/oriented but is tachypic. Shallow breathing noted. Pt is placed back on BIPAP: ST 12/5 12 30%. Oxygenation and ventilations are adequate per exhaled tidal volume and saturations. Pt able to take the mask off.    Augusto Mary, RT

## 2025-02-09 NOTE — PROGRESS NOTES
Patient is on 3l via NC, breath sounds coarse. Getting Duoneb's QID and tolerating well.   Did have a period of SOB and was placed on BiPAP 10/5, RR 14, 30%.   Large full face mask.   RT will continue to follow    Latasha Hui, RT

## 2025-02-09 NOTE — PLAN OF CARE
Problem: Heart Failure  Goal: Effective Oxygenation and Ventilation  Outcome: Progressing  Intervention: Promote Airway Secretion Clearance  Recent Flowsheet Documentation  Taken 2/8/2025 2309 by Michelle Basurto RN  Activity Management: activity adjusted per tolerance  Intervention: Optimize Oxygenation and Ventilation  Recent Flowsheet Documentation  Taken 2/8/2025 2309 by Michelle Basurto RN  Head of Bed (HOB) Positioning: HOB at 30-45 degrees     Problem: COPD (Chronic Obstructive Pulmonary Disease)  Goal: Effective Oxygenation and Ventilation  Outcome: Progressing  Intervention: Promote Airway Secretion Clearance  Recent Flowsheet Documentation  Taken 2/8/2025 2309 by Michelle Basurto RN  Activity Management: activity adjusted per tolerance  Intervention: Optimize Oxygenation and Ventilation  Recent Flowsheet Documentation  Taken 2/8/2025 2309 by Michelle Basurto RN  Head of Bed (HOB) Positioning: HOB at 30-45 degrees     Problem: Fall Injury Risk  Goal: Absence of Fall and Fall-Related Injury  Outcome: Progressing  Intervention: Identify and Manage Contributors  Recent Flowsheet Documentation  Taken 2/8/2025 2309 by Michelle Basurto RN  Medication Review/Management: medications reviewed  Intervention: Promote Injury-Free Environment  Recent Flowsheet Documentation  Taken 2/8/2025 2309 by Michelle Basurto RN  Safety Promotion/Fall Prevention: safety round/check completed   Goal Outcome Evaluation:         Pt received from the ED at 2300.  Pt alert and oriented.  Pt with purewick intact but leaking and removed eventually.  Pt lungs coarse wheezes and crackles throughout.  Pt with O2 at 3 liters.  Bipap placed per RT for overnight.  Pt tolerating well.  Pt receiving IV Lasix for diuresis.  BP elevated upon admit then recheck improved.  Last /70.  Tele monitor placed.

## 2025-02-09 NOTE — PROGRESS NOTES
[facility] MEDICINE  PROGRESS NOTE       Securely message me with Windy (more info)    Code Status: Full Code    Identification/Summary:   Ubaldo Ramos is a 79 year old male with PMH signficant for .  Presented with .  Mr. Ubaldo Ramos is a 79 year old male with a past medical history of NSTEMI (s/p DESx2 to mid LAD on 1/8/2025), HFrEF with EF of 35 to 40%, recent traumatic right rib fracture and right pneumothorax s/p chest tube removal on 1/7/HLD, PAD, HTN, severe COPD with PFT in October 2021 showing FEV1 1.22 L (45%) and DLCOc 52%, NSCLC, active tobacco dependence, lung cancer s/p left upper lobectomy in 2017 with recurrence in 2018 s/p chemotherapy and radiation, peripheral arterial disease s/p left femoral endarterectomy and bilateral iliac stents, history of prostate cancer, GERD who presented with shortness of breath and productive cough for few weeks.  He desaturated to 78% when EMS arrived.  He was found to have elevated pro-BNP and troponin, congested pulmonary vasculature x-ray,  and wheezing on exam.  He was admitted for acute on chronic HFrEF and COPD exacerbation.  BiPAP and IV Lasix 60 mg was started in the ED. Now on 40mg IV q8h. Losartan increased from 12.5mg daily to 25mg bid to help with BP control. Cardiology is consulted.     Possible admission for 4 to 5 days for supervised IV diuresis, IV steroid and frequent nebulization.     Assessment and Plan:  Acute hypoxic respiratory failure due to exacerbation of HFrEF and COPD  -Continue BiPAP, will reassess later  -Treat HFrEF and COPD exacerbation as below     Acute on chronic HFrEF, ischemic cardiomyopathy  EF of 35 to 40% on 1/6/2025 > 40-50% on admission  Check TSH - normal  IV Lasix 40 mg every 8 hours  Continue bisoprolol 5 mg daily, losartan 12.5 mg daily>25mg bid, spironolactone 12.5 mg daily  Strict I's and O's, daily weights, telemetry  Fluid restriction to 2000 mL/day, heart healthy diet  Check  electrolytes daily and replete per protocol ordered  Cardiology consult     Non-sustained VT  8-beat on 2/8  Lytes are appropriate    COPD exacerbation  Acute bronchitis  Worsening cough, shortness of breath and production (sputum color is yellow)  Start empiric coverage of ceftriaxone and doxycycline. Unfortunately, he cannot tolerate azithromycin for QTc prolonging.  Schedule DuoNeb every 4 hours  IV methylprednisolone 62.5mg daily, still severe wheezing on 2/9  He will need LABA and LAMA at discharge  Flutter valve     NSTEMI s/p DESx2 to mid LAD on 1/8/2025  Dyslipidemia   To note, he never had chest pain with NSTEMI but shortness of breath  Troponin is 233 on admission and 220 on repeat, much lower than the time when he had NSTEMI  Check hemoglobin A1c and lipid profile  Continue aspirin 81 mg, clopidogrel 75 mg daily, bisoprolol as above, and atorvastatin 80 mg     QTc prolonging  QTc is 516 ms on admission  Hold trazodone and other QT prolonging meds  Replete magnesium     Hypertension urgency: His blood pressure is Controlled. Currently on bisoprolol, losartan, spironolactone.  Ordered hydralazine 10mg as needed for blood pressure systolically greater than 180 mmHg.    Constipation: aggressive regimen, added suppository prn 2/9    PAD: s/p lower extremity stenting and right-to-left femoral-femoral arterial bypass done in [place]. CT angiography of the lower extremities 12/13/2023 showed patent bilateral common and external iliac artery stents but occlusion of the femoral bypass graft  Asymptomatic right internal carotid artery stenosis: last assessed on carotid ultrasound 7/16/2024   NSCLC:  resection, radiation therapy and chemotherapy in 2017 and 2018   Tobacco use: still smoking, encouraged cessation/reduction.  He still has trouble quitting.  Nicotine replacement is not started for possible NSTEMI at this time.  S/p rib fracture and pneumothorax requiring chest tube  Urinary incontinence: Continue  "oxybutynin  GERD: Continue pantoprazole  Constipation: Last bowel movement was a few days ago, start MiraLAX scheduled dose  Depression and anxiety: Continue sertraline  Glucose control: Will start medium dose sliding scale for the time being      Anticoagulation   Orders (Includes Only Anticoagulants)  enoxaparin ANTICOAGULANT, 40 mg, Subcutaneous, Q24H      Therapy: PT, OT  Flores:Not present  Lines: None       Current Diet  Orders Placed This Encounter      Combination Diet 2 gm NA Diet; Low Saturated Fat Na <2400mg Diet, No Caffeine Diet        Clinically Significant Risk Factors        # Hypokalemia: Lowest K = 3.3 mmol/L in last 2 days, will replace as needed      # Hypomagnesemia: Lowest Mg = 1.5 mg/dL in last 2 days, will replace as needed     # Thrombocytopenia: Lowest platelets = 113 in last 2 days, will monitor for bleeding   # Hypertension: Noted on problem list  # Heart failure, NOS: heart failure noted on the problem list and last echo with EF 40-50%           # Overweight: Estimated body mass index is 25.61 kg/m  as calculated from the following:    Height as of this encounter: 1.664 m (5' 5.5\").    Weight as of this encounter: 70.9 kg (156 lb 4.8 oz)., PRESENT ON ADMISSION     # Financial/Environmental Concerns:           Interval History/Subjective:  Feeling better at times but no big improvement. Didn't sleep well for being on Bipap last night. Getting facial wrinkles today. Didn't tolerate Bipap well but was able to go on it for a few hours each time.      Last 24H PRN:     acetaminophen (TYLENOL) tablet 650 mg, 650 mg at 02/08/25 1320 **OR** acetaminophen (TYLENOL) Suppository 650 mg    hydrALAZINE (APRESOLINE) injection 10 mg, 10 mg at 02/08/25 1715    melatonin tablet 1 mg, 1 mg at 02/08/25 2344    Physical Exam/Objective:  Temp:  [97.6  F (36.4  C)-97.8  F (36.6  C)] 97.8  F (36.6  C)  Pulse:  [58-87] 63  Resp:  [16-38] 18  BP: (138-186)/() 144/67  FiO2 (%):  [30 %] 30 %  SpO2:  [92 %-98 " %] 92 %  Wt Readings from Last 4 Encounters:   02/08/25 70.9 kg (156 lb 4.8 oz)   01/31/25 75.9 kg (167 lb 4.8 oz)   01/28/25 75.3 kg (166 lb)   01/15/25 74.2 kg (163 lb 8 oz)     Body mass index is 25.61 kg/m .    General Appearance: Alert and wake, not in distress  Respiratory: diffusely wheezing  Cardiovascular: rhythmic, normal S1 and S2, no murmur  GI: soft, non-tender, normal bowel sound  Neurology: oriented x 3  Psych: cooperative and calm, normal affect    Medications:   Personally Reviewed.  Medications   Current Facility-Administered Medications   Medication Dose Route Frequency Provider Last Rate Last Admin    Continuing ACE inhibitor/ARB/ARNI from home medication list OR ACE inhibitor/ARB/ARNI order already placed during this visit   Does not apply DOES NOT GO TO Allyn Smyth MD        Reason beta blocker not prescribed   Other DOES NOT GO TO Allyn Smyth MD         Current Facility-Administered Medications   Medication Dose Route Frequency Provider Last Rate Last Admin    aspirin EC tablet 81 mg  81 mg Oral Daily Allyn Terry MD   81 mg at 02/09/25 0919    atorvastatin (LIPITOR) tablet 80 mg  80 mg Oral At Bedtime Allyn Terry MD   80 mg at 02/08/25 2212    bisacodyl (DULCOLAX) suppository 10 mg  10 mg Rectal Once Allyn Terry MD        bisoprolol (ZEBETA) tablet 5 mg  5 mg Oral Daily Allyn Terry MD   5 mg at 02/09/25 0919    cefTRIAXone (ROCEPHIN) 1 g vial to attach to  mL bag for ADULTS or NS 50 mL bag for PEDS  1 g Intravenous Q24H Allyn Terry MD   Stopped at 02/08/25 1145    cetirizine (zyrTEC) tablet 10 mg  10 mg Oral QAM Allyn Terry MD   10 mg at 02/09/25 0921    clopidogrel (PLAVIX) tablet 75 mg  75 mg Oral Daily Allyn Terry MD   75 mg at 02/09/25 0919    docusate sodium (COLACE) capsule 100 mg  100 mg Oral BID Allyn Terry MD   100 mg at 02/09/25 0919    doxycycline hyclate (VIBRAMYCIN) capsule 100 mg  100 mg Oral Q12H [facility] (08/20) Allyn Terry MD   100 mg at 02/09/25  0919    enoxaparin ANTICOAGULANT (LOVENOX) injection 40 mg  40 mg Subcutaneous Q24H Allyn Terry MD   40 mg at 02/08/25 1158    furosemide (LASIX) injection 40 mg  40 mg Intravenous Q8H Allyn Terry MD   40 mg at 02/09/25 0920    guaiFENesin (MUCINEX) 12 hr tablet 600 mg  600 mg Oral BID Allyn Terry MD   600 mg at 02/09/25 0919    insulin aspart (NovoLOG) injection (RAPID ACTING)  1-7 Units Subcutaneous TID AC Allyn Terry MD   2 Units at 02/08/25 1745    insulin aspart (NovoLOG) injection (RAPID ACTING)  1-5 Units Subcutaneous At Bedtime Allyn Terry MD        ipratropium - albuterol 0.5 mg/2.5 mg/3 mL (DUONEB) neb solution 3 mL  1 vial Nebulization Q4H Allyn Terry MD   3 mL at 02/09/25 1259    losartan (COZAAR) tablet 25 mg  25 mg Oral BID Heidi Arnold MD        methylPREDNISolone Na Suc (solu-MEDROL) injection 62.5 mg  62.5 mg Intravenous Q24H Allyn Terry MD   62.5 mg at 02/09/25 0930    oxyBUTYnin ER (DITROPAN XL) 24 hr tablet 10 mg  10 mg Oral Every Other Day Allyn Terry MD   10 mg at 02/09/25 0920    pantoprazole (PROTONIX) EC tablet 40 mg  40 mg Oral Daily Allyn Terry MD   40 mg at 02/09/25 0920    polyethylene glycol (MIRALAX) Packet 17 g  17 g Oral BID Allyn Terry MD   17 g at 02/08/25 1600    sertraline (ZOLOFT) tablet 25 mg  25 mg Oral Daily Allyn Terry MD   25 mg at 02/09/25 0921    sodium chloride (PF) 0.9% PF flush 3 mL  3 mL Intracatheter Q8H Allyn Terry MD   3 mL at 02/09/25 0934    spironolactone (ALDACTONE) half-tab 12.5 mg  12.5 mg Oral Daily Allyn Terry MD   12.5 mg at 02/09/25 0917    [Held by provider] traZODone (DESYREL) tablet  mg   mg Oral At Bedtime Allyn Terry MD           Data reviewed today: I personally reviewed all new medications, labs, imaging/diagnostics reports over the past 24 hours. Pertinent findings include:    Imaging:   No results found for this or any previous visit (from the past 24 hours).    Labs:  Echo Limited   Final Result      XR  Chest Port 1 View   Final Result   IMPRESSION: Mild bilateral interstitial prominence, nonspecific, though pulmonary vascular congestion not excluded. Mild basilar atelectasis. Stable left costophrenic angle blunting (from mild pleural fluid or thickening). No pneumothorax. Stable    cardiomediastinal silhouette.           Recent Results (from the past 24 hours)   Magnesium    Collection Time: 02/08/25  3:59 PM   Result Value Ref Range    Magnesium 2.8 (H) 1.7 - 2.3 mg/dL   Potassium    Collection Time: 02/08/25  3:59 PM   Result Value Ref Range    Potassium 4.0 3.4 - 5.3 mmol/L   Phosphorus    Collection Time: 02/08/25  3:59 PM   Result Value Ref Range    Phosphorus 2.5 2.5 - 4.5 mg/dL   Glucose by meter    Collection Time: 02/08/25  5:35 PM   Result Value Ref Range    GLUCOSE BY METER POCT 190 (H) 70 - 99 mg/dL   Glucose by meter    Collection Time: 02/08/25 10:15 PM   Result Value Ref Range    GLUCOSE BY METER POCT 136 (H) 70 - 99 mg/dL   Basic metabolic panel    Collection Time: 02/09/25  5:42 AM   Result Value Ref Range    Sodium 143 135 - 145 mmol/L    Potassium 3.3 (L) 3.4 - 5.3 mmol/L    Chloride 102 98 - 107 mmol/L    Carbon Dioxide (CO2) 28 22 - 29 mmol/L    Anion Gap 13 7 - 15 mmol/L    Urea Nitrogen 16.3 8.0 - 23.0 mg/dL    Creatinine 0.93 0.67 - 1.17 mg/dL    GFR Estimate 84 >60 mL/min/1.73m2    Calcium 9.2 8.8 - 10.4 mg/dL    Glucose 115 (H) 70 - 99 mg/dL   Hepatic panel    Collection Time: 02/09/25  5:42 AM   Result Value Ref Range    Protein Total 6.5 6.4 - 8.3 g/dL    Albumin 3.8 3.5 - 5.2 g/dL    Bilirubin Total 0.4 <=1.2 mg/dL    Alkaline Phosphatase 84 40 - 150 U/L    AST 45 0 - 45 U/L    ALT 44 0 - 70 U/L    Bilirubin Direct <0.20 0.00 - 0.30 mg/dL   CBC with platelets    Collection Time: 02/09/25  5:42 AM   Result Value Ref Range    WBC Count 3.6 (L) 4.0 - 11.0 10e3/uL    RBC Count 3.28 (L) 4.40 - 5.90 10e6/uL    Hemoglobin 10.1 (L) 13.3 - 17.7 g/dL    Hematocrit 30.9 (L) 40.0 - 53.0 %    MCV  94 78 - 100 fL    MCH 30.8 26.5 - 33.0 pg    MCHC 32.7 31.5 - 36.5 g/dL    RDW 16.7 (H) 10.0 - 15.0 %    Platelet Count 121 (L) 150 - 450 10e3/uL   Magnesium    Collection Time: 02/09/25  5:42 AM   Result Value Ref Range    Magnesium 2.0 1.7 - 2.3 mg/dL   Blood gas venous    Collection Time: 02/09/25  5:42 AM   Result Value Ref Range    pH Venous 7.45 (H) 7.32 - 7.43    pCO2 Venous 48 40 - 50 mm Hg    pO2 Venous 52 (H) 25 - 47 mm Hg    Bicarbonate Venous 33 (H) 21 - 28 mmol/L    Base Excess/Deficit Venous 8.1 (H) -3.0 - 3.0 mmol/L    FIO2 3     Oxyhemoglobin Venous 86 (H) 70 - 75 %    O2 Sat, Venous 88.0 (H) 70.0 - 75.0 %   Glucose by meter    Collection Time: 02/09/25  7:45 AM   Result Value Ref Range    GLUCOSE BY METER POCT 96 70 - 99 mg/dL   Potassium    Collection Time: 02/09/25 11:25 AM   Result Value Ref Range    Potassium 4.0 3.4 - 5.3 mmol/L   Glucose by meter    Collection Time: 02/09/25 12:14 PM   Result Value Ref Range    GLUCOSE BY METER POCT 143 (H) 70 - 99 mg/dL       Pending Labs:  Unresulted Labs Ordered in the Past 30 Days of this Admission       No orders found for last 31 day(s).            I spoke with Dr. Arnold to discuss patient's care.    CECELIA TURNER MD  [facility] Medicine  [facility]  Phone: #[phone]    Securely message me with Windy (more info)

## 2025-02-09 NOTE — CONSULTS
Thank you,   , for asking the [facility] Care team to see Mr. Ubaldo Ramos for evaluation of .      Assessment/Recommendations   Assessment/Plan:  Acute on chronic systolic congestive heart failure, ischemic cardiomyopathy, LVEF 35 to 40%, improved to 40 to 50% post PCI: The patient developed worsening shortness of breath over last 3 days.  No weight gain, no leg edema.  However, her proBNP was significantly elevated, questionable pulmonary vascular congestion on chest x-ray.  His worsening shortness of breath on exertion could be caused by acute on chronic systolic congestive heart failure and COPD exacerbation.  Agreed to continue IV diuresis Lasix of 40 mg every 8 hours.  Close monitor weight, creatinine, electrolytes and symptoms.  Continue bisoprolol, losartan, spironolactone.  Increase losartan to 25 mg twice a day for better blood pressure control and the CHF.  Start SGLT2 inhibitor Jardiance 10 mg daily.  Coronary artery disease status post JIA to mid LAD on January 8, 2025: No chest pain.  Echocardiogram yesterday was reported improved wall motion abnormality in mid to distal anterior, anterior septal and apical segments, and LV systolic ejection fraction to 45 to 50%.  Continue aspirin, Plavix, bisoprolol and atorvastatin.  Benign essential hypertension: His blood pressure is mildly elevated.  Increased losartan to 25 mg twice a day, continue bisoprolol 5 mg daily, spironolactone 12.5 mg daily.  Continue to monitor his blood pressure.  Dyslipidemia: LDL is well-controlled.  Continue atorvastatin.  Severe COPD with exacerbation, history of lung cancer, history of prostate cancer, recent rib fracture with treatment of right pneumothorax: Please see primary team of management for details.    Clinically Significant Risk Factors        # Hypokalemia: Lowest K = 3.3 mmol/L in last 2 days, will replace as needed      # Hypomagnesemia: Lowest Mg = 1.5 mg/dL in last 2 days, will replace as  "needed     # Thrombocytopenia: Lowest platelets = 113 in last 2 days, will monitor for bleeding   # Hypertension: Noted on problem list  # Heart failure, NOS: heart failure noted on the problem list and last echo with EF 40-50%           # Overweight: Estimated body mass index is 25.61 kg/m  as calculated from the following:    Height as of this encounter: 1.664 m (5' 5.5\").    Weight as of this encounter: 70.9 kg (156 lb 4.8 oz)., PRESENT ON ADMISSION     # Financial/Environmental Concerns:         History of Present Illness/Subjective    It is my pleasure to see Ubaldo Ramos at [facility] for evaluation of acute on chronic systolic congestive heart failure.  Ubaldo Ramos is a 79 year old male with a medical history of coronary artery disease status post JIA to mid LAD on January 8, 2025, ischemic cardiomyopathy, LVEF 35 to 40%, benign essential hypertension, dyslipidemia, peripheral arterial disease, severe COPD, recent traumatic right rib fracture complicated with right pneumothorax 6 status post chest tube suction, history of lung cancer status post left upper lobectomy in 2017 with recurrence in 2018 s/p chemotherapy and radiation, history of prostate cancer.    The patient states that he has chronic shortness of breath on exertion due to COPD and previous lung cancer.  He developed worsening shortness of breath on exertion over last 3 days, gradually getting worse leading to ER visit.  He denies chest pain.  He states that he did not gain weight.  He did not have leg edema.  He did not complaint palpitations, dizziness, orthopnea, PND.  He states that he has some cough but no fever or or chills.  He has no diarrhea or dysuria.    On admission, his proBNP was elevated to 28,282 from a previous of study 783.  proBNP was 233, 221, flat.  Chest x-ray indicated possible pulmonary vascular congestion.  ECG showed sinus tachycardia, T wave abnormality, right bundle branch block, " "improved to T wave inversion in inferior and lateral leads.  He feels better after treatment post hospital admission.       Physical Examination Review of Systems   BP (!) 155/70   Pulse 87   Temp 97.8  F (36.6  C) (Oral)   Resp 20   Ht 1.664 m (5' 5.5\")   Wt 70.9 kg (156 lb 4.8 oz)   SpO2 92%   BMI 25.61 kg/m    Body mass index is 25.61 kg/m .  Wt Readings from Last 3 Encounters:   02/08/25 70.9 kg (156 lb 4.8 oz)   01/31/25 75.9 kg (167 lb 4.8 oz)   01/28/25 75.3 kg (166 lb)       Intake/Output Summary (Last 24 hours) at 2/9/2025 1114  Last data filed at 2/9/2025 0948  Gross per 24 hour   Intake 1010 ml   Output 2550 ml   Net -1540 ml       General Appearance:   Awake, Alert, No acute distress.   HEENT:  No scleral icterus; the mucous membranes were moist.   Neck: No cervical bruits. No JVD. No thyromegaly.    Chest: The spine was straight. The chest was symmetric.   Lungs:   Diminished breathing sounds. Right basilar crackles.  No wheezing.   Cardiovascular:   Regular rhythm and rate, normal first and second heart sounds with no murmurs. No rubs or gallops.    Abdomen:  Soft. No tenderness. Bowels sounds are present   Extremities: Equal tibial pulses. No leg edema.   Skin: No rashes. Warm, Dry.   Musculoskeletal: No tenderness.   Neurologic: Oriented x 3. Mood and affect are appropriate.     A 12 point comprehensive review of systems was negative except as noted.        Medical History  Surgical History Family History Social History   History reviewed. No pertinent past medical history. Past Surgical History:   Procedure Laterality Date    APPENDECTOMY      CERVICAL SPINE SURGERY      CHOLECYSTECTOMY      CV CORONARY ANGIOGRAM N/A 1/8/2025    Procedure: Coronary Angiogram;  Surgeon: Jamie Noriega MD;  Location: [facility] CV    CV INTRAVASULAR ULTRASOUND N/A 1/8/2025    Procedure: Intravascular Ultrasound;  Surgeon: Jamie Noriega MD;  Location: [facility] CV    CV LEFT HEART CATH N/A " 1/8/2025    Procedure: Left Heart Catheterization;  Surgeon: Jamie Noriega MD;  Location: [facility] CV    CV PCI STENT DRUG ELUTING N/A 1/8/2025    Procedure: Percutaneous Coronary Intervention Stent;  Surgeon: Jamie Noriega MD;  Location: [facility] CV    FEMORAL ENDARTERECTOMY      LUNG CANCER SURGERY      PROSTATECTOMY      Reviewed: No family history of CAD. Social History     Socioeconomic History    Marital status:      Spouse name: Not on file    Number of children: Not on file    Years of education: Not on file    Highest education level: Not on file   Occupational History    Not on file   Tobacco Use    Smoking status: Every Day     Current packs/day: 0.50     Types: Cigarettes     Passive exposure: Current    Smokeless tobacco: Never   Vaping Use    Vaping status: Never Used   Substance and Sexual Activity    Alcohol use: Not Currently    Drug use: Not Currently    Sexual activity: Not on file   Other Topics Concern    Not on file   Social History Narrative    Not on file     Social Drivers of Health     Financial Resource Strain: Low Risk  (2/8/2025)    Financial Resource Strain     Within the past 12 months, have you or your family members you live with been unable to get utilities (heat, electricity) when it was really needed?: No   Food Insecurity: Low Risk  (2/8/2025)    Food Insecurity     Within the past 12 months, did you worry that your food would run out before you got money to buy more?: No     Within the past 12 months, did the food you bought just not last and you didn t have money to get more?: No   Transportation Needs: Low Risk  (2/8/2025)    Transportation Needs     Within the past 12 months, has lack of transportation kept you from medical appointments, getting your medicines, non-medical meetings or appointments, work, or from getting things that you need?: No   Physical Activity: Not on file   Stress: Not on file   Social Connections: Not on file    Interpersonal Safety: Low Risk  (2/8/2025)    Interpersonal Safety     Do you feel physically and emotionally safe where you currently live?: Yes     Within the past 12 months, have you been hit, slapped, kicked or otherwise physically hurt by someone?: No     Within the past 12 months, have you been humiliated or emotionally abused in other ways by your partner or ex-partner?: No   Housing Stability: Low Risk  (2/8/2025)    Housing Stability     Do you have housing? : Yes     Are you worried about losing your housing?: No          Medications  Allergies   Scheduled Meds:  Current Facility-Administered Medications   Medication Dose Route Frequency Provider Last Rate Last Admin    aspirin EC tablet 81 mg  81 mg Oral Daily Allyn Terry MD   81 mg at 02/09/25 0919    atorvastatin (LIPITOR) tablet 80 mg  80 mg Oral At Bedtime Allyn Terry MD   80 mg at 02/08/25 2212    bisacodyl (DULCOLAX) suppository 10 mg  10 mg Rectal Once Allyn Terry MD        bisoprolol (ZEBETA) tablet 5 mg  5 mg Oral Daily Allyn Terry MD   5 mg at 02/09/25 0919    cefTRIAXone (ROCEPHIN) 1 g vial to attach to  mL bag for ADULTS or NS 50 mL bag for PEDS  1 g Intravenous Q24H Allyn Terry MD   Stopped at 02/08/25 1145    cetirizine (zyrTEC) tablet 10 mg  10 mg Oral QAM Allyn Terry MD   10 mg at 02/09/25 0921    clopidogrel (PLAVIX) tablet 75 mg  75 mg Oral Daily Allyn Terry MD   75 mg at 02/09/25 0919    docusate sodium (COLACE) capsule 100 mg  100 mg Oral BID Allyn Terry MD   100 mg at 02/09/25 0919    doxycycline hyclate (VIBRAMYCIN) capsule 100 mg  100 mg Oral Q12H [facility] (08/20) Allyn Terry MD   100 mg at 02/09/25 0919    enoxaparin ANTICOAGULANT (LOVENOX) injection 40 mg  40 mg Subcutaneous Q24H Allyn Terry MD   40 mg at 02/08/25 1158    furosemide (LASIX) injection 40 mg  40 mg Intravenous Q8H Allyn Terry MD   40 mg at 02/09/25 0920    guaiFENesin (MUCINEX) 12 hr tablet 600 mg  600 mg Oral BID Allyn Terry MD   600 mg  at 02/09/25 0919    insulin aspart (NovoLOG) injection (RAPID ACTING)  1-7 Units Subcutaneous TID AC Allyn Terry MD   2 Units at 02/08/25 1745    insulin aspart (NovoLOG) injection (RAPID ACTING)  1-5 Units Subcutaneous At Bedtime Allyn Terry MD        ipratropium - albuterol 0.5 mg/2.5 mg/3 mL (DUONEB) neb solution 3 mL  1 vial Nebulization Q4H Allyn Terry MD   3 mL at 02/09/25 0838    losartan (COZAAR) half-tab 12.5 mg  12.5 mg Oral BID Allyn Terry MD   12.5 mg at 02/09/25 0919    methylPREDNISolone Na Suc (solu-MEDROL) injection 62.5 mg  62.5 mg Intravenous Q24H Allyn Terry MD   62.5 mg at 02/09/25 0930    oxyBUTYnin ER (DITROPAN XL) 24 hr tablet 10 mg  10 mg Oral Every Other Day Allyn Terry MD   10 mg at 02/09/25 0920    pantoprazole (PROTONIX) EC tablet 40 mg  40 mg Oral Daily Allyn Terry MD   40 mg at 02/09/25 0920    polyethylene glycol (MIRALAX) Packet 17 g  17 g Oral BID Allyn Terry MD   17 g at 02/08/25 1600    sertraline (ZOLOFT) tablet 25 mg  25 mg Oral Daily Allyn Terry MD   25 mg at 02/09/25 0921    sodium chloride (PF) 0.9% PF flush 3 mL  3 mL Intracatheter Q8H Allyn Terry MD   3 mL at 02/09/25 0934    spironolactone (ALDACTONE) half-tab 12.5 mg  12.5 mg Oral Daily Allyn Terry MD   12.5 mg at 02/09/25 0917    [Held by provider] traZODone (DESYREL) tablet  mg   mg Oral At Bedtime Allyn Terry MD        Allergies   Allergen Reactions    Adhesive Tape Unknown     Adhesive- pulls skin off          Lab Results    Chemistry/lipid CBC Cardiac Enzymes/BNP/TSH/INR   Lab Results   Component Value Date    CHOL 87 02/08/2025    HDL 38 (L) 02/08/2025    TRIG 107 02/08/2025    BUN 16.3 02/09/2025     02/09/2025    CO2 28 02/09/2025    Lab Results   Component Value Date    WBC 3.6 (L) 02/09/2025    HGB 10.1 (L) 02/09/2025    HCT 30.9 (L) 02/09/2025    MCV 94 02/09/2025     (L) 02/09/2025    Lab Results   Component Value Date    TROPONINI 0.02 07/31/2022    BNP 74  07/31/2022    TSH 1.01 02/08/2025    INR 1.02 04/27/2024

## 2025-02-09 NOTE — PROGRESS NOTES
02/09/25 1014   Appointment Info   Signing Clinician's Name / Credentials (OT) Cristobal Quintero, OTR/L   Living Environment   People in Home spouse   Current Living Arrangements independent living facility   Home Accessibility no concerns;other (see comments)   Transportation Anticipated family or friend will provide   Living Environment Comments Pt has cane and FWW but does not typically use at baseline. Pt has walkin shower w/ grab bars, standard toilets w/ vanity next to it   Self-Care   Usual Activity Tolerance moderate   Current Activity Tolerance fair   Equipment Currently Used at Home cane, straight;walker, rolling;other (see comments)   Fall history within last six months yes   Number of times patient has fallen within last six months 1   Activity/Exercise/Self-Care Comment Pt IND w/ ADLs and shares IADLs w/ spouse at baseline   General Information   Onset of Illness/Injury or Date of Surgery 02/08/25   Referring Physician Allyn Terry MD   Patient/Family Therapy Goal Statement (OT) to go home   Additional Occupational Profile Info/Pertinent History of Current Problem Per chart, Mr. Ubaldo Ramos is a 79 year old male with a past medical history of NSTEMI (s/p DESx2 to mid LAD on 1/8/2025), HFrEF with EF of 35 to 40%, recent traumatic right rib fracture and right pneumothorax s/p chest tube removal on 1/7/HLD, PAD, HTN, severe COPD with PFT in October 2021 showing FEV1 1.22 L (45%) and DLCOc 52%, NSCLC, active tobacco dependence, lung cancer s/p left upper lobectomy in 2017 with recurrence in 2018 s/p chemotherapy and radiation, peripheral arterial disease s/p left femoral endarterectomy and bilateral iliac stents, history of prostate cancer, GERD who presented with shortness of breath and productive cough for few weeks.  He desaturated to 78% when EMS arrived.  He was found to have elevated pro-BNP and troponin, congested pulmonary vasculature x-ray,  and wheezing on exam.  He was admitted for acute on  chronic HFrEF and COPD exacerbation.   Existing Precautions/Restrictions cardiac;oxygen therapy device and L/min  (2.5L O2, do snot use at baselin)   Cognitive Status Examination   Orientation Status orientation to person, place and time   Follows Commands WNL   Visual Perception   Visual Impairment/Limitations corrective lenses full-time   Sensory   Sensory Quick Adds sensation intact   Pain Assessment   Patient Currently in Pain No   Posture   Posture not impaired   Range of Motion Comprehensive   General Range of Motion no range of motion deficits identified   Strength Comprehensive (MMT)   Comment, General Manual Muscle Testing (MMT) Assessment generalized weakness   Bed Mobility   Bed Mobility supine-sit;scooting/bridging;sit-supine   Comment (Bed Mobility) per clinical judgmeent, will need Min A   Transfers   Transfers bed-chair transfer;sit-stand transfer;toilet transfer   Transfer Comments SBA-CGA   Activities of Daily Living   BADL Assessment/Intervention lower body dressing;bathing   Bathing Assessment/Intervention   Dickey Level (Bathing) minimum assist (75% patient effort)   Lower Body Dressing Assessment/Training   [place] Level (Lower Body Dressing) minimum assist (75% patient effort)   Clinical Impression   Criteria for Skilled Therapeutic Interventions Met (OT) Yes, treatment indicated   OT Diagnosis decreased ADLs   Influenced by the following impairments CHF, SOB   OT Problem List-Impairments impacting ADL activity tolerance impaired;mobility   Assessment of Occupational Performance 1-3 Performance Deficits   Identified Performance Deficits transfers, mobility, LE dressing, bed mobility   Planned Therapy Interventions (OT) ADL retraining;transfer training;progressive activity/exercise;home program guidelines;bed mobility training;strengthening   Clinical Decision Making Complexity (OT) detailed assessment/moderate complexity   Risk & Benefits of therapy have been explained  evaluation/treatment results reviewed;care plan/treatment goals reviewed;patient;spouse/significant other   OT Total Evaluation Time   OT Eval, Moderate Complexity Minutes (22009) 10   OT Goals   Therapy Frequency (OT) Daily   OT Predicted Duration/Target Date for Goal Attainment 02/15/25   OT Goals Cardiac Phase 1;Lower Body Dressing;Bed Mobility   OT: Lower Body Dressing Modified independent   OT: Bed Mobility Modified independent;supine to/from sitting   OT: Understanding of cardiac education to maximize quality of life, condition management, and health outcomes Patient;Verbalize   OT: Perform aerobic activity with stable cardiovascular response 10 minutes;intermittent;ambulation   OT: Functional/aerobic ambulation tolerance with stable cardiovascular response in order to return to home and community environment Modified independent;Assistive device;200 feet   Interventions   Interventions Quick Adds Self-Care/Home Management   Self-Care/Home Management   Self-Care/Home Mgmt/ADL, Compensatory, Meal Prep Minutes (79320) 15   Symptoms Noted During/After Treatment (Meal Preparation/Planning Training) fatigue;shortness of breath   Treatment Detail/Skilled Intervention Pt in recliner and instructed on figure 4 to pull socks up and don brief - pt completed w/ SBA and extended time due to SOB. Pt having trouble donning brief but declining assistance. Pt needing CGA for tranfsers and verbal/tactile cues to push up from chair instead of FWW.   Therapeutic Procedures/Exercise   Therapeutic Procedure: strength, endurance, ROM, flexibillity minutes (10507) 8   Symptoms Noted During/After Treatment fatigue;shortness of breath   Treatment Detail/Skilled Intervention see ambulation tab. additional educ given to pt and spouse about UE ex to increase strength and activity tolerance - will review next session.   Ambulation   Workload 75   Symptoms Dyspnea;Fatigue   Cardiovascular Response Hypertensive response to exercise    Exercise Details Pt amb. 75 in hallway w/ FWW and SBA and instructions on PLB - pt tends to mouth breath and gets SOB very quickly when doing that. Pt very SOB when first standing from chair and needing rest break. Second attempt, able to march in place ~30 secs while PLB. Third trial, amb. 75 ft in hallway.   Vital Signs Details After activity, BP = 158/101, HR in 80s, O2 at 91%. Pt on 3L O2 w/ activity   Duration (minutes) 5 minutes   Effort Scale 6   Cardiac Education   Education Provided Diet;Daily weights;Home exercise program;Outpatient Cardiac Rehab;Resuming home activities;Risk factors;Signs and symptoms   Education Packet Given to Patient Yes   All Patient Education Handouts Reviewed with Patient and/or Family Yes   OT Discharge Planning   OT Plan amb. 200 ft if able w/ FWW, monitor vitals, bed mobility, LE dressing, all transfers   OT Discharge Recommendation (DC Rec) (S)  home with outpatient cardiac rehab   OT Rationale for DC Rec Pt fatigues quickly and gets SOB w /minimal activity. Pt and spouse are hoping to go home in 1-2 days to resume OP cardiac rehab. Pt may need TCU due to decreased activity tolerance for all ADLs/mobility. Pending progress, pt can hopefully go home w/ OP cardiac rehab in a couple days. Pt has good home setup and spouse is home to assist as needed.   OT Brief overview of current status amb. 75 ft, increased SOB and increased BP.   OT Total Distance Amb During Session (feet) 75   OT Equipment Needed at Discharge shower chair   Total Session Time   Timed Code Treatment Minutes 23   Total Session Time (sum of timed and untimed services) 33

## 2025-02-09 NOTE — PROGRESS NOTES
Still shows a lot of work of breathing. Back on Bipap. Consider utilizing Predex if he couldn't tolerate it.

## 2025-02-10 ENCOUNTER — APPOINTMENT (OUTPATIENT)
Dept: OCCUPATIONAL THERAPY | Facility: CLINIC | Age: 80
DRG: 291 | End: 2025-02-10
Attending: HOSPITALIST
Payer: MEDICARE

## 2025-02-10 ENCOUNTER — TELEPHONE (OUTPATIENT)
Dept: CARDIOLOGY | Facility: CLINIC | Age: 80
End: 2025-02-10
Payer: MEDICARE

## 2025-02-10 DIAGNOSIS — I50.9 ACUTE DECOMPENSATED HEART FAILURE (H): Primary | ICD-10-CM

## 2025-02-10 DIAGNOSIS — R79.89 ELEVATED TROPONIN: ICD-10-CM

## 2025-02-10 LAB
ANION GAP SERPL CALCULATED.3IONS-SCNC: 12 MMOL/L (ref 7–15)
BUN SERPL-MCNC: 27.3 MG/DL (ref 8–23)
C PNEUM DNA SPEC QL NAA+PROBE: NOT DETECTED
CALCIUM SERPL-MCNC: 9.6 MG/DL (ref 8.8–10.4)
CHLORIDE SERPL-SCNC: 100 MMOL/L (ref 98–107)
CREAT SERPL-MCNC: 0.84 MG/DL (ref 0.67–1.17)
CREAT SERPL-MCNC: 1.02 MG/DL (ref 0.67–1.17)
EGFRCR SERPLBLD CKD-EPI 2021: 75 ML/MIN/1.73M2
EGFRCR SERPLBLD CKD-EPI 2021: 89 ML/MIN/1.73M2
ERYTHROCYTE [DISTWIDTH] IN BLOOD BY AUTOMATED COUNT: 16.9 % (ref 10–15)
FLUAV H1 2009 PAND RNA SPEC QL NAA+PROBE: NOT DETECTED
FLUAV H1 RNA SPEC QL NAA+PROBE: NOT DETECTED
FLUAV H3 RNA SPEC QL NAA+PROBE: NOT DETECTED
FLUAV RNA SPEC QL NAA+PROBE: NOT DETECTED
FLUBV RNA SPEC QL NAA+PROBE: NOT DETECTED
GLUCOSE BLDC GLUCOMTR-MCNC: 128 MG/DL (ref 70–99)
GLUCOSE BLDC GLUCOMTR-MCNC: 132 MG/DL (ref 70–99)
GLUCOSE BLDC GLUCOMTR-MCNC: 150 MG/DL (ref 70–99)
GLUCOSE BLDC GLUCOMTR-MCNC: 95 MG/DL (ref 70–99)
GLUCOSE SERPL-MCNC: 105 MG/DL (ref 70–99)
HADV DNA SPEC QL NAA+PROBE: NOT DETECTED
HCO3 SERPL-SCNC: 30 MMOL/L (ref 22–29)
HCOV PNL SPEC NAA+PROBE: NOT DETECTED
HCT VFR BLD AUTO: 35.7 % (ref 40–53)
HGB BLD-MCNC: 11.3 G/DL (ref 13.3–17.7)
HMPV RNA SPEC QL NAA+PROBE: NOT DETECTED
HPIV1 RNA SPEC QL NAA+PROBE: NOT DETECTED
HPIV2 RNA SPEC QL NAA+PROBE: NOT DETECTED
HPIV3 RNA SPEC QL NAA+PROBE: DETECTED
HPIV4 RNA SPEC QL NAA+PROBE: NOT DETECTED
M PNEUMO DNA SPEC QL NAA+PROBE: NOT DETECTED
MAGNESIUM SERPL-MCNC: 2 MG/DL (ref 1.7–2.3)
MCH RBC QN AUTO: 30.2 PG (ref 26.5–33)
MCHC RBC AUTO-ENTMCNC: 31.7 G/DL (ref 31.5–36.5)
MCV RBC AUTO: 96 FL (ref 78–100)
PLATELET # BLD AUTO: 137 10E3/UL (ref 150–450)
POTASSIUM SERPL-SCNC: 3.9 MMOL/L (ref 3.4–5.3)
PROCALCITONIN SERPL IA-MCNC: 0.13 NG/ML
RBC # BLD AUTO: 3.74 10E6/UL (ref 4.4–5.9)
RSV RNA SPEC QL NAA+PROBE: NOT DETECTED
RSV RNA SPEC QL NAA+PROBE: NOT DETECTED
RV+EV RNA SPEC QL NAA+PROBE: NOT DETECTED
SODIUM SERPL-SCNC: 142 MMOL/L (ref 135–145)
WBC # BLD AUTO: 4.3 10E3/UL (ref 4–11)

## 2025-02-10 PROCEDURE — 82310 ASSAY OF CALCIUM: CPT | Performed by: STUDENT IN AN ORGANIZED HEALTH CARE EDUCATION/TRAINING PROGRAM

## 2025-02-10 PROCEDURE — 97110 THERAPEUTIC EXERCISES: CPT | Mod: GO

## 2025-02-10 PROCEDURE — 94640 AIRWAY INHALATION TREATMENT: CPT | Mod: 76

## 2025-02-10 PROCEDURE — 250N000009 HC RX 250: Performed by: HOSPITALIST

## 2025-02-10 PROCEDURE — 94799 UNLISTED PULMONARY SVC/PX: CPT

## 2025-02-10 PROCEDURE — 84145 PROCALCITONIN (PCT): CPT | Performed by: HOSPITALIST

## 2025-02-10 PROCEDURE — 94640 AIRWAY INHALATION TREATMENT: CPT

## 2025-02-10 PROCEDURE — 250N000013 HC RX MED GY IP 250 OP 250 PS 637: Performed by: STUDENT IN AN ORGANIZED HEALTH CARE EDUCATION/TRAINING PROGRAM

## 2025-02-10 PROCEDURE — 250N000013 HC RX MED GY IP 250 OP 250 PS 637: Performed by: INTERNAL MEDICINE

## 2025-02-10 PROCEDURE — 99233 SBSQ HOSP IP/OBS HIGH 50: CPT | Performed by: HOSPITALIST

## 2025-02-10 PROCEDURE — 82435 ASSAY OF BLOOD CHLORIDE: CPT | Performed by: STUDENT IN AN ORGANIZED HEALTH CARE EDUCATION/TRAINING PROGRAM

## 2025-02-10 PROCEDURE — 250N000013 HC RX MED GY IP 250 OP 250 PS 637: Performed by: HOSPITALIST

## 2025-02-10 PROCEDURE — 87633 RESP VIRUS 12-25 TARGETS: CPT | Performed by: HOSPITALIST

## 2025-02-10 PROCEDURE — 99233 SBSQ HOSP IP/OBS HIGH 50: CPT | Performed by: INTERNAL MEDICINE

## 2025-02-10 PROCEDURE — 250N000011 HC RX IP 250 OP 636: Performed by: STUDENT IN AN ORGANIZED HEALTH CARE EDUCATION/TRAINING PROGRAM

## 2025-02-10 PROCEDURE — 87581 M.PNEUMON DNA AMP PROBE: CPT | Performed by: HOSPITALIST

## 2025-02-10 PROCEDURE — 83735 ASSAY OF MAGNESIUM: CPT | Performed by: STUDENT IN AN ORGANIZED HEALTH CARE EDUCATION/TRAINING PROGRAM

## 2025-02-10 PROCEDURE — 85027 COMPLETE CBC AUTOMATED: CPT | Performed by: STUDENT IN AN ORGANIZED HEALTH CARE EDUCATION/TRAINING PROGRAM

## 2025-02-10 PROCEDURE — 120N000004 HC R&B MS OVERFLOW

## 2025-02-10 PROCEDURE — 250N000009 HC RX 250: Performed by: STUDENT IN AN ORGANIZED HEALTH CARE EDUCATION/TRAINING PROGRAM

## 2025-02-10 PROCEDURE — 36415 COLL VENOUS BLD VENIPUNCTURE: CPT | Performed by: STUDENT IN AN ORGANIZED HEALTH CARE EDUCATION/TRAINING PROGRAM

## 2025-02-10 PROCEDURE — 999N000157 HC STATISTIC RCP TIME EA 10 MIN

## 2025-02-10 PROCEDURE — 94660 CPAP INITIATION&MGMT: CPT

## 2025-02-10 RX ORDER — MAGNESIUM OXIDE 400 MG/1
400 TABLET ORAL EVERY 4 HOURS
Status: COMPLETED | OUTPATIENT
Start: 2025-02-10 | End: 2025-02-10

## 2025-02-10 RX ORDER — BISOPROLOL FUMARATE 5 MG/1
5 TABLET, FILM COATED ORAL DAILY
Qty: 30 TABLET | Refills: 3 | Status: SHIPPED | OUTPATIENT
Start: 2025-02-10

## 2025-02-10 RX ORDER — PREDNISONE 20 MG/1
40 TABLET ORAL DAILY
Status: DISCONTINUED | OUTPATIENT
Start: 2025-02-11 | End: 2025-02-12

## 2025-02-10 RX ORDER — BISOPROLOL FUMARATE 5 MG/1
5 TABLET, FILM COATED ORAL DAILY
Status: DISCONTINUED | OUTPATIENT
Start: 2025-02-11 | End: 2025-02-11

## 2025-02-10 RX ADMIN — LOSARTAN POTASSIUM 25 MG: 25 TABLET, FILM COATED ORAL at 07:55

## 2025-02-10 RX ADMIN — CETIRIZINE HYDROCHLORIDE 10 MG: 10 TABLET, FILM COATED ORAL at 07:55

## 2025-02-10 RX ADMIN — CLOPIDOGREL BISULFATE 75 MG: 75 TABLET ORAL at 07:55

## 2025-02-10 RX ADMIN — BISOPROLOL FUMARATE 5 MG: 5 TABLET ORAL at 07:54

## 2025-02-10 RX ADMIN — IPRATROPIUM BROMIDE AND ALBUTEROL SULFATE 3 ML: .5; 3 SOLUTION RESPIRATORY (INHALATION) at 00:24

## 2025-02-10 RX ADMIN — IPRATROPIUM BROMIDE AND ALBUTEROL SULFATE 3 ML: .5; 3 SOLUTION RESPIRATORY (INHALATION) at 08:26

## 2025-02-10 RX ADMIN — LOSARTAN POTASSIUM 25 MG: 25 TABLET, FILM COATED ORAL at 20:01

## 2025-02-10 RX ADMIN — DOXYCYCLINE HYCLATE 100 MG: 100 CAPSULE ORAL at 07:54

## 2025-02-10 RX ADMIN — INSULIN ASPART 1 UNITS: 100 INJECTION, SOLUTION INTRAVENOUS; SUBCUTANEOUS at 17:36

## 2025-02-10 RX ADMIN — IPRATROPIUM BROMIDE AND ALBUTEROL SULFATE 3 ML: .5; 3 SOLUTION RESPIRATORY (INHALATION) at 17:04

## 2025-02-10 RX ADMIN — ENOXAPARIN SODIUM 40 MG: 40 INJECTION SUBCUTANEOUS at 13:44

## 2025-02-10 RX ADMIN — DOXYCYCLINE HYCLATE 100 MG: 100 CAPSULE ORAL at 20:01

## 2025-02-10 RX ADMIN — DOCUSATE SODIUM 100 MG: 100 CAPSULE, LIQUID FILLED ORAL at 15:43

## 2025-02-10 RX ADMIN — IPRATROPIUM BROMIDE AND ALBUTEROL SULFATE 3 ML: .5; 3 SOLUTION RESPIRATORY (INHALATION) at 12:10

## 2025-02-10 RX ADMIN — Medication 400 MG: at 06:16

## 2025-02-10 RX ADMIN — IPRATROPIUM BROMIDE AND ALBUTEROL SULFATE 3 ML: .5; 3 SOLUTION RESPIRATORY (INHALATION) at 20:13

## 2025-02-10 RX ADMIN — Medication 400 MG: at 10:47

## 2025-02-10 RX ADMIN — SPIRONOLACTONE 12.5 MG: 25 TABLET, FILM COATED ORAL at 07:54

## 2025-02-10 RX ADMIN — ATORVASTATIN CALCIUM 80 MG: 40 TABLET, FILM COATED ORAL at 22:17

## 2025-02-10 RX ADMIN — CEFTRIAXONE 1 G: 1 INJECTION, POWDER, FOR SOLUTION INTRAMUSCULAR; INTRAVENOUS at 10:54

## 2025-02-10 RX ADMIN — FUROSEMIDE 40 MG: 10 INJECTION, SOLUTION INTRAVENOUS at 02:25

## 2025-02-10 RX ADMIN — SERTRALINE HYDROCHLORIDE 25 MG: 25 TABLET ORAL at 07:54

## 2025-02-10 RX ADMIN — FUROSEMIDE 40 MG: 10 INJECTION, SOLUTION INTRAVENOUS at 10:47

## 2025-02-10 RX ADMIN — FUROSEMIDE 40 MG: 10 INJECTION, SOLUTION INTRAVENOUS at 17:40

## 2025-02-10 RX ADMIN — METHYLPREDNISOLONE SODIUM SUCCINATE 62.5 MG: 125 INJECTION, POWDER, FOR SOLUTION INTRAMUSCULAR; INTRAVENOUS at 10:47

## 2025-02-10 RX ADMIN — IPRATROPIUM BROMIDE AND ALBUTEROL SULFATE 3 ML: .5; 3 SOLUTION RESPIRATORY (INHALATION) at 04:27

## 2025-02-10 RX ADMIN — PANTOPRAZOLE SODIUM 40 MG: 40 TABLET, DELAYED RELEASE ORAL at 07:54

## 2025-02-10 RX ADMIN — GUAIFENESIN 600 MG: 600 TABLET ORAL at 07:55

## 2025-02-10 RX ADMIN — POLYETHYLENE GLYCOL 3350 17 G: 17 POWDER, FOR SOLUTION ORAL at 15:43

## 2025-02-10 RX ADMIN — GUAIFENESIN 600 MG: 600 TABLET ORAL at 20:01

## 2025-02-10 RX ADMIN — ASPIRIN 81 MG: 81 TABLET, COATED ORAL at 07:54

## 2025-02-10 ASSESSMENT — ACTIVITIES OF DAILY LIVING (ADL)
ADLS_ACUITY_SCORE: 52
ADLS_ACUITY_SCORE: 52
ADLS_ACUITY_SCORE: 51
ADLS_ACUITY_SCORE: 48
ADLS_ACUITY_SCORE: 55
ADLS_ACUITY_SCORE: 50
ADLS_ACUITY_SCORE: 55
ADLS_ACUITY_SCORE: 52
ADLS_ACUITY_SCORE: 52
ADLS_ACUITY_SCORE: 53
ADLS_ACUITY_SCORE: 48
ADLS_ACUITY_SCORE: 52
ADLS_ACUITY_SCORE: 55
DEPENDENT_IADLS:: CLEANING;COOKING;LAUNDRY;SHOPPING;MEAL PREPARATION
ADLS_ACUITY_SCORE: 48
ADLS_ACUITY_SCORE: 55
ADLS_ACUITY_SCORE: 51
ADLS_ACUITY_SCORE: 55
ADLS_ACUITY_SCORE: 51
ADLS_ACUITY_SCORE: 50
ADLS_ACUITY_SCORE: 50
ADLS_ACUITY_SCORE: 52
ADLS_ACUITY_SCORE: 52
ADLS_ACUITY_SCORE: 50

## 2025-02-10 NOTE — PLAN OF CARE
Problem: Heart Failure  Goal: Effective Oxygenation and Ventilation  Outcome: Progressing  Intervention: Promote Airway Secretion Clearance  Recent Flowsheet Documentation  Taken 2/10/2025 0334 by Michelle Basurto RN  Activity Management: activity adjusted per tolerance  Taken 2/10/2025 0034 by Michelle Basurto RN  Activity Management: activity adjusted per tolerance  Taken 2/10/2025 0000 by Michelle Basurto RN  Activity Management:   activity adjusted per tolerance   bedrest  Taken 2/9/2025 2025 by Michelle Basurto RN  Activity Management: activity adjusted per tolerance  Intervention: Optimize Oxygenation and Ventilation  Recent Flowsheet Documentation  Taken 2/10/2025 0334 by Michelle Basurto RN  Head of Bed (HOB) Positioning: HOB at 30-45 degrees  Taken 2/10/2025 0034 by Michelle Basurto RN  Head of Bed (HOB) Positioning: HOB at 30-45 degrees  Taken 2/10/2025 0000 by Michelle Basurto RN  Head of Bed (HOB) Positioning: HOB at 20-30 degrees  Taken 2/9/2025 2025 by Michelle Basurto RN  Head of Bed (HOB) Positioning: HOB at 30-45 degrees  Goal: Effective Breathing Pattern During Sleep  Outcome: Progressing  Intervention: Monitor and Manage Obstructive Sleep Apnea  Recent Flowsheet Documentation  Taken 2/10/2025 0334 by Michelle Basurto RN  Medication Review/Management: medications reviewed  Taken 2/10/2025 0034 by Michelle Basurto RN  Medication Review/Management: medications reviewed  Taken 2/9/2025 2025 by Michelle Basurto RN  Medication Review/Management: medications reviewed     Problem: COPD (Chronic Obstructive Pulmonary Disease)  Goal: Optimal Chronic Illness Coping  Outcome: Progressing   Goal Outcome Evaluation:         Pt sleeping with Bipap overnight.  Tolerating well and actually preferred to wear it overnight. O2 at 3 liter NC when off.  Pt diuresing with IV Lasix.  VSS.  Pt encouraged to turn and reposition.  Using purewick for incontinence.

## 2025-02-10 NOTE — PROGRESS NOTES
Hospitalist follow up note:    Feeling much better, down to 2lpm this morning. Feels a little better after nebs. Maybe getting a little dry by bmp. Plan to change to po steroids, continue abx, nebs. Possibly decrease diuresis pending cards eval. Plan 1-2 more days to wean from O2. Was not on O2 at home prior.     Miller Kay DO   Pager #: [phone]

## 2025-02-10 NOTE — PROGRESS NOTES
[facility] Medicine PROGRESS NOTE      Identification/Summary:   Ubaldo Ramos is a 79 year old male with [facility] signficant for .  Presented with .  Mr. Ubaldo Ramos is a 79 year old male with a past medical history of NSTEMI (s/p DESx2 to mid LAD on 1/8/2025), HFrEF with EF of 35 to 40%, recent traumatic right rib fracture and right pneumothorax s/p chest tube removal on 1/7/HLD, PAD, HTN, severe COPD with PFT in October 2021 showing FEV1 1.22 L (45%) and DLCOc 52%, NSCLC, active tobacco dependence, lung cancer s/p left upper lobectomy in 2017 with recurrence in 2018 s/p chemotherapy and radiation, peripheral arterial disease s/p left femoral endarterectomy and bilateral iliac stents, history of prostate cancer, GERD who presented with shortness of breath and productive cough for few weeks.  He desaturated to 78% when EMS arrived.  He was found to have elevated pro-BNP and troponin, congested pulmonary vasculature x-ray,  and wheezing on exam.  He was admitted for acute on chronic HFrEF and COPD exacerbation.  BiPAP and IV Lasix 60 mg was started in the ED. Now on 40mg IV q8h. Losartan increased from 12.5mg daily to 25mg bid to help with BP control. Cardiology is consulted.     Feeling better today, O2 needs improved to 2lpm.      Assessment and Plan:  Acute hypoxic respiratory failure due to exacerbation of HFrEF and COPD  -Continue BiPAP prn  -Treat HFrEF and COPD exacerbation      Acute on chronic HFrEF, ischemic cardiomyopathy  EF of 35 to 40% on 1/6/2025 > 40-50% on admission  Check TSH - normal  Appreciate cards help, continued on IV lasix   Continue bisoprolol 5 mg daily, losartan 12.5 mg daily>25mg bid, spironolactone 12.5 mg daily  Strict I's and O's, daily weights, telemetry  Fluid restriction to 2000 mL/day, heart healthy diet  Check electrolytes daily and replete per protocol ordered     Non-sustained VT  8-beat on 2/8  Lytes are appropriate     COPD exacerbation  Acute bronchitis  Worsening cough,  shortness of breath and production (sputum color is yellow)  Start empiric coverage of ceftriaxone and doxycycline. Unfortunately, he cannot tolerate azithromycin for QTc prolonging.  Schedule DuoNeb every 4 hours  Transition to oral prednisone tomorrow  He will need LABA and LAMA at discharge  Flutter valve      NSTEMI s/p DESx2 to mid LAD on 1/8/2025  Dyslipidemia   To note, he never had chest pain with NSTEMI but shortness of breath  Troponin is 233 on admission and 220 on repeat, much lower than the time when he had NSTEMI  Check hemoglobin A1c and lipid profile  Continue aspirin 81 mg, clopidogrel 75 mg daily, bisoprolol as above, and atorvastatin 80 mg     QTc prolonging  QTc is 516 ms on admission  Hold trazodone and other QT prolonging meds  Replete magnesium     Hypertension urgency: His blood pressure is Controlled. Currently on bisoprolol, losartan, spironolactone.  Ordered hydralazine 10mg as needed for blood pressure systolically greater than 180 mmHg.     Constipation: aggressive regimen, added suppository prn 2/9     PAD: s/p lower extremity stenting and right-to-left femoral-femoral arterial bypass done in [place]. CT angiography of the lower extremities 12/13/2023 showed patent bilateral common and external iliac artery stents but occlusion of the femoral bypass graft  Asymptomatic right internal carotid artery stenosis: last assessed on carotid ultrasound 7/16/2024   NSCLC:  resection, radiation therapy and chemotherapy in 2017 and 2018   Tobacco use: still smoking, encouraged cessation/reduction.  He still has trouble quitting.  Nicotine replacement is not started for possible NSTEMI at this time.  S/p rib fracture and pneumothorax requiring chest tube  Urinary incontinence: Continue oxybutynin  GERD: Continue pantoprazole  Constipation: Last bowel movement was a few days ago, start MiraLAX scheduled dose  Depression and anxiety: Continue sertraline  Glucose control: Will start medium dose sliding  scale for the time being    Diet: Fluid restriction 2000 ML FLUID (and additional linked orders)  Combination Diet 2 gm NA Diet; Low Saturated Fat Na <2400mg Diet  Fluids: none  Pain meds:tylenol prn  Therapy:OT consulted  DVT Prophylaxis:  lovenox  Code Status: Full Code  Medically Ready for Discharge: Anticipated in 2-4 Days        Interval History/Subjective:  Feeling better today. No chest pain. Breathing improved, slightly better after nebs. No trouble peeing. No n/v/d.     Physical Exam/Objective:  Gen: no acute distress, comfortable  ENT: no scleral icterus  Pulm: lungs are clear  with soft wheezing  CV: regular rate and rhythm, no pitting edema  Psych: appropriate affect    Medications:   Current Facility-Administered Medications   Medication Dose Route Frequency Provider Last Rate Last Admin    aspirin EC tablet 81 mg  81 mg Oral Daily Allyn Terry MD   81 mg at 02/10/25 0754    atorvastatin (LIPITOR) tablet 80 mg  80 mg Oral At Bedtime Allyn Terry MD   80 mg at 02/09/25 1956    [Held by provider] bisoprolol (ZEBETA) tablet 5 mg  5 mg Oral Daily Miller Kay DO        bisoprolol (ZEBETA) tablet 5 mg  5 mg Oral Daily Miller Kay DO   5 mg at 02/10/25 0754    cefTRIAXone (ROCEPHIN) 1 g vial to attach to  mL bag for ADULTS or NS 50 mL bag for PEDS  1 g Intravenous Q24H Miller Kay DO 0 mL/hr at 02/08/25 1145 1 g at 02/10/25 1054    cetirizine (zyrTEC) tablet 10 mg  10 mg Oral QAM Allyn Terry MD   10 mg at 02/10/25 0755    clopidogrel (PLAVIX) tablet 75 mg  75 mg Oral Daily Allyn Terry MD   75 mg at 02/10/25 0755    docusate sodium (COLACE) capsule 100 mg  100 mg Oral BID Allyn Terry MD   100 mg at 02/09/25 0919    doxycycline hyclate (VIBRAMYCIN) capsule 100 mg  100 mg Oral Q12H [facility] (08/20) Miller Kay DO   100 mg at 02/10/25 0754    enoxaparin ANTICOAGULANT (LOVENOX) injection 40 mg  40 mg Subcutaneous Q24H Allyn Terry MD   40 mg at 02/09/25 1354    furosemide (LASIX) injection  40 mg  40 mg Intravenous Q8H Allyn Terry MD   40 mg at 02/10/25 1047    guaiFENesin (MUCINEX) 12 hr tablet 600 mg  600 mg Oral BID Miller Kay, DO   600 mg at 02/10/25 0755    insulin aspart (NovoLOG) injection (RAPID ACTING)  1-7 Units Subcutaneous TID AC Miller Kay DO   1 Units at 02/09/25 1803    insulin aspart (NovoLOG) injection (RAPID ACTING)  1-5 Units Subcutaneous At Bedtime Miller Kay, DO        ipratropium - albuterol 0.5 mg/2.5 mg/3 mL (DUONEB) neb solution 3 mL  1 vial Nebulization Q4H Miller Kay, DO   3 mL at 02/10/25 1210    losartan (COZAAR) tablet 25 mg  25 mg Oral BID Heidi Arnold MD   25 mg at 02/10/25 0755    methylPREDNISolone Na Suc (solu-MEDROL) injection 62.5 mg  62.5 mg Intravenous Q24H Miller Kay,    62.5 mg at 02/10/25 1047    oxyBUTYnin ER (DITROPAN XL) 24 hr tablet 10 mg  10 mg Oral Every Other Day Allyn Terry MD   10 mg at 02/09/25 0920    pantoprazole (PROTONIX) EC tablet 40 mg  40 mg Oral Daily Allyn Terry MD   40 mg at 02/10/25 0754    polyethylene glycol (MIRALAX) Packet 17 g  17 g Oral BID Allyn Terry MD   17 g at 02/08/25 1600    sertraline (ZOLOFT) tablet 25 mg  25 mg Oral Daily Allyn Terry MD   25 mg at 02/10/25 0754    sodium chloride (PF) 0.9% PF flush 3 mL  3 mL Intracatheter Q8H Allyn Terry MD   3 mL at 02/10/25 1048    spironolactone (ALDACTONE) half-tab 12.5 mg  12.5 mg Oral Daily Allyn Terry MD   12.5 mg at 02/10/25 0754    [Held by provider] traZODone (DESYREL) tablet  mg   mg Oral At Bedtime Allyn Terry MD         Clinically Significant Risk Factors        # Hypokalemia: Lowest K = 3.3 mmol/L in last 2 days, will replace as needed          # Thrombocytopenia: Lowest platelets = 121 in last 2 days, will monitor for bleeding   # Hypertension: Noted on problem list  # Heart failure, NOS: heart failure noted on the problem list and last echo with EF 40-50%               # Financial/Environmental Concerns: none            Miller  TAISHA Kay DO   Hospitalist  [facility]

## 2025-02-10 NOTE — PLAN OF CARE
Goal Outcome Evaluation:      Plan of Care Reviewed With: patient, spouse    Overall Patient Progress: improvingOverall Patient Progress: improving    Outcome Evaluation: Pt to return home with [facility] resumption

## 2025-02-10 NOTE — PROGRESS NOTES
CARDIOLOGY PROGRESS NOTE      Assessment/Plan:  1.  Acute on chronic congestive heart failure, unspecified heart failure type (H)-in the setting of midrange ejection fraction of 40 to 50%, symptomatically improved.  Weight down 2 kg, oxygen saturation 92% on 3 L, continue IV furosemide and monitoring kidney function.  Most likely cause for exacerbation is IV contrast during intervention.  2.  Coronary artery disease-secondary to positive troponin he underwent angiography, this showed normal left main, mid LAD 95% lesion that received a 3.0 x 22 [place] drug-coated stent as well as a 3.0 x 12 mm [place] drug-coated stent.  Circumflex had a proximal 45% lesion in right coronary artery was small codominant without any significant disease.  Symptomatically stable.  Plan on Plavix until 2026.  3.  Benign essential hypertension-under good control on bisoprolol, losartan and spironolactone.  By definition resistant.  4.  COPD-receiving nebulizers.  5.  Question of ventricular tachycardia-review of telemetry does not show any concerning episodes.  Continue to monitor and hold off caffeinated products today.  6.  Pure hypercholesterolemia-total cholesterol 87 with an LDL of 28 which is very acceptable.  7.  Troponin elevation-level 221, no current symptoms.  Downtrending, not diagnostic of acute coronary syndrome, will certainly need outpatient pharmacological stress nuclear given his inability to walk.    Plan:  1.  Continue IV diuresis.    Discharge Plannin..  At discharge is resolution of heart failure, anticipate 2 to 3 days.  2.  Will need outpatient pharmacological stress nuclear.  3.  Can follow with the primary cardiologist Dr Alton Hernadez.     LOS: 2 days     Subjective:  Interval History:    79-year-old white gentleman being seen on third day of hospitalization, son at bedside.  Patient is breathing better but still not at baseline.  Does have some mild shortness of breath, no chest pains, palpitations,  PND, orthopnea or significant peripheral edema.    Medications  Current Facility-Administered Medications   Medication Dose Route Frequency Provider Last Rate Last Admin    aspirin EC tablet 81 mg  81 mg Oral Daily Allyn Terry MD   81 mg at 02/10/25 0754    atorvastatin (LIPITOR) tablet 80 mg  80 mg Oral At Bedtime Allyn Terry MD   80 mg at 02/09/25 1956    bisoprolol (ZEBETA) tablet 5 mg  5 mg Oral Daily Allyn Terry MD   5 mg at 02/10/25 0754    cefTRIAXone (ROCEPHIN) 1 g vial to attach to  mL bag for ADULTS or NS 50 mL bag for PEDS  1 g Intravenous Q24H Allyn Terry MD 0 mL/hr at 02/08/25 1145 1 g at 02/09/25 1338    cetirizine (zyrTEC) tablet 10 mg  10 mg Oral QAM Allyn Terry MD   10 mg at 02/10/25 0755    clopidogrel (PLAVIX) tablet 75 mg  75 mg Oral Daily Allyn Terry MD   75 mg at 02/10/25 0755    docusate sodium (COLACE) capsule 100 mg  100 mg Oral BID Allyn Terry MD   100 mg at 02/09/25 0919    doxycycline hyclate (VIBRAMYCIN) capsule 100 mg  100 mg Oral Q12H [facility] (08/20) Allyn Terry MD   100 mg at 02/10/25 0754    enoxaparin ANTICOAGULANT (LOVENOX) injection 40 mg  40 mg Subcutaneous Q24H Allyn Terry MD   40 mg at 02/09/25 1354    furosemide (LASIX) injection 40 mg  40 mg Intravenous Q8H Allyn Terry MD   40 mg at 02/10/25 0225    guaiFENesin (MUCINEX) 12 hr tablet 600 mg  600 mg Oral BID Allyn Terry MD   600 mg at 02/10/25 0755    insulin aspart (NovoLOG) injection (RAPID ACTING)  1-7 Units Subcutaneous TID AC Allyn Terry MD   1 Units at 02/09/25 1803    insulin aspart (NovoLOG) injection (RAPID ACTING)  1-5 Units Subcutaneous At Bedtime Allyn Terry MD        ipratropium - albuterol 0.5 mg/2.5 mg/3 mL (DUONEB) neb solution 3 mL  1 vial Nebulization Q4H Allyn Terry MD   3 mL at 02/10/25 0826    losartan (COZAAR) tablet 25 mg  25 mg Oral BID Heidi Arnold MD   25 mg at 02/10/25 0755    magnesium oxide (MAG-OX) tablet 400 mg  400 mg Oral Q4H Allyn Terry MD   400 mg at 02/10/25  "0616    methylPREDNISolone Na Suc (solu-MEDROL) injection 62.5 mg  62.5 mg Intravenous Q24H Allyn Terry MD   62.5 mg at 02/09/25 0930    oxyBUTYnin ER (DITROPAN XL) 24 hr tablet 10 mg  10 mg Oral Every Other Day Allyn Terry MD   10 mg at 02/09/25 0920    pantoprazole (PROTONIX) EC tablet 40 mg  40 mg Oral Daily Allyn Terry MD   40 mg at 02/10/25 0754    polyethylene glycol (MIRALAX) Packet 17 g  17 g Oral BID Allyn Terry MD   17 g at 02/08/25 1600    sertraline (ZOLOFT) tablet 25 mg  25 mg Oral Daily Allyn Terry MD   25 mg at 02/10/25 0754    sodium chloride (PF) 0.9% PF flush 3 mL  3 mL Intracatheter Q8H Allyn Terry MD   3 mL at 02/10/25 0616    spironolactone (ALDACTONE) half-tab 12.5 mg  12.5 mg Oral Daily Allyn Terry MD   12.5 mg at 02/10/25 0754    [Held by provider] traZODone (DESYREL) tablet  mg   mg Oral At Bedtime Allyn Terry MD         Objective:   Vital signs in last 24 hours:  Temp:  [97.1  F (36.2  C)-98  F (36.7  C)] 97.7  F (36.5  C)  Pulse:  [56-74] 66  Resp:  [16-30] 16  BP: (118-153)/(56-74) 138/63  FiO2 (%):  [30 %] 30 %  SpO2:  [91 %-97 %] 93 %    Physical Exam:  /63   Pulse 66   Temp 97.7  F (36.5  C) (Oral)   Resp 16   Ht 1.664 m (5' 5.5\")   Wt 68.8 kg (151 lb 9.6 oz)   SpO2 93%   BMI 24.84 kg/m      General Appearance:    Alert, cooperative, no distress, appears stated age   Head:    Normocephalic, without obvious abnormality, atraumatic   Throat:   Lips, mucosa, and tongue normal; teeth and gums normal   Neck:   Supple, symmetrical, trachea midline, no adenopathy;        thyroid:  No enlargement/tenderness/nodules; no carotid    bruit, 2 to 3 cm at 30 degrees JVD   Back:     Symmetric, no curvature, ROM normal, no CVA tenderness   Lungs:     Occasional expiratory wheeze auscultation bilaterally, respirations unlabored   Chest wall:    No tenderness or deformity   Heart:    Regular rate and rhythm, S1 and S2 normal, no murmur, rub   or gallop, PMI " shifted medially   Abdomen:     Soft, non-tender, bowel sounds active all four quadrants,     no masses, no organomegaly   Extremities:   Normal, atraumatic, no cyanosis or edema   Pulses:   2+ and symmetric all extremities   Skin:   Skin color, texture, turgor normal, no rashes or lesions     Cardiographics:      ECG: Personally reviewed by myself shows sinus rhythm, incomplete right bundle branch block pattern, lateral T wave changes nonspecific.    Echocardiogram:   The left ventricle is normal in size. There is mild concentric left ventricular hypertrophy. The visual ejection fraction is 40-50%.  Mild hypokinesis in distal anteroseptal segment.  Normal right ventricle size and systolic function.  The left atrium is mildly dilated.   This is a limited study.  When compared to previous study on 1-6-2025, wall motion abnormality and left  ventricular systolic function are improved.      Lab Results:   Recent Labs   Lab 02/10/25  0525   WBC 4.3   HGB 11.3*   HCT 35.7*   *     Recent Labs   Lab 02/10/25  0525      CO2 30*   BUN 27.3*   .       Lab Results   Component Value Date    TROPONINI 0.02 07/31/2022

## 2025-02-10 NOTE — PROGRESS NOTES
Pt is not appropriate for skilled PT services at this time due to no skilled needs.  Will defer to OT for continued endurance and other rehab needs.  Plan was discussed with OT.  Will D/C current PT orders.  Please reorder if status changes. Thank you.    2/10/2025 by Robert Baumann, PT

## 2025-02-10 NOTE — PROVIDER NOTIFICATION
02/10/25 0427   Tech Time   $Tech Time (10 minute increments) 2   Mode: CPAP/ BiPAP/ AVAPS/ AVAPS AE   CPAP/BiPAP/ AVAPS/ AVAPS AE Mode BiPAP S/T   Equipment   Device V60   Device Serial Number [id number]   CPAP/BiPAP/Settings   BIPAP/CPAP On Standby On   IPAP/EPAP (cmH2O) 10/5   Rate (breaths/min) 14   Oxygen (%) 30   Timed Inspiration (sec) 0.9   IPAP Rise Time (sec) 2 sec   CPAP/BiPAP Patient Parameters   IPAP (cm H2O) 10 cmH2O   EPAP (cm H2O) 5 cmH2O   Pressure Support (cm H2O) 5 cmH2O   RR Total (breaths/min) 24 breaths/min   Vt (mL) 564 mL   Minute Ventilation (L/min) 14.1 L/min   Peak Inspiratory Pressure (cm H2O) 11 cmH2O   Pt.  Leak (L/min) 3 L/min   CPAP/BiPAP/AVAPS/AVAPS AE Alarms   High Pressure (cm H2O) 25 cmH2O   Low Pressure (cm H2O) 5   Low Pressure Delay (sec) 20 sec   Lo Min Vent 3   High Rate (breaths/min) 50 breaths/min   Low Rate (breaths/min) 10   Audible Alarm set at (Volume of Alarm) 7   Humidifier Checked N/A   RT Device Skin Assessment   Oxygen Delivery Device CPAP/BiPAP Mask   Interface Face Mask - Large   Ventilator Arm In Place No   Site Appearance neck circumference Clean and dry   Site Appearance nares (outer) Clean and dry   Site Appearance nares (inner) Clean and dry   Site Appearance bridge of nose Clean and dry   Site Appearance occiput Clean and dry   Strap Tightness Finger Allowance between head and device strap   Device Skin Interventions Taken No adjustments needed   Breath Sounds   Breath Sounds All Fields   All Lung Fields Breath Sounds wheezes, inspiratory;wheezes, expiratory;crackles   Nebulizer Assessment & Treatment   $RT Use ONLY Delivery Method Nebulizer - Additional   Daily Review of Necessity (SVN) completed   Nebulizer Device In line   Pretreatment Heart Rate (beats/min) 58   Pretreatment Resp Rate (breaths/min) 25   Pretreatment O2 sats - (TCU only) 94   Pretreat Breath Sounds - Bilat - All Lobes wheezes, expiratory   Pretreat Breath Sounds - LLL diminished   Patient  Position HOB elevated   Respiratory Treatment Status (SVN) given     Patient remained on BIPAP throughout the night for sleep. All scheduled nebs given this shift. Prior ro BIPAP, patient was on 3LNC. RT following.

## 2025-02-10 NOTE — CONSULTS
Care Management Initial Consult    General Information  Assessment completed with: Patient,    Type of CM/SW Visit: Initial Assessment    Primary Care Provider verified and updated as needed: Yes   Readmission within the last 30 days: unable to assess      Reason for Consult: discharge planning  Advance Care Planning:            Communication Assessment  Patient's communication style: spoken language (English or Bilingual)    Hearing Difficulty or Deaf: no   Wear Glasses or Blind: yes    Cognitive  Cognitive/Neuro/Behavioral: WDL                      Living Environment:   People in home: spouse     Current living Arrangements: apartment      Able to return to prior arrangements: yes       Family/Social Support:  Care provided by: self  Provides care for: no one  Marital Status:   Support system: Wife          Description of Support System: Supportive, Involved         Current Resources:   Patient receiving home care services: No        Community Resources: None  Equipment currently used at home: cane, straight, walker, rolling, other (see comments)  Supplies currently used at home: None    Employment/Financial:  Employment Status: retired        Financial Concerns: none   Referral to Financial Worker: No       Does the patient's insurance plan have a 3 day qualifying hospital stay waiver?  No    Lifestyle & Psychosocial Needs:  Social Drivers of Health     Food Insecurity: Low Risk  (2/8/2025)    Food Insecurity     Within the past 12 months, did you worry that your food would run out before you got money to buy more?: No     Within the past 12 months, did the food you bought just not last and you didn t have money to get more?: No   Depression: Not at risk (1/31/2025)    PHQ-2     PHQ-2 Score: 0   Housing Stability: Low Risk  (2/8/2025)    Housing Stability     Do you have housing? : Yes     Are you worried about losing your housing?: No   Tobacco Use: High Risk (2/8/2025)    Patient History     Smoking  Tobacco Use: Every Day     Smokeless Tobacco Use: Never     Passive Exposure: Current   Financial Resource Strain: Low Risk  (2/8/2025)    Financial Resource Strain     Within the past 12 months, have you or your family members you live with been unable to get utilities (heat, electricity) when it was really needed?: No   Alcohol Use: Not on file   Transportation Needs: Low Risk  (2/8/2025)    Transportation Needs     Within the past 12 months, has lack of transportation kept you from medical appointments, getting your medicines, non-medical meetings or appointments, work, or from getting things that you need?: No   Physical Activity: Not on file   Interpersonal Safety: Low Risk  (2/8/2025)    Interpersonal Safety     Do you feel physically and emotionally safe where you currently live?: Yes     Within the past 12 months, have you been hit, slapped, kicked or otherwise physically hurt by someone?: No     Within the past 12 months, have you been humiliated or emotionally abused in other ways by your partner or ex-partner?: No   Stress: Not on file   Social Connections: Not on file   Health Literacy: Not on file       Functional Status:  Prior to admission patient needed assistance:   Dependent ADLs:: Independent  Dependent IADLs:: Cleaning, Cooking, Laundry, Shopping, Meal Preparation       Mental Health Status:  Mental Health Status: No Current Concerns       Chemical Dependency Status:  Chemical Dependency Status: No Current Concerns             Values/Beliefs:  Spiritual, Cultural Beliefs, [employer] Practices, Values that affect care:                 Discussed  Partnership in Safe Discharge Planning  document with patient/family: Yes:     Additional Information:  [facility]AND[facility] met and introduced self and CM services to Pt and wife who was visiting.  Pt lives with wife in an apartment.  Pt independent at baseline.  Pt does have walker and a scooter at home if needed. Pt attends [facility] Cardiac rehab here at [facility] and  plans to resume when medically ready.  No CM needs identified at this time.     Next Steps: Wife will transport.     BETI Nichols

## 2025-02-11 ENCOUNTER — APPOINTMENT (OUTPATIENT)
Dept: OCCUPATIONAL THERAPY | Facility: CLINIC | Age: 80
DRG: 291 | End: 2025-02-11
Attending: HOSPITALIST
Payer: MEDICARE

## 2025-02-11 ENCOUNTER — APPOINTMENT (OUTPATIENT)
Dept: RADIOLOGY | Facility: CLINIC | Age: 80
DRG: 291 | End: 2025-02-11
Attending: HOSPITALIST
Payer: MEDICARE

## 2025-02-11 LAB
ANION GAP SERPL CALCULATED.3IONS-SCNC: 12 MMOL/L (ref 7–15)
ATRIAL RATE - MUSE: 73 BPM
BUN SERPL-MCNC: 43.5 MG/DL (ref 8–23)
CALCIUM SERPL-MCNC: 9.8 MG/DL (ref 8.8–10.4)
CHLORIDE SERPL-SCNC: 100 MMOL/L (ref 98–107)
CREAT SERPL-MCNC: 1.15 MG/DL (ref 0.67–1.17)
DIASTOLIC BLOOD PRESSURE - MUSE: NORMAL MMHG
EGFRCR SERPLBLD CKD-EPI 2021: 65 ML/MIN/1.73M2
ERYTHROCYTE [DISTWIDTH] IN BLOOD BY AUTOMATED COUNT: 16.6 % (ref 10–15)
GLUCOSE BLDC GLUCOMTR-MCNC: 111 MG/DL (ref 70–99)
GLUCOSE BLDC GLUCOMTR-MCNC: 138 MG/DL (ref 70–99)
GLUCOSE BLDC GLUCOMTR-MCNC: 149 MG/DL (ref 70–99)
GLUCOSE BLDC GLUCOMTR-MCNC: 153 MG/DL (ref 70–99)
GLUCOSE SERPL-MCNC: 114 MG/DL (ref 70–99)
HCO3 SERPL-SCNC: 31 MMOL/L (ref 22–29)
HCT VFR BLD AUTO: 35.2 % (ref 40–53)
HGB BLD-MCNC: 11.3 G/DL (ref 13.3–17.7)
INTERPRETATION ECG - MUSE: NORMAL
MAGNESIUM SERPL-MCNC: 2 MG/DL (ref 1.7–2.3)
MCH RBC QN AUTO: 30.5 PG (ref 26.5–33)
MCHC RBC AUTO-ENTMCNC: 32.1 G/DL (ref 31.5–36.5)
MCV RBC AUTO: 95 FL (ref 78–100)
P AXIS - MUSE: 50 DEGREES
PLATELET # BLD AUTO: 140 10E3/UL (ref 150–450)
POTASSIUM SERPL-SCNC: 4 MMOL/L (ref 3.4–5.3)
PR INTERVAL - MUSE: 132 MS
QRS DURATION - MUSE: 122 MS
QT - MUSE: 414 MS
QTC - MUSE: 456 MS
R AXIS - MUSE: 89 DEGREES
RBC # BLD AUTO: 3.7 10E6/UL (ref 4.4–5.9)
SODIUM SERPL-SCNC: 143 MMOL/L (ref 135–145)
SYSTOLIC BLOOD PRESSURE - MUSE: NORMAL MMHG
T AXIS - MUSE: 179 DEGREES
TROPONIN T SERPL HS-MCNC: 83 NG/L
TROPONIN T SERPL HS-MCNC: 93 NG/L
VENTRICULAR RATE- MUSE: 73 BPM
WBC # BLD AUTO: 5.2 10E3/UL (ref 4–11)

## 2025-02-11 PROCEDURE — 250N000013 HC RX MED GY IP 250 OP 250 PS 637: Performed by: STUDENT IN AN ORGANIZED HEALTH CARE EDUCATION/TRAINING PROGRAM

## 2025-02-11 PROCEDURE — 36415 COLL VENOUS BLD VENIPUNCTURE: CPT | Performed by: HOSPITALIST

## 2025-02-11 PROCEDURE — 250N000013 HC RX MED GY IP 250 OP 250 PS 637: Performed by: INTERNAL MEDICINE

## 2025-02-11 PROCEDURE — 94799 UNLISTED PULMONARY SVC/PX: CPT

## 2025-02-11 PROCEDURE — 93005 ELECTROCARDIOGRAM TRACING: CPT | Performed by: HOSPITALIST

## 2025-02-11 PROCEDURE — 250N000012 HC RX MED GY IP 250 OP 636 PS 637: Performed by: HOSPITALIST

## 2025-02-11 PROCEDURE — 84484 ASSAY OF TROPONIN QUANT: CPT | Performed by: HOSPITALIST

## 2025-02-11 PROCEDURE — 93010 ELECTROCARDIOGRAM REPORT: CPT | Mod: RTG | Performed by: INTERNAL MEDICINE

## 2025-02-11 PROCEDURE — 999N000157 HC STATISTIC RCP TIME EA 10 MIN

## 2025-02-11 PROCEDURE — 250N000011 HC RX IP 250 OP 636: Performed by: HOSPITALIST

## 2025-02-11 PROCEDURE — 250N000009 HC RX 250: Performed by: HOSPITALIST

## 2025-02-11 PROCEDURE — 97110 THERAPEUTIC EXERCISES: CPT | Mod: GO

## 2025-02-11 PROCEDURE — 99233 SBSQ HOSP IP/OBS HIGH 50: CPT | Performed by: INTERNAL MEDICINE

## 2025-02-11 PROCEDURE — 36415 COLL VENOUS BLD VENIPUNCTURE: CPT | Performed by: STUDENT IN AN ORGANIZED HEALTH CARE EDUCATION/TRAINING PROGRAM

## 2025-02-11 PROCEDURE — 93005 ELECTROCARDIOGRAM TRACING: CPT

## 2025-02-11 PROCEDURE — 94640 AIRWAY INHALATION TREATMENT: CPT | Mod: 76

## 2025-02-11 PROCEDURE — 250N000013 HC RX MED GY IP 250 OP 250 PS 637: Performed by: HOSPITALIST

## 2025-02-11 PROCEDURE — 71045 X-RAY EXAM CHEST 1 VIEW: CPT

## 2025-02-11 PROCEDURE — 94640 AIRWAY INHALATION TREATMENT: CPT

## 2025-02-11 PROCEDURE — 97535 SELF CARE MNGMENT TRAINING: CPT | Mod: GO

## 2025-02-11 PROCEDURE — 250N000011 HC RX IP 250 OP 636: Performed by: STUDENT IN AN ORGANIZED HEALTH CARE EDUCATION/TRAINING PROGRAM

## 2025-02-11 PROCEDURE — 85014 HEMATOCRIT: CPT | Performed by: STUDENT IN AN ORGANIZED HEALTH CARE EDUCATION/TRAINING PROGRAM

## 2025-02-11 PROCEDURE — 82565 ASSAY OF CREATININE: CPT | Performed by: STUDENT IN AN ORGANIZED HEALTH CARE EDUCATION/TRAINING PROGRAM

## 2025-02-11 PROCEDURE — 120N000004 HC R&B MS OVERFLOW

## 2025-02-11 PROCEDURE — 99233 SBSQ HOSP IP/OBS HIGH 50: CPT | Performed by: HOSPITALIST

## 2025-02-11 PROCEDURE — 85018 HEMOGLOBIN: CPT | Performed by: STUDENT IN AN ORGANIZED HEALTH CARE EDUCATION/TRAINING PROGRAM

## 2025-02-11 PROCEDURE — 94660 CPAP INITIATION&MGMT: CPT

## 2025-02-11 PROCEDURE — 83735 ASSAY OF MAGNESIUM: CPT | Performed by: STUDENT IN AN ORGANIZED HEALTH CARE EDUCATION/TRAINING PROGRAM

## 2025-02-11 RX ORDER — IPRATROPIUM BROMIDE AND ALBUTEROL SULFATE 2.5; .5 MG/3ML; MG/3ML
1 SOLUTION RESPIRATORY (INHALATION)
Status: DISCONTINUED | OUTPATIENT
Start: 2025-02-11 | End: 2025-02-15 | Stop reason: HOSPADM

## 2025-02-11 RX ORDER — FLUTICASONE PROPIONATE 50 MCG
1 SPRAY, SUSPENSION (ML) NASAL DAILY
Status: DISCONTINUED | OUTPATIENT
Start: 2025-02-11 | End: 2025-02-15 | Stop reason: HOSPADM

## 2025-02-11 RX ORDER — MAGNESIUM SULFATE HEPTAHYDRATE 40 MG/ML
2 INJECTION, SOLUTION INTRAVENOUS ONCE
Status: COMPLETED | OUTPATIENT
Start: 2025-02-11 | End: 2025-02-11

## 2025-02-11 RX ORDER — MAGNESIUM OXIDE 400 MG/1
400 TABLET ORAL EVERY 4 HOURS
Status: COMPLETED | OUTPATIENT
Start: 2025-02-11 | End: 2025-02-11

## 2025-02-11 RX ORDER — LORAZEPAM 2 MG/ML
0.5 INJECTION INTRAMUSCULAR ONCE
Status: COMPLETED | OUTPATIENT
Start: 2025-02-11 | End: 2025-02-11

## 2025-02-11 RX ADMIN — LOSARTAN POTASSIUM 25 MG: 25 TABLET, FILM COATED ORAL at 08:30

## 2025-02-11 RX ADMIN — PREDNISONE 40 MG: 20 TABLET ORAL at 08:31

## 2025-02-11 RX ADMIN — FUROSEMIDE 40 MG: 10 INJECTION, SOLUTION INTRAVENOUS at 10:47

## 2025-02-11 RX ADMIN — FLUTICASONE PROPIONATE 1 SPRAY: 50 SPRAY, METERED NASAL at 11:06

## 2025-02-11 RX ADMIN — IPRATROPIUM BROMIDE AND ALBUTEROL SULFATE 3 ML: .5; 3 SOLUTION RESPIRATORY (INHALATION) at 16:54

## 2025-02-11 RX ADMIN — OXYBUTYNIN CHLORIDE 10 MG: 5 TABLET, EXTENDED RELEASE ORAL at 08:30

## 2025-02-11 RX ADMIN — PANTOPRAZOLE SODIUM 40 MG: 40 TABLET, DELAYED RELEASE ORAL at 08:30

## 2025-02-11 RX ADMIN — BISOPROLOL FUMARATE 5 MG: 5 TABLET ORAL at 08:34

## 2025-02-11 RX ADMIN — IPRATROPIUM BROMIDE AND ALBUTEROL SULFATE 3 ML: .5; 3 SOLUTION RESPIRATORY (INHALATION) at 13:03

## 2025-02-11 RX ADMIN — GUAIFENESIN 600 MG: 600 TABLET ORAL at 08:31

## 2025-02-11 RX ADMIN — ATORVASTATIN CALCIUM 80 MG: 40 TABLET, FILM COATED ORAL at 21:37

## 2025-02-11 RX ADMIN — FUROSEMIDE 40 MG: 10 INJECTION, SOLUTION INTRAVENOUS at 17:54

## 2025-02-11 RX ADMIN — LOSARTAN POTASSIUM 25 MG: 25 TABLET, FILM COATED ORAL at 19:43

## 2025-02-11 RX ADMIN — IPRATROPIUM BROMIDE AND ALBUTEROL SULFATE 3 ML: .5; 3 SOLUTION RESPIRATORY (INHALATION) at 05:00

## 2025-02-11 RX ADMIN — SERTRALINE HYDROCHLORIDE 25 MG: 25 TABLET ORAL at 08:30

## 2025-02-11 RX ADMIN — Medication 400 MG: at 08:31

## 2025-02-11 RX ADMIN — CLOPIDOGREL BISULFATE 75 MG: 75 TABLET ORAL at 08:30

## 2025-02-11 RX ADMIN — LORAZEPAM 0.5 MG: 2 INJECTION INTRAMUSCULAR; INTRAVENOUS at 14:49

## 2025-02-11 RX ADMIN — SPIRONOLACTONE 12.5 MG: 25 TABLET, FILM COATED ORAL at 08:33

## 2025-02-11 RX ADMIN — SALINE NASAL SPRAY 2 SPRAY: 1.5 SOLUTION NASAL at 11:06

## 2025-02-11 RX ADMIN — Medication 400 MG: at 12:50

## 2025-02-11 RX ADMIN — FUROSEMIDE 40 MG: 10 INJECTION, SOLUTION INTRAVENOUS at 02:19

## 2025-02-11 RX ADMIN — SALINE NASAL SPRAY 2 SPRAY: 1.5 SOLUTION NASAL at 21:37

## 2025-02-11 RX ADMIN — SALINE NASAL SPRAY 2 SPRAY: 1.5 SOLUTION NASAL at 15:43

## 2025-02-11 RX ADMIN — INSULIN ASPART 1 UNITS: 100 INJECTION, SOLUTION INTRAVENOUS; SUBCUTANEOUS at 17:54

## 2025-02-11 RX ADMIN — ASPIRIN 81 MG: 81 TABLET, COATED ORAL at 08:30

## 2025-02-11 RX ADMIN — IPRATROPIUM BROMIDE AND ALBUTEROL SULFATE 3 ML: .5; 3 SOLUTION RESPIRATORY (INHALATION) at 00:28

## 2025-02-11 RX ADMIN — ENOXAPARIN SODIUM 40 MG: 40 INJECTION SUBCUTANEOUS at 13:01

## 2025-02-11 RX ADMIN — GUAIFENESIN 600 MG: 600 TABLET ORAL at 19:43

## 2025-02-11 RX ADMIN — CETIRIZINE HYDROCHLORIDE 10 MG: 10 TABLET, FILM COATED ORAL at 08:32

## 2025-02-11 RX ADMIN — IPRATROPIUM BROMIDE AND ALBUTEROL SULFATE 3 ML: .5; 3 SOLUTION RESPIRATORY (INHALATION) at 20:35

## 2025-02-11 RX ADMIN — IPRATROPIUM BROMIDE AND ALBUTEROL SULFATE 3 ML: .5; 3 SOLUTION RESPIRATORY (INHALATION) at 08:36

## 2025-02-11 ASSESSMENT — ACTIVITIES OF DAILY LIVING (ADL)
ADLS_ACUITY_SCORE: 51

## 2025-02-11 NOTE — PROGRESS NOTES
Hospitalist follow up note:    Went to see pt again for RR in 70s, severe dyspnea, retractions. On exam he was on bipap and in distress. When speaking his rate improved but worsened again quickly. Lung sounds diffusely diminished. STAT CXR without acute changes. EKG with lateral TWI which seems to be his baseline. He did not complain of chest pain. Removed bipap, placed oxymask and gave 0.5mg IV ativan with improvement in dyspnea. He remains on 4lpm with O2 sats in low 90s-high 80s. Event seems most consistent with panic attack.     Miller Kay DO   Pager #: [phone]

## 2025-02-11 NOTE — PLAN OF CARE
Problem: COPD (Chronic Obstructive Pulmonary Disease)  Goal: Effective Oxygenation and Ventilation  Outcome: Not Progressing  Intervention: Promote Airway Secretion Clearance  Recent Flowsheet Documentation  Taken 2/11/2025 1600 by Rochelle Rodriguez RN  Cough And Deep Breathing: done with encouragement  Intervention: Optimize Oxygenation and Ventilation  Recent Flowsheet Documentation  Taken 2/11/2025 1600 by Rochelle Rodriguez RN  Fluid/Electrolyte Management: fluids restricted    Pt alert and oriented x 4. VSS. Lung sounds diminished with crackles to bases. Denies SOB, chest pain and N/V. ACEVEDO. On 4 L/min oxymask. Unable to titrate O2 down. Would de sat down to high 80s on O2 supplement with activity, but recover after a few minutes of rest. Will continue to monitor and intervene as needed. IS encouraged and performed this shift. Pt unable to tolerate IS and would de sat and cough with use. Encouraged frequent rest periods in between breaths. Tolerating 2 gm Na diet with 2 L fluid restriction. Incontinent of bladder. Did not get OOB this shift. Alarms on for safety. Calls appropriately.

## 2025-02-11 NOTE — PLAN OF CARE
Goal Outcome Evaluation:    Patient is A&Ox4. VSS on 3L O2 via NC and PRN bipap. Unable to tolerate bipap overnight. Afebrile. Denies pain, dizziness, n/v. SOB on exertion. A1 with gait belt and pivot to bedside commode. External catheter in place and voiding spontaneously. Call light within reach.     Problem: Adult Inpatient Plan of Care  Goal: Absence of Hospital-Acquired Illness or Injury  Intervention: Identify and Manage Fall Risk  Recent Flowsheet Documentation  Taken 2/11/2025 0332 by Adrienne Robbins RN  Safety Promotion/Fall Prevention:   safety round/check completed   room organization consistent   room near nurse's station   room door open   patient and family education   nonskid shoes/slippers when out of bed   clutter free environment maintained  Taken 2/10/2025 2335 by Adrienne Robbins RN  Safety Promotion/Fall Prevention:   safety round/check completed   room organization consistent   room near nurse's station   room door open   patient and family education   nonskid shoes/slippers when out of bed   clutter free environment maintained  Taken 2/10/2025 1959 by Adrienne Robbins RN  Safety Promotion/Fall Prevention:   safety round/check completed   room organization consistent   room near nurse's station   room door open   patient and family education   nonskid shoes/slippers when out of bed   clutter free environment maintained  Intervention: Prevent Skin Injury  Recent Flowsheet Documentation  Taken 2/11/2025 0332 by Adrienne Robbins RN  Body Position: position changed independently  Skin Protection:   adhesive use limited   tubing/devices free from skin contact  Taken 2/10/2025 2335 by Adrienne Robbins RN  Body Position: position changed independently  Skin Protection:   adhesive use limited   tubing/devices free from skin contact  Taken 2/10/2025 1959 by Adrienne Robbins RN  Body Position: position changed independently  Skin Protection:   adhesive use limited   tubing/devices free from skin contact  Intervention:  Prevent and Manage VTE (Venous Thromboembolism) Risk  Recent Flowsheet Documentation  Taken 2/11/2025 0332 by Adrienne Robbins RN  VTE Prevention/Management: SCDs off (sequential compression devices)  Taken 2/10/2025 2335 by Adrienne Robbins RN  VTE Prevention/Management: SCDs off (sequential compression devices)  Taken 2/10/2025 1959 by Adrienne Robbins RN  VTE Prevention/Management: SCDs off (sequential compression devices)  Intervention: Prevent Infection  Recent Flowsheet Documentation  Taken 2/11/2025 0332 by Adrienne Robbins RN  Infection Prevention:   hand hygiene promoted   rest/sleep promoted   single patient room provided  Taken 2/10/2025 2335 by Adrienne Robbins RN  Infection Prevention:   hand hygiene promoted   rest/sleep promoted   single patient room provided  Taken 2/10/2025 1959 by Adrienne Robbins RN  Infection Prevention:   hand hygiene promoted   rest/sleep promoted   single patient room provided  Goal: Optimal Comfort and Wellbeing  2/11/2025 0610 by Adrienne Robbins RN  Outcome: Progressing  2/10/2025 2245 by Adrienne Robbins RN  Outcome: Progressing  Intervention: Provide Person-Centered Care  Recent Flowsheet Documentation  Taken 2/11/2025 0332 by Adrienne Robbins RN  Trust Relationship/Rapport: care explained  Taken 2/10/2025 2335 by Adrienne Robbins RN  Trust Relationship/Rapport: care explained  Taken 2/10/2025 1959 by Adrienne Robbins RN  Trust Relationship/Rapport: care explained     Problem: Delirium  Goal: Optimal Coping  Intervention: Optimize Psychosocial Adjustment to Delirium  Recent Flowsheet Documentation  Taken 2/11/2025 0332 by Adrienne Robbins RN  Family/Support System Care: self-care encouraged  Taken 2/10/2025 2335 by Adrienne Robbins RN  Family/Support System Care: self-care encouraged  Taken 2/10/2025 1959 by Adrienne Robbins RN  Family/Support System Care: self-care encouraged  Goal: Improved Behavioral Control  Intervention: Prevent and Manage Agitation  Recent Flowsheet Documentation  Taken 2/11/2025 0332 by  Adrienne Robbins RN  Environment Familiarity/Consistency:   daily routine followed   personal clothing/items utilized  Taken 2/10/2025 2335 by Adrienne Robbins RN  Environment Familiarity/Consistency:   daily routine followed   personal clothing/items utilized  Taken 2/10/2025 1959 by Adrienne Robbins RN  Environment Familiarity/Consistency:   daily routine followed   personal clothing/items utilized  Intervention: Minimize Safety Risk  Recent Flowsheet Documentation  Taken 2/11/2025 0332 by Adrienne Robbins RN  Enhanced Safety Measures: room near unit station  Trust Relationship/Rapport: care explained  Taken 2/10/2025 2335 by dArienne Robbins RN  Enhanced Safety Measures: room near unit station  Trust Relationship/Rapport: care explained  Taken 2/10/2025 1959 by Adrienne Robbins RN  Enhanced Safety Measures: room near unit station  Trust Relationship/Rapport: care explained  Goal: Improved Attention and Thought Clarity  2/11/2025 0610 by Adrienne Robbins RN  Outcome: Progressing  2/10/2025 2245 by Adrienne Robbins RN  Outcome: Progressing  Intervention: Maximize Cognitive Function  Recent Flowsheet Documentation  Taken 2/11/2025 0332 by Adrienne Robbins RN  Sensory Stimulation Regulation:   care clustered   lighting decreased   quiet environment promoted  Reorientation Measures: clock in view  Taken 2/10/2025 2335 by Adrienne Robbins RN  Sensory Stimulation Regulation:   care clustered   lighting decreased   quiet environment promoted  Reorientation Measures: clock in view  Taken 2/10/2025 1959 by Adrienne Robbins RN  Sensory Stimulation Regulation:   care clustered   lighting decreased   quiet environment promoted  Reorientation Measures: clock in view     Problem: Heart Failure  Goal: Optimal Coping  Intervention: Support Psychosocial Response  Recent Flowsheet Documentation  Taken 2/11/2025 0332 by Adrienne Robbins RN  Family/Support System Care: self-care encouraged  Taken 2/10/2025 2335 by Adrienne Robbins RN  Family/Support System Care: self-care  encouraged  Taken 2/10/2025 1959 by Adrienne Robbins RN  Family/Support System Care: self-care encouraged  Goal: Optimal Cardiac Output  Intervention: Optimize Cardiac Output  Recent Flowsheet Documentation  Taken 2/11/2025 0332 by Adrienne Robbins RN  Environmental Support: rest periods encouraged  Taken 2/10/2025 2335 by Adrienne Robbins RN  Environmental Support: rest periods encouraged  Taken 2/10/2025 1959 by Adrienne Robbins RN  Environmental Support: rest periods encouraged  Goal: Optimal Functional Ability  Intervention: Optimize Functional Ability  Recent Flowsheet Documentation  Taken 2/11/2025 0332 by Adrienne Robbins RN  Activity Management:   activity adjusted per tolerance   activity encouraged  Taken 2/10/2025 2335 by Adrienne Robbins RN  Activity Management:   activity adjusted per tolerance   activity encouraged  Taken 2/10/2025 1959 by Adrienne Robbins RN  Activity Management:   activity adjusted per tolerance   activity encouraged  Goal: Effective Oxygenation and Ventilation  2/11/2025 0610 by Adrienne Robbins RN  Outcome: Progressing  2/10/2025 2245 by Adrienne Robbins RN  Outcome: Progressing  Intervention: Promote Airway Secretion Clearance  Recent Flowsheet Documentation  Taken 2/11/2025 0332 by Adrienne Robbins RN  Cough And Deep Breathing: done independently per patient  Activity Management:   activity adjusted per tolerance   activity encouraged  Taken 2/10/2025 2335 by Adrienne Robbins RN  Cough And Deep Breathing: done independently per patient  Activity Management:   activity adjusted per tolerance   activity encouraged  Taken 2/10/2025 1959 by Adrienne Robbins RN  Cough And Deep Breathing: done independently per patient  Activity Management:   activity adjusted per tolerance   activity encouraged  Intervention: Optimize Oxygenation and Ventilation  Recent Flowsheet Documentation  Taken 2/11/2025 0332 by Adrienne Robbins RN  Head of Bed (HOB) Positioning: HOB at 20-30 degrees  Taken 2/10/2025 2335 by Adrienne Robbins RN  Head of  Bed (HOB) Positioning: HOB at 20-30 degrees  Taken 2/10/2025 1959 by Adrienne Robbins RN  Head of Bed (HOB) Positioning: HOB at 20-30 degrees  Goal: Effective Breathing Pattern During Sleep  2/11/2025 0610 by Adrienne Robbins RN  Outcome: Progressing  2/10/2025 2245 by Adrienne Robbins RN  Outcome: Progressing     Problem: COPD (Chronic Obstructive Pulmonary Disease)  Goal: Optimal Chronic Illness Coping  Intervention: Support and Optimize Psychosocial Response  Recent Flowsheet Documentation  Taken 2/11/2025 0332 by Adrienne Robbins RN  Family/Support System Care: self-care encouraged  Taken 2/10/2025 2335 by Adrienne Robbins RN  Family/Support System Care: self-care encouraged  Taken 2/10/2025 1959 by Adrienne Robbins RN  Family/Support System Care: self-care encouraged  Goal: Optimal Level of Functional [place]  Intervention: Optimize Functional Ability  Recent Flowsheet Documentation  Taken 2/11/2025 0332 by Adrienne Robbins RN  Activity Management:   activity adjusted per tolerance   activity encouraged  Taken 2/10/2025 2335 by Adrienne Robbins RN  Activity Management:   activity adjusted per tolerance   activity encouraged  Taken 2/10/2025 1959 by Adrienne Robbins RN  Activity Management:   activity adjusted per tolerance   activity encouraged  Goal: Absence of Infection Signs and Symptoms  Intervention: Prevent or Manage Infection  Recent Flowsheet Documentation  Taken 2/11/2025 0332 by Adrienne Robbins RN  Isolation Precautions:   contact precautions maintained   droplet precautions maintained  Taken 2/10/2025 2335 by Adrienne Robbins RN  Isolation Precautions:   contact precautions maintained   droplet precautions maintained  Taken 2/10/2025 1959 by Adrienne Robbins RN  Isolation Precautions:   droplet precautions initiated   contact precautions initiated  Goal: Effective Oxygenation and Ventilation  2/11/2025 0610 by Adrienne Robbins RN  Outcome: Progressing  2/10/2025 2245 by Adrienne Robbins RN  Outcome: Progressing  Intervention: Promote  Airway Secretion Clearance  Recent Flowsheet Documentation  Taken 2/11/2025 0332 by Adrienne Robbins RN  Cough And Deep Breathing: done independently per patient  Activity Management:   activity adjusted per tolerance   activity encouraged  Taken 2/10/2025 2335 by Adrienne Robbins RN  Cough And Deep Breathing: done independently per patient  Activity Management:   activity adjusted per tolerance   activity encouraged  Taken 2/10/2025 1959 by Adrienne Robbins RN  Cough And Deep Breathing: done independently per patient  Activity Management:   activity adjusted per tolerance   activity encouraged  Intervention: Optimize Oxygenation and Ventilation  Recent Flowsheet Documentation  Taken 2/11/2025 0332 by Adrienne Robbins RN  Head of Bed (HOB) Positioning: HOB at 20-30 degrees  Taken 2/10/2025 2335 by Adrienne Robbins RN  Head of Bed (HOB) Positioning: HOB at 20-30 degrees  Taken 2/10/2025 1959 by Adrienne Robbins RN  Head of Bed (HOB) Positioning: HOB at 20-30 degrees     Problem: Fall Injury Risk  Goal: Absence of Fall and Fall-Related Injury  2/11/2025 0610 by Adrienne Robbins RN  Outcome: Progressing  2/10/2025 2245 by Adrienne Robbins RN  Outcome: Progressing  Intervention: Promote Injury-Free Environment  Recent Flowsheet Documentation  Taken 2/11/2025 0332 by Adrienne Robbins RN  Safety Promotion/Fall Prevention:   safety round/check completed   room organization consistent   room near nurse's station   room door open   patient and family education   nonskid shoes/slippers when out of bed   clutter free environment maintained  Taken 2/10/2025 2335 by Adrienne Robbins RN  Safety Promotion/Fall Prevention:   safety round/check completed   room organization consistent   room near nurse's station   room door open   patient and family education   nonskid shoes/slippers when out of bed   clutter free environment maintained  Taken 2/10/2025 1959 by Adrienne Robbins RN  Safety Promotion/Fall Prevention:   safety round/check completed   room organization  consistent   room near nurse's station   room door open   patient and family education   nonskid shoes/slippers when out of bed   clutter free environment maintained     Problem: Skin Injury Risk Increased  Goal: Skin Health and Integrity  2/11/2025 0610 by Adrienne Robbins RN  Outcome: Progressing  2/10/2025 2245 by Adrienne Robbins RN  Outcome: Progressing  Intervention: Plan: Nurse Driven Intervention: Moisture Management  Recent Flowsheet Documentation  Taken 2/11/2025 0332 by Adrienne Robbins RN  Moisture Interventions:   Incontinence pad   Urinary collection device  Bathing/Skin Care: incontinence care  Taken 2/10/2025 2335 by Adrienne Robbins RN  Moisture Interventions:   Incontinence pad   Urinary collection device  Bathing/Skin Care: incontinence care  Taken 2/10/2025 1959 by Adrienne Robbins RN  Moisture Interventions:   Incontinence pad   Urinary collection device  Bathing/Skin Care: incontinence care  Intervention: Plan: Nurse Driven Intervention: Friction and Shear  Recent Flowsheet Documentation  Taken 2/11/2025 0332 by Adrienne Robbins RN  Friction/Shear Interventions: HOB 30 degrees or less  Taken 2/10/2025 2335 by Adrienne Robbins RN  Friction/Shear Interventions: HOB 30 degrees or less  Taken 2/10/2025 1959 by Adrienne Robbins RN  Friction/Shear Interventions: HOB 30 degrees or less  Intervention: Optimize Skin Protection  Recent Flowsheet Documentation  Taken 2/11/2025 0332 by Adrienne Robbins RN  Skin Protection:   adhesive use limited   tubing/devices free from skin contact  Activity Management:   activity adjusted per tolerance   activity encouraged  Head of Bed (HOB) Positioning: HOB at 20-30 degrees  Taken 2/10/2025 2335 by Adrienne Robbins RN  Skin Protection:   adhesive use limited   tubing/devices free from skin contact  Activity Management:   activity adjusted per tolerance   activity encouraged  Head of Bed (HOB) Positioning: HOB at 20-30 degrees  Taken 2/10/2025 1959 by Adrienne Robbins RN  Skin Protection:   adhesive  use limited   tubing/devices free from skin contact  Activity Management:   activity adjusted per tolerance   activity encouraged  Head of Bed (HOB) Positioning: HOB at 20-30 degrees

## 2025-02-11 NOTE — PROGRESS NOTES
CARDIOLOGY PROGRESS NOTE      Assessment/Plan:  1.  Acute on chronic congestive heart failure, (H)-in the setting of midrange ejection fraction of 40 to 50%, symptomatically improved.  Weight down 2 kg, oxygen saturation 89% on 3 L, continue IV furosemide monitoring kidney function.  Most likely cause for exacerbation is IV contrast during intervention as well as possibly pneumonia.  2.  Coronary artery disease-secondary to positive troponin he underwent angiography, this showed normal left main, mid LAD 95% lesion that received a 3.0 x 22 Tan drug-coated stent as well as a 3.0 x 12 mm McQue[place]y drug-coated stent.  Circumflex had a proximal 45% lesion and right coronary artery was small codominant without any significant disease.  Symptomatically stable.  Plan on Plavix until 2026.  3.  Benign essential hypertension-under good control on bisoprolol, losartan and spironolactone.  By definition resistant.  4.  Parainfluenza-treatment per hospitalist.  5.  COPD-receiving nebulizers.  6.  Question of ventricular tachycardia-review of telemetry does not show any recurrent episodes.    7.  Pure hypercholesterolemia-total cholesterol 87 with an LDL of 28 which is very acceptable.  8.  Troponin elevation-level 221, no current symptoms.  Downtrending, not diagnostic of acute coronary syndrome, will certainly need outpatient pharmacological stress nuclear given his inability to walk.    Plan:  1.  Continue IV diuresis.    Discharge Plannin.  Barrier to discharge is resolution of heart failure and infectious process, anticipate 2 to 3 days.  2.  Will need outpatient pharmacological stress nuclear.  3.  Can follow with primary cardiologist Dr Alton Hernadez.     LOS: 3 days     Subjective:  Interval History:    79-year-old white gentleman being seen on fourth day of hospitalization.  Patient is breathing better but still not at baseline.  Does have some mild shortness of breath, no chest pains, palpitations, PND,  orthopnea or significant peripheral edema.    Medications  Current Facility-Administered Medications   Medication Dose Route Frequency Provider Last Rate Last Admin    aspirin EC tablet 81 mg  81 mg Oral Daily Allyn Terry MD   81 mg at 02/11/25 0830    atorvastatin (LIPITOR) tablet 80 mg  80 mg Oral At Bedtime Allyn Terry MD   80 mg at 02/10/25 2217    bisoprolol (ZEBETA) tablet 5 mg  5 mg Oral Daily Miller Kay DO   5 mg at 02/11/25 0834    cetirizine (zyrTEC) tablet 10 mg  10 mg Oral QAM Allyn Terry MD   10 mg at 02/11/25 0832    clopidogrel (PLAVIX) tablet 75 mg  75 mg Oral Daily Allyn Terry MD   75 mg at 02/11/25 0830    docusate sodium (COLACE) capsule 100 mg  100 mg Oral BID Allyn Terry MD   100 mg at 02/10/25 1543    enoxaparin ANTICOAGULANT (LOVENOX) injection 40 mg  40 mg Subcutaneous Q24H Allyn Terry MD   40 mg at 02/10/25 1344    fluticasone (FLONASE) 50 MCG/ACT spray 1 spray  1 spray Both Nostrils Daily Miller Kay DO        furosemide (LASIX) injection 40 mg  40 mg Intravenous Q8H Allyn Terry MD   40 mg at 02/11/25 0219    guaiFENesin (MUCINEX) 12 hr tablet 600 mg  600 mg Oral BID Miller Kay DO   600 mg at 02/11/25 0831    insulin aspart (NovoLOG) injection (RAPID ACTING)  1-7 Units Subcutaneous TID AC Miller Kay DO   1 Units at 02/10/25 1736    insulin aspart (NovoLOG) injection (RAPID ACTING)  1-5 Units Subcutaneous At Bedtime Miller Kay DO        ipratropium - albuterol 0.5 mg/2.5 mg/3 mL (DUONEB) neb solution 3 mL  1 vial Nebulization 4x daily Miller Kay DO        losartan (COZAAR) tablet 25 mg  25 mg Oral BID Heidi Arnold MD   25 mg at 02/11/25 0830    magnesium oxide (MAG-OX) tablet 400 mg  400 mg Oral Q4H Miller Kay DO   400 mg at 02/11/25 0831    oxyBUTYnin ER (DITROPAN XL) 24 hr tablet 10 mg  10 mg Oral Every Other Day Allyn Terry MD   10 mg at 02/11/25 0830    pantoprazole (PROTONIX) EC tablet 40 mg  40 mg Oral Daily Allyn Terry MD   40 mg at  "02/11/25 0830    polyethylene glycol (MIRALAX) Packet 17 g  17 g Oral BID Allyn Terry MD   17 g at 02/10/25 1543    predniSONE (DELTASONE) tablet 40 mg  40 mg Oral Daily Miller Kay DO   40 mg at 02/11/25 0831    sertraline (ZOLOFT) tablet 25 mg  25 mg Oral Daily Allyn Terry MD   25 mg at 02/11/25 0830    sodium chloride (OCEAN) 0.65 % nasal spray 2 spray  2 spray Both Nostrils TID Miller Kay DO        sodium chloride (PF) 0.9% PF flush 3 mL  3 mL Intracatheter Q8H Allyn Terry MD   3 mL at 02/11/25 0220    spironolactone (ALDACTONE) half-tab 12.5 mg  12.5 mg Oral Daily Allyn Terry MD   12.5 mg at 02/11/25 0833    [Held by provider] traZODone (DESYREL) tablet  mg   mg Oral At Bedtime Allyn Terry MD         Objective:   Vital signs in last 24 hours:  Temp:  [97.5  F (36.4  C)-97.9  F (36.6  C)] 97.5  F (36.4  C)  Pulse:  [59-75] 66  Resp:  [16-18] 18  BP: (111-139)/(53-65) 129/61  SpO2:  [86 %-95 %] 92 %    Physical Exam:  /61 (BP Location: Right arm)   Pulse 66   Temp 97.5  F (36.4  C) (Oral)   Resp 18   Ht 1.664 m (5' 5.5\")   Wt 68.8 kg (151 lb 9.6 oz)   SpO2 92%   BMI 24.84 kg/m      General Appearance:    Alert, cooperative, no distress, appears stated age   Head:    Normocephalic, without obvious abnormality, atraumatic   Throat:   Lips, mucosa, and tongue normal; teeth and gums normal   Neck:   Supple, symmetrical, trachea midline, no adenopathy;        thyroid:  No enlargement/tenderness/nodules; no carotid    bruit, 2 to 3 cm at 30 degrees JVD   Back:     Symmetric, no curvature, ROM normal, no CVA tenderness   Lungs:     Occasional expiratory wheeze auscultation bilaterally, respirations unlabored   Chest wall:    No tenderness or deformity   Heart:    Regular rate and rhythm, S1 and S2 normal, no murmur, rub   or gallop, PMI shifted medially   Abdomen:     Soft, non-tender, bowel sounds active all four quadrants,     no masses, no organomegaly   Extremities:   " Normal, atraumatic, no cyanosis or edema   Pulses:   2+ and symmetric all extremities   Skin:   Skin color, texture, turgor normal, no rashes or lesions     Cardiographics:      ECG: Personally reviewed by myself shows sinus rhythm, incomplete right bundle branch block pattern, lateral T wave changes nonspecific.    Echocardiogram:   The left ventricle is normal in size. There is mild concentric left ventricular hypertrophy. The visual ejection fraction is 40-50%.  Mild hypokinesis in distal anteroseptal segment.  Normal right ventricle size and systolic function.  The left atrium is mildly dilated.   This is a limited study.  When compared to previous study on 1-6-2025, wall motion abnormality and left  ventricular systolic function are improved.      Lab Results:   Recent Labs   Lab 02/11/25  0500   WBC 5.2   HGB 11.3*   HCT 35.2*   *     Recent Labs   Lab 02/11/25  0500      CO2 31*   BUN 43.5*   .       Lab Results   Component Value Date    TROPONINI 0.02 07/31/2022

## 2025-02-11 NOTE — PLAN OF CARE
Problem: COPD (Chronic Obstructive Pulmonary Disease)  Goal: Effective Oxygenation and Ventilation  Outcome: Not Progressing  Intervention: Promote Airway Secretion Clearance  Recent Flowsheet Documentation  Taken 2/11/2025 1230 by Makenna Yap RN  Activity Management:   activity adjusted per tolerance   activity encouraged   ambulated in room   back to bed  Taken 2/11/2025 1113 by Makenna Yap RN  Activity Management:   activity adjusted per tolerance   activity encouraged   ambulated in room   ambulated to bathroom   up in chair  Taken 2/11/2025 1100 by Makenna Yap RN  Activity Management:   activity adjusted per tolerance   activity encouraged   up in chair  Taken 2/11/2025 1045 by Makenna Yap RN  Activity Management: patient refuses activity  Taken 2/11/2025 0825 by Makenna Yap RN  Cough And Deep Breathing: done independently per patient  Activity Management:   dorsiflexion/plantar flexion performed   patient refuses activity  Intervention: Optimize Oxygenation and Ventilation  Recent Flowsheet Documentation  Taken 2/11/2025 1230 by Makenna Yap RN  Head of Bed (HOB) Positioning: HOB at 30-45 degrees  Taken 2/11/2025 0825 by Makenna Yap RN  Head of Bed (HOB) Positioning: HOB at 30-45 degrees   Goal Outcome Evaluation:  Pt has been intermittently requiring bipap prn. Provider aware.     Pt has had two episodes of severe shortness of breath. First episode possibly related to removing mask while eating without RN or NA notification, pt was on 4L oxymask at this time, provider notified- pulm consulted.  Second episode patient was on 4L oxymask, shortness of breath while in bed, bipap placed again, RT notified, EKG, chest xray, IV anxiolytic administered.     Wife at bedside today.     Pt worked with therapy on 4L humidified oxygen, pt is more comfortable on 4L oxymask during the day.

## 2025-02-11 NOTE — PROGRESS NOTES
Hospitalist follow up note:    Contacted by RN with another episode of dyspnea, tachypnea, required bipap be placed again. Will consult pulm for tomorrow to see if they can help optimize pulm status.     Miller Kay DO   Pager #: [phone]

## 2025-02-11 NOTE — PROGRESS NOTES
[facility] Medicine PROGRESS NOTE      Identification/Summary:   Ubaldo Ramos is a 79 year old male with [facility] signficant for .  Presented with .  Mr. Ubaldo Ramos is a 79 year old male with a past medical history of NSTEMI (s/p DESx2 to mid LAD on 1/8/2025), HFrEF with EF of 35 to 40%, recent traumatic right rib fracture and right pneumothorax s/p chest tube removal on 1/7/HLD, PAD, HTN, severe COPD with PFT in October 2021 showing FEV1 1.22 L (45%) and DLCOc 52%, NSCLC, active tobacco dependence, lung cancer s/p left upper lobectomy in 2017 with recurrence in 2018 s/p chemotherapy and radiation, peripheral arterial disease s/p left femoral endarterectomy and bilateral iliac stents, history of prostate cancer, GERD who presented with shortness of breath and productive cough for few weeks.  He desaturated to 78% when EMS arrived.  He was found to have elevated pro-BNP and troponin, congested pulmonary vasculature x-ray,  and wheezing on exam.  He was admitted for acute on chronic HFrEF and COPD exacerbation.  BiPAP and IV Lasix 60 mg was started in the ED. Now on 40mg IV q8h. Losartan increased from 12.5mg daily to 25mg bid to help with BP control. Cardiology is consulted.     Vitals ok this morning. Needing 3lpm today, up from 2lpm yesterday. Resp viral panel pos for parainfluenza. Creat 1.15, BUN 43.5, CO2 31 this morning. Nl sodium, potassium.  WBC, hgb stable. Down about 5lbs.      Assessment and Plan:  Acute hypoxic respiratory failure due to exacerbation of HFrEF and COPD  -Continue BiPAP prn  -Treat HFrEF and COPD exacerbation      Acute on chronic HFrEF, ischemic cardiomyopathy  EF of 35 to 40% on 1/6/2025 > 40-50% on admission  Appreciate cards help, continued on IV lasix   Continue bisoprolol 5 mg daily, losartan 12.5 mg daily>25mg bid, spironolactone 12.5 mg daily  Strict I's and O's, daily weights, telemetry  Fluid restriction to 2000 mL/day, heart healthy diet  Check electrolytes daily and replete  per protocol ordered     Non-sustained VT  8-beat on 2/8  Lytes are appropriate     COPD exacerbation  Acute bronchitis, parainfluenza infection  Worsening cough, shortness of breath and production (sputum color is yellow)  Discontinue abx given pos parainfluenza  Schedule DuoNeb every 4 hours  Continue oral prednisone, scheduled nebs  He will need LABA and LAMA at discharge  Flutter valve   Lungs sounds very diminished, symptomatically worse today     NSTEMI s/p DESx2 to mid LAD on 1/8/2025  Dyslipidemia   To note, he never had chest pain with NSTEMI but shortness of breath  Troponin is 233 on admission and 220 on repeat, much lower than the time when he had NSTEMI  Check hemoglobin A1c and lipid profile  Continue aspirin 81 mg, clopidogrel 75 mg daily, bisoprolol as above, and atorvastatin 80 mg     QTc prolonging  QTc is 516 ms on admission  Hold trazodone and other QT prolonging meds  Replete magnesium       PAD: s/p lower extremity stenting and right-to-left femoral-femoral arterial bypass done in [place]. CT angiography of the lower extremities 12/13/2023 showed patent bilateral common and external iliac artery stents but occlusion of the femoral bypass graft  Asymptomatic right internal carotid artery stenosis: last assessed on carotid ultrasound 7/16/2024   NSCLC:  resection, radiation therapy and chemotherapy in 2017 and 2018   Tobacco use: still smoking, encouraged cessation/reduction.  He still has trouble quitting.  Nicotine replacement is not started for possible NSTEMI at this time.  S/p rib fracture and pneumothorax requiring chest tube  Urinary incontinence: Continue oxybutynin  GERD: Continue pantoprazole  Constipation: Last bowel movement was a few days ago, start MiraLAX scheduled dose  Depression and anxiety: Continue sertraline  Glucose control: Will start medium dose sliding scale for the time being    Diet: Fluid restriction 2000 ML FLUID (and additional linked orders)  Combination Diet 2 gm  NA Diet; Low Saturated Fat Na <2400mg Diet  Fluids: none  Pain meds:tylenol prn  Therapy:OT consulted  DVT Prophylaxis:  lovenox  Code Status: Full Code  Medically Ready for Discharge: Anticipated in 2-4 Days, trying to wean from O2, may need to go home with it.      Interval History/Subjective:  No chest pain, cough. Just feels very short of breath. No n/v/d. No dysuria. Not urinating as much now.     Physical Exam/Objective:  Gen: no acute distress, comfortable  ENT: no scleral icterus  Pulm: lungs are very diminished but clear, no wheezing  CV: regular rate and rhythm, no pitting edema  Psych: appropriate affect    Medications:   Current Facility-Administered Medications   Medication Dose Route Frequency Provider Last Rate Last Admin    aspirin EC tablet 81 mg  81 mg Oral Daily Allyn Terry MD   81 mg at 02/11/25 0830    atorvastatin (LIPITOR) tablet 80 mg  80 mg Oral At Bedtime Allyn Terry MD   80 mg at 02/10/25 2217    [Held by provider] bisoprolol (ZEBETA) tablet 5 mg  5 mg Oral Daily Miller Kay DO        bisoprolol (ZEBETA) tablet 5 mg  5 mg Oral Daily Miller Kay DO   5 mg at 02/11/25 0834    cetirizine (zyrTEC) tablet 10 mg  10 mg Oral QAM Allyn Terry MD   10 mg at 02/11/25 0832    clopidogrel (PLAVIX) tablet 75 mg  75 mg Oral Daily Allyn Terry MD   75 mg at 02/11/25 0830    docusate sodium (COLACE) capsule 100 mg  100 mg Oral BID Allyn Terry MD   100 mg at 02/10/25 1543    enoxaparin ANTICOAGULANT (LOVENOX) injection 40 mg  40 mg Subcutaneous Q24H Allyn Terry MD   40 mg at 02/10/25 1344    furosemide (LASIX) injection 40 mg  40 mg Intravenous Q8H Allyn Terry MD   40 mg at 02/11/25 0219    guaiFENesin (MUCINEX) 12 hr tablet 600 mg  600 mg Oral BID Miller Kay DO   600 mg at 02/11/25 0831    insulin aspart (NovoLOG) injection (RAPID ACTING)  1-7 Units Subcutaneous TID AC Miller Kay DO   1 Units at 02/10/25 1736    insulin aspart (NovoLOG) injection (RAPID ACTING)  1-5 Units  Subcutaneous At Bedtime Miller Kay DO        ipratropium - albuterol 0.5 mg/2.5 mg/3 mL (DUONEB) neb solution 3 mL  1 vial Nebulization Q4H Miller Kay DO   3 mL at 02/11/25 0836    losartan (COZAAR) tablet 25 mg  25 mg Oral BID Heidi Arnold MD   25 mg at 02/11/25 0830    magnesium oxide (MAG-OX) tablet 400 mg  400 mg Oral Q4H Miller Kay DO   400 mg at 02/11/25 0831    oxyBUTYnin ER (DITROPAN XL) 24 hr tablet 10 mg  10 mg Oral Every Other Day Allyn Terry MD   10 mg at 02/11/25 0830    pantoprazole (PROTONIX) EC tablet 40 mg  40 mg Oral Daily Allyn Terry MD   40 mg at 02/11/25 0830    polyethylene glycol (MIRALAX) Packet 17 g  17 g Oral BID Allyn Terry MD   17 g at 02/10/25 1543    predniSONE (DELTASONE) tablet 40 mg  40 mg Oral Daily Miller Kay DO   40 mg at 02/11/25 0831    sertraline (ZOLOFT) tablet 25 mg  25 mg Oral Daily Allyn Terry MD   25 mg at 02/11/25 0830    sodium chloride (PF) 0.9% PF flush 3 mL  3 mL Intracatheter Q8H Allyn Terry MD   3 mL at 02/11/25 0220    spironolactone (ALDACTONE) half-tab 12.5 mg  12.5 mg Oral Daily Allyn Terry MD   12.5 mg at 02/11/25 0833    [Held by provider] traZODone (DESYREL) tablet  mg   mg Oral At Bedtime Allyn Terry MD         Clinically Significant Risk Factors                   # Hypertension: Noted on problem list    # Heart failure, NOS: heart failure noted on the problem list and last echo with EF 40-50%               # Financial/Environmental Concerns: none            Miller Kay DO   Hospitalist  [facility]

## 2025-02-12 ENCOUNTER — APPOINTMENT (OUTPATIENT)
Dept: OCCUPATIONAL THERAPY | Facility: CLINIC | Age: 80
DRG: 291 | End: 2025-02-12
Attending: HOSPITALIST
Payer: MEDICARE

## 2025-02-12 ENCOUNTER — APPOINTMENT (OUTPATIENT)
Dept: CT IMAGING | Facility: CLINIC | Age: 80
DRG: 291 | End: 2025-02-12
Attending: INTERNAL MEDICINE
Payer: MEDICARE

## 2025-02-12 LAB
ANION GAP SERPL CALCULATED.3IONS-SCNC: 13 MMOL/L (ref 7–15)
BUN SERPL-MCNC: 54.1 MG/DL (ref 8–23)
CALCIUM SERPL-MCNC: 9.8 MG/DL (ref 8.8–10.4)
CHLORIDE SERPL-SCNC: 99 MMOL/L (ref 98–107)
CREAT SERPL-MCNC: 1.27 MG/DL (ref 0.67–1.17)
EGFRCR SERPLBLD CKD-EPI 2021: 58 ML/MIN/1.73M2
ERYTHROCYTE [DISTWIDTH] IN BLOOD BY AUTOMATED COUNT: 16.1 % (ref 10–15)
GLUCOSE BLDC GLUCOMTR-MCNC: 146 MG/DL (ref 70–99)
GLUCOSE BLDC GLUCOMTR-MCNC: 170 MG/DL (ref 70–99)
GLUCOSE BLDC GLUCOMTR-MCNC: 184 MG/DL (ref 70–99)
GLUCOSE BLDC GLUCOMTR-MCNC: 205 MG/DL (ref 70–99)
GLUCOSE SERPL-MCNC: 113 MG/DL (ref 70–99)
HCO3 SERPL-SCNC: 31 MMOL/L (ref 22–29)
HCT VFR BLD AUTO: 36 % (ref 40–53)
HGB BLD-MCNC: 11.6 G/DL (ref 13.3–17.7)
MAGNESIUM SERPL-MCNC: 2.1 MG/DL (ref 1.7–2.3)
MCH RBC QN AUTO: 30.1 PG (ref 26.5–33)
MCHC RBC AUTO-ENTMCNC: 32.2 G/DL (ref 31.5–36.5)
MCV RBC AUTO: 94 FL (ref 78–100)
PLATELET # BLD AUTO: 149 10E3/UL (ref 150–450)
POTASSIUM SERPL-SCNC: 3.9 MMOL/L (ref 3.4–5.3)
RBC # BLD AUTO: 3.85 10E6/UL (ref 4.4–5.9)
SODIUM SERPL-SCNC: 143 MMOL/L (ref 135–145)
WBC # BLD AUTO: 5.7 10E3/UL (ref 4–11)

## 2025-02-12 PROCEDURE — 80048 BASIC METABOLIC PNL TOTAL CA: CPT | Performed by: STUDENT IN AN ORGANIZED HEALTH CARE EDUCATION/TRAINING PROGRAM

## 2025-02-12 PROCEDURE — 250N000013 HC RX MED GY IP 250 OP 250 PS 637: Performed by: STUDENT IN AN ORGANIZED HEALTH CARE EDUCATION/TRAINING PROGRAM

## 2025-02-12 PROCEDURE — 99233 SBSQ HOSP IP/OBS HIGH 50: CPT | Performed by: INTERNAL MEDICINE

## 2025-02-12 PROCEDURE — 250N000011 HC RX IP 250 OP 636: Performed by: HOSPITALIST

## 2025-02-12 PROCEDURE — 99223 1ST HOSP IP/OBS HIGH 75: CPT | Performed by: INTERNAL MEDICINE

## 2025-02-12 PROCEDURE — 71250 CT THORAX DX C-: CPT

## 2025-02-12 PROCEDURE — 97110 THERAPEUTIC EXERCISES: CPT | Mod: GO

## 2025-02-12 PROCEDURE — 999N000157 HC STATISTIC RCP TIME EA 10 MIN

## 2025-02-12 PROCEDURE — 97535 SELF CARE MNGMENT TRAINING: CPT | Mod: GO

## 2025-02-12 PROCEDURE — 120N000004 HC R&B MS OVERFLOW

## 2025-02-12 PROCEDURE — 94799 UNLISTED PULMONARY SVC/PX: CPT

## 2025-02-12 PROCEDURE — 250N000011 HC RX IP 250 OP 636: Performed by: STUDENT IN AN ORGANIZED HEALTH CARE EDUCATION/TRAINING PROGRAM

## 2025-02-12 PROCEDURE — 94640 AIRWAY INHALATION TREATMENT: CPT

## 2025-02-12 PROCEDURE — 250N000013 HC RX MED GY IP 250 OP 250 PS 637: Performed by: HOSPITALIST

## 2025-02-12 PROCEDURE — 99233 SBSQ HOSP IP/OBS HIGH 50: CPT | Performed by: HOSPITALIST

## 2025-02-12 PROCEDURE — 94640 AIRWAY INHALATION TREATMENT: CPT | Mod: 76

## 2025-02-12 PROCEDURE — 250N000009 HC RX 250: Performed by: HOSPITALIST

## 2025-02-12 PROCEDURE — 85027 COMPLETE CBC AUTOMATED: CPT | Performed by: STUDENT IN AN ORGANIZED HEALTH CARE EDUCATION/TRAINING PROGRAM

## 2025-02-12 PROCEDURE — 250N000013 HC RX MED GY IP 250 OP 250 PS 637: Performed by: INTERNAL MEDICINE

## 2025-02-12 PROCEDURE — 36415 COLL VENOUS BLD VENIPUNCTURE: CPT | Performed by: STUDENT IN AN ORGANIZED HEALTH CARE EDUCATION/TRAINING PROGRAM

## 2025-02-12 PROCEDURE — 83735 ASSAY OF MAGNESIUM: CPT | Performed by: HOSPITALIST

## 2025-02-12 RX ORDER — LORAZEPAM 2 MG/ML
0.5 INJECTION INTRAMUSCULAR ONCE
Status: COMPLETED | OUTPATIENT
Start: 2025-02-12 | End: 2025-02-12

## 2025-02-12 RX ORDER — METHYLPREDNISOLONE SODIUM SUCCINATE 40 MG/ML
60 INJECTION INTRAMUSCULAR; INTRAVENOUS EVERY 8 HOURS
Status: DISCONTINUED | OUTPATIENT
Start: 2025-02-12 | End: 2025-02-13

## 2025-02-12 RX ADMIN — INSULIN ASPART 1 UNITS: 100 INJECTION, SOLUTION INTRAVENOUS; SUBCUTANEOUS at 09:47

## 2025-02-12 RX ADMIN — METHYLPREDNISOLONE SODIUM SUCCINATE 60 MG: 40 INJECTION, POWDER, FOR SOLUTION INTRAMUSCULAR; INTRAVENOUS at 16:00

## 2025-02-12 RX ADMIN — METHYLPREDNISOLONE SODIUM SUCCINATE 60 MG: 40 INJECTION, POWDER, FOR SOLUTION INTRAMUSCULAR; INTRAVENOUS at 09:47

## 2025-02-12 RX ADMIN — BISOPROLOL FUMARATE 5 MG: 5 TABLET ORAL at 09:51

## 2025-02-12 RX ADMIN — ASPIRIN 81 MG: 81 TABLET, COATED ORAL at 09:52

## 2025-02-12 RX ADMIN — INSULIN ASPART 1 UNITS: 100 INJECTION, SOLUTION INTRAVENOUS; SUBCUTANEOUS at 12:20

## 2025-02-12 RX ADMIN — FUROSEMIDE 40 MG: 10 INJECTION, SOLUTION INTRAVENOUS at 09:48

## 2025-02-12 RX ADMIN — FLUTICASONE PROPIONATE 1 SPRAY: 50 SPRAY, METERED NASAL at 09:49

## 2025-02-12 RX ADMIN — IPRATROPIUM BROMIDE AND ALBUTEROL SULFATE 3 ML: .5; 3 SOLUTION RESPIRATORY (INHALATION) at 11:30

## 2025-02-12 RX ADMIN — DOCUSATE SODIUM 100 MG: 100 CAPSULE, LIQUID FILLED ORAL at 09:53

## 2025-02-12 RX ADMIN — IPRATROPIUM BROMIDE AND ALBUTEROL SULFATE 3 ML: .5; 3 SOLUTION RESPIRATORY (INHALATION) at 19:51

## 2025-02-12 RX ADMIN — FUROSEMIDE 40 MG: 10 INJECTION, SOLUTION INTRAVENOUS at 17:09

## 2025-02-12 RX ADMIN — SALINE NASAL SPRAY 2 SPRAY: 1.5 SOLUTION NASAL at 19:52

## 2025-02-12 RX ADMIN — LOSARTAN POTASSIUM 25 MG: 25 TABLET, FILM COATED ORAL at 19:51

## 2025-02-12 RX ADMIN — GUAIFENESIN 600 MG: 600 TABLET ORAL at 19:51

## 2025-02-12 RX ADMIN — ENOXAPARIN SODIUM 40 MG: 40 INJECTION SUBCUTANEOUS at 11:57

## 2025-02-12 RX ADMIN — SERTRALINE HYDROCHLORIDE 25 MG: 25 TABLET ORAL at 09:51

## 2025-02-12 RX ADMIN — IPRATROPIUM BROMIDE AND ALBUTEROL SULFATE 3 ML: .5; 3 SOLUTION RESPIRATORY (INHALATION) at 07:17

## 2025-02-12 RX ADMIN — FUROSEMIDE 40 MG: 10 INJECTION, SOLUTION INTRAVENOUS at 02:03

## 2025-02-12 RX ADMIN — LORAZEPAM 0.5 MG: 2 INJECTION INTRAMUSCULAR; INTRAVENOUS at 11:56

## 2025-02-12 RX ADMIN — SALINE NASAL SPRAY 2 SPRAY: 1.5 SOLUTION NASAL at 09:51

## 2025-02-12 RX ADMIN — CLOPIDOGREL BISULFATE 75 MG: 75 TABLET ORAL at 09:51

## 2025-02-12 RX ADMIN — CETIRIZINE HYDROCHLORIDE 10 MG: 10 TABLET, FILM COATED ORAL at 09:52

## 2025-02-12 RX ADMIN — INSULIN ASPART 1 UNITS: 100 INJECTION, SOLUTION INTRAVENOUS; SUBCUTANEOUS at 17:12

## 2025-02-12 RX ADMIN — SPIRONOLACTONE 12.5 MG: 25 TABLET, FILM COATED ORAL at 09:52

## 2025-02-12 RX ADMIN — IPRATROPIUM BROMIDE AND ALBUTEROL SULFATE 3 ML: .5; 3 SOLUTION RESPIRATORY (INHALATION) at 16:31

## 2025-02-12 RX ADMIN — LOSARTAN POTASSIUM 25 MG: 25 TABLET, FILM COATED ORAL at 09:52

## 2025-02-12 RX ADMIN — ATORVASTATIN CALCIUM 80 MG: 40 TABLET, FILM COATED ORAL at 19:51

## 2025-02-12 RX ADMIN — PANTOPRAZOLE SODIUM 40 MG: 40 TABLET, DELAYED RELEASE ORAL at 09:52

## 2025-02-12 RX ADMIN — GUAIFENESIN 600 MG: 600 TABLET ORAL at 09:51

## 2025-02-12 ASSESSMENT — ACTIVITIES OF DAILY LIVING (ADL)
ADLS_ACUITY_SCORE: 51
ADLS_ACUITY_SCORE: 50
ADLS_ACUITY_SCORE: 50
ADLS_ACUITY_SCORE: 51
ADLS_ACUITY_SCORE: 50
ADLS_ACUITY_SCORE: 51

## 2025-02-12 NOTE — PLAN OF CARE
"  Problem: COPD (Chronic Obstructive Pulmonary Disease)  Goal: Optimal Chronic Illness Coping  Outcome: Not Progressing     Problem: Heart Failure  Goal: Stable Heart Rate and Rhythm  Outcome: Progressing  Goal: Optimal Functional Ability  Outcome: Progressing  Intervention: Optimize Functional Ability  Recent Flowsheet Documentation  Taken 2/12/2025 1150 by Shefali Cormier RN  Activity Management: up in chair  Taken 2/12/2025 0803 by Shefali Cormier RN  Activity Management: up in chair  Goal: Effective Oxygenation and Ventilation  Outcome: Progressing  Intervention: Promote Airway Secretion Clearance  Recent Flowsheet Documentation  Taken 2/12/2025 1150 by Shefali Cormier RN  Activity Management: up in chair  Taken 2/12/2025 0803 by Shefali Cormier RN  Activity Management: up in chair     Problem: Fall Injury Risk  Goal: Absence of Fall and Fall-Related Injury  Outcome: Progressing  Intervention: Identify and Manage Contributors  Recent Flowsheet Documentation  Taken 2/12/2025 1150 by Shefali Cormier RN  Medication Review/Management: medications reviewed  Taken 2/12/2025 0803 by Shefali Cormier RN  Medication Review/Management: medications reviewed  Intervention: Promote Injury-Free Environment  Recent Flowsheet Documentation  Taken 2/12/2025 1150 by Shefali Cormier RN  Safety Promotion/Fall Prevention:   safety round/check completed   room organization consistent   clutter free environment maintained   Goal Outcome Evaluation:  Pt remains on oxygen; currently 3L per n/c.  Had 2 episodes of c/o \"can't breath\", becomes very tachypneic, breathing shallow and rapid yet SpO2 remains 93-95%;  lungs diminished with occasional wheezes; receiving scheduled neb treatments; pt and his wife become very anxious during these episodes of shortness of breath; received onetime order for IV ativan 0.5mg  to treat anxiety.  Pulmonologist saw pt this afternoon; orders for CT of chest w/o contrast; await time of " test.  Pt remains sinus rhythm w/o ectopy.

## 2025-02-12 NOTE — PROGRESS NOTES
[facility] Medicine PROGRESS NOTE      Identification/Summary:   Ubaldo Ramos is a 79 year old male with PMH signficant for .  Presented with .  Mr. Ubaldo Ramos is a 79 year old male with a past medical history of NSTEMI (s/p DESx2 to mid LAD on 1/8/2025), HFrEF with EF of 35 to 40%, recent traumatic right rib fracture and right pneumothorax s/p chest tube removal on 1/7/HLD, PAD, HTN, severe COPD with PFT in October 2021 showing FEV1 1.22 L (45%) and DLCOc 52%, NSCLC, active tobacco dependence, lung cancer s/p left upper lobectomy in 2017 with recurrence in 2018 s/p chemotherapy and radiation, peripheral arterial disease s/p left femoral endarterectomy and bilateral iliac stents, history of prostate cancer, GERD who presented with shortness of breath and productive cough for few weeks.  He desaturated to 78% when EMS arrived.  He was found to have elevated pro-BNP and troponin, congested pulmonary vasculature x-ray,  and wheezing on exam.  He was admitted for acute on chronic HFrEF and COPD exacerbation.  BiPAP and IV Lasix 60 mg was started in the ED. Now on 40mg IV q8h. Losartan increased from 12.5mg daily to 25mg bid to help with BP control. Cardiology is consulted.     Continues to have episodes of tachypnea, severe shortness of breath.  Oxygen needs slightly worse today.  Asked for pulmonary help.  Also followed by cardiology, continued on IV diuresis.     Assessment and Plan:  Acute hypoxic respiratory failure due to exacerbation of HFrEF and COPD  Parainfluenza infection  Combination of heart failure and COPD  Appreciate cardiology and pulmonary help  Continued on IV diuresis  Restarted IV steroids today as it seemed he worsened after transition to oral prednisone  CT chest pending per pulmonary recs  Continue scheduled nebs  Monitor renal function, volume status carefully  Not sure if panic is contributing, ordered Ativan as needed which seems to be helpful for his dyspnea     NSTEMI s/p DESx2 to  mid LAD on 1/8/2025  Dyslipidemia  Essential hypertension  To note, he never had chest pain with NSTEMI but shortness of breath  Troponin was 233 on admission and 220 on repeat, much lower than the time when he had NSTEMI  Check hemoglobin A1c and lipid profile  Continue aspirin 81 mg, clopidogrel 75 mg daily, bisoprolol as above, and atorvastatin 80 mg     QTc prolongation  QTc is 516 ms on admission  Held  trazodone and other QT prolonging meds  Replete magnesium       PAD: s/p lower extremity stenting and right-to-left femoral-femoral arterial bypass done in [place]. CT angiography of the lower extremities 12/13/2023 showed patent bilateral common and external iliac artery stents but occlusion of the femoral bypass graft  Asymptomatic right internal carotid artery stenosis: last assessed on carotid ultrasound 7/16/2024   NSCLC:  resection, radiation therapy and chemotherapy in 2017 and 2018   Tobacco use: still smoking, encouraged cessation/reduction.  He still has trouble quitting.  Nicotine replacement is not started for possible NSTEMI at this time.  S/p rib fracture and pneumothorax requiring chest tube about 1 to 2 months ago  Urinary incontinence: Continue oxybutynin  GERD: Continue pantoprazole  Constipation: MiraLAX as needed  Depression and anxiety: Continue sertraline  Glucose control: Will start medium dose sliding scale for the time being    Diet: Fluid restriction 2000 ML FLUID (and additional linked orders)  Combination Diet 2 gm NA Diet; Low Saturated Fat Na <2400mg Diet  Snacks/Supplements Adult: Ensure Enlive; Between Meals  Fluids: none  Pain meds:tylenol prn  Therapy:OT consulted  DVT Prophylaxis:  lovenox  Code Status: Full Code  Medically Ready for Discharge: Anticipated in 2-4 Days        Interval History/Subjective:  Still not feeling well.  Still very short of breath.  No chest pain.  No abdominal pain, nausea, vomiting.  No significant lower extremity swelling.    Physical  Exam/Objective:  Gen: no acute distress, comfortable  ENT: no scleral icterus  Pulm: lungs are very diminished, soft distant wheezing, slight basilar crackles noted  CV: regular rate and rhythm, no pitting edema  Psych: appropriate affect    Medications:   Current Facility-Administered Medications   Medication Dose Route Frequency Provider Last Rate Last Admin    aspirin EC tablet 81 mg  81 mg Oral Daily Allyn Terry MD   81 mg at 02/12/25 0952    atorvastatin (LIPITOR) tablet 80 mg  80 mg Oral At Bedtime Allyn Terry MD   80 mg at 02/11/25 2137    bisoprolol (ZEBETA) tablet 5 mg  5 mg Oral Daily Miller aKy DO   5 mg at 02/12/25 0951    cetirizine (zyrTEC) tablet 10 mg  10 mg Oral QAM Allyn Terry MD   10 mg at 02/12/25 0952    clopidogrel (PLAVIX) tablet 75 mg  75 mg Oral Daily Allyn Terry MD   75 mg at 02/12/25 0951    docusate sodium (COLACE) capsule 100 mg  100 mg Oral BID Allyn Terry MD   100 mg at 02/12/25 0953    enoxaparin ANTICOAGULANT (LOVENOX) injection 40 mg  40 mg Subcutaneous Q24H Allyn Terry MD   40 mg at 02/12/25 1157    fluticasone (FLONASE) 50 MCG/ACT spray 1 spray  1 spray Both Nostrils Daily Miller Kay DO   1 spray at 02/12/25 0949    furosemide (LASIX) injection 40 mg  40 mg Intravenous Q8H Allyn Terry MD   40 mg at 02/12/25 0948    guaiFENesin (MUCINEX) 12 hr tablet 600 mg  600 mg Oral BID Miller Kay DO   600 mg at 02/12/25 0951    insulin aspart (NovoLOG) injection (RAPID ACTING)  1-7 Units Subcutaneous TID AC Miller Kay DO   1 Units at 02/12/25 1220    insulin aspart (NovoLOG) injection (RAPID ACTING)  1-5 Units Subcutaneous At Bedtime Miller Kay DO        ipratropium - albuterol 0.5 mg/2.5 mg/3 mL (DUONEB) neb solution 3 mL  1 vial Nebulization 4x daily Miller Kay DO   3 mL at 02/12/25 1130    losartan (COZAAR) tablet 25 mg  25 mg Oral BID Heidi Arnold MD   25 mg at 02/12/25 0923    methylPREDNISolone sodium succinate (SOLU-MEDROL) injection 60 mg  60  mg Intravenous Q8H Miller Kay DO   60 mg at 02/12/25 0947    oxyBUTYnin ER (DITROPAN XL) 24 hr tablet 10 mg  10 mg Oral Every Other Day Allyn Terry MD   10 mg at 02/11/25 0830    pantoprazole (PROTONIX) EC tablet 40 mg  40 mg Oral Daily Allyn Terry MD   40 mg at 02/12/25 0952    polyethylene glycol (MIRALAX) Packet 17 g  17 g Oral BID Allyn Terry MD   17 g at 02/10/25 1543    sertraline (ZOLOFT) tablet 25 mg  25 mg Oral Daily Allyn Terry MD   25 mg at 02/12/25 0951    sodium chloride (OCEAN) 0.65 % nasal spray 2 spray  2 spray Both Nostrils TID Miller Kay DO   2 spray at 02/12/25 0951    sodium chloride (PF) 0.9% PF flush 3 mL  3 mL Intracatheter Q8H Allyn Terry MD   3 mL at 02/12/25 0949    spironolactone (ALDACTONE) half-tab 12.5 mg  12.5 mg Oral Daily Allyn Terry MD   12.5 mg at 02/12/25 0952    [Held by provider] traZODone (DESYREL) tablet  mg   mg Oral At Bedtime Allyn Terry MD         Clinically Significant Risk Factors                  # Acute Kidney Injury, unspecified: based on a >150% or 0.3 mg/dL increase in last creatinine compared to past 90 day average, will monitor renal function  # Hypertension: Noted on problem list    # Heart failure, NOS: heart failure noted on the problem list and last echo with EF 40-50%               # Financial/Environmental Concerns: none            Miller Kay DO   Hospitalist  [facility]

## 2025-02-12 NOTE — PROGRESS NOTES
Hospitalist follow up note:    Chart reviewed, worsening O2 needs, renal function indicates likely at dry weight. Will stop oral prednisone, resume IV solumedrol. Pulm consulted.    Addendum: pt seen, pulm exam unchanged over the last couple of days. Seems he may be struggling with intermittent panic attacks leading to his tachypnea. Says he was able to ambulate in the halls today without shortness of breath while on O2. Tempted to try klonopin bid trial to see if this improves his tachypnea. Will see what pulm thinks.     Miller Kay DO   Pager #: [phone]

## 2025-02-12 NOTE — PROGRESS NOTES
CARDIOLOGY PROGRESS NOTE      Assessment/Plan:  1.  Acute on chronic congestive heart failure, (H)-in the setting of midrange ejection fraction of 40 to 50%, still symptomatic with oxygen desaturation.  Weight down 2 kg, oxygen saturation 95% on 5 L, continue IV furosemide monitoring kidney function although creatinine slightly up at 1.27 today.    2.  Coronary artery disease-secondary to positive troponin he underwent angiography, this showed normal left main, mid LAD 95% lesion that received a 3.0 x 22 Tan drug-coated stent as well as a 3.0 x 12 mm Tan drug-coated stent.  Circumflex had a proximal 45% lesion and right coronary artery was small codominant without any significant disease.  Symptomatically unchanged.  Plan on Plavix until 2026.  3.  Benign essential hypertension-under good control on bisoprolol, losartan and spironolactone.  By definition resistant.  4.  Parainfluenza-treatment per hospitalist.  5.  COPD-receiving nebulizers.  Pulmonary consultation today given continued hypoxia.  6.  Question of ventricular tachycardia-review of telemetry does not show any recurrent episodes.    7.  Pure hypercholesterolemia-total cholesterol 87 with an LDL of 28 which is very acceptable.  8.  Troponin elevation-level 221, no current symptoms.  Downtrending, not diagnostic of acute coronary syndrome, will certainly need outpatient pharmacological stress nuclear given his inability to walk.  9.  Cardiomyopathy-presumed ischemic with ejection fraction of 40 to 45%, improved following intervention.  On losartan, bisoprolol, spironolactone.    Plan:  1.  Continue IV diuresis.  If creatinine increases tremendously will need to discontinue.    Discharge Plannin.  Barrier to discharge is resolution of heart failure and infectious process, anticipate 2 to 3 days.  2.  Will need outpatient pharmacological stress nuclear.  3.  Can follow with primary cardiologist Dr Alton Hernadez.     LOS: 4 days      Subjective:  Interval History:    79-year-old white gentleman being seen on fifth day of hospitalization.  Wife at bedside, he feels more short of breath,no chest pains, palpitations, PND, orthopnea or significant peripheral edema.    Medications  Current Facility-Administered Medications   Medication Dose Route Frequency Provider Last Rate Last Admin    aspirin EC tablet 81 mg  81 mg Oral Daily Allyn Terry MD   81 mg at 02/12/25 0952    atorvastatin (LIPITOR) tablet 80 mg  80 mg Oral At Bedtime Allyn Terry MD   80 mg at 02/11/25 2137    bisoprolol (ZEBETA) tablet 5 mg  5 mg Oral Daily Miller Kay DO   5 mg at 02/12/25 0951    cetirizine (zyrTEC) tablet 10 mg  10 mg Oral QAM Allyn Terry MD   10 mg at 02/12/25 0952    clopidogrel (PLAVIX) tablet 75 mg  75 mg Oral Daily Allyn Terry MD   75 mg at 02/12/25 0951    docusate sodium (COLACE) capsule 100 mg  100 mg Oral BID Allyn Terry MD   100 mg at 02/12/25 0953    enoxaparin ANTICOAGULANT (LOVENOX) injection 40 mg  40 mg Subcutaneous Q24H Allyn Terry MD   40 mg at 02/11/25 1301    fluticasone (FLONASE) 50 MCG/ACT spray 1 spray  1 spray Both Nostrils Daily Miller Kay DO   1 spray at 02/12/25 0949    furosemide (LASIX) injection 40 mg  40 mg Intravenous Q8H Allyn Terry MD   40 mg at 02/12/25 0948    guaiFENesin (MUCINEX) 12 hr tablet 600 mg  600 mg Oral BID Miller Kay DO   600 mg at 02/12/25 0951    insulin aspart (NovoLOG) injection (RAPID ACTING)  1-7 Units Subcutaneous TID AC Miller Kay DO   1 Units at 02/12/25 0947    insulin aspart (NovoLOG) injection (RAPID ACTING)  1-5 Units Subcutaneous At Bedtime Miller Kay DO        ipratropium - albuterol 0.5 mg/2.5 mg/3 mL (DUONEB) neb solution 3 mL  1 vial Nebulization 4x daily Miller Kay DO   3 mL at 02/12/25 0717    losartan (COZAAR) tablet 25 mg  25 mg Oral BID Heidi Arnold MD   25 mg at 02/12/25 0952    methylPREDNISolone sodium succinate (SOLU-MEDROL) injection 60 mg  60 mg  "Intravenous Q8H Miller Kay DO   60 mg at 02/12/25 0947    oxyBUTYnin ER (DITROPAN XL) 24 hr tablet 10 mg  10 mg Oral Every Other Day Allyn Terry MD   10 mg at 02/11/25 0830    pantoprazole (PROTONIX) EC tablet 40 mg  40 mg Oral Daily Allyn Terry MD   40 mg at 02/12/25 0952    polyethylene glycol (MIRALAX) Packet 17 g  17 g Oral BID Allyn Terry MD   17 g at 02/10/25 1543    sertraline (ZOLOFT) tablet 25 mg  25 mg Oral Daily Allyn Terry MD   25 mg at 02/12/25 0951    sodium chloride (OCEAN) 0.65 % nasal spray 2 spray  2 spray Both Nostrils TID Miller Kay DO   2 spray at 02/12/25 0951    sodium chloride (PF) 0.9% PF flush 3 mL  3 mL Intracatheter Q8H Allyn Terry MD   3 mL at 02/12/25 0949    spironolactone (ALDACTONE) half-tab 12.5 mg  12.5 mg Oral Daily Allyn Terry MD   12.5 mg at 02/12/25 0952    [Held by provider] traZODone (DESYREL) tablet  mg   mg Oral At Bedtime Allyn Terry MD         Objective:   Vital signs in last 24 hours:  Temp:  [97.6  F (36.4  C)-98.7  F (37.1  C)] 98.4  F (36.9  C)  Pulse:  [54-82] 61  Resp:  [18-32] 22  BP: (115-141)/(58-78) 116/58  FiO2 (%):  [35 %-80 %] 80 %  SpO2:  [89 %-98 %] 96 %    Physical Exam:  /58 (BP Location: Right arm)   Pulse 61   Temp 98.4  F (36.9  C) (Oral)   Resp 22   Ht 1.664 m (5' 5.5\")   Wt 68.8 kg (151 lb 9.6 oz)   SpO2 96%   BMI 24.84 kg/m      General Appearance:    Alert, cooperative, no distress, appears stated age   Head:    Normocephalic, without obvious abnormality, atraumatic   Throat:   Lips, mucosa, and tongue normal; teeth and gums normal   Neck:   Supple, symmetrical, trachea midline, no adenopathy;        thyroid:  No enlargement/tenderness/nodules; no carotid    bruit, 2 to 3 cm at 30 degrees JVD   Back:     Symmetric, no curvature, ROM normal, no CVA tenderness   Lungs:     Occasional expiratory wheeze auscultation bilaterally, respirations unlabored   Chest wall:    No tenderness or deformity   Heart:  "   Regular rate and rhythm, S1 and S2 normal, no murmur, rub   or gallop, PMI shifted medially   Abdomen:     Soft, non-tender, bowel sounds active all four quadrants,     no masses, no organomegaly   Extremities:   Normal, atraumatic, no cyanosis or edema   Pulses:   2+ and symmetric all extremities   Skin:   Skin color, texture, turgor normal, no rashes or lesions     Cardiographics:      ECG: Personally reviewed by myself shows sinus rhythm, incomplete right bundle branch block pattern, lateral T wave changes nonspecific.    Echocardiogram:   The left ventricle is normal in size. There is mild concentric left ventricular hypertrophy. The visual ejection fraction is 40-50%.  Mild hypokinesis in distal anteroseptal segment.  Normal right ventricle size and systolic function.  The left atrium is mildly dilated.  This is a limited study.  When compared to previous study on 1-6-2025, wall motion abnormality and left  ventricular systolic function are improved.      Lab Results:   Recent Labs   Lab 02/12/25  0454   WBC 5.7   HGB 11.6*   HCT 36.0*   *     Recent Labs   Lab 02/12/25  0454      CO2 31*   BUN 54.1*   .       Lab Results   Component Value Date    TROPONINI 0.02 07/31/2022

## 2025-02-12 NOTE — PROGRESS NOTES
This morning pt was anxious, panting, tachypnea, RN attempted to place pt on BiPAP, he was on BiPAP for 10 min, didn't tolerated it, writer placed pt on 10 LPM Oxymask, and quickly titrated to 5 LPM, pt looks much better on Oxymask, WOB improved.     Roger Dickinson, RT

## 2025-02-12 NOTE — CONSULTS
PULMONARY/CRITICAL CARE CONSULT NOTE    Date / Time of Admission:  2/8/2025  7:03 AM  Assessment:   79yoM with history of iCMP, EF 40% and recent traumatic pneumothorax that has resolved but here now with parainfluenza infection on top of moderate COPD and emphysema.     Clinically Significant Risk Factors                  # Acute Kidney Injury, unspecified: based on a >150% or 0.3 mg/dL increase in last creatinine compared to past 90 day average, will monitor renal function  # Hypertension: Noted on problem list  # Heart failure, NOS: heart failure noted on the problem list and last echo with EF 40-50%               # Financial/Environmental Concerns: none              Active Problems:    Acute on chronic congestive heart failure, unspecified heart failure type (H)            Plan:   Steroids and nebs, likely transition to prednisone tomorrow  CT chest tonight.   He may need oxygen temporarily on discharge if everything else would allow him to leave the hospital  Diuresis per cards  Panic attacks per hospitalist.               Subjective:    cc:  shortness of breath    HPI: 79 year old male with history of CKD, adenocarcinoma of the right lung who presents with dyspnea. Just hospitalized last month with pneumothorax after fall. EF was found to be 40%. Cardiac cath found CAD and he had stents placed and was diuresed. Subsequently discharged home until recently when more short of breath and wife noted increased coughing and sputum.     Last night he felt suddenly like he couldn't breath. He was panicked and was placed on oxymask then bipap. This made things worse. CXR found no pneumothorax. This morning he feels well and breathing is good. Has been diuresed by cardiology/hospitalist.     PMHx:  iCMP, EF 35-40%  CKD III  Adenocarcinoma of the lung (right)  Prostate cancer  Neuropathy  COPD  Emphysema    Social History     Tobacco Use    Smoking status: Every Day     Current packs/day: 0.50     Types: Cigarettes      Passive exposure: Current    Smokeless tobacco: Never   Substance Use Topics    Alcohol use: Not Currently       History reviewed. No pertinent family history.    Current Facility-Administered Medications   Medication Dose Route Frequency Provider Last Rate Last Admin    acetaminophen (TYLENOL) tablet 650 mg  650 mg Oral Q6H PRN Allyn Terry MD   650 mg at 02/09/25 1354    Or    acetaminophen (TYLENOL) Suppository 650 mg  650 mg Rectal Q6H PRN Allyn Terry MD        aspirin EC tablet 81 mg  81 mg Oral Daily Allyn Terry MD   81 mg at 02/12/25 0952    atorvastatin (LIPITOR) tablet 80 mg  80 mg Oral At Bedtime Allyn Terry MD   80 mg at 02/11/25 2137    benzocaine-menthol (CEPACOL) 15-3.6 MG lozenge 1 lozenge  1 lozenge Buccal Q1H PRN Allyn Terry MD        bisoprolol (ZEBETA) tablet 5 mg  5 mg Oral Daily Miller Kay DO   5 mg at 02/12/25 0951    cetirizine (zyrTEC) tablet 10 mg  10 mg Oral QAM Allyn Terry MD   10 mg at 02/12/25 0952    clopidogrel (PLAVIX) tablet 75 mg  75 mg Oral Daily Allyn Terry MD   75 mg at 02/12/25 0951    Continuing ACE inhibitor/ARB/ARNI from home medication list OR ACE inhibitor/ARB/ARNI order already placed during this visit   Does not apply DOES NOT GO TO Allyn Smyth MD        glucose gel 15-30 g  15-30 g Oral Q15 Min PRN Miller Kay DO        Or    dextrose 50 % injection 25-50 mL  25-50 mL Intravenous Q15 Min PRN Miller Kay DO        Or    glucagon injection 1 mg  1 mg Subcutaneous Q15 Min PRN Miller Kay DO        docusate sodium (COLACE) capsule 100 mg  100 mg Oral BID Allyn Terry MD   100 mg at 02/12/25 0953    enoxaparin ANTICOAGULANT (LOVENOX) injection 40 mg  40 mg Subcutaneous Q24H Allyn Terry MD   40 mg at 02/12/25 1157    fluticasone (FLONASE) 50 MCG/ACT spray 1 spray  1 spray Both Nostrils Daily Miller Kay DO   1 spray at 02/12/25 0949    furosemide (LASIX) injection 40 mg  40 mg Intravenous Q8H Allyn Terry MD   40 mg at 02/12/25 0921     guaiFENesin (MUCINEX) 12 hr tablet 600 mg  600 mg Oral BID Miller Kay, DO   600 mg at 02/12/25 0951    hydrALAZINE (APRESOLINE) injection 10 mg  10 mg Intravenous Q4H PRAllyn Adair MD   10 mg at 02/08/25 1715    insulin aspart (NovoLOG) injection (RAPID ACTING)  1-7 Units Subcutaneous TID AC Miller Kay DO   1 Units at 02/12/25 1220    insulin aspart (NovoLOG) injection (RAPID ACTING)  1-5 Units Subcutaneous At Bedtime Miller Kay DO        ipratropium - albuterol 0.5 mg/2.5 mg/3 mL (DUONEB) neb solution 3 mL  1 vial Nebulization 4x daily Miller Kay DO   3 mL at 02/12/25 1130    lidocaine (LMX4) cream   Topical Q1H PRN Allyn Terry MD        lidocaine 1 % 0.1-1 mL  0.1-1 mL Other Q1H PRAllyn Adair MD        losartan (COZAAR) tablet 25 mg  25 mg Oral BID Heidi Arnold MD   25 mg at 02/12/25 0952    melatonin tablet 1 mg  1 mg Oral At Bedtime PRAllyn Adair MD   1 mg at 02/08/25 2344    methylPREDNISolone sodium succinate (SOLU-MEDROL) injection 60 mg  60 mg Intravenous Q8H Miller Kay, DO   60 mg at 02/12/25 0947    miconazole (MICATIN) 2 % powder   Topical BID PRN Allyn Terry MD        nitroGLYcerin (NITROSTAT) sublingual tablet 0.4 mg  0.4 mg Sublingual Q5 Min PRN Allyn Terry MD   0.4 mg at 02/08/25 0804    ondansetron (ZOFRAN ODT) ODT tab 4 mg  4 mg Oral Q6H PRAllyn Adair MD        Or    ondansetron (ZOFRAN) injection 4 mg  4 mg Intravenous Q6H PRAllyn Adair MD        oxyBUTYnin ER (DITROPAN XL) 24 hr tablet 10 mg  10 mg Oral Every Other Day Allyn Terry MD   10 mg at 02/11/25 0830    pantoprazole (PROTONIX) EC tablet 40 mg  40 mg Oral Daily Allyn Terry MD   40 mg at 02/12/25 0952    polyethylene glycol (MIRALAX) Packet 17 g  17 g Oral BID Allyn Terry MD   17 g at 02/10/25 1543    pregabalin (LYRICA) capsule 150 mg  150 mg Oral TID PRN Allyn Terry MD   150 mg at 02/09/25 1354    Reason beta blocker not prescribed   Other DOES NOT GO TO Allyn Smyth MD         "sertraline (ZOLOFT) tablet 25 mg  25 mg Oral Daily Allyn Terry MD   25 mg at 02/12/25 0951    sodium chloride (OCEAN) 0.65 % nasal spray 2 spray  2 spray Both Nostrils TID Miller Kay, DO   2 spray at 02/12/25 0951    sodium chloride (PF) 0.9% PF flush 3 mL  3 mL Intracatheter Q8H Allyn Terry MD   3 mL at 02/12/25 0949    sodium chloride (PF) 0.9% PF flush 3 mL  3 mL Intracatheter q1 min prn Allyn Terry MD        spironolactone (ALDACTONE) half-tab 12.5 mg  12.5 mg Oral Daily Allyn Terry MD   12.5 mg at 02/12/25 0952    [Held by provider] traZODone (DESYREL) tablet  mg   mg Oral At Bedtime Allyn Terry MD             Review of Systems: 12-point Review performed and negative aside from that noted in HPI.    Objective:    Vital signs:  /58 (BP Location: Right arm)   Pulse 59   Temp 98.4  F (36.9  C) (Oral)   Resp 20   Ht 1.664 m (5' 5.5\")   Wt 68.8 kg (151 lb 9.6 oz)   SpO2 94%   BMI 24.84 kg/m      GENERAL APPEARANCE: healthy, alert and no distress     EYES: EOMI, - PERRL     NECK: no adenopathy, no asymmetry, masses, or scars and thyroid normal to palpation     RESP: lungs clear to auscultation - no rales, rhonchi or wheezes     CV: regular rates and rhythm, normal S1 S2, no S3 or S4 and no murmur, click or rub -     ABDOMEN:  soft, nontender, no HSM or masses and bowel sounds normal     MS: extremities normal- no gross deformities noted, no evidence of inflammation in joints, FROM in all extremities.     SKIN: no suspicious lesions or rashes     NEURO: Normal strength and tone, sensory exam grossly normal, mentation intact and speech normal     PSYCH: mentation appears normal. and affect normal/bright     LYMPHATICS: No axillary, cervical, inguinal, or supraclavicular nodes        Data    CXR:  Personally reviewed  XAM: XR CHEST PORT 1 VIEW  LOCATION: [facility]  DATE: 2/11/2025     INDICATION: worsening hypoxia, hx of pneumothorax  COMPARISON: 2/8/2025 " and CT 1/5/2025                                                                      IMPRESSION: Right lung is clear with no recurrent right pneumothorax. Some minimal pleural thickening and fibrosis left lung base unchanged. No new findings.      Laboratory:  Results for orders placed or performed during the hospital encounter of 02/08/25   Basic metabolic panel    Collection Time: 02/12/25  4:54 AM   Result Value Ref Range    Sodium 143 135 - 145 mmol/L    Potassium 3.9 3.4 - 5.3 mmol/L    Chloride 99 98 - 107 mmol/L    Carbon Dioxide (CO2) 31 (H) 22 - 29 mmol/L    Anion Gap 13 7 - 15 mmol/L    Urea Nitrogen 54.1 (H) 8.0 - 23.0 mg/dL    Creatinine 1.27 (H) 0.67 - 1.17 mg/dL    GFR Estimate 58 (L) >60 mL/min/1.73m2    Calcium 9.8 8.8 - 10.4 mg/dL    Glucose 113 (H) 70 - 99 mg/dL     Lab Results   Component Value Date    WBC 5.7 02/12/2025    HGB 11.6 (L) 02/12/2025    HCT 36.0 (L) 02/12/2025    MCV 94 02/12/2025     (L) 02/12/2025

## 2025-02-12 NOTE — PROGRESS NOTES
Goal Outcome Evaluation:    Patient is A&Ox4. VSS on 4L O2 via oxymask. Afebrile. Denies pain, sob, chest pain, dizziness, headache, nor n/v. PIV is intact and saline locked. Repositions and shifts weight independently and with encouragement. Intermittently incontinent and voiding spontaneously. Pivot to bedside commode and urinal at bedside. Call light within reach.     Problem: Adult Inpatient Plan of Care  Goal: Absence of Hospital-Acquired Illness or Injury  Intervention: Identify and Manage Fall Risk  Recent Flowsheet Documentation  Taken 2/11/2025 2338 by Adrienne Robbins RN  Safety Promotion/Fall Prevention:   safety round/check completed   activity supervised   lighting adjusted   nonskid shoes/slippers when out of bed   room near nurse's station  Taken 2/11/2025 2130 by Adrienne Robbins RN  Safety Promotion/Fall Prevention:   safety round/check completed   activity supervised   lighting adjusted   nonskid shoes/slippers when out of bed   room near nurse's station  Taken 2/11/2025 1943 by Adrienne Robbins RN  Safety Promotion/Fall Prevention:   safety round/check completed   activity supervised   lighting adjusted   nonskid shoes/slippers when out of bed   room near nurse's station  Intervention: Prevent Skin Injury  Recent Flowsheet Documentation  Taken 2/11/2025 2338 by Adrienne Robbins RN  Body Position:   weight shifting   supine, head elevated  Skin Protection:   adhesive use limited   tubing/devices free from skin contact  Taken 2/11/2025 2130 by Adrienne Robbins RN  Body Position:   supine, head elevated   weight shifting  Taken 2/11/2025 1943 by Adrienne Robbins RN  Body Position:   log-rolled   position changed independently  Skin Protection:   adhesive use limited   tubing/devices free from skin contact  Intervention: Prevent and Manage VTE (Venous Thromboembolism) Risk  Recent Flowsheet Documentation  Taken 2/11/2025 2338 by Adrienne Robbins RN  VTE Prevention/Management: SCDs off (sequential compression  devices)  Taken 2/11/2025 1943 by Adrienne Robbins RN  VTE Prevention/Management: SCDs off (sequential compression devices)  Intervention: Prevent Infection  Recent Flowsheet Documentation  Taken 2/11/2025 2338 by Adrienne Robbins RN  Infection Prevention:   hand hygiene promoted   rest/sleep promoted   single patient room provided  Taken 2/11/2025 1943 by Adrienne Robbins RN  Infection Prevention:   hand hygiene promoted   rest/sleep promoted   single patient room provided  Goal: Optimal Comfort and Wellbeing  Outcome: Progressing  Intervention: Provide Person-Centered Care  Recent Flowsheet Documentation  Taken 2/11/2025 2338 by Adrienne Robbins RN  Trust Relationship/Rapport: care explained  Taken 2/11/2025 1943 by Adrienne Robbins RN  Trust Relationship/Rapport: care explained     Problem: Delirium  Goal: Optimal Coping  Intervention: Optimize Psychosocial Adjustment to Delirium  Recent Flowsheet Documentation  Taken 2/11/2025 2338 by Adrienne Robbins RN  Family/Support System Care:   self-care encouraged   support provided   involvement promoted  Taken 2/11/2025 1943 by Adrienne Robbins RN  Family/Support System Care:   self-care encouraged   support provided   involvement promoted  Goal: Improved Behavioral Control  Intervention: Prevent and Manage Agitation  Recent Flowsheet Documentation  Taken 2/11/2025 2338 by Adrienne Robbins RN  Environment Familiarity/Consistency:   daily routine followed   personal clothing/items utilized  Taken 2/11/2025 1943 by Adrienne Robbins RN  Environment Familiarity/Consistency:   daily routine followed   personal clothing/items utilized  Intervention: Minimize Safety Risk  Recent Flowsheet Documentation  Taken 2/11/2025 2338 by Adrienne Robbins RN  Enhanced Safety Measures: room near unit station  Trust Relationship/Rapport: care explained  Taken 2/11/2025 2130 by Adrienne Robbins RN  Enhanced Safety Measures: room near unit station  Taken 2/11/2025 1943 by Adrienne Robbins RN  Enhanced Safety Measures: room  near unit station  Trust Relationship/Rapport: care explained  Goal: Improved Attention and Thought Clarity  Outcome: Progressing  Intervention: Maximize Cognitive Function  Recent Flowsheet Documentation  Taken 2/11/2025 2338 by Adrienne Robbins RN  Sensory Stimulation Regulation:   care clustered   lighting decreased   quiet environment promoted   television on  Reorientation Measures: clock in view  Taken 2/11/2025 1943 by Adrienne Robbins RN  Sensory Stimulation Regulation:   care clustered   lighting decreased   quiet environment promoted   television on  Reorientation Measures: clock in view  Goal: Improved Sleep  Outcome: Progressing     Problem: Heart Failure  Goal: Optimal Coping  Intervention: Support Psychosocial Response  Recent Flowsheet Documentation  Taken 2/11/2025 2338 by Adrienne Robbins RN  Family/Support System Care:   self-care encouraged   support provided   involvement promoted  Taken 2/11/2025 1943 by Adrienne Robbins RN  Family/Support System Care:   self-care encouraged   support provided   involvement promoted  Goal: Optimal Cardiac Output  Intervention: Optimize Cardiac Output  Recent Flowsheet Documentation  Taken 2/11/2025 2338 by Adrienne Robbins RN  Environmental Support:   calm environment promoted   rest periods encouraged  Taken 2/11/2025 1943 by Adrienne Robbins RN  Environmental Support:   calm environment promoted   rest periods encouraged  Goal: Fluid and Electrolyte Balance  Intervention: Monitor and Manage Fluid and Electrolyte Balance  Recent Flowsheet Documentation  Taken 2/11/2025 2338 by Adrienne Robbins RN  Fluid/Electrolyte Management: fluids restricted  Taken 2/11/2025 1943 by Adrienne Robbins RN  Fluid/Electrolyte Management: fluids restricted  Goal: Optimal Functional Ability  Intervention: Optimize Functional Ability  Recent Flowsheet Documentation  Taken 2/11/2025 2338 by Adrienne Robbins RN  Activity Management:   activity adjusted per tolerance   activity encouraged  Taken 2/11/2025 2130  by Robbins, Adrienne O, RN  Activity Management:   activity adjusted per tolerance   activity encouraged  Taken 2/11/2025 1943 by Adrienne Robbins RN  Activity Management:   activity adjusted per tolerance   activity encouraged  Goal: Effective Oxygenation and Ventilation  Outcome: Progressing  Intervention: Promote Airway Secretion Clearance  Recent Flowsheet Documentation  Taken 2/11/2025 2338 by Adrienne Robbins RN  Cough And Deep Breathing: done independently per patient  Activity Management:   activity adjusted per tolerance   activity encouraged  Taken 2/11/2025 2130 by Adrienne Robbins RN  Activity Management:   activity adjusted per tolerance   activity encouraged  Taken 2/11/2025 1943 by Adrienne Robbins RN  Cough And Deep Breathing: done independently per patient  Activity Management:   activity adjusted per tolerance   activity encouraged  Intervention: Optimize Oxygenation and Ventilation  Recent Flowsheet Documentation  Taken 2/11/2025 2338 by Adrienne Robbins RN  Head of Bed (HOB) Positioning: HOB at 20-30 degrees  Taken 2/11/2025 2130 by Adrienne Robbins RN  Head of Bed (HOB) Positioning: HOB at 30-45 degrees  Taken 2/11/2025 1943 by Adrienne Robbins RN  Head of Bed (HOB) Positioning: HOB at 30-45 degrees  Goal: Effective Breathing Pattern During Sleep  Outcome: Progressing     Problem: COPD (Chronic Obstructive Pulmonary Disease)  Goal: Optimal Chronic Illness Coping  Intervention: Support and Optimize Psychosocial Response  Recent Flowsheet Documentation  Taken 2/11/2025 2338 by Adrienne Robbins RN  Family/Support System Care:   self-care encouraged   support provided   involvement promoted  Taken 2/11/2025 1943 by Adrienne Robbins RN  Family/Support System Care:   self-care encouraged   support provided   involvement promoted  Goal: Optimal Level of Functional [place]  Intervention: Optimize Functional Ability  Recent Flowsheet Documentation  Taken 2/11/2025 2338 by Adrienne Robbins RN  Activity Management:   activity adjusted  per tolerance   activity encouraged  Taken 2/11/2025 2130 by Adrienne Robbins RN  Activity Management:   activity adjusted per tolerance   activity encouraged  Taken 2/11/2025 1943 by Adrienne Robbins RN  Activity Management:   activity adjusted per tolerance   activity encouraged  Goal: Absence of Infection Signs and Symptoms  Intervention: Prevent or Manage Infection  Recent Flowsheet Documentation  Taken 2/11/2025 2338 by Adrienne Robbins RN  Isolation Precautions:   contact precautions maintained   droplet precautions maintained  Taken 2/11/2025 1943 by Adrienne Robbins RN  Isolation Precautions:   contact precautions maintained   droplet precautions maintained  Goal: Effective Oxygenation and Ventilation  Outcome: Progressing  Intervention: Promote Airway Secretion Clearance  Recent Flowsheet Documentation  Taken 2/11/2025 2338 by Adrienne Robbins RN  Cough And Deep Breathing: done independently per patient  Activity Management:   activity adjusted per tolerance   activity encouraged  Taken 2/11/2025 2130 by Adrienne Robbins RN  Activity Management:   activity adjusted per tolerance   activity encouraged  Taken 2/11/2025 1943 by Adrienne Robbins RN  Cough And Deep Breathing: done independently per patient  Activity Management:   activity adjusted per tolerance   activity encouraged  Intervention: Optimize Oxygenation and Ventilation  Recent Flowsheet Documentation  Taken 2/11/2025 2338 by Adrienne Robbins RN  Fluid/Electrolyte Management: fluids restricted  Head of Bed (HOB) Positioning: HOB at 20-30 degrees  Taken 2/11/2025 2130 by Adrienne Robbins RN  Head of Bed (HOB) Positioning: HOB at 30-45 degrees  Taken 2/11/2025 1943 by Adrienne Robbins RN  Fluid/Electrolyte Management: fluids restricted  Head of Bed (HOB) Positioning: HOB at 30-45 degrees     Problem: Fall Injury Risk  Goal: Absence of Fall and Fall-Related Injury  Outcome: Progressing  Intervention: Promote Injury-Free Environment  Recent Flowsheet Documentation  Taken 2/11/2025  2338 by Adrienne Robbins RN  Safety Promotion/Fall Prevention:   safety round/check completed   activity supervised   lighting adjusted   nonskid shoes/slippers when out of bed   room near nurse's station  Taken 2/11/2025 2130 by Adrienne Robbins RN  Safety Promotion/Fall Prevention:   safety round/check completed   activity supervised   lighting adjusted   nonskid shoes/slippers when out of bed   room near nurse's station  Taken 2/11/2025 1943 by Adrienne Robbins RN  Safety Promotion/Fall Prevention:   safety round/check completed   activity supervised   lighting adjusted   nonskid shoes/slippers when out of bed   room near nurse's station     Problem: Skin Injury Risk Increased  Goal: Skin Health and Integrity  Outcome: Progressing  Intervention: Plan: Nurse Driven Intervention: Moisture Management  Recent Flowsheet Documentation  Taken 2/11/2025 2338 by Adrienne Robbins RN  Moisture Interventions:   Encourage regular toileting   Incontinence pad  Bathing/Skin Care: incontinence care  Taken 2/11/2025 1943 by Adrienne Robbins RN  Moisture Interventions:   Encourage regular toileting   Incontinence pad  Bathing/Skin Care: incontinence care  Intervention: Plan: Nurse Driven Intervention: Friction and Shear  Recent Flowsheet Documentation  Taken 2/11/2025 2338 by Adrienne Robbins RN  Friction/Shear Interventions: HOB 30 degrees or less  Taken 2/11/2025 1943 by Adrienne Robbins RN  Friction/Shear Interventions: HOB 30 degrees or less  Intervention: Optimize Skin Protection  Recent Flowsheet Documentation  Taken 2/11/2025 2338 by Adrienne Robbins RN  Skin Protection:   adhesive use limited   tubing/devices free from skin contact  Activity Management:   activity adjusted per tolerance   activity encouraged  Head of Bed (HOB) Positioning: HOB at 20-30 degrees  Taken 2/11/2025 2130 by Adrienne Robbins RN  Activity Management:   activity adjusted per tolerance   activity encouraged  Head of Bed (HOB) Positioning: HOB at 30-45 degrees  Taken 2/11/2025  1943 by Adrienne Robbins RN  Skin Protection:   adhesive use limited   tubing/devices free from skin contact  Activity Management:   activity adjusted per tolerance   activity encouraged  Head of Bed (HOB) Positioning: HOB at 30-45 degrees     Problem: Risk for Delirium  Goal: Optimal Coping  Intervention: Optimize Psychosocial Adjustment to Delirium  Recent Flowsheet Documentation  Taken 2/11/2025 2338 by Adrienne Robbins RN  Family/Support System Care:   self-care encouraged   support provided   involvement promoted  Taken 2/11/2025 1943 by Adrienne Robbins RN  Family/Support System Care:   self-care encouraged   support provided   involvement promoted  Goal: Improved Behavioral Control  Intervention: Prevent and Manage Agitation  Recent Flowsheet Documentation  Taken 2/11/2025 2338 by Adrienne Robbins RN  Environment Familiarity/Consistency:   daily routine followed   personal clothing/items utilized  Taken 2/11/2025 1943 by Adrienne Robbins RN  Environment Familiarity/Consistency:   daily routine followed   personal clothing/items utilized  Intervention: Minimize Safety Risk  Recent Flowsheet Documentation  Taken 2/11/2025 2338 by Adrienne Robbins RN  Enhanced Safety Measures: room near unit station  Trust Relationship/Rapport: care explained  Taken 2/11/2025 2130 by Adrienne Robbins RN  Enhanced Safety Measures: room near unit station  Taken 2/11/2025 1943 by Adrienne Robbins RN  Enhanced Safety Measures: room near unit station  Trust Relationship/Rapport: care explained  Goal: Improved Attention and Thought Clarity  Outcome: Progressing  Intervention: Maximize Cognitive Function  Recent Flowsheet Documentation  Taken 2/11/2025 2338 by Adrienne Robbins RN  Sensory Stimulation Regulation:   care clustered   lighting decreased   quiet environment promoted   television on  Reorientation Measures: clock in view  Taken 2/11/2025 1943 by Adrienne Robbins RN  Sensory Stimulation Regulation:   care clustered   lighting decreased   quiet environment  promoted   television on  Reorientation Measures: clock in view  Goal: Improved Sleep  Outcome: Progressing

## 2025-02-12 NOTE — PROGRESS NOTES
Pt last seen on O2 1 LPM NC with humidification, SpO2 93%, BS diminished, no respiratory distress note, schedule neb given per order, BiPAP on standby, flutter valve done this afternoon. RT following.    Roger Dickinson, RT

## 2025-02-12 NOTE — PROGRESS NOTES
Heart Failure Care Map  GOALS TO BE MET BEFORE DISCHARGE:    1. Decrease congestion and/or edema with diuretic therapy to achieve near optimal volume status.     Dyspnea improved: No, further care required to meet this goal. Please explain ; 2 episodes of sob   Edema improved: Yes, satisfactory for discharge.        Last 24 hour I/O:   Intake/Output Summary (Last 24 hours) at 2/12/2025 1430  Last data filed at 2/12/2025 1300  Gross per 24 hour   Intake 1415 ml   Output 150 ml   Net 1265 ml           Net I/O and Weights since admission:   01/13 1500 - 02/12 1459  In: 4067 [P.O.:3857; I.V.:110]  Out: 4150 [Urine:4150]  Net: -83     Vitals:    02/08/25 1041 02/08/25 2256 02/10/25 0600   Weight: 76.2 kg (168 lb) 70.9 kg (156 lb 4.8 oz) 68.8 kg (151 lb 9.6 oz)       2.  O2 sats > 90% on room air, or at prior home O2 therapy level.      Able to wean O2 this shift to keep sats above 90%?: No, further care required to meet this goal. Please explain ; wean to 3L per n/c but pt still having episodes of shortness of breath with anxiety.   Does patient use Home O2? No          Current oxygenation status:   SpO2: 95 %     O2 Device: Nasal cannula, Oxygen Delivery: 3 LPM    3.  Tolerates ambulation and mobility near baseline.     Ambulation: No, further care required to meet this goal. Please explain ' need to wean off oxygen   Times patient ambulated with staff this shift: 3      Please review the Heart Failure Care Map for additional HF goal outcomes.    Shefali Cormier, RN  2/12/2025    [facility] Admission: 8- Walk 250 feet or more  [facility] Daily7-Walk 25 feet or more

## 2025-02-13 ENCOUNTER — APPOINTMENT (OUTPATIENT)
Dept: SPEECH THERAPY | Facility: CLINIC | Age: 80
DRG: 291 | End: 2025-02-13
Attending: HOSPITALIST
Payer: MEDICARE

## 2025-02-13 ENCOUNTER — APPOINTMENT (OUTPATIENT)
Dept: OCCUPATIONAL THERAPY | Facility: CLINIC | Age: 80
DRG: 291 | End: 2025-02-13
Attending: HOSPITALIST
Payer: MEDICARE

## 2025-02-13 VITALS
RESPIRATION RATE: 20 BRPM | SYSTOLIC BLOOD PRESSURE: 122 MMHG | OXYGEN SATURATION: 94 % | DIASTOLIC BLOOD PRESSURE: 60 MMHG | BODY MASS INDEX: 23.82 KG/M2 | HEART RATE: 53 BPM | HEIGHT: 66 IN | TEMPERATURE: 98 F | WEIGHT: 148.2 LBS

## 2025-02-13 LAB
ANION GAP SERPL CALCULATED.3IONS-SCNC: 14 MMOL/L (ref 7–15)
BUN SERPL-MCNC: 65 MG/DL (ref 8–23)
CALCIUM SERPL-MCNC: 9.7 MG/DL (ref 8.8–10.4)
CHLORIDE SERPL-SCNC: 97 MMOL/L (ref 98–107)
CREAT SERPL-MCNC: 1.34 MG/DL (ref 0.67–1.17)
EGFRCR SERPLBLD CKD-EPI 2021: 54 ML/MIN/1.73M2
ERYTHROCYTE [DISTWIDTH] IN BLOOD BY AUTOMATED COUNT: 15.6 % (ref 10–15)
GLUCOSE BLDC GLUCOMTR-MCNC: 152 MG/DL (ref 70–99)
GLUCOSE BLDC GLUCOMTR-MCNC: 153 MG/DL (ref 70–99)
GLUCOSE BLDC GLUCOMTR-MCNC: 198 MG/DL (ref 70–99)
GLUCOSE BLDC GLUCOMTR-MCNC: 226 MG/DL (ref 70–99)
GLUCOSE SERPL-MCNC: 174 MG/DL (ref 70–99)
HCO3 SERPL-SCNC: 29 MMOL/L (ref 22–29)
HCT VFR BLD AUTO: 35.8 % (ref 40–53)
HGB BLD-MCNC: 11.6 G/DL (ref 13.3–17.7)
MAGNESIUM SERPL-MCNC: 2.3 MG/DL (ref 1.7–2.3)
MCH RBC QN AUTO: 30 PG (ref 26.5–33)
MCHC RBC AUTO-ENTMCNC: 32.4 G/DL (ref 31.5–36.5)
MCV RBC AUTO: 93 FL (ref 78–100)
PLATELET # BLD AUTO: 156 10E3/UL (ref 150–450)
POTASSIUM SERPL-SCNC: 4.1 MMOL/L (ref 3.4–5.3)
RBC # BLD AUTO: 3.87 10E6/UL (ref 4.4–5.9)
SODIUM SERPL-SCNC: 140 MMOL/L (ref 135–145)
WBC # BLD AUTO: 4.5 10E3/UL (ref 4–11)

## 2025-02-13 PROCEDURE — 85027 COMPLETE CBC AUTOMATED: CPT | Performed by: STUDENT IN AN ORGANIZED HEALTH CARE EDUCATION/TRAINING PROGRAM

## 2025-02-13 PROCEDURE — 250N000013 HC RX MED GY IP 250 OP 250 PS 637: Performed by: HOSPITALIST

## 2025-02-13 PROCEDURE — 97110 THERAPEUTIC EXERCISES: CPT | Mod: GO

## 2025-02-13 PROCEDURE — 94640 AIRWAY INHALATION TREATMENT: CPT

## 2025-02-13 PROCEDURE — 999N000157 HC STATISTIC RCP TIME EA 10 MIN

## 2025-02-13 PROCEDURE — 99232 SBSQ HOSP IP/OBS MODERATE 35: CPT | Performed by: HOSPITALIST

## 2025-02-13 PROCEDURE — 97535 SELF CARE MNGMENT TRAINING: CPT | Mod: GO

## 2025-02-13 PROCEDURE — 250N000013 HC RX MED GY IP 250 OP 250 PS 637: Performed by: INTERNAL MEDICINE

## 2025-02-13 PROCEDURE — 250N000012 HC RX MED GY IP 250 OP 636 PS 637: Performed by: INTERNAL MEDICINE

## 2025-02-13 PROCEDURE — 92526 ORAL FUNCTION THERAPY: CPT | Mod: GN

## 2025-02-13 PROCEDURE — 120N000004 HC R&B MS OVERFLOW

## 2025-02-13 PROCEDURE — 99233 SBSQ HOSP IP/OBS HIGH 50: CPT | Performed by: INTERNAL MEDICINE

## 2025-02-13 PROCEDURE — 250N000011 HC RX IP 250 OP 636: Performed by: STUDENT IN AN ORGANIZED HEALTH CARE EDUCATION/TRAINING PROGRAM

## 2025-02-13 PROCEDURE — 83735 ASSAY OF MAGNESIUM: CPT | Performed by: HOSPITALIST

## 2025-02-13 PROCEDURE — 36415 COLL VENOUS BLD VENIPUNCTURE: CPT | Performed by: STUDENT IN AN ORGANIZED HEALTH CARE EDUCATION/TRAINING PROGRAM

## 2025-02-13 PROCEDURE — 250N000013 HC RX MED GY IP 250 OP 250 PS 637: Performed by: STUDENT IN AN ORGANIZED HEALTH CARE EDUCATION/TRAINING PROGRAM

## 2025-02-13 PROCEDURE — 250N000009 HC RX 250: Performed by: HOSPITALIST

## 2025-02-13 PROCEDURE — 80048 BASIC METABOLIC PNL TOTAL CA: CPT | Performed by: STUDENT IN AN ORGANIZED HEALTH CARE EDUCATION/TRAINING PROGRAM

## 2025-02-13 PROCEDURE — 250N000011 HC RX IP 250 OP 636: Mod: JW | Performed by: HOSPITALIST

## 2025-02-13 PROCEDURE — 94640 AIRWAY INHALATION TREATMENT: CPT | Mod: 76

## 2025-02-13 PROCEDURE — 92610 EVALUATE SWALLOWING FUNCTION: CPT | Mod: GN

## 2025-02-13 PROCEDURE — 94799 UNLISTED PULMONARY SVC/PX: CPT

## 2025-02-13 RX ORDER — LORAZEPAM 0.5 MG/1
0.5 TABLET ORAL EVERY 4 HOURS PRN
Status: DISCONTINUED | OUTPATIENT
Start: 2025-02-13 | End: 2025-02-15 | Stop reason: HOSPADM

## 2025-02-13 RX ORDER — FUROSEMIDE 40 MG/1
40 TABLET ORAL DAILY
Status: DISCONTINUED | OUTPATIENT
Start: 2025-02-14 | End: 2025-02-13

## 2025-02-13 RX ORDER — PREDNISONE 20 MG/1
40 TABLET ORAL DAILY
Status: DISCONTINUED | OUTPATIENT
Start: 2025-02-13 | End: 2025-02-15 | Stop reason: HOSPADM

## 2025-02-13 RX ORDER — FUROSEMIDE 40 MG/1
40 TABLET ORAL DAILY
Status: DISCONTINUED | OUTPATIENT
Start: 2025-02-14 | End: 2025-02-15 | Stop reason: HOSPADM

## 2025-02-13 RX ADMIN — LOSARTAN POTASSIUM 25 MG: 25 TABLET, FILM COATED ORAL at 19:30

## 2025-02-13 RX ADMIN — PANTOPRAZOLE SODIUM 40 MG: 40 TABLET, DELAYED RELEASE ORAL at 09:45

## 2025-02-13 RX ADMIN — FLUTICASONE PROPIONATE 1 SPRAY: 50 SPRAY, METERED NASAL at 09:53

## 2025-02-13 RX ADMIN — IPRATROPIUM BROMIDE AND ALBUTEROL SULFATE 3 ML: .5; 3 SOLUTION RESPIRATORY (INHALATION) at 11:26

## 2025-02-13 RX ADMIN — FUROSEMIDE 40 MG: 10 INJECTION, SOLUTION INTRAVENOUS at 09:44

## 2025-02-13 RX ADMIN — BISOPROLOL FUMARATE 5 MG: 5 TABLET ORAL at 09:45

## 2025-02-13 RX ADMIN — FUROSEMIDE 40 MG: 10 INJECTION, SOLUTION INTRAVENOUS at 01:30

## 2025-02-13 RX ADMIN — METHYLPREDNISOLONE SODIUM SUCCINATE 60 MG: 40 INJECTION, POWDER, FOR SOLUTION INTRAMUSCULAR; INTRAVENOUS at 09:43

## 2025-02-13 RX ADMIN — POLYETHYLENE GLYCOL 3350 17 G: 17 POWDER, FOR SOLUTION ORAL at 11:58

## 2025-02-13 RX ADMIN — GUAIFENESIN 600 MG: 600 TABLET ORAL at 09:44

## 2025-02-13 RX ADMIN — IPRATROPIUM BROMIDE AND ALBUTEROL SULFATE 3 ML: .5; 3 SOLUTION RESPIRATORY (INHALATION) at 08:11

## 2025-02-13 RX ADMIN — ENOXAPARIN SODIUM 40 MG: 40 INJECTION SUBCUTANEOUS at 11:58

## 2025-02-13 RX ADMIN — INSULIN ASPART 1 UNITS: 100 INJECTION, SOLUTION INTRAVENOUS; SUBCUTANEOUS at 17:23

## 2025-02-13 RX ADMIN — IPRATROPIUM BROMIDE AND ALBUTEROL SULFATE 3 ML: .5; 3 SOLUTION RESPIRATORY (INHALATION) at 16:12

## 2025-02-13 RX ADMIN — LORAZEPAM 0.5 MG: 0.5 TABLET ORAL at 14:34

## 2025-02-13 RX ADMIN — PREDNISONE 40 MG: 20 TABLET ORAL at 11:58

## 2025-02-13 RX ADMIN — CLOPIDOGREL BISULFATE 75 MG: 75 TABLET ORAL at 09:58

## 2025-02-13 RX ADMIN — LOSARTAN POTASSIUM 25 MG: 25 TABLET, FILM COATED ORAL at 09:44

## 2025-02-13 RX ADMIN — SALINE NASAL SPRAY 2 SPRAY: 1.5 SOLUTION NASAL at 19:30

## 2025-02-13 RX ADMIN — ASPIRIN 81 MG: 81 TABLET, COATED ORAL at 09:44

## 2025-02-13 RX ADMIN — DOCUSATE SODIUM 100 MG: 100 CAPSULE, LIQUID FILLED ORAL at 09:44

## 2025-02-13 RX ADMIN — METHYLPREDNISOLONE SODIUM SUCCINATE 60 MG: 40 INJECTION, POWDER, FOR SOLUTION INTRAMUSCULAR; INTRAVENOUS at 00:47

## 2025-02-13 RX ADMIN — SERTRALINE HYDROCHLORIDE 25 MG: 25 TABLET ORAL at 09:44

## 2025-02-13 RX ADMIN — IPRATROPIUM BROMIDE AND ALBUTEROL SULFATE 3 ML: .5; 3 SOLUTION RESPIRATORY (INHALATION) at 19:55

## 2025-02-13 RX ADMIN — OXYBUTYNIN CHLORIDE 10 MG: 5 TABLET, EXTENDED RELEASE ORAL at 09:45

## 2025-02-13 RX ADMIN — GUAIFENESIN 600 MG: 600 TABLET ORAL at 19:29

## 2025-02-13 RX ADMIN — ATORVASTATIN CALCIUM 80 MG: 40 TABLET, FILM COATED ORAL at 19:30

## 2025-02-13 RX ADMIN — CETIRIZINE HYDROCHLORIDE 10 MG: 10 TABLET, FILM COATED ORAL at 09:44

## 2025-02-13 RX ADMIN — SPIRONOLACTONE 12.5 MG: 25 TABLET, FILM COATED ORAL at 09:45

## 2025-02-13 RX ADMIN — INSULIN ASPART 2 UNITS: 100 INJECTION, SOLUTION INTRAVENOUS; SUBCUTANEOUS at 12:01

## 2025-02-13 ASSESSMENT — ACTIVITIES OF DAILY LIVING (ADL)
ADLS_ACUITY_SCORE: 50
ADLS_ACUITY_SCORE: 50
ADLS_ACUITY_SCORE: 52
ADLS_ACUITY_SCORE: 50
ADLS_ACUITY_SCORE: 56
ADLS_ACUITY_SCORE: 50
ADLS_ACUITY_SCORE: 52
ADLS_ACUITY_SCORE: 50
ADLS_ACUITY_SCORE: 50
ADLS_ACUITY_SCORE: 52
ADLS_ACUITY_SCORE: 56
ADLS_ACUITY_SCORE: 50
ADLS_ACUITY_SCORE: 50
ADLS_ACUITY_SCORE: 52
ADLS_ACUITY_SCORE: 52
ADLS_ACUITY_SCORE: 50
ADLS_ACUITY_SCORE: 52
ADLS_ACUITY_SCORE: 56
ADLS_ACUITY_SCORE: 50
ADLS_ACUITY_SCORE: 52
ADLS_ACUITY_SCORE: 50
ADLS_ACUITY_SCORE: 52
ADLS_ACUITY_SCORE: 50

## 2025-02-13 NOTE — PROGRESS NOTES
[id number]  Pt A &Ox4. Crackles BLL.IV SL. Denies pain. Pt O2 tubing was not plugged in and O2 sats at 85% on RA, placed back on 2.5L NC with humidification to bring O2 up to 93%. Continue 2L fluid restriction. SBA/A1 with walker gait belt. Plan for video swallow study tomorrow.

## 2025-02-13 NOTE — PROGRESS NOTES
"Speech-Language Pathology: Clinical Swallow Evaluation     02/13/25 0800   Appointment Info   Signing Clinician's Name / Credentials (SLP) Mallory Edmondson MS, CCC-SLP   General Information   Onset of Illness/Injury or Date of Surgery 02/08/25   Referring Physician Miller Kay, DO   Pertinent History of Current Problem Per referring provider, \"79 year old male with a past medical history of NSTEMI (s/p DESx2 to mid LAD on 1/8/2025), HFrEF with EF of 35 to 40%, recent traumatic right rib fracture and right pneumothorax s/p chest tube removal on 1/7/HLD, PAD, HTN, severe COPD with PFT in October 2021 showing FEV1 1.22 L (45%) and DLCOc 52%, NSCLC, active tobacco dependence, lung cancer s/p left upper lobectomy in 2017 with recurrence in 2018 s/p chemotherapy and radiation, peripheral arterial disease s/p left femoral endarterectomy and bilateral iliac stents, history of prostate cancer, GERD who presented with shortness of breath and productive cough for few weeks. He desaturated to 78% when EMS arrived.  He was found to have elevated pro-BNP and troponin, congested pulmonary vasculature x-ray,  and wheezing on exam. He was admitted for acute on chronic HFrEF and COPD exacerbation.  BiPAP and IV Lasix 60 mg was started in the ED. Now on 40mg IV q8h. Losartan increased from 12.5mg daily to 25mg bid to help with BP control. Cardiology is consulted. Continues to have episodes of tachypnea, severe shortness of breath.  Oxygen needs slightly worse today.  Asked for pulmonary help.  Also followed by cardiology, continued on IV diuresis.\" Clinical swallow evaluation completed per MD orders.   General Observations Alert and cooperative. Pt sitting upright in bed.   Type of Evaluation   Type of Evaluation Swallow Evaluation   Oral Motor   Oral Musculature generally intact   Structural Abnormalities none present   Mucosal Quality good   Dentition (Oral Motor)   Dentition (Oral Motor) significant number of missing " "teeth;dental appliance/dentures   Dental Appliance/Denture (Oral Motor) upper;dentures, full;lower;dentures, partial   Facial Symmetry (Oral Motor)   Facial Symmetry (Oral Motor) WNL   Lip Function (Oral Motor)   Lip Range of Motion (Oral Motor) WNL   Lip Strength (Oral Motor) WNL   Lip Coordination (Oral Motor) bilateral   Tongue Function (Oral Motor)   Tongue Strength (Oral Motor) WNL   Tongue Coordination/Speed (Oral Motor) WNL   Tongue ROM (Oral Motor) WNL   Jaw Function (Oral Motor)   Jaw Function (Oral Motor) WNL   Cough/Swallow/Gag Reflex (Oral Motor)   Soft Palate/Velum (Oral Motor) WNL   Volitional Throat Clear/Cough (Oral Motor) WNL   Volitional Swallow (Oral Motor) WNL   Vocal Quality/Secretion Management (Oral Motor)   Vocal Quality (Oral Motor) WNL   Secretion Management (Oral Motor) WNL   General Swallowing Observations   Past History of Dysphagia Per EMR review, none. Pt endorses consuming a regular diet and thin liquids at baseline with coughing ~1x/day on average, but does not sensate it \"going down the wrong way\". Pt currently endorses pt with intermittent dry cough throughout the day, unchanged with PO intake. Pt endorses that he had a \"swallow x-ray test\" but unsure if esophagram or VFSS, which pt said, \"it must have been normal as I don't remember changing anything\". Pt with periods of SOB and rapid breathing, which pt endorsed does not appear to correspond or coordinate with meals or PO intake.   Comment, General Swallowing Observations RN endorsed no overt s/s of aspiration with PO intake, tolerating regular diet and thin liquids since 2/10.   Respiratory Support nasal cannula  (2L)   Current Diet/Method of Nutritional Intake (General Swallowing Observations, NIS) regular diet;thin liquids (level 0)   Swallowing Evaluation Clinical swallow evaluation   Clinical Swallow Evaluation   Feeding Assistance no assistance needed   Additional evaluation(s) completed today Recommended   Rationale for " completing additional evaluation to further evaluation anatomy & physiology of swallowing and r/o silent aspiration   Clinical Swallow Evaluation Textures Trialed thin liquids;pureed;solid foods   Clinical Swallow Eval: Thin Liquid Texture Trial   Mode of Presentation, Thin Liquids cup;straw;self-fed   Volume of Liquid or Food Presented 4 oz   Oral Phase of Swallow WFL   Pharyngeal Phase of Swallow intact;coughing/choking  (x1)   Diagnostic Statement x1 dry cough delayed, suspect unrelated to swallowing function. Pt endorsed same as baseline cough. No other overt s/s of aspiration.   Clinical Swallow Evaluation: Puree Solid Texture Trial   Mode of Presentation, Puree spoon;self-fed   Volume of Puree Presented 2 oz   Oral Phase, Puree WFL   Pharyngeal Phase, Puree intact   Diagnostic Statement No overt s/s of aspiration or dysphagia. Pt denies globus sensation.   Clinical Swallow Evaluation: Solid Food Texture Trial   Mode of Presentation self-fed   Volume Presented 1 belvita cracker   Oral Phase WFL   Pharyngeal Phase intact   Diagnostic Statement No overt s/s of aspiration or dysphagia. Pt denies globus sensation. Timely and complete mastication.   Esophageal Phase of Swallow   Patient reports or presents with symptoms of esophageal dysphagia No   Esophageal comments hx of GERD per chart, but pt denied   Swallowing Recommendations   Diet Consistency Recommendations thin liquids (level 0);regular diet   Supervision Level for Intake patient independent   Mode of Delivery Recommendations bolus size, small;slow rate of intake   Postural Recommendations none   Monitoring/Assistance Required (Eating/Swallowing) stop eating activities when fatigue is present;monitor for cough or change in vocal quality with intake   Recommended Feeding/Eating Techniques (Swallow Eval) maintain upright sitting position for eating   Medication Administration Recommendations, Swallowing (SLP) as tolerated   Instrumental Assessment  Recommendations VFSS (videofluoroscopic swallowing study)  (to r/o silent aspiration after discussion with DO)   Clinical Impression   Criteria for Skilled Therapeutic Interventions Met (SLP Eval) Yes, treatment indicated  (for further evaluation via VFSS)   SLP Diagnosis Functional oropharyngeal swallowing mechanism at bedside, warrants further evaluation for silent aspiration via VFSS   Risks & Benefits of therapy have been explained evaluation/treatment results reviewed;care plan/treatment goals reviewed;risks/benefits reviewed;current/potential barriers reviewed;participants included;patient;son;participants voiced agreement with care plan   Clinical Impression Comments Clinical Swallow Evaluation completed per DO orders. Oral motor function was WNL, notable for full upper dentures and partial lower dentures. Patient's voice was strong and clear. Patient on 2L NC. Patient had x1 dry cough, suspect unrelated, to swallowing during thin liquids. No other overt s/s aspiration with any texture trialed: thin liquids, puree, and regular diet. Mastication was timely and complete. No oral residue noted. Patient denied globus sensation. Recommend regular diet and thin liquids with safe swallowing strategies. Patient should be sitting fully upright & alert and take small bites/sips at a slow rate. After discussion with primary provider from results of Chest CT, will pursue a video swallow study (VFSS) to evaluate for silent aspiration. SLP will follow.   SLP Total Evaluation Time   Eval: oral/pharyngeal swallow function, clinical swallow Minutes (31528) 12   SLP Discharge Planning   SLP Plan VFSS   SLP Discharge Recommendation other (see comments)  (defer to medical team)   SLP Rationale for DC Rec Pt warrants further evaluation to assess for silent aspiration, unable to be assessed at bedside.   SLP Brief overview of current status  Recommend regular diet and thin liquids with safe swallowing strategies. Patient should be  sitting fully upright & alert and take small bites/sips at a slow rate. After discussion with primary provider from results of Chest CT, will pursue a video swallow study (VFSS) to evaluate for silent aspiration. SLP will follow.   SLP Time and Intention   Total Session Time (sum of timed and untimed services) 12

## 2025-02-13 NOTE — PROGRESS NOTES
SPIRITUAL HEALTH SERVICES Progress Note          Referral Source/Reason for Visit: [facility] visit to introduce [facility], scope of practice, and assess Ubaldo's spiritual/emotional wellbeing              Summary and Recommendations -   Ubaldo had difficulty breathing and appeared to have an anxiety attack. Talking appeared to be taxing for him.  Writer encouraged pt to breath through his nose and supported him in catching his breath.  Ubaldo reflected briefly on his fears.   His spouse Maribel has been at his side through out the day.  Ubaldo shared he had no [employer] affiliation    PLAN: [facility] available per request     Ashwin Funes M.Div., [facility]  Staff    [phone]   Spiritual Health Services is available 24/7 for emergent requests and consults, either by paging the on-call  or by entering an ASAP/STAT consult in [employer], which will also page the on-call .

## 2025-02-13 NOTE — PROGRESS NOTES
PULMONARY/CRITICAL CARE CONSULT NOTE    Date / Time of Admission:  2/8/2025  7:03 AM  Assessment:   79yoM with history of iCMP, EF 40% and recent traumatic pneumothorax that has resolved but here now with parainfluenza infection on top of moderate COPD and emphysema.     Clinically Significant Risk Factors          # Hypochloremia: Lowest Cl = 97 mmol/L in last 2 days, will monitor as appropriate         # Acute Kidney Injury, unspecified: based on a >150% or 0.3 mg/dL increase in last creatinine compared to past 90 day average, will monitor renal function  # Hypertension: Noted on problem list  # Heart failure, NOS: heart failure noted on the problem list and last echo with EF 40-50%               # Financial/Environmental Concerns: none              Active Problems:    Acute on chronic congestive heart failure, unspecified heart failure type (H)            Plan:   Steroids and nebs. Switch Solumedrol to prednisone 40 mg daily, may help with panic attacks   CT chest reviewed and no clear pneumonia. A right upper lobe opacity has not significantly changed. This is consistent with known adenocarcinoma and treatment   He may need oxygen temporarily on discharge if everything else would allow him to leave the hospital  Diuresis per cards. IV lasix switched to oral.Attention to renal function  Should discharge when ready with Anoro inhaler. He has no inhalers at home. (Has albuterol nebs only)   Panic attacks per hospitalist.       Subjective:    cc:  shortness of breath    HPI: 79 year old male with history of CKD, adenocarcinoma of the right lung who presents with dyspnea. Just hospitalized last month with pneumothorax after fall. EF was found to be 40%. Cardiac cath found CAD and he had stents placed and was diuresed. Subsequently discharged home until recently when more short of breath and wife noted increased coughing and sputum.     Last night he felt suddenly like he couldn't breath. He was panicked and was  placed on oxymask then bipap. This made things worse. CXR found no pneumothorax. This morning he feels well and breathing is good. Has been diuresed by cardiology/hospitalist.     PMHx:  iCMP, EF 35-40%  CKD III  Adenocarcinoma of the lung (right)  Prostate cancer  Neuropathy  COPD  Emphysema    Social History     Tobacco Use    Smoking status: Every Day     Current packs/day: 0.50     Types: Cigarettes     Passive exposure: Current    Smokeless tobacco: Never   Substance Use Topics    Alcohol use: Not Currently       History reviewed. No pertinent family history.    Current Facility-Administered Medications   Medication Dose Route Frequency Provider Last Rate Last Admin    acetaminophen (TYLENOL) tablet 650 mg  650 mg Oral Q6H PRN Allyn Terry MD   650 mg at 02/09/25 1354    Or    acetaminophen (TYLENOL) Suppository 650 mg  650 mg Rectal Q6H PRN Allyn Terry MD        aspirin EC tablet 81 mg  81 mg Oral Daily Allyn Terry MD   81 mg at 02/13/25 0944    atorvastatin (LIPITOR) tablet 80 mg  80 mg Oral At Bedtime Allyn Terry MD   80 mg at 02/12/25 1951    benzocaine-menthol (CEPACOL) 15-3.6 MG lozenge 1 lozenge  1 lozenge Buccal Q1H PRN Allyn Terry MD        bisoprolol (ZEBETA) tablet 5 mg  5 mg Oral Daily Miller Kay DO   5 mg at 02/13/25 0945    cetirizine (zyrTEC) tablet 10 mg  10 mg Oral QAM Allyn Terry MD   10 mg at 02/13/25 0944    clopidogrel (PLAVIX) tablet 75 mg  75 mg Oral Daily Allyn Terry MD   75 mg at 02/13/25 0958    Continuing ACE inhibitor/ARB/ARNI from home medication list OR ACE inhibitor/ARB/ARNI order already placed during this visit   Does not apply DOES NOT GO TO Allyn Smyth MD        glucose gel 15-30 g  15-30 g Oral Q15 Min PRMiller Ta DO        Or    dextrose 50 % injection 25-50 mL  25-50 mL Intravenous Q15 Min PRN Miller Kay DO        Or    glucagon injection 1 mg  1 mg Subcutaneous Q15 Min PRMiller Ta DO        docusate sodium (COLACE) capsule 100  mg  100 mg Oral BID Allyn Terry MD   100 mg at 02/13/25 0944    enoxaparin ANTICOAGULANT (LOVENOX) injection 40 mg  40 mg Subcutaneous Q24H Allyn Terry MD   40 mg at 02/12/25 1157    fluticasone (FLONASE) 50 MCG/ACT spray 1 spray  1 spray Both Nostrils Daily Miller Kay, DO   1 spray at 02/13/25 0953    [START ON 2/14/2025] furosemide (LASIX) tablet 40 mg  40 mg Oral Daily Opal Sosa MD        guaiFENesin (MUCINEX) 12 hr tablet 600 mg  600 mg Oral BID Miller Kay DO   600 mg at 02/13/25 0944    hydrALAZINE (APRESOLINE) injection 10 mg  10 mg Intravenous Q4H PRN Allyn Terry MD   10 mg at 02/08/25 1715    insulin aspart (NovoLOG) injection (RAPID ACTING)  1-7 Units Subcutaneous TID AC Miller Kay DO   1 Units at 02/12/25 1712    insulin aspart (NovoLOG) injection (RAPID ACTING)  1-5 Units Subcutaneous At Bedtime Miller Kay DO   1 Units at 02/12/25 2221    ipratropium - albuterol 0.5 mg/2.5 mg/3 mL (DUONEB) neb solution 3 mL  1 vial Nebulization 4x daily Miller Kay DO   3 mL at 02/13/25 0811    lidocaine (LMX4) cream   Topical Q1H PRN Allyn Terry MD        lidocaine 1 % 0.1-1 mL  0.1-1 mL Other Q1H PRN Allyn Terry MD        losartan (COZAAR) tablet 25 mg  25 mg Oral BID Heidi Arnold MD   25 mg at 02/13/25 0944    melatonin tablet 1 mg  1 mg Oral At Bedtime PRN Allyn Terry MD   1 mg at 02/08/25 2344    methylPREDNISolone sodium succinate (SOLU-MEDROL) injection 60 mg  60 mg Intravenous Q8H Miller Kay DO   60 mg at 02/13/25 0943    miconazole (MICATIN) 2 % powder   Topical BID PRN Allyn Terry MD        nitroGLYcerin (NITROSTAT) sublingual tablet 0.4 mg  0.4 mg Sublingual Q5 Min PRN Allyn Terry MD   0.4 mg at 02/08/25 0804    ondansetron (ZOFRAN ODT) ODT tab 4 mg  4 mg Oral Q6H PRN Allyn Terry MD        Or    ondansetron (ZOFRAN) injection 4 mg  4 mg Intravenous Q6H PRN Allyn Terry MD        oxyBUTYnin ER (DITROPAN XL) 24 hr tablet 10 mg  10 mg Oral Every Other Day Mara  "MD Allyn   10 mg at 02/13/25 0945    pantoprazole (PROTONIX) EC tablet 40 mg  40 mg Oral Daily Allyn Terry MD   40 mg at 02/13/25 0945    polyethylene glycol (MIRALAX) Packet 17 g  17 g Oral BID Allyn Terry MD   17 g at 02/10/25 1543    pregabalin (LYRICA) capsule 150 mg  150 mg Oral TID PRN Allyn Terry MD   150 mg at 02/09/25 1354    Reason beta blocker not prescribed   Other DOES NOT GO TO MAR Allyn Terry MD        sertraline (ZOLOFT) tablet 25 mg  25 mg Oral Daily Allyn Terry MD   25 mg at 02/13/25 0944    sodium chloride (OCEAN) 0.65 % nasal spray 2 spray  2 spray Both Nostrils TID Miller Kay DO   2 spray at 02/12/25 1952    sodium chloride (PF) 0.9% PF flush 3 mL  3 mL Intracatheter Q8H Allyn Terry MD   3 mL at 02/13/25 0958    sodium chloride (PF) 0.9% PF flush 3 mL  3 mL Intracatheter q1 min prn Allyn Terry MD        spironolactone (ALDACTONE) half-tab 12.5 mg  12.5 mg Oral Daily Allyn Terry MD   12.5 mg at 02/13/25 0945    [Held by provider] traZODone (DESYREL) tablet  mg   mg Oral At Bedtime Allyn Terry MD             Review of Systems: 12-point Review performed and negative aside from that noted in HPI.    Objective:    Vital signs:  /58 (BP Location: Right arm)   Pulse 60   Temp 97.7  F (36.5  C) (Oral)   Resp 17   Ht 1.664 m (5' 5.5\")   Wt 67.2 kg (148 lb 3.2 oz)   SpO2 (!) 88%   BMI 24.29 kg/m      GENERAL APPEARANCE: healthy, alert and no distress     EYES: EOMI, - PERRL     NECK: no adenopathy, no asymmetry, masses, or scars and thyroid normal to palpation     RESP: lungs clear to auscultation - no rales, rhonchi or wheezes     CV: regular rates and rhythm, normal S1 S2, no S3 or S4 and no murmur, click or rub -     ABDOMEN:  soft, nontender, no HSM or masses and bowel sounds normal     MS: extremities normal- no gross deformities noted, no evidence of inflammation in joints, FROM in all extremities.     SKIN: no suspicious lesions or rashes     NEURO: " Normal strength and tone, sensory exam grossly normal, mentation intact and speech normal     PSYCH: mentation appears normal. and affect normal/bright     LYMPHATICS: No axillary, cervical, inguinal, or supraclavicular nodes        Data    CXR:  Personally reviewed  XAM: XR CHEST PORT 1 VIEW  LOCATION: [facility]  DATE: 2/11/2025     INDICATION: worsening hypoxia, hx of pneumothorax  COMPARISON: 2/8/2025 and CT 1/5/2025                                                                      IMPRESSION: Right lung is clear with no recurrent right pneumothorax. Some minimal pleural thickening and fibrosis left lung base unchanged. No new findings.      Laboratory:  Results for orders placed or performed during the hospital encounter of 02/08/25   Basic metabolic panel    Collection Time: 02/13/25  4:57 AM   Result Value Ref Range    Sodium 140 135 - 145 mmol/L    Potassium 4.1 3.4 - 5.3 mmol/L    Chloride 97 (L) 98 - 107 mmol/L    Carbon Dioxide (CO2) 29 22 - 29 mmol/L    Anion Gap 14 7 - 15 mmol/L    Urea Nitrogen 65.0 (H) 8.0 - 23.0 mg/dL    Creatinine 1.34 (H) 0.67 - 1.17 mg/dL    GFR Estimate 54 (L) >60 mL/min/1.73m2    Calcium 9.7 8.8 - 10.4 mg/dL    Glucose 174 (H) 70 - 99 mg/dL     Lab Results   Component Value Date    WBC 4.5 02/13/2025    HGB 11.6 (L) 02/13/2025    HCT 35.8 (L) 02/13/2025    MCV 93 02/13/2025     02/13/2025     Lyssa Alatorre MD  Pulmonary, Critical Care and Sleep Medicine  [facility]  Pager: [phone]

## 2025-02-13 NOTE — PROGRESS NOTES
CARDIOLOGY PROGRESS NOTE      Assessment/Plan:  1.  Acute on chronic congestive heart failure, (H)-in the setting of midrange ejection fraction of 40 to 50%, still symptomatic with oxygen desaturation.  Weight down 3 kg, oxygen saturation 95% on 5 L.  Creatinine increased to 1.34, suspect dehydration, will hold off on IV diuresis.  Suspect shortness of breath is multifactorial.  2.  Coronary artery disease-secondary to positive troponin he underwent angiography, this showed normal left main, mid LAD 95% lesion that received a 3.0 x 22 Tan drug-coated stent as well as a 3.0 x 12 mm Tan drug-coated stent.  Circumflex had a proximal 45% lesion and right coronary artery was small codominant without any significant disease.  Symptomatically unchanged.  Plan on Plavix until 2026.  3.  Benign essential hypertension-under good control on bisoprolol, losartan and spironolactone.  By definition resistant.  4.  Parainfluenza-treatment per hospitalist.  5.  COPD-receiving nebulizers.  Pulmonary consultation and therapy deferred to them.    6.  Ventricular tachycardia-review of telemetry does not show any recurrent episodes.    7.  Pure hypercholesterolemia-total cholesterol 87 with an LDL of 28 which is very acceptable.  8.  Troponin elevation-level 221, no current symptoms.  Downtrending, not diagnostic of acute coronary syndrome, will certainly need outpatient pharmacological stress nuclear given his inability to walk.  9.  Cardiomyopathy-presumed ischemic with ejection fraction of 40 to 45%, improved following intervention.  On losartan, bisoprolol, spironolactone.    Plan:  1.  Discontinue IV diuresis, try oral.  2.  Recheck renal profile tomorrow.    Discharge Plannin.  Barrier to discharge is resolution of heart failure and infectious process, anticipate 2 to 3 days.  2.  Will need outpatient pharmacological stress nuclear.  3.  Can follow with primary cardiologist Dr Alton Hernadez.     LOS: 5 days      Subjective:  Interval History:    79-year-old white gentleman being seen on sixth day of hospitalization.  Wife at bedside, he feels less short of breath,no chest pains, palpitations, PND, orthopnea or significant peripheral edema.    Medications  Current Facility-Administered Medications   Medication Dose Route Frequency Provider Last Rate Last Admin    aspirin EC tablet 81 mg  81 mg Oral Daily Allyn Terry MD   81 mg at 02/13/25 0944    atorvastatin (LIPITOR) tablet 80 mg  80 mg Oral At Bedtime Allyn Terry MD   80 mg at 02/12/25 1951    bisoprolol (ZEBETA) tablet 5 mg  5 mg Oral Daily Miller Kay DO   5 mg at 02/13/25 0945    cetirizine (zyrTEC) tablet 10 mg  10 mg Oral QAM Allyn Terry MD   10 mg at 02/13/25 0944    clopidogrel (PLAVIX) tablet 75 mg  75 mg Oral Daily Allyn Terry MD   75 mg at 02/13/25 0958    docusate sodium (COLACE) capsule 100 mg  100 mg Oral BID Allyn Terry MD   100 mg at 02/13/25 0944    enoxaparin ANTICOAGULANT (LOVENOX) injection 40 mg  40 mg Subcutaneous Q24H Allyn Terry MD   40 mg at 02/12/25 1157    fluticasone (FLONASE) 50 MCG/ACT spray 1 spray  1 spray Both Nostrils Daily Miller Kay DO   1 spray at 02/13/25 0953    furosemide (LASIX) injection 40 mg  40 mg Intravenous Q8H Allyn Terry MD   40 mg at 02/13/25 0944    guaiFENesin (MUCINEX) 12 hr tablet 600 mg  600 mg Oral BID Miller Kay DO   600 mg at 02/13/25 0944    insulin aspart (NovoLOG) injection (RAPID ACTING)  1-7 Units Subcutaneous TID AC Miller Kay DO   1 Units at 02/12/25 1712    insulin aspart (NovoLOG) injection (RAPID ACTING)  1-5 Units Subcutaneous At Bedtime Miller Kay DO   1 Units at 02/12/25 2221    ipratropium - albuterol 0.5 mg/2.5 mg/3 mL (DUONEB) neb solution 3 mL  1 vial Nebulization 4x daily Miller Kay DO   3 mL at 02/13/25 0811    losartan (COZAAR) tablet 25 mg  25 mg Oral BID Heidi Arnold MD   25 mg at 02/13/25 0944    methylPREDNISolone sodium succinate (SOLU-MEDROL)  "injection 60 mg  60 mg Intravenous Q8H Miller Kay DO   60 mg at 02/13/25 0943    oxyBUTYnin ER (DITROPAN XL) 24 hr tablet 10 mg  10 mg Oral Every Other Day Allyn Terry MD   10 mg at 02/13/25 0945    pantoprazole (PROTONIX) EC tablet 40 mg  40 mg Oral Daily Allyn Terry MD   40 mg at 02/13/25 0945    polyethylene glycol (MIRALAX) Packet 17 g  17 g Oral BID Allyn Terry MD   17 g at 02/10/25 1543    sertraline (ZOLOFT) tablet 25 mg  25 mg Oral Daily Allyn Terry MD   25 mg at 02/13/25 0944    sodium chloride (OCEAN) 0.65 % nasal spray 2 spray  2 spray Both Nostrils TID Miller Kay DO   2 spray at 02/12/25 1952    sodium chloride (PF) 0.9% PF flush 3 mL  3 mL Intracatheter Q8H Allyn Terry MD   3 mL at 02/13/25 0958    spironolactone (ALDACTONE) half-tab 12.5 mg  12.5 mg Oral Daily Allyn Terry MD   12.5 mg at 02/13/25 0945    [Held by provider] traZODone (DESYREL) tablet  mg   mg Oral At Bedtime Allyn Terry MD         Objective:   Vital signs in last 24 hours:  Temp:  [97.6  F (36.4  C)-98.5  F (36.9  C)] 97.7  F (36.5  C)  Pulse:  [53-67] 60  Resp:  [17-22] 17  BP: (117-130)/(57-66) 124/58  SpO2:  [88 %-99 %] 88 %    Physical Exam:  /58 (BP Location: Right arm)   Pulse 60   Temp 97.7  F (36.5  C) (Oral)   Resp 17   Ht 1.664 m (5' 5.5\")   Wt 67.2 kg (148 lb 3.2 oz)   SpO2 (!) 88%   BMI 24.29 kg/m      General Appearance:    Alert, cooperative, no distress, appears stated age   Head:    Normocephalic, without obvious abnormality, atraumatic   Throat:   Lips, mucosa, and tongue normal; teeth and gums normal   Neck:   Supple, symmetrical, trachea midline, no adenopathy;        thyroid:  No enlargement/tenderness/nodules; no carotid    bruit, 2 to 3 cm at 30 degrees JVD   Back:     Symmetric, no curvature, ROM normal, no CVA tenderness   Lungs:     Occasional expiratory wheeze auscultation bilaterally, respirations unlabored   Chest wall:    No tenderness or deformity   Heart:    " Regular rate and rhythm, S1 and S2 normal, no murmur, rub   or gallop, PMI shifted medially   Abdomen:     Soft, non-tender, bowel sounds active all four quadrants,     no masses, no organomegaly   Extremities:   Normal, atraumatic, no cyanosis or edema   Pulses:   2+ and symmetric all extremities   Skin:   Skin color, texture, turgor normal, no rashes or lesions     Cardiographics:      ECG: Personally reviewed by myself shows sinus rhythm, incomplete right bundle branch block pattern, lateral T wave changes nonspecific.    Echocardiogram:   The left ventricle is normal in size. There is mild concentric left ventricular hypertrophy. The visual ejection fraction is 40-50%.  Mild hypokinesis in distal anteroseptal segment.  Normal right ventricle size and systolic function.  The left atrium is mildly dilated.  This is a limited study.  When compared to previous study on 1-6-2025, wall motion abnormality and left  ventricular systolic function are improved.      Lab Results:   Recent Labs   Lab 02/13/25  0457   WBC 4.5   HGB 11.6*   HCT 35.8*        Recent Labs   Lab 02/13/25  0457      CO2 29   BUN 65.0*   .       Lab Results   Component Value Date    TROPONINI 0.02 07/31/2022

## 2025-02-13 NOTE — PLAN OF CARE
Problem: Adult Inpatient Plan of Care  Goal: Optimal Comfort and Wellbeing  Outcome: Progressing     Problem: Adult Inpatient Plan of Care  Goal: Readiness for Transition of Care  Outcome: Progressing     Problem: Skin Injury Risk Increased  Goal: Skin Health and Integrity  Intervention: Plan: Nurse Driven Intervention: Friction and Shear  Recent Flowsheet Documentation  Taken 2/13/2025 0000 by Diamond Newsome, RN  Friction/Shear Interventions: HOB 30 degrees or less  Taken 2/12/2025 2000 by Diamond Newsome RN  Friction/Shear Interventions: HOB 30 degrees or less   Pt oriented x4. No periods of intense anxiety overnight, tolerating NC. VSS. IV steroids and diuretics continued. Some difficulty tracking I & Os overnight due to a large incontinent episode while the pt was asleep. Male purewick attempted though was not compatible with pt's anatomy. Pt denying pain or SOB.

## 2025-02-14 ENCOUNTER — APPOINTMENT (OUTPATIENT)
Dept: SPEECH THERAPY | Facility: CLINIC | Age: 80
DRG: 291 | End: 2025-02-14
Attending: HOSPITALIST
Payer: MEDICARE

## 2025-02-14 ENCOUNTER — APPOINTMENT (OUTPATIENT)
Dept: RADIOLOGY | Facility: CLINIC | Age: 80
DRG: 291 | End: 2025-02-14
Attending: HOSPITALIST
Payer: MEDICARE

## 2025-02-14 ENCOUNTER — APPOINTMENT (OUTPATIENT)
Dept: OCCUPATIONAL THERAPY | Facility: CLINIC | Age: 80
DRG: 291 | End: 2025-02-14
Attending: HOSPITALIST
Payer: MEDICARE

## 2025-02-14 LAB
ANION GAP SERPL CALCULATED.3IONS-SCNC: 13 MMOL/L (ref 7–15)
BUN SERPL-MCNC: 72.4 MG/DL (ref 8–23)
CALCIUM SERPL-MCNC: 9.6 MG/DL (ref 8.8–10.4)
CHLORIDE SERPL-SCNC: 98 MMOL/L (ref 98–107)
CREAT SERPL-MCNC: 1.32 MG/DL (ref 0.67–1.17)
EGFRCR SERPLBLD CKD-EPI 2021: 55 ML/MIN/1.73M2
ERYTHROCYTE [DISTWIDTH] IN BLOOD BY AUTOMATED COUNT: 15.2 % (ref 10–15)
GLUCOSE BLDC GLUCOMTR-MCNC: 127 MG/DL (ref 70–99)
GLUCOSE BLDC GLUCOMTR-MCNC: 134 MG/DL (ref 70–99)
GLUCOSE BLDC GLUCOMTR-MCNC: 161 MG/DL (ref 70–99)
GLUCOSE BLDC GLUCOMTR-MCNC: 190 MG/DL (ref 70–99)
GLUCOSE BLDC GLUCOMTR-MCNC: 202 MG/DL (ref 70–99)
GLUCOSE SERPL-MCNC: 137 MG/DL (ref 70–99)
HCO3 SERPL-SCNC: 30 MMOL/L (ref 22–29)
HCT VFR BLD AUTO: 35 % (ref 40–53)
HGB BLD-MCNC: 11.5 G/DL (ref 13.3–17.7)
MAGNESIUM SERPL-MCNC: 2.4 MG/DL (ref 1.7–2.3)
MCH RBC QN AUTO: 30.3 PG (ref 26.5–33)
MCHC RBC AUTO-ENTMCNC: 32.9 G/DL (ref 31.5–36.5)
MCV RBC AUTO: 92 FL (ref 78–100)
PLATELET # BLD AUTO: 167 10E3/UL (ref 150–450)
POTASSIUM SERPL-SCNC: 3.7 MMOL/L (ref 3.4–5.3)
RBC # BLD AUTO: 3.8 10E6/UL (ref 4.4–5.9)
SODIUM SERPL-SCNC: 141 MMOL/L (ref 135–145)
WBC # BLD AUTO: 5.4 10E3/UL (ref 4–11)

## 2025-02-14 PROCEDURE — 97535 SELF CARE MNGMENT TRAINING: CPT | Mod: GO

## 2025-02-14 PROCEDURE — 94640 AIRWAY INHALATION TREATMENT: CPT

## 2025-02-14 PROCEDURE — 92611 MOTION FLUOROSCOPY/SWALLOW: CPT | Mod: GN | Performed by: SPEECH-LANGUAGE PATHOLOGIST

## 2025-02-14 PROCEDURE — 250N000013 HC RX MED GY IP 250 OP 250 PS 637: Performed by: HOSPITALIST

## 2025-02-14 PROCEDURE — 36415 COLL VENOUS BLD VENIPUNCTURE: CPT | Performed by: STUDENT IN AN ORGANIZED HEALTH CARE EDUCATION/TRAINING PROGRAM

## 2025-02-14 PROCEDURE — 85027 COMPLETE CBC AUTOMATED: CPT | Performed by: STUDENT IN AN ORGANIZED HEALTH CARE EDUCATION/TRAINING PROGRAM

## 2025-02-14 PROCEDURE — 250N000013 HC RX MED GY IP 250 OP 250 PS 637: Performed by: STUDENT IN AN ORGANIZED HEALTH CARE EDUCATION/TRAINING PROGRAM

## 2025-02-14 PROCEDURE — 999N000157 HC STATISTIC RCP TIME EA 10 MIN

## 2025-02-14 PROCEDURE — 94640 AIRWAY INHALATION TREATMENT: CPT | Mod: 76

## 2025-02-14 PROCEDURE — 99239 HOSP IP/OBS DSCHRG MGMT >30: CPT | Performed by: HOSPITALIST

## 2025-02-14 PROCEDURE — 92526 ORAL FUNCTION THERAPY: CPT | Mod: GN | Performed by: SPEECH-LANGUAGE PATHOLOGIST

## 2025-02-14 PROCEDURE — 85048 AUTOMATED LEUKOCYTE COUNT: CPT | Performed by: STUDENT IN AN ORGANIZED HEALTH CARE EDUCATION/TRAINING PROGRAM

## 2025-02-14 PROCEDURE — 82310 ASSAY OF CALCIUM: CPT | Performed by: STUDENT IN AN ORGANIZED HEALTH CARE EDUCATION/TRAINING PROGRAM

## 2025-02-14 PROCEDURE — 250N000009 HC RX 250: Performed by: HOSPITALIST

## 2025-02-14 PROCEDURE — 99233 SBSQ HOSP IP/OBS HIGH 50: CPT | Performed by: INTERNAL MEDICINE

## 2025-02-14 PROCEDURE — 250N000011 HC RX IP 250 OP 636: Performed by: STUDENT IN AN ORGANIZED HEALTH CARE EDUCATION/TRAINING PROGRAM

## 2025-02-14 PROCEDURE — 74230 X-RAY XM SWLNG FUNCJ C+: CPT

## 2025-02-14 PROCEDURE — 250N000012 HC RX MED GY IP 250 OP 636 PS 637: Performed by: INTERNAL MEDICINE

## 2025-02-14 PROCEDURE — 250N000013 HC RX MED GY IP 250 OP 250 PS 637: Performed by: INTERNAL MEDICINE

## 2025-02-14 PROCEDURE — 94799 UNLISTED PULMONARY SVC/PX: CPT

## 2025-02-14 PROCEDURE — 120N000004 HC R&B MS OVERFLOW

## 2025-02-14 PROCEDURE — 83735 ASSAY OF MAGNESIUM: CPT | Performed by: HOSPITALIST

## 2025-02-14 PROCEDURE — 99232 SBSQ HOSP IP/OBS MODERATE 35: CPT | Performed by: INTERNAL MEDICINE

## 2025-02-14 RX ORDER — PREDNISONE 20 MG/1
TABLET ORAL
Qty: 11 TABLET | Refills: 0 | Status: SHIPPED | OUTPATIENT
Start: 2025-02-15 | End: 2025-02-25

## 2025-02-14 RX ORDER — BARIUM SULFATE 400 MG/ML
SUSPENSION ORAL ONCE
Status: COMPLETED | OUTPATIENT
Start: 2025-02-14 | End: 2025-02-14

## 2025-02-14 RX ORDER — LORAZEPAM 0.5 MG/1
0.5 TABLET ORAL EVERY 4 HOURS PRN
Qty: 10 TABLET | Refills: 0 | Status: SHIPPED | OUTPATIENT
Start: 2025-02-14

## 2025-02-14 RX ORDER — POTASSIUM CHLORIDE 1500 MG/1
20 TABLET, EXTENDED RELEASE ORAL ONCE
Status: COMPLETED | OUTPATIENT
Start: 2025-02-14 | End: 2025-02-14

## 2025-02-14 RX ORDER — GUAIFENESIN 600 MG/1
600 TABLET, EXTENDED RELEASE ORAL 2 TIMES DAILY
Qty: 10 TABLET | Refills: 0 | Status: SHIPPED | OUTPATIENT
Start: 2025-02-14 | End: 2025-02-19

## 2025-02-14 RX ORDER — FUROSEMIDE 40 MG/1
40 TABLET ORAL DAILY
Qty: 30 TABLET | Refills: 0 | Status: SHIPPED | OUTPATIENT
Start: 2025-02-15

## 2025-02-14 RX ORDER — IPRATROPIUM BROMIDE AND ALBUTEROL SULFATE 2.5; .5 MG/3ML; MG/3ML
1 SOLUTION RESPIRATORY (INHALATION) EVERY 4 HOURS PRN
Status: SHIPPED
Start: 2025-02-14

## 2025-02-14 RX ORDER — LOSARTAN POTASSIUM 25 MG/1
25 TABLET ORAL 2 TIMES DAILY
Qty: 60 TABLET | Refills: 0 | Status: SHIPPED | OUTPATIENT
Start: 2025-02-14

## 2025-02-14 RX ADMIN — IPRATROPIUM BROMIDE AND ALBUTEROL SULFATE 3 ML: .5; 3 SOLUTION RESPIRATORY (INHALATION) at 08:19

## 2025-02-14 RX ADMIN — IPRATROPIUM BROMIDE AND ALBUTEROL SULFATE 3 ML: .5; 3 SOLUTION RESPIRATORY (INHALATION) at 20:31

## 2025-02-14 RX ADMIN — ENOXAPARIN SODIUM 40 MG: 40 INJECTION SUBCUTANEOUS at 12:38

## 2025-02-14 RX ADMIN — PREDNISONE 40 MG: 20 TABLET ORAL at 08:38

## 2025-02-14 RX ADMIN — INSULIN ASPART 2 UNITS: 100 INJECTION, SOLUTION INTRAVENOUS; SUBCUTANEOUS at 12:37

## 2025-02-14 RX ADMIN — ASPIRIN 81 MG: 81 TABLET, COATED ORAL at 08:37

## 2025-02-14 RX ADMIN — ATORVASTATIN CALCIUM 80 MG: 40 TABLET, FILM COATED ORAL at 21:46

## 2025-02-14 RX ADMIN — LOSARTAN POTASSIUM 25 MG: 25 TABLET, FILM COATED ORAL at 20:41

## 2025-02-14 RX ADMIN — FLUTICASONE PROPIONATE 1 SPRAY: 50 SPRAY, METERED NASAL at 08:38

## 2025-02-14 RX ADMIN — CETIRIZINE HYDROCHLORIDE 10 MG: 10 TABLET, FILM COATED ORAL at 08:38

## 2025-02-14 RX ADMIN — FUROSEMIDE 40 MG: 40 TABLET ORAL at 08:38

## 2025-02-14 RX ADMIN — SALINE NASAL SPRAY 2 SPRAY: 1.5 SOLUTION NASAL at 20:42

## 2025-02-14 RX ADMIN — SERTRALINE HYDROCHLORIDE 25 MG: 25 TABLET ORAL at 08:40

## 2025-02-14 RX ADMIN — POTASSIUM CHLORIDE 20 MEQ: 1500 TABLET, EXTENDED RELEASE ORAL at 06:37

## 2025-02-14 RX ADMIN — CLOPIDOGREL BISULFATE 75 MG: 75 TABLET ORAL at 08:37

## 2025-02-14 RX ADMIN — SPIRONOLACTONE 12.5 MG: 25 TABLET, FILM COATED ORAL at 08:40

## 2025-02-14 RX ADMIN — GUAIFENESIN 600 MG: 600 TABLET ORAL at 08:38

## 2025-02-14 RX ADMIN — PANTOPRAZOLE SODIUM 40 MG: 40 TABLET, DELAYED RELEASE ORAL at 08:38

## 2025-02-14 RX ADMIN — GUAIFENESIN 600 MG: 600 TABLET ORAL at 20:41

## 2025-02-14 RX ADMIN — BARIUM SULFATE 40 ML: 400 SUSPENSION ORAL at 10:59

## 2025-02-14 RX ADMIN — IPRATROPIUM BROMIDE AND ALBUTEROL SULFATE 3 ML: .5; 3 SOLUTION RESPIRATORY (INHALATION) at 11:32

## 2025-02-14 RX ADMIN — SALINE NASAL SPRAY 2 SPRAY: 1.5 SOLUTION NASAL at 08:39

## 2025-02-14 RX ADMIN — IPRATROPIUM BROMIDE AND ALBUTEROL SULFATE 3 ML: .5; 3 SOLUTION RESPIRATORY (INHALATION) at 15:26

## 2025-02-14 RX ADMIN — INSULIN ASPART 1 UNITS: 100 INJECTION, SOLUTION INTRAVENOUS; SUBCUTANEOUS at 18:07

## 2025-02-14 RX ADMIN — LOSARTAN POTASSIUM 25 MG: 25 TABLET, FILM COATED ORAL at 08:38

## 2025-02-14 RX ADMIN — SALINE NASAL SPRAY 2 SPRAY: 1.5 SOLUTION NASAL at 14:45

## 2025-02-14 ASSESSMENT — ACTIVITIES OF DAILY LIVING (ADL)
ADLS_ACUITY_SCORE: 60
ADLS_ACUITY_SCORE: 56
ADLS_ACUITY_SCORE: 60
ADLS_ACUITY_SCORE: 56
ADLS_ACUITY_SCORE: 60

## 2025-02-14 NOTE — PLAN OF CARE
A/O. VSS on 2.5L via NC with humidifier. ACEVEDO. Denies pain. Incontinent of B/B. Complete bed changed. Tolerating fluid restriction. Call light within reach; does not call, routine rounding completed.     Goal Outcome Evaluation:    Problem: Adult Inpatient Plan of Care  Goal: Optimal Comfort and Wellbeing  Outcome: Progressing     Problem: Heart Failure  Goal: Optimal Functional Ability  Outcome: Progressing  Intervention: Optimize Functional Ability  Recent Flowsheet Documentation  Taken 2/14/2025 0300 by Ning Edmond RN  Activity Management:   up to bedside commode   back to bed  Taken 2/14/2025 0030 by Ning Edmond RN  Activity Management: activity adjusted per tolerance     Problem: Heart Failure  Goal: Effective Oxygenation and Ventilation  Outcome: Progressing  Intervention: Promote Airway Secretion Clearance  Recent Flowsheet Documentation  Taken 2/14/2025 0300 by Ning Edmond, ITZEL  Activity Management:   up to bedside commode   back to bed  Taken 2/14/2025 0030 by Ning Edmond RN  Cough And Deep Breathing: done independently per patient  Activity Management: activity adjusted per tolerance  Intervention: Optimize Oxygenation and Ventilation  Recent Flowsheet Documentation  Taken 2/14/2025 0300 by Ning Edmond, RN  Head of Bed (HOB) Positioning: HOB at 20-30 degrees  Taken 2/14/2025 0030 by Ning Edmond RN  Head of Bed (HOB) Positioning: HOB at 20-30 degrees

## 2025-02-14 NOTE — PROGRESS NOTES
Patient has been assessed for Home Oxygen needs.     Pulse oximetry (SpO2) and Oxygen flow readings:    SpO2 = 87% on room air at rest while awake.    SpO2 improved to 94% on 1 liters/minute at rest.    SpO2 = 87% on room air during activity/with exercise.    *SpO2 improved to 92% on 2 liters/minute during activity/with exercise.

## 2025-02-14 NOTE — DISCHARGE SUMMARY
[facility] MEDICINE  DISCHARGE SUMMARY     Primary Care Physician: John Oconnor  Admission Date: 2/8/2025   Discharge Provider: Miller Kay DO Discharge Date: 2/14/2025   Diet:   Active Diet and Nourishment Order   Procedures    Fluid restriction 2000 ML FLUID    Snacks/Supplements Adult: Ensure Enlive; Between Meals    Combination Diet 2 gm NA Diet; Low Saturated Fat Na <2400mg Diet    Diet       Code Status: Full Code   Activity: As tolerated        Condition at Discharge: Stable     REASON FOR PRESENTATION(See Admission Note for Details)   Shortness of breath and productive cough  PRINCIPAL & ACTIVE DISCHARGE DIAGNOSES   Acute respiratory failure with hypoxia  Parainfluenza infection  Acute heart failure with reduced ejection fraction  Acute exacerbation of COPD  Severe COPD  History of coronary disease, NSTEMI  Dyslipidemia  Essential hypertension  Acute kidney injury  Peripheral arterial disease  History of non-small cell lung cancer status post resection, radiation, chemo and 2018  Current tobacco dependence  Reflux  PENDING LABS     Unresulted Labs Ordered in the Past 30 Days of this Admission       No orders found from 1/9/2025 to 2/9/2025.          PROCEDURES ( this hospitalization only)      RECOMMENDATIONS TO OUTPATIENT PROVIDER FOR F/U VISIT     Follow-up Appointments       Follow-up and recommended labs and tests       Follow up with primary care provider, John Oconnor, within 7 days for hospital follow- up.  The following labs/tests are recommended: CBC, BMP, Mg.              Follow wts carefully  Adjust lasix if needed  See if he is able to wean from O2  DISPOSITION   Home  SUMMARY OF HOSPITAL COURSE:    Ubaldo Ramos is a 79 year old male with PMH signficant for .  Presented with .  Mr. Ubaldo Ramos is a 79 year old male with a past medical history of NSTEMI (s/p DESx2 to mid LAD on 1/8/2025), HFrEF with EF of 35 to 40%, recent traumatic right rib  fracture and right pneumothorax s/p chest tube removal on 1/7/HLD, PAD, HTN, severe COPD with PFT in October 2021 showing FEV1 1.22 L (45%) and DLCOc 52%, NSCLC, active tobacco dependence, lung cancer s/p left upper lobectomy in 2017 with recurrence in 2018 s/p chemotherapy and radiation, peripheral arterial disease s/p left femoral endarterectomy and bilateral iliac stents, history of prostate cancer, GERD who presented with shortness of breath and productive cough for few weeks.  He desaturated to 78% when EMS arrived.  He was found to have elevated pro-BNP and troponin, congested pulmonary vasculature x-ray,  and wheezing on exam.  He was admitted for acute on chronic HFrEF and COPD exacerbation.  BiPAP and IV Lasix 60 mg was started in the ED. Now on 40mg IV q8h. Losartan increased from 12.5mg daily to 25mg bid to help with BP control. Cardiology is consulted.     Continued to have episodes of tachypnea, severe shortness of breath.  Unable to wean from O2. Asked for pulmonary help.  Also followed by cardiology, transitioning to oral diuretic. CT chest with airway debris with aspiration or infection. SLP consulted, video swallow ordered, did not show evidence of high aspiration risk. SLP recommended regular diet with thin liquids but only eating when upright and being conscious of intake purposeful with swallowing.      Currently on oral steroids, oral lasix, O2 at 2.5lpm. ordered home O2 eval that showed needing only 1lpm at rest and 2lpm with activity.  discharged with steroid taper, anoro ellipta, oral lasix, small amount of ativan to use prn panic.   Discharge Medications with Med changes:     Current Discharge Medication List        START taking these medications    Details   benzocaine-menthol (CEPACOL) 15-3.6 MG lozenge Place 1 lozenge inside cheek every hour as needed for sore throat (without fever).  Qty: 20 lozenge, Refills: 0    Associated Diagnoses: COPD exacerbation (H)      furosemide (LASIX) 40 MG  tablet Take 1 tablet (40 mg) by mouth daily.  Qty: 30 tablet, Refills: 0    Associated Diagnoses: Acute on chronic congestive heart failure, unspecified heart failure type (H)      guaiFENesin (MUCINEX) 600 MG 12 hr tablet Take 1 tablet (600 mg) by mouth 2 times daily for 5 days.  Qty: 10 tablet, Refills: 0    Associated Diagnoses: COPD exacerbation (H)      LORazepam (ATIVAN) 0.5 MG tablet Take 1 tablet (0.5 mg) by mouth every 4 hours as needed for anxiety (shortness of breath).  Qty: 10 tablet, Refills: 0    Associated Diagnoses: COPD exacerbation (H)      predniSONE (DELTASONE) 20 MG tablet Take 2 tablets (40 mg) by mouth daily for 1 day, THEN 1.5 tablets (30 mg) daily for 3 days, THEN 1 tablet (20 mg) daily for 3 days, THEN 0.5 tablets (10 mg) daily for 3 days.  Qty: 11 tablet, Refills: 0    Associated Diagnoses: COPD exacerbation (H)      sodium chloride (OCEAN) 0.65 % nasal spray Spray 2 sprays into both nostrils daily as needed for congestion.  Qty: 30 mL, Refills: 0    Associated Diagnoses: COPD exacerbation (H)      umeclidinium-vilanterol (ANORO ELLIPTA) 62.5-25 MCG/ACT oral inhaler Inhale 1 puff into the lungs daily.  Qty: 1 each, Refills: 0    Associated Diagnoses: COPD exacerbation (H)           CONTINUE these medications which have CHANGED    Details   losartan (COZAAR) 25 MG tablet Take 1 tablet (25 mg) by mouth 2 times daily.  Qty: 60 tablet, Refills: 0    Associated Diagnoses: Acute on chronic congestive heart failure, unspecified heart failure type (H)           CONTINUE these medications which have NOT CHANGED    Details   acetaminophen (TYLENOL) 325 MG tablet Take 2 tablets (650 mg) by mouth every 6 hours as needed for mild pain.  Qty: 90 tablet, Refills: 0    Associated Diagnoses: Closed fracture of one rib of right side, initial encounter      aspirin 81 MG EC tablet Take 1 tablet (81 mg) by mouth daily. Start tomorrow.  Qty: 30 tablet, Refills: 3    Associated Diagnoses: Status post insertion  of drug-eluting stent into left anterior descending (LAD) artery      atorvastatin (LIPITOR) 80 MG tablet Take 1 tablet (80 mg) by mouth at bedtime  Qty: 90 tablet, Refills: 3    Associated Diagnoses: Hyperlipidemia LDL goal <100      cetirizine (ZYRTEC) 10 MG tablet Take 10 mg by mouth every morning.      cholecalciferol, vitamin D3, (VITAMIN D3) 25 mcg (1,000 unit) capsule [CHOLECALCIFEROL, VITAMIN D3, (VITAMIN D3) 25 MCG (1,000 UNIT) CAPSULE] Take 1,000 Units by mouth daily.      clopidogrel (PLAVIX) 75 MG tablet Take 1 tablet (75 mg) by mouth daily. Dose to start tomorrow.  Qty: 90 tablet, Refills: 3    Associated Diagnoses: Status post insertion of drug-eluting stent into left anterior descending (LAD) artery      cyanocobalamin 1000 MCG tablet [CYANOCOBALAMIN 1000 MCG TABLET] Take 1,000 mcg by mouth daily.      oxyBUTYnin ER (DITROPAN XL) 10 MG 24 hr tablet Take 10 mg by mouth every other day.      pantoprazole (PROTONIX) 40 MG EC tablet TAKE 1 TABLET BY MOUTH ONCE DAILY  Qty: 90 tablet, Refills: 2    Associated Diagnoses: Dyspepsia      pregabalin (LYRICA) 150 MG capsule Take 150 mg by mouth 3 times daily as needed.      sertraline (ZOLOFT) 25 MG tablet Take 1 tablet (25 mg) by mouth daily.  Qty: 60 tablet, Refills: 0    Associated Diagnoses: Moderate episode of recurrent major depressive disorder (H)      spironolactone (ALDACTONE) 25 MG tablet Take 0.5 tablets (12.5 mg) by mouth daily.  Qty: 45 tablet, Refills: 3    Associated Diagnoses: Essential hypertension, benign      traZODone (DESYREL) 50 MG tablet Take 1-2 tablets ( mg) by mouth at bedtime.  Qty: 180 tablet, Refills: 3    Associated Diagnoses: Primary insomnia      bisoprolol (ZEBETA) 5 MG tablet Take 1 tablet (5 mg) by mouth daily.  Qty: 30 tablet, Refills: 3    Associated Diagnoses: Elevated troponin           STOP taking these medications       ipratropium - albuterol 0.5 mg/2.5 mg/3 mL (DUONEB) 0.5-2.5 (3) MG/3ML neb solution Comments:    Reason for Stopping:             Rationale for medication changes:    As above  Consults   Cardiology   Pulmonary    Anticoagulation Information    N/a  SIGNIFICANT IMAGING FINDINGS     Results for orders placed or performed during the hospital encounter of 02/08/25   XR Chest Port 1 View    Impression    IMPRESSION: Mild bilateral interstitial prominence, nonspecific, though pulmonary vascular congestion not excluded. Mild basilar atelectasis. Stable left costophrenic angle blunting (from mild pleural fluid or thickening). No pneumothorax. Stable   cardiomediastinal silhouette.     XR Chest Port 1 View    Impression    IMPRESSION: Right lung is clear with no recurrent right pneumothorax. Some minimal pleural thickening and fibrosis left lung base unchanged. No new findings.   CT Chest w/o Contrast    Impression    IMPRESSION:  1.  Debris in the airways with aspiration or infection.  2.  Emphysema.  3.  Severe coronary artery disease.  4.  Healing bilateral rib fractures.  5.  A right upper lobe opacity has not significantly changed. This is consistent with known adenocarcinoma and treatment. Recommend continued attention on follow-up imaging.     Echo Limited   Result Value Ref Range    LVEF  40-50%      SIGNIFICANT LABORATORY FINDINGS   See EMR  Discharge Orders        Primary Care - Care Coordination Referral      Reason for your hospital stay    Parainfluenza infection, low oxygen, severe COPD     Follow-up and recommended labs and tests     Follow up with primary care provider, John Oconnor, within 7 days for hospital follow- up.  The following labs/tests are recommended: CBC, BMP, Mg.     Activity    Your activity upon discharge: activity as tolerated     Diet    Follow this diet upon discharge: Current Diet:Orders Placed This Encounter      Fluid restriction 2000 ML FLUID      Snacks/Supplements Adult: Ensure Enlive; Between Meals      Combination Diet 2 gm NA Diet; Low Saturated Fat Na <2400mg Diet      Examination   Physical Exam   Temp:  [97.6  F (36.4  C)-98.1  F (36.7  C)] 98.1  F (36.7  C)  Pulse:  [51-63] 53  Resp:  [16-32] 17  BP: (107-146)/(54-64) 135/62  SpO2:  [85 %-99 %] 94 %  Wt Readings from Last 1 Encounters:   02/14/25 66.8 kg (147 lb 3.2 oz)       Feels pretty good. No chest pain, dyspnea, cough. Denies coughing/dyspnea after eating and drinking. Says he is having a little diarrhea. No dysuria.      Physical Exam/Objective:  Gen: no acute distress, comfortable, alert, pleasant  ENT: no scleral icterus  Pulm: lungs are very diminished, soft distant wheezing, slight basilar crackles noted more on right  CV: regular rate and rhythm, no pitting edema  Psych: appropriate affect      Please see EMR for more detailed significant labs, imaging, consultant notes etc.    IMiller DO, personally saw the patient today and spent greater than 30 minutes discharging this patient.    Miller Kay DO  [facility]    CC:John Oconnor

## 2025-02-14 NOTE — PROGRESS NOTES
[facility] Medicine PROGRESS NOTE      Identification/Summary:   Ubaldo Ramos is a 79 year old male with [facility] signficant for .  Presented with .  Mr. Ubaldo Ramos is a 79 year old male with a past medical history of NSTEMI (s/p DESx2 to mid LAD on 1/8/2025), HFrEF with EF of 35 to 40%, recent traumatic right rib fracture and right pneumothorax s/p chest tube removal on 1/7/HLD, PAD, HTN, severe COPD with PFT in October 2021 showing FEV1 1.22 L (45%) and DLCOc 52%, NSCLC, active tobacco dependence, lung cancer s/p left upper lobectomy in 2017 with recurrence in 2018 s/p chemotherapy and radiation, peripheral arterial disease s/p left femoral endarterectomy and bilateral iliac stents, history of prostate cancer, GERD who presented with shortness of breath and productive cough for few weeks.  He desaturated to 78% when EMS arrived.  He was found to have elevated pro-BNP and troponin, congested pulmonary vasculature x-ray,  and wheezing on exam.  He was admitted for acute on chronic HFrEF and COPD exacerbation.  BiPAP and IV Lasix 60 mg was started in the ED. Now on 40mg IV q8h. Losartan increased from 12.5mg daily to 25mg bid to help with BP control. Cardiology is consulted.     Continues to have episodes of tachypnea, severe shortness of breath.  Unable to wean from O2. Asked for pulmonary help.  Also followed by cardiology, transitioning to oral diuretic. CT chest with airway debris with aspiration or infection. SLP consulted, video swallow ordered, pending.     Currently on oral steroids, oral lasix, O2 at 2.5lpm. VFSS pending today. Suspect he will need O2 at home.      Assessment and Plan:  Acute hypoxic respiratory failure due to exacerbation of HFrEF and COPD  Parainfluenza infection  Combination of heart failure and COPD  Appreciate cardiology and pulmonary help  Transitioned to oral lasix  Transitioned to oral prednisone  CT chest with airway debris  Video swallow pending, appreciate SLP eval  Continue  scheduled nebs  Monitor renal function, volume status carefully  Not sure if panic is contributing, ordered Ativan as needed which seems to be helpful for his dyspnea  Suspect he will have to discharge home on O2 in the next 1-2 days     NSTEMI s/p DESx2 to mid LAD on 1/8/2025  Dyslipidemia  Essential hypertension  To note, he never had chest pain with NSTEMI but shortness of breath  Troponin was 233 on admission and 220 on repeat, much lower than the time when he had NSTEMI  Continue aspirin 81 mg, clopidogrel 75 mg daily, bisoprolol, losartan, spironolactone, and atorvastatin 80 mg     MATTHIAS  Mildly elevated creat, stable today  May need to tolerate slightly elevated creat to keep lungs dry    QTc prolongation  QTc is 516 ms on admission  Held  trazodone and other QT prolonging meds  Replete magnesium       PAD: s/p lower extremity stenting and right-to-left femoral-femoral arterial bypass done in [place]. CT angiography of the lower extremities 12/13/2023 showed patent bilateral common and external iliac artery stents but occlusion of the femoral bypass graft  Asymptomatic right internal carotid artery stenosis: last assessed on carotid ultrasound 7/16/2024   NSCLC:  resection, radiation therapy and chemotherapy in 2017 and 2018   Tobacco use: still smoking, encouraged cessation/reduction.  He still has trouble quitting.  Nicotine replacement is not started for possible NSTEMI at this time.  S/p rib fracture and pneumothorax requiring chest tube about 1 to 2 months ago  Urinary incontinence: Continue oxybutynin  GERD: Continue pantoprazole  Constipation: MiraLAX as needed  Depression and anxiety: Continue sertraline  Glucose control: medium dose sliding scale for the time being    Diet: Fluid restriction 2000 ML FLUID (and additional linked orders)  Combination Diet 2 gm NA Diet; Low Saturated Fat Na <2400mg Diet  Snacks/Supplements Adult: Ensure Enlive; Between Meals  Fluids: none  Pain meds:tylenol  prn  Therapy:OT consulted  DVT Prophylaxis:  lovenox  Code Status: Full Code  Medically Ready for Discharge: Anticipated Tomorrow      Interval History/Subjective:  Feels pretty good. No chest pain, dyspnea, cough. Denies coughing/dyspnea after eating and drinking. Says he is having a little diarrhea. No dysuria.     Physical Exam/Objective:  Gen: no acute distress, comfortable, alert, pleasant  ENT: no scleral icterus  Pulm: lungs are very diminished, soft distant wheezing, slight basilar crackles noted more on right  CV: regular rate and rhythm, no pitting edema  Psych: appropriate affect    Medications:   Current Facility-Administered Medications   Medication Dose Route Frequency Provider Last Rate Last Admin    aspirin EC tablet 81 mg  81 mg Oral Daily Allyn Terry MD   81 mg at 02/14/25 0837    atorvastatin (LIPITOR) tablet 80 mg  80 mg Oral At Bedtime Allyn Terry MD   80 mg at 02/13/25 1930    bisoprolol (ZEBETA) tablet 5 mg  5 mg Oral Daily Miller Kay DO   5 mg at 02/13/25 0945    cetirizine (zyrTEC) tablet 10 mg  10 mg Oral QAM Allyn Terry MD   10 mg at 02/14/25 0838    clopidogrel (PLAVIX) tablet 75 mg  75 mg Oral Daily Allyn Terry MD   75 mg at 02/14/25 0837    docusate sodium (COLACE) capsule 100 mg  100 mg Oral BID Allyn Terry MD   100 mg at 02/13/25 0944    enoxaparin ANTICOAGULANT (LOVENOX) injection 40 mg  40 mg Subcutaneous Q24H Allyn Terry MD   40 mg at 02/13/25 1158    fluticasone (FLONASE) 50 MCG/ACT spray 1 spray  1 spray Both Nostrils Daily Miller Kay DO   1 spray at 02/14/25 0838    furosemide (LASIX) tablet 40 mg  40 mg Oral Daily Lyssa Alatorre MD   40 mg at 02/14/25 0838    guaiFENesin (MUCINEX) 12 hr tablet 600 mg  600 mg Oral BID Miller Kay DO   600 mg at 02/14/25 0838    insulin aspart (NovoLOG) injection (RAPID ACTING)  1-7 Units Subcutaneous TID AC Miller Kay DO   1 Units at 02/13/25 1723    insulin aspart (NovoLOG) injection (RAPID ACTING)  1-5 Units  Subcutaneous At Bedtime Miller Kay DO   1 Units at 02/13/25 2109    ipratropium - albuterol 0.5 mg/2.5 mg/3 mL (DUONEB) neb solution 3 mL  1 vial Nebulization 4x daily Miller Kay DO   3 mL at 02/14/25 0819    losartan (COZAAR) tablet 25 mg  25 mg Oral BID Heidi Arnold MD   25 mg at 02/14/25 0838    oxyBUTYnin ER (DITROPAN XL) 24 hr tablet 10 mg  10 mg Oral Every Other Day Allyn Terry MD   10 mg at 02/13/25 0945    pantoprazole (PROTONIX) EC tablet 40 mg  40 mg Oral Daily Allyn Terry MD   40 mg at 02/14/25 0838    polyethylene glycol (MIRALAX) Packet 17 g  17 g Oral BID Allyn Terry MD   17 g at 02/13/25 1158    predniSONE (DELTASONE) tablet 40 mg  40 mg Oral Daily Lyssa Alatorre MD   40 mg at 02/14/25 0838    sertraline (ZOLOFT) tablet 25 mg  25 mg Oral Daily Allyn Terry MD   25 mg at 02/14/25 0840    sodium chloride (OCEAN) 0.65 % nasal spray 2 spray  2 spray Both Nostrils TID Miller Kay DO   2 spray at 02/14/25 0839    sodium chloride (PF) 0.9% PF flush 3 mL  3 mL Intracatheter Q8H Allyn Terry MD   3 mL at 02/13/25 1725    spironolactone (ALDACTONE) half-tab 12.5 mg  12.5 mg Oral Daily Allyn Terry MD   12.5 mg at 02/14/25 0840    [Held by provider] traZODone (DESYREL) tablet  mg   mg Oral At Bedtime Allyn Terry MD         Clinically Significant Risk Factors          # Hypochloremia: Lowest Cl = 97 mmol/L in last 2 days, will monitor as appropriate         # Acute Kidney Injury, unspecified: based on a >150% or 0.3 mg/dL increase in last creatinine compared to past 90 day average, will monitor renal function  # Hypertension: Noted on problem list    # Heart failure, NOS: heart failure noted on the problem list and last echo with EF 40-50%               # Financial/Environmental Concerns: none            Miller Kay DO   [facility]  [facility]

## 2025-02-14 NOTE — PROGRESS NOTES
CARDIOLOGY PROGRESS NOTE      Assessment/Plan:  1.  Acute on chronic congestive heart failure, (H)-in the setting of midrange ejection fraction of 40 to 50%, still symptomatic with oxygen desaturation.  Weight down 4 kg, oxygen saturation 95% on 3 L.  Creatinine increased to 1.34, suspect dehydration, so switched to oral furosemide.  Consider heart failure resolved.  2.  Coronary artery disease-secondary to positive troponin he underwent angiography, showing normal left main, mid LAD 95% lesion that received a 3.0 x 22 Russell drug-coated stent as well as a 3.0 x 12 mm Tan drug-coated stent.  Circumflex had a proximal 45% lesion and right coronary artery was small codominant without any significant disease.  Symptomatically unchanged.  Plan on Plavix until 2026.  3.  Benign essential hypertension-under good control on bisoprolol, losartan and spironolactone.  By definition resistant.  4.  Parainfluenza-treatment per hospitalist.  5.  COPD-receiving nebulizers.  Pulmonary consultation and therapy deferred to them.    6.  Ventricular tachycardia-review of telemetry does not show any recurrent episodes.    7.  Pure hypercholesterolemia-total cholesterol 87 with an LDL of 28 which is very acceptable.  8.  Troponin elevation-level 221, no current symptoms.  Downtrending, not diagnostic of acute coronary syndrome, will certainly need outpatient pharmacological stress nuclear given his inability to walk.  9.  Cardiomyopathy-presumed ischemic with ejection fraction of 40 to 45%, improved following intervention.  On losartan, bisoprolol, spironolactone.    Plan:  1.  Continue oral diuretic.    2.  Cardiology does not have much further inpatient to add, most likely will sign off tomorrow.    Discharge Plannin.  Barrier to discharge is resolution of hypoxia and infectious process, anticipate 2 to 3 days.  2.  Will need outpatient pharmacological stress nuclear.  3.  Can follow with primary cardiologist Dr Dang  Satya.     LOS: 6 days     Subjective:  Interval History:    79-year-old white gentleman being seen on 7th day of hospitalization.  He feels less short of breath,no chest pains, palpitations, PND, orthopnea or significant peripheral edema.    Medications  Current Facility-Administered Medications   Medication Dose Route Frequency Provider Last Rate Last Admin    aspirin EC tablet 81 mg  81 mg Oral Daily Allyn Terry MD   81 mg at 02/14/25 0837    atorvastatin (LIPITOR) tablet 80 mg  80 mg Oral At Bedtime Allyn Terry MD   80 mg at 02/13/25 1930    bisoprolol (ZEBETA) tablet 5 mg  5 mg Oral Daily Miller Kay DO   5 mg at 02/13/25 0945    cetirizine (zyrTEC) tablet 10 mg  10 mg Oral QAM Allyn Terry MD   10 mg at 02/14/25 0838    clopidogrel (PLAVIX) tablet 75 mg  75 mg Oral Daily Allyn Terry MD   75 mg at 02/14/25 0837    docusate sodium (COLACE) capsule 100 mg  100 mg Oral BID Allyn Terry MD   100 mg at 02/13/25 0944    enoxaparin ANTICOAGULANT (LOVENOX) injection 40 mg  40 mg Subcutaneous Q24H Allyn Terry MD   40 mg at 02/13/25 1158    fluticasone (FLONASE) 50 MCG/ACT spray 1 spray  1 spray Both Nostrils Daily Miller Kay DO   1 spray at 02/14/25 0838    furosemide (LASIX) tablet 40 mg  40 mg Oral Daily Lyssa Alatorre MD   40 mg at 02/14/25 0838    guaiFENesin (MUCINEX) 12 hr tablet 600 mg  600 mg Oral BID Miller Kay DO   600 mg at 02/14/25 0838    insulin aspart (NovoLOG) injection (RAPID ACTING)  1-7 Units Subcutaneous TID AC Miller Kay DO   1 Units at 02/13/25 1723    insulin aspart (NovoLOG) injection (RAPID ACTING)  1-5 Units Subcutaneous At Bedtime Miller Kay DO   1 Units at 02/13/25 2109    ipratropium - albuterol 0.5 mg/2.5 mg/3 mL (DUONEB) neb solution 3 mL  1 vial Nebulization 4x daily Miller Kay DO   3 mL at 02/14/25 0819    losartan (COZAAR) tablet 25 mg  25 mg Oral BID Heidi Arnold MD   25 mg at 02/14/25 0838    oxyBUTYnin ER (DITROPAN XL) 24 hr tablet 10 mg  10 mg  "Oral Every Other Day Allyn Terry MD   10 mg at 02/13/25 0945    pantoprazole (PROTONIX) EC tablet 40 mg  40 mg Oral Daily Allyn Terry MD   40 mg at 02/14/25 0838    polyethylene glycol (MIRALAX) Packet 17 g  17 g Oral BID Allyn Terry MD   17 g at 02/13/25 1158    predniSONE (DELTASONE) tablet 40 mg  40 mg Oral Daily Lyssa Alatorre MD   40 mg at 02/14/25 0838    sertraline (ZOLOFT) tablet 25 mg  25 mg Oral Daily Allyn Terry MD   25 mg at 02/14/25 0840    sodium chloride (OCEAN) 0.65 % nasal spray 2 spray  2 spray Both Nostrils TID Miller Kay DO   2 spray at 02/14/25 0839    sodium chloride (PF) 0.9% PF flush 3 mL  3 mL Intracatheter Q8H Allyn Terry MD   3 mL at 02/13/25 1725    spironolactone (ALDACTONE) half-tab 12.5 mg  12.5 mg Oral Daily Allyn Terry MD   12.5 mg at 02/14/25 0840    [Held by provider] traZODone (DESYREL) tablet  mg   mg Oral At Bedtime Allyn Terry MD         Objective:   Vital signs in last 24 hours:  Temp:  [97.6  F (36.4  C)-98  F (36.7  C)] 98  F (36.7  C)  Pulse:  [51-63] 51  Resp:  [16-32] 16  BP: (107-146)/(54-67) 146/64  SpO2:  [85 %-99 %] 92 %    Physical Exam:  BP (!) 146/64 (BP Location: Left arm)   Pulse 51   Temp 98  F (36.7  C) (Oral)   Resp 16   Ht 1.664 m (5' 5.5\")   Wt 66.8 kg (147 lb 3.2 oz)   SpO2 92%   BMI 24.12 kg/m      General Appearance:    Alert, cooperative, no distress, appears stated age   Head:    Normocephalic, without obvious abnormality, atraumatic   Throat:   Lips, mucosa, and tongue normal; teeth and gums normal   Neck:   Supple, symmetrical, trachea midline, no adenopathy;        thyroid:  No enlargement/tenderness/nodules; no carotid    bruit, 2 to 3 cm at 30 degrees JVD   Back:     Symmetric, no curvature, ROM normal, no CVA tenderness   Lungs:     Occasional expiratory wheeze auscultation bilaterally, respirations unlabored   Chest wall:    No tenderness or deformity   Heart:    Regular rate and rhythm, S1 and S2 normal, no " murmur, rub   or gallop, PMI shifted medially   Abdomen:     Soft, non-tender, bowel sounds active all four quadrants,     no masses, no organomegaly   Extremities:   Normal, atraumatic, no cyanosis or edema   Pulses:   2+ and symmetric all extremities   Skin:   Skin color, texture, turgor normal, no rashes or lesions     Cardiographics:      ECG: Personally reviewed by myself shows sinus rhythm, incomplete right bundle branch block pattern, lateral T wave changes nonspecific.    Echocardiogram:   The left ventricle is normal in size. There is mild concentric left ventricular hypertrophy. The visual ejection fraction is 40-50%.  Mild hypokinesis in distal anteroseptal segment.  Normal right ventricle size and systolic function.  The left atrium is mildly dilated.  This is a limited study.  When compared to previous study on 1-6-2025, wall motion abnormality and left  ventricular systolic function are improved.      Lab Results:   Recent Labs   Lab 02/14/25  0500   WBC 5.4   HGB 11.5*   HCT 35.0*        Recent Labs   Lab 02/14/25  0500      CO2 30*   BUN 72.4*   .       Lab Results   Component Value Date    TROPONINI 0.02 07/31/2022

## 2025-02-14 NOTE — PROGRESS NOTES
"   02/14/25 1132   Appointment Info   Signing Clinician's Name / Credentials (SLP) Kevin Wilson MA [facility] SLP   General Information   Onset of Illness/Injury or Date of Surgery 02/08/25   Referring Physician Miller Kay, DO   Pertinent History of Current Problem Per Provider Notes: \"Ubaldo Ramos is a 79 year old male with PMH signficant for .  Presented with .  Mr. Ubaldo Ramos is a 79 year old male with a past medical history of NSTEMI (s/p DESx2 to mid LAD on 1/8/2025), HFrEF with EF of 35 to 40%, recent traumatic right rib fracture and right pneumothorax s/p chest tube removal on 1/7/HLD, PAD, HTN, severe COPD with PFT in October 2021 showing FEV1 1.22 L (45%) and DLCOc 52%, NSCLC, active tobacco dependence, lung cancer s/p left upper lobectomy in 2017 with recurrence in 2018 s/p chemotherapy and radiation, peripheral arterial disease s/p left femoral endarterectomy and bilateral iliac stents, history of prostate cancer, GERD who presented with shortness of breath and productive cough for few weeks.  He desaturated to 78% when EMS arrived.  He was found to have elevated pro-BNP and troponin, congested pulmonary vasculature x-ray,  and wheezing on exam.  He was admitted for acute on chronic HFrEF and COPD exacerbation.  BiPAP and IV Lasix 60 mg was started in the ED. Now on 40mg IV q8h. Losartan increased from 12.5mg daily to 25mg bid to help with BP control. Cardiology is consulted.     Continues to have episodes of tachypnea, severe shortness of breath.  Unable to wean from O2. Asked for pulmonary help.  Also followed by cardiology, transitioning to oral diuretic. CT chest with airway debris with aspiration or infection. SLP consulted, video swallow ordered, pending.\"   General Observations Patient upright in fluoro chair, cooperative throughout.   Pain Assessment   Patient Currently in Pain No   Type of Evaluation   Type of Evaluation Swallow Evaluation   General Swallowing Observations   Past History of " Dysphagia Per chart review and patient report, none prior. Reports coughing across the day, but he does not feel this is associated with swallowing.   Respiratory Support nasal cannula;other (see comments)  (4L)   Current Diet/Method of Nutritional Intake (General Swallowing Observations, NIS) regular diet;thin liquids (level 0)   Swallowing Evaluation Videofluoroscopic swallow study (VFSS)   VFSS Evaluation   Radiologist Dr. Carter   Views Taken left lateral   Physical Location of Procedure [facility] Radiology Department   VFSS Textures Trialed thin liquids;mildly thick liquids;pureed;solid foods   VFSS Eval: Thin Liquid Texture Trial   Mode of Presentation, Thin Liquid spoon;fed by clinician;cup;straw;self-fed   Order of Presentation 1-7, 12, 13   Preparatory Phase WFL   Oral Phase, Thin Liquid residue in oral cavity   Bolus Location When Swallow Triggered valleculae   Pharyngeal Phase, Thin Liquid impaired tongue base retraction;residue in vallecula;other (see comments)  (reduced laryngeal vestibule closure)   Rosenbek's Penetration Aspiration Scale: Thin Liquid Trial Results 7 - contrast passes glottis, visible subglottic residue remains despite patient's response (aspiration)   Strategies and Compensations reduce bolus size;double swallow   Diagnostic Statement x1 instance of aspiration before the swallow during initial trial. No other aspiration observed. Intermittent flash penetration spontaneously cleared. Chin tuck did not significantly improve safety of swallow. Trace oral residue after initial swallow cleared with extra swallow. Consecutive swallow trials resulted in flash penetration.   VFSS Eval: Mildly Thick Liquids   Mode of Presentation cup;self-fed   Order of Presentation 8, 9   Preparatory Phase WFL   Oral Phase residue in oral cavity   Bolus Location When Swallow Triggered posterior angle of ramus   Pharyngeal Phase impaired tongue base retraction;residue in vallecula   Rosenbek's Penetration  Aspiration Scale 1 - no aspiration, contrast does not enter airway   Strategies and Compensations reduce bolus size   Diagnostic Statement No penetration or aspiration observed. Collection of oral residue after initial swallow, down to valleculae. Cleared with extra swallow.   VFSS Evaluation: Puree Solid Texture Trial   Mode of Presentation, Puree spoon;self-fed   Order of Presentation 10   Preparatory Phase WFL   Oral Phase, Puree WFL   Bolus Location When Swallow Triggered valleculae   Pharyngeal Phase, Puree WFL   Rosenbek's Penetration Aspiration Scale: Puree Food Trial Results 1 - no aspiration, contrast does not enter airway   Diagnostic Statement No penetration or aspiration. Good oropharyngeal clearance.   VFSS Evaluation: Solid Food Texture Trial   Mode of Presentation, Solid self-fed   Order of Presentation 11   Preparatory Phase other (see comments)  (mildly extended mastication)   Oral Phase, Solid WFL   Bolus Location When Swallow Triggered posterior angle of ramus   Pharyngeal Phase, Solid impaired tongue base retraction;residue in vallecula   Rosenbek's Penetration Aspiration Scale: Solid Food Trial Results 1 - no aspiration, contrast does not enter airway   Strategies and Compensations liquid wash   Diagnostic Statement No aspiration or penetration observed. Mildly extended mastication, but good oral clearance. Trace vallecular residue after the swallow cleared with liquid wash.   Esophageal Phase of Swallow   Patient reports or presents with symptoms of esophageal dysphagia No   Swallowing Recommendations   Diet Consistency Recommendations thin liquids (level 0);regular diet   Supervision Level for Intake patient independent   Mode of Delivery Recommendations bolus size, small;slow rate of intake;other (see comments)  (single cup sips)   Postural Recommendations none   Swallowing Maneuver Recommendations double dry swallow;alternate food and liquid intake   Monitoring/Assistance Required  (Eating/Swallowing) stop eating activities when fatigue is present;monitor for cough or change in vocal quality with intake   Recommended Feeding/Eating Techniques (Swallow Eval) maintain upright posture during/after eating for 30 minutes;minimize distractions during oral intake   Medication Administration Recommendations, Swallowing (SLP) As tolerated   Instrumental Assessment Recommendations instrumental evaluation not recommended at this time   General Therapy Interventions   Planned Therapy Interventions Dysphagia Treatment   Dysphagia treatment Instruction of safe swallow strategies   Clinical Impression   Criteria for Skilled Therapeutic Interventions Met (SLP Eval) Yes, treatment indicated   SLP Diagnosis Mild oropharyngeal dysphagia   Risks & Benefits of therapy have been explained evaluation/treatment results reviewed;care plan/treatment goals reviewed;risks/benefits reviewed;current/potential barriers reviewed;participants voiced agreement with care plan;participants included;patient   Clinical Impression Comments   VFSS completed per provider orders. Patient presents with mild oropharyngeal dysphagia in setting of COPD exacerbation and generalized weakness. Trials of thin liquids, mildly thick liquids, puree textures, and hard solids completed under fluoroscopy. Patient oral phase c/b adequate bolus acceptance and closure, mildly extended mastication of hard solids, adequate A-P movement, but trace oral residue with all liquid consistencies cleared with second swallow. Pharyngeal phase c/b reduced laryngeal vestibule closure, mildly reduced pharyngeal stripping wave, and reduced base of tongue retraction. This resulted in trace vallecular residue after the swallow (also noted to be spillover from oral residue after initial swallow) cleared with extra swallow; intermittent flash penetration with larger volume thin liquids; x1 instance of aspiration with initial tsp trial of thin liquids which reflexive  cough never fully cleared aspirated material; trace vallecular residue with hard solids cleared with liquid wash. Of note, chin tuck did not significantly improve or worsen safety of swallow with thin liquids.     Recommend continue regular diet and thin liquids. Patient should be fully upright and alert for all intake, taking small bites/sips, taking 1 sip at a time, using a double swallow, alternating solids and liquids, and using a slow rate of intake. Anticipate this is patient's baseline swallow function. SLP will follow for brief course of dysphagia management.     SLP Total Evaluation Time   Evaluation, videofluoroscopic eval of swallow function Minutes (51676) 11   SLP Discharge Planning   SLP Discharge Recommendation other (see comments)  (defer to provider team)   SLP Rationale for DC Rec Anticipate patient will meet all swallow goals at this time and is at baseline swallow fx.

## 2025-02-14 NOTE — PROGRESS NOTES
Care Management Discharge Note    Discharge Date: 02/14/2025       Discharge Disposition: Home    Discharge Services: None    Discharge DME: None    Discharge Transportation: family or friend will provide    Education Provided on the Discharge Plan: Yes (AVS per bedside RN)    Persons Notified of Discharge Plans: patient    Patient/Family in Agreement with the Plan: yes         Additional Information:  CM reviewed chart. Discharge home with family. No CM needs requested or identified. Home care accepted but not ordered at discharge. Patient will be discharged home per bedside RN. Family will provide transportation home.             Kaila Galarza RN  Care Coordinator

## 2025-02-14 NOTE — PROGRESS NOTES
Pt last seen on O2 1 LPM NC with humidification, pt was breathing fine this morning, was SOB this afternoon, panting, RR 48 breaths/min, neb was given per order, BS diminished, no changed post neb, BiPAP on standby. RT following.    Roger Dickinson, RT

## 2025-02-14 NOTE — PROGRESS NOTES
Pt last seen on O2 2.5 LPM NC, SpO2 94%, BS diminished pre and post neb. Pt was SOB this afternoon, scheduled neb given, reports breathing improved. RT following.    Roger Dickinson, RT

## 2025-02-14 NOTE — CARE PLAN
02/14/25 1447   Home Oxygen Assessment (RN/RT ONLY)   Does patient have oxygen at home? No   1. SpO2 on room air at rest while awake 88       Oxygen LPM at rest 1   3. SpO2 on room air during Activity/with exercise 87   4. SpO2 with oxygen during activity/with exercise 92       Oxygen LPM during activity/with exercise 2

## 2025-02-14 NOTE — PROGRESS NOTES
PULMONARY/CRITICAL CARE CONSULT NOTE    Date / Time of Admission:  2/8/2025  7:03 AM  Assessment:   79yoM with history of iCMP, EF 40% and recent traumatic pneumothorax that has resolved but here now with parainfluenza infection on top of moderate COPD and emphysema.     Clinically Significant Risk Factors          # Hypochloremia: Lowest Cl = 97 mmol/L in last 2 days, will monitor as appropriate         # Acute Kidney Injury, unspecified: based on a >150% or 0.3 mg/dL increase in last creatinine compared to past 90 day average, will monitor renal function  # Hypertension: Noted on problem list  # Heart failure, NOS: heart failure noted on the problem list and last echo with EF 40-50%               # Financial/Environmental Concerns: none              Active Problems:    Acute on chronic congestive heart failure, unspecified heart failure type (H)            Plan:   - c/w prednisone 40 mg daily, may help with panic attacks   - CT chest reviewed and no clear pneumonia. A right upper lobe opacity has not significantly changed. This is consistent with known adenocarcinoma and treatment   - He may need oxygen temporarily on discharge if everything else would allow him to leave the hospital  - Diuresis per cards.Continue with strict I and Os  - Should discharge when ready with Anoro inhaler. He has no inhalers at home. (Has albuterol nebs only)     Subjective:    cc:  shortness of breath    HPI: 79 year old male with history of CKD, adenocarcinoma of the right lung who presents with dyspnea. Just hospitalized last month with pneumothorax after fall. EF was found to be 40%. Cardiac cath found CAD and he had stents placed and was diuresed. Subsequently discharged home until recently when more short of breath and wife noted increased coughing and sputum.     Last night he felt suddenly like he couldn't breath. He was panicked and was placed on oxymask then bipap. This made things worse. CXR found no pneumothorax. This  morning he feels well and breathing is good. Has been diuresed by cardiology/hospitalist.     Interval history: patient has not had any further episodes of tachypnea since day before yesterday, on NC.     PMHx:  iCMP, EF 35-40%  CKD III  Adenocarcinoma of the lung (right)  Prostate cancer  Neuropathy  COPD  Emphysema    Social History     Tobacco Use    Smoking status: Every Day     Current packs/day: 0.50     Types: Cigarettes     Passive exposure: Current    Smokeless tobacco: Never   Substance Use Topics    Alcohol use: Not Currently       History reviewed. No pertinent family history.    Current Facility-Administered Medications   Medication Dose Route Frequency Provider Last Rate Last Admin    acetaminophen (TYLENOL) tablet 650 mg  650 mg Oral Q6H PRN Allyn Terry MD   650 mg at 02/09/25 1354    Or    acetaminophen (TYLENOL) Suppository 650 mg  650 mg Rectal Q6H PRN Allyn Terry MD        aspirin EC tablet 81 mg  81 mg Oral Daily Allyn Terry MD   81 mg at 02/14/25 0837    atorvastatin (LIPITOR) tablet 80 mg  80 mg Oral At Bedtime Allyn Terry MD   80 mg at 02/13/25 1930    benzocaine-menthol (CEPACOL) 15-3.6 MG lozenge 1 lozenge  1 lozenge Buccal Q1H PRN Allyn Terry MD        bisoprolol (ZEBETA) tablet 5 mg  5 mg Oral Daily Miller Kay DO   5 mg at 02/13/25 0945    cetirizine (zyrTEC) tablet 10 mg  10 mg Oral QAM Allyn Terry MD   10 mg at 02/14/25 0838    clopidogrel (PLAVIX) tablet 75 mg  75 mg Oral Daily Allyn Terry MD   75 mg at 02/14/25 0837    Continuing ACE inhibitor/ARB/ARNI from home medication list OR ACE inhibitor/ARB/ARNI order already placed during this visit   Does not apply DOES NOT GO TO Allyn Smyth MD        glucose gel 15-30 g  15-30 g Oral Q15 Min PRMiller Ta DO        Or    dextrose 50 % injection 25-50 mL  25-50 mL Intravenous Q15 Min PRMiller Ta DO        Or    glucagon injection 1 mg  1 mg Subcutaneous Q15 Min PRMiller Ta DO        docusate  sodium (COLACE) capsule 100 mg  100 mg Oral BID Allyn Terry MD   100 mg at 02/13/25 0944    enoxaparin ANTICOAGULANT (LOVENOX) injection 40 mg  40 mg Subcutaneous Q24H Allyn Terry MD   40 mg at 02/13/25 1158    fluticasone (FLONASE) 50 MCG/ACT spray 1 spray  1 spray Both Nostrils Daily Miller Kay, DO   1 spray at 02/14/25 0838    furosemide (LASIX) tablet 40 mg  40 mg Oral Daily Lyssa Alatorre MD   40 mg at 02/14/25 0838    guaiFENesin (MUCINEX) 12 hr tablet 600 mg  600 mg Oral BID Miller Kay, DO   600 mg at 02/14/25 0838    hydrALAZINE (APRESOLINE) injection 10 mg  10 mg Intravenous Q4H PRN Allyn Terry MD   10 mg at 02/08/25 1715    insulin aspart (NovoLOG) injection (RAPID ACTING)  1-7 Units Subcutaneous TID AC Miller Kay DO   1 Units at 02/13/25 1723    insulin aspart (NovoLOG) injection (RAPID ACTING)  1-5 Units Subcutaneous At Bedtime Miller Kay DO   1 Units at 02/13/25 2109    ipratropium - albuterol 0.5 mg/2.5 mg/3 mL (DUONEB) neb solution 3 mL  1 vial Nebulization 4x daily Miller Kay, DO   3 mL at 02/14/25 0819    lidocaine (LMX4) cream   Topical Q1H PRN Allyn Terry MD        lidocaine 1 % 0.1-1 mL  0.1-1 mL Other Q1H PRAllyn Adair MD        LORazepam (ATIVAN) tablet 0.5 mg  0.5 mg Oral Q4H PRN Miller Kay DO   0.5 mg at 02/13/25 1434    losartan (COZAAR) tablet 25 mg  25 mg Oral BID Heidi Arnold MD   25 mg at 02/14/25 0838    melatonin tablet 1 mg  1 mg Oral At Bedtime PRN Allyn Terry MD   1 mg at 02/08/25 2344    miconazole (MICATIN) 2 % powder   Topical BID PRN Allyn Terry MD        nitroGLYcerin (NITROSTAT) sublingual tablet 0.4 mg  0.4 mg Sublingual Q5 Min PRN Allyn Terry MD   0.4 mg at 02/08/25 0804    ondansetron (ZOFRAN ODT) ODT tab 4 mg  4 mg Oral Q6H PRN Allyn Terry MD        Or    ondansetron (ZOFRAN) injection 4 mg  4 mg Intravenous Q6H PRN Allyn Terry MD        oxyBUTYnin ER (DITROPAN XL) 24 hr tablet 10 mg  10 mg Oral Every Other Day Allyn Terry,  "MD   10 mg at 02/13/25 0945    pantoprazole (PROTONIX) EC tablet 40 mg  40 mg Oral Daily Allyn Terry MD   40 mg at 02/14/25 0838    polyethylene glycol (MIRALAX) Packet 17 g  17 g Oral BID Allyn Terry MD   17 g at 02/13/25 1158    predniSONE (DELTASONE) tablet 40 mg  40 mg Oral Daily Lyssa Alatorre MD   40 mg at 02/14/25 0838    pregabalin (LYRICA) capsule 150 mg  150 mg Oral TID PRN Allyn Terry MD   150 mg at 02/09/25 1354    Reason beta blocker not prescribed   Other DOES NOT GO TO MAR Allyn Terry MD        sertraline (ZOLOFT) tablet 25 mg  25 mg Oral Daily Allyn Terry MD   25 mg at 02/13/25 0944    sodium chloride (OCEAN) 0.65 % nasal spray 2 spray  2 spray Both Nostrils TID Miller Kay,    2 spray at 02/14/25 0839    sodium chloride (PF) 0.9% PF flush 3 mL  3 mL Intracatheter Q8H Allyn Terry MD   3 mL at 02/13/25 1725    sodium chloride (PF) 0.9% PF flush 3 mL  3 mL Intracatheter q1 min prn Allyn Terry MD        spironolactone (ALDACTONE) half-tab 12.5 mg  12.5 mg Oral Daily Allyn Terry MD   12.5 mg at 02/13/25 0945    [Held by provider] traZODone (DESYREL) tablet  mg   mg Oral At Bedtime Allyn Terry MD             Review of Systems: 12-point Review performed and negative aside from that noted in HPI.    Objective:    Vital signs:  BP (!) 146/64 (BP Location: Left arm)   Pulse 51   Temp 98  F (36.7  C) (Oral)   Resp 16   Ht 1.664 m (5' 5.5\")   Wt 66.8 kg (147 lb 3.2 oz)   SpO2 92%   BMI 24.12 kg/m      GENERAL APPEARANCE: healthy, alert and no distress     EYES: EOMI, - PERRL     NECK: no adenopathy, no asymmetry, masses, or scars and thyroid normal to palpation     RESP: lungs clear to auscultation - no rales, rhonchi or wheezes     CV: regular rates and rhythm, normal S1 S2, no S3 or S4 and no murmur, click or rub -     ABDOMEN:  soft, nontender, no HSM or masses and bowel sounds normal     MS: extremities normal- no gross deformities noted, no evidence of inflammation " in joints, FROM in all extremities.     SKIN: no suspicious lesions or rashes     NEURO: Normal strength and tone, sensory exam grossly normal, mentation intact and speech normal     PSYCH: mentation appears normal. and affect normal/bright     LYMPHATICS: No axillary, cervical, inguinal, or supraclavicular nodes        Data    CXR:  Personally reviewed  XAM: XR CHEST PORT 1 VIEW  LOCATION: [facility]  DATE: 2/11/2025     INDICATION: worsening hypoxia, hx of pneumothorax  COMPARISON: 2/8/2025 and CT 1/5/2025                                                                      IMPRESSION: Right lung is clear with no recurrent right pneumothorax. Some minimal pleural thickening and fibrosis left lung base unchanged. No new findings.      Laboratory:  Results for orders placed or performed during the hospital encounter of 02/08/25   Basic metabolic panel    Collection Time: 02/14/25  5:00 AM   Result Value Ref Range    Sodium 141 135 - 145 mmol/L    Potassium 3.7 3.4 - 5.3 mmol/L    Chloride 98 98 - 107 mmol/L    Carbon Dioxide (CO2) 30 (H) 22 - 29 mmol/L    Anion Gap 13 7 - 15 mmol/L    Urea Nitrogen 72.4 (H) 8.0 - 23.0 mg/dL    Creatinine 1.32 (H) 0.67 - 1.17 mg/dL    GFR Estimate 55 (L) >60 mL/min/1.73m2    Calcium 9.6 8.8 - 10.4 mg/dL    Glucose 137 (H) 70 - 99 mg/dL     Lab Results   Component Value Date    WBC 5.4 02/14/2025    HGB 11.5 (L) 02/14/2025    HCT 35.0 (L) 02/14/2025    MCV 92 02/14/2025     02/14/2025     Antonia Meyer MD  Pulmonary, Critical Care and Sleep Medicine

## 2025-02-15 ENCOUNTER — APPOINTMENT (OUTPATIENT)
Dept: SPEECH THERAPY | Facility: CLINIC | Age: 80
DRG: 291 | End: 2025-02-15
Attending: HOSPITALIST
Payer: MEDICARE

## 2025-02-15 ENCOUNTER — APPOINTMENT (OUTPATIENT)
Dept: OCCUPATIONAL THERAPY | Facility: CLINIC | Age: 80
DRG: 291 | End: 2025-02-15
Attending: HOSPITALIST
Payer: MEDICARE

## 2025-02-15 VITALS
WEIGHT: 154.54 LBS | TEMPERATURE: 97.7 F | SYSTOLIC BLOOD PRESSURE: 119 MMHG | BODY MASS INDEX: 24.84 KG/M2 | RESPIRATION RATE: 30 BRPM | HEART RATE: 59 BPM | HEIGHT: 66 IN | DIASTOLIC BLOOD PRESSURE: 59 MMHG | OXYGEN SATURATION: 93 %

## 2025-02-15 LAB
ANION GAP SERPL CALCULATED.3IONS-SCNC: 11 MMOL/L (ref 7–15)
BASOPHILS # BLD AUTO: 0 10E3/UL (ref 0–0.2)
BASOPHILS NFR BLD AUTO: 0 %
BUN SERPL-MCNC: 60.1 MG/DL (ref 8–23)
CALCIUM SERPL-MCNC: 9.6 MG/DL (ref 8.8–10.4)
CHLORIDE SERPL-SCNC: 102 MMOL/L (ref 98–107)
CREAT SERPL-MCNC: 1.12 MG/DL (ref 0.67–1.17)
EGFRCR SERPLBLD CKD-EPI 2021: 67 ML/MIN/1.73M2
EOSINOPHIL # BLD AUTO: 0 10E3/UL (ref 0–0.7)
EOSINOPHIL NFR BLD AUTO: 1 %
ERYTHROCYTE [DISTWIDTH] IN BLOOD BY AUTOMATED COUNT: 15.2 % (ref 10–15)
GLUCOSE BLDC GLUCOMTR-MCNC: 110 MG/DL (ref 70–99)
GLUCOSE BLDC GLUCOMTR-MCNC: 156 MG/DL (ref 70–99)
GLUCOSE SERPL-MCNC: 112 MG/DL (ref 70–99)
HCO3 SERPL-SCNC: 30 MMOL/L (ref 22–29)
HCT VFR BLD AUTO: 34.3 % (ref 40–53)
HGB BLD-MCNC: 11.1 G/DL (ref 13.3–17.7)
IMM GRANULOCYTES # BLD: 0 10E3/UL
IMM GRANULOCYTES NFR BLD: 1 %
LYMPHOCYTES # BLD AUTO: 1.6 10E3/UL (ref 0.8–5.3)
LYMPHOCYTES NFR BLD AUTO: 30 %
MAGNESIUM SERPL-MCNC: 2.4 MG/DL (ref 1.7–2.3)
MCH RBC QN AUTO: 30.1 PG (ref 26.5–33)
MCHC RBC AUTO-ENTMCNC: 32.4 G/DL (ref 31.5–36.5)
MCV RBC AUTO: 93 FL (ref 78–100)
MONOCYTES # BLD AUTO: 0.7 10E3/UL (ref 0–1.3)
MONOCYTES NFR BLD AUTO: 13 %
NEUTROPHILS # BLD AUTO: 2.9 10E3/UL (ref 1.6–8.3)
NEUTROPHILS NFR BLD AUTO: 55 %
NRBC # BLD AUTO: 0 10E3/UL
NRBC BLD AUTO-RTO: 0 /100
PLATELET # BLD AUTO: 179 10E3/UL (ref 150–450)
POTASSIUM SERPL-SCNC: 3.3 MMOL/L (ref 3.4–5.3)
POTASSIUM SERPL-SCNC: 3.9 MMOL/L (ref 3.4–5.3)
RBC # BLD AUTO: 3.69 10E6/UL (ref 4.4–5.9)
SODIUM SERPL-SCNC: 143 MMOL/L (ref 135–145)
WBC # BLD AUTO: 5.2 10E3/UL (ref 4–11)

## 2025-02-15 PROCEDURE — 85048 AUTOMATED LEUKOCYTE COUNT: CPT | Performed by: HOSPITALIST

## 2025-02-15 PROCEDURE — 85025 COMPLETE CBC W/AUTO DIFF WBC: CPT | Performed by: HOSPITALIST

## 2025-02-15 PROCEDURE — 250N000013 HC RX MED GY IP 250 OP 250 PS 637: Performed by: STUDENT IN AN ORGANIZED HEALTH CARE EDUCATION/TRAINING PROGRAM

## 2025-02-15 PROCEDURE — 250N000013 HC RX MED GY IP 250 OP 250 PS 637: Performed by: INTERNAL MEDICINE

## 2025-02-15 PROCEDURE — 250N000009 HC RX 250: Performed by: HOSPITALIST

## 2025-02-15 PROCEDURE — 250N000013 HC RX MED GY IP 250 OP 250 PS 637: Performed by: FAMILY MEDICINE

## 2025-02-15 PROCEDURE — 99232 SBSQ HOSP IP/OBS MODERATE 35: CPT | Performed by: INTERNAL MEDICINE

## 2025-02-15 PROCEDURE — 99239 HOSP IP/OBS DSCHRG MGMT >30: CPT | Performed by: FAMILY MEDICINE

## 2025-02-15 PROCEDURE — 36415 COLL VENOUS BLD VENIPUNCTURE: CPT | Performed by: HOSPITALIST

## 2025-02-15 PROCEDURE — 94640 AIRWAY INHALATION TREATMENT: CPT | Mod: 76

## 2025-02-15 PROCEDURE — 250N000012 HC RX MED GY IP 250 OP 636 PS 637: Performed by: INTERNAL MEDICINE

## 2025-02-15 PROCEDURE — 82565 ASSAY OF CREATININE: CPT | Performed by: STUDENT IN AN ORGANIZED HEALTH CARE EDUCATION/TRAINING PROGRAM

## 2025-02-15 PROCEDURE — 97535 SELF CARE MNGMENT TRAINING: CPT | Mod: GO

## 2025-02-15 PROCEDURE — 83735 ASSAY OF MAGNESIUM: CPT | Performed by: STUDENT IN AN ORGANIZED HEALTH CARE EDUCATION/TRAINING PROGRAM

## 2025-02-15 PROCEDURE — 999N000157 HC STATISTIC RCP TIME EA 10 MIN

## 2025-02-15 PROCEDURE — 99233 SBSQ HOSP IP/OBS HIGH 50: CPT | Performed by: INTERNAL MEDICINE

## 2025-02-15 PROCEDURE — 84132 ASSAY OF SERUM POTASSIUM: CPT | Performed by: FAMILY MEDICINE

## 2025-02-15 PROCEDURE — 92526 ORAL FUNCTION THERAPY: CPT | Mod: GN

## 2025-02-15 PROCEDURE — 84132 ASSAY OF SERUM POTASSIUM: CPT | Performed by: STUDENT IN AN ORGANIZED HEALTH CARE EDUCATION/TRAINING PROGRAM

## 2025-02-15 PROCEDURE — 250N000013 HC RX MED GY IP 250 OP 250 PS 637: Performed by: HOSPITALIST

## 2025-02-15 PROCEDURE — 36415 COLL VENOUS BLD VENIPUNCTURE: CPT | Performed by: FAMILY MEDICINE

## 2025-02-15 PROCEDURE — 250N000011 HC RX IP 250 OP 636: Performed by: STUDENT IN AN ORGANIZED HEALTH CARE EDUCATION/TRAINING PROGRAM

## 2025-02-15 RX ORDER — POTASSIUM CHLORIDE 1500 MG/1
40 TABLET, EXTENDED RELEASE ORAL ONCE
Status: COMPLETED | OUTPATIENT
Start: 2025-02-15 | End: 2025-02-15

## 2025-02-15 RX ORDER — DOXYCYCLINE 100 MG/1
100 CAPSULE ORAL 2 TIMES DAILY
Qty: 14 CAPSULE | Refills: 0 | Status: SHIPPED | OUTPATIENT
Start: 2025-02-15 | End: 2025-02-22

## 2025-02-15 RX ORDER — AZITHROMYCIN 500 MG/1
500 TABLET, FILM COATED ORAL ONCE
Status: DISCONTINUED | OUTPATIENT
Start: 2025-02-15 | End: 2025-02-15

## 2025-02-15 RX ORDER — BUDESONIDE AND FORMOTEROL FUMARATE DIHYDRATE 160; 4.5 UG/1; UG/1
2 AEROSOL RESPIRATORY (INHALATION) 2 TIMES DAILY
Qty: 10.2 G | Refills: 3 | Status: SHIPPED | OUTPATIENT
Start: 2025-02-15

## 2025-02-15 RX ORDER — AZITHROMYCIN 250 MG/1
TABLET, FILM COATED ORAL
Qty: 6 TABLET | Refills: 0 | Status: SHIPPED | OUTPATIENT
Start: 2025-02-16 | End: 2025-02-15

## 2025-02-15 RX ADMIN — FLUTICASONE PROPIONATE 1 SPRAY: 50 SPRAY, METERED NASAL at 09:27

## 2025-02-15 RX ADMIN — LOSARTAN POTASSIUM 25 MG: 25 TABLET, FILM COATED ORAL at 09:23

## 2025-02-15 RX ADMIN — SERTRALINE HYDROCHLORIDE 25 MG: 25 TABLET ORAL at 09:23

## 2025-02-15 RX ADMIN — SPIRONOLACTONE 12.5 MG: 25 TABLET, FILM COATED ORAL at 09:23

## 2025-02-15 RX ADMIN — PANTOPRAZOLE SODIUM 40 MG: 40 TABLET, DELAYED RELEASE ORAL at 09:23

## 2025-02-15 RX ADMIN — PREDNISONE 40 MG: 20 TABLET ORAL at 09:23

## 2025-02-15 RX ADMIN — IPRATROPIUM BROMIDE AND ALBUTEROL SULFATE 3 ML: .5; 3 SOLUTION RESPIRATORY (INHALATION) at 11:20

## 2025-02-15 RX ADMIN — LORAZEPAM 0.5 MG: 0.5 TABLET ORAL at 05:49

## 2025-02-15 RX ADMIN — IPRATROPIUM BROMIDE AND ALBUTEROL SULFATE 3 ML: .5; 3 SOLUTION RESPIRATORY (INHALATION) at 06:12

## 2025-02-15 RX ADMIN — CLOPIDOGREL BISULFATE 75 MG: 75 TABLET ORAL at 09:23

## 2025-02-15 RX ADMIN — POTASSIUM CHLORIDE 40 MEQ: 1500 TABLET, EXTENDED RELEASE ORAL at 09:23

## 2025-02-15 RX ADMIN — SALINE NASAL SPRAY 2 SPRAY: 1.5 SOLUTION NASAL at 09:29

## 2025-02-15 RX ADMIN — OXYBUTYNIN CHLORIDE 10 MG: 5 TABLET, EXTENDED RELEASE ORAL at 09:22

## 2025-02-15 RX ADMIN — ASPIRIN 81 MG: 81 TABLET, COATED ORAL at 09:23

## 2025-02-15 RX ADMIN — FUROSEMIDE 40 MG: 40 TABLET ORAL at 09:22

## 2025-02-15 RX ADMIN — GUAIFENESIN 600 MG: 600 TABLET ORAL at 09:22

## 2025-02-15 RX ADMIN — ENOXAPARIN SODIUM 40 MG: 40 INJECTION SUBCUTANEOUS at 12:44

## 2025-02-15 RX ADMIN — CETIRIZINE HYDROCHLORIDE 10 MG: 10 TABLET, FILM COATED ORAL at 09:23

## 2025-02-15 ASSESSMENT — ACTIVITIES OF DAILY LIVING (ADL)
ADLS_ACUITY_SCORE: 58
ADLS_ACUITY_SCORE: 58
ADLS_ACUITY_SCORE: 60
ADLS_ACUITY_SCORE: 58
ADLS_ACUITY_SCORE: 60
ADLS_ACUITY_SCORE: 60
ADLS_ACUITY_SCORE: 58
ADLS_ACUITY_SCORE: 60
ADLS_ACUITY_SCORE: 58
ADLS_ACUITY_SCORE: 60
ADLS_ACUITY_SCORE: 60
ADLS_ACUITY_SCORE: 58
ADLS_ACUITY_SCORE: 58
ADLS_ACUITY_SCORE: 60

## 2025-02-15 NOTE — PROGRESS NOTES
Occupational Therapy Discharge Summary    Reason for therapy discharge:    Discharged to home.    Progress towards therapy goal(s). See goals on Care Plan in [facility] electronic health record for goal details.  Goals partially met.  Barriers to achieving goals:   discharge from facility.    Therapy recommendation(s):    Continued therapy is recommended.  Rationale/Recommendations:  Outpatient cardiac rehab.    LORENZA Mcdaniel/CAROLIN 2/15/2025

## 2025-02-15 NOTE — PROGRESS NOTES
CARDIOLOGY PROGRESS NOTE      Assessment/Plan:  1.  Acute on chronic congestive heart failure, (H)-in the setting of midrange ejection fraction of 40 to 50%, still symptomatic with oxygen desaturation.  Weight down 6 kg, oxygen saturation 92% on 2 L.  Creatinine increased to 1.34, however now with oral diuretic down to 1.12. Consider heart failure resolved.  2.  Coronary artery disease-secondary to positive troponin he underwent angiography, showing normal left main, mid LAD 95% lesion that received a 3.0 x 22 [place] drug-coated stent as well as a 3.0 x 12 mm [place] drug-coated stent.  Circumflex had a proximal 45% lesion and right coronary artery was small codominant without any significant disease.  Symptomatically unchanged.  Plan on Plavix until 2026.  3.  Benign essential hypertension-under good control on bisoprolol, losartan and spironolactone.  By definition resistant.  4.  Parainfluenza-treatment per hospitalist.  5.  COPD-receiving nebulizers.  Pulmonary consultation and therapy deferred to them.    6.  Ventricular tachycardia-review of telemetry does showed brief 3-4 episodes, outpatient stress testing to rule out ischemia.    7.  Pure hypercholesterolemia-total cholesterol 87 with an LDL of 28 which is very acceptable.  8.  Troponin elevation-level 221, no current symptoms.  Downtrending, not diagnostic of acute coronary syndrome, will certainly need outpatient pharmacological stress nuclear given his inability to walk.  9.  Cardiomyopathy-presumed ischemic with ejection fraction of 40 to 45%, improved following intervention.  On losartan, bisoprolol, spironolactone.    Plan:  1.  Continue oral diuretic.    2.  Cardiology does not have much further inpatient to add, we will sign off, available as needed.    Discharge Plannin.  Barrier to discharge is resolution of hypoxia and infectious process, anticipate 2 to 3 days.  2.  Will need outpatient pharmacological stress nuclear.  3.  Can follow  with primary cardiologist Dr Alton Hernadez.     LOS: 7 days     Subjective:  Interval History:    79-year-old white gentleman being seen on 8th day of hospitalization.  Wife at bedside.  He feels less short of breath, but does have some anxiety over breathing and possible panic attack.  No chest pains, palpitations, PND, orthopnea or significant peripheral edema.    Medications  Current Facility-Administered Medications   Medication Dose Route Frequency Provider Last Rate Last Admin    aspirin EC tablet 81 mg  81 mg Oral Daily Allyn Terry MD   81 mg at 02/15/25 0923    atorvastatin (LIPITOR) tablet 80 mg  80 mg Oral At Bedtime Allyn Terry MD   80 mg at 02/14/25 2146    bisoprolol (ZEBETA) tablet 5 mg  5 mg Oral Daily Miller Kay DO   5 mg at 02/13/25 0945    cetirizine (zyrTEC) tablet 10 mg  10 mg Oral QAM Allyn Terry MD   10 mg at 02/15/25 0923    clopidogrel (PLAVIX) tablet 75 mg  75 mg Oral Daily Allyn Terry MD   75 mg at 02/15/25 0923    docusate sodium (COLACE) capsule 100 mg  100 mg Oral BID Allyn Terry MD   100 mg at 02/13/25 0944    enoxaparin ANTICOAGULANT (LOVENOX) injection 40 mg  40 mg Subcutaneous Q24H Allyn Terry MD   40 mg at 02/14/25 1238    fluticasone (FLONASE) 50 MCG/ACT spray 1 spray  1 spray Both Nostrils Daily Miller Kay DO   1 spray at 02/15/25 0927    furosemide (LASIX) tablet 40 mg  40 mg Oral Daily Lyssa Alatorre MD   40 mg at 02/15/25 0922    guaiFENesin (MUCINEX) 12 hr tablet 600 mg  600 mg Oral BID Miller Kay DO   600 mg at 02/15/25 0922    insulin aspart (NovoLOG) injection (RAPID ACTING)  1-7 Units Subcutaneous TID AC Miller Kay DO   1 Units at 02/14/25 1807    insulin aspart (NovoLOG) injection (RAPID ACTING)  1-5 Units Subcutaneous At Bedtime Miller Kay DO   1 Units at 02/13/25 2109    ipratropium - albuterol 0.5 mg/2.5 mg/3 mL (DUONEB) neb solution 3 mL  1 vial Nebulization 4x daily Miller Kay DO   3 mL at 02/15/25 0612    losartan (COZAAR)  "tablet 25 mg  25 mg Oral BID Heidi Arnold MD   25 mg at 02/15/25 0923    oxyBUTYnin ER (DITROPAN XL) 24 hr tablet 10 mg  10 mg Oral Every Other Day Allyn Terry MD   10 mg at 02/15/25 0922    pantoprazole (PROTONIX) EC tablet 40 mg  40 mg Oral Daily Allyn Terry MD   40 mg at 02/15/25 0923    polyethylene glycol (MIRALAX) Packet 17 g  17 g Oral BID Allyn Terry MD   17 g at 02/13/25 1158    predniSONE (DELTASONE) tablet 40 mg  40 mg Oral Daily Lyssa Alatorre MD   40 mg at 02/15/25 0923    sertraline (ZOLOFT) tablet 25 mg  25 mg Oral Daily Allyn Terry MD   25 mg at 02/15/25 0923    sodium chloride (OCEAN) 0.65 % nasal spray 2 spray  2 spray Both Nostrils TID Miller Kay,    2 spray at 02/15/25 0929    sodium chloride (PF) 0.9% PF flush 3 mL  3 mL Intracatheter Q8H Allyn Terry MD   3 mL at 02/15/25 0200    spironolactone (ALDACTONE) half-tab 12.5 mg  12.5 mg Oral Daily Allyn Terry MD   12.5 mg at 02/15/25 0923    [Held by provider] traZODone (DESYREL) tablet  mg   mg Oral At Bedtime Allyn Terry MD         Objective:   Vital signs in last 24 hours:  Temp:  [97.5  F (36.4  C)-98.1  F (36.7  C)] 97.9  F (36.6  C)  Pulse:  [46-69] 48  Resp:  [16-44] 32  BP: (114-149)/(55-63) 138/62  FiO2 (%):  [1 %] 1 %  SpO2:  [90 %-98 %] 93 %    Physical Exam:  /62 (BP Location: Right arm)   Pulse (!) 48   Temp 97.9  F (36.6  C) (Oral)   Resp (!) 32   Ht 1.664 m (5' 5.5\")   Wt 70.1 kg (154 lb 8.7 oz)   SpO2 93%   BMI 25.33 kg/m      General Appearance:    Alert, cooperative, no distress, appears stated age   Head:    Normocephalic, without obvious abnormality, atraumatic   Throat:   Lips, mucosa, and tongue normal; teeth and gums normal   Neck:   Supple, symmetrical, trachea midline, no adenopathy;        thyroid:  No enlargement/tenderness/nodules; no carotid    bruit, 2 to 3 cm at 30 degrees JVD   Back:     Symmetric, no curvature, ROM normal, no CVA tenderness   Lungs:     Occasional expiratory " wheeze auscultation bilaterally, respirations unlabored   Chest wall:    No tenderness or deformity   Heart:    Regular rate and rhythm, S1 and S2 normal, no murmur, rub   or gallop, PMI shifted medially   Abdomen:     Soft, non-tender, bowel sounds active all four quadrants,     no masses, no organomegaly   Extremities:   Normal, atraumatic, no cyanosis or edema   Pulses:   2+ and symmetric all extremities   Skin:   Skin color, texture, turgor normal, no rashes or lesions     Cardiographics:      ECG: Personally reviewed by myself shows sinus rhythm, incomplete right bundle branch block pattern, lateral T wave changes nonspecific.    Echocardiogram:   The left ventricle is normal in size. There is mild concentric left ventricular hypertrophy. The visual ejection fraction is 40-50%.  Mild hypokinesis in distal anteroseptal segment.  Normal right ventricle size and systolic function.  The left atrium is mildly dilated.  This is a limited study.  When compared to previous study on 1-6-2025, wall motion abnormality and left  ventricular systolic function are improved.      Lab Results:   Recent Labs   Lab 02/15/25  0606   WBC 5.2   HGB 11.1*   HCT 34.3*        Recent Labs   Lab 02/15/25  0606      CO2 30*   BUN 60.1*   .       Lab Results   Component Value Date    TROPONINI 0.02 07/31/2022

## 2025-02-15 NOTE — PROGRESS NOTES
Oxygen Documentation  I certify that this patient, Ubaldo Ramos has been under my care (or a nurse practitioner or physican's assistant working with me). This is the face-to-face encounter for oxygen medical necessity.      At the time of this encounter, I have reviewed the qualifying testing and have determined that supplemental oxygen is reasonable and necessary and is expected to improve the patient's condition in a home setting.         Patient has continued oxygen desaturation due to Chronic Heart Failure I50  COPD J44.9.    If portability is ordered, is the patient mobile within the home? yes    Patient has been assessed for Home Oxygen needs.      Pulse oximetry (SpO2) and Oxygen flow readings:     SpO2 = 87% on room air at rest while awake.     SpO2 improved to 92% on 2 liters/minute at rest.     SpO2 = 85% on room air during activity/with exercise.     *SpO2 improved to 90% on 2 liters/minute during activity/with exercise.

## 2025-02-15 NOTE — PROGRESS NOTES
"Pulmonary Progress Note  2/15/2025    Admit Date: 2/8/2025  CODE: Full Code    Reason for Consult: SOB    Assessment/Plan:     79 year old male with history of CKD, adenocarcinoma of the right lung who presents with dyspnea. Just hospitalized last month with pneumothorax after fall. EF was found to be 40%. Cardiac cath found CAD and he had stents placed and was diuresed. Subsequently discharged home until recently when more short of breath and wife noted increased coughing and sputum.      Last night he felt suddenly like he couldn't breath. He was panicked and was placed on oxymask then bipap. This made things worse. CXR found no pneumothorax. This morning he feels well and breathing is good. Has been diuresed by cardiology/hospitalist.     Overall seems presentation most consistent with CHF.  As he improved with diuresis.    Recommendations:  Complete 5 days of prednisone  On DuoNebs at home, unclear if this is due to cost of inhalers.  Seen by the pulmonary clinic last January, will arrange follow-up.  Will leave with Anoro and advised him to use DuoNebs 4 times daily should he run out of the Anoro before the appointment.  Oxygen needs can be reassessed in the outpatient setting.      Will sign off.    Annmarie Pete MD  Pulmonary and Critical Care Medicine  [facility]  Office: [phone]      Subjective/Interim Events:     Preparing for discharge today with 2 L of oxygen.    Medications:   Reviewed    Exam/Data:   Vitals  /59 (BP Location: Right arm)   Pulse 59   Temp 97.7  F (36.5  C) (Oral)   Resp 30   Ht 1.664 m (5' 5.5\")   Wt 70.1 kg (154 lb 8.7 oz)   SpO2 93%   BMI 25.33 kg/m    BP - Mean:  [91] 91  I/O last 3 completed shifts:  In: 690 [P.O.:690]  Out: -   Weight change: 3.331 kg (7 lb 5.5 oz)  FiO2 (%): 1 %, Resp: 30    EXAM:  Physical Exam  Gen: Alert, oriented, no distress  HEENT: NT, no TELLY  CV: RRR, no m/g/r  Resp: CTAB  Abd: soft, nontender, BS+  Skin: no rashes or lesions  Ext: " no edema  Neuro: PERRL, nonfocal exam    LABS:  Reviewed in detail    IMAGING:  Reviewed

## 2025-02-15 NOTE — CARE PLAN
02/15/25 [facility]   Home Oxygen Assessment (RN/RT ONLY)   Does patient have oxygen at home? No   1. SpO2 on room air at rest while awake 87       Oxygen LPM at rest 2   3. SpO2 on room air during Activity/with exercise 85   4. SpO2 with oxygen during activity/with exercise 90       Oxygen LPM during activity/with exercise 2   Does patient qualify for Home O2? Yes

## 2025-02-15 NOTE — PROGRESS NOTES
Patient has been assessed for Home Oxygen needs.     Pulse oximetry (SpO2) and Oxygen flow readings:    SpO2 = 87% on room air at rest while awake.    SpO2 improved to 92% on 2 liters/minute at rest.    SpO2 = 85% on room air during activity/with exercise.    *SpO2 improved to 90% on 2 liters/minute during activity/with exercise.

## 2025-02-15 NOTE — PLAN OF CARE
Problem: Adult Inpatient Plan of Care  Goal: Optimal Comfort and Wellbeing  Outcome: Progressing  Intervention: Provide Person-Centered Care  Recent Flowsheet Documentation  Taken 2/15/2025 0400 by Yamini Serrano RN  Trust Relationship/Rapport:   care explained   choices provided   emotional support provided   empathic listening provided   questions answered   questions encouraged   reassurance provided   thoughts/feelings acknowledged  Taken 2/15/2025 0000 by Yamini Serrano RN  Trust Relationship/Rapport:   care explained   choices provided   emotional support provided   empathic listening provided   questions answered   questions encouraged   reassurance provided   thoughts/feelings acknowledged  Taken 2/14/2025 2000 by Yamini Serrano RN  Trust Relationship/Rapport:   care explained   choices provided   emotional support provided   empathic listening provided   questions answered   questions encouraged   reassurance provided   thoughts/feelings acknowledged     Problem: Delirium  Goal: Improved Sleep  Outcome: Progressing     Problem: Heart Failure  Goal: Stable Heart Rate and Rhythm  Outcome: Progressing  Goal: Optimal Functional Ability  Outcome: Progressing  Intervention: Optimize Functional Ability  Recent Flowsheet Documentation  Taken 2/15/2025 0400 by Yamini Serrano RN  Activity Management: activity adjusted per tolerance  Taken 2/15/2025 0000 by Yamini Serrano RN  Activity Management: activity adjusted per tolerance  Taken 2/14/2025 2000 by Yamini Serrano RN  Activity Management: activity adjusted per tolerance  Goal: Improved Oral Intake  Outcome: Progressing  Goal: Effective Oxygenation and Ventilation  Outcome: Progressing  Intervention: Promote Airway Secretion Clearance  Recent Flowsheet Documentation  Taken 2/15/2025 0400 by Yamini Serrano RN  Cough And Deep Breathing: done independently per patient  Activity Management: activity adjusted per tolerance  Taken 2/15/2025 0000 by  Yamini Serrano RN  Cough And Deep Breathing: done independently per patient  Activity Management: activity adjusted per tolerance  Taken 2/14/2025 2000 by Yamini Serrano RN  Cough And Deep Breathing: done independently per patient  Activity Management: activity adjusted per tolerance  Intervention: Optimize Oxygenation and Ventilation  Recent Flowsheet Documentation  Taken 2/15/2025 0400 by Yamini Serrano RN  Head of Bed (HOB) Positioning: HOB at 20-30 degrees  Taken 2/15/2025 0000 by Yamini Serrano RN  Head of Bed (HOB) Positioning: HOB at 20-30 degrees  Taken 2/14/2025 2000 by Yamini Serrano RN  Head of Bed (HOB) Positioning: HOB at 20-30 degrees  Goal: Effective Breathing Pattern During Sleep  Outcome: Progressing  Intervention: Monitor and Manage Obstructive Sleep Apnea  Recent Flowsheet Documentation  Taken 2/15/2025 0400 by Yamini Serrano RN  Medication Review/Management: medications reviewed  Taken 2/15/2025 0000 by Yamini Serrano RN  Medication Review/Management: medications reviewed  Taken 2/14/2025 2000 by Yamini Serrano RN  Medication Review/Management: medications reviewed     Problem: COPD (Chronic Obstructive Pulmonary Disease)  Goal: Effective Oxygenation and Ventilation  Outcome: Progressing  Intervention: Promote Airway Secretion Clearance  Recent Flowsheet Documentation  Taken 2/15/2025 0400 by Yamini Serrano RN  Cough And Deep Breathing: done independently per patient  Activity Management: activity adjusted per tolerance  Taken 2/15/2025 0000 by Yamini Serrano RN  Cough And Deep Breathing: done independently per patient  Activity Management: activity adjusted per tolerance  Taken 2/14/2025 2000 by Yamini Serrano RN  Cough And Deep Breathing: done independently per patient  Activity Management: activity adjusted per tolerance  Intervention: Optimize Oxygenation and Ventilation  Recent Flowsheet Documentation  Taken 2/15/2025 0400 by Yamini Serrano  RN  Fluid/Electrolyte Management: fluids restricted  Head of Bed (HOB) Positioning: HOB at 20-30 degrees  Taken 2/15/2025 0000 by Yamini Serrano RN  Fluid/Electrolyte Management: fluids restricted  Head of Bed (HOB) Positioning: HOB at 20-30 degrees  Taken 2/14/2025 2000 by Yamini Serrano RN  Fluid/Electrolyte Management: fluids restricted  Head of Bed (HOB) Positioning: HOB at 20-30 degrees     Problem: Fall Injury Risk  Goal: Absence of Fall and Fall-Related Injury  Outcome: Progressing  Intervention: Identify and Manage Contributors  Recent Flowsheet Documentation  Taken 2/15/2025 0400 by Yamini Serrano RN  Medication Review/Management: medications reviewed  Taken 2/15/2025 0000 by Yamini Serrano RN  Medication Review/Management: medications reviewed  Taken 2/14/2025 2000 by Yamini Serrano RN  Medication Review/Management: medications reviewed  Intervention: Promote Injury-Free Environment  Recent Flowsheet Documentation  Taken 2/15/2025 0400 by Yamini Serrano RN  Safety Promotion/Fall Prevention:   safety round/check completed   clutter free environment maintained   nonskid shoes/slippers when out of bed   room door open   room near nurse's station  Taken 2/15/2025 0000 by Yamini Serrano RN  Safety Promotion/Fall Prevention:   safety round/check completed   clutter free environment maintained   nonskid shoes/slippers when out of bed   room door open   room near nurse's station  Taken 2/14/2025 2000 by Yamini Serrano RN  Safety Promotion/Fall Prevention:   safety round/check completed   clutter free environment maintained   nonskid shoes/slippers when out of bed   room door open   room near nurse's station     Problem: Skin Injury Risk Increased  Goal: Skin Health and Integrity  Outcome: Progressing  Intervention: Plan: Nurse Driven Intervention: Moisture Management  Recent Flowsheet Documentation  Taken 2/15/2025 0400 by Yamini Serrano RN  Moisture Interventions: Encourage regular  toileting  Taken 2/15/2025 0000 by Yamini Serrano RN  Moisture Interventions: Encourage regular toileting  Taken 2/14/2025 2000 by Yamini Serrano RN  Moisture Interventions: Encourage regular toileting  Intervention: Plan: Nurse Driven Intervention: Friction and Shear  Recent Flowsheet Documentation  Taken 2/15/2025 0400 by Yamini Serrano RN  Friction/Shear Interventions: HOB 30 degrees or less  Taken 2/15/2025 0000 by Yamini Serrano RN  Friction/Shear Interventions: HOB 30 degrees or less  Taken 2/14/2025 2000 by Yamini Serrano RN  Friction/Shear Interventions: HOB 30 degrees or less  Intervention: Optimize Skin Protection  Recent Flowsheet Documentation  Taken 2/15/2025 0400 by Yamini Serrano RN  Activity Management: activity adjusted per tolerance  Head of Bed (HOB) Positioning: HOB at 20-30 degrees  Taken 2/15/2025 0000 by Yamini Serrano RN  Activity Management: activity adjusted per tolerance  Head of Bed (HOB) Positioning: HOB at 20-30 degrees  Taken 2/14/2025 2000 by Yamini Serrano RN  Activity Management: activity adjusted per tolerance  Head of Bed (HOB) Positioning: HOB at 20-30 degrees     Problem: Risk for Delirium  Goal: Improved Sleep  Outcome: Progressing   Goal Outcome Evaluation:    A&O x4. Sinus bradycardia as low as 46 overnight while sleeping, otherwise in 50s-60s while awake. Continued on nasal cannula which was titrated between 1-3 lpm; has occasional anxiety attacks, especially when he has a mask on his face (i.e., neb treatment) with drop in O2 sats and tachypnea in 40s; denies panic attacks at home; given ativan prn. Continued fluid restriction with 200 ml consumed on night shift. Slept for a few hours overnight.

## 2025-02-15 NOTE — DISCHARGE SUMMARY
[facility] MEDICINE  DISCHARGE SUMMARY     Primary Care Physician: John Oconnor  Admission Date: 2/8/2025   Discharge Provider: Shiloh German MD Discharge Date: 2/15/2025   Diet:   Low-salt diet   Code Status: Full Code   Activity: DCACTIVITY: Activity as tolerated        Condition at Discharge: Stable     REASON FOR PRESENTATION(See Admission Note for Details)   Short of breath    PRINCIPAL & ACTIVE DISCHARGE DIAGNOSES     Acute hypoxic respiratory failure  Parainfluenza infection  Acute systolic CHF exacerbation  Acute COPD exacerbation  Severe COPD   Coronary artery disease  Non-STEMI  Lipidemia  Essential hypertension  Peripheral vascular disease  Acute kidney injury  History of non-small cell lung cancer status postresection, radiation and chemotherapy in 2018  Tobaccoism  GERD      PENDING LABS     Unresulted Labs Ordered in the Past 30 Days of this Admission       No orders found from 1/9/2025 to 2/9/2025.             PROCEDURES ( this hospitalization only)      None    RECOMMENDATIONS TO OUTPATIENT PROVIDER FOR F/U VISIT     Follow-up Appointments       Follow-up and recommended labs and tests       Follow up with primary care provider, John Oconnor, within 7 days for hospital follow- up.  The following labs/tests are recommended: CBC, BMP, Mg.  Follow-up with cardiology in 2 weeks at CHF clinic  Follow-up with pulmonology in 4 to 6 weeks for COPD              DISPOSITION     Home with home care    SUMMARY OF HOSPITAL COURSE:      Mr. Ubaldo Ramos is a 79 year old male with past medical history of coronary artery disease, NSTEMI, status post JIA x2 to mid LAD on 1/8/2025), HFrEF with EF of 35 to 40%, recent traumatic right rib fracture and right pneumothorax s/p chest tube removal on 1/7/HLD, PAD, HTN, severe COPD with PFT in October 2021 showing FEV1 1.22 L (45%) and DLCOc 52%, NSCLC, active tobacco dependence, lung cancer s/p left upper lobectomy in 2017 with  recurrence in 2018 s/p chemotherapy and radiation, peripheral arterial disease s/p left femoral endarterectomy and bilateral iliac stents, history of prostate cancer, GERD who presented with shortness of breath and productive cough for few weeks.Oxygen  saturation was 78% in room air on admission.  Found to have elevated proBNP, troponin secondary to acute systolic CHF exacerbation and COPD exacerbation and parainfluenza infection.  Required BiPAP, oxygen support, bronchodilator therapy, systemic steroid, IV diuresis.  Breathing is much improved.  Still requiring 2 L of oxygen which is arranged for home.  Will take taper dose of oral steroid, bronchodilator therapy, long-acting beta agonist and steroid inhaler.  Resume low-salt diet, Lasix 40 mg daily for CHF.  Doxycycline for 1 week for acute bronchitis.  He is afebrile and hemodynamically stable.  Chest CT scan revealed airway debris with aspiration or infection. SLP did not show evidence of high aspiration risk.  Absolute smoking cessation is advised. Evaluated by cardiology and pulmonology.  Discharging home in stable condition.    Discharge Medications with Med changes:     Current Discharge Medication List        START taking these medications    Details   benzocaine-menthol (CEPACOL) 15-3.6 MG lozenge Place 1 lozenge inside cheek every hour as needed for sore throat (without fever).  Qty: 20 lozenge, Refills: 0    Associated Diagnoses: COPD exacerbation (H)      furosemide (LASIX) 40 MG tablet Take 1 tablet (40 mg) by mouth daily.  Qty: 30 tablet, Refills: 0    Associated Diagnoses: Acute on chronic congestive heart failure, unspecified heart failure type (H)      guaiFENesin (MUCINEX) 600 MG 12 hr tablet Take 1 tablet (600 mg) by mouth 2 times daily for 5 days.  Qty: 10 tablet, Refills: 0    Associated Diagnoses: COPD exacerbation (H)      LORazepam (ATIVAN) 0.5 MG tablet Take 1 tablet (0.5 mg) by mouth every 4 hours as needed for anxiety (shortness of  breath).  Qty: 10 tablet, Refills: 0    Associated Diagnoses: COPD exacerbation (H)      predniSONE (DELTASONE) 20 MG tablet Take 2 tablets (40 mg) by mouth daily for 1 day, THEN 1.5 tablets (30 mg) daily for 3 days, THEN 1 tablet (20 mg) daily for 3 days, THEN 0.5 tablets (10 mg) daily for 3 days.  Qty: 11 tablet, Refills: 0    Associated Diagnoses: COPD exacerbation (H)      sodium chloride (OCEAN) 0.65 % nasal spray Spray 2 sprays into both nostrils daily as needed for congestion.  Qty: 30 mL, Refills: 0    Associated Diagnoses: COPD exacerbation (H)      umeclidinium-vilanterol (ANORO ELLIPTA) 62.5-25 MCG/ACT oral inhaler Inhale 1 puff into the lungs daily.  Qty: 1 each, Refills: 0    Associated Diagnoses: COPD exacerbation (H)           CONTINUE these medications which have CHANGED    Details   ipratropium - albuterol 0.5 mg/2.5 mg/3 mL (DUONEB) 0.5-2.5 (3) MG/3ML neb solution Take 1 vial (3 mLs) by nebulization every 4 hours as needed for shortness of breath, wheezing or cough.    Associated Diagnoses: COPD exacerbation (H)      losartan (COZAAR) 25 MG tablet Take 1 tablet (25 mg) by mouth 2 times daily.  Qty: 60 tablet, Refills: 0    Associated Diagnoses: Acute on chronic congestive heart failure, unspecified heart failure type (H)           CONTINUE these medications which have NOT CHANGED    Details   acetaminophen (TYLENOL) 325 MG tablet Take 2 tablets (650 mg) by mouth every 6 hours as needed for mild pain.  Qty: 90 tablet, Refills: 0    Associated Diagnoses: Closed fracture of one rib of right side, initial encounter      aspirin 81 MG EC tablet Take 1 tablet (81 mg) by mouth daily. Start tomorrow.  Qty: 30 tablet, Refills: 3    Associated Diagnoses: Status post insertion of drug-eluting stent into left anterior descending (LAD) artery      atorvastatin (LIPITOR) 80 MG tablet Take 1 tablet (80 mg) by mouth at bedtime  Qty: 90 tablet, Refills: 3    Associated Diagnoses: Hyperlipidemia LDL goal <100       cetirizine (ZYRTEC) 10 MG tablet Take 10 mg by mouth every morning.      cholecalciferol, vitamin D3, (VITAMIN D3) 25 mcg (1,000 unit) capsule [CHOLECALCIFEROL, VITAMIN D3, (VITAMIN D3) 25 MCG (1,000 UNIT) CAPSULE] Take 1,000 Units by mouth daily.      clopidogrel (PLAVIX) 75 MG tablet Take 1 tablet (75 mg) by mouth daily. Dose to start tomorrow.  Qty: 90 tablet, Refills: 3    Associated Diagnoses: Status post insertion of drug-eluting stent into left anterior descending (LAD) artery      cyanocobalamin 1000 MCG tablet [CYANOCOBALAMIN 1000 MCG TABLET] Take 1,000 mcg by mouth daily.      oxyBUTYnin ER (DITROPAN XL) 10 MG 24 hr tablet Take 10 mg by mouth every other day.      pantoprazole (PROTONIX) 40 MG EC tablet TAKE 1 TABLET BY MOUTH ONCE DAILY  Qty: 90 tablet, Refills: 2    Associated Diagnoses: Dyspepsia      pregabalin (LYRICA) 150 MG capsule Take 150 mg by mouth 3 times daily as needed.      sertraline (ZOLOFT) 25 MG tablet Take 1 tablet (25 mg) by mouth daily.  Qty: 60 tablet, Refills: 0    Associated Diagnoses: Moderate episode of recurrent major depressive disorder (H)      spironolactone (ALDACTONE) 25 MG tablet Take 0.5 tablets (12.5 mg) by mouth daily.  Qty: 45 tablet, Refills: 3    Associated Diagnoses: Essential hypertension, benign      traZODone (DESYREL) 50 MG tablet Take 1-2 tablets ( mg) by mouth at bedtime.  Qty: 180 tablet, Refills: 3    Associated Diagnoses: Primary insomnia      bisoprolol (ZEBETA) 5 MG tablet Take 1 tablet (5 mg) by mouth daily.  Qty: 30 tablet, Refills: 3    Associated Diagnoses: Elevated troponin                   Rationale for medication changes:      Symbicort prescribed Anoro is not provided by the insurance  Lasix 40 mg daily  Taper dose of oral steroid  Doxycycline for 1 week for acute bronchitis  Ativan as needed for anxiety        Consults   Cardiology  Pulmonary  PT and OT    Immunizations given this encounter     Most Recent Immunizations   Administered  Date(s) Administered    COVID-19 12+ (MODERNA) 09/25/2024    COVID-19 12+ (Pfizer) 10/27/2023    COVID-19 Bivalent 12+ (Pfizer) 11/16/2022    COVID-19 MONOVALENT 12+ (Pfizer) 10/01/2021    COVID-19 Monovalent 12+ (Pfizer 2022) 05/04/2022    DT (PEDS <7y) 01/01/1997    Flu 65+ (Fluad) 09/16/2024    Flu, Unspecified 10/25/2020    Influenza (IIV3) PF 10/19/2010    Influenza Vaccine 65+ (FLUAD) 10/12/2023    Influenza Vaccine 65+ (Fluzone HD) 10/18/2023    Influenza Vaccine, 6+MO IM (QUADRIVALENT W/PRESERVATIVES) 10/01/2018    Pneumococcal 20 valent Conjugate (Prevnar 20) 06/29/2022    Pneumococcal, Unspecified 10/01/2018    RSV Vaccine (Arexvy) 12/26/2023    TDAP (Adacel,Boostrix) 08/06/2007    Td,adult,historic,unspecified 08/06/2007           Anticoagulation Information      None      SIGNIFICANT IMAGING FINDINGS     Results for orders placed or performed during the hospital encounter of 02/08/25   XR Chest Port 1 View    Impression    IMPRESSION: Mild bilateral interstitial prominence, nonspecific, though pulmonary vascular congestion not excluded. Mild basilar atelectasis. Stable left costophrenic angle blunting (from mild pleural fluid or thickening). No pneumothorax. Stable   cardiomediastinal silhouette.     XR Chest Port 1 View    Impression    IMPRESSION: Right lung is clear with no recurrent right pneumothorax. Some minimal pleural thickening and fibrosis left lung base unchanged. No new findings.   CT Chest w/o Contrast    Impression    IMPRESSION:  1.  Debris in the airways with aspiration or infection.  2.  Emphysema.  3.  Severe coronary artery disease.  4.  Healing bilateral rib fractures.  5.  A right upper lobe opacity has not significantly changed. This is consistent with known adenocarcinoma and treatment. Recommend continued attention on follow-up imaging.     Echo Limited   Result Value Ref Range    LVEF  40-50%    The left ventricle is normal in size.  There is mild concentric left ventricular  hypertrophy.  The visual ejection fraction is 40-50%.  Mild hypokinesis in distal anteroseptal segment.  Normal right ventricle size and systolic function.  The left atrium is mildly dilated.    SIGNIFICANT LABORATORY FINDINGS   Sodium 143, potassium 3.9, creatinine 1.12, glucose 112  Magnesium  2.4  WBC 5.2, hemoglobin 11, platelet 179       Discharge Orders        Primary Care - Care Coordination Referral      Med Therapy Management Referral      Follow-Up with Cardiology      Home Care Referral      Reason for your hospital stay    Parainfluenza infection, low oxygen, severe COPD     Activity    Your activity upon discharge: activity as tolerated     Follow-up and recommended labs and tests     Follow up with primary care provider, John Oconnor, within 7 days for hospital follow- up.  The following labs/tests are recommended: CBC, BMP, Mg.  Follow-up with cardiology in 2 weeks at CHF clinic  Follow-up with pulmonology in 4 to 6 weeks for COPD     Oxygen Adult/Peds    Oxygen Documentation  I certify that this patient, Ubaldo Ramos has been under my care (or a nurse practitioner or physican's assistant working with me). This is the face-to-face encounter for oxygen medical necessity.      At the time of this encounter, I have reviewed the qualifying testing and have determined that supplemental oxygen is reasonable and necessary and is expected to improve the patient's condition in a home setting.         Patient has continued oxygen desaturation due to COPD J44.9.    If portability is ordered, is the patient mobile within the home? yes    Was this visit performed as a telehealth visit: No     Diet    Follow this diet upon discharge: Current Diet:Orders Placed This Encounter      Fluid restriction 2000 ML FLUID      Snacks/Supplements Adult: Ensure Enlive; Between Meals      Combination Diet 2 gm NA Diet; Low Saturated Fat Na <2400mg Diet     NM Lexiscan stress test       Examination   Physical Exam   Temp:   [97.5  F (36.4  C)-98.1  F (36.7  C)] 97.9  F (36.6  C)  Pulse:  [46-69] 48  Resp:  [16-44] 32  BP: (114-149)/(55-63) 138/62  FiO2 (%):  [1 %] 1 %  SpO2:  [90 %-98 %] 93 %  Wt Readings from Last 1 Encounters:   02/15/25 70.1 kg (154 lb 8.7 oz)     GENERAL:  Alert, appears comfortable, in no acute distress, appears stated age   HEAD:  Normocephalic, without obvious abnormality, atraumatic   EYES:  PERRL, conjunctiva  clear,  EOM's intact   NOSE: Nares normal,  mucosa normal, no drainage   THROAT: Lips, mucosa,   gums normal, mouth moist   NECK: Supple, symmetrical, trachea midline   BACK:   Symmetric, no curvature, ROM normal   LUNGS:   Clear to auscultation bilaterally, no rales, rhonchi, or wheezing, symmetric chest rise on inhalation, respirations unlabored   CHEST WALL:  No tenderness or deformity   HEART:  Regular rate and rhythm, S1 and S2 normal, no murmur    ABDOMEN:   Soft, non-tender, bowel sounds active , no masses, no organomegaly, no rebound or guarding   EXTREMITIES: No edema    SKIN: No exanthems in the visualized areas   NEURO: Alert, oriented x 3   PSYCH: Cooperative, behavior is appropriate          Please see EMR for more detailed significant labs, imaging, consultant notes etc.    IShiloh MD, personally saw the patient today and spent greater than 30 minutes discharging this patient.    Shiloh German MD  [facility]    CC:John Oconnor

## 2025-02-15 NOTE — PROGRESS NOTES
Care Management Discharge Note    Discharge Date: 02/16/2025       Discharge Disposition: Home, Home Care    Discharge Services: Home Care    Discharge DME: Oxygen    Discharge Transportation: family or friend will provide    Private pay costs discussed: Not applicable    Does the patient's insurance plan have a 3 day qualifying hospital stay waiver?  No    PAS Confirmation Code: N/A  Patient/family educated on Medicare website which has current facility and service quality ratings: yes    Education Provided on the Discharge Plan: Yes (AVS per bedside RN)  Persons Notified of Discharge Plans: Pt, wife and [facility]   Patient/Family in Agreement with the Plan: yes    Handoff Referral Completed: No, handoff not indicated or clinically appropriate    Additional Information:  [place] Pt will have 02.  [facility] will see Pt for Home RN and PT.  SWCM confirmed with [facility] and orders have been sent.  Wife to transport.     BETI Nichols

## 2025-02-16 NOTE — PLAN OF CARE
"Speech Language Therapy Discharge Summary    Reason for therapy discharge:    Discharged to home.    Progress towards therapy goal(s). See goals on Care Plan in [facility] electronic health record for goal details.  Goals partially met.  Barriers to achieving goals:   discharge from facility.    Therapy recommendation(s):    Recommend pt continues regular diet and thin liquids. Patient should be fully upright and alert for all intake, taking single small bites/sips, using a double swallow per bite/sip, alternating solids and liquids, and using a slow rate of intake. Suspect pt at baseline swallow function. Pt would benefit from diet f/u 1-2 more times, but pt discharging home and declining OP or HH SLP services. Pt & pt's wife was made aware of how to seek services if he changed his mind.    VFSS 2/14/2025 - Per SLP note, \"VFSS completed per provider orders. Patient presents with mild oropharyngeal dysphagia in setting of COPD exacerbation and generalized weakness. Trials of thin liquids, mildly thick liquids, puree textures, and hard solids completed under fluoroscopy. Patient oral phase c/b adequate bolus acceptance and closure, mildly extended mastication of hard solids, adequate A-P movement, but trace oral residue with all liquid consistencies cleared with second swallow. Pharyngeal phase c/b reduced laryngeal vestibule closure, mildly reduced pharyngeal stripping wave, and reduced base of tongue retraction. This resulted in trace vallecular residue after the swallow (also noted to be spillover from oral residue after initial swallow) cleared with extra swallow; intermittent flash penetration with larger volume thin liquids; x1 instance of aspiration with initial tsp trial of thin liquids which reflexive cough never fully cleared aspirated material; trace vallecular residue with hard solids cleared with liquid wash. Of note, chin tuck did not significantly improve or worsen safety of swallow with thin liquids. " "Recommend continue regular diet and thin liquids. Patient should be fully upright and alert for all intake, taking small bites/sips, taking 1 sip at a time, using a double swallow, alternating solids and liquids, and using a slow rate of intake. Anticipate this is patient's baseline swallow function. SLP will follow for brief course of dysphagia management.\"  "

## 2025-02-17 ENCOUNTER — TELEPHONE (OUTPATIENT)
Dept: FAMILY MEDICINE | Facility: CLINIC | Age: 80
End: 2025-02-17
Payer: MEDICARE

## 2025-02-17 ENCOUNTER — TELEPHONE (OUTPATIENT)
Dept: PULMONOLOGY | Facility: CLINIC | Age: 80
End: 2025-02-17
Payer: MEDICARE

## 2025-02-17 NOTE — TELEPHONE ENCOUNTER
MTM referral from: Transitions of Care (recent hospital discharge, TCU discharge, or ED visit)    MTM referral outreach attempt #1 on February 17, 2025 at 12:02 PM      Outcome: Patient is not interested at this time because has follow up tomorrow with PCP     Use vbc for the carrier/Plan on the flowsheet          Kate Cerda MT    [phone]

## 2025-02-17 NOTE — TELEPHONE ENCOUNTER
----- Message from Annmarie Peet sent at 2/15/2025  1:04 PM CST -----  Regarding: Follow up  Patient if Jose De Jesus's due for COPD follow up.  Thanks!  NR    Hospital follow up (60 minutes) with NP for COPD

## 2025-02-18 ENCOUNTER — OFFICE VISIT (OUTPATIENT)
Dept: FAMILY MEDICINE | Facility: CLINIC | Age: 80
End: 2025-02-18
Payer: MEDICARE

## 2025-02-18 ENCOUNTER — PATIENT OUTREACH (OUTPATIENT)
Dept: CARE COORDINATION | Facility: CLINIC | Age: 80
End: 2025-02-18

## 2025-02-18 VITALS
HEIGHT: 66 IN | WEIGHT: 154 LBS | SYSTOLIC BLOOD PRESSURE: 92 MMHG | TEMPERATURE: 97.5 F | OXYGEN SATURATION: 94 % | BODY MASS INDEX: 24.75 KG/M2 | RESPIRATION RATE: 16 BRPM | DIASTOLIC BLOOD PRESSURE: 48 MMHG

## 2025-02-18 DIAGNOSIS — D64.9 ANEMIA, UNSPECIFIED TYPE: ICD-10-CM

## 2025-02-18 DIAGNOSIS — C34.91 PRIMARY LUNG ADENOCARCINOMA, RIGHT (H): Chronic | ICD-10-CM

## 2025-02-18 DIAGNOSIS — R06.09 DOE (DYSPNEA ON EXERTION): ICD-10-CM

## 2025-02-18 DIAGNOSIS — Z72.0 TOBACCO ABUSE: Chronic | ICD-10-CM

## 2025-02-18 DIAGNOSIS — J93.9 PNEUMOTHORAX ON RIGHT: ICD-10-CM

## 2025-02-18 DIAGNOSIS — B34.8 PARAINFLUENZA INFECTION: ICD-10-CM

## 2025-02-18 DIAGNOSIS — I10 ESSENTIAL HYPERTENSION, BENIGN: Chronic | ICD-10-CM

## 2025-02-18 DIAGNOSIS — I50.9 ACUTE ON CHRONIC CONGESTIVE HEART FAILURE, UNSPECIFIED HEART FAILURE TYPE (H): ICD-10-CM

## 2025-02-18 DIAGNOSIS — E83.42 HYPOMAGNESEMIA: ICD-10-CM

## 2025-02-18 DIAGNOSIS — J96.01 ACUTE RESPIRATORY FAILURE WITH HYPOXIA (H): ICD-10-CM

## 2025-02-18 DIAGNOSIS — E78.00 PURE HYPERCHOLESTEROLEMIA: ICD-10-CM

## 2025-02-18 DIAGNOSIS — Z92.89 HOSPITALIZATION WITHIN LAST 30 DAYS: Primary | ICD-10-CM

## 2025-02-18 DIAGNOSIS — J44.1 COPD EXACERBATION (H): ICD-10-CM

## 2025-02-18 DIAGNOSIS — Z95.5 STATUS POST INSERTION OF DRUG-ELUTING STENT INTO LEFT ANTERIOR DESCENDING (LAD) ARTERY: ICD-10-CM

## 2025-02-18 PROBLEM — I73.9 PERIPHERAL ARTERY DISEASE: Chronic | Status: ACTIVE | Noted: 2021-06-27

## 2025-02-18 PROBLEM — J43.9 PULMONARY EMPHYSEMA, UNSPECIFIED EMPHYSEMA TYPE (H): Chronic | Status: ACTIVE | Noted: 2022-01-21

## 2025-02-18 LAB
ERYTHROCYTE [DISTWIDTH] IN BLOOD BY AUTOMATED COUNT: 14.8 % (ref 10–15)
HCT VFR BLD AUTO: 38.7 % (ref 40–53)
HGB BLD-MCNC: 12.4 G/DL (ref 13.3–17.7)
MCH RBC QN AUTO: 30.5 PG (ref 26.5–33)
MCHC RBC AUTO-ENTMCNC: 32 G/DL (ref 31.5–36.5)
MCV RBC AUTO: 95 FL (ref 78–100)
PLATELET # BLD AUTO: 265 10E3/UL (ref 150–450)
RBC # BLD AUTO: 4.07 10E6/UL (ref 4.4–5.9)
WBC # BLD AUTO: 11 10E3/UL (ref 4–11)

## 2025-02-18 PROCEDURE — 80048 BASIC METABOLIC PNL TOTAL CA: CPT | Performed by: FAMILY MEDICINE

## 2025-02-18 PROCEDURE — 83880 ASSAY OF NATRIURETIC PEPTIDE: CPT | Performed by: FAMILY MEDICINE

## 2025-02-18 PROCEDURE — 83735 ASSAY OF MAGNESIUM: CPT | Performed by: FAMILY MEDICINE

## 2025-02-18 PROCEDURE — 36415 COLL VENOUS BLD VENIPUNCTURE: CPT | Performed by: FAMILY MEDICINE

## 2025-02-18 PROCEDURE — 85027 COMPLETE CBC AUTOMATED: CPT | Performed by: FAMILY MEDICINE

## 2025-02-18 PROCEDURE — 99495 TRANSJ CARE MGMT MOD F2F 14D: CPT | Performed by: FAMILY MEDICINE

## 2025-02-18 RX ORDER — LOSARTAN POTASSIUM AND HYDROCHLOROTHIAZIDE 25; 100 MG/1; MG/1
TABLET ORAL
COMMUNITY
Start: 2025-02-05

## 2025-02-18 ASSESSMENT — PAIN SCALES - GENERAL: PAINLEVEL_OUTOF10: NO PAIN (0)

## 2025-02-18 NOTE — PROGRESS NOTES
Clinic Care Coordination Contact  Transitions of Care Outreach  Chief Complaint   Patient presents with    Clinic Care Coordination - Post Hospital       Most Recent Admission Date: 2/8/2025   Most Recent Admission Diagnosis: Acute on chronic congestive heart failure, unspecified heart failure type (H) - I50.9     Most Recent Discharge Date: 2/15/2025   Most Recent Discharge Diagnosis: Acute on chronic congestive heart failure, unspecified heart failure type (H) - I50.9  COPD exacerbation (H) - J44.1  Acute respiratory failure with hypoxia (H) - J96.01  Coronary artery disease due to lipid rich plaque - I25.10, I25.83  Acute bronchitis, unspecified organism - J20.9     Transitions of Care Assessment    Discharge Assessment  How are you doing now that you are home?: I am getting stronger everyday!  How are your symptoms? (Red Flag symptoms escalate to triage hotline per guidelines): Improved  Do you know how to contact your clinic care team if you have future questions or changes to your health status? : Yes  Does the patient have their discharge instructions? : Yes  Does the patient have questions regarding their discharge instructions? : No  Were you started on any new medications or were there changes to any of your previous medications? : Yes  Does the patient have all of their medications?: Yes  Do you have questions regarding any of your medications? : No  Do you have all of your needed medical supplies or equipment (DME)?  (i.e. oxygen tank, CPAP, cane, etc.): Yes         Post-op (Clinicians Only)  Did the patient have surgery or a procedure: No  Fever: No  Chills: No  Eating & Drinking: eating and drinking without complaints/concerns  PO Intake: regular diet  Bowel Function: normal  Urinary Status: voiding without complaint/concerns    Care Management       Care Mgmt General Assessment      CCC RN spoke with patient this morning to follow up after recent hospitalization. Patient stated he was doing well at  home. He said he was taking his medications as directed. Wearing O2 at 2 liters. Home Care intake has been completed. Patient reported he is waiting to hear back from them around scheduling additional appointments. He is aware of follow up with an associate of his PCP's today. Patient reported he has good support at home. Denied any needs or concerns.                    Follow up Plan     Discharge Follow-Up  Discharge follow up appointment scheduled in alignment with recommended follow up timeframe or Transitions of Risk Category? (Low = within 30 days; Moderate= within 14 days; High= within 7 days): Yes  Discharge Follow Up Appointment Date: 02/18/25  Discharge Follow Up Appointment Scheduled with?: Primary Care Provider    Future Appointments   Date Time Provider Department Center   2/18/2025 11:30 AM Josiane Meier MD CTFMOB [facility]   2/20/2025 10:00 AM WW CR GYM [facility]   2/25/2025 10:00 AM WW CR GYM [facility]   2/25/2025 10:45 AM WW CR DIET [facility]   2/27/2025 10:00 AM WW CR GYM [facility]   3/5/2025 11:10 AM Nato Becker APRN CNP [facility]   3/5/2025 11:30 AM WW HCC HEART FAILURE RN [facility]   3/14/2025 11:00 AM Mami Calles APRN CNP [facility]   3/28/2025 11:00 AM WW HC ECHO STAFF [facility]   5/12/2025  1:20 PM Tristan Lau MD [facility]       Outpatient Plan as outlined on AVS reviewed with patient.    For any urgent concerns, please contact our 24 hour nurse triage line: [phone] ([phone])       David J. Myhre, RN

## 2025-02-18 NOTE — PROGRESS NOTES
"  {PROVIDER CHARTING PREFERENCE:855031}    Tayo Conner is a 79 year old, presenting for the following health issues:  Hospital F/U (Hospital follow up 2/8-2/15 [facility])      2/18/2025    11:17 AM   Additional Questions   Roomed by garland simon cma   Accompanied by wife     HPI         8/4/2022 2/18/2025   Post Discharge Outreach   Admission Date 7/31/2022    Reason for Admission dyspnea, community acquired pneumonia, copd    Discharge Date 8/3/2022    Discharge Diagnosis dyspnea, community acquired pneumonia, copd    How are you doing now that you are home? \" I'm doing very well thank you\" I am getting stronger everyday!   How are your symptoms? (Red Flag symptoms escalate to triage hotline per guidelines) Improved Improved   Do you feel your condition is stable enough to be safe at home until your provider visit? Yes    Does the patient have their discharge instructions?   Yes   Does the patient have questions regarding their discharge instructions?  No No   Were you started on any new medications or were there changes to any of your previous medications?  Yes Yes   Does the patient have all of their medications? Yes Yes   Do you have questions regarding any of your medications?  No No   Do you have all of your needed medical supplies or equipment (DME)?  (i.e. oxygen tank, CPAP, cane, etc.) Yes Yes   Discharge follow-up appointment scheduled within 14 calendar days?  No    Discharge Follow Up Appointment Date  2/18/2025   Discharge Follow Up Appointment Scheduled with?  Primary Care Provider       Hospital Follow-up Visit:    Hospital/Nursing Home/IP Rehab Facility: [facility]  Date of Admission: 2/8  Date of Discharge: 2/15  Reason(s) for Admission: chf  Was the patient in the ICU or did the patient experience delirium during hospitalization?  No  Do you have any other stressors you would like to discuss with your provider? No    Problems taking medications regularly:  None  Medication " "changes since discharge: None  Problems adhering to non-medication therapy:  None    Summary of hospitalization:  {:516898}  Diagnostic Tests/Treatments reviewed.  Follow up needed: {:662599:}  Other Healthcare Providers Involved in Patient s Care:         {those currently involved after discharge:529433::\"None\"}  Update since discharge: {:367411} {TIP  Include information from family/caregivers, SNF, Care Coordination :474343}        Plan of care communicated with {:432065}     {Reference  Coding guidelines- Moderate Complexity F2F/Video within 7 - 14 days of discharge [id number], High Complexity F2F/Video within 7 days 3257534 or fgrbwn64 days 4665051 :101316}      {additonal problems for provider to add (Optional):891027}    {ROS Picklists (Optional):349453}      Objective    BP 92/48 (BP Location: Right arm, Patient Position: Sitting, Cuff Size: Adult Regular)   Temp 97.5  F (36.4  C)   Resp 16   Ht 1.664 m (5' 5.5\")   Wt 69.9 kg (154 lb)   SpO2 94%   BMI 25.24 kg/m    Body mass index is 25.24 kg/m .  Physical Exam   {Exam List (Optional):137159}    {Diagnostic Test Results (Optional):984642}        Signed Electronically by: Josiane Meier MD  {Email feedback regarding this note to [email or web address]   :326791}  " to any of your previous medications?  Yes Yes   Does the patient have all of their medications? Yes Yes   Do you have questions regarding any of your medications?  No No   Do you have all of your needed medical supplies or equipment (DME)?  (i.e. oxygen tank, CPAP, cane, etc.) Yes Yes   Discharge follow-up appointment scheduled within 14 calendar days?  No    Discharge Follow Up Appointment Date  2/18/2025   Discharge Follow Up Appointment Scheduled with?  Primary Care Provider       Hospital Follow-up Visit:    Hospital/Nursing Home/IP Rehab Facility: [facility]  Date of Admission: 2/8/25  Date of Discharge: 2/15/25  Reason(s) for Admission: Acute hypoxic respiratory failure, COPD and CHF exacerbation secondary to parainfluenza infection  Was the patient in the ICU or did the patient experience delirium during hospitalization?  No  Do you have any other stressors you would like to discuss with your provider? No    Problems taking medications regularly:  None  Medication changes since discharge: None  Problems adhering to non-medication therapy:  None    Summary of hospitalization:  [facility] discharge summary reviewed  Diagnostic Tests/Treatments reviewed.  Follow up needed: Low up labs needed, follow-up heart failure clinic on 3/5/2025 and with pulmonologist in 4 to 6 weeks.  Other Healthcare Providers Involved in Patient s Care:         Homecare, Care Coordination, Specialist appointment - sees pulmonology on 3/14/2025, Imaging, and heart failure clinic on 3/5/2025  Update since discharge: fluctuating course.         Plan of care communicated with patient and family         This is a very complicated patient who was brought to the [facility] ER via EMS for a complaint of several days of worsening shortness of breath.  Paramedics had been called to his home and he was found to be satting at 78%.  In the ER he was in acute hypoxic respiratory failure and was placed on BiPAP  "and O2 support along with being given bronchodilators, steroid and IV diuresis. He was quite hypertensive.  He was admitted right into the ICU.  Labs showed he had a BNP of 28,000 which is the highest I have seen, and a troponin level of 221.   He has quite a history for lung problems he is a longtime smoker who has refused to quit.  He has COPD and was having an exacerbation and was eventually found to be positive for parainfluenza infection.    He has coronary artery disease with a history of a NSTEMI and 2 stents placed mid LAD during a hospitalization of 1/8/2025 and was on Plavix.  That hospitalization was due to a fall with right sixth rib fracture that caused right-sided pneumothorax.  He was then found to have coronary artery disease and treated.  The CT he had during his hospitalization showed debris in the airway, emphysema, emphasized severe coronary artery disease, healing right sixth rib fracture and a right upper lobe at opacity consistent with his known previous lung cancer which had been diagnosed and treated with radiation and chemotherapy in 2018.    He is a rather noncompliant patient.  I was verbally told that he was finished with his antibiotic but it appears he should be treated with doxycycline through the 22nd of the month.  The discharge refers to 2 L nasal cannula oxygen which he is currently using, taper of oral steroid, low-salt diet, and 40 mg of Lasix.  Strict no smoking directions.  He continues to be short of breath on exertion.        Objective    BP 92/48 (BP Location: Right arm, Patient Position: Sitting, Cuff Size: Adult Regular)   Temp 97.5  F (36.4  C)   Resp 16   Ht 1.664 m (5' 5.5\")   Wt 69.9 kg (154 lb)   SpO2 94%   BMI 25.24 kg/m    Body mass index is 25.24 kg/m .  Physical Exam   GENERAL: alert, mild respiratory distress, frail, elderly, and chronically ill-appearing  RESP: Clear, decreased breath sounds throughout and with poor effort, wearing nasal cannula " oxygen  CV: regular rates and rhythm, distant heart sounds  ABDOMEN: soft, nontender  MS: Trace to 1+ edema to mid calf and ambulating slowly with walker    Results for orders placed or performed in visit on 02/18/25   BNP-N terminal pro     Status: Normal   Result Value Ref Range    N Terminal Pro BNP Outpatient 358 0 - 1,800 pg/mL   Basic metabolic panel     Status: Abnormal   Result Value Ref Range    Sodium 138 135 - 145 mmol/L    Potassium 4.6 3.4 - 5.3 mmol/L    Chloride 96 (L) 98 - 107 mmol/L    Carbon Dioxide (CO2) 29 22 - 29 mmol/L    Anion Gap 13 7 - 15 mmol/L    Urea Nitrogen 51.0 (H) 8.0 - 23.0 mg/dL    Creatinine 1.15 0.67 - 1.17 mg/dL    GFR Estimate 65 >60 mL/min/1.73m2    Calcium 9.9 8.8 - 10.4 mg/dL    Glucose 127 (H) 70 - 99 mg/dL   Magnesium     Status: Normal   Result Value Ref Range    Magnesium 2.0 1.7 - 2.3 mg/dL   CBC with platelets     Status: Abnormal   Result Value Ref Range    WBC Count 11.0 4.0 - 11.0 10e3/uL    RBC Count 4.07 (L) 4.40 - 5.90 10e6/uL    Hemoglobin 12.4 (L) 13.3 - 17.7 g/dL    Hematocrit 38.7 (L) 40.0 - 53.0 %    MCV 95 78 - 100 fL    MCH 30.5 26.5 - 33.0 pg    MCHC 32.0 31.5 - 36.5 g/dL    RDW 14.8 10.0 - 15.0 %    Platelet Count 265 150 - 450 10e3/uL           Signed Electronically by: Josiane Meier MD

## 2025-02-18 NOTE — LETTER
February 21, 2025      Ubaldo Ramos  [address]   [place] [address]        Dear ,    We are writing to inform you of your test results.    Heart failure lab is good as are electrolytes and kidney function.  Glucose is mildly elevated.  Hemoglobin and hematocrit shows an improvement which means you are correcting your anemia.    Resulted Orders   BNP-N terminal pro   Result Value Ref Range    N Terminal Pro BNP Outpatient 358 0 - 1,800 pg/mL      Comment:      Reference range shown and results flagged as abnormal are for the outpatient, non acute settings. Establishing a baseline value for each individual patient is useful for follow-up.    Suggested inpatient cut points for confirming diagnosis of CHF in an acute setting are:  >450 pg/mL (age 18 to less than 50)  >900 pg/mL (age 50 to less than 75)  >1800 pg/mL (75 yrs and older)    An inpatient or emergency department NT-proPBNP <300 pg/mL effectively rules out acute CHF, with 99% negative predictive value.       Basic metabolic panel   Result Value Ref Range    Sodium 138 135 - 145 mmol/L    Potassium 4.6 3.4 - 5.3 mmol/L    Chloride 96 (L) 98 - 107 mmol/L    Carbon Dioxide (CO2) 29 22 - 29 mmol/L    Anion Gap 13 7 - 15 mmol/L    Urea Nitrogen 51.0 (H) 8.0 - 23.0 mg/dL    Creatinine 1.15 0.67 - 1.17 mg/dL    GFR Estimate 65 >60 mL/min/1.73m2      Comment:      eGFR calculated using 2021 CKD-EPI equation.    Calcium 9.9 8.8 - 10.4 mg/dL    Glucose 127 (H) 70 - 99 mg/dL   Magnesium   Result Value Ref Range    Magnesium 2.0 1.7 - 2.3 mg/dL   CBC with platelets   Result Value Ref Range    WBC Count 11.0 4.0 - 11.0 10e3/uL    RBC Count 4.07 (L) 4.40 - 5.90 10e6/uL    Hemoglobin 12.4 (L) 13.3 - 17.7 g/dL    Hematocrit 38.7 (L) 40.0 - 53.0 %    MCV 95 78 - 100 fL    MCH 30.5 26.5 - 33.0 pg    MCHC 32.0 31.5 - 36.5 g/dL    RDW 14.8 10.0 - 15.0 %    Platelet Count 265 150 - 450 10e3/uL       If you have any questions or concerns, please call the  clinic at the number listed above.       Sincerely,      Josiane Meier MD    Electronically signed

## 2025-02-19 LAB
ANION GAP SERPL CALCULATED.3IONS-SCNC: 13 MMOL/L (ref 7–15)
BUN SERPL-MCNC: 51 MG/DL (ref 8–23)
CALCIUM SERPL-MCNC: 9.9 MG/DL (ref 8.8–10.4)
CHLORIDE SERPL-SCNC: 96 MMOL/L (ref 98–107)
CREAT SERPL-MCNC: 1.15 MG/DL (ref 0.67–1.17)
EGFRCR SERPLBLD CKD-EPI 2021: 65 ML/MIN/1.73M2
GLUCOSE SERPL-MCNC: 127 MG/DL (ref 70–99)
HCO3 SERPL-SCNC: 29 MMOL/L (ref 22–29)
MAGNESIUM SERPL-MCNC: 2 MG/DL (ref 1.7–2.3)
NT-PROBNP SERPL-MCNC: 358 PG/ML (ref 0–1800)
POTASSIUM SERPL-SCNC: 4.6 MMOL/L (ref 3.4–5.3)
SODIUM SERPL-SCNC: 138 MMOL/L (ref 135–145)

## 2025-02-25 ENCOUNTER — TELEPHONE (OUTPATIENT)
Dept: FAMILY MEDICINE | Facility: CLINIC | Age: 80
End: 2025-02-25
Payer: MEDICARE

## 2025-02-25 NOTE — TELEPHONE ENCOUNTER
Forms/Letter Request    Type of form/letter: Home Health Certification, order#[id number]      Do we have the form/letter: Yes: placed in John's inbox    Who is the form from? Home care    Where did/will the form come from? form was faxed in    When is form/letter needed by: when signed    How would you like the form/letter returned: Fax : [phone]

## 2025-03-04 ENCOUNTER — TELEPHONE (OUTPATIENT)
Dept: FAMILY MEDICINE | Facility: CLINIC | Age: 80
End: 2025-03-04

## 2025-03-04 ENCOUNTER — HOSPITAL ENCOUNTER (OUTPATIENT)
Dept: NUCLEAR MEDICINE | Facility: CLINIC | Age: 80
Discharge: HOME | End: 2025-03-04
Attending: FAMILY MEDICINE
Payer: MEDICARE

## 2025-03-04 ENCOUNTER — HOSPITAL ENCOUNTER (OUTPATIENT)
Dept: CARDIOLOGY | Facility: CLINIC | Age: 80
Discharge: HOME | End: 2025-03-04
Attending: FAMILY MEDICINE
Payer: MEDICARE

## 2025-03-04 DIAGNOSIS — I25.10 CORONARY ARTERY DISEASE DUE TO LIPID RICH PLAQUE: ICD-10-CM

## 2025-03-04 DIAGNOSIS — I25.83 CORONARY ARTERY DISEASE DUE TO LIPID RICH PLAQUE: ICD-10-CM

## 2025-03-04 LAB
CV STRESS CURRENT BP HE: NORMAL
CV STRESS CURRENT HR HE: 59
CV STRESS CURRENT HR HE: 59
CV STRESS CURRENT HR HE: 62
CV STRESS CURRENT HR HE: 64
CV STRESS CURRENT HR HE: 66
CV STRESS CURRENT HR HE: 67
CV STRESS CURRENT HR HE: 70
CV STRESS CURRENT HR HE: 71
CV STRESS CURRENT HR HE: 71
CV STRESS CURRENT HR HE: 80
CV STRESS CURRENT HR HE: 81
CV STRESS CURRENT HR HE: 82
CV STRESS CURRENT HR HE: 84
CV STRESS CURRENT HR HE: 84
CV STRESS DEVIATION TIME HE: NORMAL
CV STRESS ECHO PERCENT HR HE: NORMAL
CV STRESS EXERCISE STAGE HE: NORMAL
CV STRESS FINAL RESTING BP HE: NORMAL
CV STRESS FINAL RESTING HR HE: 66
CV STRESS MAX HR HE: 84
CV STRESS MAX TREADMILL GRADE HE: 0
CV STRESS MAX TREADMILL SPEED HE: 0
CV STRESS PEAK DIA BP HE: NORMAL
CV STRESS PEAK SYS BP HE: NORMAL
CV STRESS PHASE HE: NORMAL
CV STRESS PROTOCOL HE: NORMAL
CV STRESS RESTING PT POSITION HE: NORMAL
CV STRESS ST DEVIATION AMOUNT HE: NORMAL
CV STRESS ST DEVIATION ELEVATION HE: NORMAL
CV STRESS ST EVELATION AMOUNT HE: NORMAL
CV STRESS TEST TYPE HE: NORMAL
CV STRESS TOTAL STAGE TIME MIN 1 HE: NORMAL
NUC STRESS EJECTION FRACTION: 64 %
RATE PRESSURE PRODUCT: NORMAL
STRESS ECHO BASELINE DIASTOLIC HE: 68
STRESS ECHO BASELINE HR: 64
STRESS ECHO BASELINE SYSTOLIC BP: 164
STRESS ECHO CALCULATED PERCENT HR: 59 %
STRESS ECHO LAST STRESS DIASTOLIC BP: 63
STRESS ECHO LAST STRESS HR: 80
STRESS ECHO LAST STRESS SYSTOLIC BP: 153
STRESS ECHO TARGET HR: 142

## 2025-03-04 PROCEDURE — 343N000001 HC RX 343 MED OP 636: Performed by: FAMILY MEDICINE

## 2025-03-04 PROCEDURE — 93016 CV STRESS TEST SUPVJ ONLY: CPT | Performed by: INTERNAL MEDICINE

## 2025-03-04 PROCEDURE — 250N000011 HC RX IP 250 OP 636: Performed by: FAMILY MEDICINE

## 2025-03-04 PROCEDURE — A9500 TC99M SESTAMIBI: HCPCS | Performed by: FAMILY MEDICINE

## 2025-03-04 PROCEDURE — 78452 HT MUSCLE IMAGE SPECT MULT: CPT | Mod: 26 | Performed by: INTERNAL MEDICINE

## 2025-03-04 PROCEDURE — 93018 CV STRESS TEST I&R ONLY: CPT | Performed by: INTERNAL MEDICINE

## 2025-03-04 PROCEDURE — 93017 CV STRESS TEST TRACING ONLY: CPT

## 2025-03-04 PROCEDURE — 78452 HT MUSCLE IMAGE SPECT MULT: CPT

## 2025-03-04 RX ORDER — REGADENOSON 0.08 MG/ML
0.4 INJECTION, SOLUTION INTRAVENOUS ONCE
Status: COMPLETED | OUTPATIENT
Start: 2025-03-04 | End: 2025-03-04

## 2025-03-04 RX ORDER — AMINOPHYLLINE 25 MG/ML
50-100 INJECTION, SOLUTION INTRAVENOUS
Status: COMPLETED | OUTPATIENT
Start: 2025-03-04 | End: 2025-03-04

## 2025-03-04 RX ADMIN — Medication 8.7 MILLICURIE: at 08:47

## 2025-03-04 RX ADMIN — REGADENOSON 0.4 MG: 0.08 INJECTION, SOLUTION INTRAVENOUS at 09:17

## 2025-03-04 RX ADMIN — AMINOPHYLLINE 50 MG: 25 INJECTION, SOLUTION INTRAVENOUS at 09:21

## 2025-03-04 RX ADMIN — Medication 31.9 MILLICURIE: at 09:17

## 2025-03-04 NOTE — TELEPHONE ENCOUNTER
Forms/Letter Request     Type of form/letter: Home Health Certification        Do we have the form/letter: Yes: in John's inbox     Who is the form from? Home care     Where did/will the form come from? form was faxed in     When is form/letter needed by: when done     How would you like the form/letter returned: Fax : [phone]     Order details/form description: SN assessment certification     Order number (if applicable): [id number]

## 2025-03-11 ENCOUNTER — PATIENT OUTREACH (OUTPATIENT)
Dept: FAMILY MEDICINE | Facility: CLINIC | Age: 80
End: 2025-03-11
Payer: MEDICARE

## 2025-03-11 NOTE — TELEPHONE ENCOUNTER
Patient Quality Outreach    Patient is due for the following:   Physical Annual Wellness Visit    Action(s) Taken:   Schedule a Annual Wellness Visit    Type of outreach:    Sent letter.    Questions for provider review:    None           Anni Canales MA

## 2025-03-11 NOTE — LETTER
March 11, 2025      Ubaldo Ramos  [address] [facility]   [place] [address]        Jarret Conner,    We are committed to making sure our patients receive the best quality care, including preventative care.  Part of that commitment includes making sure you are getting your screening tests such as mammograms, colonoscopies, and pap smears on time.  We have noted that you are currently overdue for the following:     *Please note that if you have not seen your provider in 12 months or longer, a visit will be required before any refills of your medications can be authorized.*    HF ACTION PLAN Never done  DEPRESSION ACTION PLAN Never done  ZOSTER IMMUNIZATION(1 of 2) Never done  MEDICARE ANNUAL WELLNESS VISIT Never done  DTAP/TDAP/TD IMMUNIZATION(2 - Td or Tdap) due on 08/06/2017  FALL RISK ASSESSMENT due on 03/19/2025    If you have already completed any of the tests mentioned above, please reply with the following information, test(s) name, date completed, location where completed, and the results, and we can update our records.    We understand that you may have already discussed these items with your provider.  However, [facility] has an additional Healthcare Team specifically designed to help you complete your regular health maintenance and screening tests.  We apologize in advance for any duplicate messages you may receive, and hope you understand that our primary goal is your health and well-being.      Thank you for trusting us with your health care.

## 2025-03-12 DIAGNOSIS — F33.1 MODERATE EPISODE OF RECURRENT MAJOR DEPRESSIVE DISORDER (H): ICD-10-CM

## 2025-03-13 RX ORDER — SERTRALINE HYDROCHLORIDE 25 MG/1
25 TABLET, FILM COATED ORAL DAILY
Qty: 90 TABLET | Refills: 1 | Status: SHIPPED | OUTPATIENT
Start: 2025-03-13

## 2025-03-18 ENCOUNTER — APPOINTMENT (OUTPATIENT)
Dept: CT IMAGING | Facility: CLINIC | Age: 80
DRG: 291 | End: 2025-03-18
Attending: EMERGENCY MEDICINE
Payer: MEDICARE

## 2025-03-18 ENCOUNTER — HOSPITAL ENCOUNTER (INPATIENT)
Facility: CLINIC | Age: 80
DRG: 291 | End: 2025-03-18
Attending: EMERGENCY MEDICINE | Admitting: INTERNAL MEDICINE
Payer: MEDICARE

## 2025-03-18 DIAGNOSIS — J44.1 COPD EXACERBATION (H): ICD-10-CM

## 2025-03-18 DIAGNOSIS — I50.9 ACUTE ON CHRONIC CONGESTIVE HEART FAILURE, UNSPECIFIED HEART FAILURE TYPE (H): Primary | ICD-10-CM

## 2025-03-18 DIAGNOSIS — J96.01 ACUTE HYPOXEMIC RESPIRATORY FAILURE (H): ICD-10-CM

## 2025-03-18 PROBLEM — Z85.46 HISTORY OF PROSTATE CANCER: Status: ACTIVE | Noted: 2021-06-27

## 2025-03-18 LAB
ANION GAP SERPL CALCULATED.3IONS-SCNC: 13 MMOL/L (ref 7–15)
ATRIAL RATE - MUSE: 58 BPM
BASE EXCESS BLDA CALC-SCNC: -0.4 MMOL/L (ref -3–3)
BASE EXCESS BLDA CALC-SCNC: -3.2 MMOL/L (ref -3–3)
BASE EXCESS BLDA CALC-SCNC: 0 MMOL/L (ref -3–3)
BASOPHILS # BLD AUTO: 0 10E3/UL (ref 0–0.2)
BASOPHILS NFR BLD AUTO: 1 %
BUN SERPL-MCNC: 16.7 MG/DL (ref 8–23)
CALCIUM SERPL-MCNC: 9.7 MG/DL (ref 8.8–10.4)
CHLORIDE SERPL-SCNC: 107 MMOL/L (ref 98–107)
COHGB MFR BLD: 2.2 % (ref 0–2)
CREAT SERPL-MCNC: 0.77 MG/DL (ref 0.67–1.17)
D DIMER PPP FEU-MCNC: 1.32 UG/ML FEU (ref 0–0.5)
DIASTOLIC BLOOD PRESSURE - MUSE: 75 MMHG
EGFRCR SERPLBLD CKD-EPI 2021: >90 ML/MIN/1.73M2
EOSINOPHIL # BLD AUTO: 0 10E3/UL (ref 0–0.7)
EOSINOPHIL NFR BLD AUTO: 1 %
ERYTHROCYTE [DISTWIDTH] IN BLOOD BY AUTOMATED COUNT: 15.4 % (ref 10–15)
FLUAV RNA SPEC QL NAA+PROBE: NEGATIVE
FLUBV RNA RESP QL NAA+PROBE: NEGATIVE
GLUCOSE SERPL-MCNC: 90 MG/DL (ref 70–99)
HCO3 BLD-SCNC: 22 MMOL/L (ref 21–28)
HCO3 BLD-SCNC: 25 MMOL/L (ref 21–28)
HCO3 BLD-SCNC: 25 MMOL/L (ref 21–28)
HCO3 SERPL-SCNC: 21 MMOL/L (ref 22–29)
HCT VFR BLD AUTO: 33.3 % (ref 40–53)
HGB BLD-MCNC: 10.9 G/DL (ref 13.3–17.7)
IMM GRANULOCYTES # BLD: 0 10E3/UL
IMM GRANULOCYTES NFR BLD: 0 %
INTERPRETATION ECG - MUSE: NORMAL
LYMPHOCYTES # BLD AUTO: 2.1 10E3/UL (ref 0.8–5.3)
LYMPHOCYTES NFR BLD AUTO: 43 %
MAGNESIUM SERPL-MCNC: 1.6 MG/DL (ref 1.7–2.3)
MCH RBC QN AUTO: 29.9 PG (ref 26.5–33)
MCHC RBC AUTO-ENTMCNC: 32.7 G/DL (ref 31.5–36.5)
MCV RBC AUTO: 91 FL (ref 78–100)
METHGB MFR BLD: 0.7 % (ref 0–3)
MONOCYTES # BLD AUTO: 0.5 10E3/UL (ref 0–1.3)
MONOCYTES NFR BLD AUTO: 10 %
NEUTROPHILS # BLD AUTO: 2.2 10E3/UL (ref 1.6–8.3)
NEUTROPHILS NFR BLD AUTO: 45 %
NRBC # BLD AUTO: 0 10E3/UL
NRBC BLD AUTO-RTO: 0 /100
NT-PROBNP SERPL-MCNC: 1514 PG/ML (ref 0–1800)
O2/TOTAL GAS SETTING VFR VENT: 21 %
O2/TOTAL GAS SETTING VFR VENT: 21 %
O2/TOTAL GAS SETTING VFR VENT: 40 %
OXYHGB MFR BLDA: 45 % (ref 92–100)
OXYHGB MFR BLDA: 49 % (ref 92–100)
OXYHGB MFR BLDA: 98 % (ref 92–100)
P AXIS - MUSE: 46 DEGREES
PCO2 BLD: 38 MM HG (ref 35–45)
PCO2 BLD: 41 MM HG (ref 35–45)
PCO2 BLD: 42 MM HG (ref 35–45)
PH BLD: 7.37 [PH] (ref 7.35–7.45)
PH BLD: 7.38 [PH] (ref 7.35–7.45)
PH BLD: 7.39 [PH] (ref 7.35–7.45)
PHOSPHATE SERPL-MCNC: 2.4 MG/DL (ref 2.5–4.5)
PLATELET # BLD AUTO: 199 10E3/UL (ref 150–450)
PO2 BLD: 157 MM HG (ref 80–105)
PO2 BLD: 26 MM HG (ref 80–105)
PO2 BLD: 28 MM HG (ref 80–105)
POTASSIUM SERPL-SCNC: 4 MMOL/L (ref 3.4–5.3)
PR INTERVAL - MUSE: 160 MS
QRS DURATION - MUSE: 128 MS
QT - MUSE: 488 MS
QTC - MUSE: 479 MS
R AXIS - MUSE: 55 DEGREES
RBC # BLD AUTO: 3.65 10E6/UL (ref 4.4–5.9)
RSV RNA SPEC NAA+PROBE: NEGATIVE
SAO2 % BLDA: 100 % (ref 95–96)
SAO2 % BLDA: 46.7 % (ref 95–96)
SAO2 % BLDA: 50.6 % (ref 95–96)
SARS-COV-2 RNA RESP QL NAA+PROBE: NEGATIVE
SODIUM SERPL-SCNC: 141 MMOL/L (ref 135–145)
SYSTOLIC BLOOD PRESSURE - MUSE: 181 MMHG
T AXIS - MUSE: 70 DEGREES
TROPONIN T SERPL HS-MCNC: 22 NG/L
TROPONIN T SERPL HS-MCNC: 23 NG/L
VENTRICULAR RATE- MUSE: 58 BPM
WBC # BLD AUTO: 4.9 10E3/UL (ref 4–11)

## 2025-03-18 PROCEDURE — 82805 BLOOD GASES W/O2 SATURATION: CPT | Performed by: EMERGENCY MEDICINE

## 2025-03-18 PROCEDURE — 83050 HGB METHEMOGLOBIN QUAN: CPT | Performed by: EMERGENCY MEDICINE

## 2025-03-18 PROCEDURE — 120N000001 HC R&B MED SURG/OB

## 2025-03-18 PROCEDURE — 5A09357 ASSISTANCE WITH RESPIRATORY VENTILATION, LESS THAN 24 CONSECUTIVE HOURS, CONTINUOUS POSITIVE AIRWAY PRESSURE: ICD-10-PCS | Performed by: EMERGENCY MEDICINE

## 2025-03-18 PROCEDURE — 250N000011 HC RX IP 250 OP 636: Mod: JZ | Performed by: EMERGENCY MEDICINE

## 2025-03-18 PROCEDURE — 85048 AUTOMATED LEUKOCYTE COUNT: CPT | Performed by: EMERGENCY MEDICINE

## 2025-03-18 PROCEDURE — 258N000003 HC RX IP 258 OP 636: Performed by: EMERGENCY MEDICINE

## 2025-03-18 PROCEDURE — 82375 ASSAY CARBOXYHB QUANT: CPT | Performed by: EMERGENCY MEDICINE

## 2025-03-18 PROCEDURE — 93005 ELECTROCARDIOGRAM TRACING: CPT | Performed by: EMERGENCY MEDICINE

## 2025-03-18 PROCEDURE — 999N000157 HC STATISTIC RCP TIME EA 10 MIN

## 2025-03-18 PROCEDURE — 71275 CT ANGIOGRAPHY CHEST: CPT

## 2025-03-18 PROCEDURE — 99418 PROLNG IP/OBS E/M EA 15 MIN: CPT | Mod: FS

## 2025-03-18 PROCEDURE — 36415 COLL VENOUS BLD VENIPUNCTURE: CPT | Performed by: EMERGENCY MEDICINE

## 2025-03-18 PROCEDURE — 80048 BASIC METABOLIC PNL TOTAL CA: CPT | Performed by: EMERGENCY MEDICINE

## 2025-03-18 PROCEDURE — 87637 SARSCOV2&INF A&B&RSV AMP PRB: CPT | Performed by: EMERGENCY MEDICINE

## 2025-03-18 PROCEDURE — 250N000011 HC RX IP 250 OP 636: Performed by: EMERGENCY MEDICINE

## 2025-03-18 PROCEDURE — 250N000009 HC RX 250: Performed by: EMERGENCY MEDICINE

## 2025-03-18 PROCEDURE — 84484 ASSAY OF TROPONIN QUANT: CPT | Performed by: EMERGENCY MEDICINE

## 2025-03-18 PROCEDURE — 96374 THER/PROPH/DIAG INJ IV PUSH: CPT

## 2025-03-18 PROCEDURE — 99223 1ST HOSP IP/OBS HIGH 75: CPT | Mod: FS

## 2025-03-18 PROCEDURE — 94660 CPAP INITIATION&MGMT: CPT

## 2025-03-18 PROCEDURE — 85379 FIBRIN DEGRADATION QUANT: CPT | Performed by: EMERGENCY MEDICINE

## 2025-03-18 PROCEDURE — 36600 WITHDRAWAL OF ARTERIAL BLOOD: CPT

## 2025-03-18 PROCEDURE — 94640 AIRWAY INHALATION TREATMENT: CPT

## 2025-03-18 PROCEDURE — 84100 ASSAY OF PHOSPHORUS: CPT

## 2025-03-18 PROCEDURE — 83735 ASSAY OF MAGNESIUM: CPT

## 2025-03-18 PROCEDURE — 99285 EMERGENCY DEPT VISIT HI MDM: CPT | Mod: 25

## 2025-03-18 PROCEDURE — 85004 AUTOMATED DIFF WBC COUNT: CPT | Performed by: EMERGENCY MEDICINE

## 2025-03-18 PROCEDURE — 83880 ASSAY OF NATRIURETIC PEPTIDE: CPT | Performed by: EMERGENCY MEDICINE

## 2025-03-18 RX ORDER — ALBUTEROL SULFATE 0.83 MG/ML
SOLUTION RESPIRATORY (INHALATION)
Status: DISCONTINUED
Start: 2025-03-18 | End: 2025-03-19 | Stop reason: HOSPADM

## 2025-03-18 RX ORDER — LIDOCAINE 40 MG/G
CREAM TOPICAL
Status: DISCONTINUED | OUTPATIENT
Start: 2025-03-18 | End: 2025-03-22 | Stop reason: HOSPADM

## 2025-03-18 RX ORDER — SERTRALINE HYDROCHLORIDE 25 MG/1
25 TABLET, FILM COATED ORAL DAILY
Status: DISCONTINUED | OUTPATIENT
Start: 2025-03-19 | End: 2025-03-22 | Stop reason: HOSPADM

## 2025-03-18 RX ORDER — AZITHROMYCIN 500 MG/1
500 TABLET, FILM COATED ORAL ONCE
Status: DISCONTINUED | OUTPATIENT
Start: 2025-03-18 | End: 2025-03-18

## 2025-03-18 RX ORDER — ONDANSETRON 2 MG/ML
4 INJECTION INTRAMUSCULAR; INTRAVENOUS EVERY 6 HOURS PRN
Status: DISCONTINUED | OUTPATIENT
Start: 2025-03-18 | End: 2025-03-22 | Stop reason: HOSPADM

## 2025-03-18 RX ORDER — SPIRONOLACTONE 25 MG
12.5 TABLET ORAL DAILY
Status: DISCONTINUED | OUTPATIENT
Start: 2025-03-19 | End: 2025-03-22 | Stop reason: HOSPADM

## 2025-03-18 RX ORDER — LOSARTAN POTASSIUM 25 MG/1
25 TABLET ORAL 2 TIMES DAILY
Status: DISCONTINUED | OUTPATIENT
Start: 2025-03-19 | End: 2025-03-22 | Stop reason: HOSPADM

## 2025-03-18 RX ORDER — CALCIUM CARBONATE 500 MG/1
1000 TABLET, CHEWABLE ORAL 4 TIMES DAILY PRN
Status: DISCONTINUED | OUTPATIENT
Start: 2025-03-18 | End: 2025-03-22 | Stop reason: HOSPADM

## 2025-03-18 RX ORDER — METHYLPREDNISOLONE SODIUM SUCCINATE 125 MG/2ML
INJECTION INTRAMUSCULAR; INTRAVENOUS
Status: DISCONTINUED
Start: 2025-03-18 | End: 2025-03-19 | Stop reason: HOSPADM

## 2025-03-18 RX ORDER — CETIRIZINE HYDROCHLORIDE 10 MG/1
10 TABLET ORAL EVERY MORNING
Status: DISCONTINUED | OUTPATIENT
Start: 2025-03-19 | End: 2025-03-22 | Stop reason: HOSPADM

## 2025-03-18 RX ORDER — AZITHROMYCIN 250 MG/1
250 TABLET, FILM COATED ORAL DAILY
Status: DISCONTINUED | OUTPATIENT
Start: 2025-03-19 | End: 2025-03-18

## 2025-03-18 RX ORDER — MAGNESIUM SULFATE HEPTAHYDRATE 40 MG/ML
2 INJECTION, SOLUTION INTRAVENOUS ONCE
Status: COMPLETED | OUTPATIENT
Start: 2025-03-19 | End: 2025-03-19

## 2025-03-18 RX ORDER — FLUTICASONE FUROATE AND VILANTEROL 200; 25 UG/1; UG/1
1 POWDER RESPIRATORY (INHALATION) DAILY
Status: DISCONTINUED | OUTPATIENT
Start: 2025-03-19 | End: 2025-03-22 | Stop reason: HOSPADM

## 2025-03-18 RX ORDER — AMOXICILLIN 250 MG
2 CAPSULE ORAL 2 TIMES DAILY PRN
Status: DISCONTINUED | OUTPATIENT
Start: 2025-03-18 | End: 2025-03-22 | Stop reason: HOSPADM

## 2025-03-18 RX ORDER — IPRATROPIUM BROMIDE AND ALBUTEROL SULFATE 2.5; .5 MG/3ML; MG/3ML
3 SOLUTION RESPIRATORY (INHALATION)
Status: DISCONTINUED | OUTPATIENT
Start: 2025-03-19 | End: 2025-03-19

## 2025-03-18 RX ORDER — ONDANSETRON 4 MG/1
4 TABLET, ORALLY DISINTEGRATING ORAL EVERY 6 HOURS PRN
Status: DISCONTINUED | OUTPATIENT
Start: 2025-03-18 | End: 2025-03-22 | Stop reason: HOSPADM

## 2025-03-18 RX ORDER — ACETAMINOPHEN 325 MG/1
650 TABLET ORAL EVERY 4 HOURS PRN
Status: DISCONTINUED | OUTPATIENT
Start: 2025-03-18 | End: 2025-03-22 | Stop reason: HOSPADM

## 2025-03-18 RX ORDER — METHYLPREDNISOLONE SODIUM SUCCINATE 125 MG/2ML
125 INJECTION INTRAMUSCULAR; INTRAVENOUS ONCE
Status: COMPLETED | OUTPATIENT
Start: 2025-03-18 | End: 2025-03-18

## 2025-03-18 RX ORDER — BISOPROLOL FUMARATE 5 MG/1
5 TABLET, FILM COATED ORAL DAILY
Status: DISCONTINUED | OUTPATIENT
Start: 2025-03-19 | End: 2025-03-20

## 2025-03-18 RX ORDER — FUROSEMIDE 40 MG/1
40 TABLET ORAL DAILY
Status: DISCONTINUED | OUTPATIENT
Start: 2025-03-19 | End: 2025-03-22 | Stop reason: HOSPADM

## 2025-03-18 RX ORDER — AZITHROMYCIN 500 MG/5ML
500 INJECTION, POWDER, LYOPHILIZED, FOR SOLUTION INTRAVENOUS ONCE
Status: COMPLETED | OUTPATIENT
Start: 2025-03-18 | End: 2025-03-19

## 2025-03-18 RX ORDER — PREDNISONE 20 MG/1
40 TABLET ORAL DAILY
Status: DISCONTINUED | OUTPATIENT
Start: 2025-03-19 | End: 2025-03-20

## 2025-03-18 RX ORDER — TRAZODONE HYDROCHLORIDE 50 MG/1
50-100 TABLET ORAL AT BEDTIME
Status: DISCONTINUED | OUTPATIENT
Start: 2025-03-19 | End: 2025-03-20

## 2025-03-18 RX ORDER — IOPAMIDOL 755 MG/ML
75 INJECTION, SOLUTION INTRAVASCULAR ONCE
Status: COMPLETED | OUTPATIENT
Start: 2025-03-18 | End: 2025-03-18

## 2025-03-18 RX ORDER — OXYBUTYNIN CHLORIDE 5 MG/1
10 TABLET, EXTENDED RELEASE ORAL EVERY OTHER DAY
Status: DISCONTINUED | OUTPATIENT
Start: 2025-03-19 | End: 2025-03-22 | Stop reason: HOSPADM

## 2025-03-18 RX ORDER — ACETAMINOPHEN 650 MG/1
650 SUPPOSITORY RECTAL EVERY 4 HOURS PRN
Status: DISCONTINUED | OUTPATIENT
Start: 2025-03-18 | End: 2025-03-22 | Stop reason: HOSPADM

## 2025-03-18 RX ORDER — PANTOPRAZOLE SODIUM 40 MG/1
40 TABLET, DELAYED RELEASE ORAL DAILY
Status: DISCONTINUED | OUTPATIENT
Start: 2025-03-19 | End: 2025-03-22 | Stop reason: HOSPADM

## 2025-03-18 RX ORDER — ALBUTEROL SULFATE 0.83 MG/ML
2.5 SOLUTION RESPIRATORY (INHALATION)
Status: COMPLETED | OUTPATIENT
Start: 2025-03-18 | End: 2025-03-18

## 2025-03-18 RX ORDER — IPRATROPIUM BROMIDE AND ALBUTEROL SULFATE 2.5; .5 MG/3ML; MG/3ML
3 SOLUTION RESPIRATORY (INHALATION) ONCE
Status: COMPLETED | OUTPATIENT
Start: 2025-03-18 | End: 2025-03-18

## 2025-03-18 RX ORDER — PREGABALIN 75 MG/1
150 CAPSULE ORAL 3 TIMES DAILY PRN
Status: DISCONTINUED | OUTPATIENT
Start: 2025-03-18 | End: 2025-03-22 | Stop reason: HOSPADM

## 2025-03-18 RX ORDER — CLOPIDOGREL BISULFATE 75 MG/1
75 TABLET ORAL DAILY
Status: DISCONTINUED | OUTPATIENT
Start: 2025-03-19 | End: 2025-03-22 | Stop reason: HOSPADM

## 2025-03-18 RX ORDER — ATORVASTATIN CALCIUM 40 MG/1
80 TABLET, FILM COATED ORAL AT BEDTIME
Status: DISCONTINUED | OUTPATIENT
Start: 2025-03-19 | End: 2025-03-22 | Stop reason: HOSPADM

## 2025-03-18 RX ORDER — ASPIRIN 81 MG/1
81 TABLET ORAL DAILY
Status: DISCONTINUED | OUTPATIENT
Start: 2025-03-19 | End: 2025-03-22 | Stop reason: HOSPADM

## 2025-03-18 RX ORDER — POLYETHYLENE GLYCOL 3350 17 G/17G
17 POWDER, FOR SOLUTION ORAL 2 TIMES DAILY PRN
Status: DISCONTINUED | OUTPATIENT
Start: 2025-03-18 | End: 2025-03-22 | Stop reason: HOSPADM

## 2025-03-18 RX ORDER — AMOXICILLIN 250 MG
1 CAPSULE ORAL 2 TIMES DAILY PRN
Status: DISCONTINUED | OUTPATIENT
Start: 2025-03-18 | End: 2025-03-22 | Stop reason: HOSPADM

## 2025-03-18 RX ORDER — AZITHROMYCIN 250 MG/1
250 TABLET, FILM COATED ORAL DAILY
Status: DISCONTINUED | OUTPATIENT
Start: 2025-03-19 | End: 2025-03-20

## 2025-03-18 RX ADMIN — IOPAMIDOL 75 ML: 755 INJECTION, SOLUTION INTRAVENOUS at 20:27

## 2025-03-18 RX ADMIN — AZITHROMYCIN MONOHYDRATE 500 MG: 500 INJECTION, POWDER, LYOPHILIZED, FOR SOLUTION INTRAVENOUS at 22:49

## 2025-03-18 RX ADMIN — IPRATROPIUM BROMIDE AND ALBUTEROL SULFATE 3 ML: .5; 3 SOLUTION RESPIRATORY (INHALATION) at 19:46

## 2025-03-18 RX ADMIN — ALBUTEROL SULFATE 2.5 MG: 2.5 SOLUTION RESPIRATORY (INHALATION) at 17:31

## 2025-03-18 RX ADMIN — METHYLPREDNISOLONE SODIUM SUCCINATE 125 MG: 125 INJECTION, POWDER, FOR SOLUTION INTRAMUSCULAR; INTRAVENOUS at 18:02

## 2025-03-18 RX ADMIN — ALBUTEROL SULFATE 2.5 MG: 2.5 SOLUTION RESPIRATORY (INHALATION) at 17:34

## 2025-03-18 RX ADMIN — ALBUTEROL SULFATE 2.5 MG: 2.5 SOLUTION RESPIRATORY (INHALATION) at 17:35

## 2025-03-18 ASSESSMENT — COLUMBIA-SUICIDE SEVERITY RATING SCALE - C-SSRS
1. IN THE PAST MONTH, HAVE YOU WISHED YOU WERE DEAD OR WISHED YOU COULD GO TO SLEEP AND NOT WAKE UP?: NO
2. HAVE YOU ACTUALLY HAD ANY THOUGHTS OF KILLING YOURSELF IN THE PAST MONTH?: NO
6. HAVE YOU EVER DONE ANYTHING, STARTED TO DO ANYTHING, OR PREPARED TO DO ANYTHING TO END YOUR LIFE?: NO

## 2025-03-18 ASSESSMENT — ACTIVITIES OF DAILY LIVING (ADL)
ADLS_ACUITY_SCORE: 58

## 2025-03-18 ASSESSMENT — ENCOUNTER SYMPTOMS
CHILLS: 0
ORTHOPNEA: 0
SHORTNESS OF BREATH: 1
WEAKNESS: 0
WHEEZING: 1
FREQUENCY: 0
COUGH: 0
ABDOMINAL PAIN: 0
FEVER: 0
NAUSEA: 0
WHEEZING: 0
HEADACHES: 1
FLANK PAIN: 0
SPUTUM PRODUCTION: 0
PALPITATIONS: 0
DEPRESSION: 0
HEMOPTYSIS: 0
HEMATURIA: 1
VOMITING: 0
HEARTBURN: 0
TINGLING: 0
DIZZINESS: 0
SEIZURES: 0
WEIGHT LOSS: 0
BLURRED VISION: 0

## 2025-03-18 NOTE — ED TRIAGE NOTES
Patient states recent admission to hospital for a virus, sent home with 02 as needed.  02 sats are 98% but using accessory muscles to breath, has tried inhalers and nebs at home without relief, for COPD. Heart attack in January and stents placed.      Triage Assessment (Adult)       Row Name 03/18/25 1652          Triage Assessment    Airway WDL WDL        Respiratory WDL    Respiratory WDL all     Rhythm/Pattern, Respiratory shallow;shortness of breath;tachypneic     Expansion/Accessory Muscles/Retractions abdominal muscle use;accessory muscle use     Nailbeds no discoloration     Mucous Membranes intact;moist     Cough Frequency no cough        Skin Circulation/Temperature WDL    Skin Circulation/Temperature WDL WDL        Cardiac WDL    Cardiac WDL WDL        Peripheral/Neurovascular WDL    Peripheral Neurovascular WDL WDL        Cognitive/Neuro/Behavioral WDL    Cognitive/Neuro/Behavioral WDL WDL

## 2025-03-18 NOTE — ED NOTES
"Pt seen in the ED. Pt received albuterol neb x3. BS diminished with faint wheeze. RR 30-50. Pt did not tolerate Bipap. Said \"its too much\". RT will continue to monitor.    Kizzy Thomas, RT    "

## 2025-03-18 NOTE — ED PROVIDER NOTES
EMERGENCY DEPARTMENT ENCOUNTER      NAME: Ubaldo Ramos  AGE: 79 year old male  YOB: 1945  MRN: [id number]  EVALUATION DATE & TIME: 3/18/2025  4:50 PM    PCP: John Oconnor    ED PROVIDER: Zack Shepard MD      Chief Complaint   Patient presents with    Shortness of Breath         FINAL IMPRESSION:  1. COPD exacerbation (H)    2. Acute hypoxemic respiratory failure (H)          ED COURSE & MEDICAL DECISION MAKING:    Pertinent Labs & Imaging studies reviewed. (See chart for details)  79 year old male presents to the Emergency Department for evaluation of shortness of breath.    Upon arrival mild respiratory distress.  BiPAP ordered, nebs ordered, Solu-Medrol ordered    ED Course as of 03/18/25 2221   Tue Mar 18, 2025   1713 I introduced myself to the patient, obtained patient history, performed a physical exam, and discussed plan for ED workup including potential diagnostic laboratory/imaging studies and interventions.      1852 Patient presents with mild respiratory distress.  Speaks in complete sentences has wheezing but is tachypneic and does have some accessory muscle use.  SolumMedrol ordered, albuterol ordered, and an BiPAP   1853 Differential includes ACS, pulmonary emboli, pneumonia, aortic dissection, esophageal rupture, pneumothorax, pneumonia, malignancy, pleurisy, shingles, and GERD.  Based on history and examination pulmonary emboli and aortic dissection unlikely.      1853 Elevated D-dimer will obtain CTA PE   1853 Patient refusing wear BiPAP per RT.  Mildly elevated troponin will trend.  proBNP normal   1853 Decreased hemoglobin from last month   2000 O2 critically low   2005 Rechecked patient. His tachypnea has improved. He denies taking Dapsone.   2025 Lab called critically low O2 but patient oxygen saturation is normal on the monitor.  He denies dapsone or nitrates.  Clinically looks markedly improved and he says he feels much much better.  Will repeat suspect lab error in collection    2026 Methemoglobinemia seems less likely   2038 Dr Shepard went to the lab to inspect the bloods color. Picture linked in chart.   2043 Spoke with the intensivist again, still not completely sure on why the patient's oxygen is so low.   2051 Patient says he is not taking benzocaine lozenges.   2055 Spoke with intensivist regarding methemoglobin.   2139 High flow feeling his breathing is getting worse agrees to try BiPAP again   2220 PO2 Arterial(!!): 28  No chocolate colored blood on ABG   2220 Methemoglobin level ordered since discrepancy of pulse ox and pO2   2220 Discussed with pulmonary who recommended I had a car monoxide level on   2220 Methemoglobin: 0.7  Methemoglobinemia unlikely   2220 Carbon Monoxide(!): 2.2   2220 D-Dimer Quantitative(!): 1.32  CTA PE ordered   2220 Hemoglobin(!): 10.9   2220 Troponin T, High Sensitivity: 22   2221 Influenza A: Negative   2221 Influenza B: Negative   2221 Resp Syncytial Virus: Negative   2221 SARS CoV2 PCR: Negative   Patient finally agrees to have BiPAP again.  Repeat exam looks much more comfortable.  Will get another ABG.  I suspect these are likely VBG's.  Normal methemoglobin level, minimally elevated CO level    CTA without evidence of pulmonary emboli or pneumonia    Recently discharged from hospital with NSTEMI and stent and on oxygen.  Was discharged home on benzocaine lozenge which patient states he has not been using    Patient signed out to Dr. Remy pending completion of CT and admission    Medical Decision Making  Obtained supplemental history:Supplemental history obtained?: No  Reviewed external records: External records reviewed?: Documented in chart  Care impacted by chronic illness:Cancer/Chemotherapy, Chronic Lung Disease, Heart Disease, Hyperlipidemia, Hypertension, Peripheral Vascular Disease, and Smoking / Nicotine Use  Did you consider but not order tests?: Work up considered but not performed and documented in chart, if applicable  Did you interpret  images independently?: Independent interpretation of ECG and images noted in documentation, when applicable.  Consultation discussion with other provider:Did you involve another provider (consultant, , pharmacy, etc.)?: I discussed the care with another health care provider, see documentation for details.  Admission considered. Patient was signed out to the oncoming physician, disposition pending.    MIPS (CTPE, Dental pain, Flores, Sinusitis, Asthma/COPD, Head Trauma): CT Pulmonary Angiogram:The patient had an abnormal d-dimer.    SEPSIS: None              At the conclusion of the encounter I discussed the results of all of the tests and the disposition. The questions were answered. The patient or family acknowledged understanding and was agreeable with the care plan.     65 minutes of critical care time     MEDICATIONS GIVEN IN THE EMERGENCY:  Medications   albuterol (PROVENTIL) neb solution 2.5 mg (2.5 mg Nebulization $Given 3/18/25 1735)   methylPREDNISolone Na Suc (solu-MEDROL) injection 125 mg (125 mg Intravenous $Given 3/18/25 1802)   ipratropium - albuterol 0.5 mg/2.5 mg/3 mL (DUONEB) neb solution 3 mL (3 mLs Nebulization $Given 3/1945)   iopamidol (ISOVUE-370) solution 75 mL (75 mLs Intravenous $Given 3/18/25 2027)       NEW PRESCRIPTIONS STARTED AT TODAY'S ER VISIT  New Prescriptions    No medications on file          =================================================================    TRIAGE ASSESSMENT:  Patient states recent admission to hospital for a virus, sent home with 02 as needed.  02 sats are 98% but using accessory muscles to breath, has tried inhalers and nebs at home without relief, for COPD. Heart attack in January and stents placed.      Triage Assessment (Adult)       Row Name 03/18/25 2657          Triage Assessment    Airway WDL WDL        Respiratory WDL    Respiratory WDL all     Rhythm/Pattern, Respiratory shallow;shortness of breath;tachypneic     Expansion/Accessory  Muscles/Retractions abdominal muscle use;accessory muscle use     Nailbeds no discoloration     Mucous Membranes intact;moist     Cough Frequency no cough        Skin Circulation/Temperature WDL    Skin Circulation/Temperature WDL WDL        Cardiac WDL    Cardiac WDL WDL        Peripheral/Neurovascular WDL    Peripheral Neurovascular WDL WDL        Cognitive/Neuro/Behavioral WDL    Cognitive/Neuro/Behavioral WDL WDL                          HPI    Patient information was obtained from: patient    Use of : N/A      Ubaldo Ramos is a 79 year old male with a pertinent history of COPD, PE, prostate cancer, hypertension, pulmonary emphysema, hyperlipidemia, PAD, CHF, lung cancer, tobacco abuse, and s/p LAD stent who presents to this ED via walk in for evaluation of shortness of breath.    Patient reports intermittent shortness of breath since 3/15. He has supplemental oxygen at home. He reports a history of COPD. He has prescribed inhalers for this, and ran out of one of them today. Patient reports taking a blood thinner, he is unsure which one. Patient was recently admitted to [facility]. Denies pleurisy. Denies history of blood clots. Denies sick contacts. Denies fever. Denies chest pain.    REVIEW OF SYSTEMS   Review of Systems   Constitutional:  Negative for fever.   Respiratory:  Positive for shortness of breath and wheezing.    Cardiovascular:  Negative for chest pain.      PAST MEDICAL HISTORY:  No past medical history on file.    PAST SURGICAL HISTORY:  Past Surgical History:   Procedure Laterality Date    APPENDECTOMY      CERVICAL SPINE SURGERY      CHOLECYSTECTOMY      CV CORONARY ANGIOGRAM N/A 1/8/2025    Procedure: Coronary Angiogram;  Surgeon: Jamie Noriega MD;  Location: [facility] CV    CV INTRAVASULAR ULTRASOUND N/A 1/8/2025    Procedure: Intravascular Ultrasound;  Surgeon: Jamie Noriega MD;  Location: [facility] CV    CV LEFT HEART CATH N/A 1/8/2025    Procedure: Left  Heart Catheterization;  Surgeon: Jamie Noriega MD;  Location: [facility] CV    CV PCI STENT DRUG ELUTING N/A 1/8/2025    Procedure: Percutaneous Coronary Intervention Stent;  Surgeon: Jamie Noriega MD;  Location: [facility] CV    FEMORAL ENDARTERECTOMY      LUNG CANCER SURGERY      PROSTATECTOMY             CURRENT MEDICATIONS:    acetaminophen (TYLENOL) 325 MG tablet  aspirin 81 MG EC tablet  atorvastatin (LIPITOR) 80 MG tablet  bisoprolol (ZEBETA) 5 MG tablet  budesonide-formoterol (SYMBICORT/BREYNA) 160-4.5 MCG/ACT Inhaler  cetirizine (ZYRTEC) 10 MG tablet  cholecalciferol, vitamin D3, (VITAMIN D3) 25 mcg (1,000 unit) capsule  clopidogrel (PLAVIX) 75 MG tablet  cyanocobalamin 1000 MCG tablet  furosemide (LASIX) 40 MG tablet  ipratropium - albuterol 0.5 mg/2.5 mg/3 mL (DUONEB) 0.5-2.5 (3) MG/3ML neb solution  LORazepam (ATIVAN) 0.5 MG tablet  losartan (COZAAR) 25 MG tablet  oxyBUTYnin ER (DITROPAN XL) 10 MG 24 hr tablet  pantoprazole (PROTONIX) 40 MG EC tablet  pregabalin (LYRICA) 150 MG capsule  sertraline (ZOLOFT) 25 MG tablet  spironolactone (ALDACTONE) 25 MG tablet  traZODone (DESYREL) 50 MG tablet        ALLERGIES:  Allergies   Allergen Reactions    Adhesive Tape Unknown     Adhesive- pulls skin off        FAMILY HISTORY:  No family history on file.    SOCIAL HISTORY:   Social History     Socioeconomic History    Marital status:    Tobacco Use    Smoking status: Former     Current packs/day: 0.50     Types: Cigarettes     Passive exposure: Current    Smokeless tobacco: Never   Vaping Use    Vaping status: Never Used   Substance and Sexual Activity    Alcohol use: Not Currently    Drug use: Not Currently     Social Drivers of Health     Financial Resource Strain: Low Risk  (2/8/2025)    Financial Resource Strain     Within the past 12 months, have you or your family members you live with been unable to get utilities (heat, electricity) when it was really needed?: No   Food  "Insecurity: Low Risk  (2/8/2025)    Food Insecurity     Within the past 12 months, did you worry that your food would run out before you got money to buy more?: No     Within the past 12 months, did the food you bought just not last and you didn t have money to get more?: No   Transportation Needs: Low Risk  (2/8/2025)    Transportation Needs     Within the past 12 months, has lack of transportation kept you from medical appointments, getting your medicines, non-medical meetings or appointments, work, or from getting things that you need?: No   Interpersonal Safety: Low Risk  (2/8/2025)    Interpersonal Safety     Do you feel physically and emotionally safe where you currently live?: Yes     Within the past 12 months, have you been hit, slapped, kicked or otherwise physically hurt by someone?: No     Within the past 12 months, have you been humiliated or emotionally abused in other ways by your partner or ex-partner?: No   Housing Stability: Low Risk  (2/8/2025)    Housing Stability     Do you have housing? : Yes     Are you worried about losing your housing?: No       VITALS:  BP (!) 188/93   Pulse 60   Temp 97.4  F (36.3  C) (Oral)   Resp 29   Ht 1.651 m (5' 5\")   Wt 70.3 kg (155 lb)   SpO2 100%   BMI 25.79 kg/m      PHYSICAL EXAM      Vitals: BP (!) 188/93   Pulse 60   Temp 97.4  F (36.3  C) (Oral)   Resp 29   Ht 1.651 m (5' 5\")   Wt 70.3 kg (155 lb)   SpO2 100%   BMI 25.79 kg/m    General: Appears in no acute distress, awake, alert, interactive.  Eyes: Conjunctivae non-injected. Sclera anicteric.  HENT: Atraumatic.  Neck: Supple.  Respiratory/Chest: Respiration unlabored. Exhaling wheezes. Tachypnea. Mild respiratory distress.  Accessory muscle use  Abdomen: non distended  Musculoskeletal: Normal extremities. No edema or erythema.  Skin: Normal color. No rash or diaphoresis.  Neurologic: Face symmetric, moves all extremities spontaneously. Speech clear.  Psychiatric: Oriented to person, place, and " time. Affect appropriate.      LAB:  All pertinent labs reviewed and interpreted.  Results for orders placed or performed during the hospital encounter of 03/18/25   CT Chest Pulmonary Embolism w Contrast    Impression    IMPRESSION:  1.  No acute pulmonary embolism.  2.  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 Close clinical and imaging follow-up is recommended.  3.  Unchanged mildly enlarged right hilar lymph node. Attention on follow-up.  4.  Left upper lobectomy.   D dimer quantitative   Result Value Ref Range    D-Dimer Quantitative 1.32 (H) 0.00 - 0.50 ug/mL FEU   Basic metabolic panel   Result Value Ref Range    Sodium 141 135 - 145 mmol/L    Potassium 4.0 3.4 - 5.3 mmol/L    Chloride 107 98 - 107 mmol/L    Carbon Dioxide (CO2) 21 (L) 22 - 29 mmol/L    Anion Gap 13 7 - 15 mmol/L    Urea Nitrogen 16.7 8.0 - 23.0 mg/dL    Creatinine 0.77 0.67 - 1.17 mg/dL    GFR Estimate >90 >60 mL/min/1.73m2    Calcium 9.7 8.8 - 10.4 mg/dL    Glucose 90 70 - 99 mg/dL   Result Value Ref Range    Troponin T, High Sensitivity 23 (H) <=22 ng/L   Nt probnp inpatient   Result Value Ref Range    N terminal Pro BNP Inpatient 1,514 0 - 1,800 pg/mL   Blood gas arterial   Result Value Ref Range    pH Arterial 7.38 7.35 - 7.45    pCO2 Arterial 42 35 - 45 mm Hg    pO2 Arterial 26 (LL) 80 - 105 mm Hg    FIO2 21     Bicarbonate Arterial 25 21 - 28 mmol/L    Base Excess/Deficit Arterial -0.4 -3.0 - 3.0 mmol/L    Oxyhemoglobin Arterial 45 (L) 92 - 100 %    O2 Sat, Arterial 46.7 (L) 95.0 - 96.0 %   Influenza A/B, RSV and SARS-CoV2 PCR (COVID-19) Nasopharyngeal    Specimen: Nasopharyngeal; Swab   Result Value Ref Range    Influenza A PCR Negative Negative    Influenza B PCR Negative Negative    RSV PCR Negative Negative    SARS CoV2 PCR Negative Negative   CBC with platelets and differential   Result Value Ref Range     WBC Count 4.9 4.0 - 11.0 10e3/uL    RBC Count 3.65 (L) 4.40 - 5.90 10e6/uL    Hemoglobin 10.9 (L) 13.3 - 17.7 g/dL    Hematocrit 33.3 (L) 40.0 - 53.0 %    MCV 91 78 - 100 fL    MCH 29.9 26.5 - 33.0 pg    MCHC 32.7 31.5 - 36.5 g/dL    RDW 15.4 (H) 10.0 - 15.0 %    Platelet Count 199 150 - 450 10e3/uL    % Neutrophils 45 %    % Lymphocytes 43 %    % Monocytes 10 %    % Eosinophils 1 %    % Basophils 1 %    % Immature Granulocytes 0 %    NRBCs per 100 WBC 0 <1 /100    Absolute Neutrophils 2.2 1.6 - 8.3 10e3/uL    Absolute Lymphocytes 2.1 0.8 - 5.3 10e3/uL    Absolute Monocytes 0.5 0.0 - 1.3 10e3/uL    Absolute Eosinophils 0.0 0.0 - 0.7 10e3/uL    Absolute Basophils 0.0 0.0 - 0.2 10e3/uL    Absolute Immature Granulocytes 0.0 <=0.4 10e3/uL    Absolute NRBCs 0.0 10e3/uL   Result Value Ref Range    Troponin T, High Sensitivity 22 <=22 ng/L   Blood gas arterial   Result Value Ref Range    pH Arterial 7.39 7.35 - 7.45    pCO2 Arterial 41 35 - 45 mm Hg    pO2 Arterial 28 (LL) 80 - 105 mm Hg    FIO2 21     Bicarbonate Arterial 25 21 - 28 mmol/L    Base Excess/Deficit Arterial 0.0 -3.0 - 3.0 mmol/L    Oxyhemoglobin Arterial 49 (L) 92 - 100 %    O2 Sat, Arterial 50.6 (L) 95.0 - 96.0 %   Result Value Ref Range    Methemoglobin 0.7 0.0 - 3.0 %   Result Value Ref Range    Carbon Monoxide 2.2 (H) 0.0 - 2.0 %   ECG 12-LEAD WITH MUSE (LHE)   Result Value Ref Range    Systolic Blood Pressure 181 mmHg    Diastolic Blood Pressure 75 mmHg    Ventricular Rate 58 BPM    Atrial Rate 58 BPM    OR Interval 160 ms    QRS Duration 128 ms     ms    QTc 479 ms    P Axis 46 degrees    R AXIS 55 degrees    T Axis 70 degrees    Interpretation ECG       Sinus bradycardia  Right bundle branch block  Abnormal ECG  When compared with ECG of 11-Feb-2025 14:45,  T wave inversion no longer evident in Inferior leads  T wave inversion no longer evident in Lateral leads  Confirmed by SEE ED PROVIDER NOTE FOR, ECG INTERPRETATION ([id number]),   NANNETTE CARDOZO ([id number]) on 3/18/2025 5:38:23 PM         RADIOLOGY:  Reviewed all pertinent imaging. Please see official radiology report.  CT Chest Pulmonary Embolism w Contrast   Final Result   IMPRESSION:   1.  No acute pulmonary embolism.   2.  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 Close clinical and imaging follow-up is recommended.   3.  Unchanged mildly enlarged right hilar lymph node. Attention on follow-up.   4.  Left upper lobectomy.          EKG:    Performed at: 1659 03/18/25    Impression: Sinus bradycardia. Right bundle branch block    Rate: 58 bpm  Rhythm: Sinus sonia  Axis: 46 55 70  CA Interval: 160 ms  QRS Interval: 128 ms  QTc Interval: 479 ms  ST Changes: No  Comparison: When compared with 11-Feb-25, T wave inversion no longer evident on inferior and lateral leads.    I have independently reviewed and interpreted the EKG(s) documented above.    PROCEDURES:   NA        I, Rahel Rm, am serving as a scribe to document services personally performed by Zack Shepard MD based on my observation and the provider's statements to me. I, Zack Shepard MD, attest that Rahel Rm is acting in a scribe capacity, has observed my performance of the services and has documented them in accordance with my direction.    Zack Shepard MD  [facility] EMERGENCY ROOM  [address] [address]  [phone]      Zack Shepard MD  03/18/25 5593

## 2025-03-19 ENCOUNTER — APPOINTMENT (OUTPATIENT)
Dept: PHYSICAL THERAPY | Facility: CLINIC | Age: 80
DRG: 291 | End: 2025-03-19
Payer: MEDICARE

## 2025-03-19 ENCOUNTER — APPOINTMENT (OUTPATIENT)
Dept: CT IMAGING | Facility: CLINIC | Age: 80
DRG: 291 | End: 2025-03-19
Attending: NURSE PRACTITIONER
Payer: MEDICARE

## 2025-03-19 LAB
ALBUMIN UR-MCNC: ABNORMAL G/DL
ANION GAP SERPL CALCULATED.3IONS-SCNC: 14 MMOL/L (ref 7–15)
APPEARANCE UR: ABNORMAL
BASOPHILS # BLD AUTO: 0 10E3/UL (ref 0–0.2)
BASOPHILS NFR BLD AUTO: 0 %
BILIRUB UR QL STRIP: ABNORMAL
BUN SERPL-MCNC: 13.6 MG/DL (ref 8–23)
C PNEUM DNA SPEC QL NAA+PROBE: NOT DETECTED
CALCIUM SERPL-MCNC: 9.2 MG/DL (ref 8.8–10.4)
CHLORIDE SERPL-SCNC: 107 MMOL/L (ref 98–107)
COLOR UR AUTO: ABNORMAL
CREAT SERPL-MCNC: 0.67 MG/DL (ref 0.67–1.17)
EGFRCR SERPLBLD CKD-EPI 2021: >90 ML/MIN/1.73M2
EOSINOPHIL # BLD AUTO: 0 10E3/UL (ref 0–0.7)
EOSINOPHIL NFR BLD AUTO: 0 %
ERYTHROCYTE [DISTWIDTH] IN BLOOD BY AUTOMATED COUNT: 15.2 % (ref 10–15)
FLUAV H1 2009 PAND RNA SPEC QL NAA+PROBE: NOT DETECTED
FLUAV H1 RNA SPEC QL NAA+PROBE: NOT DETECTED
FLUAV H3 RNA SPEC QL NAA+PROBE: NOT DETECTED
FLUAV RNA SPEC QL NAA+PROBE: NOT DETECTED
FLUBV RNA SPEC QL NAA+PROBE: NOT DETECTED
GLUCOSE SERPL-MCNC: 147 MG/DL (ref 70–99)
GLUCOSE UR STRIP-MCNC: ABNORMAL MG/DL
HADV DNA SPEC QL NAA+PROBE: NOT DETECTED
HCO3 SERPL-SCNC: 20 MMOL/L (ref 22–29)
HCOV PNL SPEC NAA+PROBE: NOT DETECTED
HCT VFR BLD AUTO: 33.2 % (ref 40–53)
HGB BLD-MCNC: 11.3 G/DL (ref 13.3–17.7)
HGB UR QL STRIP: ABNORMAL
HMPV RNA SPEC QL NAA+PROBE: NOT DETECTED
HPIV1 RNA SPEC QL NAA+PROBE: NOT DETECTED
HPIV2 RNA SPEC QL NAA+PROBE: NOT DETECTED
HPIV3 RNA SPEC QL NAA+PROBE: NOT DETECTED
HPIV4 RNA SPEC QL NAA+PROBE: NOT DETECTED
IMM GRANULOCYTES # BLD: 0 10E3/UL
IMM GRANULOCYTES NFR BLD: 1 %
KETONES UR STRIP-MCNC: ABNORMAL MG/DL
LEUKOCYTE ESTERASE UR QL STRIP: ABNORMAL
LYMPHOCYTES # BLD AUTO: 0.9 10E3/UL (ref 0.8–5.3)
LYMPHOCYTES NFR BLD AUTO: 18 %
M PNEUMO DNA SPEC QL NAA+PROBE: NOT DETECTED
MAGNESIUM SERPL-MCNC: 2.1 MG/DL (ref 1.7–2.3)
MCH RBC QN AUTO: 30.2 PG (ref 26.5–33)
MCHC RBC AUTO-ENTMCNC: 34 G/DL (ref 31.5–36.5)
MCV RBC AUTO: 89 FL (ref 78–100)
MONOCYTES # BLD AUTO: 0.1 10E3/UL (ref 0–1.3)
MONOCYTES NFR BLD AUTO: 1 %
MUCOUS THREADS #/AREA URNS LPF: PRESENT /LPF
NEUTROPHILS # BLD AUTO: 3.8 10E3/UL (ref 1.6–8.3)
NEUTROPHILS NFR BLD AUTO: 80 %
NITRATE UR QL: ABNORMAL
NRBC # BLD AUTO: 0 10E3/UL
NRBC BLD AUTO-RTO: 0 /100
PH UR STRIP: ABNORMAL [PH]
PHOSPHATE SERPL-MCNC: 3.4 MG/DL (ref 2.5–4.5)
PLATELET # BLD AUTO: 184 10E3/UL (ref 150–450)
POTASSIUM SERPL-SCNC: 3.7 MMOL/L (ref 3.4–5.3)
RBC # BLD AUTO: 3.74 10E6/UL (ref 4.4–5.9)
RBC URINE: >182 /HPF
RSV RNA SPEC QL NAA+PROBE: NOT DETECTED
RSV RNA SPEC QL NAA+PROBE: NOT DETECTED
RV+EV RNA SPEC QL NAA+PROBE: NOT DETECTED
SODIUM SERPL-SCNC: 141 MMOL/L (ref 135–145)
SP GR UR STRIP: ABNORMAL
UROBILINOGEN UR STRIP-MCNC: ABNORMAL MG/DL
WBC # BLD AUTO: 4.8 10E3/UL (ref 4–11)
WBC URINE: >182 /HPF

## 2025-03-19 PROCEDURE — 250N000009 HC RX 250

## 2025-03-19 PROCEDURE — 85048 AUTOMATED LEUKOCYTE COUNT: CPT

## 2025-03-19 PROCEDURE — 82565 ASSAY OF CREATININE: CPT

## 2025-03-19 PROCEDURE — 83735 ASSAY OF MAGNESIUM: CPT

## 2025-03-19 PROCEDURE — 94640 AIRWAY INHALATION TREATMENT: CPT | Mod: 76

## 2025-03-19 PROCEDURE — 85004 AUTOMATED DIFF WBC COUNT: CPT

## 2025-03-19 PROCEDURE — 87086 URINE CULTURE/COLONY COUNT: CPT

## 2025-03-19 PROCEDURE — 36415 COLL VENOUS BLD VENIPUNCTURE: CPT

## 2025-03-19 PROCEDURE — 250N000013 HC RX MED GY IP 250 OP 250 PS 637

## 2025-03-19 PROCEDURE — 250N000012 HC RX MED GY IP 250 OP 636 PS 637

## 2025-03-19 PROCEDURE — 250N000011 HC RX IP 250 OP 636: Performed by: NURSE PRACTITIONER

## 2025-03-19 PROCEDURE — 97116 GAIT TRAINING THERAPY: CPT | Mod: GP

## 2025-03-19 PROCEDURE — 999N000157 HC STATISTIC RCP TIME EA 10 MIN

## 2025-03-19 PROCEDURE — 97161 PT EVAL LOW COMPLEX 20 MIN: CPT | Mod: GP

## 2025-03-19 PROCEDURE — 94660 CPAP INITIATION&MGMT: CPT

## 2025-03-19 PROCEDURE — 87486 CHLMYD PNEUM DNA AMP PROBE: CPT

## 2025-03-19 PROCEDURE — 999N000111 HC STATISTIC OT IP EVAL DEFER: Performed by: OCCUPATIONAL THERAPIST

## 2025-03-19 PROCEDURE — 81001 URINALYSIS AUTO W/SCOPE: CPT

## 2025-03-19 PROCEDURE — 250N000011 HC RX IP 250 OP 636

## 2025-03-19 PROCEDURE — 120N000001 HC R&B MED SURG/OB

## 2025-03-19 PROCEDURE — 87633 RESP VIRUS 12-25 TARGETS: CPT

## 2025-03-19 PROCEDURE — 74178 CT ABD&PLV WO CNTR FLWD CNTR: CPT

## 2025-03-19 PROCEDURE — 84100 ASSAY OF PHOSPHORUS: CPT

## 2025-03-19 PROCEDURE — 94640 AIRWAY INHALATION TREATMENT: CPT

## 2025-03-19 PROCEDURE — 999N000156 HC STATISTIC RCP CONSULT EA 30 MIN

## 2025-03-19 PROCEDURE — 250N000011 HC RX IP 250 OP 636: Performed by: HOSPITALIST

## 2025-03-19 PROCEDURE — 99233 SBSQ HOSP IP/OBS HIGH 50: CPT | Performed by: HOSPITALIST

## 2025-03-19 PROCEDURE — 80048 BASIC METABOLIC PNL TOTAL CA: CPT

## 2025-03-19 PROCEDURE — 85041 AUTOMATED RBC COUNT: CPT

## 2025-03-19 PROCEDURE — 250N000011 HC RX IP 250 OP 636: Performed by: INTERNAL MEDICINE

## 2025-03-19 RX ORDER — FUROSEMIDE 20 MG/1
TABLET ORAL
Status: DISCONTINUED
Start: 2025-03-19 | End: 2025-03-19 | Stop reason: HOSPADM

## 2025-03-19 RX ORDER — IPRATROPIUM BROMIDE AND ALBUTEROL SULFATE 2.5; .5 MG/3ML; MG/3ML
SOLUTION RESPIRATORY (INHALATION)
Status: DISCONTINUED
Start: 2025-03-19 | End: 2025-03-19 | Stop reason: HOSPADM

## 2025-03-19 RX ORDER — LOSARTAN POTASSIUM 25 MG/1
TABLET ORAL
Status: DISCONTINUED
Start: 2025-03-19 | End: 2025-03-19 | Stop reason: HOSPADM

## 2025-03-19 RX ORDER — CLOPIDOGREL BISULFATE 75 MG/1
TABLET ORAL
Status: DISCONTINUED
Start: 2025-03-19 | End: 2025-03-19 | Stop reason: HOSPADM

## 2025-03-19 RX ORDER — IPRATROPIUM BROMIDE AND ALBUTEROL SULFATE 2.5; .5 MG/3ML; MG/3ML
3 SOLUTION RESPIRATORY (INHALATION) EVERY 4 HOURS PRN
Status: DISCONTINUED | OUTPATIENT
Start: 2025-03-19 | End: 2025-03-22 | Stop reason: HOSPADM

## 2025-03-19 RX ORDER — MAGNESIUM SULFATE HEPTAHYDRATE 40 MG/ML
INJECTION, SOLUTION INTRAVENOUS
Status: DISCONTINUED
Start: 2025-03-19 | End: 2025-03-19 | Stop reason: HOSPADM

## 2025-03-19 RX ORDER — SERTRALINE HYDROCHLORIDE 25 MG/1
TABLET, FILM COATED ORAL
Status: DISCONTINUED
Start: 2025-03-19 | End: 2025-03-19 | Stop reason: HOSPADM

## 2025-03-19 RX ORDER — IOPAMIDOL 755 MG/ML
100 INJECTION, SOLUTION INTRAVASCULAR ONCE
Status: COMPLETED | OUTPATIENT
Start: 2025-03-19 | End: 2025-03-19

## 2025-03-19 RX ORDER — AZITHROMYCIN 250 MG/1
TABLET, FILM COATED ORAL
Status: DISCONTINUED
Start: 2025-03-19 | End: 2025-03-19 | Stop reason: HOSPADM

## 2025-03-19 RX ORDER — PANTOPRAZOLE SODIUM 40 MG/1
TABLET, DELAYED RELEASE ORAL
Status: DISCONTINUED
Start: 2025-03-19 | End: 2025-03-19 | Stop reason: HOSPADM

## 2025-03-19 RX ORDER — ASPIRIN 81 MG/1
TABLET, CHEWABLE ORAL
Status: DISCONTINUED
Start: 2025-03-19 | End: 2025-03-19 | Stop reason: HOSPADM

## 2025-03-19 RX ORDER — CEFTRIAXONE SODIUM 1 G
VIAL (EA) INJECTION
Status: DISCONTINUED
Start: 2025-03-19 | End: 2025-03-19 | Stop reason: HOSPADM

## 2025-03-19 RX ORDER — CEFTRIAXONE 1 G/1
1 INJECTION, POWDER, FOR SOLUTION INTRAMUSCULAR; INTRAVENOUS EVERY 24 HOURS
Status: DISCONTINUED | OUTPATIENT
Start: 2025-03-19 | End: 2025-03-20

## 2025-03-19 RX ORDER — SPIRONOLACTONE 25 MG/1
TABLET ORAL
Status: DISCONTINUED
Start: 2025-03-19 | End: 2025-03-19 | Stop reason: HOSPADM

## 2025-03-19 RX ORDER — FUROSEMIDE 10 MG/ML
40 INJECTION INTRAMUSCULAR; INTRAVENOUS EVERY 12 HOURS
Status: DISCONTINUED | OUTPATIENT
Start: 2025-03-19 | End: 2025-03-22 | Stop reason: HOSPADM

## 2025-03-19 RX ORDER — PREDNISONE 20 MG/1
TABLET ORAL
Status: DISCONTINUED
Start: 2025-03-19 | End: 2025-03-19 | Stop reason: HOSPADM

## 2025-03-19 RX ORDER — ACETAMINOPHEN 325 MG/1
TABLET ORAL
Status: DISCONTINUED
Start: 2025-03-19 | End: 2025-03-20 | Stop reason: HOSPADM

## 2025-03-19 RX ORDER — CETIRIZINE HYDROCHLORIDE 10 MG/1
TABLET ORAL
Status: DISCONTINUED
Start: 2025-03-19 | End: 2025-03-19 | Stop reason: HOSPADM

## 2025-03-19 RX ORDER — IPRATROPIUM BROMIDE AND ALBUTEROL SULFATE 2.5; .5 MG/3ML; MG/3ML
3 SOLUTION RESPIRATORY (INHALATION) 4 TIMES DAILY
Status: DISCONTINUED | OUTPATIENT
Start: 2025-03-19 | End: 2025-03-19

## 2025-03-19 RX ORDER — HYDRALAZINE HYDROCHLORIDE 20 MG/ML
5 INJECTION INTRAMUSCULAR; INTRAVENOUS EVERY 6 HOURS PRN
Status: DISCONTINUED | OUTPATIENT
Start: 2025-03-19 | End: 2025-03-22 | Stop reason: HOSPADM

## 2025-03-19 RX ORDER — ATORVASTATIN CALCIUM 40 MG/1
TABLET, FILM COATED ORAL
Status: DISCONTINUED
Start: 2025-03-19 | End: 2025-03-19 | Stop reason: HOSPADM

## 2025-03-19 RX ADMIN — PANTOPRAZOLE SODIUM 40 MG: 40 TABLET, DELAYED RELEASE ORAL at 09:07

## 2025-03-19 RX ADMIN — LOSARTAN POTASSIUM 25 MG: 25 TABLET, FILM COATED ORAL at 20:58

## 2025-03-19 RX ADMIN — LOSARTAN POTASSIUM 25 MG: 25 TABLET, FILM COATED ORAL at 09:07

## 2025-03-19 RX ADMIN — OXYBUTYNIN CHLORIDE 10 MG: 5 TABLET, EXTENDED RELEASE ORAL at 09:08

## 2025-03-19 RX ADMIN — PREDNISONE 40 MG: 20 TABLET ORAL at 09:07

## 2025-03-19 RX ADMIN — ACETAMINOPHEN 650 MG: 325 TABLET ORAL at 13:30

## 2025-03-19 RX ADMIN — SERTRALINE HYDROCHLORIDE 25 MG: 25 TABLET ORAL at 09:07

## 2025-03-19 RX ADMIN — CETIRIZINE HYDROCHLORIDE 10 MG: 10 TABLET, FILM COATED ORAL at 09:06

## 2025-03-19 RX ADMIN — ATORVASTATIN CALCIUM 80 MG: 40 TABLET, FILM COATED ORAL at 00:33

## 2025-03-19 RX ADMIN — FUROSEMIDE 40 MG: 20 TABLET ORAL at 09:06

## 2025-03-19 RX ADMIN — SPIRONOLACTONE 12.5 MG: 25 TABLET, FILM COATED ORAL at 00:33

## 2025-03-19 RX ADMIN — FUROSEMIDE 40 MG: 10 INJECTION, SOLUTION INTRAMUSCULAR; INTRAVENOUS at 14:44

## 2025-03-19 RX ADMIN — IPRATROPIUM BROMIDE AND ALBUTEROL SULFATE 3 ML: .5; 3 SOLUTION RESPIRATORY (INHALATION) at 08:02

## 2025-03-19 RX ADMIN — LOSARTAN POTASSIUM 25 MG: 25 TABLET, FILM COATED ORAL at 00:33

## 2025-03-19 RX ADMIN — SPIRONOLACTONE 12.5 MG: 25 TABLET, FILM COATED ORAL at 20:58

## 2025-03-19 RX ADMIN — CEFTRIAXONE 1 G: 1 INJECTION, POWDER, FOR SOLUTION INTRAMUSCULAR; INTRAVENOUS at 09:11

## 2025-03-19 RX ADMIN — ATORVASTATIN CALCIUM 80 MG: 40 TABLET, FILM COATED ORAL at 20:58

## 2025-03-19 RX ADMIN — FLUTICASONE FUROATE AND VILANTEROL TRIFENATATE 1 PUFF: 200; 25 POWDER RESPIRATORY (INHALATION) at 09:08

## 2025-03-19 RX ADMIN — AZITHROMYCIN DIHYDRATE 250 MG: 250 TABLET, FILM COATED ORAL at 10:40

## 2025-03-19 RX ADMIN — IOPAMIDOL 100 ML: 755 INJECTION, SOLUTION INTRAVENOUS at 08:44

## 2025-03-19 RX ADMIN — IPRATROPIUM BROMIDE AND ALBUTEROL SULFATE 3 ML: .5; 3 SOLUTION RESPIRATORY (INHALATION) at 02:34

## 2025-03-19 RX ADMIN — BISOPROLOL FUMARATE 5 MG: 5 TABLET ORAL at 09:07

## 2025-03-19 RX ADMIN — MAGNESIUM SULFATE HEPTAHYDRATE 2 G: 40 INJECTION, SOLUTION INTRAVENOUS at 00:22

## 2025-03-19 ASSESSMENT — ACTIVITIES OF DAILY LIVING (ADL)
ADLS_ACUITY_SCORE: 64
ADLS_ACUITY_SCORE: 64
ADLS_ACUITY_SCORE: 69
ADLS_ACUITY_SCORE: 69
ADLS_ACUITY_SCORE: 64
ADLS_ACUITY_SCORE: 69
ADLS_ACUITY_SCORE: 64
ADLS_ACUITY_SCORE: 69
ADLS_ACUITY_SCORE: 69
ADLS_ACUITY_SCORE: 68
ADLS_ACUITY_SCORE: 64
ADLS_ACUITY_SCORE: 65
ADLS_ACUITY_SCORE: 68
ADLS_ACUITY_SCORE: 69
ADLS_ACUITY_SCORE: 67
ADLS_ACUITY_SCORE: 64
ADLS_ACUITY_SCORE: 64
ADLS_ACUITY_SCORE: 58
ADLS_ACUITY_SCORE: 69
ADLS_ACUITY_SCORE: 69

## 2025-03-19 NOTE — PROGRESS NOTES
PT continues on RA, SPO2 92-94%, HR 67-75, RR 28-36, BS IGNACIA absent(lobectomy) LLL diminished. RUL and RML clear, RLL diminished. MD changed Duo nebs to prn because pt not wheezing and did not feel that he needed them  after patient stated that they made him feel worse. Pt tolerates RA well at rest; but with any activity such as eating... his work of breathing increases 40-50 bpm. PT does not not like BIPAP or HFNC and will only wear each briefly for work of breathing. MD aware.    Brittny Delaney, RT

## 2025-03-19 NOTE — MEDICATION SCRIBE - ADMISSION MEDICATION HISTORY
Medication Scribe Admission Medication History    Admission medication history is complete. The information provided in this note is only as accurate as the sources available at the time of the update.    Information Source(s): Patient and CareEverywhere/SureScripts via in-person    Pertinent Information: Patient took AM medications and his noon pregabalin PTA. Used Duoneb this morning as well.     Currently on aspirin 81 mg and clopidogrel 75 mg once daily.      Changes made to PTA medication list:  Added: None  Deleted:   Benzocaine-menthol lozenge   Losartan-hydrochlorothiazide 100-25 mg - taking losartan by itself  Sodium chloride nasal spray  Umeclidinium-vilanterol 62.5-25 mcg/act - has other inhaler  Changed: None    Allergies reviewed with patient and updates made in EHR: yes    Medication History Completed By: Stevo Henderson 3/18/2025 7:21 PM    PTA Med List   Medication Sig Last Dose/Taking    acetaminophen (TYLENOL) 325 MG tablet Take 2 tablets (650 mg) by mouth every 6 hours as needed for mild pain. 3/18/2025 Noon    aspirin 81 MG EC tablet Take 1 tablet (81 mg) by mouth daily. Start tomorrow. 3/18/2025 Morning    atorvastatin (LIPITOR) 80 MG tablet Take 1 tablet (80 mg) by mouth at bedtime 3/17/2025 Bedtime    bisoprolol (ZEBETA) 5 MG tablet Take 1 tablet (5 mg) by mouth daily. 3/18/2025 Morning    budesonide-formoterol (SYMBICORT/BREYNA) 160-4.5 MCG/ACT Inhaler Inhale 2 puffs into the lungs 2 times daily. 3/18/2025 Morning    cetirizine (ZYRTEC) 10 MG tablet Take 10 mg by mouth every morning. 3/18/2025 Morning    cholecalciferol, vitamin D3, (VITAMIN D3) 25 mcg (1,000 unit) capsule [CHOLECALCIFEROL, VITAMIN D3, (VITAMIN D3) 25 MCG (1,000 UNIT) CAPSULE] Take 1,000 Units by mouth daily. 3/18/2025 Morning    clopidogrel (PLAVIX) 75 MG tablet Take 1 tablet (75 mg) by mouth daily. Dose to start tomorrow. 3/18/2025 Morning    cyanocobalamin 1000 MCG tablet [CYANOCOBALAMIN 1000 MCG TABLET] Take 1,000 mcg  by mouth daily. 3/18/2025 Morning    furosemide (LASIX) 40 MG tablet Take 1 tablet (40 mg) by mouth daily. 3/18/2025 Morning    ipratropium - albuterol 0.5 mg/2.5 mg/3 mL (DUONEB) 0.5-2.5 (3) MG/3ML neb solution Take 1 vial (3 mLs) by nebulization every 4 hours as needed for shortness of breath, wheezing or cough. 3/18/2025 Morning    LORazepam (ATIVAN) 0.5 MG tablet Take 1 tablet (0.5 mg) by mouth every 4 hours as needed for anxiety (shortness of breath). 3/18/2025 Morning    losartan (COZAAR) 25 MG tablet Take 1 tablet (25 mg) by mouth 2 times daily. 3/18/2025 Morning    oxyBUTYnin ER (DITROPAN XL) 10 MG 24 hr tablet Take 10 mg by mouth every other day. 3/17/2025 Morning    pantoprazole (PROTONIX) 40 MG EC tablet TAKE 1 TABLET BY MOUTH ONCE DAILY 3/18/2025 Morning    pregabalin (LYRICA) 150 MG capsule Take 150 mg by mouth 3 times daily as needed. 3/18/2025 Noon    sertraline (ZOLOFT) 25 MG tablet TAKE 1 TABLET(25 MG) BY MOUTH DAILY 3/18/2025 Morning    spironolactone (ALDACTONE) 25 MG tablet Take 0.5 tablets (12.5 mg) by mouth daily. 3/17/2025 Bedtime    traZODone (DESYREL) 50 MG tablet Take 1-2 tablets ( mg) by mouth at bedtime. 3/17/2025 Bedtime

## 2025-03-19 NOTE — PROGRESS NOTES
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  Management assisted by pulmonology and cardiology.  February 15: Discharged from [facility] with 2 L O2.  He used the oxygen occasionally  March 18: Presented to [facility] ED with 3 days of dyspnea which she thought spontaneously improved at night.  Initially in respiratory distress placed on BiPAP.  Workup showed hypomagnesemia, hypophosphatemia, metabolic acidosis, normocytic anemia, elevated D-dimer, and elevated troponin (23).  EKG showed right bundle branch block.  CT PE study was negative but did show right upper lobe findings concerning for adenocarcinoma and lymph node enlargement.  Urinalysis had gross hematuria.  He diagnosed with acute respiratory failure with hypoxia.  He was empirically treated for COPD exacerbation.  Ceftriaxone and azithromycin were started.  He received IV methylprednisolone followed by p.o. prednisone.    Today:  Increased work of breathing on exam, on nasal cannula. Has base crackles and some JVD, no real wheeze  PT/OT pending  Urine culture pending  RVP negative  -As needed hydralazine  -Increase diuresis IV twice daily Lasix  *Discussed with RT-does not feel improvement with nebs  *Discussed with urology no plan for hyperbaric oxygen at this time  *May need pulmonary consult tomorrow    Acute respiratory failure with  hypoxia  Acute severe COPD exacerbation  PFTs October 2021: FEV1 45%, DLCO 52%    Acute on chronic congestive heart failure with intermediate ejection fraction (40-50%)  Echo February 20 2540-50% EF, mild LVH, mild anteroseptal hypokinesis, normal RV size/function  -Strict I's and O's, free fluid restriction and daily weights  -PTA losartan and Aldactone resumed with hold parameters    Coronary artery disease status post JIA  Outpatient plan for [facility] through January 2026  Stress test earlier this month showed 64% EF and was negative    Hematuria  History of prostate cancer  PVR scan  CT urogram  -PTA oxybutynin    Non-small cell lung cancer  Left upper lobe ectomy 2017  Recurrence 2018 status postchemotherapy and radiation  Completed radiation last week in December 2024 for right lobe adenocarcinoma, follows with oncologist Dr. Evans through [facility].    Peripheral artery disease status post left femoral endarterectomy and bilateral iliac stents  -PTA aspirin 81 mg daily  -PTA clopidogrel 75 mg daily    Hypophosphatemia  Hypomagnesemia  -Replacement per interim protocols    PAD s/p lower extremity stenting and right to left femoral to femoral arterial bypass  -CT angiography of the lower extremities 12/13/2023 showed patent bilateral common and external iliac artery stents but occlusion of the femoral bypass graft     Nicotine dependence  -Admits to smoking half a pack of cigarettes per day, notes he has had extreme difficulty smoking cessation  -Nicotine replacement declined    GERD  -PTA Protonix resumed    Anxiety  Depression  -PTA Zoloft resumed, also takes ativan prn for anxiety    Asymptomatic right internal carotid artery stenosis: last assessed on carotid ultrasound 7/16/2024     Insomnia   - hold PTA trazodone for now, melatonin          Diet: Combination Diet 2 gm NA Diet; Low Saturated Fat Na <2400mg Diet  Fluid restriction 2000 ML FLUID    DVT Prophylaxis: Pneumatic Compression Devices  [place]  "Catheter: Not present  Lines: None     Cardiac Monitoring: ACTIVE order. Indication: hypoxia  Code Status: Full Code      Clinically Significant Risk Factors Present on Admission             # Hypomagnesemia: Lowest Mg = 1.6 mg/dL in last 2 days, will replace as needed     # Drug Induced Platelet Defect: home medication list includes an antiplatelet medication   # Hypertension: Noted on problem list  # Heart failure, NOS: heart failure noted on the problem list and last echo with EF 40-50%    # Acute Hypoxic Respiratory Failure: Documented O2 saturation < 90%. Continue supplemental oxygen as needed        # Overweight: Estimated body mass index is 25.79 kg/m  as calculated from the following:    Height as of this encounter: 1.651 m (5' 5\").    Weight as of this encounter: 70.3 kg (155 lb).       # Financial/Environmental Concerns:           Social Drivers of Health    Tobacco Use: Medium Risk (2/18/2025)    Patient History     Smoking Tobacco Use: Former     Smokeless Tobacco Use: Never     Passive Exposure: Current          Disposition Plan     Medically Ready for Discharge: Anticipated in 2-4 Days         Anthony Isaac MD  Hospitalist Service  [facility]  Securely message with [employer] (more info)  Text page via [employer] Paging/Directory   ______________________________________________________________________    Interval History   Says shortness of breath is worse during the daytime  Feels better than initial presentation now  Denies chest pain, abdominal pain  Says he has some clots in his underwear (urinary)    Physical Exam   Vital Signs: Temp: 98  F (36.7  C) Temp src: Oral BP: (!) 185/76 Pulse: 68   Resp: (!) 32 SpO2: 92 % O2 Device: None (Room air) Oxygen Delivery: 2 LPM  Weight: 155 lbs 0 oz    NAD in the bed  Awake, alert, appropriate conversation  Mild tachypnea and increased work of breathing on nasal cannula  Answers questions appropriately  Bibasilar crackles on auscultation, " minimal wheeze, diminished breath sounds left upper lobe  Abdomen soft, nontender, no bladder distention on percussion  Peripheral edema present    Medical Decision Making       51 MINUTES SPENT BY ME on the date of service doing chart review, history, exam, documentation & further activities per the note.      Data   ------------------------- PAST 24 HR DATA REVIEWED -----------------------------------------------    I have personally reviewed the following data over the past 24 hrs:    4.8  \   11.3 (L)   / 184     141 107 13.6 /  147 (H)   3.7 20 (L) 0.67 \     Trop: 22 BNP: 1,514     INR:  N/A PTT:  N/A   D-dimer:  1.32 (H) Fibrinogen:  N/A       Imaging results reviewed over the past 24 hrs:   Recent Results (from the past 24 hours)   CT Chest Pulmonary Embolism w Contrast    Narrative    EXAM: CT CHEST PULMONARY EMBOLISM W CONTRAST  LOCATION: [facility]  DATE: 3/18/2025    INDICATION: Elevated d-dimer and shortness of breath.  COMPARISON: 2/12/2025.  TECHNIQUE: CT chest pulmonary angiogram during arterial phase injection of IV contrast. Multiplanar reformats and MIP reconstructions were performed. Dose reduction techniques were used.   CONTRAST: 75 mL Isovue-370.    FINDINGS:    ANGIOGRAM CHEST: No acute pulmonary embolism.    Thoracic aorta is normal in course and caliber. Moderate atheromatous disease.    LUNGS AND PLEURA: Left upper lobectomy. Mild diffuse bronchiectatic change. Stable dependent scarring within the left lower lobe. No acute airspace consolidation.     Slight increased conspicuity of grouped nodular opacities within the periphery of the right upper lobe, series 7 image 116, measuring 15 x 18 mm, unchanged from prior. 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      Tiny right upper lobe calcified granuloma.    No pneumothorax.     No acute pleural effusion. Unchanged chronic left pleural thickening.     MEDIASTINUM/AXILLAE: A mildly enlarged  right hilar lymph node measures 14 mm in short axis, series 6 image 136, unchanged from prior.      Thoracic esophagus is unremarkable.      No axillary lymphadenopathy.    Minimal bilateral gynecomastia.    Mild cardiomegaly. Calcifications of the aortic valve. No pericardial effusion.    CORONARY ARTERY CALCIFICATION: Severe.    UPPER ABDOMEN: No suspicious abnormality.    MUSCULOSKELETAL: Multiple healing subacute rib fractures. Pectus carinatum.    OTHER: No additionally suspicious abnormality.      Impression    IMPRESSION:  1.  No acute pulmonary embolism.  2.  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 Close clinical and imaging follow-up is recommended.  3.  Unchanged mildly enlarged right hilar lymph node. Attention on follow-up.  4.  Left upper lobectomy.   CT Urogram wo & w Contrast    Narrative    EXAM: CT UROGRAM WO and W CONTRAST  LOCATION: [facility]  DATE: 3/19/2025    INDICATION: Gross hematuria  COMPARISON: CT chest 3/18/2025, PET/CT 10/31/2024 and CT abdomen and pelvis 4/27/2024  TECHNIQUE: CT scan of the abdomen and pelvis using urogram technique with pre contrast, post contrast, and delayed images. Multiplanar reformats were obtained. Dose reduction techniques were used.   CONTRAST: isovue 370 100 mls    FINDINGS:   LOWER CHEST: Trace bilateral pleural effusions with adjacent atelectasis and scarring. Mild cardiomegaly.    HEPATOBILIARY: Cholecystectomy with stable biliary ectasia likely reflecting reservoir effect.. Scattered subcentimeter hypodense hepatic lesions, likely cysts. Recommend attention on follow-up.    PANCREAS: Normal.    SPLEEN: Normal.    ADRENAL GLANDS: Normal.    RIGHT KIDNEY/URETER: No definite urinary tract stone. Limited evaluation for calculi due to excreted contrast from recent prior chest CT exam. Stable likely chronic  perinephric stranding. Small renal cortical cysts. No follow-up is indicated. No   suspicious solid lesion. Delayed postcontrast imaging shows no suspicious filling defects in the renal collecting system or ureter.    LEFT KIDNEY/URETER: Lower pole atrophy.  No definite urinary tract stone. Limited evaluation for calculi due to excreted contrast from recent prior chest CT exam. Stable likely chronic perinephric stranding. Small renal cortical cysts. No follow-up is   indicated. No suspicious solid lesion. Delayed postcontrast imaging shows no suspicious filling defects in the renal collecting system or ureter.    BLADDER: Diffuse bladder wall thickening. There is focal irregular wall thickening at the anterior aspect of the bladder dome for example image 164 series 12 and image 53 series 14. A 10.1 x 0.9 x 0.9 cm irregular nodular filling defect is present within   the dependent portion of the bladder.    BOWEL: No bowel obstruction or inflammatory process. Appendectomy. No free air or free fluid.    LYMPH NODES: Normal.    VASCULATURE: Severe aortoiliac atherosclerosis. Bilateral iliac endovascular stents and patent femoral-femoral bypass graft    PELVIC ORGANS: Prostatectomy. Presacral edema. Trace pelvic free fluid.    MUSCULOSKELETAL: Small bilateral fat-containing inguinal hernias. Spinal and pelvic degenerative changes. Multiple old healed lower right rib fractures.      Impression    IMPRESSION:  1.  Focal irregular bladder wall thickening at the anterior aspect of the bladder dome. This could reflect a bladder tumor. Further evaluation with cystoscopy is recommended.  2.  Small irregular nodular filling defect in the dependent portion of the bladder likely represents a blood clot.  3.  No urinary tract stone is identified and there is no CT evidence for a suspicious renal/upper urinary tract lesion.

## 2025-03-19 NOTE — PROGRESS NOTES
Pt received neb treatment and tolerated well. Pt was placed on HFNC, didn't tolerate so pt was placed on BiPAP for WOB. Pt later improved and was placed on 2L NC. Pt currently on room air with sat in the mid 90's. Pt will continue to be monitor and assess.    Tiffany Howe, RT

## 2025-03-19 NOTE — CONSULTS
[facility] CONSULTATION    Type of Consult: emergency room  Place of Service: [facility]   Reason for consult: Gross hematuria  Request for consult by: Dr. Isaac    History of present illness:   Ubaldo Ramos is a 79 year old male that was admitted for COPD exacerbation (H). Urology is being consulted for gross hematuria. History obtained through patient and chart review.     Patient known to our group. He is followed by Dr. 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 Radical prostatectomy and radiation completed in 2010. He was last seen 09/2024 for episode of gross hematuria. Radiation cystitis was noted on cystoscopy. Urine cytology negative at that time. His hematuria resolved within 2-3 days with increase fluid consumption. He reports intermittent episodes of hematuria the past 5 years. His hematuria self resolves with fluid consumption majority of the time. However, he did require CBI with cysto and fulguration for gross hematuria 05/2021.   Patient noted development of gross hematuria yesterday without any obvious trigger. Blood clots noted with voids. Mild dysuria noted with passing clots which is typical for patient. Voiding without issue. PVR bladder scans in ER have been consistently under 100 ml. Denies abdominal pain, N/V, fever and chills. He was expected to see Dr. Gibson this morning. Provider updated.     Past Medical History:  No past medical history on file.    Past Surgical History:  Past Surgical History:   Procedure Laterality Date    APPENDECTOMY      CERVICAL SPINE SURGERY      CHOLECYSTECTOMY      CV CORONARY ANGIOGRAM N/A 1/8/2025    Procedure: Coronary Angiogram;  Surgeon: Jamie Noriega MD;  Location: [facility] CV    CV INTRAVASULAR ULTRASOUND N/A 1/8/2025    Procedure: Intravascular Ultrasound;  Surgeon: Jamie Noriega MD;  Location: [facility] CATH LAB CV    CV LEFT HEART CATH N/A 1/8/2025    Procedure: Left Heart  Catheterization;  Surgeon: Jamie Noriega MD;  Location: [facility] CV    CV PCI STENT DRUG ELUTING N/A 1/8/2025    Procedure: Percutaneous Coronary Intervention Stent;  Surgeon: Jamie Noriega MD;  Location: [facility] LAB CV    FEMORAL ENDARTERECTOMY      LUNG CANCER SURGERY      PROSTATECTOMY         Social History:  Social History     Socioeconomic History    Marital status:      Spouse name: Not on file    Number of children: Not on file    Years of education: Not on file    Highest education level: Not on file   Occupational History    Not on file   Tobacco Use    Smoking status: Former     Current packs/day: 0.50     Types: Cigarettes     Passive exposure: Current    Smokeless tobacco: Never   Vaping Use    Vaping status: Never Used   Substance and Sexual Activity    Alcohol use: Not Currently    Drug use: Not Currently    Sexual activity: Not on file   Other Topics Concern    Not on file   Social History Narrative    Not on file     Social Drivers of Health     Financial Resource Strain: Low Risk  (2/8/2025)    Financial Resource Strain     Within the past 12 months, have you or your family members you live with been unable to get utilities (heat, electricity) when it was really needed?: No   Food Insecurity: Low Risk  (2/8/2025)    Food Insecurity     Within the past 12 months, did you worry that your food would run out before you got money to buy more?: No     Within the past 12 months, did the food you bought just not last and you didn t have money to get more?: No   Transportation Needs: Low Risk  (2/8/2025)    Transportation Needs     Within the past 12 months, has lack of transportation kept you from medical appointments, getting your medicines, non-medical meetings or appointments, work, or from getting things that you need?: No   Physical Activity: Not on file   Stress: Not on file   Social Connections: Not on file   Interpersonal Safety: Low Risk  (2/8/2025)    Interpersonal  Safety     Do you feel physically and emotionally safe where you currently live?: Yes     Within the past 12 months, have you been hit, slapped, kicked or otherwise physically hurt by someone?: No     Within the past 12 months, have you been humiliated or emotionally abused in other ways by your partner or ex-partner?: No   Housing Stability: Low Risk  (2/8/2025)    Housing Stability     Do you have housing? : Yes     Are you worried about losing your housing?: No       Medications:  Current Facility-Administered Medications   Medication Dose Route Frequency Provider Last Rate Last Admin    acetaminophen (TYLENOL) tablet 650 mg  650 mg Oral Q4H PRN Christine Godoy PA-C        Or    acetaminophen (TYLENOL) Suppository 650 mg  650 mg Rectal Q4H PRN Christine Godoy PA-C        aspirin EC tablet 81 mg  81 mg Oral Daily Christine Godoy PA-C        atorvastatin (LIPITOR) tablet 80 mg  80 mg Oral At Bedtime Christine Godoy PA-C   80 mg at 03/19/25 0033    azithromycin (ZITHROMAX) tablet 250 mg  250 mg Oral Daily Christine Godoy PA-C        bisoprolol (ZEBETA) tablet 5 mg  5 mg Oral Daily Christine Godoy PA-C   5 mg at 03/19/25 0907    calcium carbonate (TUMS) chewable tablet 1,000 mg  1,000 mg Oral 4x Daily PRN Christine Godoy PA-C        cefTRIAXone (ROCEPHIN) 1 g vial to attach to  mL bag for ADULTS or NS 50 mL bag for PEDS  1 g Intravenous Q24H Yvonne Kerr MD   1 g at 03/19/25 0911    cetirizine (zyrTEC) tablet 10 mg  10 mg Oral QAM Christine Godoy PA-C   10 mg at 03/19/25 0906    clopidogrel (PLAVIX) tablet 75 mg  75 mg Oral Daily Christine Godoy PA-C        fluticasone-vilanterol (BREO ELLIPTA) 200-25 MCG/ACT inhaler 1 puff  1 puff Inhalation Daily Christine Godoy PA-C   1 puff at 03/19/25 0908    furosemide (LASIX) tablet 40 mg  40 mg Oral Daily Christine Godoy PA-C   40 mg at 03/19/25 0906    ipratropium - albuterol 0.5 mg/2.5 mg/3 mL  (DUONEB) neb solution 3 mL  3 mL Nebulization 4x Daily Anthony Isaac MD        lidocaine (LMX4) cream   Topical Q1H PRN Christine Godoy PA-C        lidocaine 1 % 0.1-1 mL  0.1-1 mL Other Q1H PRN Christine Godoy PA-C        losartan (COZAAR) tablet 25 mg  25 mg Oral BID Christine Godoy PA-C   25 mg at 03/19/25 0907    melatonin tablet 3 mg  3 mg Oral At Bedtime PRN Christine Godoy PA-C        ondansetron (ZOFRAN ODT) ODT tab 4 mg  4 mg Oral Q6H PRN Christine Godoy PA-C        Or    ondansetron (ZOFRAN) injection 4 mg  4 mg Intravenous Q6H PRN Christine Godoy PA-C        oxyBUTYnin ER (DITROPAN XL) 24 hr tablet 10 mg  10 mg Oral Every Other Day Christine Godoy PA-C   10 mg at 03/19/25 0908    pantoprazole (PROTONIX) EC tablet 40 mg  40 mg Oral Daily Christine Godoy PA-C   40 mg at 03/19/25 0907    polyethylene glycol (MIRALAX) Packet 17 g  17 g Oral BID PRN Christine Godoy PA-C        predniSONE (DELTASONE) tablet 40 mg  40 mg Oral Daily Christine Godoy PA-C   40 mg at 03/19/25 0907    [Held by provider] pregabalin (LYRICA) capsule 150 mg  150 mg Oral TID PRN Christine Godoy PA-C        senna-docusate (SENOKOT-S/PERICOLACE) 8.6-50 MG per tablet 1 tablet  1 tablet Oral BID PRN Christine Godoy PA-C        Or    senna-docusate (SENOKOT-S/PERICOLACE) 8.6-50 MG per tablet 2 tablet  2 tablet Oral BID PRN Christine Godoy PA-C        sertraline (ZOLOFT) tablet 25 mg  25 mg Oral Daily Christine Godoy PA-C   25 mg at 03/19/25 0907    sodium chloride (PF) 0.9% PF flush 3 mL  3 mL Intracatheter Q8H Christine Godoy PA-C   3 mL at 03/19/25 0910    sodium chloride (PF) 0.9% PF flush 3 mL  3 mL Intracatheter q1 min prn Christine Godoy PA-C        spironolactone (ALDACTONE) half-tab 12.5 mg  12.5 mg Oral Daily Christine Godoy PA-C   12.5 mg at 03/19/25 0033    [Held by provider] traZODone (DESYREL) tablet  mg   mg  Oral At Bedtime Christine Godoy, PEDRO         Current Outpatient Medications   Medication Sig Dispense Refill    acetaminophen (TYLENOL) 325 MG tablet Take 2 tablets (650 mg) by mouth every 6 hours as needed for mild pain. 90 tablet 0    aspirin 81 MG EC tablet Take 1 tablet (81 mg) by mouth daily. Start tomorrow. 30 tablet 3    atorvastatin (LIPITOR) 80 MG tablet Take 1 tablet (80 mg) by mouth at bedtime 90 tablet 3    bisoprolol (ZEBETA) 5 MG tablet Take 1 tablet (5 mg) by mouth daily. 30 tablet 3    budesonide-formoterol (SYMBICORT/BREYNA) 160-4.5 MCG/ACT Inhaler Inhale 2 puffs into the lungs 2 times daily. 10.2 g 3    cetirizine (ZYRTEC) 10 MG tablet Take 10 mg by mouth every morning.      cholecalciferol, vitamin D3, (VITAMIN D3) 25 mcg (1,000 unit) capsule [CHOLECALCIFEROL, VITAMIN D3, (VITAMIN D3) 25 MCG (1,000 UNIT) CAPSULE] Take 1,000 Units by mouth daily.      clopidogrel (PLAVIX) 75 MG tablet Take 1 tablet (75 mg) by mouth daily. Dose to start tomorrow. 90 tablet 3    cyanocobalamin 1000 MCG tablet [CYANOCOBALAMIN 1000 MCG TABLET] Take 1,000 mcg by mouth daily.      furosemide (LASIX) 40 MG tablet Take 1 tablet (40 mg) by mouth daily. 30 tablet 0    ipratropium - albuterol 0.5 mg/2.5 mg/3 mL (DUONEB) 0.5-2.5 (3) MG/3ML neb solution Take 1 vial (3 mLs) by nebulization every 4 hours as needed for shortness of breath, wheezing or cough.      LORazepam (ATIVAN) 0.5 MG tablet Take 1 tablet (0.5 mg) by mouth every 4 hours as needed for anxiety (shortness of breath). 10 tablet 0    losartan (COZAAR) 25 MG tablet Take 1 tablet (25 mg) by mouth 2 times daily. 60 tablet 0    oxyBUTYnin ER (DITROPAN XL) 10 MG 24 hr tablet Take 10 mg by mouth every other day.      pantoprazole (PROTONIX) 40 MG EC tablet TAKE 1 TABLET BY MOUTH ONCE DAILY 90 tablet 2    pregabalin (LYRICA) 150 MG capsule Take 150 mg by mouth 3 times daily as needed.      sertraline (ZOLOFT) 25 MG tablet TAKE 1 TABLET(25 MG) BY MOUTH DAILY 90  tablet 1    spironolactone (ALDACTONE) 25 MG tablet Take 0.5 tablets (12.5 mg) by mouth daily. 45 tablet 3    traZODone (DESYREL) 50 MG tablet Take 1-2 tablets ( mg) by mouth at bedtime. 180 tablet 3       Allergies:   Allergies   Allergen Reactions    Adhesive Tape Unknown     Adhesive- pulls skin off        Review of Systems:   A full 12 point review of systems was taken and is negative aside from what is noted above in the HPI    Physical Exam:  Temp:  [87.8  F (31  C)-98  F (36.7  C)] 87.8  F (31  C)  Pulse:  [55-78] 78  Resp:  [21-58] 22  BP: (148-195)/(64-93) 148/67  FiO2 (%):  [21 %-40 %] 21 %  SpO2:  [93 %-100 %] 99 %  General: NAD, alert, cooperative  Head: normocephalic, without abnormality / atraumatic  Abdomen: soft, non tender, non distended. No suprapubic fullness/tenderness. No CVA tenderness noted bilaterally  Geniturinary: No urine available to evaluate.   Psychological: alert and oriented, answers questions appropriately      Labs:   Creatinine   Date Value Ref Range Status   03/19/2025 0.67 0.67 - 1.17 mg/dL Final       Lab Results   Component Value Date    WBC 4.8 03/19/2025     Lab Results   Component Value Date    HGB 11.3 03/19/2025     Lab Results   Component Value Date     03/19/2025       UA RESULTS:  Recent Labs   Lab Test 03/19/25  0438 06/24/21  1444   COLOR Dark Brown* Colorless   APPEARANCE Bloody* Clear   URINEGLC  --  Negative   URINEBILI  --  Negative   URINEKETONE  --  Negative   SG  --  1.015   UBLD  --  0.06 mg/dL*   URINEPH  --  5.0   PROTEIN  --  10 mg/dL   UROBILINOGEN  --  Normal   NITRITE  --  Negative   LEUKEST  --  500 Princess/uL*   RBCU >182*  --    WBCU >182*  --          Urine culture: pending    Lab Results: personally reviewed     Imaging:  CT Urogram pending    I have personally reviewed the imaging reports above.     Assessment / Plan : Ubaldo Ramos is being seen by [facility] for gross hematuria    - Persistent hematuria with blood clots. Hemoglobin  stable (improved from yesterday). Encouraged increased fluid consumption as this has previously resolved his hematuria.   - Ok to avoid catheter at this time. Patient would prefer to avoid catheter if possible. Will reconsider catheter placement with or without CBI tomorrow pending hematuria progression. Emptying well per PVR bladder scans. Continue PVR bladder scans q shift.   - CT urogram with potential bladder mass. Negative cystoscopy this past September.Concern for small volume blood clots on imaging as well. Patient did have large void with numerous blood clots after his CT imaging.   - IV antibiotics. UC pending.     Thank you for consulting [facility] regarding this patient's care. We will follow. Please contact us with questions or concerns.     Ashwin Rodriguez PA-C  [facility]   [phone]

## 2025-03-19 NOTE — PROGRESS NOTES
03/19/25 1015   Appointment Info   Signing Clinician's Name / Credentials (PT) Robert Mckeon, PT   Living Environment   People in Home spouse   Current Living Arrangements apartment  (senior living)   Home Accessibility no concerns   Transportation Anticipated car, drives self   Living Environment Comments no stairs senior apt   Self-Care   Usual Activity Tolerance moderate   Current Activity Tolerance fair   Equipment Currently Used at Home cane, straight;walker, rolling;other (see comments)   Fall history within last six months no   Activity/Exercise/Self-Care Comment Pt. uses both a cane and a walker.  Both he and his wife drive   General Information   Onset of Illness/Injury or Date of Surgery 03/18/25   Referring Physician Christine Godoy PA-C   Patient/Family Therapy Goals Statement (PT) return home   Pertinent History of Current Problem (include personal factors and/or comorbidities that impact the POC) admitted with SOB, history of COPD, PAD, and EF of 40-45%   Range of Motion (ROM)   Range of Motion ROM is WFL   Strength (Manual Muscle Testing)   Strength (Manual Muscle Testing) strength is WFL   Bed Mobility   Bed Mobility no deficits identified   Transfers   Transfers no deficits identified   Gait/Stairs (Locomotion)   [place] Level (Gait) modified independence   Assistive Device (Gait) walker, front-wheeled   Distance in Feet (Gait) 10   Sensory Examination   Sensory Perception Comments B LE light touch intact   Clinical Impression   Criteria for Skilled Therapeutic Intervention Yes, treatment indicated   PT Diagnosis (PT) decreased activity tolerance   Influenced by the following impairments SOB with activity   Functional limitations due to impairments gait distance   Clinical Presentation (PT Evaluation Complexity) stable   Clinical Presentation Rationale per exam findings   Clinical Decision Making (Complexity) low complexity   Planned Therapy Interventions (PT) gait training   Risk &  Benefits of therapy have been explained evaluation/treatment results reviewed;care plan/treatment goals reviewed;risks/benefits reviewed;current/potential barriers reviewed;participants voiced agreement with care plan;patient   PT Total Evaluation Time   PT Eval, Low Complexity Minutes (71381) 10   Physical Therapy Goals   PT Frequency One time eval and treatment only   PT Predicted Duration/Target Date for Goal Attainment 03/19/25   PT Goals Gait   PT: Gait Modified independent;Rolling walker;Greater than 200 feet   Interventions   Interventions Quick Adds Gait Training   Gait Training   Gait Training Minutes (42825) 10   Symptoms Noted During/After Treatment (Gait Training) shortness of breath   Treatment Detail/Skilled Intervention Pt. amb with FWW mod I on 2L NC, writer managed 02.  Pt. rating dyspnea at 2-3/10 while amb   Distance in Feet 350   [place] Level (Gait Training) independent   Physical Assistance Level (Gait Training) set-up required  (secondary 02 and lines)   Assistive Device (Gait Training) rolling walker   Pattern Analysis (Gait Training) swing-through gait   PT Discharge Planning   PT Plan D/C PT at this time   PT Discharge Recommendation (DC Rec) (S)  home   PT Rationale for DC Rec Pt. independent with mobility and amb community distance in ED   PT Brief overview of current status Pt. does not require PT at this time as he is modified independent with mobility   Physical Therapy Time and Intention   Timed Code Treatment Minutes 10   Total Session Time (sum of timed and untimed services) 20

## 2025-03-19 NOTE — H&P
[facility]    History and Physical - Hospitalist Service       Date of Admission:  3/18/2025    Assessment & Plan      Ubaldo Ramos is a 79 year old male admitted on 3/18/2025. He has a PMH of CAD s/p JIA x 2 to mid LAD 1/2025, HFrEF, traumatic right rib fracture and right pneumothorax s/p chest tube removal on 1/7, HLD, HTN, severe COPD, nicotine dependence, lung cancer s/p left upper lobectomy 2017 with recurrence in 2018 s/p chemo and radiation, PAD s/p left femoral endarterectomy with bilateral iliac stents, prostate cancer, GERD presented to the ER for evaluation of shortness of breath and labored breathing off and on the past 3 days.  Utilizes 2 L of oxygen at home since discharge from [facility] 2/15.  He presented with initial oxygen saturations of 98% on room air, ABG was drawn showing pH of 7.38, pO2 was 26, he was placed on BiPAP.  There was thought ABG was actually a VBG.  After being on BiPAP, repeat ABG showing pO2 of 157.  Subsequently taken off BiPAP and placed on 2L via NC.  D-dimer was elevated and CT chest PE study was negative for PE.  Admitted for further treatment and care of acute COPD exacerbation.    Acute on chronic hypoxic respiratory failure  Acute COPD exacerbation  - CT chest PE study: No acute pulmonary embolism.Slight increased conspicuity of a group of nodular opacities within the periphery of the right upper lobe, corresponding to site of known right upper lobe adenocarcinoma. Close clinical and imaging follow-up is recommended. Unchanged mildly enlarged right hilar lymph node. Attention on follow-up. Left upper lobectomy.  -As evidenced by ABG reviewed by myself: pH 7.38, pCO2 42, pO2 26 - initial concern this was a VBG draw instead ABG.   -Repeat ABG after being on BiPAP showed pO2 of 157, RT was present and subsequently taken off BiPAP and placed on 2 L via NC.  -Uses 2 L via nasal cannula at baseline  -COVID/flu/RSV was negative    Plan:   -Respiratory  viral panel pending  -Initiated COPD protocols, empiric IV antibiotics, azithromycin   -Solu-Medrol 125 mg x 1 given in ER, prednisone 40 mg daily  -Scheduled DuoNebs every 6 hours  -Utilize incentive spirometry while awake  -Respiratory therapy to assist with oxygen demands  -Continuous pulse oximetry  -Consider pulmonology consultation pending clinical course    Hx of non-small cell lung cancer s/p left upper lobectomy 2017  Right upper lobe adenocarcinoma  -left upper lobectomy 2017 with recurrence in 2018 s/p chemo and radiation  -Reports he completed radiation last week in December 2024 for right lobe adenocarcinoma, follows with oncologist Dr. Evans through [facility].  - defer to AM doctor if possible need for oncology consult    normocytic anemia  Hematuria  Hx of Prostate cancer  Urinary incontinence  -Noted onset of hematuria with clots this morning - is on ASA and Plavix for stent placed 1/8/2025 -patient care order placed to resume ASA and Plavix morning of 3/19 after notifying AM rounder if this is safe to resume pending clinical course of hematuria   -Hemoglobin 10.9 on admission  - PTA oxybutynin resumed  -UA ordered on admission, consider urology consult pending clinical course  -Bladder scan per unit routine  - Am CBC     HFmrEF  -BNP on admission 1514  -Echocardiogram from 2/2025 showed LVEF 40 to 50%, mild hypokinesis in distal anteroseptal segment  -Strict I's and O's, free fluid restriction and daily weights  -Appears euvolemic on exam  -PTA losartan, Lasix and Aldactone resumed with hold parameters    CAD s/p JIA x 2 to mid LAD 1/8/2025  HLD  HTN  -Initial troponin 23 -> 22   -Resume PTA bisoprolol, losartan, spinal lactone and atorvastatin  - PTA aspirin and Plavix resumed for now given recent stent, please assess in AM safety to resume     Hypophosphatemia  Hypomagnesemia  -Replacement per interim protocols    PAD s/p lower extremity stenting and right to left femoral to femoral arterial  "bypass  -CT angiography of the lower extremities 12/13/2023 showed patent bilateral common and external iliac artery stents but occlusion of the femoral bypass graft     Nicotine dependence  -Admits to smoking half a pack of cigarettes per day, notes he has had extreme difficulty smoking cessation  -Nicotine replacement declined    GERD  -PTA Protonix resumed    Anxiety  Depression  -PTA Zoloft resumed, also takes ativan prn for anxiety    Asymptomatic right internal carotid artery stenosis: last assessed on carotid ultrasound 7/16/2024     Insomnia   - hold PTA trazodone for now, melatonin          Diet: Combination Diet 2 gm NA Diet; Low Saturated Fat Na <2400mg Diet  Fluid restriction 2000 ML FLUID  DVT Prophylaxis: Pneumatic Compression Devices due to hematuria  Flores Catheter: Not present  Lines: None     Cardiac Monitoring: ACTIVE order. Indication: hypoxia  Code Status: Full Code    Clinically Significant Risk Factors Present on Admission             # Hypomagnesemia: Lowest Mg = 1.6 mg/dL in last 2 days, will replace as needed     # Drug Induced Platelet Defect: home medication list includes an antiplatelet medication   # Hypertension: Noted on problem list  # Heart failure, NOS: heart failure noted on the problem list and last echo with EF 40-50%    # Acute Hypoxic Respiratory Failure: Documented O2 saturation < 90%. Continue supplemental oxygen as needed   # Anemia: based on hgb <11       # Overweight: Estimated body mass index is 25.79 kg/m  as calculated from the following:    Height as of this encounter: 1.651 m (5' 5\").    Weight as of this encounter: 70.3 kg (155 lb).         # Financial/Environmental Concerns:           Disposition Plan     Medically Ready for Discharge: Anticipated in 2-4 Days         The patient's care was discussed with the Attending Physician, Dr. Kerr and the pt .    Christine Godoy PA-C  Hospitalist Service  [facility]  Securely message with " Windy (more info)  Text page via [facility] Paging/Directory     ______________________________________________________________________    Chief Complaint   sent home with 02 as needed.  02 sats are 98% but using accessory muscles to breath, has tried inhalers and nebs at home without relief, for COPD.     History is obtained from the patient    History of Present Illness   Ubaldo Ramos is a 79 year old male who PMH of CAD s/p JIA x 2 to mid LAD 1/2025, HFrEF, traumatic right rib fracture and right pneumothorax s/p chest tube removal on 1/7, HLD, HTN, severe COPD, nicotine dependence, lung cancer s/p left upper lobectomy 2017 with recurrence in 2018 s/p chemo and radiation, PAD s/p left femoral endarterectomy with bilateral iliac stents, prostate cancer, GERD presented to the ER for evaluation of shortness of breath and labored breathing off and on the past 3 days.  Utilizes 2 L of oxygen at home since discharge from [facility] 2/15.  Reports he has been compliant with all home medications with no recent changes, used nebulizer and home inhalers prior to arrival with minimal relief of his shortness of breath and labored breathing.  He denies any noticeable wheezing, denies cough, chest pain, orthopnea or worsening lower extremity edema.  Also is unaware of any recent sick contacts.  States he noticed hematuria with clots this morning, denies dizziness, vision changes but does admit to a mild headache.    Ed course: Presented afebrile temperature 97.4, heart rate 69, blood pressure 181/75.  ABG was drawn that initially showed a pH of 7.38, pCO2 WNL of 42 and pO2 was 26.  He was then placed on BiPAP.  Carbon monoxide mildly elevated at 2.2 however WNL for smokers.  BMP reassuring at 1514, initial troponin was 23 with repeat 22.  CBC showing mild anemia with hemoglobin of 10.9.  D-dimer was elevated, COVID/flu/RSV was negative.  CT chest PE study showed no acute pulmonary embolism.  Slight increased nodular opacities in  the periphery of the right upper lobe, corresponding to site of known right upper lobe adenocarcinoma. He received one albuterol neb, one duoneb and 125 mg solu-medrol in the ED.     At the time of admission, respiratory therapy presented after repeat ABG was drawn showing pO2 of 157.  He was then taken off BiPAP and was resting comfortably in the bed, no tachypnea or labored breathing.  Patient reports he is feeling significantly better since arrival.  Discussed treatment plan with patient and he was agreeable.  He confirms he is full code.    Past Medical History    No past medical history on file.    Past Surgical History   Past Surgical History:   Procedure Laterality Date    APPENDECTOMY      CERVICAL SPINE SURGERY      CHOLECYSTECTOMY      CV CORONARY ANGIOGRAM N/A 1/8/2025    Procedure: Coronary Angiogram;  Surgeon: Jamie Noriega MD;  Location: [facility] CV    CV INTRAVASULAR ULTRASOUND N/A 1/8/2025    Procedure: Intravascular Ultrasound;  Surgeon: Jamie Noriega MD;  Location: [facility]    CV LEFT HEART CATH N/A 1/8/2025    Procedure: Left Heart Catheterization;  Surgeon: Jamie Noriega MD;  Location: [facility] CV    CV PCI STENT DRUG ELUTING N/A 1/8/2025    Procedure: Percutaneous Coronary Intervention Stent;  Surgeon: Jamie Noriega MD;  Location: [facility] CV    FEMORAL ENDARTERECTOMY      LUNG CANCER SURGERY      PROSTATECTOMY         Prior to Admission Medications   Prior to Admission Medications   Prescriptions Last Dose Informant Patient Reported? Taking?   LORazepam (ATIVAN) 0.5 MG tablet 3/18/2025 Morning  No Yes   Sig: Take 1 tablet (0.5 mg) by mouth every 4 hours as needed for anxiety (shortness of breath).   acetaminophen (TYLENOL) 325 MG tablet 3/18/2025 Noon  No Yes   Sig: Take 2 tablets (650 mg) by mouth every 6 hours as needed for mild pain.   aspirin 81 MG EC tablet 3/18/2025 Morning  No Yes   Sig: Take 1 tablet (81 mg) by mouth daily. Start  tomorrow.   atorvastatin (LIPITOR) 80 MG tablet 3/17/2025 Bedtime  No Yes   Sig: Take 1 tablet (80 mg) by mouth at bedtime   bisoprolol (ZEBETA) 5 MG tablet 3/18/2025 Morning  No Yes   Sig: Take 1 tablet (5 mg) by mouth daily.   budesonide-formoterol (SYMBICORT/BREYNA) 160-4.5 MCG/ACT Inhaler 3/18/2025 Morning  No Yes   Sig: Inhale 2 puffs into the lungs 2 times daily.   cetirizine (ZYRTEC) 10 MG tablet 3/18/2025 Morning  Yes Yes   Sig: Take 10 mg by mouth every morning.   cholecalciferol, vitamin D3, (VITAMIN D3) 25 mcg (1,000 unit) capsule 3/18/2025 Morning  Yes Yes   Sig: [CHOLECALCIFEROL, VITAMIN D3, (VITAMIN D3) 25 MCG (1,000 UNIT) CAPSULE] Take 1,000 Units by mouth daily.   clopidogrel (PLAVIX) 75 MG tablet 3/18/2025 Morning  No Yes   Sig: Take 1 tablet (75 mg) by mouth daily. Dose to start tomorrow.   cyanocobalamin 1000 MCG tablet 3/18/2025 Morning  Yes Yes   Sig: [CYANOCOBALAMIN 1000 MCG TABLET] Take 1,000 mcg by mouth daily.   furosemide (LASIX) 40 MG tablet 3/18/2025 Morning  No Yes   Sig: Take 1 tablet (40 mg) by mouth daily.   ipratropium - albuterol 0.5 mg/2.5 mg/3 mL (DUONEB) 0.5-2.5 (3) MG/3ML neb solution 3/18/2025 Morning  No Yes   Sig: Take 1 vial (3 mLs) by nebulization every 4 hours as needed for shortness of breath, wheezing or cough.   losartan (COZAAR) 25 MG tablet 3/18/2025 Morning  No Yes   Sig: Take 1 tablet (25 mg) by mouth 2 times daily.   oxyBUTYnin ER (DITROPAN XL) 10 MG 24 hr tablet 3/17/2025 Morning  Yes Yes   Sig: Take 10 mg by mouth every other day.   pantoprazole (PROTONIX) 40 MG EC tablet 3/18/2025 Morning  No Yes   Sig: TAKE 1 TABLET BY MOUTH ONCE DAILY   pregabalin (LYRICA) 150 MG capsule 3/18/2025 Noon  Yes Yes   Sig: Take 150 mg by mouth 3 times daily as needed.   sertraline (ZOLOFT) 25 MG tablet 3/18/2025 Morning  No Yes   Sig: TAKE 1 TABLET(25 MG) BY MOUTH DAILY   spironolactone (ALDACTONE) 25 MG tablet 3/17/2025 Bedtime  No Yes   Sig: Take 0.5 tablets (12.5 mg) by mouth  daily.   traZODone (DESYREL) 50 MG tablet 3/17/2025 Bedtime  No Yes   Sig: Take 1-2 tablets ( mg) by mouth at bedtime.      Facility-Administered Medications: None        Review of Systems    Review of Systems   Constitutional:  Positive for malaise/fatigue. Negative for chills, fever and weight loss.   Eyes:  Negative for blurred vision.   Respiratory:  Positive for shortness of breath. Negative for cough, hemoptysis, sputum production and wheezing.    Cardiovascular:  Negative for chest pain, palpitations, orthopnea and leg swelling.   Gastrointestinal:  Negative for abdominal pain, heartburn, nausea and vomiting.   Genitourinary:  Positive for hematuria. Negative for flank pain, frequency and urgency.   Skin:  Negative for itching and rash.   Neurological:  Positive for headaches. Negative for dizziness, tingling, seizures and weakness.   Psychiatric/Behavioral:  Negative for depression.          Physical Exam   Vital Signs: Temp: 97.4  F (36.3  C) Temp src: Oral BP: (!) 188/93 Pulse: 60   Resp: 29 SpO2: 100 % O2 Device: BiPAP/CPAP Oxygen Delivery: (S) 40 LPM  Weight: 155 lbs 0 oz    Physical Exam  Constitutional:       General: He is not in acute distress.     Appearance: He is not ill-appearing, toxic-appearing or diaphoretic.   HENT:      Head: Normocephalic and atraumatic.      Mouth/Throat:      Mouth: Mucous membranes are moist.   Cardiovascular:      Rate and Rhythm: Normal rate and regular rhythm.      Heart sounds: No murmur heard.     No friction rub.   Pulmonary:      Effort: No respiratory distress.      Comments: Was taken off BiPAP on my evaluation, resting comfortably in the bed on 2 L, diminished breath sounds more prominent left upper lobe.  Faint wheeze, tachypnea has improved  Abdominal:      General: Bowel sounds are normal.      Palpations: Abdomen is soft.      Tenderness: There is no abdominal tenderness. There is no guarding.   Musculoskeletal:      Right lower leg: No edema.       Left lower leg: No edema.   Neurological:      General: No focal deficit present.      Mental Status: He is alert and oriented to person, place, and time.   Psychiatric:         Mood and Affect: Mood normal.         Behavior: Behavior normal.           Medical Decision Making       75 MINUTES SPENT BY ME on the date of service doing chart review, history, exam, documentation & further activities per the note.      Data     I have personally reviewed the following data over the past 24 hrs:    4.9  \   10.9 (L)   / 199     141 107 16.7 /  90   4.0 21 (L) 0.77 \     Trop: 22 BNP: 1,514     INR:  N/A PTT:  N/A   D-dimer:  1.32 (H) Fibrinogen:  N/A       Imaging results reviewed over the past 24 hrs:   Recent Results (from the past 24 hours)   CT Chest Pulmonary Embolism w Contrast    Narrative    EXAM: CT CHEST PULMONARY EMBOLISM W CONTRAST  LOCATION: [facility]  DATE: 3/18/2025    INDICATION: Elevated d-dimer and shortness of breath.  COMPARISON: 2/12/2025.  TECHNIQUE: CT chest pulmonary angiogram during arterial phase injection of IV contrast. Multiplanar reformats and MIP reconstructions were performed. Dose reduction techniques were used.   CONTRAST: 75 mL Isovue-370.    FINDINGS:    ANGIOGRAM CHEST: No acute pulmonary embolism.    Thoracic aorta is normal in course and caliber. Moderate atheromatous disease.    LUNGS AND PLEURA: Left upper lobectomy. Mild diffuse bronchiectatic change. Stable dependent scarring within the left lower lobe. No acute airspace consolidation.     Slight increased conspicuity of grouped nodular opacities within the periphery of the right upper lobe, series 7 image 116, measuring 15 x 18 mm, unchanged from prior. 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      Tiny right upper lobe calcified granuloma.    No pneumothorax.     No acute pleural effusion. Unchanged chronic left pleural thickening.     MEDIASTINUM/AXILLAE: A mildly enlarged  right hilar lymph node measures 14 mm in short axis, series 6 image 136, unchanged from prior.      Thoracic esophagus is unremarkable.      No axillary lymphadenopathy.    Minimal bilateral gynecomastia.    Mild cardiomegaly. Calcifications of the aortic valve. No pericardial effusion.    CORONARY ARTERY CALCIFICATION: Severe.    UPPER ABDOMEN: No suspicious abnormality.    MUSCULOSKELETAL: Multiple healing subacute rib fractures. Pectus carinatum.    OTHER: No additionally suspicious abnormality.      Impression    IMPRESSION:  1.  No acute pulmonary embolism.  2.  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 Close clinical and imaging follow-up is recommended.  3.  Unchanged mildly enlarged right hilar lymph node. Attention on follow-up.  4.  Left upper lobectomy.

## 2025-03-19 NOTE — ED NOTES
EMERGENCY DEPARTMENT SIGN OUT NOTE      ED COURSE AND MEDICAL DECISION MAKING  Patient was signed out to me by Dr Henok Shepard at 10:06 PM.    In brief, Ubaldo Ramos is a 79 year old male who initially presented with shortness of breath and wheezing with a history of COPD and CHF.     At time of sign out, disposition was pending CT result and discussion with hospitalist.  CT scan returned showing no pulmonary embolism but did show possibility of a nodular mass in the right upper lung.  Given patient's smoking history this could be concerning for malignancy.  Unlikely to be causing patient's acute hypoxia and respiratory distress.  Patient now stable on BiPAP and earlier ABGs likely more consistent with the VBG's given the oxygen.  Will plan to monitor patient on BiPAP and recheck ABG.  Presently oxygenation is 100% patient is comfortable on the BiPAP.  Patient will be discussed with hospitalist service for admission.    10:27 PM I spoke with the hospitalist PIO King, who accepts the patient to Dr. Kerr's service.    FINAL IMPRESSION    1. COPD exacerbation (H)    2. Acute hypoxemic respiratory failure (H)      ED MEDS  Medications   azithromycin (ZITHROMAX) 500 mg in  mL intermittent infusion (has no administration in time range)   albuterol (PROVENTIL) neb solution 2.5 mg (2.5 mg Nebulization $Given 3/18/25 1735)   methylPREDNISolone Na Suc (solu-MEDROL) injection 125 mg (125 mg Intravenous $Given 3/18/25 1802)   ipratropium - albuterol 0.5 mg/2.5 mg/3 mL (DUONEB) neb solution 3 mL (3 mLs Nebulization $Given 3/1946)   iopamidol (ISOVUE-370) solution 75 mL (75 mLs Intravenous $Given 3/18/25 2027)     LAB  Labs Ordered and Resulted from Time of ED Arrival to Time of ED Departure   D DIMER QUANTITATIVE - Abnormal       Result Value    D-Dimer Quantitative 1.32 (*)    BASIC METABOLIC PANEL - Abnormal    Sodium 141      Potassium 4.0      Chloride 107      Carbon Dioxide (CO2) 21 (*)     Anion Gap 13       Urea Nitrogen 16.7      Creatinine 0.77      GFR Estimate >90      Calcium 9.7      Glucose 90     TROPONIN T, HIGH SENSITIVITY - Abnormal    Troponin T, High Sensitivity 23 (*)    BLOOD GAS ARTERIAL - Abnormal    pH Arterial 7.38      pCO2 Arterial 42      pO2 Arterial 26 (*)     FIO2 21      Bicarbonate Arterial 25      Base Excess/Deficit Arterial -0.4      Oxyhemoglobin Arterial 45 (*)     O2 Sat, Arterial 46.7 (*)    CBC WITH PLATELETS AND DIFFERENTIAL - Abnormal    WBC Count 4.9      RBC Count 3.65 (*)     Hemoglobin 10.9 (*)     Hematocrit 33.3 (*)     MCV 91      MCH 29.9      MCHC 32.7      RDW 15.4 (*)     Platelet Count 199      % Neutrophils 45      % Lymphocytes 43      % Monocytes 10      % Eosinophils 1      % Basophils 1      % Immature Granulocytes 0      NRBCs per 100 WBC 0      Absolute Neutrophils 2.2      Absolute Lymphocytes 2.1      Absolute Monocytes 0.5      Absolute Eosinophils 0.0      Absolute Basophils 0.0      Absolute Immature Granulocytes 0.0      Absolute NRBCs 0.0     BLOOD GAS ARTERIAL - Abnormal    pH Arterial 7.39      pCO2 Arterial 41      pO2 Arterial 28 (*)     FIO2 21      Bicarbonate Arterial 25      Base Excess/Deficit Arterial 0.0      Oxyhemoglobin Arterial 49 (*)     O2 Sat, Arterial 50.6 (*)    CARBON MONOXIDE - Abnormal    Carbon Monoxide 2.2 (*)    NT PROBNP INPATIENT - Normal    N terminal Pro BNP Inpatient 1,514     INFLUENZA A/B, RSV AND SARS-COV2 PCR - Normal    Influenza A PCR Negative      Influenza B PCR Negative      RSV PCR Negative      SARS CoV2 PCR Negative     TROPONIN T, HIGH SENSITIVITY - Normal    Troponin T, High Sensitivity 22     METHEMOGLOBIN - Normal    Methemoglobin 0.7     BLOOD GAS ARTERIAL     RADIOLOGY    CT Chest Pulmonary Embolism w Contrast   Final Result   IMPRESSION:   1.  No acute pulmonary embolism.   2.  Slight increased conspicuity of a group of nodular opacities within the periphery of the right upper lobe, corresponding to site of  known right upper lobe adenocarcinoma. Close clinical and imaging follow-up is recommended.   3.  Unchanged mildly enlarged right hilar lymph node. Attention on follow-up.   4.  Left upper lobectomy.        DISCHARGE MEDS  New Prescriptions    No medications on file     I, Zoltan Peterson, am serving as a scribe to document services personally performed by Dr. Naresh Remy based on my observations and the provider's statements to me.  I, Naresh Remy MD, attest that Zoltan Peterson is acting in a scribe capacity, has observed my performance of the services and has documented them in accordance with my direction.    Naresh Remy MD  [facility] EMERGENCY ROOM  [facility] [facility] [address] [phone]     Naresh Remy MD  03/18/25 3521

## 2025-03-19 NOTE — PROVIDER NOTIFICATION
03/19/25 0802   RCAT Assessment   Reason for Assessment COPD   Pulmonary Status 4   Surgical Status 0   Chest X-ray 1   Respiratory Pattern 3   Mental Status 0   Breath Sounds 2   Cough Effectiveness 0   Level of Activity 2   O2 Required for SpO2>=92% 0   Acuity Level (points) 12   Acuity Level  3   Re-eval Interval Guideline Every 3 days   Re-evaluation Date 03/22/25   $RT Consult Time Spent RCAT (30 minute increments) 1   Clinical Indications/Symptoms   Aerosol Therapy RCAT protocol   Broncho-pulmonary Hygiene   (ct showed bronchiectactic changes, scarring,)   Volume Expansion Prevent atelectasis;Decreased breath sounds   Aerosol Therapy Plan   RT Treatment Nebulizer   Anticholinergic/Beta-Andrenergic Agonist Duoneb soln (0.5mg/3mg per 3mL) neb Max 6 doses/24h   Aerosol Treatment Frequency Acuity Level 3: QID/PRN @noc-Mod wheezing/Hx asthma/secretion removal   Aerosol Therapy (SVN)   Daily Review of Necessity (SVN) completed   Respiratory Treatment Status (SVN) given   Patient Position HOB elevated   Posttreatment Assessment (SVN) breath sounds unchanged   Signs of Intolerance (SVN) none   Broncho-Pulmonary Hygiene Plan   Broncho-Pulmonary Hygiene Treatment Coughing techniques   Volume Expansion Plan   Volume Expansion Treatment Incentive Spirometer;BiPAP Continuous per MD order   Volume Expansion Frequency Acuity Level 3: TID-At risk for developing atelectasis   Breath Sounds   Breath Sounds All Fields;IGNACIA;LLL;RUL;RML;RLL   IGNACIA Breath Sounds absent   LLL Breath Sounds diminished   RUL Breath Sounds clear   RML Breath Sounds clear   RLL Breath Sounds diminished     Pt is on RA, SPO2 %. PT WOB is increased with activity.Pt refuses BIPAP and would only wear HFNC for a little bit and was agitated and took it off.Pt does not require oxygen but does get air hungry and has increased WOB with his COPD exacerbation. RT will encourage pt to continue to do BIPAP or HFNC with increased WOB.  
Provider notified of BP elevation at 184/75 & 185/76 despite AM BP medication administration. BP recheck 1 hr later & . Provider ordered PRN hydralazine as needed.   
Community Resource

## 2025-03-19 NOTE — PLAN OF CARE
Hypertensive on RA- was placed on highflow for air hunger momentarily after eating breakfast. Sinus w/ prolonged qt on tele. Hematuria this am, appears to resolving last two voids. Incontinent of urine, no BM. Up with ax1 gbw. Activity encouragement this evening, but refused. No acute changes.   Problem: Delirium  Goal: Improved Behavioral Control  Outcome: Progressing  Intervention: Minimize Safety Risk  Recent Flowsheet Documentation  Taken 3/19/2025 1200 by Yuly Desai RN  Enhanced Safety Measures: room near unit station  Taken 3/19/2025 0800 by Yuly Desai RN  Enhanced Safety Measures: room near unit station  Goal: Improved Attention and Thought Clarity  Outcome: Progressing  Goal: Improved Sleep  Outcome: Progressing     Problem: Gas Exchange Impaired  Goal: Optimal Gas Exchange  Outcome: Progressing  Intervention: Optimize Oxygenation and Ventilation  Recent Flowsheet Documentation  Taken 3/19/2025 1200 by Yuly Desai RN  Head of Bed (HOB) Positioning: HOB at 30-45 degrees  Taken 3/19/2025 0800 by Yuly Desai RN  Head of Bed (HOB) Positioning: HOB at 30-45 degrees     Problem: Comorbidity Management  Goal: Maintenance of COPD Symptom Control  Outcome: Progressing  Intervention: Maintain COPD Symptom Control  Recent Flowsheet Documentation  Taken 3/19/2025 1200 by Yuly Desai RN  Medication Review/Management: medications reviewed  Taken 3/19/2025 0800 by Yuly Desai RN  Medication Review/Management: medications reviewed   Goal Outcome Evaluation:

## 2025-03-19 NOTE — PROGRESS NOTES
OT: Orders received. Chart reviewed and discussed with care team.  OT not indicated due to per PT, pt independent and no need for skilled OT identified.  Defer discharge recommendations to PT/Care Team.  Will complete orders.

## 2025-03-19 NOTE — PLAN OF CARE
Pt is AxOx4. VSS on RA, weaned from 2L (was on 2L at home). SR with BBB and prolonged Qtc on tele. Denying pain currently. Reporting no SOB. LS diminished. Has urinary incontinence, has hematuria. No signs of urinary discomfort, bladder scan 50 ml. 2 gram NA and low fat, 2L fluid restriction. Not OOB yet. On K, mag, phos protocols.     UA and resp panel collected, UC pending.    Problem: Gas Exchange Impaired  Goal: Optimal Gas Exchange  Outcome: Progressing  Intervention: Optimize Oxygenation and Ventilation  Recent Flowsheet Documentation  Taken 3/19/2025 0049 by Rima Hudson RN  Head of Bed (HOB) Positioning: HOB at 30-45 degrees     Problem: Comorbidity Management  Goal: Maintenance of COPD Symptom Control  Outcome: Progressing  Intervention: Maintain COPD Symptom Control  Recent Flowsheet Documentation  Taken 3/19/2025 0049 by Rima Hudson, RN  Medication Review/Management: medications reviewed     Problem: Adult Inpatient Plan of Care  Goal: Optimal Comfort and Wellbeing  Outcome: Progressing

## 2025-03-20 VITALS
BODY MASS INDEX: 25.02 KG/M2 | TEMPERATURE: 97.4 F | HEIGHT: 65 IN | SYSTOLIC BLOOD PRESSURE: 125 MMHG | OXYGEN SATURATION: 92 % | DIASTOLIC BLOOD PRESSURE: 68 MMHG | WEIGHT: 150.2 LBS | HEART RATE: 56 BPM | RESPIRATION RATE: 16 BRPM

## 2025-03-20 DIAGNOSIS — R10.13 DYSPEPSIA: ICD-10-CM

## 2025-03-20 LAB
ANION GAP SERPL CALCULATED.3IONS-SCNC: 12 MMOL/L (ref 7–15)
BACTERIA UR CULT: NORMAL
BUN SERPL-MCNC: 17 MG/DL (ref 8–23)
CALCIUM SERPL-MCNC: 9.7 MG/DL (ref 8.8–10.4)
CHLORIDE SERPL-SCNC: 109 MMOL/L (ref 98–107)
CREAT SERPL-MCNC: 0.79 MG/DL (ref 0.67–1.17)
EGFRCR SERPLBLD CKD-EPI 2021: >90 ML/MIN/1.73M2
ERYTHROCYTE [DISTWIDTH] IN BLOOD BY AUTOMATED COUNT: 15.8 % (ref 10–15)
GLUCOSE SERPL-MCNC: 118 MG/DL (ref 70–99)
HCO3 SERPL-SCNC: 23 MMOL/L (ref 22–29)
HCT VFR BLD AUTO: 35 % (ref 40–53)
HGB BLD-MCNC: 11.6 G/DL (ref 13.3–17.7)
MAGNESIUM SERPL-MCNC: 1.8 MG/DL (ref 1.7–2.3)
MCH RBC QN AUTO: 29.8 PG (ref 26.5–33)
MCHC RBC AUTO-ENTMCNC: 33.1 G/DL (ref 31.5–36.5)
MCV RBC AUTO: 90 FL (ref 78–100)
PHOSPHATE SERPL-MCNC: 2.3 MG/DL (ref 2.5–4.5)
PLATELET # BLD AUTO: 200 10E3/UL (ref 150–450)
POTASSIUM SERPL-SCNC: 3.1 MMOL/L (ref 3.4–5.3)
POTASSIUM SERPL-SCNC: 4.2 MMOL/L (ref 3.4–5.3)
POTASSIUM SERPL-SCNC: 4.2 MMOL/L (ref 3.4–5.3)
RBC # BLD AUTO: 3.89 10E6/UL (ref 4.4–5.9)
SODIUM SERPL-SCNC: 144 MMOL/L (ref 135–145)
WBC # BLD AUTO: 6.1 10E3/UL (ref 4–11)

## 2025-03-20 PROCEDURE — 250N000013 HC RX MED GY IP 250 OP 250 PS 637

## 2025-03-20 PROCEDURE — 250N000011 HC RX IP 250 OP 636: Performed by: HOSPITALIST

## 2025-03-20 PROCEDURE — 99233 SBSQ HOSP IP/OBS HIGH 50: CPT | Performed by: HOSPITALIST

## 2025-03-20 PROCEDURE — 83735 ASSAY OF MAGNESIUM: CPT | Performed by: HOSPITALIST

## 2025-03-20 PROCEDURE — 36415 COLL VENOUS BLD VENIPUNCTURE: CPT | Performed by: HOSPITALIST

## 2025-03-20 PROCEDURE — 80048 BASIC METABOLIC PNL TOTAL CA: CPT | Performed by: HOSPITALIST

## 2025-03-20 PROCEDURE — 250N000013 HC RX MED GY IP 250 OP 250 PS 637: Performed by: HOSPITALIST

## 2025-03-20 PROCEDURE — 250N000011 HC RX IP 250 OP 636: Performed by: INTERNAL MEDICINE

## 2025-03-20 PROCEDURE — 250N000012 HC RX MED GY IP 250 OP 636 PS 637

## 2025-03-20 PROCEDURE — 120N000001 HC R&B MED SURG/OB

## 2025-03-20 PROCEDURE — 84100 ASSAY OF PHOSPHORUS: CPT | Performed by: HOSPITALIST

## 2025-03-20 PROCEDURE — 85027 COMPLETE CBC AUTOMATED: CPT | Performed by: HOSPITALIST

## 2025-03-20 PROCEDURE — 84132 ASSAY OF SERUM POTASSIUM: CPT | Performed by: HOSPITALIST

## 2025-03-20 RX ORDER — TRAZODONE HYDROCHLORIDE 50 MG/1
50-100 TABLET ORAL
Status: DISCONTINUED | OUTPATIENT
Start: 2025-03-20 | End: 2025-03-22 | Stop reason: HOSPADM

## 2025-03-20 RX ORDER — MAGNESIUM OXIDE 400 MG/1
400 TABLET ORAL EVERY 4 HOURS
Status: COMPLETED | OUTPATIENT
Start: 2025-03-20 | End: 2025-03-20

## 2025-03-20 RX ORDER — PANTOPRAZOLE SODIUM 40 MG/1
40 TABLET, DELAYED RELEASE ORAL DAILY
Qty: 90 TABLET | Refills: 2 | OUTPATIENT
Start: 2025-03-20

## 2025-03-20 RX ORDER — POTASSIUM CHLORIDE 1500 MG/1
40 TABLET, EXTENDED RELEASE ORAL ONCE
Status: DISCONTINUED | OUTPATIENT
Start: 2025-03-20 | End: 2025-03-20

## 2025-03-20 RX ORDER — POTASSIUM CHLORIDE 1500 MG/1
40 TABLET, EXTENDED RELEASE ORAL ONCE
Status: COMPLETED | OUTPATIENT
Start: 2025-03-20 | End: 2025-03-20

## 2025-03-20 RX ORDER — BISOPROLOL FUMARATE 5 MG/1
2.5 TABLET, FILM COATED ORAL DAILY
Status: DISCONTINUED | OUTPATIENT
Start: 2025-03-21 | End: 2025-03-22 | Stop reason: HOSPADM

## 2025-03-20 RX ADMIN — FLUTICASONE FUROATE AND VILANTEROL TRIFENATATE 1 PUFF: 200; 25 POWDER RESPIRATORY (INHALATION) at 09:03

## 2025-03-20 RX ADMIN — PANTOPRAZOLE SODIUM 40 MG: 40 TABLET, DELAYED RELEASE ORAL at 09:05

## 2025-03-20 RX ADMIN — BISOPROLOL FUMARATE 5 MG: 5 TABLET ORAL at 09:05

## 2025-03-20 RX ADMIN — SPIRONOLACTONE 12.5 MG: 25 TABLET, FILM COATED ORAL at 20:54

## 2025-03-20 RX ADMIN — Medication 400 MG: at 09:04

## 2025-03-20 RX ADMIN — ASPIRIN 81 MG: 81 TABLET, COATED ORAL at 09:04

## 2025-03-20 RX ADMIN — POTASSIUM & SODIUM PHOSPHATES POWDER PACK 280-160-250 MG 1 PACKET: 280-160-250 PACK at 16:03

## 2025-03-20 RX ADMIN — LOSARTAN POTASSIUM 25 MG: 25 TABLET, FILM COATED ORAL at 20:54

## 2025-03-20 RX ADMIN — AZITHROMYCIN DIHYDRATE 250 MG: 250 TABLET, FILM COATED ORAL at 09:10

## 2025-03-20 RX ADMIN — SERTRALINE HYDROCHLORIDE 25 MG: 25 TABLET ORAL at 09:04

## 2025-03-20 RX ADMIN — EMPAGLIFLOZIN 10 MG: 10 TABLET, FILM COATED ORAL at 16:03

## 2025-03-20 RX ADMIN — POTASSIUM & SODIUM PHOSPHATES POWDER PACK 280-160-250 MG 1 PACKET: 280-160-250 PACK at 09:01

## 2025-03-20 RX ADMIN — POTASSIUM CHLORIDE 40 MEQ: 1500 TABLET, EXTENDED RELEASE ORAL at 09:04

## 2025-03-20 RX ADMIN — CETIRIZINE HYDROCHLORIDE 10 MG: 10 TABLET, FILM COATED ORAL at 09:05

## 2025-03-20 RX ADMIN — CLOPIDOGREL BISULFATE 75 MG: 75 TABLET ORAL at 09:04

## 2025-03-20 RX ADMIN — ACETAMINOPHEN 650 MG: 325 TABLET ORAL at 06:15

## 2025-03-20 RX ADMIN — LOSARTAN POTASSIUM 25 MG: 25 TABLET, FILM COATED ORAL at 09:04

## 2025-03-20 RX ADMIN — FUROSEMIDE 40 MG: 10 INJECTION, SOLUTION INTRAMUSCULAR; INTRAVENOUS at 03:47

## 2025-03-20 RX ADMIN — ATORVASTATIN CALCIUM 80 MG: 40 TABLET, FILM COATED ORAL at 20:54

## 2025-03-20 RX ADMIN — Medication 400 MG: at 11:14

## 2025-03-20 RX ADMIN — FUROSEMIDE 40 MG: 10 INJECTION, SOLUTION INTRAMUSCULAR; INTRAVENOUS at 14:24

## 2025-03-20 RX ADMIN — PREDNISONE 40 MG: 20 TABLET ORAL at 09:04

## 2025-03-20 RX ADMIN — POTASSIUM & SODIUM PHOSPHATES POWDER PACK 280-160-250 MG 1 PACKET: 280-160-250 PACK at 11:14

## 2025-03-20 RX ADMIN — CEFTRIAXONE 1 G: 1 INJECTION, POWDER, FOR SOLUTION INTRAMUSCULAR; INTRAVENOUS at 08:58

## 2025-03-20 ASSESSMENT — ACTIVITIES OF DAILY LIVING (ADL)
ADLS_ACUITY_SCORE: 68
ADLS_ACUITY_SCORE: 64
ADLS_ACUITY_SCORE: 68
ADLS_ACUITY_SCORE: 64
ADLS_ACUITY_SCORE: 68

## 2025-03-20 NOTE — PLAN OF CARE
Goal Outcome Evaluation:    Patient is A&Ox4. VSS with elevated bps. On room air with saturations in 90s.  Afebrile. Denies pain, sob, chest pain, dizziness, nor n/v. Complains of headache and PRN tylenol given with some relief. New PIV placed and saline locked. Incontinent of urine and bladder scanned several times overnight. A1 with walker and gait belt. No significant events overnight. Call light within reach.     Problem: Adult Inpatient Plan of Care  Goal: Absence of Hospital-Acquired Illness or Injury  Intervention: Identify and Manage Fall Risk  Recent Flowsheet Documentation  Taken 3/20/2025 0350 by Adrienne Robbins RN  Safety Promotion/Fall Prevention: safety round/check completed  Taken 3/20/2025 0031 by Adrienne Robbins RN  Safety Promotion/Fall Prevention: safety round/check completed  Intervention: Prevent Skin Injury  Recent Flowsheet Documentation  Taken 3/20/2025 0350 by Adrienne Robbins RN  Body Position: weight shifting  Skin Protection:   adhesive use limited   tubing/devices free from skin contact  Taken 3/20/2025 0031 by Adrienne Robbins RN  Body Position: position changed independently  Skin Protection:   adhesive use limited   tubing/devices free from skin contact  Intervention: Prevent and Manage VTE (Venous Thromboembolism) Risk  Recent Flowsheet Documentation  Taken 3/20/2025 0350 by Adrienne Robbins RN  VTE Prevention/Management: SCDs off (sequential compression devices)  Taken 3/20/2025 0031 by Adrienne Robbins RN  VTE Prevention/Management: SCDs off (sequential compression devices)  Intervention: Prevent Infection  Recent Flowsheet Documentation  Taken 3/20/2025 0350 by Adrienne Robbins RN  Infection Prevention:   hand hygiene promoted   rest/sleep promoted   single patient room provided  Taken 3/20/2025 0031 by Adrienne Robbins RN  Infection Prevention:   hand hygiene promoted   rest/sleep promoted   single patient room provided  Goal: Optimal Comfort and Wellbeing  Outcome: Progressing  Intervention: Provide  Person-Centered Care  Recent Flowsheet Documentation  Taken 3/20/2025 0350 by Adrienne Robbins RN  Trust Relationship/Rapport:   care explained   questions encouraged   thoughts/feelings acknowledged  Taken 3/20/2025 0031 by Adrienne Robbins RN  Trust Relationship/Rapport:   care explained   questions encouraged   thoughts/feelings acknowledged     Problem: Delirium  Goal: Optimal Coping  Intervention: Optimize Psychosocial Adjustment to Delirium  Recent Flowsheet Documentation  Taken 3/20/2025 0350 by Adrienne Robbins RN  Family/Support System Care: support provided  Taken 3/20/2025 0031 by Adrienne Robbins RN  Family/Support System Care: support provided  Goal: Improved Behavioral Control  Intervention: Prevent and Manage Agitation  Recent Flowsheet Documentation  Taken 3/20/2025 0350 by Adrienne Robbins RN  Environment Familiarity/Consistency:   daily routine followed   personal clothing/items utilized  Taken 3/20/2025 0031 by Adrienne Robbins RN  Environment Familiarity/Consistency:   daily routine followed   personal clothing/items utilized  Intervention: Minimize Safety Risk  Recent Flowsheet Documentation  Taken 3/20/2025 0350 by Adrienne Robbins RN  Enhanced Safety Measures: room near unit station  Trust Relationship/Rapport:   care explained   questions encouraged   thoughts/feelings acknowledged  Taken 3/20/2025 0031 by Adrienne Robbins RN  Enhanced Safety Measures: room near unit station  Trust Relationship/Rapport:   care explained   questions encouraged   thoughts/feelings acknowledged  Goal: Improved Attention and Thought Clarity  Outcome: Progressing  Intervention: Maximize Cognitive Function  Recent Flowsheet Documentation  Taken 3/20/2025 0350 by Adrienne Robbins RN  Sensory Stimulation Regulation:   care clustered   lighting decreased   quiet environment promoted  Reorientation Measures: clock in view  Taken 3/20/2025 0031 by Adrienne Robbins RN  Sensory Stimulation Regulation:   care clustered   lighting decreased   quiet  environment promoted  Reorientation Measures: clock in view  Goal: Improved Sleep  Outcome: Progressing     Problem: Gas Exchange Impaired  Goal: Optimal Gas Exchange  Outcome: Progressing  Intervention: Optimize Oxygenation and Ventilation  Recent Flowsheet Documentation  Taken 3/20/2025 0350 by Adrienne Robbins RN  Head of Bed (HOB) Positioning: HOB at 20-30 degrees  Taken 3/20/2025 0031 by Adrienne Robbins RN  Head of Bed (HOB) Positioning: HOB at 30-45 degrees     Problem: Comorbidity Management  Goal: Maintenance of COPD Symptom Control  Outcome: Progressing

## 2025-03-20 NOTE — DISCHARGE SUMMARY
Physical Therapy Discharge Summary    Reason for therapy discharge:    All goals and outcomes met, no further needs identified.    Progress towards therapy goal(s). See goals on Care Plan in [facility] electronic health record for goal details.  Goals met    Therapy recommendation(s):    No further therapy is recommended.      Robert Mckeon, PT, WCC, CLT

## 2025-03-20 NOTE — PLAN OF CARE
Problem: Adult Inpatient Plan of Care  Goal: Optimal Comfort and Wellbeing  Outcome: Progressing     Problem: Adult Inpatient Plan of Care  Goal: Readiness for Transition of Care  Outcome: Progressing   Goal Outcome Evaluation:    Admitted to the unit around 1215. Patient is A&Ox4. Denies pain and SOB. K, MG, and P protocols. 2,000 mL fluid restriction. Makes needs known and calls appropriately.

## 2025-03-20 NOTE — PROGRESS NOTES
Place of Service:  [facility]     Reason for follow up: Gross hematuria    SUBJECTIVE:  Events: no acute events overnight    Patient reports urine is now clear yellow. Denies abdominal and bilateral flank pain, fever, chills. Voiding without difficulty.     Son present in room.     OBJECTIVE:  PHYSICAL EXAM:  Temp: 97.7  F (36.5  C) Temp src: Oral BP: (!) 172/77 Pulse: 51   Resp: 21 SpO2: 93 % O2 Device: None (Room air) Oxygen Delivery: 2 LPM  General: NAD, alert, cooperative  Head: normocephalic, without abnormality / atraumatic  Abdomen: soft, non- tender, non distended. no suprapubic fullness, no suprapubic tenderness. No bilateral CVA tenderness.   Genitourinary: Penis and scrotum without erythema or edema. Wearing pull-up.   Skin: No rashes or lesions  Psychological: alert and oriented, answers questions appropriately    LABS:  Creatinine   Date Value Ref Range Status   03/20/2025 0.79 0.67 - 1.17 mg/dL Final     WBC Count   Date Value Ref Range Status   03/20/2025 6.1 4.0 - 11.0 10e3/uL Final     Hemoglobin   Date Value Ref Range Status   03/20/2025 11.6 (L) 13.3 - 17.7 g/dL Final     Platelet Count   Date Value Ref Range Status   03/20/2025 200 150 - 450 10e3/uL Final       UA:  UA RESULTS:  Recent Labs   Lab Test 03/19/25  0438 06/24/21  1444   COLOR Dark Brown* Colorless   APPEARANCE Bloody* Clear   URINEGLC  --  Negative   URINEBILI  --  Negative   URINEKETONE  --  Negative   SG  --  1.015   UBLD  --  0.06 mg/dL*   URINEPH  --  5.0   PROTEIN  --  10 mg/dL   UROBILINOGEN  --  Normal   NITRITE  --  Negative   LEUKEST  --  500 Princess/uL*   RBCU >182*  --    WBCU >182*  --      Cultures:  Urine 3/19: 10-50k mix of urogenital antonio     Lab Results: personally reviewed.     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   - Pt reportedly had a large void with numerous clots passed after his CT imaging. Flores was not placed and patient reports urine clear yellow today. PVR 3 ml this AM.   - UC: 10-50k mix of urogenital antonio. WBC WNL. Remains afebrile. No flank/abdominal pain. Ok from Urology standpoint to discontinue antibiotics.   - Message sent to [facility] schedulers to arrange follow up with Dr. Gibson in 1-2 weeks, to include cystoscopy. [facility] contact info added to discharge orders.   - [facility] will sign off. Please re-consult with any new or worsening urologic concerns.     Wing Denny, APRN, CNP  [facility]   [phone]

## 2025-03-20 NOTE — PLAN OF CARE
A/O x 4. Oxygen mid-high 90's on room air. Denies chest pain and SOB. Tele: sinus bradycardia. Mag/K+/Phos protocols; all requiring replacement w/ rechecks scheduled. Continuing Rocephin for UTI. Tolerating cardiac diet and fluid restriction. A x 1 w/ walker and gait belt.     Discharge pending.

## 2025-03-20 NOTE — PLAN OF CARE
Problem: Gas Exchange Impaired  Goal: Optimal Gas Exchange  Outcome: Progressing     Problem: Comorbidity Management  Goal: Maintenance of COPD Symptom Control  Outcome: Progressing     Goal Outcome Evaluation:    Pt is alert and oriented, calls appropriately for needs. Assist of one with ambulation. Vitals signs are stable, afebrile, oxygen WNL on room air. Pt is tolerating diet. IV is SL.  Pt is voiding in his brief, incontinent. Pt denies pain. TELE: SB    Plan: Telemetry monitoring    Protocols:  Potassium, 3.7, recheck next AM  Magnesium, 2.1, recheck next AM  Phosphorus, 3.4 , recheck next AM    Naif Bourgeois RN  March 19, 2025, 9:58 PM

## 2025-03-20 NOTE — PROGRESS NOTES
"[facility] ED Handoff Report    ED Chief Complaint: SOB, dyspnea w/ exertion    ED Diagnosis:  (J44.1) COPD exacerbation (H)  Comment: COPD Exacerbation  Plan: IV Lasix, PO Prednisone and PO Azithromycin   Monitor I/O's, cardiac monitoring     (J96.01) Acute hypoxemic respiratory failure (H)         PMH:  No past medical history on file.     Code Status:  Full Code     Falls Risk: Yes Band: Applied    Current Living Situation/Residence: lives in an assisted living facility: [facility] in [place]     Elimination Status: Continent: No     Activity Level: A x 1 w/ walker and gait belt    Patient's Preferred Language:  English     Needed: No    Vital Signs:  BP (!) 172/77 (BP Location: Left arm)   Pulse 51   Temp 97.7  F (36.5  C) (Oral)   Resp 21   Ht 1.651 m (5' 5\")   Wt 70.3 kg (155 lb)   SpO2 93%   BMI 25.79 kg/m       Cardiac Rhythm: Sinus bradycardia    Pain Score: 0/10    Is the Patient Confused:  No    Last Food or Drink: 03/20/25 at 1145    Assessment and Plan of Care:  A/O x 4. Oxygen mid-high 90's on room air. Denies chest pain and SOB. Tele: sinus bradycardia. Mag/K+/Phos protocols; all requiring replacement w/ rechecks scheduled. Continuing Rocephin for UTI. Tolerating cardiac diet and fluid restriction. A x 1 w/ walker and gait belt.     Discharge pending.     Tests Performed: Done: Labs and Imaging    Treatments Provided:  IV Rocephin, IV Lasix, PO Prednisone, PO Azithromycin     Family Dynamics/Concerns: No    Belongings Checklist Done and Signed by Patient: N/A    Belongings Sent with Patient: Yes    Boarding medications sent with patient: Yes    Additional Information: No    RN: Anni Benavidez RN 3/20/2025 11:44 AM        "

## 2025-03-20 NOTE — PROGRESS NOTES
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  Management assisted by pulmonology and cardiology.  February 15: Discharged from [facility] with 2 L O2.  He used the oxygen occasionally  March 18: Presented to [facility] ED with 3 days of dyspnea which she thought spontaneously improved at night.  Initially in respiratory distress placed on BiPAP.  Workup showed hypomagnesemia, hypophosphatemia, metabolic acidosis, normocytic anemia, elevated D-dimer, and elevated troponin (23).  EKG showed right bundle branch block.  CT PE study was negative but did show right upper lobe findings concerning for adenocarcinoma and lymph node enlargement.  Urinalysis had gross hematuria.  He diagnosed with acute respiratory failure with hypoxia.  He was empirically treated for COPD exacerbation.  Ceftriaxone and azithromycin were started.  He received IV methylprednisolone followed by p.o. prednisone.  Clinical exam was more suggestive of acute on chronic heart failure and diuretics were increased.  *Urology follow-up 1 to 2 weeks for cystoscopy    Today:  Hematuria stopped, Urology signed off  Off O2 today, given recent CHF hospitalization shoot for lower discharge weight  Plan to diurese until we see creatinine bump, we will try to start SGL 2 inhibitor  -resume trazodone  -Stop prednisone  -Stop ceftriaxone and  azithromycin  -Reduce bisoprolol from 5 mg to 2.5 mg (bradycardia)  -Hypokalemia and hypophosphatemia protocols  -Morning metabolic panel  -trial jardiance 10mg daily  -home O2 assess for tomorrow AM  *Paged urology to discuss risk/benefit of starting SGL 2 inhibitor for HFpEF control    Acute on chronic congestive heart failure with intermediate ejection fraction (40-50%)  Echo February 20 2540-50% EF, mild LVH, mild anteroseptal hypokinesis, normal RV size/function  More recent stress test showed recovered EF  -Strict I's and O's, free fluid restriction and daily weights  -PTA losartan and Aldactone resumed with hold parameters  -2 L fluid restriction    Severe COPD exacerbation  PFTs October 2021: FEV1 45%, DLCO 52%  -Breo  -As needed nebs    Coronary artery disease status post JIA  Outpatient plan for [facility] through January 2026  Stress test earlier this month showed 64% EF and was negative    Bradycardia  Bisoprolol dose reduced    Hematuria  History of prostate cancer  PVR scan  CT urogram  -PTA oxybutynin    Essential hypertension  Lasix as above  -PTA losartan 25 mg twice daily  -Spironolactone 12.5 mg daily    Non-small cell lung cancer  Left upper lobe ectomy 2017  Recurrence 2018 status postchemotherapy and radiation  Completed radiation last week in December 2024 for right lobe adenocarcinoma, follows with oncologist Dr. Evans through [facility].    Peripheral artery disease status post left femoral endarterectomy and bilateral iliac stents  CT angiography of the lower extremities 12/13/2023 showed patent bilateral common and external iliac artery stents but occlusion of the femoral bypass graft   -PTA aspirin 81 mg daily  -PTA clopidogrel 75 mg daily    Hypophosphatemia  Hypomagnesemia  -Replacement per interim protocols    Nicotine dependence  -Admits to smoking half a pack of cigarettes per day, notes he has had extreme difficulty smoking cessation  -Nicotine replacement declined    GERD  -PTA Protonix  "resumed    Anxiety  Depression  -PTA Zoloft resumed, also takes ativan prn for anxiety    Asymptomatic right internal carotid artery stenosis  last assessed on carotid ultrasound 7/16/2024     Insomnia   PTA trazodone          Diet: Combination Diet 2 gm NA Diet; Low Saturated Fat Na <2400mg Diet  Fluid restriction 2000 ML FLUID    DVT Prophylaxis: on DAPT, SCDs  Florse Catheter: Not present  Lines: None     Cardiac Monitoring: ACTIVE order. Indication: hypoxia  Code Status: Full Code      Clinically Significant Risk Factors        # Hypokalemia: Lowest K = 3.1 mmol/L in last 2 days, will replace as needed   # Hyperchloremia: Highest Cl = 109 mmol/L in last 2 days, will monitor as appropriate        # Hypomagnesemia: Lowest Mg = 1.6 mg/dL in last 2 days, will replace as needed       # Hypertension: Noted on problem list  # Heart failure, NOS: heart failure noted on the problem list and last echo with EF 40-50%    # Acute Hypoxic Respiratory Failure: Documented O2 saturation < 90%. Continue supplemental oxygen as needed         # Overweight: Estimated body mass index is 25.79 kg/m  as calculated from the following:    Height as of this encounter: 1.651 m (5' 5\").    Weight as of this encounter: 70.3 kg (155 lb)., PRESENT ON ADMISSION     # Financial/Environmental Concerns:           Social Drivers of Health    Tobacco Use: Medium Risk (2/18/2025)    Patient History     Smoking Tobacco Use: Former     Smokeless Tobacco Use: Never     Passive Exposure: Current          Disposition Plan     Medically Ready for Discharge: Anticipated Tomorrow         Anthony Isaac MD  Hospitalist Service  [facility]  Securely message with [employer] (more info)  Text page via [employer] Paging/Directory   ______________________________________________________________________    Interval History   Remains somewhat hypertensive, intermittently tachypneic, stable bradycardia in the 50s  Was just taken off oxygen this " "morning  Hemoglobin 11.6  Potassium 3.1  \"I'm doing pretty good\"    Physical Exam   Vital Signs: Temp: 97.5  F (36.4  C) Temp src: Oral BP: (!) 152/67 Pulse: 50   Resp: 18 SpO2: 93 % O2 Device: None (Room air)    Weight: 155 lbs 0 oz        Medical Decision Making       51 MINUTES SPENT BY ME on the date of service doing chart review, history, exam, documentation & further activities per the note.      Data   ------------------------- PAST 24 HR DATA REVIEWED -----------------------------------------------    I have personally reviewed the following data over the past 24 hrs:    6.1  \   11.6 (L)   / 200     144 109 (H) 17.0 /  118 (H)   3.1 (L) 23 0.79 \       Imaging results reviewed over the past 24 hrs:   No results found for this or any previous visit (from the past 24 hours).  "

## 2025-03-20 NOTE — CONSULTS
PT recommendations: home   OT: OT not indicated due to per PT     Clinic care coordination referral sent per protocol.        CM consult for Elevated Risk Score

## 2025-03-21 LAB
ANION GAP SERPL CALCULATED.3IONS-SCNC: 15 MMOL/L (ref 7–15)
BUN SERPL-MCNC: 21.5 MG/DL (ref 8–23)
CALCIUM SERPL-MCNC: 9.9 MG/DL (ref 8.8–10.4)
CHLORIDE SERPL-SCNC: 104 MMOL/L (ref 98–107)
CREAT SERPL-MCNC: 0.94 MG/DL (ref 0.67–1.17)
EGFRCR SERPLBLD CKD-EPI 2021: 83 ML/MIN/1.73M2
GLUCOSE SERPL-MCNC: 115 MG/DL (ref 70–99)
HCO3 SERPL-SCNC: 26 MMOL/L (ref 22–29)
HOLD SPECIMEN: NORMAL
MAGNESIUM SERPL-MCNC: 1.7 MG/DL (ref 1.7–2.3)
NT-PROBNP SERPL-MCNC: 1035 PG/ML (ref 0–1800)
PHOSPHATE SERPL-MCNC: 3.1 MG/DL (ref 2.5–4.5)
POTASSIUM SERPL-SCNC: 3.2 MMOL/L (ref 3.4–5.3)
POTASSIUM SERPL-SCNC: 4.3 MMOL/L (ref 3.4–5.3)
SODIUM SERPL-SCNC: 145 MMOL/L (ref 135–145)

## 2025-03-21 PROCEDURE — 250N000011 HC RX IP 250 OP 636: Performed by: HOSPITALIST

## 2025-03-21 PROCEDURE — 36415 COLL VENOUS BLD VENIPUNCTURE: CPT | Performed by: HOSPITALIST

## 2025-03-21 PROCEDURE — 36415 COLL VENOUS BLD VENIPUNCTURE: CPT | Performed by: STUDENT IN AN ORGANIZED HEALTH CARE EDUCATION/TRAINING PROGRAM

## 2025-03-21 PROCEDURE — 120N000001 HC R&B MED SURG/OB

## 2025-03-21 PROCEDURE — 84100 ASSAY OF PHOSPHORUS: CPT | Performed by: HOSPITALIST

## 2025-03-21 PROCEDURE — 250N000013 HC RX MED GY IP 250 OP 250 PS 637: Performed by: STUDENT IN AN ORGANIZED HEALTH CARE EDUCATION/TRAINING PROGRAM

## 2025-03-21 PROCEDURE — 80048 BASIC METABOLIC PNL TOTAL CA: CPT | Performed by: HOSPITALIST

## 2025-03-21 PROCEDURE — 83735 ASSAY OF MAGNESIUM: CPT | Performed by: HOSPITALIST

## 2025-03-21 PROCEDURE — 83880 ASSAY OF NATRIURETIC PEPTIDE: CPT | Performed by: STUDENT IN AN ORGANIZED HEALTH CARE EDUCATION/TRAINING PROGRAM

## 2025-03-21 PROCEDURE — 99233 SBSQ HOSP IP/OBS HIGH 50: CPT | Performed by: STUDENT IN AN ORGANIZED HEALTH CARE EDUCATION/TRAINING PROGRAM

## 2025-03-21 PROCEDURE — 250N000013 HC RX MED GY IP 250 OP 250 PS 637

## 2025-03-21 PROCEDURE — 250N000013 HC RX MED GY IP 250 OP 250 PS 637: Performed by: HOSPITALIST

## 2025-03-21 RX ORDER — POTASSIUM CHLORIDE 1500 MG/1
40 TABLET, EXTENDED RELEASE ORAL ONCE
Status: COMPLETED | OUTPATIENT
Start: 2025-03-21 | End: 2025-03-21

## 2025-03-21 RX ORDER — MAGNESIUM OXIDE 400 MG/1
400 TABLET ORAL EVERY 4 HOURS
Status: COMPLETED | OUTPATIENT
Start: 2025-03-21 | End: 2025-03-21

## 2025-03-21 RX ADMIN — PANTOPRAZOLE SODIUM 40 MG: 40 TABLET, DELAYED RELEASE ORAL at 08:36

## 2025-03-21 RX ADMIN — LOSARTAN POTASSIUM 25 MG: 25 TABLET, FILM COATED ORAL at 08:37

## 2025-03-21 RX ADMIN — Medication 400 MG: at 14:32

## 2025-03-21 RX ADMIN — Medication 400 MG: at 08:37

## 2025-03-21 RX ADMIN — POTASSIUM CHLORIDE 40 MEQ: 1500 TABLET, EXTENDED RELEASE ORAL at 08:36

## 2025-03-21 RX ADMIN — ASPIRIN 81 MG: 81 TABLET, COATED ORAL at 08:37

## 2025-03-21 RX ADMIN — FLUTICASONE FUROATE AND VILANTEROL TRIFENATATE 1 PUFF: 200; 25 POWDER RESPIRATORY (INHALATION) at 10:20

## 2025-03-21 RX ADMIN — EMPAGLIFLOZIN 10 MG: 10 TABLET, FILM COATED ORAL at 08:37

## 2025-03-21 RX ADMIN — LOSARTAN POTASSIUM 25 MG: 25 TABLET, FILM COATED ORAL at 21:18

## 2025-03-21 RX ADMIN — ATORVASTATIN CALCIUM 80 MG: 40 TABLET, FILM COATED ORAL at 21:18

## 2025-03-21 RX ADMIN — CETIRIZINE HYDROCHLORIDE 10 MG: 10 TABLET, FILM COATED ORAL at 08:36

## 2025-03-21 RX ADMIN — FUROSEMIDE 40 MG: 10 INJECTION, SOLUTION INTRAMUSCULAR; INTRAVENOUS at 15:38

## 2025-03-21 RX ADMIN — CLOPIDOGREL BISULFATE 75 MG: 75 TABLET ORAL at 08:37

## 2025-03-21 RX ADMIN — SPIRONOLACTONE 12.5 MG: 25 TABLET, FILM COATED ORAL at 21:18

## 2025-03-21 RX ADMIN — OXYBUTYNIN CHLORIDE 10 MG: 5 TABLET, EXTENDED RELEASE ORAL at 08:37

## 2025-03-21 RX ADMIN — FUROSEMIDE 40 MG: 10 INJECTION, SOLUTION INTRAMUSCULAR; INTRAVENOUS at 02:24

## 2025-03-21 RX ADMIN — SERTRALINE HYDROCHLORIDE 25 MG: 25 TABLET ORAL at 08:36

## 2025-03-21 ASSESSMENT — ACTIVITIES OF DAILY LIVING (ADL)
ADLS_ACUITY_SCORE: 70
ADLS_ACUITY_SCORE: 72
ADLS_ACUITY_SCORE: 72
ADLS_ACUITY_SCORE: 46
ADLS_ACUITY_SCORE: 72
ADLS_ACUITY_SCORE: 72
ADLS_ACUITY_SCORE: 46
ADLS_ACUITY_SCORE: 42
ADLS_ACUITY_SCORE: 65
ADLS_ACUITY_SCORE: 72
ADLS_ACUITY_SCORE: 72
ADLS_ACUITY_SCORE: 46
ADLS_ACUITY_SCORE: 65
ADLS_ACUITY_SCORE: 42
ADLS_ACUITY_SCORE: 70
ADLS_ACUITY_SCORE: 72
ADLS_ACUITY_SCORE: 72
ADLS_ACUITY_SCORE: 46
ADLS_ACUITY_SCORE: 46
ADLS_ACUITY_SCORE: 42
ADLS_ACUITY_SCORE: 72
ADLS_ACUITY_SCORE: 46
ADLS_ACUITY_SCORE: 70

## 2025-03-21 NOTE — PLAN OF CARE
Pt A&) x4, calls appropriately and can make needs known. Call light within reach. Sinus sonia. K, Mg, Phos protocols. 2000ml fluid restriction, strict I&O's, cardiac diet. Standby assist. Pending discharge when medically ready.  Problem: Adult Inpatient Plan of Care  Goal: Plan of Care Review  Description: The Plan of Care Review/Shift note should be completed every shift.  The Outcome Evaluation is a brief statement about your assessment that the patient is improving, declining, or no change.  This information will be displayed automatically on your shift  note.  Outcome: Progressing     Problem: Gas Exchange Impaired  Goal: Optimal Gas Exchange  Outcome: Progressing     Problem: Adult Inpatient Plan of Care  Goal: Absence of Hospital-Acquired Illness or Injury  Intervention: Identify and Manage Fall Risk  Recent Flowsheet Documentation  Taken 3/21/2025 0830 by Geovanna Kaufman RN  Safety Promotion/Fall Prevention: safety round/check completed  Intervention: Prevent Infection  Recent Flowsheet Documentation  Taken 3/21/2025 0830 by Geovanna Kaufman RN  Infection Prevention: hand hygiene promoted  Goal: Optimal Comfort and Wellbeing  Intervention: Provide Person-Centered Care  Recent Flowsheet Documentation  Taken 3/21/2025 0830 by Geovanna Kaufman RN  Trust Relationship/Rapport:   care explained   questions encouraged   thoughts/feelings acknowledged     Problem: Delirium  Goal: Optimal Coping  Intervention: Optimize Psychosocial Adjustment to Delirium  Recent Flowsheet Documentation  Taken 3/21/2025 0830 by Geovanna Kaufman RN  Family/Support System Care: support provided  Goal: Improved Behavioral Control  Intervention: Minimize Safety Risk  Recent Flowsheet Documentation  Taken 3/21/2025 0830 by Geovanna Kaufman RN  Trust Relationship/Rapport:   care explained   questions encouraged   thoughts/feelings acknowledged  Goal: Improved Attention and Thought Clarity  Intervention: Maximize Cognitive Function  Recent Flowsheet  Documentation  Taken 3/21/2025 4930 by Geovanna Kaufman, RN  Sensory Stimulation Regulation: television on  Reorientation Measures: clock in view   Goal Outcome Evaluation:

## 2025-03-21 NOTE — PLAN OF CARE
Orientation: A&O  Mobility: SBA  VS: . VSS  Pain:  Denies pain  GI:  BM tonight 3/21  Urinary:  incontinence at times  Drain/lines:  PIV - SL  Tx:  Lasix IV  Consults:  BFM, urology, PT, care management  Disposition:  Home to [facility] when appropriate for discharge.

## 2025-03-21 NOTE — PROGRESS NOTES
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  Management assisted by pulmonology and cardiology.  February 15: Discharged from [facility] with 2 L O2.  He used the oxygen occasionally  March 18: Presented to [facility] ED with 3 days of dyspnea which she thought spontaneously improved at night.  Initially in respiratory distress placed on BiPAP.  Workup showed hypomagnesemia, hypophosphatemia, metabolic acidosis, normocytic anemia, elevated D-dimer, and elevated troponin (23).  EKG showed right bundle branch block.  CT PE study was negative but did show right upper lobe findings concerning for adenocarcinoma and lymph node enlargement.  Urinalysis had gross hematuria.  He diagnosed with acute respiratory failure with hypoxia.  He was empirically treated for COPD exacerbation.  Ceftriaxone and azithromycin were started.  He received IV methylprednisolone followed by p.o. prednisone.  Clinical exam was more suggestive of acute on chronic heart failure and diuretics were increased.  *Urology follow-up 1 to 2 weeks for cystoscopy    Changes Today:  -BNP slightly improved from 3 days ago, creatinine remains within normal limits will continue to diuresis though bump in creatinine  -Hypokalemia, replacement per potassium protocol      Acute on chronic congestive heart failure with intermediate ejection fraction  (40-50%)  Echo February 20 2540-50% EF, mild LVH, mild anteroseptal hypokinesis, normal RV size/function  More recent stress test showed recovered EF  -Strict I's and O's, free fluid restriction and daily weights  -PTA losartan and Aldactone resumed with hold parameters  -2 L fluid restriction    Severe COPD exacerbation  PFTs October 2021: FEV1 45%, DLCO 52%  -Breo  -As needed nebs    Coronary artery disease status post JIA  Outpatient plan for DAPT through January 2026  Stress test earlier this month showed 64% EF and was negative    Bradycardia  Bisoprolol dose reduced    Hematuria  History of prostate cancer  PVR scan  CT urogram  -PTA oxybutynin    Essential hypertension  Lasix as above  -PTA losartan 25 mg twice daily  -Spironolactone 12.5 mg daily    Non-small cell lung cancer  Left upper lobe ectomy 2017  Recurrence 2018 status postchemotherapy and radiation  Completed radiation last week in December 2024 for right lobe adenocarcinoma, follows with oncologist Dr. Evans through [facility].    Peripheral artery disease status post left femoral endarterectomy and bilateral iliac stents  CT angiography of the lower extremities 12/13/2023 showed patent bilateral common and external iliac artery stents but occlusion of the femoral bypass graft   -PTA aspirin 81 mg daily  -PTA clopidogrel 75 mg daily    Hypophosphatemia  Hypomagnesemia  -Replacement per interim protocols    Nicotine dependence  -Admits to smoking half a pack of cigarettes per day, notes he has had extreme difficulty smoking cessation  -Nicotine replacement declined    GERD  -PTA Protonix resumed    Anxiety  Depression  -PTA Zoloft resumed, also takes ativan prn for anxiety    Asymptomatic right internal carotid artery stenosis  last assessed on carotid ultrasound 7/16/2024     Insomnia   PTA trazodone          Diet: Combination Diet 2 gm NA Diet; Low Saturated Fat Na <2400mg Diet  Fluid restriction 2000 ML FLUID    DVT Prophylaxis: on DAPT,  "SCDs  Flores Catheter: Not present  Lines: None     Cardiac Monitoring: None  Code Status: Full Code      Clinically Significant Risk Factors        # Hypokalemia: Lowest K = 3.1 mmol/L in last 2 days, will replace as needed   # Hyperchloremia: Highest Cl = 109 mmol/L in last 2 days, will monitor as appropriate              # Hypertension: Noted on problem list    # Heart failure, NOS: heart failure noted on the problem list and last echo with EF 40-50%    # Acute Hypoxic Respiratory Failure: Documented O2 saturation < 90%. Continue supplemental oxygen as needed         # Overweight: Estimated body mass index is 25.01 kg/m  as calculated from the following:    Height as of this encounter: 1.651 m (5' 5\").    Weight as of this encounter: 68.2 kg (150 lb 4.8 oz)., PRESENT ON ADMISSION       # Financial/Environmental Concerns:           Social Drivers of Health    Tobacco Use: Medium Risk (2/18/2025)    Patient History     Smoking Tobacco Use: Former     Smokeless Tobacco Use: Never     Passive Exposure: Current          Disposition Plan     Medically Ready for Discharge: Anticipated Tomorrow         Home Lyons MD  Hospitalist Service  [facility]  Securely message with [employer] (more info)  Text page via [employer] Paging/Directory   ______________________________________________________________________    Interval History   No acute events overnight, discussed care plan discharging today with close follow-up versus challenging patient's dry weight and diuresis until creatinine bump, after discussion- will stay tonight hopefully discharge tomorrow on PO diuretics.    Physical Exam   Vital Signs: Temp: 98  F (36.7  C) Temp src: Oral BP: 120/70 Pulse: 50   Resp: 16 SpO2: 94 % O2 Device: None (Room air)    Weight: 150 lbs 4.8 oz    Gen: Appears well, NAD, on RA  Card:Warm well perfused, pulses assumed patient talking, no peripheral edema  Pulm: Normal I/E effort on RA  Abd:Non distended  Skin:No " obvious rashes or lesions on exposed areas of skin  Neuro AxOx4, S/S grossly intact, no obvious FND,   Psych:Pleasant, answering questions appropriately, insight good, judgement good, does not appear to be responding to I/E stimuli     Medical Decision Making       60 MINUTES SPENT BY ME on the date of service doing chart review, history, exam, documentation & further activities per the note.      Data   ------------------------- PAST 24 HR DATA REVIEWED -----------------------------------------------    I have personally reviewed the following data over the past 24 hrs:    N/A  \   N/A   / N/A     145 104 21.5 /  115 (H)   3.2 (L) 26 0.94 \     Trop: N/A BNP: 1,035       Imaging results reviewed over the past 24 hrs:   No results found for this or any previous visit (from the past 24 hours).

## 2025-03-21 NOTE — PLAN OF CARE
Goal Outcome Evaluation:      Plan of Care Reviewed With: patient    Overall Patient Progress: improvingOverall Patient Progress: improving    Patient is alert and oriented. VSS on RA. Denies SOB. Denies pain. Tolerating diet. Ambulated in hallway/bathroom with SBA. Provided with incontinent cares. Bed alarm on. Calls appropriately. Plan of care ongoing.      Problem: Delirium  Goal: Optimal Coping  Outcome: Progressing  Goal: Improved Behavioral Control  Outcome: Progressing  Intervention: Minimize Safety Risk  Recent Flowsheet Documentation  Taken 3/20/2025 1600 by Yissel Schultz RN  Enhanced Safety Measures: room near unit station  Goal: Improved Attention and Thought Clarity  Outcome: Progressing  Intervention: Maximize Cognitive Function  Recent Flowsheet Documentation  Taken 3/20/2025 1600 by Yissel Schultz RN  Sensory Stimulation Regulation: television on  Reorientation Measures: clock in view  Goal: Improved Sleep  Outcome: Progressing  Intervention: Promote Sleep  Flowsheets (Taken 3/20/2025 2027)  Sleep/Rest Enhancement:   natural light exposure provided   visualization   regular sleep/rest pattern promoted     Problem: Gas Exchange Impaired  Goal: Optimal Gas Exchange  Outcome: Progressing     Problem: Comorbidity Management  Goal: Maintenance of COPD Symptom Control  Outcome: Progressing  Intervention: Maintain COPD Symptom Control  Flowsheets  Taken 3/20/2025 2027  Breathing Techniques/Airway Clearance: pursed-lip breathing encouraged  Medication Review/Management: medications reviewed  Taken 3/20/2025 1600  Medication Review/Management: medications reviewed

## 2025-03-22 VITALS
HEIGHT: 65 IN | HEART RATE: 59 BPM | OXYGEN SATURATION: 97 % | SYSTOLIC BLOOD PRESSURE: 132 MMHG | RESPIRATION RATE: 16 BRPM | WEIGHT: 144.5 LBS | BODY MASS INDEX: 24.07 KG/M2 | DIASTOLIC BLOOD PRESSURE: 60 MMHG | TEMPERATURE: 98 F

## 2025-03-22 LAB
ANION GAP SERPL CALCULATED.3IONS-SCNC: 12 MMOL/L (ref 7–15)
BUN SERPL-MCNC: 26.7 MG/DL (ref 8–23)
CALCIUM SERPL-MCNC: 10 MG/DL (ref 8.8–10.4)
CHLORIDE SERPL-SCNC: 103 MMOL/L (ref 98–107)
CREAT SERPL-MCNC: 1.09 MG/DL (ref 0.67–1.17)
EGFRCR SERPLBLD CKD-EPI 2021: 69 ML/MIN/1.73M2
GLUCOSE SERPL-MCNC: 122 MG/DL (ref 70–99)
HCO3 SERPL-SCNC: 28 MMOL/L (ref 22–29)
HOLD SPECIMEN: NORMAL
MAGNESIUM SERPL-MCNC: 1.7 MG/DL (ref 1.7–2.3)
PHOSPHATE SERPL-MCNC: 4.2 MG/DL (ref 2.5–4.5)
POTASSIUM SERPL-SCNC: 4 MMOL/L (ref 3.4–5.3)
SODIUM SERPL-SCNC: 143 MMOL/L (ref 135–145)

## 2025-03-22 PROCEDURE — 99239 HOSP IP/OBS DSCHRG MGMT >30: CPT | Performed by: STUDENT IN AN ORGANIZED HEALTH CARE EDUCATION/TRAINING PROGRAM

## 2025-03-22 PROCEDURE — 36415 COLL VENOUS BLD VENIPUNCTURE: CPT | Performed by: STUDENT IN AN ORGANIZED HEALTH CARE EDUCATION/TRAINING PROGRAM

## 2025-03-22 PROCEDURE — 80048 BASIC METABOLIC PNL TOTAL CA: CPT | Performed by: STUDENT IN AN ORGANIZED HEALTH CARE EDUCATION/TRAINING PROGRAM

## 2025-03-22 PROCEDURE — 250N000013 HC RX MED GY IP 250 OP 250 PS 637: Performed by: STUDENT IN AN ORGANIZED HEALTH CARE EDUCATION/TRAINING PROGRAM

## 2025-03-22 PROCEDURE — 250N000011 HC RX IP 250 OP 636: Performed by: HOSPITALIST

## 2025-03-22 PROCEDURE — 83735 ASSAY OF MAGNESIUM: CPT | Performed by: STUDENT IN AN ORGANIZED HEALTH CARE EDUCATION/TRAINING PROGRAM

## 2025-03-22 PROCEDURE — 250N000013 HC RX MED GY IP 250 OP 250 PS 637: Performed by: HOSPITALIST

## 2025-03-22 PROCEDURE — 84100 ASSAY OF PHOSPHORUS: CPT | Performed by: STUDENT IN AN ORGANIZED HEALTH CARE EDUCATION/TRAINING PROGRAM

## 2025-03-22 PROCEDURE — 250N000013 HC RX MED GY IP 250 OP 250 PS 637

## 2025-03-22 RX ORDER — BISOPROLOL FUMARATE 5 MG/1
2.5 TABLET, FILM COATED ORAL DAILY
Qty: 15 TABLET | Refills: 0 | Status: SHIPPED | OUTPATIENT
Start: 2025-03-23 | End: 2025-04-22

## 2025-03-22 RX ORDER — MAGNESIUM OXIDE 400 MG/1
400 TABLET ORAL EVERY 4 HOURS
Status: COMPLETED | OUTPATIENT
Start: 2025-03-22 | End: 2025-03-22

## 2025-03-22 RX ADMIN — ASPIRIN 81 MG: 81 TABLET, COATED ORAL at 09:42

## 2025-03-22 RX ADMIN — SERTRALINE HYDROCHLORIDE 25 MG: 25 TABLET ORAL at 09:43

## 2025-03-22 RX ADMIN — Medication 400 MG: at 09:42

## 2025-03-22 RX ADMIN — LOSARTAN POTASSIUM 25 MG: 25 TABLET, FILM COATED ORAL at 09:42

## 2025-03-22 RX ADMIN — BISOPROLOL FUMARATE 2.5 MG: 5 TABLET ORAL at 09:43

## 2025-03-22 RX ADMIN — EMPAGLIFLOZIN 10 MG: 10 TABLET, FILM COATED ORAL at 09:43

## 2025-03-22 RX ADMIN — FLUTICASONE FUROATE AND VILANTEROL TRIFENATATE 1 PUFF: 200; 25 POWDER RESPIRATORY (INHALATION) at 09:44

## 2025-03-22 RX ADMIN — FUROSEMIDE 40 MG: 10 INJECTION, SOLUTION INTRAMUSCULAR; INTRAVENOUS at 03:00

## 2025-03-22 RX ADMIN — PANTOPRAZOLE SODIUM 40 MG: 40 TABLET, DELAYED RELEASE ORAL at 09:42

## 2025-03-22 RX ADMIN — CETIRIZINE HYDROCHLORIDE 10 MG: 10 TABLET, FILM COATED ORAL at 09:43

## 2025-03-22 RX ADMIN — Medication 400 MG: at 11:58

## 2025-03-22 RX ADMIN — CLOPIDOGREL BISULFATE 75 MG: 75 TABLET ORAL at 09:43

## 2025-03-22 ASSESSMENT — ACTIVITIES OF DAILY LIVING (ADL)
ADLS_ACUITY_SCORE: 49
DEPENDENT_IADLS:: INDEPENDENT
ADLS_ACUITY_SCORE: 49

## 2025-03-22 NOTE — PROGRESS NOTES
Care Management Initial Consult    General Information  Assessment completed with: Patient, Spouse or significant other, Maribel  Type of CM/SW Visit: Initial Assessment    Primary Care Provider verified and updated as needed: Yes   Readmission within the last 30 days:        Reason for Consult: other (see comments) (ERS)  Advance Care Planning: Advance Care Planning Reviewed: verified with patient          Communication Assessment  Patient's communication style: spoken language (English or Bilingual)    Hearing Difficulty or Deaf: no   Wear Glasses or Blind: yes    Cognitive  Cognitive/Neuro/Behavioral: WDL                      Living Environment:   People in home: spouse  Maribel  Current living Arrangements: apartment      Able to return to prior arrangements: yes       Family/Social Support:  Care provided by: self  Provides care for: no one  Marital Status:   Support system: Wife, Children  Maribel       Description of Support System: Supportive, Involved         Current Resources:   Patient receiving home care services: Yes  Skilled Home Care Services: Skilled Nursing, Physical Therapy     Community Resources: Home Care  Equipment currently used at home: cane, straight, walker, rolling, other (see comments), grab bar, toilet, grab bar, tub/shower (scooter)  Supplies currently used at home: Oxygen Tubing/Supplies (O2 Concentrator)    Employment/Financial:  Employment Status: retired        Financial Concerns: none   Referral to Financial Worker: No       Does the patient's insurance plan have a 3 day qualifying hospital stay waiver?  Yes     Which insurance plan 3 day waiver is available? [employer] RE[employer]    Will the waiver be used for post-acute placement? No    Lifestyle & Psychosocial Needs:  Social Drivers of Health     Food Insecurity: Low Risk  (3/21/2025)    Food Insecurity     Within the past 12 months, did you worry that your food would run out before you got money to buy more?: No     Within the past  12 months, did the food you bought just not last and you didn t have money to get more?: No   Depression: Not at risk (1/31/2025)    PHQ-2     PHQ-2 Score: 0   Housing Stability: High Risk (3/21/2025)    Housing Stability     Do you have housing? : No     Are you worried about losing your housing?: No   Tobacco Use: Medium Risk (2/18/2025)    Patient History     Smoking Tobacco Use: Former     Smokeless Tobacco Use: Never     Passive Exposure: Current   Financial Resource Strain: Low Risk  (3/21/2025)    Financial Resource Strain     Within the past 12 months, have you or your family members you live with been unable to get utilities (heat, electricity) when it was really needed?: No   Alcohol Use: Not on file   Transportation Needs: Low Risk  (3/21/2025)    Transportation Needs     Within the past 12 months, has lack of transportation kept you from medical appointments, getting your medicines, non-medical meetings or appointments, work, or from getting things that you need?: No   Physical Activity: Not on file   Interpersonal Safety: High Risk (3/21/2025)    Interpersonal Safety     Do you feel physically and emotionally safe where you currently live?: No     Within the past 12 months, have you been hit, slapped, kicked or otherwise physically hurt by someone?: No     Within the past 12 months, have you been humiliated or emotionally abused in other ways by your partner or ex-partner?: No   Stress: Not on file   Social Connections: Not on file   Health Literacy: Not on file       Functional Status:  Prior to admission patient needed assistance:   Dependent ADLs:: Independent  Dependent IADLs:: Independent       Mental Health Status:  Mental Health Status: No Current Concerns       Chemical Dependency Status:  Chemical Dependency Status: No Current Concerns             Values/Beliefs:  Spiritual, Cultural Beliefs, Druze Practices, Values that affect care: no               Discussed  Partnership in Safe Discharge  Planning  document with patient/family: Yes:     Additional Information:  Ubaldo Ramos is a(n) 79 year old year old male who presented with COPD exacerbation (H) [J44.1]  Acute hypoxemic respiratory failure (H) [J96.01].     CM spoke with pt at bedside. Introduced self and role. Spouse/Partner, Maribel, assisted with completing assessment. Patient lives in a(n) apartment with his wife . Patient is independent with IADL/ADLs. Anticipated that patient will discharge to home with family.    Pt denies any further needs at this time.    Contacts:  Extended Emergency Contact Information  Primary Emergency Contact: Maribel Ramos  Mobile Phone: [phone]  Relation: Spouse      Care Management Discharge Note    Discharge Date: 03/22/2025       Discharge Disposition: Home, Home Care    Discharge Services: Home Care    Discharge DME: Oxygen    Discharge Transportation: family or friend will provide    Private pay costs discussed: Not applicable    Does the patient's insurance plan have a 3 day qualifying hospital stay waiver?  Yes     Which insurance plan 3 day waiver is available? [employer] REACH    Will the waiver be used for post-acute placement? No    PAS Confirmation Code: N/A  Patient/family educated on Medicare website which has current facility and service quality ratings: yes    Education Provided on the Discharge Plan:    Persons Notified of Discharge Plans: Pt and wife  Patient/Family in Agreement with the Plan: yes    Handoff Referral Completed: No, handoff not indicated or clinically appropriate    Additional Information:  Pt to discharge back to home. PT is not recommending home care services. Wife to transport.     Kenny Hernandez RN

## 2025-03-22 NOTE — PLAN OF CARE
Problem: Adult Inpatient Plan of Care  Goal: Absence of Hospital-Acquired Illness or Injury  Intervention: Identify and Manage Fall Risk  Recent Flowsheet Documentation  Taken 3/21/2025 2118 by Jannette Alexander RN  Safety Promotion/Fall Prevention: safety round/check completed     Problem: Adult Inpatient Plan of Care  Goal: Optimal Comfort and Wellbeing  Outcome: Progressing  Intervention: Provide Person-Centered Care  Recent Flowsheet Documentation  Taken 3/21/2025 2118 by Jannette Alexander RN  Trust Relationship/Rapport:   care explained   questions encouraged   thoughts/feelings acknowledged   Goal Outcome Evaluation:       Pt is alert and oriented x4. Denies pain. Can be incontinent of urine. Ambulates to the bathroom on SBA. Had a bowel movement this shift. VSS on RA. Pending discharge.

## 2025-03-22 NOTE — DISCHARGE SUMMARY
[facility]  Hospitalist Discharge Summary      Date of Admission:  3/18/2025  Date of Discharge:  3/22/2025  2:05 PM  Discharging Provider: Home Lyons MD  Discharge Service: Hospitalist Service    Discharge Diagnoses   COPD exacerbation  Acute on chronic congestive heart failure  Hematuria  Bradycardia    Clinically Significant Risk Factors          Follow-ups Needed After Discharge   Follow-up Appointments       Follow Up      [facility] will call you to arrange outpatient follow up in 1-2 weeks with Dr. Gibson. Anticipate this appointment will include cystoscopy. You may call to confirm appointment(s) as needed: [phone]        Follow Up      Follow up with Urology, within 1-2 weeks. for hospital follow- up. No follow up labs or test are needed.        Hospital Follow-up with Existing Primary Care Provider (PCP)      Please see details below         Schedule Primary Care visit within: 7 Days           {Additional follow-up instructions/to-do's for PCP       Unresulted Labs Ordered in the Past 30 Days of this Admission       No orders found from 2/16/2025 to 3/19/2025.        These results will be followed up by     Discharge Disposition   Discharged to home  Condition at discharge: Stable    Hospital Course   79-year-old male presented [facility] 3 days of dyspnea admitted initially thought to be COPD exacerbation was empirically treated ceftriaxone and azithromycin however later during his hospital course was determined that his clinical exam and features are more suggestive of acute on chronic heart failure diuretics were started aggressively diuresed while he was here hospital stay was complicated by hematuria urology was consulted plan for follow-up in the outpatient setting 1 to 2 weeks for cystoscopy.  He was diuresed until he had a slight creatinine bump still within normal limits electrolytes were stable on day of discharge he was put back on his PTA diuretic dosing.  His  hospital stay was also complicated by asymptomatic bradycardia his bisoprolol dose was reduced, he did have some hypokalemia while being aggressively IV diuresed however on day of discharge his potassium was normal he was switched back to his p.o. meds can follow-up with PCP recheck BMP within 7 days, he was started on SGLT2 inhibitor Jardiance for HFpEF, potentially some financial concerns with SGLT2 inhibitors and patient is insurance he will follow-up with his PCP for alternative options if unable to pay for empagliflozin.    Consultations This Hospital Stay   RESPIRATORY CARE IP CONSULT  PHYSICAL THERAPY ADULT IP CONSULT  OCCUPATIONAL THERAPY ADULT IP CONSULT  CARE MANAGEMENT / SOCIAL WORK IP CONSULT  UROLOGY IP CONSULT    Code Status   Full Code    Time Spent on this Encounter   I, Home Lyons MD, personally saw the patient today and spent greater than 30 minutes discharging this patient.       Home Lyons MD  [address] [address]  Phone: [phone]  Fax: [phone]  ______________________________________________________________________    Physical Exam   Vital Signs: Temp: 98.1  F (36.7  C) Temp src: Oral BP: 133/60 Pulse: 61   Resp: 16 SpO2: 93 % O2 Device: None (Room air)    Weight: 144 lbs 8 oz  Gen: Appears well, NAD, on RA  Card:Warm well perfused, pulses assumed patient talking  Pulm: Normal I/E effort on RA  Abd:Non distended  Skin:No obvious rashes or lesions on exposed areas of skin  Neuro AxOx4, S/S grossly intact, no obvious FND,   Psych:Pleasant, answering questions appropriately, insight good, judgement good, does not appear to be responding to I/E stimuli        Primary Care Physician   John Oconnor    Discharge Orders      Primary Care - Care Coordination Referral      Med Therapy Management Referral      Follow Up    [facility] will call you to arrange outpatient follow up in 1-2 weeks with Dr. Gibson.  Anticipate this appointment will include cystoscopy. You may call to confirm appointment(s) as needed: [phone]     Reason for your hospital stay    Acute on chronic respiratory failure COPD exacerbation and CHF exacerbation     Activity    Your activity upon discharge: activity as tolerated     Follow Up    Follow up with Urology, within 1-2 weeks. for hospital follow- up. No follow up labs or test are needed.     Diet    Follow this diet upon discharge: Current Diet:Orders Placed This Encounter      Fluid restriction 2000 ML FLUID      Combination Diet 2 gm NA Diet; Low Saturated Fat Na <2400mg Diet     Hospital Follow-up with Existing Primary Care Provider (PCP)    Please see details below            Significant Results and Procedures   Results for orders placed or performed during the hospital encounter of 03/18/25   CT Chest Pulmonary Embolism w Contrast    Narrative    EXAM: CT CHEST PULMONARY EMBOLISM W CONTRAST  LOCATION: [facility]  DATE: 3/18/2025    INDICATION: Elevated d-dimer and shortness of breath.  COMPARISON: 2/12/2025.  TECHNIQUE: CT chest pulmonary angiogram during arterial phase injection of IV contrast. Multiplanar reformats and MIP reconstructions were performed. Dose reduction techniques were used.   CONTRAST: 75 mL Isovue-370.    FINDINGS:    ANGIOGRAM CHEST: No acute pulmonary embolism.    Thoracic aorta is normal in course and caliber. Moderate atheromatous disease.    LUNGS AND PLEURA: Left upper lobectomy. Mild diffuse bronchiectatic change. Stable dependent scarring within the left lower lobe. No acute airspace consolidation.     Slight increased conspicuity of grouped nodular opacities within the periphery of the right upper lobe, series 7 image 116, measuring 15 x 18 mm, unchanged from prior. 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      Tiny right upper lobe calcified granuloma.    No pneumothorax.     No acute pleural effusion.  Unchanged chronic left pleural thickening.     MEDIASTINUM/AXILLAE: A mildly enlarged right hilar lymph node measures 14 mm in short axis, series 6 image 136, unchanged from prior.      Thoracic esophagus is unremarkable.      No axillary lymphadenopathy.    Minimal bilateral gynecomastia.    Mild cardiomegaly. Calcifications of the aortic valve. No pericardial effusion.    CORONARY ARTERY CALCIFICATION: Severe.    UPPER ABDOMEN: No suspicious abnormality.    MUSCULOSKELETAL: Multiple healing subacute rib fractures. Pectus carinatum.    OTHER: No additionally suspicious abnormality.      Impression    IMPRESSION:  1.  No acute pulmonary embolism.  2.  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 Close clinical and imaging follow-up is recommended.  3.  Unchanged mildly enlarged right hilar lymph node. Attention on follow-up.  4.  Left upper lobectomy.   CT Urogram wo & w Contrast    Narrative    EXAM: CT UROGRAM WO and W CONTRAST  LOCATION: [facility]  DATE: 3/19/2025    INDICATION: Gross hematuria  COMPARISON: CT chest 3/18/2025, PET/CT 10/31/2024 and CT abdomen and pelvis 4/27/2024  TECHNIQUE: CT scan of the abdomen and pelvis using urogram technique with pre contrast, post contrast, and delayed images. Multiplanar reformats were obtained. Dose reduction techniques were used.   CONTRAST: isovue 370 100 mls    FINDINGS:   LOWER CHEST: Trace bilateral pleural effusions with adjacent atelectasis and scarring. Mild cardiomegaly.    HEPATOBILIARY: Cholecystectomy with stable biliary ectasia likely reflecting reservoir effect.. Scattered subcentimeter hypodense hepatic lesions, likely cysts. Recommend attention on follow-up.    PANCREAS: Normal.    SPLEEN: Normal.    ADRENAL GLANDS: Normal.    RIGHT KIDNEY/URETER: No definite urinary tract stone. Limited evaluation for  calculi due to excreted contrast from recent prior chest CT exam. Stable likely chronic perinephric stranding. Small renal cortical cysts. No follow-up is indicated. No   suspicious solid lesion. Delayed postcontrast imaging shows no suspicious filling defects in the renal collecting system or ureter.    LEFT KIDNEY/URETER: Lower pole atrophy.  No definite urinary tract stone. Limited evaluation for calculi due to excreted contrast from recent prior chest CT exam. Stable likely chronic perinephric stranding. Small renal cortical cysts. No follow-up is   indicated. No suspicious solid lesion. Delayed postcontrast imaging shows no suspicious filling defects in the renal collecting system or ureter.    BLADDER: Diffuse bladder wall thickening. There is focal irregular wall thickening at the anterior aspect of the bladder dome for example image 164 series 12 and image 53 series 14. A 10.1 x 0.9 x 0.9 cm irregular nodular filling defect is present within   the dependent portion of the bladder.    BOWEL: No bowel obstruction or inflammatory process. Appendectomy. No free air or free fluid.    LYMPH NODES: Normal.    VASCULATURE: Severe aortoiliac atherosclerosis. Bilateral iliac endovascular stents and patent femoral-femoral bypass graft    PELVIC ORGANS: Prostatectomy. Presacral edema. Trace pelvic free fluid.    MUSCULOSKELETAL: Small bilateral fat-containing inguinal hernias. Spinal and pelvic degenerative changes. Multiple old healed lower right rib fractures.      Impression    IMPRESSION:  1.  Focal irregular bladder wall thickening at the anterior aspect of the bladder dome. This could reflect a bladder tumor. Further evaluation with cystoscopy is recommended.  2.  Small irregular nodular filling defect in the dependent portion of the bladder likely represents a blood clot.  3.  No urinary tract stone is identified and there is no CT evidence for a suspicious renal/upper urinary tract lesion.       Discharge  Medications   Current Discharge Medication List        START taking these medications    Details   empagliflozin (JARDIANCE) 10 MG TABS tablet Take 1 tablet (10 mg) by mouth daily.  Qty: 30 tablet, Refills: 0    Associated Diagnoses: Acute on chronic congestive heart failure, unspecified heart failure type (H)           CONTINUE these medications which have CHANGED    Details   bisoprolol (ZEBETA) 5 MG tablet Take 0.5 tablets (2.5 mg) by mouth daily.  Qty: 15 tablet, Refills: 0    Associated Diagnoses: Acute on chronic congestive heart failure, unspecified heart failure type (H)           CONTINUE these medications which have NOT CHANGED    Details   acetaminophen (TYLENOL) 325 MG tablet Take 2 tablets (650 mg) by mouth every 6 hours as needed for mild pain.  Qty: 90 tablet, Refills: 0    Associated Diagnoses: Closed fracture of one rib of right side, initial encounter      aspirin 81 MG EC tablet Take 1 tablet (81 mg) by mouth daily. Start tomorrow.  Qty: 30 tablet, Refills: 3    Associated Diagnoses: Status post insertion of drug-eluting stent into left anterior descending (LAD) artery      atorvastatin (LIPITOR) 80 MG tablet Take 1 tablet (80 mg) by mouth at bedtime  Qty: 90 tablet, Refills: 3    Associated Diagnoses: Hyperlipidemia LDL goal <100      budesonide-formoterol (SYMBICORT/BREYNA) 160-4.5 MCG/ACT Inhaler Inhale 2 puffs into the lungs 2 times daily.  Qty: 10.2 g, Refills: 3    Comments: Pharmacy may dispense brand covered by insurance (Symbicort, Breyna, or generic budesonide-formoterol inhaler)  Associated Diagnoses: COPD exacerbation (H)      cetirizine (ZYRTEC) 10 MG tablet Take 10 mg by mouth every morning.      cholecalciferol, vitamin D3, (VITAMIN D3) 25 mcg (1,000 unit) capsule [CHOLECALCIFEROL, VITAMIN D3, (VITAMIN D3) 25 MCG (1,000 UNIT) CAPSULE] Take 1,000 Units by mouth daily.      clopidogrel (PLAVIX) 75 MG tablet Take 1 tablet (75 mg) by mouth daily. Dose to start tomorrow.  Qty: 90 tablet,  Refills: 3    Associated Diagnoses: Status post insertion of drug-eluting stent into left anterior descending (LAD) artery      cyanocobalamin 1000 MCG tablet [CYANOCOBALAMIN 1000 MCG TABLET] Take 1,000 mcg by mouth daily.      furosemide (LASIX) 40 MG tablet Take 1 tablet (40 mg) by mouth daily.  Qty: 30 tablet, Refills: 0    Associated Diagnoses: Acute on chronic congestive heart failure, unspecified heart failure type (H)      ipratropium - albuterol 0.5 mg/2.5 mg/3 mL (DUONEB) 0.5-2.5 (3) MG/3ML neb solution Take 1 vial (3 mLs) by nebulization every 4 hours as needed for shortness of breath, wheezing or cough.    Associated Diagnoses: COPD exacerbation (H)      LORazepam (ATIVAN) 0.5 MG tablet Take 1 tablet (0.5 mg) by mouth every 4 hours as needed for anxiety (shortness of breath).  Qty: 10 tablet, Refills: 0    Associated Diagnoses: COPD exacerbation (H)      losartan (COZAAR) 25 MG tablet Take 1 tablet (25 mg) by mouth 2 times daily.  Qty: 60 tablet, Refills: 0    Associated Diagnoses: Acute on chronic congestive heart failure, unspecified heart failure type (H)      oxyBUTYnin ER (DITROPAN XL) 10 MG 24 hr tablet Take 10 mg by mouth every other day.      pantoprazole (PROTONIX) 40 MG EC tablet TAKE 1 TABLET BY MOUTH ONCE DAILY  Qty: 90 tablet, Refills: 2    Associated Diagnoses: Dyspepsia      pregabalin (LYRICA) 150 MG capsule Take 150 mg by mouth 3 times daily as needed.      sertraline (ZOLOFT) 25 MG tablet TAKE 1 TABLET(25 MG) BY MOUTH DAILY  Qty: 90 tablet, Refills: 1    Associated Diagnoses: Moderate episode of recurrent major depressive disorder (H)      spironolactone (ALDACTONE) 25 MG tablet Take 0.5 tablets (12.5 mg) by mouth daily.  Qty: 45 tablet, Refills: 3    Associated Diagnoses: Essential hypertension, benign      traZODone (DESYREL) 50 MG tablet Take 1-2 tablets ( mg) by mouth at bedtime.  Qty: 180 tablet, Refills: 3    Associated Diagnoses: Primary insomnia           Allergies    Allergies   Allergen Reactions    Adhesive Tape Unknown     Adhesive- pulls skin off

## 2025-03-24 ENCOUNTER — PATIENT OUTREACH (OUTPATIENT)
Dept: CARE COORDINATION | Facility: CLINIC | Age: 80
End: 2025-03-24
Payer: MEDICARE

## 2025-03-24 NOTE — PROGRESS NOTES
Clinic Care Coordination Contact  [facility]/Voicemail    Clinical Data: Care Coordinator Outreach    Outreach Documentation Number of Outreach Attempt   3/24/2025  12:16 PM 1       Left message on patient's voicemail with call back information and requested return call.      Plan:  Care Coordinator will try to reach patient again in 1-2 business days.    David Myhre, RN   [facility] RN  [phone]

## 2025-03-25 ENCOUNTER — TELEPHONE (OUTPATIENT)
Dept: FAMILY MEDICINE | Facility: CLINIC | Age: 80
End: 2025-03-25
Payer: MEDICARE

## 2025-03-25 ENCOUNTER — PATIENT OUTREACH (OUTPATIENT)
Dept: CARE COORDINATION | Facility: CLINIC | Age: 80
End: 2025-03-25
Payer: MEDICARE

## 2025-03-25 DIAGNOSIS — E78.5 HYPERLIPIDEMIA LDL GOAL <100: ICD-10-CM

## 2025-03-25 DIAGNOSIS — M51.26 DISPLACEMENT OF LUMBAR INTERVERTEBRAL DISC WITHOUT MYELOPATHY: Primary | ICD-10-CM

## 2025-03-25 RX ORDER — ATORVASTATIN CALCIUM 80 MG/1
80 TABLET, FILM COATED ORAL AT BEDTIME
Qty: 90 TABLET | Refills: 2 | Status: SHIPPED | OUTPATIENT
Start: 2025-03-25

## 2025-03-25 NOTE — TELEPHONE ENCOUNTER
MTM referral from: Transitions of Care (recent hospital discharge, TCU discharge, or ED visit)    MTM referral outreach attempt #2 on March 25, 2025 at 10:36 AM      Outcome: Patient not reachable after several attempts, sent Music Messenger (MM)hart message    Use vbc for the carrier/Plan on the flowsheet      MyChart Message Sent    See Mayito LONG   [phone]

## 2025-03-25 NOTE — TELEPHONE ENCOUNTER
Home Care is calling regarding an established patient with [facility].  Requesting orders from: John Oconnor  RN APPROVED: RN able to provide verbal orders.  Gracy unable to take verbal orders so transferred to Kenny, therapy supervisor and left voicemail relaying OK for requested verbal orders. Home Care will send orders for signature.  RN will close encounter.  Is this a request for a temporary pause in the home care episode?  No    Orders Requested    Skilled Nursing  Request for initial evaluation and treatment (one time)   RN gave verbal order: Yes    Physical Therapy  Request for initial evaluation and treatment (one time)   RN gave verbal order: Yes     Contacts       Contact Date/Time Type Contact Phone/Fax    03/25/2025 03:15 PM CDT Phone (Incoming) Gracy  (Home Care) [phone]     [facility]. - OK to leave detailed message.            Parvin Byrd RN

## 2025-03-25 NOTE — PROGRESS NOTES
Clinic Care Coordination Contact  Transitions of Care Outreach  No chief complaint on file.      Most Recent Admission Date: 3/18/2025   Most Recent Admission Diagnosis: COPD exacerbation (H) - J44.1  Acute hypoxemic respiratory failure (H) - J96.01     Most Recent Discharge Date: 3/22/2025   Most Recent Discharge Diagnosis: COPD exacerbation (H) - J44.1  Acute hypoxemic respiratory failure (H) - J96.01  Acute on chronic congestive heart failure, unspecified heart failure type (H) - I50.9     Transitions of Care Assessment    Discharge Assessment  How are you doing now that you are home?: I am feeling better  How are your symptoms? (Red Flag symptoms escalate to triage hotline per guidelines): Improved  Do you know how to contact your clinic care team if you have future questions or changes to your health status? : Yes  Does the patient have their discharge instructions? : Yes  Does the patient have questions regarding their discharge instructions? : No  Were you started on any new medications or were there changes to any of your previous medications? : Yes  Does the patient have all of their medications?: No (see comment) (Needs a new order for Lyrica)  Do you have questions regarding any of your medications? : No  Do you have all of your needed medical supplies or equipment (DME)?  (i.e. oxygen tank, CPAP, cane, etc.): Yes    Post-op ([facility] CTA Only)  If the patient had a surgery or procedure, do they have any questions for a nurse?: No    Post-op (Clinicians Only)  Did the patient have surgery or a procedure: No  Fever: No  Chills: No  Eating & Drinking: eating and drinking without complaints/concerns  PO Intake:  (low sodium)  Bowel Function: normal  Date of last BM: 03/25/25  Urinary Status: voiding without complaint/concerns    Care Management       Care Mgmt General Assessment      CCC RN spoke with patient today to follow up on recent hospitalization. Patient stated he is doing well at home. He is taking his  medications as directed. Patient stated he is weighing himself daily and reported no weight changes since coming home. Patient is aware of Echo scheduled on 3/28/25. He is also scheduled for a follow with his PCP on 4/1/25 and plans to attend this appointment. Patient requested refill of Lyrica, which was sent to PCP. Patient declined enrollment in Care Coordination at this time.                    Follow up Plan     Discharge Follow-Up  Discharge follow up appointment scheduled in alignment with recommended follow up timeframe or Transitions of Risk Category? (Low = within 30 days; Moderate= within 14 days; High= within 7 days): Yes  Discharge Follow Up Appointment Date: 04/01/25  Discharge Follow Up Appointment Scheduled with?: Primary Care Provider    Future Appointments   Date Time Provider Department Center   3/28/2025 11:00 AM [facility] ECHO STAFF [facility]   4/1/2025 11:00 AM John Oconnor NP [facility]   4/30/2025  1:30 PM Nato Becker APRN CNP [facility]   4/30/2025  2:10 PM [facility] HCC HEART FAILURE RN [facility]   5/12/2025  1:20 PM Tristan Lau MD [facility]       Outpatient Plan as outlined on AVS reviewed with patient.    For any urgent concerns, please contact our 24 hour nurse triage line: [phone] ([phone])       David J. Myhre, RN

## 2025-03-26 ENCOUNTER — MEDICAL CORRESPONDENCE (OUTPATIENT)
Dept: HEALTH INFORMATION MANAGEMENT | Facility: CLINIC | Age: 80
End: 2025-03-26
Payer: MEDICARE

## 2025-03-26 ENCOUNTER — TELEPHONE (OUTPATIENT)
Dept: FAMILY MEDICINE | Facility: CLINIC | Age: 80
End: 2025-03-26
Payer: MEDICARE

## 2025-03-26 DIAGNOSIS — M51.26 DISPLACEMENT OF LUMBAR INTERVERTEBRAL DISC WITHOUT MYELOPATHY: Primary | ICD-10-CM

## 2025-03-26 RX ORDER — PREGABALIN 150 MG/1
150 CAPSULE ORAL 3 TIMES DAILY PRN
Qty: 270 CAPSULE | Refills: 1 | Status: SHIPPED | OUTPATIENT
Start: 2025-03-26

## 2025-03-26 NOTE — TELEPHONE ENCOUNTER
Forms/Letter Request    Type of form/letter: OTHER: SN, PT per below, order#s [id number] & [id number]       Do we have the form/letter: Yes: placed in John's inbox    Who is the form from? Home care    Where did/will the form come from? form was faxed in    When is form/letter needed by: when signed    How would you like the form/letter returned: Fax : [phone]

## 2025-03-28 ENCOUNTER — TELEPHONE (OUTPATIENT)
Dept: FAMILY MEDICINE | Facility: CLINIC | Age: 80
End: 2025-03-28

## 2025-03-28 ENCOUNTER — MEDICAL CORRESPONDENCE (OUTPATIENT)
Dept: HEALTH INFORMATION MANAGEMENT | Facility: CLINIC | Age: 80
End: 2025-03-28

## 2025-03-28 ENCOUNTER — HOSPITAL ENCOUNTER (OUTPATIENT)
Dept: CARDIOLOGY | Facility: CLINIC | Age: 80
Discharge: HOME | End: 2025-03-28
Payer: MEDICARE

## 2025-03-28 DIAGNOSIS — I25.5 ISCHEMIC CARDIOMYOPATHY: ICD-10-CM

## 2025-03-28 LAB — LVEF ECHO: NORMAL

## 2025-03-28 PROCEDURE — 255N000002 HC RX 255 OP 636

## 2025-03-28 PROCEDURE — 93306 TTE W/DOPPLER COMPLETE: CPT | Mod: 26 | Performed by: INTERNAL MEDICINE

## 2025-03-28 PROCEDURE — C8929 TTE W OR WO FOL WCON,DOPPLER: HCPCS

## 2025-03-28 RX ADMIN — PERFLUTREN 2 ML: 6.52 INJECTION, SUSPENSION INTRAVENOUS at 11:50

## 2025-03-28 NOTE — TELEPHONE ENCOUNTER
Forms/Letter Request     Type of form/letter: Home Health Certification        Do we have the form/letter: Yes: in John's inbox     Who is the form from? Home care     Where did/will the form come from? form was faxed in     When is form/letter needed by: when done     How would you like the form/letter returned: Fax : [phone]     Order details/form description: skilled nursing eval      Order number (if applicable): [id number]

## 2025-03-31 ENCOUNTER — TELEPHONE (OUTPATIENT)
Dept: FAMILY MEDICINE | Facility: CLINIC | Age: 80
End: 2025-03-31
Payer: MEDICARE

## 2025-03-31 NOTE — TELEPHONE ENCOUNTER
Forms/Letter Request    Type of form/letter: Home Health Certification, order#[id number]      Do we have the form/letter: Yes: placed in John's inbox    Who is the form from? Home care    Where did/will the form come from? form was faxed in    When is form/letter needed by: when signed    How would you like the form/letter returned: Fax : [phone]

## 2025-04-01 ENCOUNTER — MEDICAL CORRESPONDENCE (OUTPATIENT)
Dept: HEALTH INFORMATION MANAGEMENT | Facility: CLINIC | Age: 80
End: 2025-04-01

## 2025-04-01 ENCOUNTER — OFFICE VISIT (OUTPATIENT)
Dept: FAMILY MEDICINE | Facility: CLINIC | Age: 80
End: 2025-04-01
Attending: STUDENT IN AN ORGANIZED HEALTH CARE EDUCATION/TRAINING PROGRAM
Payer: MEDICARE

## 2025-04-01 VITALS
HEART RATE: 65 BPM | WEIGHT: 155.4 LBS | TEMPERATURE: 97.6 F | OXYGEN SATURATION: 97 % | RESPIRATION RATE: 16 BRPM | DIASTOLIC BLOOD PRESSURE: 60 MMHG | HEIGHT: 65 IN | SYSTOLIC BLOOD PRESSURE: 130 MMHG | BODY MASS INDEX: 25.89 KG/M2

## 2025-04-01 DIAGNOSIS — J43.9 PULMONARY EMPHYSEMA, UNSPECIFIED EMPHYSEMA TYPE (H): Chronic | ICD-10-CM

## 2025-04-01 DIAGNOSIS — G61.82 MULTIFOCAL MOTOR NEUROPATHY (H): ICD-10-CM

## 2025-04-01 DIAGNOSIS — I50.9 ACUTE ON CHRONIC CONGESTIVE HEART FAILURE, UNSPECIFIED HEART FAILURE TYPE (H): Primary | ICD-10-CM

## 2025-04-01 PROCEDURE — 80048 BASIC METABOLIC PNL TOTAL CA: CPT | Performed by: NURSE PRACTITIONER

## 2025-04-01 PROCEDURE — 1126F AMNT PAIN NOTED NONE PRSNT: CPT | Performed by: NURSE PRACTITIONER

## 2025-04-01 PROCEDURE — 1111F DSCHRG MED/CURRENT MED MERGE: CPT | Performed by: NURSE PRACTITIONER

## 2025-04-01 PROCEDURE — 3075F SYST BP GE 130 - 139MM HG: CPT | Performed by: NURSE PRACTITIONER

## 2025-04-01 PROCEDURE — 3078F DIAST BP <80 MM HG: CPT | Performed by: NURSE PRACTITIONER

## 2025-04-01 PROCEDURE — 99495 TRANSJ CARE MGMT MOD F2F 14D: CPT | Performed by: NURSE PRACTITIONER

## 2025-04-01 PROCEDURE — 36415 COLL VENOUS BLD VENIPUNCTURE: CPT | Performed by: NURSE PRACTITIONER

## 2025-04-01 RX ORDER — BISOPROLOL FUMARATE 5 MG/1
5 TABLET, FILM COATED ORAL DAILY
Qty: 30 TABLET | Refills: 0 | Status: SHIPPED | OUTPATIENT
Start: 2025-04-01

## 2025-04-01 ASSESSMENT — PAIN SCALES - GENERAL: PAINLEVEL_OUTOF10: NO PAIN (0)

## 2025-04-01 NOTE — PROGRESS NOTES
Assessment & Plan     ICD-10-CM    1. Acute on chronic congestive heart failure, unspecified heart failure type (H)  I50.9 Med Therapy Management Referral     bisoprolol (ZEBETA) 5 MG tablet     Basic metabolic panel     Basic metabolic panel      2. Pulmonary emphysema, unspecified emphysema type (H)  J43.9 Med Therapy Management Referral     OXYGEN CONCENTRATOR, SINGLE DELIVERY PORT, CAPABLE OF DELIVERING 85 PERCENT OR      3. 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  Has been taking 5 mg bisoprolol anyways so we will continue since he no longer shows bradycardia and shows good BP control.  Continue daily weight checks.  Interested in getting oxygen concentrator.  Orders placed.  Plans to get through [facility].  Noted that he is supposed to be off losartan hydrochlorothiazide so that was discontinued out of his medication bag along with the propranolol that he was still holding onto.  Keep follow-up with urology.  If urinating blood again let them know.  Will have [facility] pharmacy reach out to the patient to see what can be done regarding prescription assistance programs to try to make his Jardiance and Symbicort more affordable.    MED REC REQUIRED  Post Medication Reconciliation Status: discharge medications reconciled and changed, per note/orders     Reviewed echocardiogram x 1, stress test x 1, ECG x 1, hospitalist note x 1, metabolic panel x 2, CBC x 2, CT urogram x 1, CT PE study x 1    The longitudinal plan of care for the diagnosis(es)/condition(s) as documented were addressed during this visit. Due to the added complexity in care, I will continue to support Ubaldo in the subsequent management and with ongoing continuity of care.      Subjective     HPI         3/25/2025   Post Discharge Outreach   How are you doing now that you are home? I am feeling better   How are your symptoms? (Red Flag symptoms escalate to triage hotline per guidelines) Improved   Does the  patient have their discharge instructions?  Yes   Does the patient have questions regarding their discharge instructions?  No   Were you started on any new medications or were there changes to any of your previous medications?  Yes   Does the patient have all of their medications? No (see comment)   Do you have questions regarding any of your medications?  No   Do you have all of your needed medical supplies or equipment (DME)?  (i.e. oxygen tank, CPAP, cane, etc.) Yes   Discharge Follow Up Appointment Date 4/1/2025   Discharge Follow Up Appointment Scheduled with? Primary Care Provider     Discharge note as follows:    79-year-old male presented [facility] 3 days of dyspnea admitted initially thought to be COPD exacerbation was empirically treated ceftriaxone and azithromycin however later during his hospital course was determined that his clinical exam and features are more suggestive of acute on chronic heart failure diuretics were started aggressively diuresed while he was here hospital stay was complicated by hematuria urology was consulted plan for follow-up in the outpatient setting 1 to 2 weeks for cystoscopy.  He was diuresed until he had a slight creatinine bump still within normal limits electrolytes were stable on day of discharge he was put back on his PTA diuretic dosing.  His hospital stay was also complicated by asymptomatic bradycardia his bisoprolol dose was reduced, he did have some hypokalemia while being aggressively IV diuresed however on day of discharge his potassium was normal he was switched back to his p.o. meds can follow-up with PCP recheck BMP within 7 days, he was started on SGLT2 inhibitor Jardiance for HFpEF, potentially some financial concerns with SGLT2 inhibitors and patient is insurance he will follow-up with his PCP for alternative options if unable to pay for empagliflozin.     Following up from hospitalization from 3/18-3/22.  Energy level slowly improving.  Not quite back to  "baseline but considerably improved.  No further hematic area.  Was supposed to follow-up with Dr. Gibson at [facility] 1-2 weeks along with cystoscopy. No further hematuria episodes. No SOB at rest. ACEVEDO is baseline. Does have appointment set up with Dr. Gibson in a couple weeks. No chest pains. Brings in weight log. Weight is back to baseline and stable. No big LE swelling. No bradycardia.  Has been taking full 5 mg bisoprolol as he was confused about what medications he is and is not supposed to take.  Also has a bottle of losartan-hydrochlorothiazide which was discontinued in favor of just losartan.    Review of Systems - negative except for what's listed in the HPI      Objective    /60 (BP Location: Right arm, Patient Position: Sitting, Cuff Size: Adult Regular)   Pulse 65   Temp 97.6  F (36.4  C) (Oral)   Resp 16   Ht 1.651 m (5' 5\")   Wt 70.5 kg (155 lb 6.4 oz)   SpO2 97%   BMI 25.86 kg/m    Physical Exam   General appearance - alert, well appearing, and in no distress  Mental status - alert, oriented to person, place, and time  Eyes -Sclera white. PERRLA  Lymphatics - no palpable lymphadenopathy  Chest - clear to auscultation, no wheezes, rales or rhonchi, symmetric air entry  Heart - normal rate and regular rhythm, S1 and S2 normal, no murmurs noted  Abdomen - soft, nontender, nondistended, no masses or organomegaly  Neurological - alert, oriented, normal speech, no focal findings or movement disorder noted, neck supple without rigidity, cranial nerves II through XII intact, motor and sensory grossly normal bilaterally, normal muscle tone, no tremors, strength 5/5  Extremities - peripheral pulses normal, no peripheral edema  Skin - normal coloration and turgor.    John Oconnor, CNP    This note has been dictated using voice recognition software. Any grammatical or context distortions are unintentional and inherent to the software.    "

## 2025-04-01 NOTE — PATIENT INSTRUCTIONS
We went through your medicines and I took out the things you are not supposed to be taking any longer.    You technically were supposed to cut the bisoprolol in half, but your heart rate and pressures are tolerating the full tab just fine so you can keep taking the full tab as you have been.    IF you start urinating blood again, just give urology and FYI call.  As long as it's not constant bleeding or plugging up the bladder, you can continue to monitor it.      I will have the pharmacy team reach out to you to see what can be done regarding your prescription costs.     I ordered the portable oxygen concentrator. [phone] is the number for the oxygen people. They should be able to see my order that I placed.

## 2025-04-01 NOTE — LETTER
April 2, 2025      Ubaldo Ramos  [address]   [place] [address]        Dear ,    Kidney function has improved and electrolytes are holding steady in the normal range!     Resulted Orders   Basic metabolic panel   Result Value Ref Range    Sodium 140 135 - 145 mmol/L    Potassium 4.2 3.4 - 5.3 mmol/L    Chloride 105 98 - 107 mmol/L    Carbon Dioxide (CO2) 23 22 - 29 mmol/L    Anion Gap 12 7 - 15 mmol/L    Urea Nitrogen 25.6 (H) 8.0 - 23.0 mg/dL    Creatinine 0.89 0.67 - 1.17 mg/dL    GFR Estimate 88 >60 mL/min/1.73m2      Comment:      eGFR calculated using 2021 CKD-EPI equation.    Calcium 9.7 8.8 - 10.4 mg/dL    Glucose 124 (H) 70 - 99 mg/dL       If you have any questions or concerns, please call the clinic at the number listed above.       Sincerely,      John Oconnor NP    Electronically signed

## 2025-04-02 LAB
ANION GAP SERPL CALCULATED.3IONS-SCNC: 12 MMOL/L (ref 7–15)
BUN SERPL-MCNC: 25.6 MG/DL (ref 8–23)
CALCIUM SERPL-MCNC: 9.7 MG/DL (ref 8.8–10.4)
CHLORIDE SERPL-SCNC: 105 MMOL/L (ref 98–107)
CREAT SERPL-MCNC: 0.89 MG/DL (ref 0.67–1.17)
EGFRCR SERPLBLD CKD-EPI 2021: 88 ML/MIN/1.73M2
GLUCOSE SERPL-MCNC: 124 MG/DL (ref 70–99)
HCO3 SERPL-SCNC: 23 MMOL/L (ref 22–29)
POTASSIUM SERPL-SCNC: 4.2 MMOL/L (ref 3.4–5.3)
SODIUM SERPL-SCNC: 140 MMOL/L (ref 135–145)

## 2025-04-15 ENCOUNTER — TELEPHONE (OUTPATIENT)
Dept: FAMILY MEDICINE | Facility: CLINIC | Age: 80
End: 2025-04-15
Payer: MEDICARE

## 2025-04-15 NOTE — TELEPHONE ENCOUNTER
Forms/Letter Request    Type of form/letter: OTHER: PT, orders [id number] & [id number]       Do we have the form/letter: Yes: placed in John's inbox    Who is the form from? Home care    Where did/will the form come from? form was faxed in    When is form/letter needed by: when signed    How would you like the form/letter returned: Fax : [phone]

## 2025-04-16 ENCOUNTER — TELEPHONE (OUTPATIENT)
Dept: FAMILY MEDICINE | Facility: CLINIC | Age: 80
End: 2025-04-16
Payer: MEDICARE

## 2025-04-16 ENCOUNTER — TRANSFERRED RECORDS (OUTPATIENT)
Dept: HEALTH INFORMATION MANAGEMENT | Facility: CLINIC | Age: 80
End: 2025-04-16
Payer: MEDICARE

## 2025-04-16 NOTE — TELEPHONE ENCOUNTER
General Call      Reason for Call:  Patient has questions about pharmacy outreach thatbwas supposed to occur. Please call patient and find out concerns.  Ok to leave message     What are your questions or concerns:  see above    Date of last appointment with provider: 4/1/2025    Could we send this information to you in [facility] or would you prefer to receive a phone call?:   Patient would prefer a phone call   Okay to leave a detailed message?: Yes at Home number on file [phone] (home)

## 2025-04-16 NOTE — TELEPHONE ENCOUNTER
Contacts       Contact Date/Time Type Contact Phone/Fax    04/16/2025 09:33 AM CDT In Person (Incoming) Ubaldo Ramos (Self)     04/16/2025 10:37 AM CDT Phone (Outgoing) Ubaldo Ramos (Self) [phone] (M)          Attempted to reach patient to: Relay a message    Regarding: Pt requested call back with medication questions    Information needed: What are his questions    Action to take: OK to relay (verbatim): There was a second encounter 10 min apart from this one requesting more Losartan. This is written by Dr. Kay so I did send that request to him high priority. I left this on the VM as well, pt only needs to call back if he had other questions.

## 2025-04-17 ENCOUNTER — MEDICAL CORRESPONDENCE (OUTPATIENT)
Dept: HEALTH INFORMATION MANAGEMENT | Facility: CLINIC | Age: 80
End: 2025-04-17
Payer: MEDICARE

## 2025-04-19 DIAGNOSIS — I50.9 ACUTE ON CHRONIC CONGESTIVE HEART FAILURE, UNSPECIFIED HEART FAILURE TYPE (H): ICD-10-CM

## 2025-04-21 RX ORDER — BISOPROLOL FUMARATE 5 MG/1
5 TABLET, FILM COATED ORAL DAILY
Qty: 30 TABLET | Refills: 0 | OUTPATIENT
Start: 2025-04-21

## 2025-04-25 ENCOUNTER — TELEPHONE (OUTPATIENT)
Dept: FAMILY MEDICINE | Facility: CLINIC | Age: 80
End: 2025-04-25
Payer: MEDICARE

## 2025-04-25 NOTE — TELEPHONE ENCOUNTER
Is this a new referral or dose change: new patient    Patient preferred phone number (please verify with pt): [phone]    Additional helpful info for PAP team:  cost of Jardiance for heart failure. An alternative would be Farxiga.    Notes for MTM team:   Route TE to p RXASSIST    Patient assistance referral process for [facility] Primary Care Patients   *Definition of a Primary Care Patient- Established Patients seen at a [facility] [place] [facility].      New patient referral-    MTM provider identifies a patient that needs prescription assistance.   MTM will create telephone encounter, enter .rxassistmtm dot phrase and route to RXASSIST pool.    PAP team will reply to MTM that they received the request.    PAP team member will contact the patient via phone within 1-2 business days to let them know when to expect paperwork.  PAP team will document next steps in an Epic telephone encounter, after the consult is completed.    Dose change for current PAP patient-    MTM provider identifies current PAP patient that has a dosage change    MTM provider will enter a new script in Epic to reflect the dosage change.    MTM will create telephone encounter, enter .rxassistmtm dot phrase and route to RXASSIST pool. Include dose change information in the dot phrase.    PAP team will document next steps in an Epic telephone encounter

## 2025-04-28 DIAGNOSIS — I50.9 ACUTE ON CHRONIC CONGESTIVE HEART FAILURE, UNSPECIFIED HEART FAILURE TYPE (H): Primary | ICD-10-CM

## 2025-04-29 ENCOUNTER — TRANSFERRED RECORDS (OUTPATIENT)
Dept: HEALTH INFORMATION MANAGEMENT | Facility: CLINIC | Age: 80
End: 2025-04-29
Payer: MEDICARE

## 2025-04-29 PROBLEM — I50.31 ACUTE HEART FAILURE WITH PRESERVED EJECTION FRACTION (HFPEF) (H): Status: ACTIVE | Noted: 2025-02-08

## 2025-04-29 PROBLEM — I50.32 HEART FAILURE WITH IMPROVED EJECTION FRACTION (HFIMPEF) (H): Status: ACTIVE | Noted: 2025-04-29

## 2025-04-30 ENCOUNTER — ALLIED HEALTH/NURSE VISIT (OUTPATIENT)
Dept: CARDIOLOGY | Facility: CLINIC | Age: 80
End: 2025-04-30
Payer: MEDICARE

## 2025-04-30 ENCOUNTER — OFFICE VISIT (OUTPATIENT)
Dept: CARDIOLOGY | Facility: CLINIC | Age: 80
End: 2025-04-30
Payer: MEDICARE

## 2025-04-30 VITALS
OXYGEN SATURATION: 96 % | SYSTOLIC BLOOD PRESSURE: 140 MMHG | HEART RATE: 63 BPM | WEIGHT: 160 LBS | HEIGHT: 66 IN | RESPIRATION RATE: 16 BRPM | DIASTOLIC BLOOD PRESSURE: 60 MMHG | BODY MASS INDEX: 25.71 KG/M2

## 2025-04-30 DIAGNOSIS — Z72.0 TOBACCO ABUSE: Chronic | ICD-10-CM

## 2025-04-30 DIAGNOSIS — I10 ESSENTIAL HYPERTENSION, BENIGN: Chronic | ICD-10-CM

## 2025-04-30 DIAGNOSIS — I50.32 HEART FAILURE WITH IMPROVED EJECTION FRACTION (HFIMPEF) (H): ICD-10-CM

## 2025-04-30 DIAGNOSIS — I50.32 HEART FAILURE WITH IMPROVED EJECTION FRACTION (HFIMPEF) (H): Primary | ICD-10-CM

## 2025-04-30 DIAGNOSIS — I50.31 ACUTE HEART FAILURE WITH PRESERVED EJECTION FRACTION (HFPEF) (H): Primary | ICD-10-CM

## 2025-04-30 DIAGNOSIS — E78.00 PURE HYPERCHOLESTEROLEMIA: ICD-10-CM

## 2025-04-30 DIAGNOSIS — I25.10 CORONARY ARTERY DISEASE INVOLVING NATIVE CORONARY ARTERY OF NATIVE HEART WITHOUT ANGINA PECTORIS: ICD-10-CM

## 2025-04-30 DIAGNOSIS — I50.31 ACUTE HEART FAILURE WITH PRESERVED EJECTION FRACTION (HFPEF) (H): ICD-10-CM

## 2025-04-30 PROBLEM — R79.89 ELEVATED TROPONIN: Status: RESOLVED | Noted: 2025-01-05 | Resolved: 2025-04-30

## 2025-04-30 PROBLEM — J44.1 COPD EXACERBATION (H): Status: RESOLVED | Noted: 2022-07-31 | Resolved: 2025-04-30

## 2025-04-30 PROBLEM — J96.01 ACUTE HYPOXEMIC RESPIRATORY FAILURE (H): Status: RESOLVED | Noted: 2025-03-18 | Resolved: 2025-04-30

## 2025-04-30 PROBLEM — R09.02 HYPOXEMIA: Status: RESOLVED | Noted: 2022-07-31 | Resolved: 2025-04-30

## 2025-04-30 PROBLEM — J96.01 ACUTE RESPIRATORY FAILURE WITH HYPOXIA (H): Status: RESOLVED | Noted: 2025-01-05 | Resolved: 2025-04-30

## 2025-04-30 PROBLEM — J93.9 PNEUMOTHORAX ON RIGHT: Status: RESOLVED | Noted: 2025-01-05 | Resolved: 2025-04-30

## 2025-04-30 PROBLEM — R94.31 ABNORMAL ELECTROCARDIOGRAM: Status: RESOLVED | Noted: 2022-07-31 | Resolved: 2025-04-30

## 2025-04-30 PROCEDURE — 3078F DIAST BP <80 MM HG: CPT | Performed by: NURSE PRACTITIONER

## 2025-04-30 PROCEDURE — 3077F SYST BP >= 140 MM HG: CPT | Performed by: NURSE PRACTITIONER

## 2025-04-30 PROCEDURE — 99207 PR NO CHARGE LOS: CPT

## 2025-04-30 PROCEDURE — G2211 COMPLEX E/M VISIT ADD ON: HCPCS | Performed by: NURSE PRACTITIONER

## 2025-04-30 PROCEDURE — 99215 OFFICE O/P EST HI 40 MIN: CPT | Performed by: NURSE PRACTITIONER

## 2025-04-30 RX ORDER — SPIRONOLACTONE 25 MG/1
25 TABLET ORAL DAILY
Qty: 90 TABLET | Refills: 3 | Status: SHIPPED | OUTPATIENT
Start: 2025-04-30

## 2025-04-30 RX ORDER — FUROSEMIDE 40 MG/1
60 TABLET ORAL DAILY
Qty: 135 TABLET | Refills: 1 | Status: SHIPPED | OUTPATIENT
Start: 2025-04-30

## 2025-04-30 RX ORDER — BETAMETHASONE DIPROPIONATE 0.5 MG/G
CREAM TOPICAL PRN
COMMUNITY

## 2025-04-30 NOTE — LETTER
4/30/2025    John Oconnor, NP  [address] [place] [address]    RE: Ubaldo Ramos       Dear Colleague,     I had the pleasure of seeing Ubaldo Ramos in the [facility].          Assessment/Recommendations   Assessment:    #   Ischemic Cardiomyopathy with Acute heart failure with improved/preserved ejection fraction with LVEF of 50 to 55% per echo in March 2025, NYHA Class II:    Discharge weight was 144 pounds.  Current weight is 150 lbs-Home weight is up 5 lbs      Bisoprolol 5 mg dailyHeart failure regimen includes:    -Beta-blocker therapy with bisoprolol 5 mg daily    -ARB  therapy with losartan 25 mg twice a day    -Aldosterone blocker/MRA therapy with spironolactone 12.5 mg daily    - Not on SGLT2 Inhibitor -Jardiance stopped due to cost issue.  Did not qualify for financial aid per patient.    -Diuretic therapy with furosemide 40 mg daily    We discussed and reviewed about heart failure, medication management, and lifestyle management including low sodium diet <2 g/day, daily weight, and staying physically active as tolerated. Patient met with [facility] HF CORE nurse clinician for heart failure education.    #   Coronary artery disease with status post PCI/JIA to mid LAD on 1/8/2025: Stress test negative for inducible myocardial ischemia in March 2025     #  Dyslipidemia: On atorvastatin    # Hypertension: Blood pressure today is 140/60     #Tobacco abuse: Smokes half a pack a day.  No plans to quit.    Plan/Recommendation:  - Given hypervolemic on exam, increase furosemide from 40 mg to 60 mg daily  - Increase spironolactone from 12.5 mg to 25 mg daily  - BMP in 1 week  - Encouraged to call with adverse effects  -Continue on low-sodium diet <2000 mg per day, daily weight monitoring, and maintain fluid intake at 50 to 60 ounces per day    Follow up with Dr. Lau in May. Follow up with me in 2-3 months in HF clinic     The longitudinal plan of care for acute heart failure with  "preserved/improved ejection fraction, hypertension was addressed during this visit.?Due to the added   complexity in care, I will continue to support Ubaldo Ramos in the subsequent management of this   condition(s) and with the ongoing continuity of care of this condition(s)\".     History of Present Illness/Subjective    Mr. Ubaldo Ramos is a 79 year old male with a past medical history of prostate cancer, lung cancer, cervical spine fusion, COPD, peripheral artery disease, hypertension, recent hospitalization in January with close rib fractures secondary to fall, pneumothorax, new onset of cardiomyopathy with LVEF of 35 to 40%, rehospitalization in January with acute coronary syndrome, non-STEMI with status post PCI/JIA to mid LAD on 1/8/2025, rehospitalization in February with acute hypoxic respiratory failure, parainfluenza, acute COPD exacerbation and acute systolic heart failure, and acute on chronic systolic heart failure, most recent hospitalization in March with hematuria, bradycardia, COPD exacerbation, and acute heart failure with improved/preserved ejection fraction,  who is seen at [facility] for post hospitalization heart failure follow up.     Today, Ubaldo reports feeling significant improvement in his heart failure symptoms since recent hospitalization in March.  He reports 5 pounds weight gain with increased lower extremity edema.  He always has some shortness of breath at baseline which he thinks is due to COPD and smoking.  He denies fatigue, lightheadedness, shortness of breath, orthopnea, PND, palpitations, chest pain, and abdominal fullness/bloating he does not report any bleeding complications..      He is trying to follow some diet.  He reports adequate fluid intake.    ECHO from 3/28/25-Reviewed:   Interpretation Summary     The left ventricle is normal in size.  There is normal left ventricular wall thickness.  The visual ejection fraction is " "50-55%.  Grade II or moderate diastolic dysfunction.  There is borderline-mild global hypokinesia of the left ventricle.  Normal right ventricle size and systolic function.  The left atrium is mild to moderately dilated.  There is mild (1+) mitral regurgitation.  No hemodynamically significant valvular abnormalities on 2D or color Doppler  images.  IVC diameter <2.1 cm collapsing >50% with sniff suggests a normal RA pressure  of 3 mmHg.     When compared to previous study on 2-8-2025, left ventricular systolic  function is mildly improved.        Physical Examination Review of Systems   BP (!) 140/60 (BP Location: Right arm, Patient Position: Sitting, Cuff Size: Adult Regular)   Pulse 63   Resp 16   Ht 1.664 m (5' 5.5\")   Wt 72.6 kg (160 lb)   SpO2 96%   BMI 26.22 kg/m    Body mass index is 26.22 kg/m .  Wt Readings from Last 3 Encounters:   04/30/25 72.6 kg (160 lb)   04/01/25 70.5 kg (155 lb 6.4 oz)   03/22/25 65.5 kg (144 lb 8 oz)     General Appearance:   no distress, normal body habitus   ENT/Mouth: membranes moist, no oral lesions or bleeding gums.      EYES:  no scleral icterus, normal conjunctivae   Neck: no carotid bruits or thyromegaly   Chest/Lungs:   lungs are clear to auscultation, no rales or wheezing, equal chest wall expansion    Cardiovascular:   Hear rate regular. Normal first and second heart sounds with no murmurs, rubs, or gallops; Jugular venous pressure flat, 1+ pitting edema bilaterally    Abdomen:  no organomegaly, masses, bruits, or tenderness; bowel sounds are present   Extremities   no cyanosis or clubbing    CMS intact.   Skin: no xanthelasma, warm.    Neurologic: Alert and oriented X 3 no tremors   Psychiatric: calm and cooperative                                                   Negative unless noted in HPI     Medical History  Surgical History Family History Social History   No past medical history on file. Past Surgical History:   Procedure Laterality Date     APPENDECTOMY   "     CERVICAL SPINE SURGERY       CHOLECYSTECTOMY       CV CORONARY ANGIOGRAM N/A 1/8/2025    Procedure: Coronary Angiogram;  Surgeon: Jamie Noriega MD;  Location: [facility] LAB CV     CV INTRAVASULAR ULTRASOUND N/A 1/8/2025    Procedure: Intravascular Ultrasound;  Surgeon: Jamie Noriega MD;  Location: [facility] CATH LAB CV     CV LEFT HEART CATH N/A 1/8/2025    Procedure: Left Heart Catheterization;  Surgeon: Jamie Noriega MD;  Location: [facility] LAB CV     CV PCI STENT DRUG ELUTING N/A 1/8/2025    Procedure: Percutaneous Coronary Intervention Stent;  Surgeon: Jamie Noriega MD;  Location: [facility] LAB CV     FEMORAL ENDARTERECTOMY       LUNG CANCER SURGERY       PROSTATECTOMY      No family history on file. Social History     Socioeconomic History     Marital status:      Spouse name: Not on file     Number of children: Not on file     Years of education: Not on file     Highest education level: Not on file   Occupational History     Not on file   Tobacco Use     Smoking status: Every Day     Current packs/day: 0.50     Types: Cigarettes     Passive exposure: Current     Smokeless tobacco: Never   Vaping Use     Vaping status: Never Used   Substance and Sexual Activity     Alcohol use: Not Currently     Drug use: Not Currently     Sexual activity: Not on file   Other Topics Concern     Not on file   Social History Narrative     Not on file     Social Drivers of Health     Financial Resource Strain: Low Risk  (3/21/2025)    Financial Resource Strain      Within the past 12 months, have you or your family members you live with been unable to get utilities (heat, electricity) when it was really needed?: No   Food Insecurity: Low Risk  (3/21/2025)    Food Insecurity      Within the past 12 months, did you worry that your food would run out before you got money to buy more?: No      Within the past 12 months, did the food you bought just not last and you didn t have money to get more?:  No   Transportation Needs: Low Risk  (3/21/2025)    Transportation Needs      Within the past 12 months, has lack of transportation kept you from medical appointments, getting your medicines, non-medical meetings or appointments, work, or from getting things that you need?: No   Physical Activity: Not on file   Stress: Not on file   Social Connections: Not on file   Interpersonal Safety: High Risk (3/21/2025)    Interpersonal Safety      Do you feel physically and emotionally safe where you currently live?: No      Within the past 12 months, have you been hit, slapped, kicked or otherwise physically hurt by someone?: No      Within the past 12 months, have you been humiliated or emotionally abused in other ways by your partner or ex-partner?: No   Housing Stability: High Risk (3/21/2025)    Housing Stability      Do you have housing? : No      Are you worried about losing your housing?: No          Medications  Allergies   Current Outpatient Medications   Medication Sig Dispense Refill     acetaminophen (TYLENOL) 500 MG tablet Take 500-1,000 mg by mouth every 6 hours as needed for mild pain.       aspirin 81 MG EC tablet Take 1 tablet (81 mg) by mouth daily. Start tomorrow. 30 tablet 3     atorvastatin (LIPITOR) 80 MG tablet TAKE 1 TABLET BY MOUTH AT BEDTIME 90 tablet 2     betamethasone dipropionate (DIPROSONE) 0.05 % external cream Apply topically as needed.       bisoprolol (ZEBETA) 5 MG tablet Take 1 tablet (5 mg) by mouth daily. 30 tablet 0     budesonide-formoterol (SYMBICORT/BREYNA) 160-4.5 MCG/ACT Inhaler Inhale 2 puffs into the lungs 2 times daily. 10.2 g 3     cetirizine (ZYRTEC) 10 MG tablet Take 10 mg by mouth every morning.       cholecalciferol, vitamin D3, (VITAMIN D3) 25 mcg (1,000 unit) capsule [CHOLECALCIFEROL, VITAMIN D3, (VITAMIN D3) 25 MCG (1,000 UNIT) CAPSULE] Take 1,000 Units by mouth daily.       clopidogrel (PLAVIX) 75 MG tablet Take 1 tablet (75 mg) by mouth daily. Dose to start tomorrow.  90 tablet 3     cyanocobalamin 1000 MCG tablet [CYANOCOBALAMIN 1000 MCG TABLET] Take 1,000 mcg by mouth daily.       fluticasone (FLONASE) 50 MCG/ACT nasal spray Spray 2 sprays into both nostrils daily. 16 g 3     furosemide (LASIX) 40 MG tablet Take 1.5 tablets (60 mg) by mouth daily. 135 tablet 1     ipratropium - albuterol 0.5 mg/2.5 mg/3 mL (DUONEB) 0.5-2.5 (3) MG/3ML neb solution Take 1 vial (3 mLs) by nebulization every 4 hours as needed for shortness of breath, wheezing or cough.       LORazepam (ATIVAN) 0.5 MG tablet Take 1 tablet (0.5 mg) by mouth every 4 hours as needed for anxiety (shortness of breath). 10 tablet 0     losartan (COZAAR) 25 MG tablet Take 1 tablet (25 mg) by mouth 2 times daily. 90 tablet 2     oxyBUTYnin ER (DITROPAN XL) 10 MG 24 hr tablet Take 10 mg by mouth every other day.       pantoprazole (PROTONIX) 40 MG EC tablet TAKE 1 TABLET BY MOUTH ONCE DAILY 90 tablet 2     pregabalin (LYRICA) 150 MG capsule Take 1 capsule (150 mg) by mouth 3 times daily as needed. 270 capsule 1     sertraline (ZOLOFT) 25 MG tablet TAKE 1 TABLET(25 MG) BY MOUTH DAILY 90 tablet 1     spironolactone (ALDACTONE) 25 MG tablet Take 1 tablet (25 mg) by mouth daily. 90 tablet 3     traZODone (DESYREL) 50 MG tablet Take 1-2 tablets ( mg) by mouth at bedtime. 180 tablet 3    Allergies   Allergen Reactions     Adhesive Tape Unknown     Adhesive- pulls skin off          Lab Results    Chemistry/lipid CBC Cardiac Enzymes/BNP/TSH/INR   Lab Results   Component Value Date    CHOL 87 02/08/2025    HDL 38 (L) 02/08/2025    TRIG 107 02/08/2025    BUN 25.6 (H) 04/01/2025     04/01/2025    CO2 23 04/01/2025    Lab Results   Component Value Date    WBC 6.1 03/20/2025    HGB 11.6 (L) 03/20/2025    HCT 35.0 (L) 03/20/2025    MCV 90 03/20/2025     03/20/2025    Lab Results   Component Value Date    TROPONINI 0.02 07/31/2022    BNP 74 07/31/2022    TSH 1.01 02/08/2025    INR 1.02 04/27/2024        40  minutes spent  on the date of encounter doing chart review, review of test results, interpretation with above tests, patient visit, and documentation.        This note has been dictated using voice recognition software. Any grammatical, typographical, or context distortions are unintentional and inherent to the software          Thank you for allowing me to participate in the care of your patient.      Sincerely,     KAYLEEN Pizano [facility]  cc:   Shiloh German MD  [address] [place] [address]

## 2025-04-30 NOTE — PROGRESS NOTES
[facility]: Heart Failure Care Coordination   Heart Failure Education    Situation/Background:      RN LURDES provided heart failure education to patient during clinic visit.    Assessment:      Living situation:  home at a senior independent living facility apartment w/ wife    Barriers to Heart Failure follow-up:  none identified at this time    Medication management: independent    Pt confirms having a scale at home, is not currently taking weights regularly. Encouraged him to resume, he does have a weight log at home. Reviewed utilizing daily weights as a means of monitoring for fluid retention. Pt agreeable. Pt's wife does most of the grocery shopping and cooking. He reports that he was more closely monitoring his sodium intake following his hospitalization, but has since since become more relaxed. He is familiar w/ reading nutrition labels. Reviewed utilizing sodium free seasonings, common high sodium food products. Pt reports his wife doesn't want to do much cooking anymore, they also eat a few meals or so a week @ the facility (but pay OOP). Encouraged him to continue doing what he can. Pt agreeable. Pt unsure how much fluid he is drinking daily- some pop, coffee, water. Encouraged utilizing a consistent bottle/ glass for tracking intake at home, to help ensure adequate intake for hydration, but also help prevent fluid retention.    Intervention/Plan:      CM/HF education topics reviewed:  Low sodium: 2000 mg or less daily, meal choices and label reading   Fluid Restriction: 50-64oz daily   Daily weight monitoring and logging   Medication review and importance of compliance   Home blood pressure monitoring and logging   Overview of heart failure appointments and testing   Symptoms of HF to be reported to Core Team      Education materials provided:  Low sodium food and drink handout  Low sodium food product examples  HF stoplight tool  Cardiac medication handout    Patient to follow up as scheduled.  RN  CC reviewed and reinforced fluid/dietary sodium restrictions; patient stated understanding.  Instructed patient to call RN line with new or worsening heart failure symptoms and/or rapid weight gain.     Patient expressed understanding of above education/instructions and denied further questions at this time. Pt sched to see Dr. Lau on 5/12 and Nato on 7/2.    LISA Love RN

## 2025-04-30 NOTE — PATIENT INSTRUCTIONS
Ubaldo Ramos,    It was a pleasure to see you today at the [facility].     My recommendations after this visit include:    - Increase furosemide from 40 mg to 60 mg (take 1.5 tablet of 40 mg) daily    - Increase Spironolactone from 12.5 mg to 25 mg 1 tablet daily    - Please call if you develop lightheaded, dizziness or any new side effects    - Please get your lab work (BMP) kidney function test done in 1 week- order placed. You can get this lab done through hospital lab by walk-in     - Low sodium diet < 2000 mg/day, daily weight monitoring, and maintain adequate fluid intake at 50 to 60 ounces per day    -Please call if you experience persistent weight gain 2 to 3 pounds 2 days in a row or 5 pounds in a week with shortness of breath, abdominal bloating and leg swelling    - Follow up with Dr. Lau as scheduled in May    - Follow up with Nato Becker CNP in 3 months.    - Please call Heart Failure Nurse Line at [phone], if you have any questions or concerns            None

## 2025-04-30 NOTE — PROGRESS NOTES
Assessment/Recommendations   Assessment:    #   Ischemic Cardiomyopathy with Acute heart failure with improved/preserved ejection fraction with LVEF of 50 to 55% per echo in March 2025, NYHA Class II:    Discharge weight was 144 pounds.  Current weight is 150 lbs-Home weight is up 5 lbs      Bisoprolol 5 mg dailyHeart failure regimen includes:    -Beta-blocker therapy with bisoprolol 5 mg daily    -ARB  therapy with losartan 25 mg twice a day    -Aldosterone blocker/MRA therapy with spironolactone 12.5 mg daily    - Not on SGLT2 Inhibitor -Jardiance stopped due to cost issue.  Did not qualify for financial aid per patient.    -Diuretic therapy with furosemide 40 mg daily    We discussed and reviewed about heart failure, medication management, and lifestyle management including low sodium diet <2 g/day, daily weight, and staying physically active as tolerated. Patient met with [facility] HF CORE nurse clinician for heart failure education.    #   Coronary artery disease with status post PCI/JIA to mid LAD on 1/8/2025: Stress test negative for inducible myocardial ischemia in March 2025     #  Dyslipidemia: On atorvastatin    # Hypertension: Blood pressure today is 140/60     #Tobacco abuse: Smokes half a pack a day.  No plans to quit.    Plan/Recommendation:  - Given hypervolemic on exam, increase furosemide from 40 mg to 60 mg daily  - Increase spironolactone from 12.5 mg to 25 mg daily  - BMP in 1 week  - Encouraged to call with adverse effects  -Continue on low-sodium diet <2000 mg per day, daily weight monitoring, and maintain fluid intake at 50 to 60 ounces per day    Follow up with Dr. Lau in May. Follow up with me in 2-3 months in HF clinic     The longitudinal plan of care for acute heart failure with preserved/improved ejection fraction, hypertension was addressed during this visit.?Due to the added   complexity in care, I will continue to support Ubaldo Ramos in the subsequent management of this  "  condition(s) and with the ongoing continuity of care of this condition(s)\".     History of Present Illness/Subjective    Mr. Ubaldo Ramos is a 79 year old male with a past medical history of prostate cancer, lung cancer, cervical spine fusion, COPD, peripheral artery disease, hypertension, recent hospitalization in January with close rib fractures secondary to fall, pneumothorax, new onset of cardiomyopathy with LVEF of 35 to 40%, rehospitalization in January with acute coronary syndrome, non-STEMI with status post PCI/JIA to mid LAD on 1/8/2025, rehospitalization in February with acute hypoxic respiratory failure, parainfluenza, acute COPD exacerbation and acute systolic heart failure, and acute on chronic systolic heart failure, most recent hospitalization in March with hematuria, bradycardia, COPD exacerbation, and acute heart failure with improved/preserved ejection fraction,  who is seen at [facility] for post hospitalization heart failure follow up.     Today, Ubaldo reports feeling significant improvement in his heart failure symptoms since recent hospitalization in March.  He reports 5 pounds weight gain with increased lower extremity edema.  He always has some shortness of breath at baseline which he thinks is due to COPD and smoking.  He denies fatigue, lightheadedness, shortness of breath, orthopnea, PND, palpitations, chest pain, and abdominal fullness/bloating he does not report any bleeding complications..      He is trying to follow some diet.  He reports adequate fluid intake.    ECHO from 3/28/25-Reviewed:   Interpretation Summary     The left ventricle is normal in size.  There is normal left ventricular wall thickness.  The visual ejection fraction is 50-55%.  Grade II or moderate diastolic dysfunction.  There is borderline-mild global hypokinesia of the left ventricle.  Normal right ventricle size and systolic function.  The left atrium is mild to moderately " "dilated.  There is mild (1+) mitral regurgitation.  No hemodynamically significant valvular abnormalities on 2D or color Doppler  images.  IVC diameter <2.1 cm collapsing >50% with sniff suggests a normal RA pressure  of 3 mmHg.     When compared to previous study on 2-8-2025, left ventricular systolic  function is mildly improved.        Physical Examination Review of Systems   BP (!) 140/60 (BP Location: Right arm, Patient Position: Sitting, Cuff Size: Adult Regular)   Pulse 63   Resp 16   Ht 1.664 m (5' 5.5\")   Wt 72.6 kg (160 lb)   SpO2 96%   BMI 26.22 kg/m    Body mass index is 26.22 kg/m .  Wt Readings from Last 3 Encounters:   04/30/25 72.6 kg (160 lb)   04/01/25 70.5 kg (155 lb 6.4 oz)   03/22/25 65.5 kg (144 lb 8 oz)     General Appearance:   no distress, normal body habitus   ENT/Mouth: membranes moist, no oral lesions or bleeding gums.      EYES:  no scleral icterus, normal conjunctivae   Neck: no carotid bruits or thyromegaly   Chest/Lungs:   lungs are clear to auscultation, no rales or wheezing, equal chest wall expansion    Cardiovascular:   Hear rate regular. Normal first and second heart sounds with no murmurs, rubs, or gallops; Jugular venous pressure flat, 1+ pitting edema bilaterally    Abdomen:  no organomegaly, masses, bruits, or tenderness; bowel sounds are present   Extremities   no cyanosis or clubbing    CMS intact.   Skin: no xanthelasma, warm.    Neurologic: Alert and oriented X 3 no tremors   Psychiatric: calm and cooperative                                                   Negative unless noted in HPI     Medical History  Surgical History Family History Social History   No past medical history on file. Past Surgical History:   Procedure Laterality Date    APPENDECTOMY      CERVICAL SPINE SURGERY      CHOLECYSTECTOMY      CV CORONARY ANGIOGRAM N/A 1/8/2025    Procedure: Coronary Angiogram;  Surgeon: Jamie Noriega MD;  Location: [facility] CATH LAB CV    CV INTRAVASULAR ULTRASOUND " N/A 1/8/2025    Procedure: Intravascular Ultrasound;  Surgeon: Jamie Noriega MD;  Location: [facility] CV    CV LEFT HEART CATH N/A 1/8/2025    Procedure: Left Heart Catheterization;  Surgeon: Jamie Noriega MD;  Location: [facility] LAB CV    CV PCI STENT DRUG ELUTING N/A 1/8/2025    Procedure: Percutaneous Coronary Intervention Stent;  Surgeon: Jamie Noriega MD;  Location: [facility] CV    FEMORAL ENDARTERECTOMY      LUNG CANCER SURGERY      PROSTATECTOMY      No family history on file. Social History     Socioeconomic History    Marital status:      Spouse name: Not on file    Number of children: Not on file    Years of education: Not on file    Highest education level: Not on file   Occupational History    Not on file   Tobacco Use    Smoking status: Every Day     Current packs/day: 0.50     Types: Cigarettes     Passive exposure: Current    Smokeless tobacco: Never   Vaping Use    Vaping status: Never Used   Substance and Sexual Activity    Alcohol use: Not Currently    Drug use: Not Currently    Sexual activity: Not on file   Other Topics Concern    Not on file   Social History Narrative    Not on file     Social Drivers of Health     Financial Resource Strain: Low Risk  (3/21/2025)    Financial Resource Strain     Within the past 12 months, have you or your family members you live with been unable to get utilities (heat, electricity) when it was really needed?: No   Food Insecurity: Low Risk  (3/21/2025)    Food Insecurity     Within the past 12 months, did you worry that your food would run out before you got money to buy more?: No     Within the past 12 months, did the food you bought just not last and you didn t have money to get more?: No   Transportation Needs: Low Risk  (3/21/2025)    Transportation Needs     Within the past 12 months, has lack of transportation kept you from medical appointments, getting your medicines, non-medical meetings or appointments, work, or  from getting things that you need?: No   Physical Activity: Not on file   Stress: Not on file   Social Connections: Not on file   Interpersonal Safety: High Risk (3/21/2025)    Interpersonal Safety     Do you feel physically and emotionally safe where you currently live?: No     Within the past 12 months, have you been hit, slapped, kicked or otherwise physically hurt by someone?: No     Within the past 12 months, have you been humiliated or emotionally abused in other ways by your partner or ex-partner?: No   Housing Stability: High Risk (3/21/2025)    Housing Stability     Do you have housing? : No     Are you worried about losing your housing?: No          Medications  Allergies   Current Outpatient Medications   Medication Sig Dispense Refill    acetaminophen (TYLENOL) 500 MG tablet Take 500-1,000 mg by mouth every 6 hours as needed for mild pain.      aspirin 81 MG EC tablet Take 1 tablet (81 mg) by mouth daily. Start tomorrow. 30 tablet 3    atorvastatin (LIPITOR) 80 MG tablet TAKE 1 TABLET BY MOUTH AT BEDTIME 90 tablet 2    betamethasone dipropionate (DIPROSONE) 0.05 % external cream Apply topically as needed.      bisoprolol (ZEBETA) 5 MG tablet Take 1 tablet (5 mg) by mouth daily. 30 tablet 0    budesonide-formoterol (SYMBICORT/BREYNA) 160-4.5 MCG/ACT Inhaler Inhale 2 puffs into the lungs 2 times daily. 10.2 g 3    cetirizine (ZYRTEC) 10 MG tablet Take 10 mg by mouth every morning.      cholecalciferol, vitamin D3, (VITAMIN D3) 25 mcg (1,000 unit) capsule [CHOLECALCIFEROL, VITAMIN D3, (VITAMIN D3) 25 MCG (1,000 UNIT) CAPSULE] Take 1,000 Units by mouth daily.      clopidogrel (PLAVIX) 75 MG tablet Take 1 tablet (75 mg) by mouth daily. Dose to start tomorrow. 90 tablet 3    cyanocobalamin 1000 MCG tablet [CYANOCOBALAMIN 1000 MCG TABLET] Take 1,000 mcg by mouth daily.      fluticasone (FLONASE) 50 MCG/ACT nasal spray Spray 2 sprays into both nostrils daily. 16 g 3    furosemide (LASIX) 40 MG tablet Take 1.5  tablets (60 mg) by mouth daily. 135 tablet 1    ipratropium - albuterol 0.5 mg/2.5 mg/3 mL (DUONEB) 0.5-2.5 (3) MG/3ML neb solution Take 1 vial (3 mLs) by nebulization every 4 hours as needed for shortness of breath, wheezing or cough.      LORazepam (ATIVAN) 0.5 MG tablet Take 1 tablet (0.5 mg) by mouth every 4 hours as needed for anxiety (shortness of breath). 10 tablet 0    losartan (COZAAR) 25 MG tablet Take 1 tablet (25 mg) by mouth 2 times daily. 90 tablet 2    oxyBUTYnin ER (DITROPAN XL) 10 MG 24 hr tablet Take 10 mg by mouth every other day.      pantoprazole (PROTONIX) 40 MG EC tablet TAKE 1 TABLET BY MOUTH ONCE DAILY 90 tablet 2    pregabalin (LYRICA) 150 MG capsule Take 1 capsule (150 mg) by mouth 3 times daily as needed. 270 capsule 1    sertraline (ZOLOFT) 25 MG tablet TAKE 1 TABLET(25 MG) BY MOUTH DAILY 90 tablet 1    spironolactone (ALDACTONE) 25 MG tablet Take 1 tablet (25 mg) by mouth daily. 90 tablet 3    traZODone (DESYREL) 50 MG tablet Take 1-2 tablets ( mg) by mouth at bedtime. 180 tablet 3    Allergies   Allergen Reactions    Adhesive Tape Unknown     Adhesive- pulls skin off          Lab Results    Chemistry/lipid CBC Cardiac Enzymes/BNP/TSH/INR   Lab Results   Component Value Date    CHOL 87 02/08/2025    HDL 38 (L) 02/08/2025    TRIG 107 02/08/2025    BUN 25.6 (H) 04/01/2025     04/01/2025    CO2 23 04/01/2025    Lab Results   Component Value Date    WBC 6.1 03/20/2025    HGB 11.6 (L) 03/20/2025    HCT 35.0 (L) 03/20/2025    MCV 90 03/20/2025     03/20/2025    Lab Results   Component Value Date    TROPONINI 0.02 07/31/2022    BNP 74 07/31/2022    TSH 1.01 02/08/2025    INR 1.02 04/27/2024        40  minutes spent on the date of encounter doing chart review, review of test results, interpretation with above tests, patient visit, and documentation.        This note has been dictated using voice recognition software. Any grammatical, typographical, or context distortions are  unintentional and inherent to the software

## 2025-05-07 ENCOUNTER — LAB (OUTPATIENT)
Dept: CARDIOLOGY | Facility: CLINIC | Age: 80
End: 2025-05-07
Payer: MEDICARE

## 2025-05-07 ENCOUNTER — RESULTS FOLLOW-UP (OUTPATIENT)
Dept: CARDIOLOGY | Facility: CLINIC | Age: 80
End: 2025-05-07

## 2025-05-07 DIAGNOSIS — I50.32 HEART FAILURE WITH IMPROVED EJECTION FRACTION (HFIMPEF) (H): ICD-10-CM

## 2025-05-07 LAB
ANION GAP SERPL CALCULATED.3IONS-SCNC: 13 MMOL/L (ref 7–15)
BUN SERPL-MCNC: 20.9 MG/DL (ref 8–23)
CALCIUM SERPL-MCNC: 10.8 MG/DL (ref 8.8–10.4)
CHLORIDE SERPL-SCNC: 102 MMOL/L (ref 98–107)
CREAT SERPL-MCNC: 0.98 MG/DL (ref 0.67–1.17)
EGFRCR SERPLBLD CKD-EPI 2021: 79 ML/MIN/1.73M2
GLUCOSE SERPL-MCNC: 110 MG/DL (ref 70–99)
HCO3 SERPL-SCNC: 28 MMOL/L (ref 22–29)
POTASSIUM SERPL-SCNC: 4.1 MMOL/L (ref 3.4–5.3)
SODIUM SERPL-SCNC: 143 MMOL/L (ref 135–145)

## 2025-05-07 PROCEDURE — 36415 COLL VENOUS BLD VENIPUNCTURE: CPT

## 2025-05-07 PROCEDURE — 80048 BASIC METABOLIC PNL TOTAL CA: CPT

## 2025-05-12 ENCOUNTER — OFFICE VISIT (OUTPATIENT)
Dept: CARDIOLOGY | Facility: CLINIC | Age: 80
End: 2025-05-12
Payer: MEDICARE

## 2025-05-12 ENCOUNTER — TELEPHONE (OUTPATIENT)
Dept: VASCULAR SURGERY | Facility: CLINIC | Age: 80
End: 2025-05-12

## 2025-05-12 VITALS
BODY MASS INDEX: 25.89 KG/M2 | HEART RATE: 64 BPM | SYSTOLIC BLOOD PRESSURE: 138 MMHG | WEIGHT: 158 LBS | OXYGEN SATURATION: 95 % | DIASTOLIC BLOOD PRESSURE: 69 MMHG

## 2025-05-12 DIAGNOSIS — I73.9 PERIPHERAL ARTERY DISEASE: ICD-10-CM

## 2025-05-12 DIAGNOSIS — I65.21 ASYMPTOMATIC STENOSIS OF RIGHT CAROTID ARTERY: ICD-10-CM

## 2025-05-12 DIAGNOSIS — E78.5 HYPERLIPIDEMIA WITH TARGET LDL LESS THAN 70: ICD-10-CM

## 2025-05-12 DIAGNOSIS — I73.9 PERIPHERAL ARTERY DISEASE: Primary | ICD-10-CM

## 2025-05-12 DIAGNOSIS — I50.32 CHRONIC HEART FAILURE WITH PRESERVED EJECTION FRACTION (H): ICD-10-CM

## 2025-05-12 DIAGNOSIS — I25.10 CORONARY ARTERY DISEASE INVOLVING NATIVE CORONARY ARTERY OF NATIVE HEART WITHOUT ANGINA PECTORIS: Primary | ICD-10-CM

## 2025-05-12 DIAGNOSIS — I10 ESSENTIAL HYPERTENSION: ICD-10-CM

## 2025-05-12 DIAGNOSIS — I65.29 STENOSIS OF CAROTID ARTERY, UNSPECIFIED LATERALITY: ICD-10-CM

## 2025-05-12 DIAGNOSIS — Z87.891 PERSONAL HISTORY OF TOBACCO USE, PRESENTING HAZARDS TO HEALTH: ICD-10-CM

## 2025-05-12 DIAGNOSIS — L90.9 FAT PAD ATROPHY OF FOOT: ICD-10-CM

## 2025-05-12 PROCEDURE — G2211 COMPLEX E/M VISIT ADD ON: HCPCS | Performed by: GENERAL ACUTE CARE HOSPITAL

## 2025-05-12 PROCEDURE — 3075F SYST BP GE 130 - 139MM HG: CPT | Performed by: GENERAL ACUTE CARE HOSPITAL

## 2025-05-12 PROCEDURE — 3078F DIAST BP <80 MM HG: CPT | Performed by: GENERAL ACUTE CARE HOSPITAL

## 2025-05-12 PROCEDURE — 99215 OFFICE O/P EST HI 40 MIN: CPT | Performed by: GENERAL ACUTE CARE HOSPITAL

## 2025-05-12 NOTE — TELEPHONE ENCOUNTER
Attempt#1- wife answered and asked for a call back at a later time as patient was not home. [phone]

## 2025-05-12 NOTE — PROGRESS NOTES
HEART CARE ENCOUNTER NOTE        Assessment/Recommendations   Assessment:    Chronic heart failure with improved left ventricular ejection fraction of 50-55% due to ischemic cardiomyopathy. NYHA class II, euvolemic.  Coronary artery disease status post drug-eluting stenting x2 to the mid left anterior descending artery 1/8/2025. Pharmacologic nuclear stress testing 3/4/2025 showed no ischemia. He denies angina.  Asymptomatic severe right internal carotid artery stenosis last assessed on carotid ultrasound 7/16/2024.  Peripheral arterial disease status post bilateral lower extremity stenting and right-to-left femoral-femoral arterial bypass done in [place]. CT angiography of the lower extremities 12/13/2023 showed patent bilateral common and external iliac artery stents but occlusion of the femoral bypass graft. He notes worsening claudication symptoms.  Essential hypertension. Controlled.  Hyperlipidemia. Last LDL 28 mg/dL.  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  Non-small cell lung cancer of the bilateral upper lungs treated with resection, radiation therapy and chemotherapy in 2017 and 2018. I do not have further details on this.  Tobacco use.    Plan:  Continue bisoprolol 5 mg and losartan 25 mg daily  Spironolactone 25 mg daily  He could not afford a sodium-glucose co-transporter 2 inhibitor.  Continue furosemide mg daily.  Cilostazol was discontinued  as this is contraindicated in heart failure.  Follow-up with vascular surgery for further management of his peripheral arterial and carotid artery disease.  Aspirin 81 mg and clopidogrel 75 mg daily which he needs to take for at least 12 months (until 1/8/2026) for his myocardial infarction and percutaneous coronary intervention  Atorvastatin 80 mg daily  Recommended he quit smoking.  Follow-up with me in 6 months.       A total time of 40 minutes was spent on the date of this  encounter.    The longitudinal plan of care for the diagnosis(es)/condition(s) as documented were addressed during this visit. Due to the added complexity in care, I will continue to support Ubaldo in the subsequent management and with ongoing continuity of care.    History of Present Illness   Mr. Ubaldo Ramos is a 79 year old male with a significant past history of CAD s/p JIA x2 to the mid LAD 2025, HFpEF, PAD s/p lower extremity revascularization, and severe COPD presenting for follow-up.    After his initial NSTEMI and HF hospitalization in January 2024, he was hospitalized again in February and March with combined COPD/HF exacerbations. During this time, he was noted to have an elevated troponin but a nuclear stress test showed no ischemia. TTE showed his LVEF improved. We tried starting an SGLT2 inhibitor but he could not afford this. His furosemide dose was subsequently increased to 60 mg daily and he seems to be doing better with this.    We did stop cilostazol in January 2025 due to the presence of heart failure and he now notes burning in both calves when walking more than half a block. He also gets out of breath when walking but this seems stable. No chest pain/pressure/tightness, shortness of breath at rest, light headedness/dizziness, pre-syncope, syncope, lower extremity swelling, palpitations, paroxysmal nocturnal dyspnea (PND), or orthopnea.     Cardiac Problems and Cardiac Diagnostics     Most Recent Cardiac testing:  ECG dated 3/18/2025 (personaly reviewed and interpreted): sinus bradycardia HR 58 bpm, RBBB    Echocardiogram 3/28/2025 (results reviewed):   The left ventricle is normal in size.  There is normal left ventricular wall thickness.  The visual ejection fraction is 50-55%.  Grade II or moderate diastolic dysfunction.  There is borderline-mild global hypokinesia of the left ventricle.  Normal right ventricle size and systolic function.  The left atrium is mild to moderately dilated.  There  is mild (1+) mitral regurgitation.  No hemodynamically significant valvular abnormalities on 2D or color Doppler images.  IVC diameter <2.1 cm collapsing >50% with sniff suggests a normal RA pressure of 3 mmHg.     Stress test 3/4/2025 (results reviewed):    The nuclear stress test is negative for inducible myocardial ischemia or infarction.    The patient is at a low risk of future cardiac ischemic events.    Left ventricular function is normal.    The left ventricular ejection fraction at stress is 64%.    There is no prior study for comparison.    Cardiac Cath 1/8/2025 (results reviewed):   1.  Severe focal stenosis mid LAD at angulated takeoff adjacent to bifurcating septal branches.  2.  Large codominant left circumflex with 40 to 50% stenosis mid segment after takeoff of large OM1.  3.  Small RCA supplies 2 small LV branches.  No significant stenoses  4.  Elevated LVEDP = 30 mmHg  5.  Successful PCI mid LAD with 3.0 x 22 [place] JIA and 3.0 x 12 [place] JIA implants.  0% residual, KESHIA-3 flow.     Medications  Allergies   Current Outpatient Medications   Medication Sig Dispense Refill    acetaminophen (TYLENOL) 500 MG tablet Take 500-1,000 mg by mouth every 6 hours as needed for mild pain.      aspirin 81 MG EC tablet Take 1 tablet (81 mg) by mouth daily. Start tomorrow. 30 tablet 3    atorvastatin (LIPITOR) 80 MG tablet TAKE 1 TABLET BY MOUTH AT BEDTIME 90 tablet 2    betamethasone dipropionate (DIPROSONE) 0.05 % external cream Apply topically as needed.      bisoprolol (ZEBETA) 5 MG tablet Take 1 tablet (5 mg) by mouth daily. 30 tablet 0    budesonide-formoterol (SYMBICORT/BREYNA) 160-4.5 MCG/ACT Inhaler Inhale 2 puffs into the lungs 2 times daily. 10.2 g 3    cetirizine (ZYRTEC) 10 MG tablet Take 10 mg by mouth every morning.      cholecalciferol, vitamin D3, (VITAMIN D3) 25 mcg (1,000 unit) capsule [CHOLECALCIFEROL, VITAMIN D3, (VITAMIN D3) 25 MCG (1,000 UNIT) CAPSULE] Take 1,000 Units by mouth daily.       clopidogrel (PLAVIX) 75 MG tablet Take 1 tablet (75 mg) by mouth daily. Dose to start tomorrow. 90 tablet 3    cyanocobalamin 1000 MCG tablet [CYANOCOBALAMIN 1000 MCG TABLET] Take 1,000 mcg by mouth daily.      fluticasone (FLONASE) 50 MCG/ACT nasal spray Spray 2 sprays into both nostrils daily. 16 g 3    furosemide (LASIX) 40 MG tablet Take 1.5 tablets (60 mg) by mouth daily. 135 tablet 1    ipratropium - albuterol 0.5 mg/2.5 mg/3 mL (DUONEB) 0.5-2.5 (3) MG/3ML neb solution Take 1 vial (3 mLs) by nebulization every 4 hours as needed for shortness of breath, wheezing or cough.      LORazepam (ATIVAN) 0.5 MG tablet Take 1 tablet (0.5 mg) by mouth every 4 hours as needed for anxiety (shortness of breath). 10 tablet 0    losartan (COZAAR) 25 MG tablet Take 1 tablet (25 mg) by mouth 2 times daily. 90 tablet 2    oxyBUTYnin ER (DITROPAN XL) 10 MG 24 hr tablet Take 10 mg by mouth every other day.      pantoprazole (PROTONIX) 40 MG EC tablet TAKE 1 TABLET BY MOUTH ONCE DAILY 90 tablet 2    pregabalin (LYRICA) 150 MG capsule Take 1 capsule (150 mg) by mouth 3 times daily as needed. 270 capsule 1    sertraline (ZOLOFT) 25 MG tablet TAKE 1 TABLET(25 MG) BY MOUTH DAILY 90 tablet 1    spironolactone (ALDACTONE) 25 MG tablet Take 1 tablet (25 mg) by mouth daily. 90 tablet 3    traZODone (DESYREL) 50 MG tablet Take 1-2 tablets ( mg) by mouth at bedtime. 180 tablet 3      Allergies   Allergen Reactions    Adhesive Tape Unknown     Adhesive- pulls skin off         Physical Examination Review of Systems   /69 (BP Location: Left arm, Patient Position: Sitting, Cuff Size: Adult Regular)   Pulse 64   Wt 71.7 kg (158 lb)   SpO2 95%   BMI 25.89 kg/m    Body mass index is 25.89 kg/m .  Wt Readings from Last 3 Encounters:   05/12/25 71.7 kg (158 lb)   04/30/25 72.6 kg (160 lb)   04/01/25 70.5 kg (155 lb 6.4 oz)       General Appearance:   Pleasant  male, appears  stated age. no acute distress, normal body habitus    ENT/Mouth: membranes moist, no apparent gingival bleeding.      EYES:  no scleral icterus, normal conjunctivae   Neck: no carotid bruits. No anterior cervical lymphadenopaty   Respiratory:   Diminished breath sounds, no rales or wheezing, equal chest wall expansion    Cardiovascular:   Regular rhythm, normal rate. Normal first and second heart sounds with no murmurs, rubs, or gallops; the carotid, radial and posterior tibial pulses are intact, Jugular venous pressure normal, no edema bilaterally    Abdomen/GI:  no organomegaly, masses, bruits, or tenderness; bowel sounds are present   Extremities: no cyanosis or clubbing   Skin: no xanthelasma, warm.    Heme/lymph/ Immunology No apparent bleeding noted.   Neurologic: Alert and oriented. normal gait, no tremors     Psychiatric: Pleasant, calm, appropriate affect.    A complete 10 system review of systems was performed and is negative except as mentioned in the HPI/subjective.         Past History   Past Medical History:   Past Medical History:   Diagnosis Date    CAD (coronary artery disease)     CHF (congestive heart failure) (H)     COPD exacerbation (H) 07/31/2022    Pneumothorax on right 01/05/2025       Past Surgical History:   Past Surgical History:   Procedure Laterality Date    APPENDECTOMY      CERVICAL SPINE SURGERY      CHOLECYSTECTOMY      CV CORONARY ANGIOGRAM N/A 1/8/2025    Procedure: Coronary Angiogram;  Surgeon: Jamie Noriega MD;  Location: [facility] CV    CV INTRAVASULAR ULTRASOUND N/A 1/8/2025    Procedure: Intravascular Ultrasound;  Surgeon: Jamie Noriega MD;  Location: [facility]    CV LEFT HEART CATH N/A 1/8/2025    Procedure: Left Heart Catheterization;  Surgeon: Jamie Noriega MD;  Location: [facility]    CV PCI STENT DRUG ELUTING N/A 1/8/2025    Procedure: Percutaneous Coronary Intervention Stent;  Surgeon: Jamie Noriega MD;  Location: [facility] CV    FEMORAL ENDARTERECTOMY      LUNG CANCER  SURGERY      PROSTATECTOMY         Family History:   Family History   Problem Relation Age of Onset    Cancer No family hx of      Social History:   Social History     Socioeconomic History    Marital status:      Spouse name: Not on file    Number of children: Not on file    Years of education: Not on file    Highest education level: Not on file   Occupational History    Not on file   Tobacco Use    Smoking status: Every Day     Current packs/day: 0.50     Types: Cigarettes     Passive exposure: Current    Smokeless tobacco: Never   Vaping Use    Vaping status: Never Used   Substance and Sexual Activity    Alcohol use: Not Currently    Drug use: Not Currently    Sexual activity: Not on file   Other Topics Concern    Not on file   Social History Narrative    Not on file     Social Drivers of Health     Financial Resource Strain: Low Risk  (3/21/2025)    Financial Resource Strain     Within the past 12 months, have you or your family members you live with been unable to get utilities (heat, electricity) when it was really needed?: No   Food Insecurity: Low Risk  (3/21/2025)    Food Insecurity     Within the past 12 months, did you worry that your food would run out before you got money to buy more?: No     Within the past 12 months, did the food you bought just not last and you didn t have money to get more?: No   Transportation Needs: Low Risk  (3/21/2025)    Transportation Needs     Within the past 12 months, has lack of transportation kept you from medical appointments, getting your medicines, non-medical meetings or appointments, work, or from getting things that you need?: No   Physical Activity: Not on file   Stress: Not on file   Social Connections: Not on file   Interpersonal Safety: High Risk (3/21/2025)    Interpersonal Safety     Do you feel physically and emotionally safe where you currently live?: No     Within the past 12 months, have you been hit, slapped, kicked or otherwise physically hurt by  someone?: No     Within the past 12 months, have you been humiliated or emotionally abused in other ways by your partner or ex-partner?: No   Housing Stability: High Risk (3/21/2025)    Housing Stability     Do you have housing? : No     Are you worried about losing your housing?: No              Lab Results    Chemistry/lipid CBC Cardiac Enzymes/BNP/TSH/INR   Lab Results   Component Value Date    CHOL 87 02/08/2025    HDL 38 (L) 02/08/2025    LDL 28 02/08/2025    TRIG 107 02/08/2025    CR 0.98 05/07/2025    BUN 20.9 05/07/2025    POTASSIUM 4.1 05/07/2025     05/07/2025    CO2 28 05/07/2025      Lab Results   Component Value Date    WBC 6.1 03/20/2025    HGB 11.6 (L) 03/20/2025    HCT 35.0 (L) 03/20/2025    MCV 90 03/20/2025     03/20/2025    A1C 5.3 02/08/2025     Lab Results   Component Value Date    A1C 5.3 02/08/2025    Lab Results   Component Value Date    TROPONINI 0.02 07/31/2022    BNP 74 07/31/2022    NTBNP 358 02/18/2025    TSH 1.01 02/08/2025    INR 1.02 04/27/2024          Tristan Lau MD [facility]  Non-Invasive Cardiologist  [facility]  Pager [phone]

## 2025-05-12 NOTE — TELEPHONE ENCOUNTER
Vascular Referral Intake    Appointment note (to be pasted into appt note. Also add where additional info is located ie: outside images pushed to PACS, in Epic, sent to HIM, etc): Last seen by Dr. Schafer in January 2024 for carotid stenosis with 6 month follow-up recommended, saw DR. Callejas in July 2024 for PAD with PRN follow-up recommended    Referred by Tristan Lau MD for Asymptomatic stenosis of right carotid artery, Peripheral artery disease     Specialty: Vascular Surgery    Specific Provider if Necessary:  Dr. Flynn Schafer- established patient, overdue for follow-up    Visit Type: Return    Time Frame: Next Available    Testing/Imaging Needed Before Consult: US carotid, ABIs, BLE arterial ultrasound    Dianna or bed needed: No    *Schedulers: Please send welcome letter to patient after appointment(s) scheduled*

## 2025-05-12 NOTE — LETTER
5/12/2025    John Oconnor, NP  [address] [place] [address]    RE: Ubaldo Ramos       Dear Colleague,     I had the pleasure of seeing Ubaldo Ramos in the [facility].  HEART CARE ENCOUNTER NOTE        Assessment/Recommendations   Assessment:    Chronic heart failure with improved left ventricular ejection fraction of 50-55% due to ischemic cardiomyopathy. NYHA class II, euvolemic.  Coronary artery disease status post drug-eluting stenting x2 to the mid left anterior descending artery 1/8/2025. Pharmacologic nuclear stress testing 3/4/2025 showed no ischemia. He denies angina.  Asymptomatic severe right internal carotid artery stenosis last assessed on carotid ultrasound 7/16/2024.  Peripheral arterial disease status post bilateral lower extremity stenting and right-to-left femoral-femoral arterial bypass done in [place]. CT angiography of the lower extremities 12/13/2023 showed patent bilateral common and external iliac artery stents but occlusion of the femoral bypass graft. He notes worsening claudication symptoms.  Essential hypertension. Controlled.  Hyperlipidemia. Last LDL 28 mg/dL.  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  Non-small cell lung cancer of the bilateral upper lungs treated with resection, radiation therapy and chemotherapy in 2017 and 2018. I do not have further details on this.  Tobacco use.    Plan:  Continue bisoprolol 5 mg and losartan 25 mg daily  Spironolactone 25 mg daily  He could not afford a sodium-glucose co-transporter 2 inhibitor.  Continue furosemide mg daily.  Cilostazol was discontinued  as this is contraindicated in heart failure.  Follow-up with vascular surgery for further management of his peripheral arterial and carotid artery disease.  Aspirin 81 mg and clopidogrel 75 mg daily which he needs to take for at least 12 months (until 1/8/2026) for his myocardial  infarction and percutaneous coronary intervention  Atorvastatin 80 mg daily  Recommended he quit smoking.  Follow-up with me in 6 months.       A total time of 40 minutes was spent on the date of this encounter.    The longitudinal plan of care for the diagnosis(es)/condition(s) as documented were addressed during this visit. Due to the added complexity in care, I will continue to support Ubaldo in the subsequent management and with ongoing continuity of care.    History of Present Illness   Mr. Ubaldo Ramos is a 79 year old male with a significant past history of CAD s/p JIA x2 to the mid LAD 2025, HFpEF, PAD s/p lower extremity revascularization, and severe COPD presenting for follow-up.    After his initial NSTEMI and HF hospitalization in January 2024, he was hospitalized again in February and March with combined COPD/HF exacerbations. During this time, he was noted to have an elevated troponin but a nuclear stress test showed no ischemia. TTE showed his LVEF improved. We tried starting an SGLT2 inhibitor but he could not afford this. His furosemide dose was subsequently increased to 60 mg daily and he seems to be doing better with this.    We did stop cilostazol in January 2025 due to the presence of heart failure and he now notes burning in both calves when walking more than half a block. He also gets out of breath when walking but this seems stable. No chest pain/pressure/tightness, shortness of breath at rest, light headedness/dizziness, pre-syncope, syncope, lower extremity swelling, palpitations, paroxysmal nocturnal dyspnea (PND), or orthopnea.     Cardiac Problems and Cardiac Diagnostics     Most Recent Cardiac testing:  ECG dated 3/18/2025 (personaly reviewed and interpreted): sinus bradycardia HR 58 bpm, RBBB    Echocardiogram 3/28/2025 (results reviewed):   The left ventricle is normal in size.  There is normal left ventricular wall thickness.  The visual ejection fraction is 50-55%.  Grade II or  moderate diastolic dysfunction.  There is borderline-mild global hypokinesia of the left ventricle.  Normal right ventricle size and systolic function.  The left atrium is mild to moderately dilated.  There is mild (1+) mitral regurgitation.  No hemodynamically significant valvular abnormalities on 2D or color Doppler images.  IVC diameter <2.1 cm collapsing >50% with sniff suggests a normal RA pressure of 3 mmHg.     Stress test 3/4/2025 (results reviewed):     The nuclear stress test is negative for inducible myocardial ischemia or infarction.     The patient is at a low risk of future cardiac ischemic events.     Left ventricular function is normal.     The left ventricular ejection fraction at stress is 64%.     There is no prior study for comparison.    Cardiac Cath 1/8/2025 (results reviewed):   1.  Severe focal stenosis mid LAD at angulated takeoff adjacent to bifurcating septal branches.  2.  Large codominant left circumflex with 40 to 50% stenosis mid segment after takeoff of large OM1.  3.  Small RCA supplies 2 small LV branches.  No significant stenoses  4.  Elevated LVEDP = 30 mmHg  5.  Successful PCI mid LAD with 3.0 x 22 [place] JIA and 3.0 x 12 [place] JIA implants.  0% residual, KESHIA-3 flow.     Medications  Allergies   Current Outpatient Medications   Medication Sig Dispense Refill     acetaminophen (TYLENOL) 500 MG tablet Take 500-1,000 mg by mouth every 6 hours as needed for mild pain.       aspirin 81 MG EC tablet Take 1 tablet (81 mg) by mouth daily. Start tomorrow. 30 tablet 3     atorvastatin (LIPITOR) 80 MG tablet TAKE 1 TABLET BY MOUTH AT BEDTIME 90 tablet 2     betamethasone dipropionate (DIPROSONE) 0.05 % external cream Apply topically as needed.       bisoprolol (ZEBETA) 5 MG tablet Take 1 tablet (5 mg) by mouth daily. 30 tablet 0     budesonide-formoterol (SYMBICORT/BREYNA) 160-4.5 MCG/ACT Inhaler Inhale 2 puffs into the lungs 2 times daily. 10.2 g 3     cetirizine (ZYRTEC) 10 MG tablet Take  10 mg by mouth every morning.       cholecalciferol, vitamin D3, (VITAMIN D3) 25 mcg (1,000 unit) capsule [CHOLECALCIFEROL, VITAMIN D3, (VITAMIN D3) 25 MCG (1,000 UNIT) CAPSULE] Take 1,000 Units by mouth daily.       clopidogrel (PLAVIX) 75 MG tablet Take 1 tablet (75 mg) by mouth daily. Dose to start tomorrow. 90 tablet 3     cyanocobalamin 1000 MCG tablet [CYANOCOBALAMIN 1000 MCG TABLET] Take 1,000 mcg by mouth daily.       fluticasone (FLONASE) 50 MCG/ACT nasal spray Spray 2 sprays into both nostrils daily. 16 g 3     furosemide (LASIX) 40 MG tablet Take 1.5 tablets (60 mg) by mouth daily. 135 tablet 1     ipratropium - albuterol 0.5 mg/2.5 mg/3 mL (DUONEB) 0.5-2.5 (3) MG/3ML neb solution Take 1 vial (3 mLs) by nebulization every 4 hours as needed for shortness of breath, wheezing or cough.       LORazepam (ATIVAN) 0.5 MG tablet Take 1 tablet (0.5 mg) by mouth every 4 hours as needed for anxiety (shortness of breath). 10 tablet 0     losartan (COZAAR) 25 MG tablet Take 1 tablet (25 mg) by mouth 2 times daily. 90 tablet 2     oxyBUTYnin ER (DITROPAN XL) 10 MG 24 hr tablet Take 10 mg by mouth every other day.       pantoprazole (PROTONIX) 40 MG EC tablet TAKE 1 TABLET BY MOUTH ONCE DAILY 90 tablet 2     pregabalin (LYRICA) 150 MG capsule Take 1 capsule (150 mg) by mouth 3 times daily as needed. 270 capsule 1     sertraline (ZOLOFT) 25 MG tablet TAKE 1 TABLET(25 MG) BY MOUTH DAILY 90 tablet 1     spironolactone (ALDACTONE) 25 MG tablet Take 1 tablet (25 mg) by mouth daily. 90 tablet 3     traZODone (DESYREL) 50 MG tablet Take 1-2 tablets ( mg) by mouth at bedtime. 180 tablet 3      Allergies   Allergen Reactions     Adhesive Tape Unknown     Adhesive- pulls skin off         Physical Examination Review of Systems   /69 (BP Location: Left arm, Patient Position: Sitting, Cuff Size: Adult Regular)   Pulse 64   Wt 71.7 kg (158 lb)   SpO2 95%   BMI 25.89 kg/m    Body mass index is 25.89 kg/m .  Wt Readings  from Last 3 Encounters:   05/12/25 71.7 kg (158 lb)   04/30/25 72.6 kg (160 lb)   04/01/25 70.5 kg (155 lb 6.4 oz)       General Appearance:   Pleasant  male, appears  stated age. no acute distress, normal body habitus   ENT/Mouth: membranes moist, no apparent gingival bleeding.      EYES:  no scleral icterus, normal conjunctivae   Neck: no carotid bruits. No anterior cervical lymphadenopaty   Respiratory:   Diminished breath sounds, no rales or wheezing, equal chest wall expansion    Cardiovascular:   Regular rhythm, normal rate. Normal first and second heart sounds with no murmurs, rubs, or gallops; the carotid, radial and posterior tibial pulses are intact, Jugular venous pressure normal, no edema bilaterally    Abdomen/GI:  no organomegaly, masses, bruits, or tenderness; bowel sounds are present   Extremities: no cyanosis or clubbing   Skin: no xanthelasma, warm.    Heme/lymph/ Immunology No apparent bleeding noted.   Neurologic: Alert and oriented. normal gait, no tremors     Psychiatric: Pleasant, calm, appropriate affect.    A complete 10 system review of systems was performed and is negative except as mentioned in the HPI/subjective.         Past History   Past Medical History:   Past Medical History:   Diagnosis Date     CAD (coronary artery disease)      CHF (congestive heart failure) (H)      COPD exacerbation (H) 07/31/2022     Pneumothorax on right 01/05/2025       Past Surgical History:   Past Surgical History:   Procedure Laterality Date     APPENDECTOMY       CERVICAL SPINE SURGERY       CHOLECYSTECTOMY       CV CORONARY ANGIOGRAM N/A 1/8/2025    Procedure: Coronary Angiogram;  Surgeon: Jamie Noriega MD;  Location: [facility] CV     CV INTRAVASULAR ULTRASOUND N/A 1/8/2025    Procedure: Intravascular Ultrasound;  Surgeon: Jamie Noriega MD;  Location: [facility] CV     CV LEFT HEART CATH N/A 1/8/2025    Procedure: Left Heart Catheterization;  Surgeon: Jamie Noriega MD;   Location: [facility] LAB CV     CV PCI STENT DRUG ELUTING N/A 1/8/2025    Procedure: Percutaneous Coronary Intervention Stent;  Surgeon: Jamie Noriega MD;  Location: [facility] CV     FEMORAL ENDARTERECTOMY       LUNG CANCER SURGERY       PROSTATECTOMY         Family History:   Family History   Problem Relation Age of Onset     Cancer No family hx of      Social History:   Social History     Socioeconomic History     Marital status:      Spouse name: Not on file     Number of children: Not on file     Years of education: Not on file     Highest education level: Not on file   Occupational History     Not on file   Tobacco Use     Smoking status: Every Day     Current packs/day: 0.50     Types: Cigarettes     Passive exposure: Current     Smokeless tobacco: Never   Vaping Use     Vaping status: Never Used   Substance and Sexual Activity     Alcohol use: Not Currently     Drug use: Not Currently     Sexual activity: Not on file   Other Topics Concern     Not on file   Social History Narrative     Not on file     Social Drivers of Health     Financial Resource Strain: Low Risk  (3/21/2025)    Financial Resource Strain      Within the past 12 months, have you or your family members you live with been unable to get utilities (heat, electricity) when it was really needed?: No   Food Insecurity: Low Risk  (3/21/2025)    Food Insecurity      Within the past 12 months, did you worry that your food would run out before you got money to buy more?: No      Within the past 12 months, did the food you bought just not last and you didn t have money to get more?: No   Transportation Needs: Low Risk  (3/21/2025)    Transportation Needs      Within the past 12 months, has lack of transportation kept you from medical appointments, getting your medicines, non-medical meetings or appointments, work, or from getting things that you need?: No   Physical Activity: Not on file   Stress: Not on file   Social Connections:  Not on file   Interpersonal Safety: High Risk (3/21/2025)    Interpersonal Safety      Do you feel physically and emotionally safe where you currently live?: No      Within the past 12 months, have you been hit, slapped, kicked or otherwise physically hurt by someone?: No      Within the past 12 months, have you been humiliated or emotionally abused in other ways by your partner or ex-partner?: No   Housing Stability: High Risk (3/21/2025)    Housing Stability      Do you have housing? : No      Are you worried about losing your housing?: No              Lab Results    Chemistry/lipid CBC Cardiac Enzymes/BNP/TSH/INR   Lab Results   Component Value Date    CHOL 87 02/08/2025    HDL 38 (L) 02/08/2025    LDL 28 02/08/2025    TRIG 107 02/08/2025    CR 0.98 05/07/2025    BUN 20.9 05/07/2025    POTASSIUM 4.1 05/07/2025     05/07/2025    CO2 28 05/07/2025      Lab Results   Component Value Date    WBC 6.1 03/20/2025    HGB 11.6 (L) 03/20/2025    HCT 35.0 (L) 03/20/2025    MCV 90 03/20/2025     03/20/2025    A1C 5.3 02/08/2025     Lab Results   Component Value Date    A1C 5.3 02/08/2025    Lab Results   Component Value Date    TROPONINI 0.02 07/31/2022    BNP 74 07/31/2022    NTBNP 358 02/18/2025    TSH 1.01 02/08/2025    INR 1.02 04/27/2024          Tristan Lau MD [facility]  Non-Invasive Cardiologist  [facility]  Pager [phone]      Thank you for allowing me to participate in the care of your patient.      Sincerely,     Tristan Lau MD     [facility]  cc:   Tristan Lau MD  [address]  [place] [address]

## 2025-05-12 NOTE — PATIENT INSTRUCTIONS
Continue your current medications.  We will schedule you for ultrasounds of your neck and leg arteries.  I would like to follow-up with the vascular surgeons.  Continue your current heart medications.  See me back in 6 months.

## 2025-05-21 NOTE — PROGRESS NOTES
Assessment & Plan     ICD-10-CM    1. Moderate episode of recurrent major depressive disorder (H)  F33.1 sertraline (ZOLOFT) 25 MG tablet      2. Benign Essential Hypertension  I10       3. Elevated troponin  R79.89       4. Primary insomnia  F51.01 traZODone (DESYREL) 50 MG tablet        Will message care coordinator to see if there is anything we can do about the Medicare issues of having both PT/OT and cardiac rehab.  Cough slowly improving.  Let me know if worsening then we can treat.  Keep follow-up with cardiology.  No change to medications prescribed by cardiology.  Worsening mood that has been present for some time now.  Discussed therapy versus treatment with medication.  Most interested in SSRI treatment.  After risks and benefits were discussed we elected to start sertraline.  Virtual follow-up with me in 6 weeks.    MED REC REQUIRED  Post Medication Reconciliation Status: discharge medications reconciled and changed, per note/orders    Reviewed metabolic panel x 2, angiogram x 1, hospitalist note x 1, ED note x 1, CBC x 1, chest x-ray x 2    Subjective     HPI     Patient presents today following hospitalization from January 8-January 10 of this year.  Presented to the hospital with acute shortness of breath after a mechanical fall 3 days prior.  Found to have broken rib and pneumothorax.  Received chest tube.  Noted elevated troponin and transferred to [facility] for angiogram with stent placement.    Echo LVEF-35-40% with severe hypokinesis of mid apical septal and mid apical inferior and apex.    Chest tube removed in hospital.  Still has a bit of discomfort, but tolerating.  Breathing is fine.  VSS.  HCTZ was discontinued and started on spironolactone.  Continues with losartan and bisoprolol. Cilostazol was Dc'd due to decreased EF.      Since discharge has been coughing up thick yellow phlegm.  No fever or chills. No SOB or worsening ACEVEDO.  Cough has been getting better.      Has noticed  "mood is quite sour.  Wishes he was in [place] but wife likes it here. Is struggling with not having things to do and his body can't do the hobbies he once enjoyed.  Finds lack of motivation. Not many relationships outside of his marriage.  No history of medication or therapy interventions for depression. No SI/HI.  Is hesitant on doing talk therapy.          Review of Systems - negative except for what's listed in the HPI      Objective    /70 (BP Location: Right arm, Patient Position: Sitting, Cuff Size: Adult Regular)   Pulse 67   Temp 97.6  F (36.4  C) (Oral)   Resp 18   Ht 1.651 m (5' 5\")   Wt 74.2 kg (163 lb 8 oz)   SpO2 95%   BMI 27.21 kg/m    Physical Exam   General appearance - alert, well appearing, and in no distress  Mental status - alert, oriented to person, place, and time  Eyes -PERRLA, sclera white  Lymphatics - no palpable lymphadenopathy  Chest - clear to auscultation, no wheezes, rales or rhonchi, symmetric air entry  Heart - normal rate and regular rhythm, S1 and S2 normal, no murmurs noted  Abdomen - soft, nontender, nondistended, no masses or organomegaly  Neurological - alert, oriented, normal speech, no focal findings or movement disorder noted, neck supple without rigidity, cranial nerves II through XII intact, motor and sensory grossly normal bilaterally, normal muscle tone, no tremors, strength 5/5  Extremities - peripheral pulses normal, no peripheral edema  Skin - normal coloration and turgor.    John Oconnor, CNP    This note has been dictated using voice recognition software. Any grammatical or context distortions are unintentional and inherent to the software.    " 21-May-2025

## 2025-05-28 DIAGNOSIS — J44.1 COPD EXACERBATION (H): ICD-10-CM

## 2025-05-29 RX ORDER — BUDESONIDE AND FORMOTEROL FUMARATE 160; 4.5 UG/1; UG/1
2 AEROSOL, METERED RESPIRATORY (INHALATION) 2 TIMES DAILY
Qty: 10.2 G | Refills: 3 | Status: SHIPPED | OUTPATIENT
Start: 2025-05-29

## 2025-06-04 DIAGNOSIS — E78.5 HYPERLIPIDEMIA LDL GOAL <100: ICD-10-CM

## 2025-06-05 RX ORDER — ATORVASTATIN CALCIUM 80 MG/1
80 TABLET, FILM COATED ORAL AT BEDTIME
Qty: 90 TABLET | Refills: 1 | Status: SHIPPED | OUTPATIENT
Start: 2025-06-05

## 2025-06-10 ENCOUNTER — HOSPITAL ENCOUNTER (OUTPATIENT)
Dept: ULTRASOUND IMAGING | Facility: CLINIC | Age: 80
Discharge: HOME | End: 2025-06-10
Attending: GENERAL ACUTE CARE HOSPITAL
Payer: MEDICARE

## 2025-06-10 DIAGNOSIS — I73.9 PERIPHERAL ARTERY DISEASE: ICD-10-CM

## 2025-06-10 PROCEDURE — 93923 UPR/LXTR ART STDY 3+ LVLS: CPT

## 2025-06-10 PROCEDURE — 93925 LOWER EXTREMITY STUDY: CPT

## 2025-06-11 ENCOUNTER — OFFICE VISIT (OUTPATIENT)
Dept: FAMILY MEDICINE | Facility: CLINIC | Age: 80
End: 2025-06-11
Payer: MEDICARE

## 2025-06-11 VITALS
RESPIRATION RATE: 18 BRPM | SYSTOLIC BLOOD PRESSURE: 120 MMHG | HEIGHT: 66 IN | BODY MASS INDEX: 24.22 KG/M2 | OXYGEN SATURATION: 99 % | HEART RATE: 51 BPM | WEIGHT: 150.7 LBS | DIASTOLIC BLOOD PRESSURE: 74 MMHG | TEMPERATURE: 97.9 F

## 2025-06-11 DIAGNOSIS — R25.1 TREMOR: Primary | ICD-10-CM

## 2025-06-11 DIAGNOSIS — R23.3 EASY BRUISING: ICD-10-CM

## 2025-06-11 DIAGNOSIS — G62.9 PERIPHERAL POLYNEUROPATHY: ICD-10-CM

## 2025-06-11 LAB
APTT PPP: 31 SECONDS (ref 22–38)
ERYTHROCYTE [DISTWIDTH] IN BLOOD BY AUTOMATED COUNT: 15.1 % (ref 10–15)
HCT VFR BLD AUTO: 40.6 % (ref 40–53)
HGB BLD-MCNC: 12.5 G/DL (ref 13.3–17.7)
INR PPP: 1.07 (ref 0.85–1.15)
MCH RBC QN AUTO: 28.1 PG (ref 26.5–33)
MCHC RBC AUTO-ENTMCNC: 30.8 G/DL (ref 31.5–36.5)
MCV RBC AUTO: 91 FL (ref 78–100)
PLATELET # BLD AUTO: 119 10E3/UL (ref 150–450)
PROTHROMBIN TIME: 13.9 SECONDS (ref 11.8–14.8)
RBC # BLD AUTO: 4.45 10E6/UL (ref 4.4–5.9)
WBC # BLD AUTO: 4.7 10E3/UL (ref 4–11)

## 2025-06-11 PROCEDURE — 3078F DIAST BP <80 MM HG: CPT | Performed by: NURSE PRACTITIONER

## 2025-06-11 PROCEDURE — 36415 COLL VENOUS BLD VENIPUNCTURE: CPT | Performed by: NURSE PRACTITIONER

## 2025-06-11 PROCEDURE — 3074F SYST BP LT 130 MM HG: CPT | Performed by: NURSE PRACTITIONER

## 2025-06-11 PROCEDURE — 99214 OFFICE O/P EST MOD 30 MIN: CPT | Performed by: NURSE PRACTITIONER

## 2025-06-11 PROCEDURE — 80076 HEPATIC FUNCTION PANEL: CPT | Performed by: NURSE PRACTITIONER

## 2025-06-11 PROCEDURE — 1126F AMNT PAIN NOTED NONE PRSNT: CPT | Performed by: NURSE PRACTITIONER

## 2025-06-11 PROCEDURE — G2211 COMPLEX E/M VISIT ADD ON: HCPCS | Performed by: NURSE PRACTITIONER

## 2025-06-11 PROCEDURE — 85027 COMPLETE CBC AUTOMATED: CPT | Performed by: NURSE PRACTITIONER

## 2025-06-11 PROCEDURE — 85610 PROTHROMBIN TIME: CPT | Performed by: NURSE PRACTITIONER

## 2025-06-11 PROCEDURE — 85730 THROMBOPLASTIN TIME PARTIAL: CPT | Performed by: NURSE PRACTITIONER

## 2025-06-11 ASSESSMENT — PAIN SCALES - GENERAL: PAINLEVEL_OUTOF10: NO PAIN (0)

## 2025-06-11 NOTE — PROGRESS NOTES
"Assessment & Plan     ICD-10-CM    1. Tremor  R25.1 Adult Neurology  Referral      2. Peripheral polyneuropathy  G62.9 amitriptyline (ELAVIL) 25 MG tablet      3. Easy bruising  R23.3 INR     Partial thromboplastin time     Hepatic panel (Albumin, ALT, AST, Bili, Alk Phos, TP)     CBC with platelets     INR     Partial thromboplastin time     Hepatic panel (Albumin, ALT, AST, Bili, Alk Phos, TP)     CBC with platelets        No clear sign of intention tremor. No cogwheel rigidity.  No benefit with propranolol.  Neurology consultation ordered.  Suspect bruising related to aspirin/Plavix use along with advanced age but rule out hepatic issues or clotting problems with lab work today. Discussed different ways to manage neuropathy symptoms. Try switching out trazodone for amitriptyline.  Reviewed potential side effects.  Reviewed increasing dosing over the next few weeks depending on response to therapy and side effects.    Reviewed cardiology note x 2    The longitudinal plan of care for the diagnosis(es)/condition(s) as documented were addressed during this visit. Due to the added complexity in care, I will continue to support Ubaldo in the subsequent management and with ongoing continuity of care.      Subjective     HPI     Bruising more prevalent the past couple of months. On plavix and aspirin. No oral or rectal bleeding. No lightheadedness. No abdominal pain or jaundice.     Tremors worsening the past couple of months.  Previously tried propranolol. Feels like worsening over the whole body.  Feels more jittery. Ct head done 6 months ago. No worsening balance issues. No stutter step with walking. No cogwheel rigidity.     Did follow-up with cardiology.    Review of Systems - negative except for what's listed in the HPI      Objective    /74 (BP Location: Left arm, Patient Position: Sitting, Cuff Size: Adult Regular)   Pulse 51   Temp 97.9  F (36.6  C) (Oral)   Resp 18   Ht 1.664 m (5' 5.5\")   Wt " 68.4 kg (150 lb 11.2 oz)   SpO2 99%   BMI 24.70 kg/m    Physical Exam   General appearance - alert, well appearing, and in no distress  Mental status - alert, oriented to person, place, and time  Eyes -Sclera white. PERRLA  Ears - bilateral TM's and external ear canals normal  Nose - normal and patent, no erythema, discharge or polyps  Mouth - mucous membranes moist. No oral lesions.  Neck - no significant adenopathy  Lymphatics - no palpable lymphadenopathy  Chest - clear to auscultation, no wheezes, rales or rhonchi, symmetric air entry  Heart - normal rate and regular rhythm, S1 and S2 normal, no murmurs noted  Abdomen - soft, nontender, nondistended, no masses or organomegaly  Neurological - alert, oriented, normal speech, no focal findings or movement disorder noted, neck supple without rigidity, cranial nerves II through XII intact, motor and sensory grossly normal bilaterally, normal muscle tone, no tremors, strength 5/5  Musculoskeletal - no joint tenderness, deformity or swelling  Extremities - peripheral pulses normal, no peripheral edema  Skin - normal coloration and turgor.    John Oconnor, CNP    This note has been dictated using voice recognition software. Any grammatical or context distortions are unintentional and inherent to the software.

## 2025-06-11 NOTE — PATIENT INSTRUCTIONS
I sent Amitriptyline to the pharmacy to take at bedtime to see if it helps with neuropathy.      After 1 week if you still are having issues with neuropathy you can increase to taking 2 tablets at bedtime.  After another week if still having issues you can increase to 3 tablets.    You can use this to replace your trazodone.    Remember, the goal is to make neuropathy tolerable.    Keep me updated as to how things go and we can adjust dosing at your pharmacy    Updating labs today to look into the easy bruising.  I suspect this is likely a side effect of your clopidogrel/Plavix along with aging but we will make sure that there are no clotting issues or problems with the liver    I did place a referral to neurology.  The scheduling information for this is highlighted in your paperwork to further look into the tremors.    Cardiology already updated your spironolactone at the pharmacy for the full tablet going forward.

## 2025-06-12 ENCOUNTER — PATIENT OUTREACH (OUTPATIENT)
Dept: CARE COORDINATION | Facility: CLINIC | Age: 80
End: 2025-06-12
Payer: MEDICARE

## 2025-06-12 ENCOUNTER — RESULTS FOLLOW-UP (OUTPATIENT)
Dept: FAMILY MEDICINE | Facility: CLINIC | Age: 80
End: 2025-06-12
Payer: MEDICARE

## 2025-06-12 DIAGNOSIS — F33.1 MODERATE EPISODE OF RECURRENT MAJOR DEPRESSIVE DISORDER (H): ICD-10-CM

## 2025-06-12 DIAGNOSIS — D69.6 THROMBOCYTOPENIA: Primary | ICD-10-CM

## 2025-06-12 LAB
ALBUMIN SERPL BCG-MCNC: 4.6 G/DL (ref 3.5–5.2)
ALP SERPL-CCNC: 83 U/L (ref 40–150)
ALT SERPL W P-5'-P-CCNC: 17 U/L (ref 0–70)
AST SERPL W P-5'-P-CCNC: 25 U/L (ref 0–45)
BILIRUB SERPL-MCNC: 0.3 MG/DL
BILIRUBIN DIRECT (ROCHE PRO & PURE): 0.12 MG/DL (ref 0–0.45)
PROT SERPL-MCNC: 7.3 G/DL (ref 6.4–8.3)

## 2025-06-12 RX ORDER — SERTRALINE HYDROCHLORIDE 25 MG/1
25 TABLET, FILM COATED ORAL DAILY
Qty: 90 TABLET | Refills: 1 | OUTPATIENT
Start: 2025-06-12

## 2025-06-16 ENCOUNTER — PATIENT OUTREACH (OUTPATIENT)
Dept: CARE COORDINATION | Facility: CLINIC | Age: 80
End: 2025-06-16
Payer: MEDICARE

## 2025-06-25 ENCOUNTER — TRANSFERRED RECORDS (OUTPATIENT)
Dept: HEALTH INFORMATION MANAGEMENT | Facility: CLINIC | Age: 80
End: 2025-06-25
Payer: MEDICARE

## 2025-07-02 ENCOUNTER — PATIENT OUTREACH (OUTPATIENT)
Dept: FAMILY MEDICINE | Facility: CLINIC | Age: 80
End: 2025-07-02

## 2025-07-02 ENCOUNTER — OFFICE VISIT (OUTPATIENT)
Dept: CARDIOLOGY | Facility: CLINIC | Age: 80
End: 2025-07-02
Attending: NURSE PRACTITIONER
Payer: MEDICARE

## 2025-07-02 VITALS
WEIGHT: 152 LBS | OXYGEN SATURATION: 97 % | BODY MASS INDEX: 24.91 KG/M2 | SYSTOLIC BLOOD PRESSURE: 117 MMHG | HEART RATE: 63 BPM | DIASTOLIC BLOOD PRESSURE: 66 MMHG

## 2025-07-02 DIAGNOSIS — I73.9 PERIPHERAL ARTERY DISEASE: Chronic | ICD-10-CM

## 2025-07-02 DIAGNOSIS — Z72.0 TOBACCO ABUSE: Chronic | ICD-10-CM

## 2025-07-02 DIAGNOSIS — I10 ESSENTIAL HYPERTENSION, BENIGN: Chronic | ICD-10-CM

## 2025-07-02 DIAGNOSIS — I25.10 CORONARY ARTERY DISEASE INVOLVING NATIVE CORONARY ARTERY OF NATIVE HEART WITHOUT ANGINA PECTORIS: ICD-10-CM

## 2025-07-02 DIAGNOSIS — I50.32 HEART FAILURE WITH IMPROVED EJECTION FRACTION (HFIMPEF) (H): Primary | ICD-10-CM

## 2025-07-02 PROBLEM — I50.31 ACUTE HEART FAILURE WITH PRESERVED EJECTION FRACTION (HFPEF) (H): Status: RESOLVED | Noted: 2025-02-08 | Resolved: 2025-07-02

## 2025-07-02 PROCEDURE — 3074F SYST BP LT 130 MM HG: CPT | Performed by: NURSE PRACTITIONER

## 2025-07-02 PROCEDURE — G2211 COMPLEX E/M VISIT ADD ON: HCPCS | Performed by: NURSE PRACTITIONER

## 2025-07-02 PROCEDURE — 3078F DIAST BP <80 MM HG: CPT | Performed by: NURSE PRACTITIONER

## 2025-07-02 PROCEDURE — 99214 OFFICE O/P EST MOD 30 MIN: CPT | Performed by: NURSE PRACTITIONER

## 2025-07-02 RX ORDER — BISOPROLOL FUMARATE 5 MG/1
5 TABLET, FILM COATED ORAL DAILY
Qty: 90 TABLET | Refills: 3 | Status: SHIPPED | OUTPATIENT
Start: 2025-07-02

## 2025-07-02 RX ORDER — FUROSEMIDE 40 MG/1
TABLET ORAL
Qty: 135 TABLET | Refills: 3 | Status: SHIPPED | OUTPATIENT
Start: 2025-07-02

## 2025-07-02 NOTE — PROGRESS NOTES
Assessment/Recommendations   Assessment:    #  Heart failure with improved/preserved ejection fraction with LVEF of 50 to 55% per echo in March 2025, ischemic cardiomyopathy, NYHA Class II:    Current weight is 147-149 lbs.     Patient is well compensated on exam.    Heart failure regimen includes:    -Beta-blocker therapy with bisoprolol 5 mg daily    -ARB  therapy with losartan 25 mg twice a day    -Aldosterone blocker/MRA therapy with spironolactone 25 mg daily-tolerating    - Not on SGLT2 Inhibitor -Jardiance stopped due to cost issue.  Did not qualify for financial aid per patient.    -Diuretic therapy with furosemide 60 mg daily-taking only 40 mg once a day    #   Coronary artery disease with status post PCI/JIA to mid LAD on 1/8/2025: Stress test negative for inducible myocardial ischemia in March 2025.  No chest pain.    # Hypertension: Blood pressure improved and stable.    #Tobacco abuse: Smokes half a pack a day.  He has no plans to quit    #  Peripheral artery disease: Patient has follow-up appointment with vascular clinic.    Plan/Recommendation:  - Changed furosemide from 60 mg daily to 40 mg 1 tablet daily. May take extra 40 mg 1 tablet as needed for weight gain > 3 lbs with shortness of breath, bloating and leg swelling   - Low sodium diet < 2000 mg/day, daily weight monitoring, and maintain adequate fluid intake at 50 to 60 ounces per day  -Please call if you experience persistent weight gain 2 to 3 pounds 2 days in a row or 5 pounds in a week with shortness of breath, abdominal bloating and leg swelling    - Follow up with Dr. Lau as recommended in November 2025    - Follow up with Nato Becker CNP in 8-9 months      The longitudinal plan of care for heart failure with preserved/improved ejection fraction, hypertension was addressed during this visit.?Due to the added complexity in care, I will continue to support Ubaldo Ramos in the subsequent management of this condition(s) and with  "the ongoing continuity of care of this condition(s)\".     History of Present Illness/Subjective    Mr. Ubaldo Ramos is a 79 year old male with a past medical history of prostate cancer, lung cancer, cervical spine fusion, COPD, peripheral artery disease with status post bilateral lower extremity stenting and right to left femoral to femoral artery bypass done in [place] and known severe right internal carotid artery stenosis per carotid ultrasound in July 2024 follows up with vascular clinic, hypertension, recent hospitalization in January with close rib fractures secondary to fall, pneumothorax, new onset of cardiomyopathy with LVEF of 35 to 40%, rehospitalization in January with acute coronary syndrome, non-STEMI with status post PCI/JIA to mid LAD on 1/8/2025, rehospitalization in February with acute hypoxic respiratory failure, parainfluenza, acute COPD exacerbation and acute systolic heart failure, and acute on chronic systolic heart failure, most recent hospitalization in March with hematuria, bradycardia, COPD exacerbation, and acute heart failure with improved/preserved ejection fraction,  who is seen at [facility] for continued heart failure follow-up.      During last heart failure clinic visit, patient reported significant improvement in his heart failure symptoms since recent hospitalization in March.  He reported 5 pounds weight gain with increased lower extremity edema.  He reported chronic shortness of breath on exertion at baseline due to COPD and smoking.     Today, Ubaldo reports improvement in his lower extremity edema since last heart failure clinic visit.  He has been tolerating increased dose of spironolactone with no adverse effects.  He states he did not increase his furosemide as recommended.  His chronic shortness of breath at baseline which he thinks is due to COPD and smoking.        He denies fatigue, lightheadedness, shortness of breath, orthopnea, " PND, palpitations, chest pain, abdominal fullness/bloating, and lower extremity edema.  He does not report any bleeding complications    He is trying to follow some diet.  He reports adequate fluid intake.    ECHO from 3/28/25-Reviewed:   Interpretation Summary   The left ventricle is normal in size.  There is normal left ventricular wall thickness.  The visual ejection fraction is 50-55%.  Grade II or moderate diastolic dysfunction.  There is borderline-mild global hypokinesia of the left ventricle.  Normal right ventricle size and systolic function.  The left atrium is mild to moderately dilated.  There is mild (1+) mitral regurgitation.  No hemodynamically significant valvular abnormalities on 2D or color Doppler  images.  IVC diameter <2.1 cm collapsing >50% with sniff suggests a normal RA pressure  of 3 mmHg.     When compared to previous study on 2-8-2025, left ventricular systolic  function is mildly improved.     Physical Examination Review of Systems   /66 (BP Location: Left arm, Patient Position: Sitting, Cuff Size: Adult Regular)   Pulse 63   Wt 68.9 kg (152 lb)   SpO2 97%   BMI 24.91 kg/m    Body mass index is 24.91 kg/m .  Wt Readings from Last 3 Encounters:   07/02/25 68.9 kg (152 lb)   06/11/25 68.4 kg (150 lb 11.2 oz)   05/12/25 71.7 kg (158 lb)     General Appearance:   no distress, normal body habitus   ENT/Mouth: membranes moist, no oral lesions or bleeding gums.      EYES:  no scleral icterus, normal conjunctivae   Neck: no  thyromegaly   Chest/Lungs:   lungs are clear to auscultation, no rales or wheezing, equal chest wall expansion    Cardiovascular:   Hear rate regular. Normal first and second heart sounds with no murmurs, rubs, or gallops; Jugular venous pressure flat, no edema bilaterally    Abdomen:  no organomegaly, masses, bruits, or tenderness; bowel sounds are present   Extremities   no cyanosis or clubbing    CMS intact.   Skin: no xanthelasma, warm.    Neurologic: Alert and  oriented X 3 no tremors   Psychiatric: calm and cooperative                                                   Negative unless noted in HPI     Medical History  Surgical History Family History Social History   Past Medical History:   Diagnosis Date    CAD (coronary artery disease)     CHF (congestive heart failure) (H)     COPD exacerbation (H) 07/31/2022    PAD (peripheral artery disease)     Pneumothorax on right 01/05/2025    Past Surgical History:   Procedure Laterality Date    APPENDECTOMY      CERVICAL SPINE SURGERY      CHOLECYSTECTOMY      CV CORONARY ANGIOGRAM N/A 1/8/2025    Procedure: Coronary Angiogram;  Surgeon: Jamie Noriega MD;  Location: [facility] CATH LAB CV    CV INTRAVASULAR ULTRASOUND N/A 1/8/2025    Procedure: Intravascular Ultrasound;  Surgeon: Jamie Noriega MD;  Location: [facility] CATH LAB CV    CV LEFT HEART CATH N/A 1/8/2025    Procedure: Left Heart Catheterization;  Surgeon: Jamie Noriega MD;  Location: [facility] LAB CV    CV PCI STENT DRUG ELUTING N/A 1/8/2025    Procedure: Percutaneous Coronary Intervention Stent;  Surgeon: Jamie Noriega MD;  Location: [facility] CATH LAB CV    FEMORAL ENDARTERECTOMY      LUNG CANCER SURGERY      PROSTATECTOMY      Family History   Problem Relation Age of Onset    Cancer No family hx of     Social History     Socioeconomic History    Marital status:      Spouse name: Not on file    Number of children: Not on file    Years of education: Not on file    Highest education level: Not on file   Occupational History    Not on file   Tobacco Use    Smoking status: Every Day     Current packs/day: 0.50     Types: Cigarettes     Passive exposure: Current    Smokeless tobacco: Never   Vaping Use    Vaping status: Never Used   Substance and Sexual Activity    Alcohol use: Not Currently    Drug use: Not Currently    Sexual activity: Not on file   Other Topics Concern    Not on file   Social History Narrative    Not on file     Social Drivers of  Health     Financial Resource Strain: Low Risk  (3/21/2025)    Financial Resource Strain     Within the past 12 months, have you or your family members you live with been unable to get utilities (heat, electricity) when it was really needed?: No   Food Insecurity: Low Risk  (3/21/2025)    Food Insecurity     Within the past 12 months, did you worry that your food would run out before you got money to buy more?: No     Within the past 12 months, did the food you bought just not last and you didn t have money to get more?: No   Transportation Needs: Low Risk  (3/21/2025)    Transportation Needs     Within the past 12 months, has lack of transportation kept you from medical appointments, getting your medicines, non-medical meetings or appointments, work, or from getting things that you need?: No   Physical Activity: Not on file   Stress: Not on file   Social Connections: Not on file   Interpersonal Safety: High Risk (3/21/2025)    Interpersonal Safety     Do you feel physically and emotionally safe where you currently live?: No     Within the past 12 months, have you been hit, slapped, kicked or otherwise physically hurt by someone?: No     Within the past 12 months, have you been humiliated or emotionally abused in other ways by your partner or ex-partner?: No   Housing Stability: High Risk (3/21/2025)    Housing Stability     Do you have housing? : No     Are you worried about losing your housing?: No          Medications  Allergies   Current Outpatient Medications   Medication Sig Dispense Refill    acetaminophen (TYLENOL) 500 MG tablet Take 500-1,000 mg by mouth every 6 hours as needed for mild pain.      amitriptyline (ELAVIL) 25 MG tablet Take 1 tablet (25 mg) by mouth at bedtime. 90 tablet 1    aspirin 81 MG EC tablet Take 1 tablet (81 mg) by mouth daily. Start tomorrow. 30 tablet 3    atorvastatin (LIPITOR) 80 MG tablet TAKE 1 TABLET BY MOUTH EVERY NIGHT AT BEDTIME 90 tablet 1    betamethasone dipropionate  (DIPROSONE) 0.05 % external cream Apply topically as needed.      bisoprolol (ZEBETA) 5 MG tablet Take 1 tablet (5 mg) by mouth daily. 90 tablet 3    BREYNA 160-4.5 MCG/ACT inhaler INHALE 2 PUFFS INTO THE LUNGS TWICE DAILY 10.2 g 3    cetirizine (ZYRTEC) 10 MG tablet Take 10 mg by mouth every morning.      cholecalciferol, vitamin D3, (VITAMIN D3) 25 mcg (1,000 unit) capsule [CHOLECALCIFEROL, VITAMIN D3, (VITAMIN D3) 25 MCG (1,000 UNIT) CAPSULE] Take 1,000 Units by mouth daily.      clopidogrel (PLAVIX) 75 MG tablet Take 1 tablet (75 mg) by mouth daily. Dose to start tomorrow. 90 tablet 3    cyanocobalamin 1000 MCG tablet [CYANOCOBALAMIN 1000 MCG TABLET] Take 1,000 mcg by mouth daily.      fluticasone (FLONASE) 50 MCG/ACT nasal spray Spray 2 sprays into both nostrils daily. 16 g 3    furosemide (LASIX) 40 MG tablet Take 1 tablet daily. May take extra 1 tablet as needed for weight gain > 3 lbs with shortness of breath, bloating and leg swelling 135 tablet 3    ipratropium - albuterol 0.5 mg/2.5 mg/3 mL (DUONEB) 0.5-2.5 (3) MG/3ML neb solution Take 1 vial (3 mLs) by nebulization every 4 hours as needed for shortness of breath, wheezing or cough.      LORazepam (ATIVAN) 0.5 MG tablet Take 1 tablet (0.5 mg) by mouth every 4 hours as needed for anxiety (shortness of breath). 10 tablet 0    losartan (COZAAR) 25 MG tablet Take 1 tablet (25 mg) by mouth 2 times daily. 90 tablet 2    oxyBUTYnin ER (DITROPAN XL) 10 MG 24 hr tablet Take 10 mg by mouth every other day.      pantoprazole (PROTONIX) 40 MG EC tablet TAKE 1 TABLET BY MOUTH ONCE DAILY 90 tablet 2    pregabalin (LYRICA) 150 MG capsule Take 1 capsule (150 mg) by mouth 3 times daily as needed. 270 capsule 1    sertraline (ZOLOFT) 25 MG tablet TAKE 1 TABLET(25 MG) BY MOUTH DAILY 90 tablet 1    spironolactone (ALDACTONE) 25 MG tablet Take 1 tablet (25 mg) by mouth daily. 90 tablet 3    Allergies   Allergen Reactions    Adhesive Tape Unknown     Adhesive- pulls skin off           Lab Results    Chemistry/lipid CBC Cardiac Enzymes/BNP/TSH/INR   Lab Results   Component Value Date    CHOL 87 02/08/2025    HDL 38 (L) 02/08/2025    TRIG 107 02/08/2025    BUN 20.9 05/07/2025     05/07/2025    CO2 28 05/07/2025    Lab Results   Component Value Date    WBC 4.7 06/11/2025    HGB 12.5 (L) 06/11/2025    HCT 40.6 06/11/2025    MCV 91 06/11/2025     (L) 06/11/2025    Lab Results   Component Value Date    TROPONINI 0.02 07/31/2022    BNP 74 07/31/2022    TSH 1.01 02/08/2025    INR 1.07 06/11/2025        28  minutes spent on the date of encounter doing chart review, review of test results, interpretation with above tests, patient visit, and documentation.        This note has been dictated using voice recognition software. Any grammatical, typographical, or context distortions are unintentional and inherent to the software

## 2025-07-02 NOTE — TELEPHONE ENCOUNTER
Patient Quality Outreach    Patient is due for the following:   Physical Annual Wellness Visit    Action(s) Taken:   No follow up needed at this time.  Pt was just seen for a med check in June- Next visit giovana be in Dec and will do AWV at that time    Type of outreach:    Chart review performed, no outreach needed.    Questions for provider review:    None         Anni Canales MA  Chart routed to None.

## 2025-07-02 NOTE — PATIENT INSTRUCTIONS
Ubaldo Ramos,    It was a pleasure to see you today at the [facility].     My recommendations after this visit include:    - Changed furosemide from 60 mg daily to 40 mg 1 tablet daily. May take extra 40 mg 1 tablet as needed for weight gain > 3 lbs with shortness of breath, bloating and leg swelling     - Low sodium diet < 2000 mg/day, daily weight monitoring, and maintain adequate fluid intake at 50 to 60 ounces per day    -Please call if you experience persistent weight gain 2 to 3 pounds 2 days in a row or 5 pounds in a week with shortness of breath, abdominal bloating and leg swelling    - Follow up with Dr. Lau as recommended in November 2025    - Follow up with Nato Becker CNP in 8-9 months     - Please call Heart Failure Nurse Line at [phone], if you have any questions or concerns

## 2025-07-02 NOTE — LETTER
7/2/2025    John Oconnor, NP  [address] [place] [address]    RE: Ubaldo Ramos       Dear Colleague,     I had the pleasure of seeing Ubaldo Ramos in the [facility].          Assessment/Recommendations   Assessment:    #  Heart failure with improved/preserved ejection fraction with LVEF of 50 to 55% per echo in March 2025, ischemic cardiomyopathy, NYHA Class II:    Current weight is 147-149 lbs.     Patient is well compensated on exam.    Heart failure regimen includes:    -Beta-blocker therapy with bisoprolol 5 mg daily    -ARB  therapy with losartan 25 mg twice a day    -Aldosterone blocker/MRA therapy with spironolactone 25 mg daily-tolerating    - Not on SGLT2 Inhibitor -Jardiance stopped due to cost issue.  Did not qualify for financial aid per patient.    -Diuretic therapy with furosemide 60 mg daily-taking only 40 mg once a day    #   Coronary artery disease with status post PCI/JIA to mid LAD on 1/8/2025: Stress test negative for inducible myocardial ischemia in March 2025.  No chest pain.    # Hypertension: Blood pressure improved and stable.    #Tobacco abuse: Smokes half a pack a day.  He has no plans to quit    #  Peripheral artery disease: Patient has follow-up appointment with vascular clinic.    Plan/Recommendation:  - Changed furosemide from 60 mg daily to 40 mg 1 tablet daily. May take extra 40 mg 1 tablet as needed for weight gain > 3 lbs with shortness of breath, bloating and leg swelling   - Low sodium diet < 2000 mg/day, daily weight monitoring, and maintain adequate fluid intake at 50 to 60 ounces per day  -Please call if you experience persistent weight gain 2 to 3 pounds 2 days in a row or 5 pounds in a week with shortness of breath, abdominal bloating and leg swelling    - Follow up with Dr. Lau as recommended in November 2025    - Follow up with Nato Becker CNP in 8-9 months      The longitudinal plan of care for heart failure with  "preserved/improved ejection fraction, hypertension was addressed during this visit.?Due to the added complexity in care, I will continue to support Ubaldo Ramos in the subsequent management of this condition(s) and with the ongoing continuity of care of this condition(s)\".     History of Present Illness/Subjective    Mr. Ubaldo Ramos is a 79 year old male with a past medical history of prostate cancer, lung cancer, cervical spine fusion, COPD, peripheral artery disease with status post bilateral lower extremity stenting and right to left femoral to femoral artery bypass done in [place] and known severe right internal carotid artery stenosis per carotid ultrasound in July 2024 follows up with vascular clinic, hypertension, recent hospitalization in January with close rib fractures secondary to fall, pneumothorax, new onset of cardiomyopathy with LVEF of 35 to 40%, rehospitalization in January with acute coronary syndrome, non-STEMI with status post PCI/JIA to mid LAD on 1/8/2025, rehospitalization in February with acute hypoxic respiratory failure, parainfluenza, acute COPD exacerbation and acute systolic heart failure, and acute on chronic systolic heart failure, most recent hospitalization in March with hematuria, bradycardia, COPD exacerbation, and acute heart failure with improved/preserved ejection fraction,  who is seen at [facility] for continued heart failure follow-up.      During last heart failure clinic visit, patient reported significant improvement in his heart failure symptoms since recent hospitalization in March.  He reported 5 pounds weight gain with increased lower extremity edema.  He reported chronic shortness of breath on exertion at baseline due to COPD and smoking.     Today, Ubaldo reports improvement in his lower extremity edema since last heart failure clinic visit.  He has been tolerating increased dose of spironolactone with no adverse effects.  He " states he did not increase his furosemide as recommended.  His chronic shortness of breath at baseline which he thinks is due to COPD and smoking.        He denies fatigue, lightheadedness, shortness of breath, orthopnea, PND, palpitations, chest pain, abdominal fullness/bloating, and lower extremity edema.  He does not report any bleeding complications    He is trying to follow some diet.  He reports adequate fluid intake.    ECHO from 3/28/25-Reviewed:   Interpretation Summary   The left ventricle is normal in size.  There is normal left ventricular wall thickness.  The visual ejection fraction is 50-55%.  Grade II or moderate diastolic dysfunction.  There is borderline-mild global hypokinesia of the left ventricle.  Normal right ventricle size and systolic function.  The left atrium is mild to moderately dilated.  There is mild (1+) mitral regurgitation.  No hemodynamically significant valvular abnormalities on 2D or color Doppler  images.  IVC diameter <2.1 cm collapsing >50% with sniff suggests a normal RA pressure  of 3 mmHg.     When compared to previous study on 2-8-2025, left ventricular systolic  function is mildly improved.     Physical Examination Review of Systems   /66 (BP Location: Left arm, Patient Position: Sitting, Cuff Size: Adult Regular)   Pulse 63   Wt 68.9 kg (152 lb)   SpO2 97%   BMI 24.91 kg/m    Body mass index is 24.91 kg/m .  Wt Readings from Last 3 Encounters:   07/02/25 68.9 kg (152 lb)   06/11/25 68.4 kg (150 lb 11.2 oz)   05/12/25 71.7 kg (158 lb)     General Appearance:   no distress, normal body habitus   ENT/Mouth: membranes moist, no oral lesions or bleeding gums.      EYES:  no scleral icterus, normal conjunctivae   Neck: no  thyromegaly   Chest/Lungs:   lungs are clear to auscultation, no rales or wheezing, equal chest wall expansion    Cardiovascular:   Hear rate regular. Normal first and second heart sounds with no murmurs, rubs, or gallops; Jugular venous pressure  flat, no edema bilaterally    Abdomen:  no organomegaly, masses, bruits, or tenderness; bowel sounds are present   Extremities   no cyanosis or clubbing    CMS intact.   Skin: no xanthelasma, warm.    Neurologic: Alert and oriented X 3 no tremors   Psychiatric: calm and cooperative                                                   Negative unless noted in HPI     Medical History  Surgical History Family History Social History   Past Medical History:   Diagnosis Date     CAD (coronary artery disease)      CHF (congestive heart failure) (H)      COPD exacerbation (H) 07/31/2022     PAD (peripheral artery disease)      Pneumothorax on right 01/05/2025    Past Surgical History:   Procedure Laterality Date     APPENDECTOMY       CERVICAL SPINE SURGERY       CHOLECYSTECTOMY       CV CORONARY ANGIOGRAM N/A 1/8/2025    Procedure: Coronary Angiogram;  Surgeon: Jamie Noriega MD;  Location: [facility] CATH [facility] CV     CV INTRAVASULAR ULTRASOUND N/A 1/8/2025    Procedure: Intravascular Ultrasound;  Surgeon: Jamie Noriega MD;  Location: [facility] LAB CV     CV LEFT HEART CATH N/A 1/8/2025    Procedure: Left Heart Catheterization;  Surgeon: Jamie Noriega MD;  Location: [facility] CV     CV PCI STENT DRUG ELUTING N/A 1/8/2025    Procedure: Percutaneous Coronary Intervention Stent;  Surgeon: Jamie Noriega MD;  Location: [facility] CATH [facility] CV     FEMORAL ENDARTERECTOMY       LUNG CANCER SURGERY       PROSTATECTOMY      Family History   Problem Relation Age of Onset     Cancer No family hx of     Social History     Socioeconomic History     Marital status:      Spouse name: Not on file     Number of children: Not on file     Years of education: Not on file     Highest education level: Not on file   Occupational History     Not on file   Tobacco Use     Smoking status: Every Day     Current packs/day: 0.50     Types: Cigarettes     Passive exposure: Current     Smokeless tobacco: Never   Vaping Use      Vaping status: Never Used   Substance and Sexual Activity     Alcohol use: Not Currently     Drug use: Not Currently     Sexual activity: Not on file   Other Topics Concern     Not on file   Social History Narrative     Not on file     Social Drivers of Health     Financial Resource Strain: Low Risk  (3/21/2025)    Financial Resource Strain      Within the past 12 months, have you or your family members you live with been unable to get utilities (heat, electricity) when it was really needed?: No   Food Insecurity: Low Risk  (3/21/2025)    Food Insecurity      Within the past 12 months, did you worry that your food would run out before you got money to buy more?: No      Within the past 12 months, did the food you bought just not last and you didn t have money to get more?: No   Transportation Needs: Low Risk  (3/21/2025)    Transportation Needs      Within the past 12 months, has lack of transportation kept you from medical appointments, getting your medicines, non-medical meetings or appointments, work, or from getting things that you need?: No   Physical Activity: Not on file   Stress: Not on file   Social Connections: Not on file   Interpersonal Safety: High Risk (3/21/2025)    Interpersonal Safety      Do you feel physically and emotionally safe where you currently live?: No      Within the past 12 months, have you been hit, slapped, kicked or otherwise physically hurt by someone?: No      Within the past 12 months, have you been humiliated or emotionally abused in other ways by your partner or ex-partner?: No   Housing Stability: High Risk (3/21/2025)    Housing Stability      Do you have housing? : No      Are you worried about losing your housing?: No          Medications  Allergies   Current Outpatient Medications   Medication Sig Dispense Refill     acetaminophen (TYLENOL) 500 MG tablet Take 500-1,000 mg by mouth every 6 hours as needed for mild pain.       amitriptyline (ELAVIL) 25 MG tablet Take 1 tablet  (25 mg) by mouth at bedtime. 90 tablet 1     aspirin 81 MG EC tablet Take 1 tablet (81 mg) by mouth daily. Start tomorrow. 30 tablet 3     atorvastatin (LIPITOR) 80 MG tablet TAKE 1 TABLET BY MOUTH EVERY NIGHT AT BEDTIME 90 tablet 1     betamethasone dipropionate (DIPROSONE) 0.05 % external cream Apply topically as needed.       bisoprolol (ZEBETA) 5 MG tablet Take 1 tablet (5 mg) by mouth daily. 90 tablet 3     BREYNA 160-4.5 MCG/ACT inhaler INHALE 2 PUFFS INTO THE LUNGS TWICE DAILY 10.2 g 3     cetirizine (ZYRTEC) 10 MG tablet Take 10 mg by mouth every morning.       cholecalciferol, vitamin D3, (VITAMIN D3) 25 mcg (1,000 unit) capsule [CHOLECALCIFEROL, VITAMIN D3, (VITAMIN D3) 25 MCG (1,000 UNIT) CAPSULE] Take 1,000 Units by mouth daily.       clopidogrel (PLAVIX) 75 MG tablet Take 1 tablet (75 mg) by mouth daily. Dose to start tomorrow. 90 tablet 3     cyanocobalamin 1000 MCG tablet [CYANOCOBALAMIN 1000 MCG TABLET] Take 1,000 mcg by mouth daily.       fluticasone (FLONASE) 50 MCG/ACT nasal spray Spray 2 sprays into both nostrils daily. 16 g 3     furosemide (LASIX) 40 MG tablet Take 1 tablet daily. May take extra 1 tablet as needed for weight gain > 3 lbs with shortness of breath, bloating and leg swelling 135 tablet 3     ipratropium - albuterol 0.5 mg/2.5 mg/3 mL (DUONEB) 0.5-2.5 (3) MG/3ML neb solution Take 1 vial (3 mLs) by nebulization every 4 hours as needed for shortness of breath, wheezing or cough.       LORazepam (ATIVAN) 0.5 MG tablet Take 1 tablet (0.5 mg) by mouth every 4 hours as needed for anxiety (shortness of breath). 10 tablet 0     losartan (COZAAR) 25 MG tablet Take 1 tablet (25 mg) by mouth 2 times daily. 90 tablet 2     oxyBUTYnin ER (DITROPAN XL) 10 MG 24 hr tablet Take 10 mg by mouth every other day.       pantoprazole (PROTONIX) 40 MG EC tablet TAKE 1 TABLET BY MOUTH ONCE DAILY 90 tablet 2     pregabalin (LYRICA) 150 MG capsule Take 1 capsule (150 mg) by mouth 3 times daily as needed.  270 capsule 1     sertraline (ZOLOFT) 25 MG tablet TAKE 1 TABLET(25 MG) BY MOUTH DAILY 90 tablet 1     spironolactone (ALDACTONE) 25 MG tablet Take 1 tablet (25 mg) by mouth daily. 90 tablet 3    Allergies   Allergen Reactions     Adhesive Tape Unknown     Adhesive- pulls skin off          Lab Results    Chemistry/lipid CBC Cardiac Enzymes/BNP/TSH/INR   Lab Results   Component Value Date    CHOL 87 02/08/2025    HDL 38 (L) 02/08/2025    TRIG 107 02/08/2025    BUN 20.9 05/07/2025     05/07/2025    CO2 28 05/07/2025    Lab Results   Component Value Date    WBC 4.7 06/11/2025    HGB 12.5 (L) 06/11/2025    HCT 40.6 06/11/2025    MCV 91 06/11/2025     (L) 06/11/2025    Lab Results   Component Value Date    TROPONINI 0.02 07/31/2022    BNP 74 07/31/2022    TSH 1.01 02/08/2025    INR 1.07 06/11/2025        28  minutes spent on the date of encounter doing chart review, review of test results, interpretation with above tests, patient visit, and documentation.        This note has been dictated using voice recognition software. Any grammatical, typographical, or context distortions are unintentional and inherent to the software          Thank you for allowing me to participate in the care of your patient.      Sincerely,     KAYLEEN Pizano CNP     [facility]  cc:   KAYLEEN Pizano CNP  [address]  [place] [id number]

## 2025-07-03 ENCOUNTER — TRANSFERRED RECORDS (OUTPATIENT)
Dept: HEALTH INFORMATION MANAGEMENT | Facility: CLINIC | Age: 80
End: 2025-07-03
Payer: MEDICARE

## 2025-07-07 ENCOUNTER — TRANSFERRED RECORDS (OUTPATIENT)
Dept: HEALTH INFORMATION MANAGEMENT | Facility: CLINIC | Age: 80
End: 2025-07-07
Payer: MEDICARE

## 2025-07-07 DIAGNOSIS — R10.13 DYSPEPSIA: ICD-10-CM

## 2025-07-07 RX ORDER — PANTOPRAZOLE SODIUM 40 MG/1
40 TABLET, DELAYED RELEASE ORAL DAILY
Qty: 90 TABLET | Refills: 2 | Status: SHIPPED | OUTPATIENT
Start: 2025-07-07

## 2025-07-11 DIAGNOSIS — I50.9 ACUTE ON CHRONIC CONGESTIVE HEART FAILURE, UNSPECIFIED HEART FAILURE TYPE (H): ICD-10-CM

## 2025-07-13 RX ORDER — LOSARTAN POTASSIUM 25 MG/1
25 TABLET ORAL 2 TIMES DAILY
Qty: 180 TABLET | Refills: 2 | Status: SHIPPED | OUTPATIENT
Start: 2025-07-13

## 2025-07-24 ENCOUNTER — TELEPHONE (OUTPATIENT)
Dept: FAMILY MEDICINE | Facility: CLINIC | Age: 80
End: 2025-07-24
Payer: MEDICARE

## 2025-07-24 NOTE — TELEPHONE ENCOUNTER
Order/Referral Request    Who is requesting: [facility]    Orders being requested: hold and bridge Plavix    Reason service is needed/diagnosis: due to EGD 9/2/25 , to start on 8/26    When are orders needed by: prior to 8/26      Please call [facility] with verbal ok [phone] ok to leave detailed message

## 2025-07-25 DIAGNOSIS — Z95.5 STATUS POST INSERTION OF DRUG-ELUTING STENT INTO LEFT ANTERIOR DESCENDING (LAD) ARTERY: ICD-10-CM

## 2025-07-25 RX ORDER — CLOPIDOGREL BISULFATE 75 MG/1
75 TABLET ORAL DAILY
Qty: 90 TABLET | Refills: 3 | OUTPATIENT
Start: 2025-07-25

## 2025-07-28 RX ORDER — CLOPIDOGREL BISULFATE 75 MG/1
75 TABLET ORAL DAILY
Qty: 90 TABLET | Refills: 2 | Status: SHIPPED | OUTPATIENT
Start: 2025-07-28

## 2025-07-28 NOTE — TELEPHONE ENCOUNTER
"Contacts       Contact Date/Time Type Contact Phone/Fax    07/24/2025 02:02 PM CDT Phone (Incoming) CLAUDE Mason [phone]    07/28/2025 03:22 PM CDT Phone (Outgoing) CLAUDE Mason [phone]    Left Message           Attempted to reach [place] to: Relay a message    Regarding: hold and bridge orders     Action to take: OK to relay (verbatim):   Jamie Noriega MD  7/25/25  8:15 AM  Can it wait until January 2026, 12 months after implant date. There is no \"bridging\" protocol for DAPT.Jamie Noriega MD on 7/25/2025 at 8:15 AM    Left detail VM on [facility] anticoag line per Dr. Noriega left message with his office number [phone] to call back with any further questions as PCP dose not manage this medication      "

## 2025-07-28 NOTE — TELEPHONE ENCOUNTER
Spoke with the patient's wife. The medication was denied due to having refills on file, but upon further investigation this medication was sent to a different state. The patient has not been on this medication since it was prescribed as they didn't know anything about it other than it appearing on his medication list. She was informed he needs to be on this medication daily once a refill is received, unless they hear differently from cardiology. She verbalized understanding. Routing high priority to the patient's cardiologist to fill this medication if appropriate.

## 2025-07-28 NOTE — TELEPHONE ENCOUNTER
"07/28/2025    Pt's wife called to check on the status of the refill request. TC advised that we do have the refill request (Received on 07/25/2025) and the turn around time is 1-3 business days. It appears this was \"doned\" but Rx was not sent. Sending this back to [facility] RN pool.     Yenni Jamiosn      "

## 2025-08-07 ENCOUNTER — TRANSFERRED RECORDS (OUTPATIENT)
Dept: HEALTH INFORMATION MANAGEMENT | Facility: CLINIC | Age: 80
End: 2025-08-07
Payer: MEDICARE

## 2025-08-14 ENCOUNTER — ANCILLARY PROCEDURE (OUTPATIENT)
Dept: VASCULAR ULTRASOUND | Facility: CLINIC | Age: 80
End: 2025-08-14
Attending: SURGERY
Payer: MEDICARE

## 2025-08-14 DIAGNOSIS — I73.9 PERIPHERAL ARTERY DISEASE: ICD-10-CM

## 2025-08-14 DIAGNOSIS — I65.29 STENOSIS OF CAROTID ARTERY, UNSPECIFIED LATERALITY: ICD-10-CM

## 2025-08-14 PROCEDURE — 93923 UPR/LXTR ART STDY 3+ LVLS: CPT | Mod: 26 | Performed by: SURGERY

## 2025-08-14 PROCEDURE — 93880 EXTRACRANIAL BILAT STUDY: CPT

## 2025-08-14 PROCEDURE — 93925 LOWER EXTREMITY STUDY: CPT | Mod: 26 | Performed by: SURGERY

## 2025-08-14 PROCEDURE — 93880 EXTRACRANIAL BILAT STUDY: CPT | Mod: 26 | Performed by: SURGERY

## 2025-08-14 PROCEDURE — 93923 UPR/LXTR ART STDY 3+ LVLS: CPT

## 2025-08-14 PROCEDURE — 93925 LOWER EXTREMITY STUDY: CPT

## 2025-08-19 DIAGNOSIS — N32.81 OVERACTIVE BLADDER: ICD-10-CM

## 2025-08-20 ENCOUNTER — TELEPHONE (OUTPATIENT)
Dept: FAMILY MEDICINE | Facility: CLINIC | Age: 80
End: 2025-08-20
Payer: MEDICARE

## 2025-08-20 DIAGNOSIS — N32.81 OVERACTIVE BLADDER: Primary | ICD-10-CM

## 2025-08-20 RX ORDER — OXYBUTYNIN CHLORIDE 10 MG/1
10 TABLET, EXTENDED RELEASE ORAL EVERY OTHER DAY
Qty: 90 TABLET | Refills: 3 | Status: SHIPPED | OUTPATIENT
Start: 2025-08-20

## 2025-08-20 RX ORDER — OXYBUTYNIN CHLORIDE 10 MG/1
10 TABLET, EXTENDED RELEASE ORAL DAILY
Qty: 90 TABLET | Refills: 3 | OUTPATIENT
Start: 2025-08-20

## 2025-08-22 ENCOUNTER — MEDICAL CORRESPONDENCE (OUTPATIENT)
Dept: HEALTH INFORMATION MANAGEMENT | Facility: CLINIC | Age: 80
End: 2025-08-22
Payer: MEDICARE

## 2026-08-22 ENCOUNTER — TRANSFERRED RECORDS (OUTPATIENT)
Dept: HEALTH INFORMATION MANAGEMENT | Facility: CLINIC | Age: 81
End: 2026-08-22
Payer: MEDICARE

## (undated) DEVICE — WIRE GUIDE HI-TRQ  WHISPER MS JTIP 0.014"X190CM 1005357HJ

## (undated) DEVICE — SLEEVE TR BAND RADIAL COMPRESSION DEVICE 24CM TRB24-REG

## (undated) DEVICE — CATH DIAGNOSTIC RADIAL 5FR TIG 4.0

## (undated) DEVICE — SHTH INTRO 0.021IN ID 6FR DIA

## (undated) DEVICE — ELECTRODE DEFIB CADENCE 22550R

## (undated) DEVICE — MANIFOLD KIT ANGIO AUTOMATED 014613

## (undated) DEVICE — CUSTOM PACK CORONARY SAN5BCRHEA

## (undated) DEVICE — EXCHANGE WIRE .035 260 STAR/JFC/035/260/ M001491681

## (undated) DEVICE — CATH BALLOON EMERGE 2.5X15MM H7493918915250

## (undated) DEVICE — GUIDEWIRE VASC 0.014"X190CML 3CML TIP AHW14R104S

## (undated) DEVICE — CATH BALLOON NC EMERGE 3.00X12MM H7493926712300

## (undated) DEVICE — CATH LAUNCHER 6FR EBU 4.0 LA6EBU40

## (undated) DEVICE — VALVE HEMOSTASIS .096" COPILOT MECH 1003331

## (undated) DEVICE — CATH IVUS OPTICROSS HD 6 3.6FR 1.18MM DIA 135CML H7493935408

## (undated) DEVICE — CATH RX TAKERU PTCA BALLOON 1.5X12MM DC-RY1512UA1

## (undated) DEVICE — SYR ANGIOGRAPHY MULTIUSE KIT ACIST 014612

## (undated) DEVICE — KIT HAND CONTROL ACIST 014644 AR-P54

## (undated) DEVICE — DEVICE INFLATION SYR W/ HEMOSTASIS VALVE 12IN EXT IN4904

## (undated) RX ORDER — FENTANYL CITRATE 50 UG/ML
INJECTION, SOLUTION INTRAMUSCULAR; INTRAVENOUS
Status: DISPENSED
Start: 2025-01-08

## (undated) RX ORDER — CLOPIDOGREL 300 MG/1
TABLET, FILM COATED ORAL
Status: DISPENSED
Start: 2025-01-08

## (undated) RX ORDER — FENTANYL CITRATE 50 UG/ML
INJECTION, SOLUTION INTRAMUSCULAR; INTRAVENOUS
Status: DISPENSED
Start: 2024-11-12